# Patient Record
Sex: FEMALE | Race: BLACK OR AFRICAN AMERICAN | Employment: OTHER | URBAN - METROPOLITAN AREA
[De-identification: names, ages, dates, MRNs, and addresses within clinical notes are randomized per-mention and may not be internally consistent; named-entity substitution may affect disease eponyms.]

---

## 2017-02-03 ENCOUNTER — HOSPITAL ENCOUNTER (OUTPATIENT)
Dept: MAMMOGRAPHY | Age: 67
Discharge: HOME | End: 2017-02-03
Attending: INTERNAL MEDICINE
Payer: COMMERCIAL

## 2017-02-03 DIAGNOSIS — R92.8 ABNORMAL MAMMOGRAM: ICD-10-CM

## 2017-02-03 PROCEDURE — 77066 DX MAMMO INCL CAD BI: CPT

## 2017-02-08 RX ORDER — LISINOPRIL 10 MG/1
TABLET ORAL
Qty: 90 TAB | Refills: 1 | Status: SHIPPED | OUTPATIENT
Start: 2017-02-08 | End: 2017-06-29 | Stop reason: SDUPTHER

## 2017-02-09 ENCOUNTER — HOSPITAL ENCOUNTER (OUTPATIENT)
Dept: MRI IMAGING | Age: 67
End: 2017-02-09
Attending: OTOLARYNGOLOGY

## 2017-02-11 ENCOUNTER — APPOINTMENT (OUTPATIENT)
Dept: GENERAL RADIOLOGY | Age: 67
End: 2017-02-11
Attending: EMERGENCY MEDICINE

## 2017-02-11 ENCOUNTER — HOSPITAL ENCOUNTER (EMERGENCY)
Age: 67
Discharge: HOME | End: 2017-02-11
Attending: EMERGENCY MEDICINE

## 2017-02-11 VITALS
SYSTOLIC BLOOD PRESSURE: 187 MMHG | DIASTOLIC BLOOD PRESSURE: 86 MMHG | BODY MASS INDEX: 39.56 KG/M2 | OXYGEN SATURATION: 95 % | TEMPERATURE: 98.4 F | WEIGHT: 215 LBS | HEART RATE: 96 BPM | RESPIRATION RATE: 22 BRPM | HEIGHT: 62 IN

## 2017-02-11 DIAGNOSIS — J10.1 INFLUENZA A: Primary | ICD-10-CM

## 2017-02-11 LAB
INFLUENZA A AG (POC): POSITIVE
INFLUENZA AG (POC) NEGATIVE CTRL.: ABNORMAL
INFLUENZA AG (POC) POSITIVE CTRL.: ABNORMAL
INFLUENZA B AG (POC): NEGATIVE
LOT NUMBER POC: ABNORMAL

## 2017-02-11 RX ORDER — OSELTAMIVIR PHOSPHATE 75 MG/1
75 CAPSULE ORAL 2 TIMES DAILY
Qty: 10 CAP | Refills: 0 | Status: SHIPPED | OUTPATIENT
Start: 2017-02-11 | End: 2017-02-16

## 2017-02-11 RX ORDER — BENZONATATE 100 MG/1
100 CAPSULE ORAL
Qty: 30 CAP | Refills: 0 | Status: SHIPPED | OUTPATIENT
Start: 2017-02-11 | End: 2017-02-18

## 2017-02-11 RX ORDER — AMOXICILLIN 500 MG/1
500 CAPSULE ORAL 3 TIMES DAILY
COMMUNITY
End: 2017-06-11

## 2017-02-11 RX ORDER — ALBUTEROL SULFATE 5 MG/ML
2.5 SOLUTION RESPIRATORY (INHALATION)
Qty: 0.5 ML | Refills: 0 | Status: SHIPPED
Start: 2017-02-11 | End: 2019-12-16 | Stop reason: SDUPTHER

## 2017-02-11 NOTE — LETTER
NOTIFICATION RETURN TO WORK / SCHOOL 
 
2/11/2017 4:19 PM 
 
Ms. Ivan Lucas [address] To Whom It May Concern: 
 
Ivan Lucas is currently under the care of [address] [place] [address]. She will return to work/school on: 2/14/17 If there are questions or concerns please have the patient contact our office. Sincerely, Jared Reyes.  DO

## 2017-02-11 NOTE — DISCHARGE INSTRUCTIONS
Influenza (Flu): Care Instructions  Your Care Instructions  Influenza (flu) is an infection in the lungs and breathing passages. It is caused by the influenza virus. There are different strains, or types, of the flu virus from year to year. Unlike the common cold, the flu comes on suddenly and the symptoms, such as a cough, congestion, fever, chills, fatigue, aches, and pains, are more severe. These symptoms may last up to 10 days. Although the flu can make you feel very sick, it usually doesn't cause serious health problems. Home treatment is usually all you need for flu symptoms. But your doctor may prescribe antiviral medicine to prevent other health problems, such as pneumonia, from developing. Older people and those who have a long-term health condition, such as lung disease, are most at risk for having pneumonia or other health problems. Follow-up care is a key part of your treatment and safety. Be sure to make and go to all appointments, and call your doctor if you are having problems. Its also a good idea to know your test results and keep a list of the medicines you take. How can you care for yourself at home? · Get plenty of rest.  · Drink plenty of fluids, enough so that your urine is light yellow or clear like water. If you have kidney, heart, or liver disease and have to limit fluids, talk with your doctor before you increase the amount of fluids you drink. · Take an over-the-counter pain medicine if needed, such as acetaminophen (Tylenol), ibuprofen (Advil, Motrin), or naproxen (Aleve), to relieve fever, headache, and muscle aches. Read and follow all instructions on the label. No one younger than 20 should take aspirin. It has been linked to Reye syndrome, a serious illness. · Do not smoke. Smoking can make the flu worse. If you need help quitting, talk to your doctor about stop-smoking programs and medicines. These can increase your chances of quitting for good.   · Breathe moist air from a hot shower or from a sink filled with hot water to help clear a stuffy nose. · Before you use cough and cold medicines, check the label. These medicines may not be safe for young children or for people with certain health problems. · If the skin around your nose and lips becomes sore, put some petroleum jelly on the area. · To ease coughing:  ¨ Drink fluids to soothe a scratchy throat. ¨ Suck on cough drops or plain hard candy. ¨ Take an over-the-counter cough medicine that contains dextromethorphan to help you get some sleep. Read and follow all instructions on the label. ¨ Raise your head at night with an extra pillow. This may help you rest if coughing keeps you awake. · Take any prescribed medicine exactly as directed. Call your doctor if you think you are having a problem with your medicine. To avoid spreading the flu  · Wash your hands regularly, and keep your hands away from your face. · Stay home from school, work, and other public places until you are feeling better and your fever has been gone for at least 24 hours. The fever needs to have gone away on its own without the help of medicine. · Ask people living with you to talk to their doctors about preventing the flu. They may get antiviral medicine to keep from getting the flu from you. · To prevent the flu in the future, get a flu vaccine every fall. Encourage people living with you to get the vaccine. · Cover your mouth when you cough or sneeze. When should you call for help? Call 911 anytime you think you may need emergency care. For example, call if:  · You have severe trouble breathing. Call your doctor now or seek immediate medical care if:  · You have new or worse trouble breathing. · You seem to be getting much sicker. · You feel very sleepy or confused. · You have a new or higher fever. · You get a new rash.   Watch closely for changes in your health, and be sure to contact your doctor if:  · You begin to get better and then get worse. · You are not getting better after 1 week. Where can you learn more? Go to http://mynor-mary kay.info/. Enter J916 in the search box to learn more about \"Influenza (Flu): Care Instructions. \"  Current as of: May 23, 2016  Content Version: 11.1  © 7513-7180 [employer]. Care instructions adapted under license by [employer] (which disclaims liability or warranty for this information). If you have questions about a medical condition or this instruction, always ask your healthcare professional. [address] any warranty or liability for your use of this information.

## 2017-02-11 NOTE — UC PROVIDER NOTE
Patient is a 61 y.o. female presenting with cough. The history is provided by the patient. Cough   This is a new problem. The current episode started more than 2 days ago (since Wed night-HA with cough and confestion and wheezing-getting worse). The problem occurs constantly. The problem has been gradually worsening. The cough is non-productive. Patient reports a subjective fever - was not measured. Associated symptoms include chest pain (hurts to cough), rhinorrhea and wheezing. Pertinent negatives include no sore throat, no myalgias and no shortness of breath. She has tried decongestants for the symptoms. She is not a smoker. Her past medical history is significant for asthma. Past Medical History   Diagnosis Date    Asthma     CAD (coronary artery disease)      \"mild\" per Dr Hoang Willoughby note    Diabetes [facility])      Dr Nicole Schrader     Hypertension     Screening for colon cancer 2/16/05     Dr Burk Sees in 10 years    Stroke 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     Thyroid disease     Unspecified sleep apnea      uses CPAP        Past Surgical History   Procedure Laterality Date    Cardiac catheterization  6/6/2012          Hx hysterectomy      Hx hemorrhoidectomy      Hx orthopaedic  8/2011     left shoulder         Family History   Problem Relation Age of Onset    Diabetes Mother     Cancer Mother     Breast Cancer Mother 79    Heart Disease Father     Hypertension Father     Stroke Father     Coronary Artery Disease Brother 54        Social History     Social History    Marital status: SINGLE     Spouse name: N/A    Number of children: N/A    Years of education: N/A     Occupational History    Not on file.      Social History Main Topics    Smoking status: Former Smoker     Quit date: 9/17/2004    Smokeless tobacco: Never Used    Alcohol use No    Drug use: No    Sexual activity: Not on file     Other Topics Concern    Not on file     Social History Narrative                ALLERGIES: Latex; Codeine; Contrast dye [iodine]; and Seafood [shellfish containing products]    Review of Systems   Constitutional: Positive for fatigue and fever. HENT: Positive for congestion and rhinorrhea. Negative for sore throat. Respiratory: Positive for cough and wheezing. Negative for shortness of breath. Cardiovascular: Positive for chest pain (hurts to cough). Musculoskeletal: Negative for myalgias. Vitals:    02/11/17 1510   BP: (!) 207/90   Pulse: 90   Resp: 22   Temp: 98.4 °F (36.9 °C)   SpO2: 95%   Weight: 97.5 kg (215 lb)   Height: 5' 2\" (1.575 m)       Physical Exam   Constitutional: She is oriented to person, place, and time. She appears well-developed and well-nourished. No distress. Uncomfortable but nontoxic   HENT:   Head: Normocephalic. Mouth/Throat: Oropharynx is clear and moist. No oropharyngeal exudate. Nasal congestion without sinus pain and PND is presesnt   Eyes: Conjunctivae and EOM are normal.   Cardiovascular: Normal rate, regular rhythm and normal heart sounds. No murmur heard. Pulmonary/Chest: Effort normal. No respiratory distress. She has wheezes (some with exertion but clears at rest). She has no rales. She exhibits no tenderness. Abdominal: Bowel sounds are normal.   Musculoskeletal: Normal range of motion. She exhibits no edema. Neurological: She is alert and oriented to person, place, and time. Skin: Skin is warm. No erythema. Psychiatric: She has a normal mood and affect. Her behavior is normal.   Nursing note and vitals reviewed. MDM     Differential Diagnosis; Clinical Impression; Plan:     CLINICAL IMPRESSION:  Influenza A  (primary encounter diagnosis)    Plan:  1. tamiflu  2. albuterol  3.  rest  Close fu  Amount and/or Complexity of Data Reviewed:   Clinical lab tests:  Ordered and reviewed  Tests in the radiology section of CPT®:  Ordered  Risk of Significant Complications, Morbidity, and/or Mortality:   Presenting problems: Moderate  Management options:   Moderate  Progress:   Patient progress:  Stable      Procedures

## 2017-02-17 ENCOUNTER — HOSPITAL ENCOUNTER (OUTPATIENT)
Dept: MRI IMAGING | Age: 67
Discharge: HOME | End: 2017-02-17
Attending: OTOLARYNGOLOGY
Payer: COMMERCIAL

## 2017-02-17 DIAGNOSIS — R22.0 HEAD SWELLING: ICD-10-CM

## 2017-02-17 DIAGNOSIS — K11.20 SIALOADENITIS: ICD-10-CM

## 2017-02-17 DIAGNOSIS — Z78.9 NONSMOKER: ICD-10-CM

## 2017-02-17 DIAGNOSIS — R22.1 NECK SWELLING: ICD-10-CM

## 2017-02-17 PROCEDURE — 70543 MRI ORBT/FAC/NCK W/O &W/DYE: CPT

## 2017-02-17 PROCEDURE — 74011250636 HC RX REV CODE- 250/636: Performed by: OTOLARYNGOLOGY

## 2017-02-17 PROCEDURE — A9585 GADOBUTROL INJECTION: HCPCS | Performed by: OTOLARYNGOLOGY

## 2017-02-17 RX ADMIN — GADOBUTROL 10 ML: 604.72 INJECTION INTRAVENOUS at 17:40

## 2017-03-22 ENCOUNTER — TELEPHONE (OUTPATIENT)
Dept: INTERNAL MEDICINE CLINIC | Age: 67
End: 2017-03-22

## 2017-05-19 ENCOUNTER — OFFICE VISIT (OUTPATIENT)
Dept: SLEEP MEDICINE | Age: 67
End: 2017-05-19

## 2017-05-19 VITALS
DIASTOLIC BLOOD PRESSURE: 72 MMHG | BODY MASS INDEX: 40.85 KG/M2 | HEIGHT: 62 IN | OXYGEN SATURATION: 97 % | HEART RATE: 62 BPM | WEIGHT: 222 LBS | SYSTOLIC BLOOD PRESSURE: 116 MMHG

## 2017-05-19 DIAGNOSIS — G47.33 OBSTRUCTIVE SLEEP APNEA (ADULT) (PEDIATRIC): Primary | ICD-10-CM

## 2017-05-19 DIAGNOSIS — I10 ESSENTIAL HYPERTENSION: ICD-10-CM

## 2017-05-19 DIAGNOSIS — Z79.4 TYPE 2 DIABETES MELLITUS WITH COMPLICATION, WITH LONG-TERM CURRENT USE OF INSULIN (HCC): ICD-10-CM

## 2017-05-19 DIAGNOSIS — E11.8 TYPE 2 DIABETES MELLITUS WITH COMPLICATION, WITH LONG-TERM CURRENT USE OF INSULIN (HCC): ICD-10-CM

## 2017-05-19 RX ORDER — INSULIN GLARGINE 100 [IU]/ML
42 INJECTION, SOLUTION SUBCUTANEOUS
COMMUNITY
Start: 2017-03-06 | End: 2017-12-20 | Stop reason: ALTCHOICE

## 2017-05-19 RX ORDER — BLOOD SUGAR DIAGNOSTIC
STRIP MISCELLANEOUS
COMMUNITY
Start: 2017-04-16 | End: 2019-06-01 | Stop reason: ALTCHOICE

## 2017-05-19 RX ORDER — PEN NEEDLE, DIABETIC 31 GX5/16"
NEEDLE, DISPOSABLE MISCELLANEOUS
COMMUNITY
Start: 2017-03-21

## 2017-05-19 NOTE — PROGRESS NOTES
[address] [facility] [place]. [place], [address]  Tel.  [phone]  Fax. [address] [facility] [address]  [place], [address] [facility]  Tel.  [phone]  Fax. [phone] [address]  Tel.  [phone]  Fax. [phone]       Subjective:      Tamiko Vieira is a 61 y.o. female who I am asked to see in consultation for evaluation and management of sleep apnea. She was diagnosed with sleep apnea 10 years ago. She is using her device regularly. She complains of snoring, snorting, periods of not breathing if she doesn't use her CPAP associated with feels sleepy during the day. Symptoms began 2 years ago, gradually worsening since that time. She usually can fall asleep in 5 minutes. Family or house members note snoring, snorting. She denies falling asleep while at work, driving. There are moderate  mask comfort problems. The following problems are noted with PAP:     Drowsiness no Problems exhaling no   Snoring No not if she wears machine Forget to put on no   Mask Comfortable Yes, uses murphy Lt but doesn't like the marks it leaves on her face Can't fall asleep no   Dry Mouth no Mask falls off no   Air Leaking yes Frequent awakenings no     Makayla Garcia does not wake up frequently at night. She is not bothered by waking up too early and left unable to get back to sleep. She actually sleeps about 7 hours at night and wakes up about 1 times during the night. She does work shifts:  First Shift. Anamikabradambrocio indicates she does not get too little sleep at night. Her bedtime is 2300. She awakens at 0530. She does (sometimes) take naps. She takes 2 naps a week lasting 2. She has the following observed behaviors: Loud snoring;  . Other remarks:    She is about to retire at the end of the year. She works for the state in .   Leopold Sleepiness Score: 4      Allergies   Allergen Reactions    Latex Itching    Codeine Itching and Other (comments)     hallucinate    Contrast Dye [Iodine] Rash and Itching     Given pre-cardiac cath    Seafood [Shellfish Containing Products] Swelling         Current Outpatient Prescriptions:     ONETOUCH ULTRA TEST strip, , Disp: , Rfl:     LANTUS SOLOSTAR 100 unit/mL (3 mL) pen, , Disp: , Rfl:     BD INSULIN PEN NEEDLE UF SHORT 31 gauge x 5/16\" ndle, , Disp: , Rfl:     albuterol (PROVENTIL) 5 mg/mL nebulizer solution, 0.5 mL by Nebulization route every four (4) hours as needed for Wheezing., Disp: 0.5 mL, Rfl: 0    lisinopril (PRINIVIL, ZESTRIL) 10 mg tablet, TAKE 1 TABLET DAILY, Disp: 90 Tab, Rfl: 1    empagliflozin (JARDIANCE) 25 mg tablet, Take  by mouth daily. , Disp: , Rfl:     triamterene-hydroCHLOROthiazide (DYAZIDE) 37.5-25 mg per capsule, TAKE 1 CAPSULE TWICE A DAY, Disp: 180 Cap, Rfl: 3    metoprolol tartrate (LOPRESSOR) 25 mg tablet, TAKE 1 TABLET TWICE A DAY, Disp: 180 Tab, Rfl: 2    albuterol (ACCUNEB) 1.25 mg/3 mL nebu, USE 1 VIAL EVERY 4 HOURS AS NEEDED FOR WHEEZING, Disp: 225 mL, Rfl: 3    ADVAIR DISKUS 100-50 mcg/dose diskus inhaler, USE 1 INHALATION TWO TIMES A DAY, Disp: 180 Each, Rfl: 3    PROAIR HFA 90 mcg/actuation inhaler, USE 1 INHALATION EVERY 4 HOURS AS NEEDED WHEEZING OR SHORTNESS OF BREATH, Disp: 25.5 Inhaler, Rfl: 2    amLODIPine (NORVASC) 10 mg tablet, TAKE 1 TABLET DAILY, Disp: 90 Tab, Rfl: 3    SYNTHROID 137 mcg tablet, Take 137 mcg by mouth daily. , Disp: , Rfl:     ergocalciferol (VITAMIN D2) 50,000 unit capsule, Take 50,000 Units by mouth every Monday., Disp: , Rfl:     rosuvastatin (CRESTOR) 20 mg tablet, Take 20 mg by mouth nightly., Disp: , Rfl:     fluticasone (FLONASE) 50 mcg/actuation nasal spray, 2 sprays each morning., Disp: 1 Bottle, Rfl: 1    insulin glargine (LANTUS) 100 unit/mL Soln, 36 Units by SubCUTAneous route nightly., Disp: , Rfl:     insulin lispro (HUMALOG) 100 unit/mL Soln, by SubCUTAneous route.  22 units tid ( Sliding scale), Disp: , Rfl:    aspirin delayed-release 81 mg tablet, Take 81 mg by mouth as needed. , Disp: , Rfl:     amoxicillin (AMOXIL) 500 mg capsule, Take 500 mg by mouth three (3) times daily. , Disp: , Rfl:     buPROPion SR (WELLBUTRIN SR) 150 mg SR tablet, Take  by mouth two (2) times a day., Disp: , Rfl:     ALPRAZolam (XANAX) 0.25 mg tablet, Take 1 Tab by mouth two (2) times daily as needed for Anxiety. Max Daily Amount: 0.5 mg., Disp: 20 Tab, Rfl: 0    scopolamine (TRANSDERM-SCOP) 1.5 mg (1 mg over 3 days) pt3d, 1 Patch by TransDERmal route every seventy-two (72) hours. , Disp: 4 Patch, Rfl: 0    canagliflozin (INVOKANA) 100 mg tablet, Take 300 mg by mouth Daily (before breakfast). , Disp: , Rfl:      She  has a past medical history of Asthma; CAD (coronary artery disease); Diabetes ([facility] [address] Hypertension; Screening for colon cancer (2/16/05); Stroke [facility]) (2004); [facility] ([facility]); Thyroid disease; and Unspecified sleep apnea. She  has a past surgical history that includes cardiac catheterization (6/6/2012); hysterectomy; hemorrhoidectomy; orthopaedic (8/2011); heent; and cardiac surg procedure unlist.    She family history includes Breast Cancer (age of onset: 79) in her mother; Cancer in her mother; Coronary Artery Disease (age of onset: 54) in her brother; Diabetes in her mother; Heart Disease in her father; Hypertension in her father; Stroke in her father. She  reports that she quit smoking about 12 years ago. She has never used smokeless tobacco. She reports that she does not drink alcohol or use illicit drugs. Review of Systems:  Constitutional:  +30 pound weight gain. Eyes:  No blurred vision.   CVS:  No significant chest pain  Pulm:  No significant shortness of breath, occasional wheezing, she is treated for asthma  GI:  No significant nausea or vomiting  :  No significant nocturia  Musculoskeletal:  No significant joint pain at night  Skin:  No significant rashes  Neuro:  No significant dizziness , she has nerve pain in feet from diabetes  Psych:  No active mood issues    Sleep Review of Systems: notable for no difficulty falling asleep; infrequent awakenings at night;  occasional dreaming noted; no nightmares ; no early morning headaches ; no memory problems; no concentration issues; no history of any automobile or occupational accidents due to daytime drowsiness. Objective:     Visit Vitals    /72    Pulse 62    Ht 5' 2\" (1.575 m)    Wt 222 lb (100.7 kg)    SpO2 97%    BMI 40.6 kg/m2         General:   Not in acute distress   Eyes:  Anicteric sclerae, no obvious strabismus   Nose:  No obvious nasal septum deviation    Oropharynx:   Class 3 oropharyngeal outlet, thick tongue base, enlarged and boggy uvula, low-lying soft palate, narrow tonsilo-pharyngeal pilars   Tonsils:   tonsils are absent   Neck:   Neck circ. in \"inches\": 15.5; midline trachea   Chest/Lungs:  Equal lung expansion, clear on auscultation    CVS:  Normal rate, regular rhythm; no JVD   Skin:  Warm to touch; no obvious rashes   Neuro:  No focal deficits ; no obvious tremor    Psych:  Normal affect,  normal countenance;          Assessment:       ICD-10-CM ICD-9-CM    1. Obstructive sleep apnea (adult) (pediatric) G47.33 327.23 SLEEP STUDY UNATTENDED, 4 CHANNEL   2. Essential hypertension I10 401.9    3. Type 2 diabetes mellitus with complication, with long-term current use of insulin ([facility]) E11.8 250.90     Z79.4 V58.67        Plan:         I have ordered a home sleep apnea test.   I will call her with the results. Treatment options for sleep apnea were reviewed. She will need a replacement CPAP. We talked about the features available with the newer devices. We also talked about newer styles of nasal pillows. I have counseled the patient regarding the benefits of weight loss. Counseling was provided regarding the importance of regular PAP use and on proper sleep hygiene and safe driving.   2. Hypertension - she continues on her current regimen. I have reviewed the relationship between hypertension as it relates to sleep-disordered breathing. 3. Type II diabetes - she continues on her current regimen. She is now trying to get her sugar under control. I have reviewed the relationship between sleep disordered breathing as it relates to diabetes. Thank you for allowing us to participate in your patient's medical care.   Darwin Jerez MD  Diplomate in Sleep Medicine  [facility]

## 2017-05-19 NOTE — PATIENT INSTRUCTIONS
[address] [facility] [place]. [place], [address]  Tel.  [phone]  Fax. 100 [facility] 60  [place], [address] [facility]  Tel.  [phone]  Fax. [phone] [address] [facility] 3 Nikita Crenshaw   Tel.  [phone]  Fax. [phone]     Sleep Apnea: After Your Visit  Your Care Instructions  Sleep apnea occurs when you frequently stop breathing for 10 seconds or longer during sleep. It can be mild to severe, based on the number of times per hour that you stop breathing or have slowed breathing. Blocked or narrowed airways in your nose, mouth, or throat can cause sleep apnea. Your airway can become blocked when your throat muscles and tongue relax during sleep. Sleep apnea is common, occurring in 1 out of 20 individuals. Individuals having any of the following characteristics should be evaluated and treated right away due to high risk and detrimental consequences from untreated sleep apnea:  1. Obesity  2. Congestive Heart failure  3. Atrial Fibrillation  4. Uncontrolled Hypertension  5. Type II Diabetes  6. Night-time Arrhythmias  7. Stroke  8. Pulmonary Hypertension  9. High-risk Driving Populations (pilots, truck drivers, etc.)  10. Patients Considering Weight-loss Surgery    How do you know you have sleep apnea? You probably have sleep apnea if you answer 'yes' to 3 or more of the following questions:  S - Have you been told that you Snore? T - Are you often Tired during the day? O - Has anyone Observed you stop breathing while sleeping? P- Do you have (or are being treated for) high blood Pressure? B - Are you obese (Body Mass Index > 35)? A - Is your Age 48years old or older? N - Is your Neck size greater than 16 inches? G - Are you male Gender? A sleep physician can prescribe a breathing device that prevents tissues in the throat from blocking your airway.  Or your doctor may recommend using a dental device (oral breathing device) to help keep your airway open. In some cases, surgery may be needed to remove enlarged tissues in the throat. Follow-up care is a key part of your treatment and safety. Be sure to make and go to all appointments, and call your doctor if you are having problems. It's also a good idea to know your test results and keep a list of the medicines you take. How can you care for yourself at home? · Lose weight, if needed. It may reduce the number of times you stop breathing or have slowed breathing. · Go to bed at the same time every night. · Sleep on your side. It may stop mild apnea. If you tend to roll onto your back, sew a pocket in the back of your pajama top. Put a tennis ball into the pocket, and stitch the pocket shut. This will help keep you from sleeping on your back. · Avoid alcohol and medicines such as sleeping pills and sedatives before bed. · Do not smoke. Smoking can make sleep apnea worse. If you need help quitting, talk to your doctor about stop-smoking programs and medicines. These can increase your chances of quitting for good. · Prop up the head of your bed 4 to 6 inches by putting bricks under the legs of the bed. · Treat breathing problems, such as a stuffy nose, caused by a cold or allergies. · Use a continuous positive airway pressure (CPAP) breathing machine if lifestyle changes do not help your apnea and your doctor recommends it. The machine keeps your airway from closing when you sleep. · If CPAP does not help you, ask your doctor whether you should try other breathing machines. A bilevel positive airway pressure machine has two types of air pressureâone for breathing in and one for breathing out. Another device raises or lowers air pressure as needed while you breathe. · If your nose feels dry or bleeds when using one of these machines, talk with your doctor about increasing moisture in the air. A humidifier may help.   · If your nose is runny or stuffy from using a breathing machine, talk with your doctor about using decongestants or a corticosteroid nasal spray. When should you call for help? Watch closely for changes in your health, and be sure to contact your doctor if:  · You still have sleep apnea even though you have made lifestyle changes. · You are thinking of trying a device such as CPAP. · You are having problems using a CPAP or similar machine. Where can you learn more? Go to [employer]en[employer]. Enter S708 in the search box to learn more about \"Sleep Apnea: After Your Visit. \"   © 8766-9568 Healthwise, [employer] Care instructions adapted under license by Kendall Pandey (which disclaims liability or warranty for this information). This care instruction is for use with your licensed healthcare professional. If you have questions about a medical condition or this instruction, always ask your healthcare professional. [employer] any warranty or liability for your use of this information. PROPER SLEEP HYGIENE    What to avoid  · Do not have drinks with caffeine, such as coffee or black tea, for 8 hours before bed. · Do not smoke or use other types of tobacco near bedtime. Nicotine is a stimulant and can keep you awake. · Avoid drinking alcohol late in the evening, because it can cause you to wake in the middle of the night. · Do not eat a big meal close to bedtime. If you are hungry, eat a light snack. · Do not drink a lot of water close to bedtime, because the need to urinate may wake you up during the night. · Do not read or watch TV in bed. Use the bed only for sleeping and sexual activity. What to try  · Go to bed at the same time every night, and wake up at the same time every morning. Do not take naps during the day. · Keep your bedroom quiet, dark, and cool. · Get regular exercise, but not within 3 to 4 hours of your bedtime. .  · Sleep on a comfortable pillow and mattress.   · If watching the clock makes you anxious, turn it facing away from you so you cannot see the time. · If you worry when you lie down, start a worry book. Well before bedtime, write down your worries, and then set the book and your concerns aside. · Try meditation or other relaxation techniques before you go to bed. · If you cannot fall asleep, get up and go to another room until you feel sleepy. Do something relaxing. Repeat your bedtime routine before you go to bed again. · Make your house quiet and calm about an hour before bedtime. Turn down the lights, turn off the TV, log off the computer, and turn down the volume on music. This can help you relax after a busy day. Drowsy Driving  The [address] [place] [address] Road Traffic Safety Administration cites drowsiness as a causing factor in more than 297,683 police reported crashes annually, resulting in 76,000 injuries and 1,500 deaths. Other surveys suggest 55% of people polled have driven while drowsy in the past year, 23% had fallen asleep but not crashed, 3% crashed, and 2% had and accident due to drowsy driving. Who is at risk? Young Drivers: One study of drowsy driving accidents states that 55% of the drivers were under 25 years. Of those, 75% were male. Shift Workers and Travelers: People who work overnight or travel across time zones frequently are at higher risk of experiencing Circadian Rhythm Disorders. They are trying to work and function when their body is programed to sleep. Sleep Deprived: Lack of sleep has a serious impact on your ability to pay attention or focus on a task. Consistently getting less than the average of 8 hours your body needs creates partial or cumulative sleep deprivation. Untreated Sleep Disorders: Sleep Apnea, Narcolepsy, R.L.S., and other sleep disorders (untreated) prevent a person from getting enough restful sleep. This leads to excessive daytime sleepiness and increases the risk for drowsy driving accidents by up to 7 times.   Medications / Alcohol: Even over the counter medications can cause drowsiness. Medications that impair a drivers attention should have a warning label. Alcohol naturally makes you sleepy and on its own can cause accidents. Combined with excessive drowsiness its effects are amplified. Signs of Drowsy Driving:   * You don't remember driving the last few miles   * You may drift out of your blake   * You are unable to focus and your thoughts wander   * You may yawn more often than normal   * You have difficulty keeping your eyes open / nodding off   * Missing traffic signs, speeding, or tailgating  Prevention-   Good sleep hygiene, lifestyle and behavioral choices have the most impact on drowsy driving. There is no substitute for sleep and the average person requires 8 hours nightly. If you find yourself driving drowsy, stop and sleep. Consider the sleep hygiene tips provided during your visit as well. Medication Refill Policy: Refills for all medications require 1 week advance notice. Please have your pharmacy fax a refill request. We are unable to fax, or call in \"controled substance\" medications and you will need to pick these prescriptions up from our office. [employer] Activation    Thank you for requesting access to [employer]. Please follow the instructions below to securely access and download your online medical record. [employer] allows you to send messages to your doctor, view your test results, renew your prescriptions, schedule appointments, and more. How Do I Sign Up? 1. In your internet browser, go to [email or web address]. 2. Click on the First Time User? Click Here link in the Sign In box. You will see the New Member Sign Up page. 3. Enter your [employer] Access Code exactly as it appears below. You will not need to use this code after youve completed the sign-up process. If you do not sign up before the expiration date, you must request a new code.     [employer] Access Code: [email or web address]  Expires: 8/17/2017 10:01 AM (This is the date your iLike access code will )    4. Enter the last four digits of your Social Security Number (xxxx) and Date of Birth (mm/dd/yyyy) as indicated and click Submit. You will be taken to the next sign-up page. 5. Create a tripJanet ID. This will be your iLike login ID and cannot be changed, so think of one that is secure and easy to remember. 6. Create a iLike password. You can change your password at any time. 7. Enter your Password Reset Question and Answer. This can be used at a later time if you forget your password. 8. Enter your e-mail address. You will receive e-mail notification when new information is available in [address] 9. Click Sign Up. You can now view and download portions of your medical record. 10. Click the Download Summary menu link to download a portable copy of your medical information. Additional Information    If you have questions, please call [phone]. Remember, iLike is NOT to be used for urgent needs. For medical emergencies, dial 911.

## 2017-05-19 NOTE — PROGRESS NOTES
[address] [facility] [place]. [place], [address]  Tel.  [phone]  Fax. 100 [facility] 60  [place], [address] [facility]  Tel.  [phone]  Fax. [phone] [address] [facility] Nikita Crenshaw   Tel.  [phone]  Fax. [phone]       S>Makayla Martinez is a 61 y.o. female seen today to receive a home sleep testing unit (HST). · Patient was educated on proper hookup and operation of the HST. · Instruction forms and documentation were reviewed and signed. · The patient demonstrated good understanding of the HST. O>    Visit Vitals    /72    Pulse 62    Ht 5' 2\" (1.575 m)    Wt 222 lb (100.7 kg)    SpO2 97%    BMI 40.6 kg/m2    Neck circ. in \"inches\": 15.5    A>  1. Obstructive sleep apnea (adult) (pediatric)    2. Essential hypertension    3. Type 2 diabetes mellitus with complication, with long-term current use of insulin ([facility])          P>  · General information regarding operations and maintenance of the device was provided. · She was provided information on sleep apnea including coresponding risk factors and the importance of proper treatment. · Follow-up appointment was made to return the HST. She will be contacted once the results have been reviewed. · She was asked to contact our office for any problems regarding her home sleep test study.

## 2017-05-22 ENCOUNTER — TELEPHONE (OUTPATIENT)
Dept: SLEEP MEDICINE | Age: 67
End: 2017-05-22

## 2017-05-22 NOTE — TELEPHONE ENCOUNTER
Patient returned HSAT today, her machine broke last week and needs order for new device ASAP. She reports she is not able to sleep without device.  We will need sleep study results in order to set her up with new DME

## 2017-05-23 ENCOUNTER — TELEPHONE (OUTPATIENT)
Dept: SLEEP MEDICINE | Age: 67
End: 2017-05-23

## 2017-05-23 DIAGNOSIS — G47.33 OBSTRUCTIVE SLEEP APNEA (ADULT) (PEDIATRIC): Primary | ICD-10-CM

## 2017-05-23 NOTE — TELEPHONE ENCOUNTER
The results of her home sleep study were reviewed. The results were positive for significant sleep disordered breathing. Treatment options were reviewed in detail. she would like to proceed with PAP therapy. I have placed an order for APAP. she will be seen in follow-up in 6 weeks to gauge treatment response and adherence to therapy. All of her questions were addressed. Please call dme and expedite her set up.  She is also interested in a rental for short term since she cannot sleep without the machine and hers broke

## 2017-05-23 NOTE — TELEPHONE ENCOUNTER
HSAT Returned    Date of Study: 5/19/2017    The following information was gathered from the patients study log:    · Lights off: 10:31 PM  · Estimated sleep onset: 10:44 PM    · Awakened a total of 3 times  · The patient felt they slept 8.5 hours  · Patient took nothing before starting the test  · Sleep quality was worse compared to a usual nights sleep. Further information provided: Increase in snoring because of device?

## 2017-05-24 ENCOUNTER — DOCUMENTATION ONLY (OUTPATIENT)
Dept: SLEEP MEDICINE | Age: 67
End: 2017-05-24

## 2017-05-31 ENCOUNTER — TELEPHONE (OUTPATIENT)
Dept: SLEEP MEDICINE | Age: 67
End: 2017-05-31

## 2017-05-31 NOTE — TELEPHONE ENCOUNTER
Scheduled patient for adherence visit. She stated she is having issues with using device due to high pressure settings, she would like a decrease in pressure.

## 2017-06-07 NOTE — TELEPHONE ENCOUNTER
Call placed by Sleep Educator per  request for assessment. Patient is 100% usage over past (12 days). Patient is doing very well with her new device and is feeling better than with her previus device. Patient was asked to contact our office for any problems regarding there PAP therapy.

## 2017-06-11 ENCOUNTER — HOSPITAL ENCOUNTER (EMERGENCY)
Age: 67
Discharge: HOME | End: 2017-06-11
Attending: FAMILY MEDICINE

## 2017-06-11 VITALS
OXYGEN SATURATION: 97 % | HEIGHT: 62 IN | WEIGHT: 224 LBS | SYSTOLIC BLOOD PRESSURE: 189 MMHG | HEART RATE: 89 BPM | TEMPERATURE: 97.2 F | DIASTOLIC BLOOD PRESSURE: 75 MMHG | RESPIRATION RATE: 18 BRPM | BODY MASS INDEX: 41.22 KG/M2

## 2017-06-11 DIAGNOSIS — J31.0 RHINITIS, UNSPECIFIED TYPE: Primary | ICD-10-CM

## 2017-06-11 RX ORDER — MONTELUKAST SODIUM 10 MG/1
10 TABLET ORAL DAILY
Qty: 30 TAB | Refills: 0 | Status: SHIPPED | OUTPATIENT
Start: 2017-06-11 | End: 2019-06-21 | Stop reason: SDUPTHER

## 2017-06-11 RX ORDER — MOMETASONE FUROATE 50 UG/1
2 SPRAY, METERED NASAL DAILY
Qty: 1 CONTAINER | Refills: 0 | Status: SHIPPED | OUTPATIENT
Start: 2017-06-11 | End: 2019-01-22

## 2017-06-11 RX ORDER — BENZONATATE 100 MG/1
100 CAPSULE ORAL
Qty: 30 CAP | Refills: 0 | Status: SHIPPED | OUTPATIENT
Start: 2017-06-11 | End: 2017-06-18

## 2017-06-11 NOTE — DISCHARGE INSTRUCTIONS
Rhinitis: Care Instructions  Your Care Instructions  Rhinitis is swelling and irritation in the nose. Allergies and infections are often the cause. Your nose may run or feel stuffy. Other symptoms are itchy and sore eyes, ears, throat, and mouth. If allergies are the cause, your doctor may do tests to find out what you are allergic to. You may be able to stop symptoms if you avoid the things that cause them. Your doctor may suggest or prescribe medicine to ease your symptoms. Follow-up care is a key part of your treatment and safety. Be sure to make and go to all appointments, and call your doctor if you are having problems. It's also a good idea to know your test results and keep a list of the medicines you take. How can you care for yourself at home? · If your rhinitis is caused by allergies, try to find out what sets off (triggers) your symptoms. Take steps to avoid these triggers. ¨ Avoid yard work. It can stir up both pollen and mold. ¨ Do not smoke or allow others to smoke around you. If you need help quitting, talk to your doctor about stop-smoking programs and medicines. These can increase your chances of quitting for good. ¨ Do not use aerosol sprays, cleaning products, or perfumes. ¨ If pollen is one of your triggers, close your house and car windows during blooming season. ¨ Clean your house often to control dust.  ¨ Keep pets outside. · If your doctor recommends over-the-counter medicines to relieve symptoms, take your medicines exactly as prescribed. Call your doctor if you think you are having a problem with your medicine. · Use saline (saltwater) nasal washes to help keep your nasal passages open and wash out mucus and bacteria. You can buy saline nose drops at a grocery store or drugstore. Or you can make your own at home by adding 1 teaspoon of salt and 1 teaspoon of baking soda to 2 cups of distilled water.  If you make your own, fill a bulb syringe with the solution, insert the tip into your nostril, and squeeze gently. Surinder White your nose. When should you call for help? Call your doctor now or seek immediate medical care if:  · You are having trouble breathing. Watch closely for changes in your health, and be sure to contact your doctor if:  · Mucus from your nose gets thicker (like pus) or has new blood in it. · You have new or worse symptoms. · You do not get better as expected. Where can you learn more? Go to http://mynor-mary kay.info/. Enter M030 in the search box to learn more about \"Rhinitis: Care Instructions. \"  Current as of: July 29, 2016  Content Version: 11.2  © 4834-7165 [employer], [employer]. Care instructions adapted under license by [employer] (which disclaims liability or warranty for this information). If you have questions about a medical condition or this instruction, always ask your healthcare professional. [address] any warranty or liability for your use of this information.

## 2017-06-11 NOTE — UC PROVIDER NOTE
Patient is a 61 y.o. female presenting with sinus pain. The history is provided by the patient. Sinus Pain    This is a new problem. The current episode started 2 days ago. The problem has not changed since onset. There has been no fever. The pain is at a severity of 3/10. Associated symptoms include congestion, sinus pressure and rhinorrhea. Pertinent negatives include no chills, no sweats and no ear pain. She has tried nothing for the symptoms. 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     Thyroid disease     Unspecified sleep apnea     uses CPAP        Past Surgical History:   Procedure Laterality Date    CARDIAC CATHETERIZATION  6/6/2012         CARDIAC SURG PROCEDURE UNLIST      heart surgery    HX HEENT      tonsillectomy    HX HEMORRHOIDECTOMY      HX HYSTERECTOMY      HX ORTHOPAEDIC  8/2011    left shoulder         Family History   Problem Relation Age of Onset    Diabetes Mother     Cancer Mother     Breast Cancer Mother 79    Heart Disease Father     Hypertension Father     Stroke Father     Coronary Artery Disease Brother 54        Social History     Social History    Marital status: SINGLE     Spouse name: N/A    Number of children: N/A    Years of education: N/A     Occupational History    Not on file. Social History Main Topics    Smoking status: Former Smoker     Quit date: 9/17/2004    Smokeless tobacco: Never Used    Alcohol use No    Drug use: No    Sexual activity: Not on file     Other Topics Concern    Not on file     Social History Narrative                ALLERGIES: Latex; Codeine;  Contrast dye [iodine]; and Seafood [shellfish containing products]    Review of Systems   Constitutional: Negative for chills. HENT: Positive for congestion, rhinorrhea and sinus pressure. Negative for ear pain. Vitals:    06/11/17 1126   BP: 189/75   Pulse: 89   Resp: 18   Temp: 97.2 °F (36.2 °C)   SpO2: 97%   Weight: 101.6 kg (224 lb)   Height: 5' 2\" (1.575 m)       Physical Exam   Constitutional: She is oriented to person, place, and time. She appears well-developed and well-nourished. HENT:   Head: Normocephalic and atraumatic. Eyes: Conjunctivae and EOM are normal.   Cardiovascular: Normal rate, regular rhythm and normal heart sounds. Pulmonary/Chest: Effort normal and breath sounds normal. No respiratory distress. She has no wheezes. She has no rales. She exhibits no tenderness. Neurological: She is alert and oriented to person, place, and time. Skin: Skin is warm and dry. Psychiatric: She has a normal mood and affect. Her behavior is normal. Judgment and thought content normal.   Nursing note and vitals reviewed. MDM     Differential Diagnosis; Clinical Impression; Plan:     CLINICAL IMPRESSION:  Rhinitis, unspecified type  (primary encounter diagnosis)    Plan:  1. singulair   2. nasonex   3. Tessalon   Risk of Significant Complications, Morbidity, and/or Mortality:   Presenting problems: Moderate  Diagnostic procedures: Moderate  Management options:   Moderate  Progress:   Patient progress:  Stable      Procedures

## 2017-06-15 ENCOUNTER — HOSPITAL ENCOUNTER (EMERGENCY)
Age: 67
Discharge: HOME | End: 2017-06-15
Attending: FAMILY MEDICINE

## 2017-06-15 VITALS
WEIGHT: 224 LBS | OXYGEN SATURATION: 97 % | DIASTOLIC BLOOD PRESSURE: 65 MMHG | HEART RATE: 92 BPM | HEIGHT: 62 IN | BODY MASS INDEX: 41.22 KG/M2 | SYSTOLIC BLOOD PRESSURE: 145 MMHG | RESPIRATION RATE: 18 BRPM | TEMPERATURE: 97.6 F

## 2017-06-15 DIAGNOSIS — J01.90 ACUTE SINUSITIS, RECURRENCE NOT SPECIFIED, UNSPECIFIED LOCATION: ICD-10-CM

## 2017-06-15 DIAGNOSIS — H10.31 ACUTE BACTERIAL CONJUNCTIVITIS OF RIGHT EYE: Primary | ICD-10-CM

## 2017-06-15 RX ORDER — GENTAMICIN SULFATE 3 MG/ML
1 SOLUTION/ DROPS OPHTHALMIC EVERY 4 HOURS
Qty: 5 ML | Refills: 0 | Status: SHIPPED | OUTPATIENT
Start: 2017-06-15 | End: 2017-12-20 | Stop reason: ALTCHOICE

## 2017-06-15 RX ORDER — FLUTICASONE PROPIONATE 50 MCG
SPRAY, SUSPENSION (ML) NASAL
Qty: 1 BOTTLE | Refills: 0 | Status: SHIPPED | OUTPATIENT
Start: 2017-06-15 | End: 2019-01-22

## 2017-06-15 RX ORDER — AMOXICILLIN 875 MG/1
875 TABLET, FILM COATED ORAL 2 TIMES DAILY
Qty: 20 TAB | Refills: 0 | Status: SHIPPED | OUTPATIENT
Start: 2017-06-15 | End: 2017-06-25

## 2017-06-15 NOTE — DISCHARGE INSTRUCTIONS
Pinkeye: Care Instructions  Your Care Instructions    Pinkeye is redness and swelling of the eye surface and the conjunctiva (the lining of the eyelid and the covering of the white part of the eye). Pinkeye is also called conjunctivitis. Pinkeye is often caused by infection with bacteria or a virus. Dry air, allergies, smoke, and chemicals are other common causes. Pinkeye often clears on its own in 7 to 10 days. Antibiotics only help if the pinkeye is caused by bacteria. Pinkeye caused by infection spreads easily. If an allergy or chemical is causing pinkeye, it will not go away unless you can avoid whatever is causing it. Follow-up care is a key part of your treatment and safety. Be sure to make and go to all appointments, and call your doctor if you are having problems. Its also a good idea to know your test results and keep a list of the medicines you take. How can you care for yourself at home? · Wash your hands often. Always wash them before and after you treat pinkeye or touch your eyes or face. · Use moist cotton or a clean, wet cloth to remove crust. Wipe from the inside corner of the eye to the outside. Use a clean part of the cloth for each wipe. · Put cold or warm wet cloths on your eye a few times a day if the eye hurts. · Do not wear contact lenses or eye makeup until the pinkeye is gone. Throw away any eye makeup you were using when you got pinkeye. Clean your contacts and storage case. If you wear disposable contacts, use a new pair when your eye has cleared and it is safe to wear contacts again. · If the doctor gave you antibiotic ointment or eyedrops, use them as directed. Use the medicine for as long as instructed, even if your eye starts looking better soon. Keep the bottle tip clean, and do not let it touch the eye area. · To put in eyedrops or ointment:  ¨ Tilt your head back, and pull your lower eyelid down with one finger.   ¨ Drop or squirt the medicine inside the lower lid.  ¨ Close your eye for 30 to 60 seconds to let the drops or ointment move around. ¨ Do not touch the ointment or dropper tip to your eyelashes or any other surface. · Do not share towels, pillows, or washcloths while you have pinkeye. When should you call for help? Call your doctor now or seek immediate medical care if:  · You have pain in your eye, not just irritation on the surface. · You have a change in vision or loss of vision. · You have an increase in discharge from the eye. · Your eye has not started to improve or begins to get worse within 48 hours after you start using antibiotics. · Pinkeye lasts longer than 7 days. Watch closely for changes in your health, and be sure to contact your doctor if you have any problems. Where can you learn more? Go to http://mynor-mary kay.info/. Enter Y392 in the search box to learn more about \"Pinkeye: Care Instructions. \"  Current as of: May 27, 2016  Content Version: 11.2  © 1557-7973 [employer]. Care instructions adapted under license by [employer] (which disclaims liability or warranty for this information). If you have questions about a medical condition or this instruction, always ask your healthcare professional. [address] any warranty or liability for your use of this information. Sinusitis: Care Instructions  Your Care Instructions    Sinusitis is an infection of the lining of the sinus cavities in your head. Sinusitis often follows a cold. It causes pain and pressure in your head and face. In most cases, sinusitis gets better on its own in 1 to 2 weeks. But some mild symptoms may last for several weeks. Sometimes antibiotics are needed. Follow-up care is a key part of your treatment and safety. Be sure to make and go to all appointments, and call your doctor if you are having problems.  It's also a good idea to know your test results and keep a list of the medicines you take.  How can you care for yourself at home? · Take an over-the-counter pain medicine, such as acetaminophen (Tylenol), ibuprofen (Advil, Motrin), or naproxen (Aleve). Read and follow all instructions on the label. · If the doctor prescribed antibiotics, take them as directed. Do not stop taking them just because you feel better. You need to take the full course of antibiotics. · Be careful when taking over-the-counter cold or flu medicines and Tylenol at the same time. Many of these medicines have acetaminophen, which is Tylenol. Read the labels to make sure that you are not taking more than the recommended dose. Too much acetaminophen (Tylenol) can be harmful. · Breathe warm, moist air from a steamy shower, a hot bath, or a sink filled with hot water. Avoid cold, dry air. Using a humidifier in your home may help. Follow the directions for cleaning the machine. · Use saline (saltwater) nasal washes to help keep your nasal passages open and wash out mucus and bacteria. You can buy saline nose drops at a grocery store or drugstore. Or you can make your own at home by adding 1 teaspoon of salt and 1 teaspoon of baking soda to 2 cups of distilled water. If you make your own, fill a bulb syringe with the solution, insert the tip into your nostril, and squeeze gently. [place] your nose. · Put a hot, wet towel or a warm gel pack on your face 3 or 4 times a day for 5 to 10 minutes each time. · Try a decongestant nasal spray like oxymetazoline (Afrin). Do not use it for more than 3 days in a row. Using it for more than 3 days can make your congestion worse. When should you call for help? Call your doctor now or seek immediate medical care if:  · You have new or worse swelling or redness in your face or around your eyes. · You have a new or higher fever. Watch closely for changes in your health, and be sure to contact your doctor if:  · You have new or worse facial pain.   · The mucus from your nose becomes thicker (like pus) or has new blood in it. · You are not getting better as expected. Where can you learn more? Go to http://mynor-mary kay.info/. Enter N196 in the search box to learn more about \"Sinusitis: Care Instructions. \"  Current as of: July 29, 2016  Content Version: 11.2  © 2042-5774 [employer]. Care instructions adapted under license by [employer] (which disclaims liability or warranty for this information). If you have questions about a medical condition or this instruction, always ask your healthcare professional. [place] [address] any warranty or liability for your use of this information.

## 2017-06-18 NOTE — UC PROVIDER NOTE
Patient is a 61 y.o. female presenting with eye pain and sinus problems. The history is provided by the patient. Eye Pain    This is a new problem. The current episode started 2 days ago. The problem occurs constantly. The problem has not changed since onset. Both eyes are affected. The injury mechanism was none. There is no history of trauma to the eye. There is no known exposure to pink eye. She does not wear contacts. Associated symptoms include discharge, eye redness and pain. Pertinent negatives include no foreign body sensation. Sinus Infection    This is a new problem. The current episode started more than 1 week ago. There has been no fever. Associated symptoms include congestion and sinus pressure. She has tried acetaminophen for the symptoms. 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     Thyroid disease     Unspecified sleep apnea     uses CPAP        Past Surgical History:   Procedure Laterality Date    CARDIAC CATHETERIZATION  6/6/2012         CARDIAC SURG PROCEDURE UNLIST      heart surgery    HX HEENT      tonsillectomy    HX HEMORRHOIDECTOMY      HX HYSTERECTOMY      HX ORTHOPAEDIC  8/2011    left shoulder         Family History   Problem Relation Age of Onset    Diabetes Mother     Cancer Mother     Breast Cancer Mother 79    Heart Disease Father     Hypertension Father     Stroke Father     Coronary Artery Disease Brother 54        Social History     Social History    Marital status: SINGLE     Spouse name: N/A    Number of children: N/A    Years of education: N/A     Occupational History    Not on file.      Social History Main Topics    Smoking status: Former Smoker     Quit date: 9/17/2004    Smokeless tobacco: Never Used    Alcohol use No    Drug use: No    Sexual activity: Not on file     Other Topics Concern    Not on file     Social History Narrative                ALLERGIES: Latex; Codeine; Contrast dye [iodine]; and Seafood [shellfish containing products]    Review of Systems   HENT: Positive for congestion and sinus pressure. Eyes: Positive for pain, discharge and redness. All other systems reviewed and are negative. Vitals:    06/15/17 1730   BP: 145/65   Pulse: 92   Resp: 18   Temp: 97.6 °F (36.4 °C)   SpO2: 97%   Weight: 101.6 kg (224 lb)   Height: 5' 2\" (1.575 m)       Physical Exam   Constitutional: No distress. HENT:   Right Ear: Tympanic membrane and ear canal normal.   Left Ear: Tympanic membrane and ear canal normal.   Nose: Right sinus exhibits maxillary sinus tenderness. Left sinus exhibits maxillary sinus tenderness. Mouth/Throat: No oropharyngeal exudate, posterior oropharyngeal edema or posterior oropharyngeal erythema. Eyes: Right eye exhibits no discharge. Left eye exhibits no discharge. Right conjunctiva is injected. Left conjunctiva is injected. Neck: Neck supple. Pulmonary/Chest: Effort normal and breath sounds normal. No respiratory distress. She has no wheezes. She has no rales. Lymphadenopathy:     She has no cervical adenopathy. Skin: No rash noted. Nursing note and vitals reviewed.       MDM     Differential Diagnosis; Clinical Impression; Plan:     CLINICAL IMPRESSION:  Acute bacterial conjunctivitis of right eye  (primary encounter diagnosis)  Acute sinusitis, recurrence not specified, unspecified location      DDX    Plan:    Eye drops- gentamycin  Amoxicillin and Flonase  otc Claritin and decongestants  Amount and/or Complexity of Data Reviewed:    Review and summarize past medical records:  Yes  Risk of Significant Complications, Morbidity, and/or Mortality:   Presenting problems: Moderate  Management options:   Moderate  Progress:   Patient progress:  Stable      Procedures

## 2017-06-29 ENCOUNTER — OFFICE VISIT (OUTPATIENT)
Dept: INTERNAL MEDICINE CLINIC | Age: 67
End: 2017-06-29

## 2017-06-29 VITALS
SYSTOLIC BLOOD PRESSURE: 146 MMHG | WEIGHT: 227.6 LBS | HEIGHT: 62 IN | TEMPERATURE: 97.7 F | OXYGEN SATURATION: 95 % | BODY MASS INDEX: 41.88 KG/M2 | HEART RATE: 80 BPM | DIASTOLIC BLOOD PRESSURE: 70 MMHG

## 2017-06-29 DIAGNOSIS — I10 ESSENTIAL HYPERTENSION: Primary | ICD-10-CM

## 2017-06-29 RX ORDER — LISINOPRIL 10 MG/1
TABLET ORAL
Qty: 90 TAB | Refills: 3 | Status: SHIPPED | OUTPATIENT
Start: 2017-06-29 | End: 2018-06-21 | Stop reason: SINTOL

## 2017-06-29 RX ORDER — METOPROLOL TARTRATE 25 MG/1
TABLET, FILM COATED ORAL
Qty: 180 TAB | Refills: 3 | Status: SHIPPED | OUTPATIENT
Start: 2017-06-29 | End: 2018-12-14 | Stop reason: SDUPTHER

## 2017-06-29 RX ORDER — AMOXICILLIN 875 MG/1
875 TABLET, FILM COATED ORAL 2 TIMES DAILY
COMMUNITY
End: 2017-12-20 | Stop reason: ALTCHOICE

## 2017-06-29 RX ORDER — AMLODIPINE BESYLATE 10 MG/1
TABLET ORAL
Qty: 90 TAB | Refills: 3 | Status: SHIPPED | OUTPATIENT
Start: 2017-06-29 | End: 2018-10-27 | Stop reason: SDUPTHER

## 2017-06-29 RX ORDER — BENZONATATE 100 MG/1
100 CAPSULE ORAL
COMMUNITY
End: 2019-01-22

## 2017-06-29 NOTE — PROGRESS NOTES
HISTORY OF PRESENT ILLNESS  Makayla Milner is a 61 y.o. female. HPI   F/u htn hld asthma  F/u right Warthins Tumor--saw ENT Dr Germania Schrader who did not advise surgery given age, dm-2, hx cva  Got new cpap device working well-Dr Milton Blount for DM-2 but wants to change endo MD though-last a1c around 10 per pt  Asthma has been quiet  C/o swelling of right >left foot x months but improved with leg elevation    Patient Active Problem List    Diagnosis Date Noted    Warthin's tumor 07/01/2016    HTN (hypertension) 09/13/2013    Axillary hidradenitis suppurativa 08/07/2013    Esophageal reflux 01/15/2013    Renal failure, acute ([facility] [address] 01/13/2013    Asthma 12/14/2012    Myocardial ischemia 06/06/2012    Shortness of breath 06/06/2012    Coronary artery disease 06/06/2012    PVC's (premature ventricular contractions) 06/01/2012    [facility], [facility] 04/23/2012    Grave's disease 10/15/2010    Obesity 10/20/2009    DJD (degenerative joint disease) of hip 10/20/2009    DJD (degenerative joint disease) of knee 10/20/2009    CTS (Carpal Tunnel Syndrome)-b/l 10/20/2009    CVA (cerebral infarction) 10/20/2009    Diverticulosis 10/20/2009    Osteopenia 10/20/2009     Current Outpatient Prescriptions   Medication Sig Dispense Refill    benzonatate (TESSALON) 100 mg capsule Take 100 mg by mouth three (3) times daily as needed for Cough.  amoxicillin (AMOXIL) 875 mg tablet Take 875 mg by mouth two (2) times a day.  lisinopril (PRINIVIL, ZESTRIL) 10 mg tablet TAKE 1 TABLET DAILY 90 Tab 3    amLODIPine (NORVASC) 10 mg tablet TAKE 1 TABLET DAILY 90 Tab 3    metoprolol tartrate (LOPRESSOR) 25 mg tablet TAKE 1 TABLET TWICE A  Tab 3    gentamicin (GARAMYCIN) 0.3 % ophthalmic solution Administer 1 Drop to both eyes every four (4) hours. 5 mL 0    fluticasone (FLONASE) 50 mcg/actuation nasal spray 2 sprays each morning.  1 Bottle 0    ONETOUCH ULTRA TEST strip  LANTUS SOLOSTAR 100 unit/mL (3 mL) pen 42 Units.  BD INSULIN PEN NEEDLE UF SHORT 31 gauge x 5/16\" ndle       albuterol (PROVENTIL) 5 mg/mL nebulizer solution 0.5 mL by Nebulization route every four (4) hours as needed for Wheezing. 0.5 mL 0    triamterene-hydroCHLOROthiazide (DYAZIDE) 37.5-25 mg per capsule TAKE 1 CAPSULE TWICE A  Cap 3    albuterol (ACCUNEB) 1.25 mg/3 mL nebu USE 1 VIAL EVERY 4 HOURS AS NEEDED FOR WHEEZING 225 mL 3    ADVAIR DISKUS 100-50 mcg/dose diskus inhaler USE 1 INHALATION TWO TIMES A  Each 3    PROAIR HFA 90 mcg/actuation inhaler USE 1 INHALATION EVERY 4 HOURS AS NEEDED WHEEZING OR SHORTNESS OF BREATH 25.5 Inhaler 2    SYNTHROID 137 mcg tablet Take 137 mcg by mouth daily.  ergocalciferol (VITAMIN D2) 50,000 unit capsule Take 50,000 Units by mouth every Monday.  rosuvastatin (CRESTOR) 20 mg tablet Take 20 mg by mouth nightly.  insulin lispro (HUMALOG) 100 unit/mL Soln by SubCUTAneous route. 22 units tid ( Sliding scale)      aspirin delayed-release 81 mg tablet Take 81 mg by mouth as needed.  montelukast (SINGULAIR) 10 mg tablet Take 1 Tab by mouth daily. 30 Tab 0    mometasone (NASONEX) 50 mcg/actuation nasal spray 2 Sprays by Both Nostrils route daily. 1 Container 0    ALPRAZolam (XANAX) 0.25 mg tablet Take 1 Tab by mouth two (2) times daily as needed for Anxiety. Max Daily Amount: 0.5 mg. 20 Tab 0    scopolamine (TRANSDERM-SCOP) 1.5 mg (1 mg over 3 days) pt3d 1 Patch by TransDERmal route every seventy-two (72) hours.  4 Patch 0     Allergies   Allergen Reactions    Latex Itching    Codeine Itching and Other (comments)     hallucinate    Contrast Dye [Iodine] Rash and Itching     Given pre-cardiac cath    Seafood formerly [facility] Containing Products] Swelling      Lab Results  Component Value Date/Time   Hemoglobin A1c, External 9.4 05/20/2016   Hemoglobin A1c, External 8.9 08/17/2015 02:37 PM   Hemoglobin A1c, External 8.1 05/16/2015 05:36 AM   Glucose 150 02/21/2013 01:15 PM   Glucose (POC) 129 09/22/2015 07:11 AM   LDL, calculated 84 06/04/2012 12:00 AM   Creatinine (POC) 0.9 04/10/2015 12:41 PM   Creatinine 0.91 02/21/2013 01:15 PM      Lab Results  Component Value Date/Time   Cholesterol, total 157 06/04/2012 12:00 AM   HDL Cholesterol 55 06/04/2012 12:00 AM   LDL, calculated 84 06/04/2012 12:00 AM   LDL-C, External 72 05/20/2016   Triglyceride 90 06/04/2012 12:00 AM     Lab Results  Component Value Date/Time   GFR est non-AA 69 02/21/2013 01:15 PM   GFRNA, POC >60 04/10/2015 12:41 PM   GFR est AA 31 01/15/2013 03:25 AM   GFRAA, POC >60 04/10/2015 12:41 PM   Creatinine 0.91 02/21/2013 01:15 PM   Creatinine (POC) 0.9 04/10/2015 12:41 PM   BUN 14 02/21/2013 01:15 PM   BUN (POC) 7 04/10/2015 12:41 PM   Sodium 139 02/21/2013 01:15 PM   Sodium (POC) 143 04/10/2015 12:41 PM   Potassium 3.4 02/21/2013 01:15 PM   Potassium (POC) 2.9 04/10/2015 12:41 PM   Chloride 97 02/21/2013 01:15 PM   Chloride (POC) 103 04/10/2015 12:41 PM   CO2 29 02/21/2013 01:15 PM   Magnesium 1.9 01/14/2013 04:30 AM          ROS    Physical Exam   Constitutional: She appears well-developed and well-nourished. Appears stated age   Cardiovascular: Normal rate, regular rhythm and normal heart sounds. Exam reveals no gallop and no friction rub. No murmur heard. Pulmonary/Chest: Effort normal and breath sounds normal. No respiratory distress. She has no wheezes. Abdominal: Soft. Bowel sounds are normal.   Musculoskeletal: She exhibits edema. Mild b/l ankle edema   Neurological: She is alert. Psychiatric: She has a normal mood and affect. Nursing note and vitals reviewed. ASSESSMENT and PLAN  Makayla was seen today for physical and foot swelling.     Diagnoses and all orders for this visit:    Essential hypertension   Reasonable control, refilled medicines  Uncontrolled type 2 diabetes mellitus with complication, with long-term current use of insulin ([facility] [address]  - REFERRAL TO ENDOCRINOLOGY    Leg edema   D/t CVI , hx right leg dvt. Elevate, stockings prn    Warthins tumor   Surgery not recommended by ENT, not symptomatic    Asthma   Controlled on advair  Other orders  -     lisinopril (PRINIVIL, ZESTRIL) 10 mg tablet; TAKE 1 TABLET DAILY  -     amLODIPine (NORVASC) 10 mg tablet; TAKE 1 TABLET DAILY  -     metoprolol tartrate (LOPRESSOR) 25 mg tablet; TAKE 1 TABLET TWICE A DAY    Needs dexa and prevnar 13 next visit age 67-discussed  Follow-up Disposition:  Return in about 6 months (around 12/29/2017) for htn hld asthma.

## 2017-06-29 NOTE — MR AVS SNAPSHOT
Visit Information Date & Time Provider Department Dept. Phone Encounter #  
 6/29/2017  8:30 AM Miguel Hinson, [place] [facility] [address] [phone] [id number] Follow-up Instructions Return in about 6 months (around 12/29/2017) for htn hld asthma. Your Appointments 6/30/2017  9:00 AM  
Any with Mya Leal MD  
[id number] [facility] ([facility]) Appt Note: 1st adherence visit; pt r/s  
 [address] [facility] [phone] [address] [facility] [address] Upcoming Health Maintenance Date Due Hepatitis C Screening 1950 DTaP/Tdap/Td series (1 - Tdap) 12/12/1973 Pneumococcal 19-64 Highest Risk (2 of 3 - PCV13) 4/10/2015 FOOT EXAM Q1 8/4/2015 EYE EXAM RETINAL OR DILATED Q1 3/17/2016 HEMOGLOBIN A1C Q6M 5/23/2017 INFLUENZA AGE 9 TO ADULT 8/1/2017 MICROALBUMIN Q1 11/22/2017 LIPID PANEL Q1 11/22/2017 PAP AKA CERVICAL CYTOLOGY 4/17/2018 BREAST CANCER SCRN MAMMOGRAM 2/3/2019 COLONOSCOPY 9/23/2026 Allergies as of 6/29/2017  Review Complete On: 6/29/2017 By: Earline Alvarez LPN Severity Noted Reaction Type Reactions Latex Medium 06/24/2013    Itching Codeine  03/13/2012    Itching, Other (comments)  
 hallucinate Contrast Dye [Iodine]  06/12/2012    Rash, Itching Given pre-cardiac cath Seafood [Shellfish Containing Products]  06/06/2012    Swelling Current Immunizations  Reviewed on 10/16/2015 Name Date Influenza Vaccine 10/1/2015 Influenza Vaccine PF 1/14/2013  2:37 PM  
 Influenza Vaccine Split 11/1/2011, 10/15/2010 Pneumococcal Polysaccharide (PPSV-23) 4/10/2014 Not reviewed this visit You Were Diagnosed With   
  
 Codes Comments Essential hypertension    -  Primary ICD-10-CM: I10 
ICD-9-CM: 401.9  Uncontrolled type 2 diabetes mellitus with complication, with long-term current use of insulin (HCC)     ICD-10-CM: E11.8, E11.65, Z79.4 ICD-9-CM: 250.82, V58.67 Vitals BP Pulse Temp Height(growth percentile) Weight(growth percentile) SpO2  
 146/70 80 97.7 °F (36.5 °C) (Oral) 5' 2\" (1.575 m) 227 lb 9.6 oz (103.2 kg) 95% BMI OB Status Smoking Status 41.63 kg/m2 Hysterectomy Former Smoker Vitals History BMI and BSA Data Body Mass Index Body Surface Area  
 41.63 kg/m 2 2.12 m 2 Preferred Pharmacy Pharmacy Name Phone [address], [facility] [phone] Your Updated Medication List  
  
   
This list is accurate as of: 6/29/17  9:06 AM.  Always use your most recent med list.  
  
  
  
  
 ADVAIR DISKUS 100-50 mcg/dose diskus inhaler Generic drug:  fluticasone-salmeterol USE 1 INHALATION TWO TIMES A DAY ALPRAZolam 0.25 mg tablet Commonly known as:  Praful Cellar Take 1 Tab by mouth two (2) times daily as needed for Anxiety. Max Daily Amount: 0.5 mg.  
  
 amLODIPine 10 mg tablet Commonly known as:  Racos Duane TAKE 1 TABLET DAILY  
  
 amoxicillin 875 mg tablet Commonly known as:  AMOXIL Take 875 mg by mouth two (2) times a day. aspirin delayed-release 81 mg tablet Take 81 mg by mouth as needed. BD INSULIN PEN NEEDLE UF SHORT 31 gauge x 5/16\" Ndle Generic drug:  Insulin Needles (Disposable)  
  
 benzonatate 100 mg capsule Commonly known as:  TESSALON Take 100 mg by mouth three (3) times daily as needed for Cough. CRESTOR 20 mg tablet Generic drug:  rosuvastatin Take 20 mg by mouth nightly. fluticasone 50 mcg/actuation nasal spray Commonly known as:  FLONASE  
2 sprays each morning. gentamicin 0.3 % ophthalmic solution Commonly known as:  GARAMYCIN Administer 1 Drop to both eyes every four (4) hours. HumaLOG 100 unit/mL injection Generic drug:  insulin lispro by SubCUTAneous route. 22 units tid ( Sliding scale) LANTUS SOLOSTAR 100 unit/mL (3 mL) Inpn Generic drug:  insulin glargine 42 Units. lisinopril 10 mg tablet Commonly known as:  PRINIVIL, ZESTRIL  
TAKE 1 TABLET DAILY  
  
 metoprolol tartrate 25 mg tablet Commonly known as:  LOPRESSOR  
TAKE 1 TABLET TWICE A DAY  
  
 mometasone 50 mcg/actuation nasal spray Commonly known as:  NASONEX  
2 Sprays by Both Nostrils route daily. montelukast 10 mg tablet Commonly known as:  SINGULAIR Take 1 Tab by mouth daily. ONETOUCH ULTRA TEST strip Generic drug:  glucose blood VI test strips * PROAIR HFA 90 mcg/actuation inhaler Generic drug:  albuterol USE 1 INHALATION EVERY 4 HOURS AS NEEDED WHEEZING OR SHORTNESS OF BREATH  
  
 * albuterol 1.25 mg/3 mL Nebu Commonly known as:  ACCUNEB  
USE 1 VIAL EVERY 4 HOURS AS NEEDED FOR WHEEZING  
  
 * albuterol 5 mg/mL nebulizer solution Commonly known as:  PROVENTIL  
0.5 mL by Nebulization route every four (4) hours as needed for Wheezing.  
  
 scopolamine 1.5 mg (1 mg over 3 days) Pt3d Commonly known as:  TRANSDERM-SCOP  
1 Patch by TransDERmal route every seventy-two (72) hours. SYNTHROID 137 mcg tablet Generic drug:  levothyroxine Take 137 mcg by mouth daily. triamterene-hydroCHLOROthiazide 37.5-25 mg per capsule Commonly known as:  Romayne Caitlin TAKE 1 CAPSULE TWICE A DAY  
  
 VITAMIN D2 50,000 unit capsule Generic drug:  ergocalciferol Take 50,000 Units by mouth every Monday. * Notice: This list has 3 medication(s) that are the same as other medications prescribed for you. Read the directions carefully, and ask your doctor or other care provider to review them with you. Prescriptions Sent to Pharmacy Refills  
 lisinopril (PRINIVIL, ZESTRIL) 10 mg tablet 3 Sig: TAKE 1 TABLET DAILY  Class: Normal  
 Pharmacy: [address], [address] [place] [address] [address] [facility] Ph #: [phone]  
 amLODIPine (NORVASC) 10 mg tablet 3 Sig: TAKE 1 TABLET DAILY Class: Normal  
 Pharmacy: [address], [address] Ph #: [phone]  
 metoprolol tartrate (LOPRESSOR) 25 mg tablet 3 Sig: TAKE 1 TABLET TWICE A DAY Class: Normal  
 Pharmacy: [address], [address] [place] [address] Ph #: [phone] We Performed the Following REFERRAL TO ENDOCRINOLOGY [NAM82 Custom] Comments:  
 Please evaluate patient for dm-2 Follow-up Instructions Return in about 6 months (around 12/29/2017) for htn hld asthma. Referral Information Referral ID Referred By Referred To  
  
 [id number] Symone ADAMSON. Steve Dowling 39 Tery Lesch, MD   
   [address] [place] [address] Phone: [phone] Fax: [phone] Visits Status Start Date End Date 1 New Request 6/29/17 6/29/18 If your referral has a status of pending review or denied, additional information will be sent to support the outcome of this decision. Introducing [facility]! [address] introduces BlueVox patient portal. Now you can access parts of your medical record, email your doctor's office, and request medication refills online. 1. In your internet browser, go to [email or web address] 2. Click on the First Time User? Click Here link in the Sign In box. You will see the New Member Sign Up page. 3. Enter your BlueVox Access Code exactly as it appears below. You will not need to use this code after youve completed the sign-up process. If you do not sign up before the expiration date, you must request a new code. · BlueVox Access Code: [email or web address] Expires: 8/17/2017 10:01 AM 
 
4. Enter the last four digits of your Social Security Number (xxxx) and Date of Birth (mm/dd/yyyy) as indicated and click Submit.  You will be taken to the next sign-up page. 5. Create a CamSemi ID. This will be your CamSemi login ID and cannot be changed, so think of one that is secure and easy to remember. 6. Create a CamSemi password. You can change your password at any time. 7. Enter your Password Reset Question and Answer. This can be used at a later time if you forget your password. 8. Enter your e-mail address. You will receive e-mail notification when new information is available in [address] 9. Click Sign Up. You can now view and download portions of your medical record. 10. Click the Download Summary menu link to download a portable copy of your medical information. If you have questions, please visit the Frequently Asked Questions section of the CamSemi website. Remember, CamSemi is NOT to be used for urgent needs. For medical emergencies, dial 911. Now available from your iPhone and Android! Please provide this summary of care documentation to your next provider. Your primary care clinician is listed as Kelley ADAMSON. If you have any questions after today's visit, please call [phone].

## 2017-06-30 ENCOUNTER — OFFICE VISIT (OUTPATIENT)
Dept: SLEEP MEDICINE | Age: 67
End: 2017-06-30

## 2017-06-30 VITALS
BODY MASS INDEX: 41.59 KG/M2 | SYSTOLIC BLOOD PRESSURE: 133 MMHG | HEIGHT: 62 IN | DIASTOLIC BLOOD PRESSURE: 74 MMHG | OXYGEN SATURATION: 95 % | WEIGHT: 226 LBS | HEART RATE: 86 BPM

## 2017-06-30 DIAGNOSIS — I10 ESSENTIAL HYPERTENSION: ICD-10-CM

## 2017-06-30 DIAGNOSIS — G47.33 OBSTRUCTIVE SLEEP APNEA (ADULT) (PEDIATRIC): Primary | ICD-10-CM

## 2017-06-30 NOTE — PROGRESS NOTES
[address] [facility] [place]. [place], [address]  Tel.  [phone]  Fax. 100 [facility] 60  [place], [address] [facility]  Tel.  [phone]  Fax. [phone] [address]   Tel.  [phone]  Fax. [phone]     S>Makayla Ariza is a 61 y.o. female seen for a positive airway pressure follow-up. She reports no problems using the device. The following problems are identified:    Drowsiness no Problems exhaling Yes right at the beginning. On download, it is noted that there is no ramp   Snoring no Forget to put on no   Mask Comfortable yes Can't fall asleep no   Dry Mouth no Mask falls off no   Air Leaking no Frequent awakenings no       Download reviewed. She admits that her sleep has significantly improved. Therapy Apnea Index averaged over PAP use: 1 /hr which reflects improved sleep breathing condition. Allergies   Allergen Reactions    Latex Itching    Codeine Itching and Other (comments)     hallucinate    Contrast Dye [Iodine] Rash and Itching     Given pre-cardiac cath    Seafood [Shellfish Containing Products] Swelling       She has a current medication list which includes the following prescription(s): benzonatate, amoxicillin, lisinopril, amlodipine, metoprolol tartrate, gentamicin, fluticasone, montelukast, mometasone, onetouch ultra test, lantus solostar, bd insulin pen needle uf short, albuterol, triamterene-hydrochlorothiazide, albuterol, advair diskus, proair hfa, scopolamine, synthroid, ergocalciferol, rosuvastatin, humalog, aspirin delayed-release, and alprazolam..      She  has a past medical history of Asthma; CAD (coronary artery disease); [facility] Hypertension; Screening for colon cancer (2/16/05); Stroke [facility]) (2004); Thromboembolus ([facility].) (1976); Thyroid disease; and Unspecified sleep apnea.     [place] Sleepiness Score: 6   and Modified F.O.S.Q. Score Total / 2: 19.5   which reflect improved sleep quality over therapy time. O>    Visit Vitals    /74    Pulse 86    Ht 5' 2\" (1.575 m)    Wt 226 lb (102.5 kg)    SpO2 95%    BMI 41.34 kg/m2           General:   Alert, oriented, not in distress   Neck:   No JVD    Chest/Lungs:  symetrical lung expansion , no accessory muscle use    Extremities:  no obvious rashes , negative edema    Neuro:  No focal deficits ; No obvious tremor    Psych:  Normal affect ,  Normal countenance ;           A>    ICD-10-CM ICD-9-CM    1. Obstructive sleep apnea (adult) (pediatric) G47.33 327.23    2. Essential hypertension I10 401.9      AHI = 5.3 (5-17). On CPAP :  9-12 cmH2O. Compliant:      yes    Therapeutic Response:  Positive    P>      * Follow-up Disposition:  Return in about 7 months (around 1/30/2018). Set ramp starting at 5 cm for 15 minutes  she will continue on her current pressure settings. * She was asked to contact our office for any problems regarding PAP therapy. * Counseling was provided regarding the importance of regular PAP use and on proper sleep hygiene and safe driving. * Re-enforced proper and regular cleaning for the device. I have counseled the patient regarding the benefits of weight loss. 2. Hypertension - she continues on her current regimen. I have reviewed the relationship between hypertension as it relates to sleep-disordered breathing.      Cecy Clark MD  Diplomate in Sleep Medicine  [facility]

## 2017-06-30 NOTE — PATIENT INSTRUCTIONS
217 [facility] [place]. [place], [address]  Tel.  [phone]  Fax. 100 [facility] 60  [place], [address] [facility]  Tel.  [phone]  Fax. [phone] [address]  Tel.  [phone]  Fax. [phone]     PROPER SLEEP HYGIENE    What to avoid  · Do not have drinks with caffeine, such as coffee or black tea, for 8 hours before bed. · Do not smoke or use other types of tobacco near bedtime. Nicotine is a stimulant and can keep you awake. · Avoid drinking alcohol late in the evening, because it can cause you to wake in the middle of the night. · Do not eat a big meal close to bedtime. If you are hungry, eat a light snack. · Do not drink a lot of water close to bedtime, because the need to urinate may wake you up during the night. · Do not read or watch TV in bed. Use the bed only for sleeping and sexual activity. What to try  · Go to bed at the same time every night, and wake up at the same time every morning. Do not take naps during the day. · Keep your bedroom quiet, dark, and cool. · Get regular exercise, but not within 3 to 4 hours of your bedtime. .  · Sleep on a comfortable pillow and mattress. · If watching the clock makes you anxious, turn it facing away from you so you cannot see the time. · If you worry when you lie down, start a worry book. Well before bedtime, write down your worries, and then set the book and your concerns aside. · Try meditation or other relaxation techniques before you go to bed. · If you cannot fall asleep, get up and go to another room until you feel sleepy. Do something relaxing. Repeat your bedtime routine before you go to bed again. · Make your house quiet and calm about an hour before bedtime. Turn down the lights, turn off the TV, log off the computer, and turn down the volume on music. This can help you relax after a busy day.     Drowsy Driving  The [address] [place] [address] Road Traffic Safety Administration cites drowsiness as a causing factor in more than 242,465 police reported crashes annually, resulting in 76,000 injuries and 1,500 deaths. Other surveys suggest 55% of people polled have driven while drowsy in the past year, 23% had fallen asleep but not crashed, 3% crashed, and 2% had and accident due to drowsy driving. Who is at risk? Young Drivers: One study of drowsy driving accidents states that 55% of the drivers were under 25 years. Of those, 75% were male. Shift Workers and Travelers: People who work overnight or travel across time zones frequently are at higher risk of experiencing Circadian Rhythm Disorders. They are trying to work and function when their body is programed to sleep. Sleep Deprived: Lack of sleep has a serious impact on your ability to pay attention or focus on a task. Consistently getting less than the average of 8 hours your body needs creates partial or cumulative sleep deprivation. Untreated Sleep Disorders: Sleep Apnea, Narcolepsy, R.L.S., and other sleep disorders (untreated) prevent a person from getting enough restful sleep. This leads to excessive daytime sleepiness and increases the risk for drowsy driving accidents by up to 7 times. Medications / Alcohol: Even over the counter medications can cause drowsiness. Medications that impair a drivers attention should have a warning label. Alcohol naturally makes you sleepy and on its own can cause accidents. Combined with excessive drowsiness its effects are amplified. Signs of Drowsy Driving:   * You don't remember driving the last few miles   * You may drift out of your blake   * You are unable to focus and your thoughts wander   * You may yawn more often than normal   * You have difficulty keeping your eyes open / nodding off   * Missing traffic signs, speeding, or tailgating  Prevention-   Good sleep hygiene, lifestyle and behavioral choices have the most impact on drowsy driving.  There is no substitute for sleep and the average person requires 8 hours nightly. If you find yourself driving drowsy, stop and sleep. Consider the sleep hygiene tips provided during your visit as well. Medication Refill Policy: Refills for all medications require 1 week advance notice. Please have your pharmacy fax a refill request. We are unable to fax, or call in \"controled substance\" medications and you will need to pick these prescriptions up from our office. Octonius Activation    Thank you for requesting access to Oct[employer]ius. Please follow the instructions below to securely access and download your online medical record. Octonius allows you to send messages to your doctor, view your test results, renew your prescriptions, schedule appointments, and more. How Do I Sign Up? 1. In your internet browser, go to [email or web address] 2. Click on the First Time User? Click Here link in the Sign In box. You will see the New Member Sign Up page. 3. Enter your Octonius Access Code exactly as it appears below. You will not need to use this code after youve completed the sign-up process. If you do not sign up before the expiration date, you must request a new code. Octonius Access Code: [email or web address]  Expires: 2017 10:01 AM (This is the date your Octonius access code will )    4. Enter the last four digits of your Social Security Number (xxxx) and Date of Birth (mm/dd/yyyy) as indicated and click Submit. You will be taken to the next sign-up page. 5. Create a Octonius ID. This will be your Octonius login ID and cannot be changed, so think of one that is secure and easy to remember. 6. Create a Octonius password. You can change your password at any time. 7. Enter your Password Reset Question and Answer. This can be used at a later time if you forget your password. 8. Enter your e-mail address. You will receive e-mail notification when new information is available in [address] 9. Click Sign Up.  You can now view and download portions of your medical record. 10. Click the Download Summary menu link to download a portable copy of your medical information. Additional Information    If you have questions, please call [phone]. Remember, [employer] is NOT to be used for urgent needs. For medical emergencies, dial 911.

## 2017-07-10 ENCOUNTER — TELEPHONE (OUTPATIENT)
Dept: INTERNAL MEDICINE CLINIC | Age: 67
End: 2017-07-10

## 2017-07-10 RX ORDER — CYCLOBENZAPRINE HCL 10 MG
10 TABLET ORAL
Qty: 30 TAB | Refills: 0 | Status: SHIPPED | OUTPATIENT
Start: 2017-07-10 | End: 2019-01-22 | Stop reason: ALTCHOICE

## 2017-07-10 NOTE — TELEPHONE ENCOUNTER
Call completed to patient, two identifiers verified. Patient reports experiencing back spasms in the past. She is currently having a muscle spasm and requesting a Rx be sent to [facility]. Patient advised of 83-54 hours refill policy. Patient verbalized an understanding.

## 2017-07-11 NOTE — TELEPHONE ENCOUNTER
Call completed to patient, two identifiers verified. Patient advised Rx was e-scribed. Patient reports picking up the Rx on yesterday. Patient reports relief from the medication. Patient advised to contact our office if needed.

## 2017-08-07 ENCOUNTER — OFFICE VISIT (OUTPATIENT)
Dept: CARDIOLOGY CLINIC | Age: 67
End: 2017-08-07

## 2017-08-07 VITALS
RESPIRATION RATE: 16 BRPM | DIASTOLIC BLOOD PRESSURE: 70 MMHG | HEIGHT: 62 IN | BODY MASS INDEX: 41.51 KG/M2 | WEIGHT: 225.6 LBS | HEART RATE: 70 BPM | SYSTOLIC BLOOD PRESSURE: 140 MMHG | OXYGEN SATURATION: 97 %

## 2017-08-07 DIAGNOSIS — R60.0 LOWER LEG EDEMA: Primary | ICD-10-CM

## 2017-08-07 DIAGNOSIS — I25.10 CORONARY ARTERY DISEASE INVOLVING NATIVE CORONARY ARTERY OF NATIVE HEART WITHOUT ANGINA PECTORIS: ICD-10-CM

## 2017-08-07 DIAGNOSIS — R06.02 SHORTNESS OF BREATH: ICD-10-CM

## 2017-08-07 DIAGNOSIS — E11.8 TYPE 2 DIABETES MELLITUS WITH COMPLICATION, WITH LONG-TERM CURRENT USE OF INSULIN (HCC): ICD-10-CM

## 2017-08-07 DIAGNOSIS — I10 ESSENTIAL HYPERTENSION: ICD-10-CM

## 2017-08-07 DIAGNOSIS — Z79.4 TYPE 2 DIABETES MELLITUS WITH COMPLICATION, WITH LONG-TERM CURRENT USE OF INSULIN (HCC): ICD-10-CM

## 2017-08-07 RX ORDER — METFORMIN HYDROCHLORIDE 500 MG/1
500 TABLET, EXTENDED RELEASE ORAL
COMMUNITY
Start: 2017-07-19 | End: 2019-01-22

## 2017-08-07 NOTE — MR AVS SNAPSHOT
Visit Information Date & Time Provider Department Dept. Phone Encounter #  
 8/7/2017 10:00 AM Freddy Almazan NP [facility] [phone] [id number] Your Appointments 12/20/2017  8:30 AM  
ROUTINE CARE with Enricotalia Marceloshirley, [address] [place] [address],[address]) Appt Note: 6 month follow up  
 [facility] [address] [facility]  
[phone]  
  
   
 [facility] [address] [facility] [address] [facility]  
  
    
 1/25/2018  9:00 AM  
Any with Renetta Álvarez MD  
[address] [facility] ([address]) Appt Note: pap f/up- bring machine [address] [facility] [address] [phone] 07 [facility], [address] [phone] Upcoming Health Maintenance Date Due Hepatitis C Screening 1950 DTaP/Tdap/Td series (1 - Tdap) 12/12/1973 Pneumococcal 19-64 Highest Risk (2 of 3 - PCV13) 4/10/2015 FOOT EXAM Q1 8/4/2015 EYE EXAM RETINAL OR DILATED Q1 3/17/2016 HEMOGLOBIN A1C Q6M 5/23/2017 INFLUENZA AGE 9 TO ADULT 8/1/2017 MICROALBUMIN Q1 11/22/2017 LIPID PANEL Q1 11/22/2017 PAP AKA CERVICAL CYTOLOGY 4/17/2018 BREAST CANCER SCRN MAMMOGRAM 2/3/2019 COLONOSCOPY 9/23/2026 Allergies as of 8/7/2017  Review Complete On: 8/7/2017 By: Jacquelyn Brewer LPN Severity Noted Reaction Type Reactions Latex Medium 06/24/2013    Itching Codeine  03/13/2012    Itching, Other (comments)  
 hallucinate Contrast Dye [Iodine]  06/12/2012    Rash, Itching Given pre-cardiac cath Seafood [Shellfish Containing Products]  06/06/2012    Swelling Current Immunizations  Reviewed on 10/16/2015 Name Date Influenza Vaccine 10/1/2015 Influenza Vaccine PF 1/14/2013  2:37 PM  
 Influenza Vaccine Split 11/1/2011, 10/15/2010 Pneumococcal Polysaccharide (PPSV-23) 4/10/2014 Not reviewed this visit You Were Diagnosed With   
  
 Codes Comments Essential hypertension    -  Primary ICD-10-CM: I10 
ICD-9-CM: 401.9 Coronary artery disease involving native coronary artery of native heart without angina pectoris     ICD-10-CM: I25.10 ICD-9-CM: 414.01 Shortness of breath     ICD-10-CM: R06.02 
ICD-9-CM: 786.05 Type 2 diabetes mellitus with complication, with long-term current use of insulin (HCC)     ICD-10-CM: E11.8, Z79.4 ICD-9-CM: 250.90, V58.67 Vitals BP Pulse Resp Height(growth percentile) Weight(growth percentile) SpO2  
 140/70 (BP Patient Position: Sitting) 70 16 5' 2\" (1.575 m) 225 lb 9.6 oz (102.3 kg) 97% BMI OB Status Smoking Status 41.26 kg/m2 Hysterectomy Former Smoker BMI and BSA Data Body Mass Index Body Surface Area  
 41.26 kg/m 2 2.12 m 2 Preferred Pharmacy Pharmacy Name Phone [address] [facility] [phone] Your Updated Medication List  
  
   
This list is accurate as of: 8/7/17 10:52 AM.  Always use your most recent med list.  
  
  
  
  
 ADVAIR DISKUS 100-50 mcg/dose diskus inhaler Generic drug:  fluticasone-salmeterol USE 1 INHALATION TWO TIMES A DAY ALPRAZolam 0.25 mg tablet Commonly known as:  Fairy Ny Take 1 Tab by mouth two (2) times daily as needed for Anxiety. Max Daily Amount: 0.5 mg.  
  
 amLODIPine 10 mg tablet Commonly known as:  Robert Lute TAKE 1 TABLET DAILY  
  
 amoxicillin 875 mg tablet Commonly known as:  AMOXIL Take 875 mg by mouth two (2) times a day. aspirin delayed-release 81 mg tablet Take 81 mg by mouth as needed. BD INSULIN PEN NEEDLE UF SHORT 31 gauge x 5/16\" Ndle Generic drug:  Insulin Needles (Disposable)  
  
 benzonatate 100 mg capsule Commonly known as:  TESSALON Take 100 mg by mouth three (3) times daily as needed for Cough. CRESTOR 20 mg tablet Generic drug:  rosuvastatin Take 20 mg by mouth nightly. cyclobenzaprine 10 mg tablet Commonly known as:  FLEXERIL Take 1 Tab by mouth three (3) times daily as needed for Muscle Spasm(s). fluticasone 50 mcg/actuation nasal spray Commonly known as:  FLONASE  
2 sprays each morning. gentamicin 0.3 % ophthalmic solution Commonly known as:  GARAMYCIN Administer 1 Drop to both eyes every four (4) hours. HumaLOG 100 unit/mL injection Generic drug:  insulin lispro  
by SubCUTAneous route. 22 units tid ( Sliding scale) LANTUS SOLOSTAR 100 unit/mL (3 mL) Inpn Generic drug:  insulin glargine 42 Units. lisinopril 10 mg tablet Commonly known as:  PRINIVIL, ZESTRIL  
TAKE 1 TABLET DAILY  
  
 metFORMIN  mg tablet Commonly known as:  GLUCOPHAGE XR Take 500 mg by mouth daily (with dinner). metoprolol tartrate 25 mg tablet Commonly known as:  LOPRESSOR  
TAKE 1 TABLET TWICE A DAY  
  
 mometasone 50 mcg/actuation nasal spray Commonly known as:  NASONEX  
2 Sprays by Both Nostrils route daily. montelukast 10 mg tablet Commonly known as:  SINGULAIR Take 1 Tab by mouth daily. ONETOUCH ULTRA TEST strip Generic drug:  glucose blood VI test strips * PROAIR HFA 90 mcg/actuation inhaler Generic drug:  albuterol USE 1 INHALATION EVERY 4 HOURS AS NEEDED WHEEZING OR SHORTNESS OF BREATH  
  
 * albuterol 1.25 mg/3 mL Nebu Commonly known as:  ACCUNEB  
USE 1 VIAL EVERY 4 HOURS AS NEEDED FOR WHEEZING  
  
 * albuterol 5 mg/mL nebulizer solution Commonly known as:  PROVENTIL  
0.5 mL by Nebulization route every four (4) hours as needed for Wheezing.  
  
 scopolamine 1.5 mg (1 mg over 3 days) Pt3d Commonly known as:  TRANSDERM-SCOP  
1 Patch by TransDERmal route every seventy-two (72) hours. SYNTHROID 137 mcg tablet Generic drug:  levothyroxine Take 137 mcg by mouth daily. triamterene-hydroCHLOROthiazide 37.5-25 mg per capsule Commonly known as:  Pia Loyola TAKE 1 CAPSULE TWICE A DAY  
  
 VITAMIN D2 50,000 unit capsule Generic drug:  ergocalciferol Take 50,000 Units by mouth every Monday. * Notice: This list has 3 medication(s) that are the same as other medications prescribed for you. Read the directions carefully, and ask your doctor or other care provider to review them with you. We Performed the Following AMB POC EKG ROUTINE W/ 12 LEADS, INTER & REP [32538 CPT(R)] To-Do List   
 08/08/2017 ECHO:  2D ECHO COMPLETE ADULT (TTE) W OR WO CONTR   
  
 08/08/2017 ECG:  STRESS TEST LEXISCAN/CARDIOLITE Introducing [facility]! [facility] introduces [employer] patient portal. Now you can access parts of your medical record, email your doctor's office, and request medication refills online. 1. In your internet browser, go to [email or web address] 2. Click on the First Time User? Click Here link in the Sign In box. You will see the New Member Sign Up page. 3. Enter your [employer] Access Code exactly as it appears below. You will not need to use this code after youve completed the sign-up process. If you do not sign up before the expiration date, you must request a new code. · [employer] Access Code: [email or web address] Expires: 8/17/2017 10:01 AM 
 
4. Enter the last four digits of your Social Security Number (xxxx) and Date of Birth (mm/dd/yyyy) as indicated and click Submit. You will be taken to the next sign-up page. 5. Create a [employer] ID. This will be your [employer] login ID and cannot be changed, so think of one that is secure and easy to remember. 6. Create a [employer] password. You can change your password at any time. 7. Enter your Password Reset Question and Answer. This can be used at a later time if you forget your password. 8. Enter your e-mail address.  You will receive e-mail notification when new information is available in [employer]. 9. Click Sign Up. You can now view and download portions of your medical record. 10. Click the Download Summary menu link to download a portable copy of your medical information. If you have questions, please visit the Frequently Asked Questions section of the [employer] website. Remember, [employer] is NOT to be used for urgent needs. For medical emergencies, dial 911. Now available from your iPhone and Android! Please provide this summary of care documentation to your next provider. Your primary care clinician is listed as Shay ADAMSON. If you have any questions after today's visit, please call [phone].

## 2017-08-07 NOTE — PROGRESS NOTES
Dinora Gallagher DNP, ANP-BC  Subjective/HPI:     Hailee Beltran is a 61 y.o. female is here for symptom based appointment reporting increasing bilateral dependent edema, worse towards the end of the day. Additionally, she is reporting episodes of dyspnea on exertion, denies exertional chest pain. Cardiac risk factors include hypertension, hyperlipidemia and type 2 diabetes. Regarding edema, she has been on amlodipine for several years, in medication review she is on Dyazide twice a day however she is only been taking it once a day feeling that it dehydrates her. She denies any excessive sodium use however she is unsure exactly how much salt is in her food since she does not do the cooking, denies frequently eating out.       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     Thyroid disease     Unspecified sleep apnea     uses CPAP      Past Surgical History:   Procedure Laterality Date    CARDIAC CATHETERIZATION  6/6/2012         CARDIAC SURG PROCEDURE UNLIST      heart surgery    HX HEENT      tonsillectomy    HX HEMORRHOIDECTOMY      HX HYSTERECTOMY      HX ORTHOPAEDIC  8/2011    left shoulder     Allergies   Allergen Reactions    Latex Itching    Codeine Itching and Other (comments)     hallucinate    Contrast Dye [Iodine] Rash and Itching     Given pre-cardiac cath    Seafood [Shellfish Containing Products] Swelling      Family History   Problem Relation Age of Onset    Diabetes Mother     Cancer Mother     Breast Cancer Mother 79    Heart Disease Father     Hypertension Father     Stroke Father     Coronary Artery Disease Brother 54      Current Outpatient Prescriptions   Medication Sig    metFORMIN ER (GLUCOPHAGE XR) 500 mg tablet Take 500 mg by mouth daily (with dinner).  cyclobenzaprine (FLEXERIL) 10 mg tablet Take 1 Tab by mouth three (3) times daily as needed for Muscle Spasm(s).  lisinopril (PRINIVIL, ZESTRIL) 10 mg tablet TAKE 1 TABLET DAILY    amLODIPine (NORVASC) 10 mg tablet TAKE 1 TABLET DAILY    metoprolol tartrate (LOPRESSOR) 25 mg tablet TAKE 1 TABLET TWICE A DAY    gentamicin (GARAMYCIN) 0.3 % ophthalmic solution Administer 1 Drop to both eyes every four (4) hours.  fluticasone (FLONASE) 50 mcg/actuation nasal spray 2 sprays each morning.  montelukast (SINGULAIR) 10 mg tablet Take 1 Tab by mouth daily.  mometasone (NASONEX) 50 mcg/actuation nasal spray 2 Sprays by Both Nostrils route daily.  ONETOUCH ULTRA TEST strip     LANTUS SOLOSTAR 100 unit/mL (3 mL) pen 42 Units.  BD INSULIN PEN NEEDLE UF SHORT 31 gauge x 5/16\" ndle     albuterol (PROVENTIL) 5 mg/mL nebulizer solution 0.5 mL by Nebulization route every four (4) hours as needed for Wheezing.  triamterene-hydroCHLOROthiazide (DYAZIDE) 37.5-25 mg per capsule TAKE 1 CAPSULE TWICE A DAY    albuterol (ACCUNEB) 1.25 mg/3 mL nebu USE 1 VIAL EVERY 4 HOURS AS NEEDED FOR WHEEZING    ADVAIR DISKUS 100-50 mcg/dose diskus inhaler USE 1 INHALATION TWO TIMES A DAY    SYNTHROID 137 mcg tablet Take 137 mcg by mouth daily.  ergocalciferol (VITAMIN D2) 50,000 unit capsule Take 50,000 Units by mouth every Monday.  rosuvastatin (CRESTOR) 20 mg tablet Take 20 mg by mouth nightly.  insulin lispro (HUMALOG) 100 unit/mL Soln by SubCUTAneous route. 22 units tid ( Sliding scale)    aspirin delayed-release 81 mg tablet Take 81 mg by mouth as needed.  benzonatate (TESSALON) 100 mg capsule Take 100 mg by mouth three (3) times daily as needed for Cough.  amoxicillin (AMOXIL) 875 mg tablet Take 875 mg by mouth two (2) times a day.  PROAIR HFA 90 mcg/actuation inhaler USE 1 INHALATION EVERY 4 HOURS AS NEEDED WHEEZING OR SHORTNESS OF BREATH    ALPRAZolam (XANAX) 0.25 mg tablet Take 1 Tab by mouth two (2) times daily as needed for Anxiety. Max Daily Amount: 0.5 mg.    scopolamine (TRANSDERM-SCOP) 1.5 mg (1 mg over 3 days) pt3d 1 Patch by TransDERmal route every seventy-two (72) hours. No current facility-administered medications for this visit. Vitals:    08/07/17 1020   BP: 140/70   Pulse: 70   Resp: 16   SpO2: 97%   Weight: 225 lb 9.6 oz (102.3 kg)   Height: 5' 2\" (1.575 m)     Social History     Social History    Marital status: SINGLE     Spouse name: N/A    Number of children: N/A    Years of education: N/A     Occupational History    Not on file. Social History Main Topics    Smoking status: Former Smoker     Quit date: 9/17/2004    Smokeless tobacco: Never Used    Alcohol use No    Drug use: Yes     Special: Prescription, OTC    Sexual activity: No     Other Topics Concern    Not on file     Social History Narrative       I have reviewed the nurses notes, vitals, problem list, allergy list, medical history, family, social history and medications. Review of Symptoms:    General: Pt denies excessive weight gain or loss. Pt is able to conduct ADL's  HEENT: Denies blurred vision, headaches, epistaxis and difficulty swallowing. Respiratory: Denies shortness of breath, + THOMPSON, w denies heezing or stridor. Cardiovascular: Denies precordial pain, palpitations, + edema denies PND  Gastrointestinal: Denies poor appetite, indigestion, abdominal pain or blood in stool  Musculoskeletal: Denies pain or swelling from muscles or joints  Neurologic: Denies tremor, paresthesias, or sensory motor disturbance  Skin: Denies rash, itching or texture change. Physical Exam:      General: Well developed, in no acute distress, cooperative and alert  HEENT: No carotid bruits, no JVD, trach is midline.  Neck Supple, PEERL, EOM intact. Heart:  Normal S1/S2 negative S3 or S4. Regular, no murmur, gallop or rub.   Respiratory: Clear bilaterally x 4, no wheezing or rales  Abdomen:   Soft, non-tender, no masses, bowel sounds are active.   Extremities: +1 bilateral ankle edema, normal cap refill, no cyanosis, atraumatic. Neuro: A&Ox3, speech clear, gait stable. Skin: Skin color is normal. No rashes or lesions. Non diaphoretic  Vascular: 2+ pulses symmetric in all extremities    Cardiographics    ECG: Normal sinus rhythm  Results for orders placed or performed during the hospital encounter of 01/13/13   EKG, 12 LEAD, INITIAL   Result Value Ref Range    Ventricular Rate 74 BPM    Atrial Rate 74 BPM    P-R Interval 200 ms    QRS Duration 86 ms    Q-T Interval 410 ms    QTC Calculation (Bezet) 455 ms    Calculated P Axis 64 degrees    Calculated R Axis 9 degrees    Calculated T Axis 51 degrees    Diagnosis       Normal sinus rhythm  When compared with ECG of 05-AUG-2011 13:27,  No significant change was found  Confirmed by Ani Solis ([id number]) on 1/14/2013 7:28:27 AM         Cardiology Labs:  Lab Results   Component Value Date/Time    Cholesterol, total 157 06/04/2012 12:00 AM    HDL Cholesterol 55 06/04/2012 12:00 AM    LDL, calculated 84 06/04/2012 12:00 AM    Triglyceride 90 06/04/2012 12:00 AM       Lab Results   Component Value Date/Time    Sodium 139 02/21/2013 01:15 PM    Potassium 3.4 02/21/2013 01:15 PM    Chloride 97 02/21/2013 01:15 PM    CO2 29 02/21/2013 01:15 PM    Anion gap 11 01/15/2013 03:25 AM    Glucose 150 02/21/2013 01:15 PM    BUN 14 02/21/2013 01:15 PM    Creatinine 0.91 02/21/2013 01:15 PM    BUN/Creatinine ratio 15 02/21/2013 01:15 PM    GFR est AA 31 01/15/2013 03:25 AM    GFR est non-AA 69 02/21/2013 01:15 PM    Calcium 9.7 02/21/2013 01:15 PM    Bilirubin, total 0.4 01/13/2013 12:45 PM    AST (SGOT) 12 01/13/2013 12:45 PM    Alk.  phosphatase 139 01/13/2013 12:45 PM    Protein, total 8.0 01/13/2013 12:45 PM Albumin 3.2 01/13/2013 12:45 PM    Globulin 4.8 01/13/2013 12:45 PM    A-G Ratio 0.7 01/13/2013 12:45 PM    ALT (SGPT) 18 01/13/2013 12:45 PM           Assessment:     Assessment:     Diagnoses and all orders for this visit:    1. Lower leg edema    2. Essential hypertension  -     AMB POC EKG ROUTINE W/ 12 LEADS, INTER & REP  -     2D ECHO COMPLETE ADULT (TTE) W OR WO CONTR; Future  -     LEXISCAN/CARDIOLITE, Clinic Performed; Future    3. Coronary artery disease involving native coronary artery of native heart without angina pectoris  -     2D ECHO COMPLETE ADULT (TTE) W OR WO CONTR; Future  -     LEXISCAN/CARDIOLITE, Clinic Performed; Future    4. Shortness of breath  -     2D ECHO COMPLETE ADULT (TTE) W OR WO CONTR; Future  -     LEXISCAN/CARDIOLITE, Clinic Performed; Future    5. Type 2 diabetes mellitus with complication, with long-term current use of insulin (HCC)  -     2D ECHO COMPLETE ADULT (TTE) W OR WO CONTR; Future  -     LEXISCAN/CARDIOLITE, Clinic Performed; Future        ICD-10-CM ICD-9-CM    1. Lower leg edema R60.0 782.3    2. Essential hypertension I10 401.9 metFORMIN ER (GLUCOPHAGE XR) 500 mg tablet      AMB POC EKG ROUTINE W/ 12 LEADS, INTER & REP      2D ECHO COMPLETE ADULT (TTE) W OR WO CONTR      STRESS TEST LEXISCAN/CARDIOLITE   3. Coronary artery disease involving native coronary artery of native heart without angina pectoris I25.10 414.01 2D ECHO COMPLETE ADULT (TTE) W OR WO CONTR      STRESS TEST LEXISCAN/CARDIOLITE   4. Shortness of breath R06.02 786.05 2D ECHO COMPLETE ADULT (TTE) W OR WO CONTR      STRESS TEST LEXISCAN/CARDIOLITE   5.  Type 2 diabetes mellitus with complication, with long-term current use of insulin (HCC) E11.8 250.90 2D ECHO COMPLETE ADULT (TTE) W OR WO CONTR    Z79.4 V58.67 STRESS TEST LEXISCAN/CARDIOLITE     Orders Placed This Encounter    AMB POC EKG ROUTINE W/ 12 LEADS, INTER & REP     Order Specific Question:   Reason for Exam:     Answer:   routine    LEXISCAN/CARDIOLITE, Clinic Performed     Standing Status:   Future     Standing Expiration Date:   2/7/2018     Order Specific Question:   Reason for Exam:     Answer:   THOMPSON    2D ECHO COMPLETE ADULT (TTE) W OR WO CONTR     Standing Status:   Future     Standing Expiration Date:   2/7/2018     Order Specific Question:   Reason for Exam:     Answer:   THOMPSON     Order Specific Question:   Contrast Enhancement (Bubble Study, Definity, Optison) may be used if criteria listed in established evidence-based protocol has been identified. Answer: Yes    metFORMIN ER (GLUCOPHAGE XR) 500 mg tablet     Sig: Take 500 mg by mouth daily (with dinner). Plan:     1. Waxing and waning dependent edema: Recommend low-sodium diet, may take the second dose of Dyazide as needed for edema, prescribed thigh-high compression stockings. If no improvement will then warrant reflux study. 2.  Dyspnea on exertion: As anginal equivalent will evaluate with Lexiscan nuclear stress test.  Currently on dual antianginal therapy. Cardiac risk factors include diabetes, hypertension hyperlipidemia and obesity. Maintain aspirin therapy  3. Hyperlipidemia: Maintain statin therapy  4. Hypertension: Mildly elevated today, will work first with reduced sodium diet if no improvement will then warrant further medication adjustments    Follow-up with Dr. Uriel Estrella when test complete.     Joseph Mercedes NP

## 2017-08-24 ENCOUNTER — CLINICAL SUPPORT (OUTPATIENT)
Dept: CARDIOLOGY CLINIC | Age: 67
End: 2017-08-24

## 2017-08-24 DIAGNOSIS — E11.8 TYPE 2 DIABETES MELLITUS WITH COMPLICATION, WITH LONG-TERM CURRENT USE OF INSULIN (HCC): ICD-10-CM

## 2017-08-24 DIAGNOSIS — R06.02 SHORTNESS OF BREATH: ICD-10-CM

## 2017-08-24 DIAGNOSIS — I25.10 CORONARY ARTERY DISEASE INVOLVING NATIVE CORONARY ARTERY OF NATIVE HEART WITHOUT ANGINA PECTORIS: ICD-10-CM

## 2017-08-24 DIAGNOSIS — Z79.4 TYPE 2 DIABETES MELLITUS WITH COMPLICATION, WITH LONG-TERM CURRENT USE OF INSULIN (HCC): ICD-10-CM

## 2017-08-24 DIAGNOSIS — I10 ESSENTIAL HYPERTENSION: ICD-10-CM

## 2017-08-25 NOTE — PROGRESS NOTES
Spoke with patient  Verified patient with 2 patient identifier  Informed per Yunier Hutson NP stress test normal,still awaiting her ECHO results.    Patient verbalized understanding.

## 2017-08-30 NOTE — PROGRESS NOTES
Spoke with patient  Verified patient with 2 patient identifier  Informed per James Keane NP ECHO OK   Patient verbalized understanding

## 2017-09-17 RX ORDER — CEPHALEXIN 250 MG/1
CAPSULE ORAL
Qty: 180 EACH | Refills: 3 | Status: SHIPPED | OUTPATIENT
Start: 2017-09-17

## 2017-11-12 RX ORDER — TRIAMTERENE AND HYDROCHLOROTHIAZIDE 37.5; 25 MG/1; MG/1
CAPSULE ORAL
Qty: 180 CAP | Refills: 3 | Status: SHIPPED | OUTPATIENT
Start: 2017-11-12 | End: 2019-03-06 | Stop reason: SDUPTHER

## 2017-12-11 RX ORDER — ALBUTEROL SULFATE 1.25 MG/3ML
SOLUTION RESPIRATORY (INHALATION)
Qty: 225 ML | Refills: 3 | Status: SHIPPED | OUTPATIENT
Start: 2017-12-11 | End: 2019-04-18 | Stop reason: SDUPTHER

## 2017-12-12 RX ORDER — ALBUTEROL SULFATE 90 UG/1
AEROSOL, METERED RESPIRATORY (INHALATION)
Qty: 25.5 INHALER | Refills: 11 | Status: SHIPPED | OUTPATIENT
Start: 2017-12-12 | End: 2019-06-21 | Stop reason: SDUPTHER

## 2017-12-20 ENCOUNTER — OFFICE VISIT (OUTPATIENT)
Dept: INTERNAL MEDICINE CLINIC | Age: 67
End: 2017-12-20

## 2017-12-20 VITALS
BODY MASS INDEX: 42.14 KG/M2 | OXYGEN SATURATION: 94 % | DIASTOLIC BLOOD PRESSURE: 76 MMHG | SYSTOLIC BLOOD PRESSURE: 146 MMHG | HEIGHT: 62 IN | WEIGHT: 229 LBS | HEART RATE: 78 BPM | TEMPERATURE: 97.9 F

## 2017-12-20 DIAGNOSIS — I10 ESSENTIAL HYPERTENSION: Primary | ICD-10-CM

## 2017-12-20 DIAGNOSIS — L03.818 CELLULITIS OF OTHER SPECIFIED SITE: ICD-10-CM

## 2017-12-20 DIAGNOSIS — R92.8 ABNORMAL MAMMOGRAM: ICD-10-CM

## 2017-12-20 DIAGNOSIS — E78.00 PURE HYPERCHOLESTEROLEMIA: ICD-10-CM

## 2017-12-20 DIAGNOSIS — J45.909 MILD ASTHMA WITHOUT COMPLICATION, UNSPECIFIED WHETHER PERSISTENT: ICD-10-CM

## 2017-12-20 RX ORDER — ROSUVASTATIN CALCIUM 20 MG/1
20 TABLET, COATED ORAL
Qty: 90 TAB | Refills: 3 | Status: SHIPPED | OUTPATIENT
Start: 2017-12-20 | End: 2019-03-06 | Stop reason: SDUPTHER

## 2017-12-20 RX ORDER — CEPHALEXIN 500 MG/1
500 CAPSULE ORAL 3 TIMES DAILY
Qty: 21 CAP | Refills: 0 | Status: SHIPPED | OUTPATIENT
Start: 2017-12-20 | End: 2018-06-21 | Stop reason: ALTCHOICE

## 2017-12-20 NOTE — MR AVS SNAPSHOT
Visit Information Date & Time Provider Department Dept. Phone Encounter #  
 12/20/2017  8:30 AM Miguelina Moon, [address] [phone] [id number] Follow-up Instructions Return in about 6 months (around 6/20/2018) for htn hld asthma. Your Appointments 1/25/2018  9:00 AM  
Any with Tricia Fregoso MD  
[address] [place] ([facility]) Appt Note: pap f/up- bring machine [facility] [address] [phone] [address] [place] [address]., [address] [address] Upcoming Health Maintenance Date Due Hepatitis C Screening 1950 DTaP/Tdap/Td series (1 - Tdap) 12/12/1973 FOOT EXAM Q1 8/4/2015 EYE EXAM RETINAL OR DILATED Q1 3/17/2016 HEMOGLOBIN A1C Q6M 5/23/2017 MICROALBUMIN Q1 11/22/2017 LIPID PANEL Q1 11/22/2017 GLAUCOMA SCREENING Q2Y 12/12/2017 OSTEOPOROSIS SCREENING (DEXA) 12/12/2017 Pneumococcal 65+ High/Highest Risk (1 of 2 - PCV13) 12/12/2017 MEDICARE YEARLY EXAM 12/12/2017 PAP AKA CERVICAL CYTOLOGY 4/17/2018 COLONOSCOPY 9/23/2026 Allergies as of 12/20/2017  Review Complete On: 12/20/2017 By: Tomer Nicole LPN Severity Noted Reaction Type Reactions Latex Medium 06/24/2013    Itching Codeine  03/13/2012    Itching, Other (comments)  
 hallucinate Contrast Dye [Iodine]  06/12/2012    Rash, Itching Given pre-cardiac cath Seafood [Shellfish Containing Products]  06/06/2012    Swelling Current Immunizations  Reviewed on 10/16/2015 Name Date Influenza Vaccine 10/1/2015 Influenza Vaccine PF 1/14/2013  2:37 PM  
 Influenza Vaccine Split 11/1/2011, 10/15/2010 Pneumococcal Polysaccharide (PPSV-23) 4/10/2014 Not reviewed this visit You Were Diagnosed With   
  
 Codes Comments Essential hypertension    -  Primary ICD-10-CM: I10 
ICD-9-CM: 401.9 Pure hypercholesterolemia     ICD-10-CM: E78.00 ICD-9-CM: 272.0 Mild asthma without complication, unspecified whether persistent     ICD-10-CM: J45.909 ICD-9-CM: 493.90 Cellulitis of other specified site     ICD-10-CM: L03.818 ICD-9-CM: 165. 8 Abnormal mammogram     ICD-10-CM: R92.8 ICD-9-CM: 793.80 Uncontrolled type 2 diabetes mellitus with complication, with long-term current use of insulin (HCC)     ICD-10-CM: E11.8, E11.65, Z79.4 ICD-9-CM: 250.82, V58.67 Vitals BP Pulse Temp Height(growth percentile) Weight(growth percentile) SpO2  
 146/76 78 97.9 °F (36.6 °C) (Oral) 5' 2\" (1.575 m) 229 lb (103.9 kg) 94% BMI OB Status Smoking Status 41.88 kg/m2 Hysterectomy Former Smoker Vitals History BMI and BSA Data Body Mass Index Body Surface Area  
 41.88 kg/m 2 2.13 m 2 Preferred Pharmacy Pharmacy Name Phone [facility], [address] [facility] [phone] Your Updated Medication List  
  
   
This list is accurate as of: 12/20/17  8:56 AM.  Always use your most recent med list.  
  
  
  
  
 ADVAIR DISKUS 100-50 mcg/dose diskus inhaler Generic drug:  fluticasone-salmeterol USE 1 INHALATION TWICE A DAY  
  
 * albuterol 5 mg/mL nebulizer solution Commonly known as:  PROVENTIL  
0.5 mL by Nebulization route every four (4) hours as needed for Wheezing. * albuterol 1.25 mg/3 mL Nebu Commonly known as:  ACCUNEB  
USE 1 VIAL EVERY 4 HOURS AS NEEDED FOR WHEEZING  
  
 * albuterol 90 mcg/actuation inhaler Commonly known as:  PROAIR HFA  
USE 1 INHALATION EVERY 4 HOURS AS NEEDED WHEEZING OR SHORTNESS OF BREATH  
  
 ALPRAZolam 0.25 mg tablet Commonly known as:  Rena Quinones Take 1 Tab by mouth two (2) times daily as needed for Anxiety. Max Daily Amount: 0.5 mg.  
  
 amLODIPine 10 mg tablet Commonly known as:  Tom Brooke TAKE 1 TABLET DAILY aspirin delayed-release 81 mg tablet Take 81 mg by mouth as needed. BD INSULIN PEN NEEDLE UF SHORT 31 gauge x 5/16\" Ndle Generic drug:  Insulin Needles (Disposable)  
  
 benzonatate 100 mg capsule Commonly known as:  TESSALON Take 100 mg by mouth three (3) times daily as needed for Cough. cephALEXin 500 mg capsule Commonly known as:  Colin Balboa Take 1 Cap by mouth three (3) times daily. cyclobenzaprine 10 mg tablet Commonly known as:  FLEXERIL Take 1 Tab by mouth three (3) times daily as needed for Muscle Spasm(s). fluticasone 50 mcg/actuation nasal spray Commonly known as:  FLONASE  
2 sprays each morning. HumaLOG 100 unit/mL injection Generic drug:  insulin lispro  
by SubCUTAneous route. 22 units tid ( Sliding scale)  
  
 lisinopril 10 mg tablet Commonly known as:  PRINIVIL, ZESTRIL  
TAKE 1 TABLET DAILY  
  
 metFORMIN  mg tablet Commonly known as:  GLUCOPHAGE XR Take 500 mg by mouth daily (with dinner). metoprolol tartrate 25 mg tablet Commonly known as:  LOPRESSOR  
TAKE 1 TABLET TWICE A DAY  
  
 mometasone 50 mcg/actuation nasal spray Commonly known as:  NASONEX  
2 Sprays by Both Nostrils route daily. montelukast 10 mg tablet Commonly known as:  SINGULAIR Take 1 Tab by mouth daily. ONETOUCH ULTRA TEST strip Generic drug:  glucose blood VI test strips  
  
 rosuvastatin 20 mg tablet Commonly known as:  CRESTOR Take 1 Tab by mouth nightly.  
  
 scopolamine 1 mg over 3 days Pt3d Commonly known as:  TRANSDERM-SCOP  
1 Patch by TransDERmal route every seventy-two (72) hours. SYNTHROID 137 mcg tablet Generic drug:  levothyroxine Take 137 mcg by mouth daily. TOUJEO SOLOSTAR 300 unit/mL (1.5 mL) Inpn Generic drug:  insulin glargine 50 Units by SubCUTAneous route nightly. triamterene-hydroCHLOROthiazide 37.5-25 mg per capsule Commonly known as:  Elmarie Lucero TAKE 1 CAPSULE TWICE A DAY  
  
 not need to use this code after youve completed the sign-up process. If you do not sign up before the expiration date, you must request a new code. · [employer] Access Code: [email or web address] Expires: 3/20/2018  8:56 AM 
 
4. Enter the last four digits of your Social Security Number (xxxx) and Date of Birth (mm/dd/yyyy) as indicated and click Submit. You will be taken to the next sign-up page. 5. Create a [employer] ID. This will be your [employer] login ID and cannot be changed, so think of one that is secure and easy to remember. 6. Create a [employer] password. You can change your password at any time. 7. Enter your Password Reset Question and Answer. This can be used at a later time if you forget your password. 8. Enter your e-mail address. You will receive e-mail notification when new information is available in [address]. 9. Click Sign Up. You can now view and download portions of your medical record. 10. Click the Download Summary menu link to download a portable copy of your medical information. If you have questions, please visit the Frequently Asked Questions section of the [employer] website. Remember, [employer] is NOT to be used for urgent needs. For medical emergencies, dial 911. Now available from your iPhone and Android! Please provide this summary of care documentation to your next provider. Your primary care clinician is listed as Lisa ADAMSON. If you have any questions after today's visit, please call [phone].

## 2017-12-20 NOTE — PROGRESS NOTES
HISTORY OF PRESENT ILLNESS  Makayla Montano is a 74 y.o. female.   HPI   F/u htn hld asthma  C/o sorethroat and 3d  C/o left arm pit boil intermittently  Has not taken crestor in months--did not get refilled  Saw Dr Chris Carrasco 3 mos ago- a1c was up -Lantus was changed to Toujeo  Had neg echo and nst for THOMPSON and edema recently at [facility]  F/u right Warthins Tumor--saw ENT Dr Deena Baldwin who did not advise surgery given age, dm-2, hx cva  Got new cpap device working well-Dr Miya Carrasco for DM-2 but wants to change endo MD though-last a1c around 10 per pt  Asthma has been quiet  C/o swelling of right >left foot x months but improved with leg elevation    Patient Active Problem List    Diagnosis Date Noted    Warthin's tumor 07/01/2016    HTN (hypertension) 09/13/2013    Axillary hidradenitis suppurativa 08/07/2013    Esophageal reflux 01/15/2013    Renal failure, acute ([facility] [address] 01/13/2013    Asthma 12/14/2012    Myocardial ischemia 06/06/2012    Shortness of breath 06/06/2012    Coronary artery disease 06/06/2012    PVC's (premature ventricular contractions) 06/01/2012    DM type 2 (diabetes mellitus, type 2) ([facility] 04/23/2012    Grave's disease 10/15/2010    Obesity 10/20/2009    DJD (degenerative joint disease) of hip 10/20/2009    DJD (degenerative joint disease) of knee 10/20/2009    CTS (Carpal Tunnel Syndrome)-b/l 10/20/2009    CVA (cerebral infarction) 10/20/2009    Diverticulosis 10/20/2009    Osteopenia 10/20/2009     Current Outpatient Prescriptions   Medication Sig Dispense Refill    albuterol (PROAIR HFA) 90 mcg/actuation inhaler USE 1 INHALATION EVERY 4 HOURS AS NEEDED WHEEZING OR SHORTNESS OF BREATH 25.5 Inhaler 11    albuterol (ACCUNEB) 1.25 mg/3 mL nebu USE 1 VIAL EVERY 4 HOURS AS NEEDED FOR WHEEZING 225 mL 3    triamterene-hydroCHLOROthiazide (DYAZIDE) 37.5-25 mg per capsule TAKE 1 CAPSULE TWICE A  Cap 3    ADVAIR DISKUS 100-50 mcg/dose diskus inhaler USE 1 INHALATION TWICE A  Each 3    metFORMIN ER (GLUCOPHAGE XR) 500 mg tablet Take 500 mg by mouth daily (with dinner).  cyclobenzaprine (FLEXERIL) 10 mg tablet Take 1 Tab by mouth three (3) times daily as needed for Muscle Spasm(s). 30 Tab 0    benzonatate (TESSALON) 100 mg capsule Take 100 mg by mouth three (3) times daily as needed for Cough.  amoxicillin (AMOXIL) 875 mg tablet Take 875 mg by mouth two (2) times a day.  lisinopril (PRINIVIL, ZESTRIL) 10 mg tablet TAKE 1 TABLET DAILY 90 Tab 3    amLODIPine (NORVASC) 10 mg tablet TAKE 1 TABLET DAILY 90 Tab 3    metoprolol tartrate (LOPRESSOR) 25 mg tablet TAKE 1 TABLET TWICE A  Tab 3    gentamicin (GARAMYCIN) 0.3 % ophthalmic solution Administer 1 Drop to both eyes every four (4) hours. 5 mL 0    fluticasone (FLONASE) 50 mcg/actuation nasal spray 2 sprays each morning. 1 Bottle 0    montelukast (SINGULAIR) 10 mg tablet Take 1 Tab by mouth daily. 30 Tab 0    mometasone (NASONEX) 50 mcg/actuation nasal spray 2 Sprays by Both Nostrils route daily. 1 Container 0    ONETOUCH ULTRA TEST strip       LANTUS SOLOSTAR 100 unit/mL (3 mL) pen 42 Units.  BD INSULIN PEN NEEDLE UF SHORT 31 gauge x 5/16\" ndle       albuterol (PROVENTIL) 5 mg/mL nebulizer solution 0.5 mL by Nebulization route every four (4) hours as needed for Wheezing. 0.5 mL 0    ALPRAZolam (XANAX) 0.25 mg tablet Take 1 Tab by mouth two (2) times daily as needed for Anxiety. Max Daily Amount: 0.5 mg. 20 Tab 0    scopolamine (TRANSDERM-SCOP) 1.5 mg (1 mg over 3 days) pt3d 1 Patch by TransDERmal route every seventy-two (72) hours. 4 Patch 0    SYNTHROID 137 mcg tablet Take 137 mcg by mouth daily.  ergocalciferol (VITAMIN D2) 50,000 unit capsule Take 50,000 Units by mouth every Monday.  rosuvastatin (CRESTOR) 20 mg tablet Take 20 mg by mouth nightly.  insulin lispro (HUMALOG) 100 unit/mL Soln by SubCUTAneous route.  22 units tid ( Sliding scale)      aspirin delayed-release 81 mg tablet Take 81 mg by mouth as needed. Allergies   Allergen Reactions    Latex Itching    Codeine Itching and Other (comments)     hallucinate    Contrast Dye [Iodine] Rash and Itching     Given pre-cardiac cath    Seafood [facility] Containing Products] Swelling      Lab Results  Component Value Date/Time   Hemoglobin A1c, External 9.4 05/20/2016   Hemoglobin A1c, External 8.9 08/17/2015 02:37 PM   Hemoglobin A1c, External 8.1 05/16/2015 05:36 AM   Glucose 150 02/21/2013 01:15 PM   Glucose (POC) 129 09/22/2015 07:11 AM   LDL, calculated 84 06/04/2012 12:00 AM   Creatinine (POC) 0.9 04/10/2015 12:41 PM   Creatinine 0.91 02/21/2013 01:15 PM      Lab Results  Component Value Date/Time   Cholesterol, total 157 06/04/2012 12:00 AM   HDL Cholesterol 55 06/04/2012 12:00 AM   LDL, calculated 84 06/04/2012 12:00 AM   LDL-C, External 72 05/20/2016   Triglyceride 90 06/04/2012 12:00 AM     Lab Results  Component Value Date/Time   GFR est non-AA 69 02/21/2013 01:15 PM   GFRNA, POC >60 04/10/2015 12:41 PM   GFR est AA 31 01/15/2013 03:25 AM   GFRAA, POC >60 04/10/2015 12:41 PM   Creatinine 0.91 02/21/2013 01:15 PM   Creatinine (POC) 0.9 04/10/2015 12:41 PM   BUN 14 02/21/2013 01:15 PM   BUN (POC) 7 04/10/2015 12:41 PM   Sodium 139 02/21/2013 01:15 PM   Sodium (POC) 143 04/10/2015 12:41 PM   Potassium 3.4 02/21/2013 01:15 PM   Potassium (POC) 2.9 04/10/2015 12:41 PM   Chloride 97 02/21/2013 01:15 PM   Chloride (POC) 103 04/10/2015 12:41 PM   CO2 29 02/21/2013 01:15 PM   Magnesium 1.9 01/14/2013 04:30 AM        ROS    Physical Exam   Constitutional: She appears well-developed and well-nourished. Appears stated age   HENT:   Mouth/Throat: Oropharynx is clear and moist.   Cardiovascular: Normal rate, regular rhythm and normal heart sounds. Exam reveals no gallop and no friction rub. No murmur heard. Pulmonary/Chest: Effort normal and breath sounds normal. No respiratory distress. She has no wheezes. Abdominal: Soft. Bowel sounds are normal.   Musculoskeletal: She exhibits no edema. Neurological: She is alert. Skin:   2 red dime sized red areas below left breast-no induration   Psychiatric: She has a normal mood and affect. Nursing note and vitals reviewed. ASSESSMENT and PLAN  Diagnoses and all orders for this visit:    1. Essential hypertension   Reasonable control  2. Pure hypercholesterolemia   Needs to restart crestor-refilled. Labs with endo MD next month  3. Mild asthma without complication, unspecified whether persistent   controlled  4. Cellulitis of other specified site   Keflex x 7d  5. Abnormal mammogram   Pt will get diagnostic mammogram next month  6. Uncontrolled type 2 diabetes mellitus with complication, with long-term current use of insulin (HCC)  -     REFERRAL TO ENDOCRINOLOGY  7. Advised prevnar 13 immunization    8. URI   cepacol ekta  Other orders  -     rosuvastatin (CRESTOR) 20 mg tablet; Take 1 Tab by mouth nightly. -     cephALEXin (KEFLEX) 500 mg capsule; Take 1 Cap by mouth three (3) times daily. Follow-up Disposition:  Return in about 6 months (around 6/20/2018) for htn hld asthma.

## 2018-01-25 ENCOUNTER — OFFICE VISIT (OUTPATIENT)
Dept: SLEEP MEDICINE | Age: 68
End: 2018-01-25

## 2018-01-25 VITALS
DIASTOLIC BLOOD PRESSURE: 74 MMHG | SYSTOLIC BLOOD PRESSURE: 147 MMHG | BODY MASS INDEX: 42.69 KG/M2 | WEIGHT: 232 LBS | HEIGHT: 62 IN | HEART RATE: 76 BPM | TEMPERATURE: 98.9 F | OXYGEN SATURATION: 98 %

## 2018-01-25 DIAGNOSIS — Z79.4 TYPE 2 DIABETES MELLITUS WITH COMPLICATION, WITH LONG-TERM CURRENT USE OF INSULIN (HCC): ICD-10-CM

## 2018-01-25 DIAGNOSIS — I10 ESSENTIAL HYPERTENSION: ICD-10-CM

## 2018-01-25 DIAGNOSIS — E11.8 TYPE 2 DIABETES MELLITUS WITH COMPLICATION, WITH LONG-TERM CURRENT USE OF INSULIN (HCC): ICD-10-CM

## 2018-01-25 DIAGNOSIS — G47.33 OBSTRUCTIVE SLEEP APNEA SYNDROME: Primary | ICD-10-CM

## 2018-01-25 PROBLEM — E66.01 OBESITY, MORBID (HCC): Status: ACTIVE | Noted: 2018-01-25

## 2018-01-25 NOTE — PROGRESS NOTES
[address] [facility]. [place], [address]  Tel.  [phone]  Fax. 100 [facility] [address]  [place], [address] [facility]  Tel.  [phone]  Fax. [phone] [address]   Tel.  [phone]  Fax. [phone]     S>Makayla Card is a 74 y.o. female seen for a positive airway pressure follow-up. She reports no problems using the device. The following problems are identified:    Drowsiness no Problems exhaling no   Snoring no Forget to put on no   Mask Comfortable yes Can't fall asleep no   Dry Mouth no Mask falls off no   Air Leaking no Frequent awakenings no     Download reviewed. She admits that her sleep has improved. Therapy Apnea Index averaged over PAP use: 1 /hr which reflects improved sleep breathing condition. Allergies   Allergen Reactions    Latex Itching    Codeine Itching and Other (comments)     hallucinate    Contrast Dye [Iodine] Rash and Itching     Given pre-cardiac cath    Seafood [Shellfish Containing Products] Swelling       She has a current medication list which includes the following prescription(s): insulin glargine, rosuvastatin, albuterol, albuterol, triamterene-hydrochlorothiazide, advair diskus, metformin er, cyclobenzaprine, benzonatate, lisinopril, amlodipine, metoprolol tartrate, fluticasone, montelukast, mometasone, onetouch ultra test, bd insulin pen needle uf short, albuterol, alprazolam, scopolamine, synthroid, ergocalciferol, humalog, aspirin delayed-release, and cephalexin. Ford Negrete She  has a past medical history of Asthma; CAD (coronary artery disease); Diabetes ([facility] [address] Hypertension; Screening for colon cancer (2/16/05); Stroke [facility]) (2004); Throm[facility]embolus [facility] ([facility]); Thyroid disease; and Unspecified sleep apnea.            O>    Visit Vitals    /74    Pulse 76    Temp 98.9 °F (37.2 °C) (Oral)    Ht 5' 2\" (1.575 m)    Wt 232 lb (105.2 kg)    SpO2 98%    BMI 42.43 kg/m2 General:   Alert, oriented, not in distress   Neck:   No JVD    Chest/Lungs:  symetrical lung expansion , no accessory muscle use    Extremities:  no obvious rashes , negative edema    Neuro:  No focal deficits ; No obvious tremor    Psych:  Normal affect ,  Normal countenance ;           A>    ICD-10-CM ICD-9-CM    1. Obstructive sleep apnea syndrome G47.33 327.23    2. Essential hypertension I10 401.9    3. Type 2 diabetes mellitus with complication, with long-term current use of insulin (HCC) E11.8 250.90     Z79.4 V58.67      AHI = 5(5-17). On CPAP :  9-12 cmH2O. Compliant:      yes    Therapeutic Response:  Positive    P>      * Follow-up Disposition:  Return in about 1 year (around 1/25/2019). PAP continues to be necessary for treatment and patient benefiting from it  she will continue on her current pressure settings. * She was asked to contact our office for any problems regarding PAP therapy. * Counseling was provided regarding the importance of regular PAP use and on proper sleep hygiene and safe driving. * Re-enforced proper and regular cleaning for the device. 2. Hypertension - she continues on her current regimen. I have reviewed the relationship between hypertension as it relates to sleep-disordered breathing. 3. Type II diabetes - she continues on her current regimen. I have reviewed the relationship between sleep disordered breathing as it relates to diabetes.     Lauren Mack MD  Diplomate in Sleep Medicine  [facility]

## 2018-01-25 NOTE — PATIENT INSTRUCTIONS
[address] [facility] [place]. [place], [address]  Tel.  [phone]  Fax. 100 [facility] 60  [place], [address] [facility] [address]  Tel.  [phone]  Fax. [phone] [address]  Tel.  [phone]  Fax. [phone]     PROPER SLEEP HYGIENE    What to avoid  · Do not have drinks with caffeine, such as coffee or black tea, for 8 hours before bed. · Do not smoke or use other types of tobacco near bedtime. Nicotine is a stimulant and can keep you awake. · Avoid drinking alcohol late in the evening, because it can cause you to wake in the middle of the night. · Do not eat a big meal close to bedtime. If you are hungry, eat a light snack. · Do not drink a lot of water close to bedtime, because the need to urinate may wake you up during the night. · Do not read or watch TV in bed. Use the bed only for sleeping and sexual activity. What to try  · Go to bed at the same time every night, and wake up at the same time every morning. Do not take naps during the day. · Keep your bedroom quiet, dark, and cool. · Get regular exercise, but not within 3 to 4 hours of your bedtime. .  · Sleep on a comfortable pillow and mattress. · If watching the clock makes you anxious, turn it facing away from you so you cannot see the time. · If you worry when you lie down, start a worry book. Well before bedtime, write down your worries, and then set the book and your concerns aside. · Try meditation or other relaxation techniques before you go to bed. · If you cannot fall asleep, get up and go to another room until you feel sleepy. Do something relaxing. Repeat your bedtime routine before you go to bed again. · Make your house quiet and calm about an hour before bedtime. Turn down the lights, turn off the TV, log off the computer, and turn down the volume on music. This can help you relax after a busy day.     Drowsy Driving  The [address] [place] [address] Road Traffic Safety Administration cites drowsiness as a causing factor in more than 560,806 police reported crashes annually, resulting in 76,000 injuries and 1,500 deaths. Other surveys suggest 55% of people polled have driven while drowsy in the past year, 23% had fallen asleep but not crashed, 3% crashed, and 2% had and accident due to drowsy driving. Who is at risk? Young Drivers: One study of drowsy driving accidents states that 55% of the drivers were under 25 years. Of those, 75% were male. Shift Workers and Travelers: People who work overnight or travel across time zones frequently are at higher risk of experiencing Circadian Rhythm Disorders. They are trying to work and function when their body is programed to sleep. Sleep Deprived: Lack of sleep has a serious impact on your ability to pay attention or focus on a task. Consistently getting less than the average of 8 hours your body needs creates partial or cumulative sleep deprivation. Untreated Sleep Disorders: Sleep Apnea, Narcolepsy, R.L.S., and other sleep disorders (untreated) prevent a person from getting enough restful sleep. This leads to excessive daytime sleepiness and increases the risk for drowsy driving accidents by up to 7 times. Medications / Alcohol: Even over the counter medications can cause drowsiness. Medications that impair a drivers attention should have a warning label. Alcohol naturally makes you sleepy and on its own can cause accidents. Combined with excessive drowsiness its effects are amplified. Signs of Drowsy Driving:   * You don't remember driving the last few miles   * You may drift out of your blake   * You are unable to focus and your thoughts wander   * You may yawn more often than normal   * You have difficulty keeping your eyes open / nodding off   * Missing traffic signs, speeding, or tailgating  Prevention-   Good sleep hygiene, lifestyle and behavioral choices have the most impact on drowsy driving.  There is no substitute for sleep and the average person requires 8 hours nightly. If you find yourself driving drowsy, stop and sleep. Consider the sleep hygiene tips provided during your visit as well. Medication Refill Policy: Refills for all medications require 1 week advance notice. Please have your pharmacy fax a refill request. We are unable to fax, or call in \"controled substance\" medications and you will need to pick these prescriptions up from our office. [employer] Activation    Thank you for requesting access to [employer]. Please follow the instructions below to securely access and download your online medical record. [employer] allows you to send messages to your doctor, view your test results, renew your prescriptions, schedule appointments, and more. How Do I Sign Up? 1. In your internet browser, go to [email or web address]. 2. Click on the First Time User? Click Here link in the Sign In box. You will see the New Member Sign Up page. 3. Enter your [employer] Access Code exactly as it appears below. You will not need to use this code after youve completed the sign-up process. If you do not sign up before the expiration date, you must request a new code. [employer] Access Code: [email or web address]  Expires: 3/20/2018  8:56 AM (This is the date your [employer]ide[employer] access code will )    4. Enter the last four digits of your Social Security Number (xxxx) and Date of Birth (mm/dd/yyyy) as indicated and click Submit. You will be taken to the next sign-up page. 5. Create a [employer] ID. This will be your [employer] login ID and cannot be changed, so think of one that is secure and easy to remember. 6. Create a [employer] password. You can change your password at any time. 7. Enter your Password Reset Question and Answer. This can be used at a later time if you forget your password. 8. Enter your e-mail address. You will receive e-mail notification when new information is available in [address] 9. Click Sign Up.  You can now view and download portions of your medical record. 10. Click the Download Summary menu link to download a portable copy of your medical information. Additional Information    If you have questions, please call [phone]. Remember, [employer] is NOT to be used for urgent needs. For medical emergencies, dial 911.

## 2018-01-26 ENCOUNTER — DOCUMENTATION ONLY (OUTPATIENT)
Dept: SLEEP MEDICINE | Age: 68
End: 2018-01-26

## 2018-01-26 NOTE — PROGRESS NOTES
PAP supply order and clinical note were faxed to [facility] on 1/26/18

## 2018-02-09 ENCOUNTER — HOSPITAL ENCOUNTER (OUTPATIENT)
Dept: MAMMOGRAPHY | Age: 68
Discharge: HOME | End: 2018-02-09
Attending: INTERNAL MEDICINE
Payer: MEDICARE

## 2018-02-09 DIAGNOSIS — Z12.39 SCREENING BREAST EXAMINATION: ICD-10-CM

## 2018-02-09 PROCEDURE — 77067 SCR MAMMO BI INCL CAD: CPT

## 2018-06-21 ENCOUNTER — OFFICE VISIT (OUTPATIENT)
Dept: INTERNAL MEDICINE CLINIC | Age: 68
End: 2018-06-21

## 2018-06-21 VITALS
OXYGEN SATURATION: 97 % | WEIGHT: 227 LBS | TEMPERATURE: 98.6 F | SYSTOLIC BLOOD PRESSURE: 162 MMHG | BODY MASS INDEX: 41.77 KG/M2 | HEIGHT: 62 IN | DIASTOLIC BLOOD PRESSURE: 75 MMHG | HEART RATE: 74 BPM

## 2018-06-21 DIAGNOSIS — E78.00 PURE HYPERCHOLESTEROLEMIA: ICD-10-CM

## 2018-06-21 DIAGNOSIS — E11.8 TYPE 2 DIABETES MELLITUS WITH COMPLICATION, WITH LONG-TERM CURRENT USE OF INSULIN (HCC): ICD-10-CM

## 2018-06-21 DIAGNOSIS — Z00.00 MEDICARE ANNUAL WELLNESS VISIT, SUBSEQUENT: ICD-10-CM

## 2018-06-21 DIAGNOSIS — Z11.59 ENCOUNTER FOR HEPATITIS C SCREENING TEST FOR LOW RISK PATIENT: ICD-10-CM

## 2018-06-21 DIAGNOSIS — Z78.0 MENOPAUSE: ICD-10-CM

## 2018-06-21 DIAGNOSIS — Z23 ENCOUNTER FOR IMMUNIZATION: ICD-10-CM

## 2018-06-21 DIAGNOSIS — J45.20 MILD INTERMITTENT ASTHMA WITHOUT COMPLICATION: ICD-10-CM

## 2018-06-21 DIAGNOSIS — I10 ESSENTIAL HYPERTENSION: Primary | ICD-10-CM

## 2018-06-21 DIAGNOSIS — Z79.4 TYPE 2 DIABETES MELLITUS WITH COMPLICATION, WITH LONG-TERM CURRENT USE OF INSULIN (HCC): ICD-10-CM

## 2018-06-21 RX ORDER — LOSARTAN POTASSIUM 50 MG/1
TABLET ORAL DAILY
COMMUNITY
End: 2018-12-14 | Stop reason: SDUPTHER

## 2018-06-21 NOTE — PROGRESS NOTES
HISTORY OF PRESENT ILLNESS  Makayla Burden [place] is a 74 y.o. female. HPI   F/u htn hld asthma and medicare wellness-----------  crestor was restarted last visit   Sees Dr. Suzi Blunt, f/u DM-2-last a1c ---- around 10 this year.  Had appt this week and will get labs    Due for dexa and , prevnar and hep c screen lab    Was given rx for losartan 50mg 1/2 tab every day per Dr Suzi Blunt but did not fill it yet  Last OV:    C/o sorethroat and 3d  C/o left arm pit boil intermittently  Has not taken crestor in months--did not get refilled  Saw Dr Suzi Blunt 3 mos ago- a1c was up -Lantus was changed to Toujeo  Had neg echo and nst for THOMPSON and edema recently at [facility]  F/u right Warthins Tumor--saw ENT Dr Darya Bolaños who did not advise surgery given age, dm-2, hx cva  Got new cpap device working well-Dr Abbi Blunt for DM-2 but wants to change endo MD though-last a1c around 10 per pt  Asthma has been quiet  C/o swelling of right >left foot x months but improved with leg elevation    Patient Active Problem List    Diagnosis Date Noted    Obesity, morbid ([facility] [address] 01/25/2018    Warthin's tumor 07/01/2016    HTN (hypertension) 09/13/2013    Axillary hidradenitis suppurativa 08/07/2013    Esophageal reflux 01/15/2013    Renal failure, acute ([address] 01/13/2013    Asthma 12/14/2012    Myocardial ischemia 06/06/2012    Shortness of breath 06/06/2012    Coronary artery disease 06/06/2012    PVC's (premature ventricular contractions) 06/01/2012    DM type 2 (diabetes mellitus, type 2) ([facility] [address] 04/23/2012    Grave's disease 10/15/2010    Obesity 10/20/2009    DJD (degenerative joint disease) of hip 10/20/2009    DJD (degenerative joint disease) of knee 10/20/2009    CTS (Carpal Tunnel Syndrome)-b/l 10/20/2009    CVA (cerebral infarction) 10/20/2009    Diverticulosis 10/20/2009    Osteopenia 10/20/2009     Current Outpatient Prescriptions   Medication Sig Dispense Refill    insulin glargine (TOUJEO SOLOSTAR) 300 unit/mL (1.5 mL) inpn 50 Units by SubCUTAneous route nightly.  rosuvastatin (CRESTOR) 20 mg tablet Take 1 Tab by mouth nightly. 90 Tab 3    cephALEXin (KEFLEX) 500 mg capsule Take 1 Cap by mouth three (3) times daily. 21 Cap 0    albuterol (PROAIR HFA) 90 mcg/actuation inhaler USE 1 INHALATION EVERY 4 HOURS AS NEEDED WHEEZING OR SHORTNESS OF BREATH 25.5 Inhaler 11    albuterol (ACCUNEB) 1.25 mg/3 mL nebu USE 1 VIAL EVERY 4 HOURS AS NEEDED FOR WHEEZING 225 mL 3    triamterene-hydroCHLOROthiazide (DYAZIDE) 37.5-25 mg per capsule TAKE 1 CAPSULE TWICE A  Cap 3    ADVAIR DISKUS 100-50 mcg/dose diskus inhaler USE 1 INHALATION TWICE A  Each 3    metFORMIN ER (GLUCOPHAGE XR) 500 mg tablet Take 500 mg by mouth daily (with dinner).  cyclobenzaprine (FLEXERIL) 10 mg tablet Take 1 Tab by mouth three (3) times daily as needed for Muscle Spasm(s). 30 Tab 0    benzonatate (TESSALON) 100 mg capsule Take 100 mg by mouth three (3) times daily as needed for Cough.  lisinopril (PRINIVIL, ZESTRIL) 10 mg tablet TAKE 1 TABLET DAILY 90 Tab 3    amLODIPine (NORVASC) 10 mg tablet TAKE 1 TABLET DAILY 90 Tab 3    metoprolol tartrate (LOPRESSOR) 25 mg tablet TAKE 1 TABLET TWICE A  Tab 3    fluticasone (FLONASE) 50 mcg/actuation nasal spray 2 sprays each morning. 1 Bottle 0    montelukast (SINGULAIR) 10 mg tablet Take 1 Tab by mouth daily. 30 Tab 0    mometasone (NASONEX) 50 mcg/actuation nasal spray 2 Sprays by Both Nostrils route daily. 1 Container 0    ONETOUCH ULTRA TEST strip       BD INSULIN PEN NEEDLE UF SHORT 31 gauge x 5/16\" ndle       albuterol (PROVENTIL) 5 mg/mL nebulizer solution 0.5 mL by Nebulization route every four (4) hours as needed for Wheezing. 0.5 mL 0    ALPRAZolam (XANAX) 0.25 mg tablet Take 1 Tab by mouth two (2) times daily as needed for Anxiety.  Max Daily Amount: 0.5 mg. 20 Tab 0    scopolamine (TRANSDERM-SCOP) 1.5 mg (1 mg over 3 days) pt3d 1 Patch by TransDERmal route every seventy-two (72) hours. 4 Patch 0    SYNTHROID 137 mcg tablet Take 137 mcg by mouth daily.  ergocalciferol (VITAMIN D2) 50,000 unit capsule Take 50,000 Units by mouth every Monday.  insulin lispro (HUMALOG) 100 unit/mL Soln by SubCUTAneous route. 22 units tid ( Sliding scale)      aspirin delayed-release 81 mg tablet Take 81 mg by mouth as needed.        Allergies   Allergen Reactions    Latex Itching    Codeine Itching and Other (comments)     hallucinate    Contrast Dye [Iodine] Rash and Itching     Given pre-cardiac cath    Seafood [Shellfish Containing Products] Swelling     Social History   Substance Use Topics    Smoking status: Former Smoker     Quit date: 9/17/2004    Smokeless tobacco: Never Used    Alcohol use No      Lab Results  Component Value Date/Time   Hemoglobin A1c, External 9.4 05/20/2016   Hemoglobin A1c, External 8.9 08/17/2015 02:37 PM   Hemoglobin A1c, External 8.1 05/16/2015 05:36 AM   Glucose 150 (H) 02/21/2013 01:15 PM   Glucose (POC) 129 (H) 09/22/2015 07:11 AM   LDL, calculated 84 06/04/2012 12:00 AM   Creatinine (POC) 0.9 04/10/2015 12:41 PM   Creatinine 0.91 02/21/2013 01:15 PM      Lab Results  Component Value Date/Time   Cholesterol, total 157 06/04/2012 12:00 AM   HDL Cholesterol 55 06/04/2012 12:00 AM   LDL, calculated 84 06/04/2012 12:00 AM   LDL-C, External 72 05/20/2016   Triglyceride 90 06/04/2012 12:00 AM     Lab Results  Component Value Date/Time   GFR est non-AA 69 02/21/2013 01:15 PM   GFRNA, POC >60 04/10/2015 12:41 PM   GFR est AA 31 (L) 01/15/2013 03:25 AM   GFRAA, POC >60 04/10/2015 12:41 PM   Creatinine 0.91 02/21/2013 01:15 PM   Creatinine (POC) 0.9 04/10/2015 12:41 PM   BUN 14 02/21/2013 01:15 PM   BUN (POC) 7 (L) 04/10/2015 12:41 PM   Sodium 139 02/21/2013 01:15 PM   Sodium (POC) 143 04/10/2015 12:41 PM   Potassium 3.4 (L) 02/21/2013 01:15 PM   Potassium (POC) 2.9 (L) 04/10/2015 12:41 PM   Chloride 97 02/21/2013 01:15 PM   Chloride (POC) 103 04/10/2015 12:41 PM   CO2 29 02/21/2013 01:15 PM   Magnesium 1.9 01/14/2013 04:30 AM        ROS    Physical Exam   Constitutional: She appears well-developed and well-nourished. Appears stated age   Cardiovascular: Normal rate, regular rhythm and normal heart sounds. Exam reveals no gallop and no friction rub. No murmur heard. Pulmonary/Chest: Effort normal and breath sounds normal. No respiratory distress. She has no wheezes. Abdominal: Soft. Bowel sounds are normal.   Musculoskeletal: She exhibits no edema. Neurological: She is alert. Psychiatric: She has a normal mood and affect. Nursing note and vitals reviewed. ASSESSMENT and PLAN  Diagnoses and all orders for this visit:    1. Essential hypertension    2. Pure hypercholesterolemia    3. Mild intermittent asthma without complication    4. Type 2 diabetes mellitus with complication, with long-term current use of insulin ([address]    5. Encounter for hepatitis C screening test for low risk patient    6. Encounter for immunization  -     Pneumococcal conjugate 13 valent, IM (PREVNAR-13)    7. Menopause  -     DEXA BONE DENSITY STUDY AXIAL; Future      Follow-up Disposition:  Return in about 6 months (around 12/21/2018) for htn hld asthma. This is the Subsequent Medicare Annual Wellness Exam, performed 12 months or more after the Initial AWV or the last Subsequent AWV    I have reviewed the patient's medical history in detail and updated the computerized patient record.      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 1/2 tab daily      insulin glargine (TOUJEO SOLOSTAR) 300 unit/mL (1.5 mL) inpn 50 Units by SubCUTAneous route nightly.  rosuvastatin (CRESTOR) 20 mg tablet Take 1 Tab by mouth nightly. 90 Tab 3    albuterol (PROAIR HFA) 90 mcg/actuation inhaler USE 1 INHALATION EVERY 4 HOURS AS NEEDED WHEEZING OR SHORTNESS OF BREATH 25.5 Inhaler 11    albuterol (ACCUNEB) 1.25 mg/3 mL nebu USE 1 VIAL EVERY 4 HOURS AS NEEDED FOR WHEEZING 225 mL 3    triamterene-hydroCHLOROthiazide (DYAZIDE) 37.5-25 mg per capsule TAKE 1 CAPSULE TWICE A  Cap 3    ADVAIR DISKUS 100-50 mcg/dose diskus inhaler USE 1 INHALATION TWICE A  Each 3    amLODIPine (NORVASC) 10 mg tablet TAKE 1 TABLET DAILY 90 Tab 3    metoprolol tartrate (LOPRESSOR) 25 mg tablet TAKE 1 TABLET TWICE A  Tab 3    ONETOUCH ULTRA TEST strip       BD INSULIN PEN NEEDLE UF SHORT 31 gauge x 5/16\" ndle       albuterol (PROVENTIL) 5 mg/mL nebulizer solution 0.5 mL by Nebulization route every four (4) hours as needed for Wheezing. 0.5 mL 0    SYNTHROID 137 mcg tablet Take 137 mcg by mouth daily.  insulin lispro (HUMALOG) 100 unit/mL Soln by SubCUTAneous route. 15 units tid ( Sliding scale)      aspirin delayed-release 81 mg tablet Take 81 mg by mouth as needed.  losartan (COZAAR) 50 mg tablet Take  by mouth daily. 1/2 tab daily      metFORMIN ER (GLUCOPHAGE XR) 500 mg tablet Take 500 mg by mouth daily (with dinner).  cyclobenzaprine (FLEXERIL) 10 mg tablet Take 1 Tab by mouth three (3) times daily as needed for Muscle Spasm(s).  30 Tab 0    benzonatate (TESSALON) 100 mg capsule Take 100 mg by mouth three (3) times daily as needed for Cough.  fluticasone (FLONASE) 50 mcg/actuation nasal spray 2 sprays each morning. 1 Bottle 0    montelukast (SINGULAIR) 10 mg tablet Take 1 Tab by mouth daily. 30 Tab 0    mometasone (NASONEX) 50 mcg/actuation nasal spray 2 Sprays by Both Nostrils route daily. 1 Container 0    ALPRAZolam (XANAX) 0.25 mg tablet Take 1 Tab by mouth two (2) times daily as needed for Anxiety. Max Daily Amount: 0.5 mg. 20 Tab 0    scopolamine (TRANSDERM-SCOP) 1.5 mg (1 mg over 3 days) pt3d 1 Patch by TransDERmal route every seventy-two (72) hours. 4 Patch 0    ergocalciferol (VITAMIN D2) 50,000 unit capsule Take 50,000 Units by mouth every Monday.        Allergies   Allergen Reactions    Latex Itching    Codeine Itching and Other (comments)     hallucinate    Contrast Dye [Iodine] Rash and Itching     Given pre-cardiac cath    Seafood [Shellfish Containing Products] Swelling     Family History   Problem Relation Age of Onset    Diabetes Mother     Cancer Mother     Breast Cancer Mother 79    Heart Disease Father     Hypertension Father     Stroke Father     Coronary Artery Disease Brother 54     Social History   Substance Use Topics    Smoking status: Former Smoker     Quit date: 9/17/2004    Smokeless tobacco: Never Used    Alcohol use No     Patient Active Problem List   Diagnosis Code    Obesity E66.9    DJD (degenerative joint disease) of hip M16.9    DJD (degenerative joint disease) of knee M17.10    CTS (Carpal Tunnel Syndrome)-b/l G56.00    CVA (cerebral infarction) I63.9    Diverticulosis K57.90    Osteopenia M85.80    Grave's disease     DM type 2 (diabetes mellitus, type 2) ([facility] [address] E11.9    PVC's (premature ventricular contractions) I49.3    Myocardial ischemia I25.9    Shortness of breath R06.02    Coronary artery disease I25.10    Asthma J45.909    Renal failure, acute (HCC) N17.9    Esophageal reflux K21.9    Axillary hidradenitis suppurativa L73.2    HTN (hypertension) I10    Warthin's tumor D11.9    Obesity, morbid (HCC) E66.01       Depression Risk Factor Screening:     PHQ over the last two weeks 7/1/2016   Little interest or pleasure in doing things Several days   Feeling down, depressed or hopeless Several days   Total Score PHQ 2 2     Alcohol Risk Factor Screening: You do not drink alcohol or very rarely. Functional Ability and Level of Safety:   Hearing Loss  The patient needs further evaluation. Activities of Daily Living  The home contains: no safety equipment. Patient needs help with:  housework    Fall Risk  No flowsheet data found. Abuse Screen  Patient is not abused    Cognitive Screening   Evaluation of Cognitive Function:  Has your family/caregiver stated any concerns about your memory: no  Normal    Patient Care Team   Patient Care Team:  Amanda Iyer MD as PCP - Pippa Martin RN as Ambulatory Care Navigator  Rosie Carrizales MD as Physician (Cardiology)  Harris Daley MD as Surgeon (General Surgery)  Stephany Flores MD as Physician (Sleep Medicine)  Frederick Amanda NP as Nurse Practitioner (Nurse Practitioner)    Assessment/Plan   Education and counseling provided:  Are appropriate based on today's review and evaluation  End-of-Life planning (with patient's consent)-see ACP note  Pneumococcal Vaccine-prevnar 13 today  Bone mass measurement (DEXA)-ordered    Diagnoses and all orders for this visit:    1. Essential hypertension   Pt will start losartan and get labs next week per Dr Domenic Saba  2. Pure hypercholesterolemia   Continue statin   3. Mild intermittent asthma without complication   controlled  4. Type 2 diabetes mellitus with complication, with long-term current use of insulin (HCC)   F/u Dr Domenic Saba  5. Encounter for hepatitis C screening test for low risk patient   Pt declines lab today  6.  Encounter for immunization  -     Pneumococcal conjugate 13 MONICA clark (PREVNAR-13)    7. Menopause  -     DEXA BONE DENSITY STUDY AXIAL;  Future        Health Maintenance Due   Topic Date Due    Hepatitis C Screening  1950    DTaP/Tdap/Td series (1 - Tdap) 12/12/1973    FOOT EXAM Q1  08/04/2015    HEMOGLOBIN A1C Q6M  05/23/2017    MICROALBUMIN Q1  11/22/2017    LIPID PANEL Q1  11/22/2017    Bone Densitometry (Dexa) Screening  12/12/2017    Pneumococcal 65+ High/Highest Risk (1 of 2 - PCV13) 12/12/2017

## 2018-06-21 NOTE — MR AVS SNAPSHOT
[address] [place] [address] 
[phone] Patient: Ranjit Quevedo MRN:  :[id number] Visit Information Date & Time Provider Department Dept. Phone Encounter #  
 2018  8:30 AM Jarett Robles, [address] [place] [address],[address] [phone] [id number] Follow-up Instructions Return in about 6 months (around 2018) for htn hld asthma. Your Appointments 2019  9:20 AM  
Any with Snehal Ferrell MD  
[address] [place] [address] ([facility]) Appt Note: 1 year f/up- bring machine [facility] [address] [phone] [address] [place] [address]., [address] [phone] Upcoming Health Maintenance Date Due Hepatitis C Screening 1950 DTaP/Tdap/Td series (1 - Tdap) 1973 HEMOGLOBIN A1C Q6M 2017 MICROALBUMIN Q1 2017 LIPID PANEL Q1 2017 Bone Densitometry (Dexa) Screening 2017 Pneumococcal 65+ High/Highest Risk (1 of 2 - PCV13) 2017 Influenza Age 5 to Adult 2018 EYE EXAM RETINAL OR DILATED Q1 2018 FOOT EXAM Q1 2019 MEDICARE YEARLY EXAM 2019 GLAUCOMA SCREENING Q2Y 2019 BREAST CANCER SCRN MAMMOGRAM 2020 COLONOSCOPY 2026 Allergies as of 2018  Review Complete On: 2018 By: Chano Laurent LPN Severity Noted Reaction Type Reactions Latex Medium 2013    Itching Codeine  2012    Itching, Other (comments)  
 hallucinate Contrast Dye [Iodine]  2012    Rash, Itching Given pre-cardiac cath Seafood [Shellfish Containing Products]  2012    Swelling Current Immunizations  Reviewed on 10/16/2015 Name Date Influenza Vaccine 10/1/2015 Influenza Vaccine PF 2013  2:37 PM  
 Influenza Vaccine Split 2011, 10/15/2010 Pneumococcal Conjugate (PCV-13)  Incomplete Pneumococcal Polysaccharide (PPSV-23) 4/10/2014 Not reviewed this visit You Were Diagnosed With   
  
 Codes Comments Essential hypertension    -  Primary ICD-10-CM: I10 
ICD-9-CM: 401.9 Pure hypercholesterolemia     ICD-10-CM: E78.00 ICD-9-CM: 272.0 Mild intermittent asthma without complication     CJJ-55-AI: J45.20 ICD-9-CM: 493.90 Type 2 diabetes mellitus with complication, with long-term current use of insulin (HCC)     ICD-10-CM: E11.8, Z79.4 ICD-9-CM: 250.90, V58.67 Encounter for hepatitis C screening test for low risk patient     ICD-10-CM: Z11.59 
ICD-9-CM: V73.89 Encounter for immunization     ICD-10-CM: G79 ICD-9-CM: V03.89 Menopause     ICD-10-CM: Z78.0 ICD-9-CM: 627.2 Vitals BP Pulse Temp Height(growth percentile) Weight(growth percentile) SpO2  
 162/75 (BP 1 Location: Left arm, BP Patient Position: Sitting) 74 98.6 °F (37 °C) (Oral) 5' 2\" (1.575 m) 227 lb (103 kg) 97% BMI OB Status Smoking Status 41.52 kg/m2 Hysterectomy Former Smoker BMI and BSA Data Body Mass Index Body Surface Area 41.52 kg/m 2 2.12 m 2 Preferred Pharmacy Pharmacy Name [place] [address] [address] [facility]3] [phone] [facility] [phone] Your Updated Medication List  
  
   
This list is accurate as of 6/21/18  9:06 AM.  Always use your most recent med list.  
  
  
  
  
 ADVAIR DISKUS 100-50 mcg/dose diskus inhaler Generic drug:  fluticasone-salmeterol USE 1 INHALATION TWICE A DAY  
  
 * albuterol 5 mg/mL nebulizer solution Commonly known as:  PROVENTIL  
0.5 mL by Nebulization route every four (4) hours as needed for Wheezing. * albuterol 1.25 mg/3 mL Nebu Commonly known as:  ACCUNEB  
USE 1 VIAL EVERY 4 HOURS AS NEEDED FOR WHEEZING  
  
 * albuterol 90 mcg/actuation inhaler Commonly known as:  PROAIR HFA  
 USE 1 INHALATION EVERY 4 HOURS AS NEEDED WHEEZING OR SHORTNESS OF BREATH  
  
 ALPRAZolam 0.25 mg tablet Commonly known as:  Betsy  Take 1 Tab by mouth two (2) times daily as needed for Anxiety. Max Daily Amount: 0.5 mg.  
  
 amLODIPine 10 mg tablet Commonly known as:  Ashtyn Conklindle TAKE 1 TABLET DAILY  
  
 aspirin delayed-release 81 mg tablet Take 81 mg by mouth as needed. BD ULTRA-FINE SHORT PEN NEEDLE 31 gauge x 5/16\" Ndle Generic drug:  Insulin Needles (Disposable)  
  
 benzonatate 100 mg capsule Commonly known as:  TESSALON Take 100 mg by mouth three (3) times daily as needed for Cough. cyclobenzaprine 10 mg tablet Commonly known as:  FLEXERIL Take 1 Tab by mouth three (3) times daily as needed for Muscle Spasm(s). fluticasone 50 mcg/actuation nasal spray Commonly known as:  FLONASE  
2 sprays each morning. HumaLOG U-100 Insulin 100 unit/mL injection Generic drug:  insulin lispro  
by SubCUTAneous route. 15 units tid ( Sliding scale) JARDIANCE 25 mg tablet Generic drug:  empagliflozin Take  by mouth daily. 1/2 tab daily  
  
 losartan 50 mg tablet Commonly known as:  COZAAR Take  by mouth daily. 1/2 tab daily  
  
 metFORMIN  mg tablet Commonly known as:  GLUCOPHAGE XR Take 500 mg by mouth daily (with dinner). metoprolol tartrate 25 mg tablet Commonly known as:  LOPRESSOR  
TAKE 1 TABLET TWICE A DAY  
  
 mometasone 50 mcg/actuation nasal spray Commonly known as:  NASONEX  
2 Sprays by Both Nostrils route daily. montelukast 10 mg tablet Commonly known as:  SINGULAIR Take 1 Tab by mouth daily. ONETOUCH ULTRA TEST strip Generic drug:  glucose blood VI test strips  
  
 rosuvastatin 20 mg tablet Commonly known as:  CRESTOR Take 1 Tab by mouth nightly.  
  
 scopolamine 1 mg over 3 days Pt3d Commonly known as:  TRANSDERM-SCOP  
1 Patch by TransDERmal route every seventy-two (72) hours. SYNTHROID 137 mcg tablet Generic drug:  levothyroxine Take 137 mcg by mouth daily. TOUJEO SOLOSTAR U-300 INSULIN 300 unit/mL (1.5 mL) Inpn Generic drug:  insulin glargine 50 Units by SubCUTAneous route nightly. triamterene-hydroCHLOROthiazide 37.5-25 mg per capsule Commonly known as:  Volodymyr Vega TAKE 1 CAPSULE TWICE A DAY  
  
 VITAMIN D2 50,000 unit capsule Generic drug:  ergocalciferol Take 50,000 Units by mouth every Monday. * Notice: This list has 3 medication(s) that are the same as other medications prescribed for you. Read the directions carefully, and ask your doctor or other care provider to review them with you. We Performed the Following PNEUMOCOCCAL CONJ VACCINE 13 VALENT IM [id number] CPT(R)] Follow-up Instructions Return in about 6 months (around 12/21/2018) for htn hld asthma. To-Do List   
 06/25/2018 Imaging:  DEXA BONE DENSITY STUDY AXIAL Patient Instructions Medicare Wellness Visit, Female The best way to live healthy is to have a lifestyle where you eat a well-balanced diet, exercise regularly, limit alcohol use, and quit all forms of tobacco/nicotine, if applicable. Regular preventive services are another way to keep healthy. Preventive services (vaccines, screening tests, monitoring & exams) can help personalize your care plan, which helps you manage your own care. Screening tests can find health problems at the earliest stages, when they are easiest to treat. [address] [facility] follows the current, evidence-based guidelines published by the [facility] States Mirza Marte ([facility]) when recommending preventive services for our patients. Because we follow these guidelines, sometimes recommendations change over time as research supports it. (For example, mammograms used to be recommended annually.  Even though Medicare will still pay for an annual mammogram, the newer guidelines recommend a mammogram every two years for women of average risk.) Of course, you and your provider may decide to screen more often for some diseases, based on your risk and co-morbidities (chronic disease you are already diagnosed with). Preventive services for you include: - Medicare offers their members a free annual wellness visit, which is time for you and your primary care provider to discuss and plan for your preventive service needs. Take advantage of this benefit every year! 
 
-All people over age 72 should receive the recommended pneumonia vaccines. Current USPSTF guidelines recommend a series of two vaccines for the best pneumonia protection.  
 
-All adults should have a yearly flu vaccine and a tetanus vaccine every 10 years. All adults age 61 years should receive a shingles vaccine once in their lifetime.   
 
-A bone mass density test is recommended when a woman turns 65 to screen for osteoporosis. This test is only recommended once as a screening. Some providers will use this same test as a disease monitoring tool if you already have osteoporosis. -All adults age 38-68 years who are overweight should have a diabetes screening test once every three years.  
 
-Other screening tests & preventive services for persons with diabetes include: an eye exam to screen for diabetic retinopathy, a kidney function test, a foot exam, and stricter control over your cholesterol.  
 
-Cardiovascular screening for adults with routine risk involves an electrocardiogram (ECG) at intervals determined by the provider.  
 
-Colorectal cancer screenings should be done for adults age 54-65 years with normal risk. There are a number of acceptable methods of screening for this type of cancer. Each test has its own benefits and drawbacks. Discuss with your provider what is most appropriate for you during your annual wellness visit.  The different tests include: colonoscopy (considered the best screening method), a fecal occult blood test, a fecal DNA test, and sigmoidoscopy. -Breast cancer screenings are recommended every other year for women of normal risk age 54-69 years.  
 
-Cervical cancer screenings for women over age 72 are only recommended with certain risk factors.  
 
-All adults born between [facility] should be screened once for Hepatitis C. Here is a list of your current Health Maintenance items (your personalized list of preventive services) with a due date: 
Health Maintenance Due Topic Date Due  
 Hepatitis C Test  1950  DTaP/Tdap/Td  (1 - Tdap) 12/12/1973 [facility] Diabetic Foot Care  08/04/2015  Hemoglobin A1C    05/23/2017  Albumin Urine Test  11/22/2017  Cholesterol Test   11/22/2017  Bone Mineral Density   12/12/2017  Pneumococcal Vaccine (1 of 2 - PCV13) 12/12/2017 Introducing [facility]! Rosalva Lundberg introduces [employer] patient portal. Now you can access parts of your medical record, email your doctor's office, and request medication refills online. 1. In your internet browser, go to [email or web address] 2. Click on the First Time User? Click Here link in the Sign In box. You will see the New Member Sign Up page. 3. Enter your [employer] Access Code exactly as it appears below. You will not need to use this code after youve completed the sign-up process. If you do not sign up before the expiration date, you must request a new code. · [employer] Access Code: [email or web address] Expires: 9/18/2018  9:18 PM 
 
4. Enter the last four digits of your Social Security Number (xxxx) and Date of Birth (mm/dd/yyyy) as indicated and click Submit. You will be taken to the next sign-up page. 5. Create a [employer] ID. This will be your [employer] login ID and cannot be changed, so think of one that is secure and easy to remember. 6. Create a [employer] password. You can change your password at any time. 7. Enter your Password Reset Question and Answer. This can be used at a later time if you forget your password. 8. Enter your e-mail address. You will receive e-mail notification when new information is available in [address] 9. Click Sign Up. You can now view and download portions of your medical record. 10. Click the Download Summary menu link to download a portable copy of your medical information. If you have questions, please visit the Frequently Asked Questions section of the [employer] website. Remember, [employer] is NOT to be used for urgent needs. For medical emergencies, dial 911. Now available from your iPhone and Android! Please provide this summary of care documentation to your next provider. Your primary care clinician is listed as Jose Luis ADAMSON. If you have any questions after today's visit, please call [phone].

## 2018-06-21 NOTE — PROGRESS NOTES
Chief Complaint   Patient presents with    Hypertension     6 month follow up    Cholesterol Problem     6 month follow up    Asthma     6 month follow up    Medication Evaluation     New med.  Losartan , stop Lisinopril    Labs     Non-fasting

## 2018-07-05 ENCOUNTER — HOSPITAL ENCOUNTER (OUTPATIENT)
Dept: BONE DENSITY | Age: 68
Discharge: HOME | End: 2018-07-05
Attending: INTERNAL MEDICINE
Payer: MEDICARE

## 2018-07-05 DIAGNOSIS — Z78.0 MENOPAUSE: ICD-10-CM

## 2018-07-05 PROCEDURE — 77080 DXA BONE DENSITY AXIAL: CPT

## 2018-07-13 ENCOUNTER — OFFICE VISIT (OUTPATIENT)
Dept: INTERNAL MEDICINE CLINIC | Age: 68
End: 2018-07-13

## 2018-07-13 VITALS
SYSTOLIC BLOOD PRESSURE: 163 MMHG | BODY MASS INDEX: 41.96 KG/M2 | OXYGEN SATURATION: 98 % | HEIGHT: 62 IN | RESPIRATION RATE: 18 BRPM | DIASTOLIC BLOOD PRESSURE: 73 MMHG | WEIGHT: 228 LBS | HEART RATE: 66 BPM | TEMPERATURE: 98.1 F

## 2018-07-13 DIAGNOSIS — I10 ESSENTIAL HYPERTENSION: ICD-10-CM

## 2018-07-13 DIAGNOSIS — E11.8 TYPE 2 DIABETES MELLITUS WITH COMPLICATION, WITH LONG-TERM CURRENT USE OF INSULIN (HCC): ICD-10-CM

## 2018-07-13 DIAGNOSIS — I25.10 CORONARY ARTERY DISEASE INVOLVING NATIVE CORONARY ARTERY OF NATIVE HEART WITHOUT ANGINA PECTORIS: ICD-10-CM

## 2018-07-13 DIAGNOSIS — E66.01 OBESITY, MORBID (HCC): ICD-10-CM

## 2018-07-13 DIAGNOSIS — Z79.4 TYPE 2 DIABETES MELLITUS WITH COMPLICATION, WITH LONG-TERM CURRENT USE OF INSULIN (HCC): ICD-10-CM

## 2018-07-13 DIAGNOSIS — Z86.73 HISTORY OF CVA (CEREBROVASCULAR ACCIDENT): Chronic | ICD-10-CM

## 2018-07-13 DIAGNOSIS — M54.31 SCIATICA OF RIGHT SIDE: Primary | ICD-10-CM

## 2018-07-13 RX ORDER — TRAMADOL HYDROCHLORIDE 50 MG/1
TABLET ORAL
Refills: 0 | COMMUNITY
Start: 2018-07-03 | End: 2019-01-22

## 2018-07-13 RX ORDER — NAPROXEN 500 MG/1
TABLET ORAL
Refills: 0 | COMMUNITY
Start: 2018-07-03 | End: 2019-01-22

## 2018-07-13 NOTE — MR AVS SNAPSHOT
[address] [facility] 
[phone] Patient: Arely Samayoa MRN:  :[id number] Visit Information Date & Time Provider Department Dept. Phone Encounter #  
 2018 10:15 AM Rubina Jones, [address] [id number] Follow-up Instructions Return if symptoms worsen or fail to improve. Your Appointments 2019  9:20 AM  
Any with Larry Schafer MD  
[address] [place] [address] ([facility]) Appt Note: 1 year f/up- bring machine [address] [facility] [address] [phone] [address] [facility] [address] [phone] Upcoming Health Maintenance Date Due Hepatitis C Screening 1950 DTaP/Tdap/Td series (1 - Tdap) 1973 HEMOGLOBIN A1C Q6M 2017 MICROALBUMIN Q1 2017 LIPID PANEL Q1 2017 Influenza Age 5 to Adult 2018 EYE EXAM RETINAL OR DILATED Q1 2018 FOOT EXAM Q1 2019 Pneumococcal 65+ High/Highest Risk (2 of 2 - PPSV23) 4/10/2019 MEDICARE YEARLY EXAM 2019 GLAUCOMA SCREENING Q2Y 2019 BREAST CANCER SCRN MAMMOGRAM 2020 COLONOSCOPY 2026 Allergies as of 2018  Review Complete On: 2018 By: Joshua Loaiza III, DO Severity Noted Reaction Type Reactions Latex Medium 2013    Itching Codeine  2012    Itching, Other (comments)  
 hallucinate Contrast Dye [Iodine]  2012    Rash, Itching Given pre-cardiac cath Seafood [Shellfish Containing Products]  2012    Swelling Current Immunizations  Reviewed on 10/16/2015 Name Date Influenza Vaccine 10/1/2015 Influenza Vaccine PF 2013  2:37 PM  
 Influenza Vaccine Split 2011, 10/15/2010 Pneumococcal Conjugate (PCV-13) 2018 Pneumococcal Polysaccharide (PPSV-23) 4/10/2014 Not reviewed this visit You Were Diagnosed With   
  
 Codes Comments Sciatica of right side    -  Primary ICD-10-CM: M54.31 
ICD-9-CM: 724.3 Obesity, morbid ([address]     ICD-10-CM: E66.01 
ICD-9-CM: 278.01 Type 2 diabetes mellitus with complication, with long-term current use of insulin (HCC)     ICD-10-CM: E11.8, Z79.4 ICD-9-CM: 250.90, V58.67 History of CVA (cerebrovascular accident)     ICD-10-CM: Z86.73 
ICD-9-CM: V12.54 Coronary artery disease involving native coronary artery of native heart without angina pectoris     ICD-10-CM: I25.10 ICD-9-CM: 414.01 Essential hypertension     ICD-10-CM: I10 
ICD-9-CM: 401.9 Vitals BP Pulse Temp Resp Height(growth percentile) Weight(growth percentile) 163/73 (BP 1 Location: Right arm, BP Patient Position: Sitting) 66 98.1 °F (36.7 °C) (Oral) 18 5' 2\" (1.575 m) 228 lb (103.4 kg) SpO2 BMI OB Status Smoking Status 98% 41.7 kg/m2 Hysterectomy Former Smoker Vitals History BMI and BSA Data Body Mass Index Body Surface Area 41.7 kg/m 2 2.13 m 2 Preferred Pharmacy Pharmacy Name Phone [id number] [facility], [address] [facility] [phone] Your Updated Medication List  
  
   
This list is accurate as of 7/13/18 10:35 AM.  Always use your most recent med list.  
  
  
  
  
 ADVAIR DISKUS 100-50 mcg/dose diskus inhaler Generic drug:  fluticasone-salmeterol USE 1 INHALATION TWICE A DAY  
  
 * albuterol 5 mg/mL nebulizer solution Commonly known as:  PROVENTIL  
0.5 mL by Nebulization route every four (4) hours as needed for Wheezing. * albuterol 1.25 mg/3 mL Nebu Commonly known as:  ACCUNEB  
USE 1 VIAL EVERY 4 HOURS AS NEEDED FOR WHEEZING  
  
 * albuterol 90 mcg/actuation inhaler Commonly known as:  PROAIR HFA  
 USE 1 INHALATION EVERY 4 HOURS AS NEEDED WHEEZING OR SHORTNESS OF BREATH  
  
 ALPRAZolam 0.25 mg tablet Commonly known as:  [place] Curio Take 1 Tab by mouth two (2) times daily as needed for Anxiety. Max Daily Amount: 0.5 mg.  
  
 amLODIPine 10 mg tablet Commonly known as:  Billie Darting TAKE 1 TABLET DAILY  
  
 aspirin delayed-release 81 mg tablet Take 81 mg by mouth as needed. BD ULTRA-FINE SHORT PEN NEEDLE 31 gauge x 5/16\" Ndle Generic drug:  Insulin Needles (Disposable)  
  
 benzonatate 100 mg capsule Commonly known as:  TESSALON Take 100 mg by mouth three (3) times daily as needed for Cough. cyclobenzaprine 10 mg tablet Commonly known as:  FLEXERIL Take 1 Tab by mouth three (3) times daily as needed for Muscle Spasm(s). fluticasone 50 mcg/actuation nasal spray Commonly known as:  FLONASE  
2 sprays each morning. HumaLOG U-100 Insulin 100 unit/mL injection Generic drug:  insulin lispro  
by SubCUTAneous route. 15 units tid ( Sliding scale) JARDIANCE 25 mg tablet Generic drug:  empagliflozin Take  by mouth daily. 1/2 tab daily  
  
 losartan 50 mg tablet Commonly known as:  COZAAR Take  by mouth daily. 1/2 tab daily  
  
 metFORMIN  mg tablet Commonly known as:  GLUCOPHAGE XR Take 500 mg by mouth daily (with dinner). metoprolol tartrate 25 mg tablet Commonly known as:  LOPRESSOR  
TAKE 1 TABLET TWICE A DAY  
  
 mometasone 50 mcg/actuation nasal spray Commonly known as:  NASONEX  
2 Sprays by Both Nostrils route daily. montelukast 10 mg tablet Commonly known as:  SINGULAIR Take 1 Tab by mouth daily. naproxen 500 mg tablet Commonly known as:  NAPROSYN  
TK 1 T PO BID PRF PAIN WITH FOOD  
  
 ONETOUCH ULTRA TEST strip Generic drug:  glucose blood VI test strips  
  
 rosuvastatin 20 mg tablet Commonly known as:  CRESTOR Take 1 Tab by mouth nightly.  
  
 scopolamine 1 mg over 3 days Pt3d Commonly known as:  TRANSDERM-SCOP  
1 Patch by TransDERmal route every seventy-two (72) hours. SYNTHROID 137 mcg tablet Generic drug:  levothyroxine Take 137 mcg by mouth daily. TOUJEO SOLOSTAR U-300 INSULIN 300 unit/mL (1.5 mL) Inpn Generic drug:  insulin glargine 50 Units by SubCUTAneous route nightly. traMADol 50 mg tablet Commonly known as:  ULTRAM  
TK 1 T PO BID PRF PAIN  
  
 triamterene-hydroCHLOROthiazide 37.5-25 mg per capsule Commonly known as:  Steffany Root TAKE 1 CAPSULE TWICE A DAY  
  
 VITAMIN D2 50,000 unit capsule Generic drug:  ergocalciferol Take 50,000 Units by mouth every Monday. * Notice: This list has 3 medication(s) that are the same as other medications prescribed for you. Read the directions carefully, and ask your doctor or other care provider to review them with you. Follow-up Instructions Return if symptoms worsen or fail to improve. Patient Instructions DASH Diet: Care Instructions Your Care Instructions The DASH diet is an eating plan that can help lower your blood pressure. DASH stands for Dietary Approaches to Stop Hypertension. Hypertension is high blood pressure. The DASH diet focuses on eating foods that are high in calcium, potassium, and magnesium. These nutrients can lower blood pressure. The foods that are highest in these nutrients are fruits, vegetables, low-fat dairy products, nuts, seeds, and legumes. But taking calcium, potassium, and magnesium supplements instead of eating foods that are high in those nutrients does not have the same effect. The DASH diet also includes whole grains, fish, and poultry. The DASH diet is one of several lifestyle changes your doctor may recommend to lower your high blood pressure. Your doctor may also want you to decrease the amount of sodium in your diet.  Lowering sodium while following the DASH diet can lower blood pressure even further than just the DASH diet alone. Follow-up care is a key part of your treatment and safety. Be sure to make and go to all appointments, and call your doctor if you are having problems. It's also a good idea to know your test results and keep a list of the medicines you take. How can you care for yourself at home? Following the DASH diet · Eat 4 to 5 servings of fruit each day. A serving is 1 medium-sized piece of fruit, ½ cup chopped or canned fruit, 1/4 cup dried fruit, or 4 ounces (½ cup) of fruit juice. Choose fruit more often than fruit juice. · Eat 4 to 5 servings of vegetables each day. A serving is 1 cup of lettuce or raw leafy vegetables, ½ cup of chopped or cooked vegetables, or 4 ounces (½ cup) of vegetable juice. Choose vegetables more often than vegetable juice. · Get 2 to 3 servings of low-fat and fat-free dairy each day. A serving is 8 ounces of milk, 1 cup of yogurt, or 1 ½ ounces of cheese. · Eat 6 to 8 servings of grains each day. A serving is 1 slice of bread, 1 ounce of dry cereal, or ½ cup of cooked rice, pasta, or cooked cereal. Try to choose whole-grain products as much as possible. · Limit lean meat, poultry, and fish to 2 servings each day. A serving is 3 ounces, about the size of a deck of cards. · Eat 4 to 5 servings of nuts, seeds, and legumes (cooked dried beans, lentils, and split peas) each week. A serving is 1/3 cup of nuts, 2 tablespoons of seeds, or ½ cup of cooked beans or peas. · Limit fats and oils to 2 to 3 servings each day. A serving is 1 teaspoon of vegetable oil or 2 tablespoons of salad dressing. · Limit sweets and added sugars to 5 servings or less a week. A serving is 1 tablespoon jelly or jam, ½ cup sorbet, or 1 cup of lemonade. · Eat less than 2,300 milligrams (mg) of sodium a day. If you limit your sodium to 1,500 mg a day, you can lower your blood pressure even more. Tips for success · Start small.  Do not try to make dramatic changes to your diet all at once. You might feel that you are missing out on your favorite foods and then be more likely to not follow the plan. Make small changes, and stick with them. Once those changes become habit, add a few more changes. · Try some of the following: ¨ Make it a goal to eat a fruit or vegetable at every meal and at snacks. This will make it easy to get the recommended amount of fruits and vegetables each day. ¨ Try yogurt topped with fruit and nuts for a snack or healthy dessert. ¨ Add lettuce, tomato, cucumber, and onion to sandwiches. ¨ Combine a ready-made pizza crust with low-fat mozzarella cheese and lots of vegetable toppings. Try using tomatoes, squash, spinach, broccoli, carrots, cauliflower, and onions. ¨ Have a variety of cut-up vegetables with a low-fat dip as an appetizer instead of chips and dip. ¨ Sprinkle sunflower seeds or chopped almonds over salads. Or try adding chopped walnuts or almonds to cooked vegetables. ¨ Try some vegetarian meals using beans and peas. Add garbanzo or kidney beans to salads. Make burritos and tacos with mashed bullard beans or black beans. Where can you learn more? Go to http://mynor-mary kay.info/. Enter J989 in the search box to learn more about \"DASH Diet: Care Instructions. \" Current as of: December 6, 2017 Content Version: 11.7 © 4643-2598 [employer]. Care instructions adapted under license by [employer] (which disclaims liability or warranty for this information). If you have questions about a medical condition or this instruction, always ask your healthcare professional. [place] [address] any warranty or liability for your use of this information. Learning About High Blood Pressure What is high blood pressure? Blood pressure is a measure of how hard the blood pushes against the walls of your arteries.  It's normal for blood pressure to go up and down throughout the day, but if it stays up, you have high blood pressure. Another name for high blood pressure is hypertension. Two numbers tell you your blood pressure. The first number is the systolic pressure. It shows how hard the blood pushes when your heart is pumping. The second number is the diastolic pressure. It shows how hard the blood pushes between heartbeats, when your heart is relaxed and filling with blood. A blood pressure of less than 120/80 (say \"120 over 80\") is ideal for an adult. High blood pressure is 130/80 or higher. You have high blood pressure if your top number is 130 or higher or your bottom number is 80 or higher, or both. What happens when you have high blood pressure? · Blood flows through your arteries with too much force. Over time, this damages the walls of your arteries. But you can't feel it. High blood pressure usually doesn't cause symptoms. · Fat and calcium start to build up in your arteries. This buildup is called plaque. Plaque makes your arteries narrower and stiffer. Blood can't flow through them as easily. · This lack of good blood flow starts to damage some of the organs in your body. This can lead to problems such as coronary artery disease and heart attack, heart failure, stroke, kidney failure, and eye damage. How can you prevent high blood pressure? · Stay at a healthy weight. · Try to limit how much sodium you eat to less than 2,300 milligrams (mg) a day. If you limit your sodium to 1,500 mg a day, you can lower your blood pressure even more. ¨ Buy foods that are labeled \"unsalted,\" \"sodium-free,\" or \"low-sodium. \" Foods labeled \"reduced-sodium\" and \"light sodium\" may still have too much sodium. ¨ Flavor your food with garlic, lemon juice, onion, vinegar, herbs, and spices instead of salt. Do not use soy sauce, steak sauce, onion salt, garlic salt, mustard, or ketchup on your food. ¨ Use less salt (or none) when recipes call for it.  You can often use half the salt a recipe calls for without losing flavor. · Be physically active. Get at least 30 minutes of exercise on most days of the week. Walking is a good choice. You also may want to do other activities, such as running, swimming, cycling, or playing tennis or team sports. · Limit alcohol to 2 drinks a day for men and 1 drink a day for women. · Eat plenty of fruits, vegetables, and low-fat dairy products. Eat less saturated and total fats. How is high blood pressure treated? · Your doctor will suggest making lifestyle changes. For example, your doctor may ask you to eat healthy foods, quit smoking, lose extra weight, and be more active. · If lifestyle changes don't help enough or your blood pressure is very high, you will have to take medicine every day. Follow-up care is a key part of your treatment and safety. Be sure to make and go to all appointments, and call your doctor if you are having problems. It's also a good idea to know your test results and keep a list of the medicines you take. Where can you learn more? Go to http://mynor-mary kay.info/. Enter P501 in the search box to learn more about \"Learning About High Blood Pressure. \" Current as of: May 10, 2017 Content Version: 11.7 © 1295-2841 [employer]. Care instructions adapted under license by [employer] (which disclaims liability or warranty for this information). If you have questions about a medical condition or this instruction, always ask your healthcare professional. [place] [address] any warranty or liability for your use of this information. Naprosyn is an anti-inflammatory medicine that helps with pain. Always take with food. Putting ice pack on a sore area for 15 minutes a few times each day will also help. Would also resume your water aerobics. Introducing [facility]!    
 [facility] introduces "[employer]" patient portal. Now you can access parts of your medical record, email your doctor's office, and request medication refills online. 1. In your internet browser, go to [email or web address] 2. Click on the First Time User? Click Here link in the Sign In box. You will see the New Member Sign Up page. 3. Enter your [email or web address] Access Code exactly as it appears below. You will not need to use this code after youve completed the sign-up process. If you do not sign up before the expiration date, you must request a new code. · [email or web address] Access Code: [email or web address] Expires: 9/18/2018  9:18 PM 
 
4. Enter the last four digits of your Social Security Number (xxxx) and Date of Birth (mm/dd/yyyy) as indicated and click Submit. You will be taken to the next sign-up page. 5. Create a Bolt[employer]io ID. This will be your [employer]olt[employer] login ID and cannot be changed, so think of one that is secure and easy to remember. 6. Create a [employer] password. You can change your password at any time. 7. Enter your Password Reset Question and Answer. This can be used at a later time if you forget your password. 8. Enter your e-mail address. You will receive e-mail notification when new information is available in [address] 9. Click Sign Up. You can now view and download portions of your medical record. 10. Click the Download Summary menu link to download a portable copy of your medical information. If you have questions, please visit the Frequently Asked Questions section of the [email or web address] website. Remember, [employer] is NOT to be used for urgent needs. For medical emergencies, dial 911. Now available from your iPhone and Android! Please provide this summary of care documentation to your next provider. Your primary care clinician is listed as Kanika ADAMSON. If you have any questions after today's visit, please call [phone].

## 2018-07-13 NOTE — PROGRESS NOTES
Tanmay Bonner is a 74 y.o. female who presents for evaluation of uc follow up. Typically follows with dr Royal Vitale, last saw him June 21, 2018. Went to [facility] on July 3 as her right leg and right groin area was hurting her and she was having a tough time walking. She was afraid that she might be having another cva, and she wanted to be checked out. rx given for medrol dose adriana, naprosyn,and ultram for sciatica. No longer has any pain, getting around much better with her walker. Does inquire about getting ramp for her house, and some other things to help her around the house.       ROS:  Constitutional: negative for fevers, chills, anorexia and weight loss  Eyes:   negative for visual disturbance and irritation  ENT:   negative for tinnitus,sore throat,nasal congestion,ear pain,hoarseness  Respiratory:  negative for cough, hemoptysis, dyspnea,wheezing  CV:   negative for chest pain, palpitations, lower extremity edema  GI:   negative for nausea, vomiting, diarrhea, abdominal pain,melena  Genitourinary: negative for frequency, dysuria and hematuria  Musculoskel: negative for myalgias, arthralgias, back pain, muscle weakness, joint pain  Neurological:  negative for headaches, dizziness, focal weakness, numbness  Psychiatric:     Negative for depression or anxiety      Past Medical History:   Diagnosis Date    Asthma     CAD (coronary artery disease)     \"mild\" per Dr Kenyatta Garcia note    Diabetes [facility])     Dr Maria E De Souza     Hypertension     Screening for colon cancer 2/16/05    Dr Allan Sun in 10 years    Stroke [facility]) 2004    Rt side weaker than left, uses a cane: no longer followed by neuro    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     Thyroid disease     Unspecified sleep apnea     uses CPAP       Past Surgical History:   Procedure Laterality Date    CARDIAC CATHETERIZATION  6/6/2012         CARDIAC SURG PROCEDURE UNLIST      heart surgery    HX HEENT      tonsillectomy    HX HEMORRHOIDECTOMY      HX HYSTERECTOMY      HX ORTHOPAEDIC  8/2011    left shoulder       Family History   Problem Relation Age of Onset    Diabetes Mother     Cancer Mother     Breast Cancer Mother 79    Heart Disease Father     Hypertension Father     Stroke Father     Coronary Artery Disease Brother 54       Social History     Social History    Marital status: SINGLE     Spouse name: N/A    Number of children: N/A    Years of education: N/A     Occupational History    Not on file. Social History Main Topics    Smoking status: Former Smoker     Quit date: 9/17/2004    Smokeless tobacco: Never Used    Alcohol use No    Drug use: Yes     Special: Prescription, OTC    Sexual activity: No     Other Topics Concern    Not on file     Social History Narrative            Visit Vitals    /73 (BP 1 Location: Right arm, BP Patient Position: Sitting)    Pulse 66    Temp 98.1 °F (36.7 °C) (Oral)    Resp 18    Ht 5' 2\" (1.575 m)    Wt 228 lb (103.4 kg)    SpO2 98%    BMI 41.7 kg/m2       Physical Examination:   General - Well appearing female  HEENT - PERRL, TM no erythema/opacification, normal nasal turbinates, no oropharyngeal erythema or exudate, MMM  Neck - supple, no bruits, no thyroidomegaly, no lymphadenopathy  Pulm - clear to auscultation bilaterally  Cardio - RRR, normal S1 S2, no murmur  Abd - soft, nontender, no masses, no HSM  Extrem - no edema, +2 distal pulses  Neuro-  No focal deficits, CN intact     Assessment/Plan:    1. Right sided sciatica--has resolved, ok to use naprosyn with food prn  2.  htn--follow at home, continue with norvasc, metoprolol, cozaar, dyazide  3. Hx cva--on asa  4.   Dm, type 2--toujeo, glucophage, humalog  5.  crystal--continue cpap    rtc prn, follows with dr Eric Moon

## 2018-07-13 NOTE — PATIENT INSTRUCTIONS
DASH Diet: Care Instructions  Your Care Instructions    The DASH diet is an eating plan that can help lower your blood pressure. DASH stands for Dietary Approaches to Stop Hypertension. Hypertension is high blood pressure. The DASH diet focuses on eating foods that are high in calcium, potassium, and magnesium. These nutrients can lower blood pressure. The foods that are highest in these nutrients are fruits, vegetables, low-fat dairy products, nuts, seeds, and legumes. But taking calcium, potassium, and magnesium supplements instead of eating foods that are high in those nutrients does not have the same effect. The DASH diet also includes whole grains, fish, and poultry. The DASH diet is one of several lifestyle changes your doctor may recommend to lower your high blood pressure. Your doctor may also want you to decrease the amount of sodium in your diet. Lowering sodium while following the DASH diet can lower blood pressure even further than just the DASH diet alone. Follow-up care is a key part of your treatment and safety. Be sure to make and go to all appointments, and call your doctor if you are having problems. It's also a good idea to know your test results and keep a list of the medicines you take. How can you care for yourself at home? Following the DASH diet  · Eat 4 to 5 servings of fruit each day. A serving is 1 medium-sized piece of fruit, ½ cup chopped or canned fruit, 1/4 cup dried fruit, or 4 ounces (½ cup) of fruit juice. Choose fruit more often than fruit juice. · Eat 4 to 5 servings of vegetables each day. A serving is 1 cup of lettuce or raw leafy vegetables, ½ cup of chopped or cooked vegetables, or 4 ounces (½ cup) of vegetable juice. Choose vegetables more often than vegetable juice. · Get 2 to 3 servings of low-fat and fat-free dairy each day. A serving is 8 ounces of milk, 1 cup of yogurt, or 1 ½ ounces of cheese. · Eat 6 to 8 servings of grains each day.  A serving is 1 slice of bread, 1 ounce of dry cereal, or ½ cup of cooked rice, pasta, or cooked cereal. Try to choose whole-grain products as much as possible. · Limit lean meat, poultry, and fish to 2 servings each day. A serving is 3 ounces, about the size of a deck of cards. · Eat 4 to 5 servings of nuts, seeds, and legumes (cooked dried beans, lentils, and split peas) each week. A serving is 1/3 cup of nuts, 2 tablespoons of seeds, or ½ cup of cooked beans or peas. · Limit fats and oils to 2 to 3 servings each day. A serving is 1 teaspoon of vegetable oil or 2 tablespoons of salad dressing. · Limit sweets and added sugars to 5 servings or less a week. A serving is 1 tablespoon jelly or jam, ½ cup sorbet, or 1 cup of lemonade. · Eat less than 2,300 milligrams (mg) of sodium a day. If you limit your sodium to 1,500 mg a day, you can lower your blood pressure even more. Tips for success  · Start small. Do not try to make dramatic changes to your diet all at once. You might feel that you are missing out on your favorite foods and then be more likely to not follow the plan. Make small changes, and stick with them. Once those changes become habit, add a few more changes. · Try some of the following:  ¨ Make it a goal to eat a fruit or vegetable at every meal and at snacks. This will make it easy to get the recommended amount of fruits and vegetables each day. ¨ Try yogurt topped with fruit and nuts for a snack or healthy dessert. ¨ Add lettuce, tomato, cucumber, and onion to sandwiches. ¨ Combine a ready-made pizza crust with low-fat mozzarella cheese and lots of vegetable toppings. Try using tomatoes, squash, spinach, broccoli, carrots, cauliflower, and onions. ¨ Have a variety of cut-up vegetables with a low-fat dip as an appetizer instead of chips and dip. ¨ Sprinkle sunflower seeds or chopped almonds over salads. Or try adding chopped walnuts or almonds to cooked vegetables.   ¨ Try some vegetarian meals using beans and peas. Add garbanzo or kidney beans to salads. Make burritos and tacos with mashed bullard beans or black beans. Where can you learn more? Go to http://mynor-mary kay.info/. Enter W122 in the search box to learn more about \"DASH Diet: Care Instructions. \"  Current as of: December 6, 2017  Content Version: 11.7  © 2402-2957 [employer]. Care instructions adapted under license by [employer] (which disclaims liability or warranty for this information). If you have questions about a medical condition or this instruction, always ask your healthcare professional. [address] any warranty or liability for your use of this information. Learning About High Blood Pressure  What is high blood pressure? Blood pressure is a measure of how hard the blood pushes against the walls of your arteries. It's normal for blood pressure to go up and down throughout the day, but if it stays up, you have high blood pressure. Another name for high blood pressure is hypertension. Two numbers tell you your blood pressure. The first number is the systolic pressure. It shows how hard the blood pushes when your heart is pumping. The second number is the diastolic pressure. It shows how hard the blood pushes between heartbeats, when your heart is relaxed and filling with blood. A blood pressure of less than 120/80 (say \"120 over 80\") is ideal for an adult. High blood pressure is 130/80 or higher. You have high blood pressure if your top number is 130 or higher or your bottom number is 80 or higher, or both. What happens when you have high blood pressure? · Blood flows through your arteries with too much force. Over time, this damages the walls of your arteries. But you can't feel it. High blood pressure usually doesn't cause symptoms. · Fat and calcium start to build up in your arteries. This buildup is called plaque. Plaque makes your arteries narrower and stiffer.  Blood can't flow through them as easily. · This lack of good blood flow starts to damage some of the organs in your body. This can lead to problems such as coronary artery disease and heart attack, heart failure, stroke, kidney failure, and eye damage. How can you prevent high blood pressure? · Stay at a healthy weight. · Try to limit how much sodium you eat to less than 2,300 milligrams (mg) a day. If you limit your sodium to 1,500 mg a day, you can lower your blood pressure even more. ¨ Buy foods that are labeled \"unsalted,\" \"sodium-free,\" or \"low-sodium. \" Foods labeled \"reduced-sodium\" and \"light sodium\" may still have too much sodium. ¨ Flavor your food with garlic, lemon juice, onion, vinegar, herbs, and spices instead of salt. Do not use soy sauce, steak sauce, onion salt, garlic salt, mustard, or ketchup on your food. ¨ Use less salt (or none) when recipes call for it. You can often use half the salt a recipe calls for without losing flavor. · Be physically active. Get at least 30 minutes of exercise on most days of the week. Walking is a good choice. You also may want to do other activities, such as running, swimming, cycling, or playing tennis or team sports. · Limit alcohol to 2 drinks a day for men and 1 drink a day for women. · Eat plenty of fruits, vegetables, and low-fat dairy products. Eat less saturated and total fats. How is high blood pressure treated? · Your doctor will suggest making lifestyle changes. For example, your doctor may ask you to eat healthy foods, quit smoking, lose extra weight, and be more active. · If lifestyle changes don't help enough or your blood pressure is very high, you will have to take medicine every day. Follow-up care is a key part of your treatment and safety. Be sure to make and go to all appointments, and call your doctor if you are having problems. It's also a good idea to know your test results and keep a list of the medicines you take.   Where can you learn more?  Go to http://mynor-mary kay.info/. Enter P501 in the search box to learn more about \"Learning About High Blood Pressure. \"  Current as of: May 10, 2017  Content Version: 11.7  © 8411-4715 [employer]. Care instructions adapted under license by [employer] (which disclaims liability or warranty for this information). If you have questions about a medical condition or this instruction, always ask your healthcare professional. [facility] [address] any warranty or liability for your use of this information. Naprosyn is an anti-inflammatory medicine that helps with pain. Always take with food. Putting ice pack on a sore area for 15 minutes a few times each day will also help. Would also resume your water aerobics.

## 2018-07-13 NOTE — PROGRESS NOTES
Reviewed record in preparation for visit and have obtained necessary documentation. Identified pt with two pt identifiers(name and ). Chief Complaint   Patient presents with    Back Pain     was seen at Urgent care facility on 18       Health Maintenance Due   Topic Date Due    Hepatitis C Screening  1950    DTaP/Tdap/Td series (1 - Tdap) 1973    HEMOGLOBIN A1C Q6M  2017    MICROALBUMIN Q1  2017    LIPID PANEL Q1  2017       Ms. Kenny Toledo has a reminder for a \"due or due soon\" health maintenance. I have asked that she discuss health maintenance topic(s) due with Her  primary care provider. Coordination of Care Questionnaire:  :     1) Have you been to an emergency room, urgent care clinic since your last visit? yes   Hospitalized since your last visit? no             2) Have you seen or consulted any other health care providers outside of [address] [place] [address] since your last visit? no  (Include any pap smears or colon screenings in this section.)    3) Do you have an Advance Directive on file? no    4) Are you interested in receiving information on Advance Directives? NO    Patient is accompanied by self I have received verbal consent from Michael Son to discuss any/all medical information while they are present in the room.

## 2018-07-16 ENCOUNTER — TELEPHONE (OUTPATIENT)
Dept: INTERNAL MEDICINE CLINIC | Age: 68
End: 2018-07-16

## 2018-07-16 NOTE — TELEPHONE ENCOUNTER
10:00 am, Outbound call to [facility] intake dept. Identified self, role and nature of the call. Spoke to [place], then transferred to a therapist.  Inquired if [facility] does the evaluation for the lift. Per PT Tanika Luong, [facility] doesn't do the evaluation for chairlifts for pt to be able to go up/down in their home. This lift is commercial, and Medicare won't cover this expense. Acknowledged understanding, will relay this information to patient. RO Butts      09:24 am, Outbound call to patient, identifiers ( & address) verified per HIPAA policy. Identified self, role and nature of the call. Inquired about the need for ramp to be built on the outside of her home. Pt voiced \"no, I don't need a ramp on the outside. I need a Lift to help me get up/down the stairs\". Acknowledged understanding, and voiced this message will be relayed to PCP to see if [facility] does the evaluation for the lift. RO Butts      Writer recv'd a message from staff pt requesting a ramp be built for her home. 09:18 am, Outbound call [employer]. Spoke to [employer]", identified self, role and nature of the call re: getting ramp built for elderly patient. Jefry Meredith informed writer pt will need to call in, and do the assessment. Acknowledged understanding.       RO Butts

## 2018-07-16 NOTE — TELEPHONE ENCOUNTER
16: 42 pm, Outbound call to patient, identifiers ( & address) verified per HIPAA policy. Identified self, role and nature of the call. Provided patient telephone # to [address]; and informed her Medicare will not cover this expense. Inquired if patient has grab bars in bathroom \"no, I don't\". Suggested pt purchase the grab bars to assist w/getting in/out of the tub/shower. Pt acknowledged understanding. RO Sanderson    16:32 pm, Outbound call to [employer]. Identified self, role and nature of the call, spoke to .      Inquired if Medicare covered the costs of chairlift. \"Medicare doesn't cover this cost, it's all out of pocket on the individual.  Costs can range btwn $2500-$3500\". Will provide information to the patient.       RO Sanderson

## 2018-10-27 RX ORDER — AMLODIPINE BESYLATE 10 MG/1
TABLET ORAL
Qty: 90 TAB | Refills: 3 | Status: SHIPPED | OUTPATIENT
Start: 2018-10-27 | End: 2018-10-29 | Stop reason: SDUPTHER

## 2018-10-29 RX ORDER — AMLODIPINE BESYLATE 10 MG/1
10 TABLET ORAL DAILY
Qty: 90 TAB | Refills: 3 | Status: SHIPPED | OUTPATIENT
Start: 2018-10-29 | End: 2018-12-14 | Stop reason: SDUPTHER

## 2018-10-29 NOTE — TELEPHONE ENCOUNTER
----- Message from Hanane Locke sent at 10/29/2018  1:55 PM EDT -----  Regarding: Dr. Angelica Nieto  Pt is requesting a refill of  Rx \"Amlodipine\" to be sent to [facility] on [address]. Pharmacy number is [phone]. Best contact number is [phone].         Message copied/pasted from [facility]

## 2018-11-18 ENCOUNTER — HOSPITAL ENCOUNTER (EMERGENCY)
Age: 68
Discharge: HOME | End: 2018-11-18
Attending: EMERGENCY MEDICINE | Admitting: EMERGENCY MEDICINE
Payer: MEDICARE

## 2018-11-18 ENCOUNTER — APPOINTMENT (OUTPATIENT)
Dept: CT IMAGING | Age: 68
End: 2018-11-18
Attending: STUDENT IN AN ORGANIZED HEALTH CARE EDUCATION/TRAINING PROGRAM
Payer: MEDICARE

## 2018-11-18 VITALS
WEIGHT: 210 LBS | SYSTOLIC BLOOD PRESSURE: 149 MMHG | RESPIRATION RATE: 18 BRPM | HEART RATE: 67 BPM | BODY MASS INDEX: 38.41 KG/M2 | DIASTOLIC BLOOD PRESSURE: 61 MMHG | OXYGEN SATURATION: 94 % | TEMPERATURE: 98 F

## 2018-11-18 DIAGNOSIS — H81.399 PERIPHERAL VERTIGO, UNSPECIFIED LATERALITY: Primary | ICD-10-CM

## 2018-11-18 LAB
ALBUMIN SERPL-MCNC: 3.5 G/DL (ref 3.5–5)
ALBUMIN/GLOB SERPL: 0.7 {RATIO} (ref 1.1–2.2)
ALP SERPL-CCNC: 153 U/L (ref 45–117)
ALT SERPL-CCNC: 23 U/L (ref 12–78)
ANION GAP SERPL CALC-SCNC: 5 MMOL/L (ref 5–15)
APPEARANCE UR: CLEAR
AST SERPL-CCNC: 23 U/L (ref 15–37)
BACTERIA URNS QL MICRO: ABNORMAL /HPF
BASOPHILS # BLD: 0 K/UL (ref 0–0.1)
BASOPHILS NFR BLD: 0 % (ref 0–1)
BILIRUB SERPL-MCNC: 0.6 MG/DL (ref 0.2–1)
BILIRUB UR QL: NEGATIVE
BUN SERPL-MCNC: 12 MG/DL (ref 6–20)
BUN/CREAT SERPL: 12 (ref 12–20)
CALCIUM SERPL-MCNC: 9.4 MG/DL (ref 8.5–10.1)
CHLORIDE SERPL-SCNC: 101 MMOL/L (ref 97–108)
CK SERPL-CCNC: 104 U/L (ref 26–192)
CO2 SERPL-SCNC: 35 MMOL/L (ref 21–32)
COLOR UR: ABNORMAL
COMMENT, HOLDF: NORMAL
CREAT SERPL-MCNC: 1.01 MG/DL (ref 0.55–1.02)
DIFFERENTIAL METHOD BLD: ABNORMAL
EOSINOPHIL # BLD: 0.2 K/UL (ref 0–0.4)
EOSINOPHIL NFR BLD: 2 % (ref 0–7)
EPITH CASTS URNS QL MICRO: ABNORMAL /LPF
ERYTHROCYTE [DISTWIDTH] IN BLOOD BY AUTOMATED COUNT: 17.1 % (ref 11.5–14.5)
GLOBULIN SER CALC-MCNC: 4.7 G/DL (ref 2–4)
GLUCOSE SERPL-MCNC: 247 MG/DL (ref 65–100)
GLUCOSE UR STRIP.AUTO-MCNC: 500 MG/DL
HCT VFR BLD AUTO: 41.8 % (ref 35–47)
HGB BLD-MCNC: 12.8 G/DL (ref 11.5–16)
HGB UR QL STRIP: NEGATIVE
IMM GRANULOCYTES # BLD: 0 K/UL (ref 0–0.04)
IMM GRANULOCYTES NFR BLD AUTO: 0 % (ref 0–0.5)
KETONES UR QL STRIP.AUTO: NEGATIVE MG/DL
LEUKOCYTE ESTERASE UR QL STRIP.AUTO: NEGATIVE
LYMPHOCYTES # BLD: 1.6 K/UL (ref 0.8–3.5)
LYMPHOCYTES NFR BLD: 23 % (ref 12–49)
MCH RBC QN AUTO: 24 PG (ref 26–34)
MCHC RBC AUTO-ENTMCNC: 30.6 G/DL (ref 30–36.5)
MCV RBC AUTO: 78.4 FL (ref 80–99)
MONOCYTES # BLD: 0.4 K/UL (ref 0–1)
MONOCYTES NFR BLD: 5 % (ref 5–13)
NEUTS SEG # BLD: 4.7 K/UL (ref 1.8–8)
NEUTS SEG NFR BLD: 68 % (ref 32–75)
NITRITE UR QL STRIP.AUTO: NEGATIVE
NRBC # BLD: 0 K/UL (ref 0–0.01)
NRBC BLD-RTO: 0 PER 100 WBC
PH UR STRIP: 6.5 [PH] (ref 5–8)
PLATELET # BLD AUTO: 204 K/UL (ref 150–400)
PMV BLD AUTO: 11.5 FL (ref 8.9–12.9)
POTASSIUM SERPL-SCNC: 3.5 MMOL/L (ref 3.5–5.1)
PROT SERPL-MCNC: 8.2 G/DL (ref 6.4–8.2)
PROT UR STRIP-MCNC: ABNORMAL MG/DL
RBC # BLD AUTO: 5.33 M/UL (ref 3.8–5.2)
RBC #/AREA URNS HPF: ABNORMAL /HPF (ref 0–5)
SAMPLES BEING HELD,HOLD: NORMAL
SODIUM SERPL-SCNC: 141 MMOL/L (ref 136–145)
SP GR UR REFRACTOMETRY: 1.02 (ref 1–1.03)
TROPONIN I SERPL-MCNC: <0.05 NG/ML
UA: UC IF INDICATED,UAUC: ABNORMAL
UROBILINOGEN UR QL STRIP.AUTO: 1 EU/DL (ref 0.2–1)
WBC # BLD AUTO: 6.9 K/UL (ref 3.6–11)
WBC URNS QL MICRO: ABNORMAL /HPF (ref 0–4)
YEAST BUDDING URNS QL: PRESENT

## 2018-11-18 PROCEDURE — 74011250636 HC RX REV CODE- 250/636: Performed by: STUDENT IN AN ORGANIZED HEALTH CARE EDUCATION/TRAINING PROGRAM

## 2018-11-18 PROCEDURE — 81001 URINALYSIS AUTO W/SCOPE: CPT

## 2018-11-18 PROCEDURE — 70450 CT HEAD/BRAIN W/O DYE: CPT

## 2018-11-18 PROCEDURE — 93005 ELECTROCARDIOGRAM TRACING: CPT

## 2018-11-18 PROCEDURE — 36415 COLL VENOUS BLD VENIPUNCTURE: CPT

## 2018-11-18 PROCEDURE — 96374 THER/PROPH/DIAG INJ IV PUSH: CPT

## 2018-11-18 PROCEDURE — 85025 COMPLETE CBC W/AUTO DIFF WBC: CPT

## 2018-11-18 PROCEDURE — 99285 EMERGENCY DEPT VISIT HI MDM: CPT

## 2018-11-18 PROCEDURE — 84484 ASSAY OF TROPONIN QUANT: CPT

## 2018-11-18 PROCEDURE — 80053 COMPREHEN METABOLIC PANEL: CPT

## 2018-11-18 PROCEDURE — 82550 ASSAY OF CK (CPK): CPT

## 2018-11-18 PROCEDURE — 87086 URINE CULTURE/COLONY COUNT: CPT

## 2018-11-18 RX ORDER — MECLIZINE HCL 12.5 MG 12.5 MG/1
25 TABLET ORAL
Status: COMPLETED | OUTPATIENT
Start: 2018-11-18 | End: 2018-11-18

## 2018-11-18 RX ORDER — ONDANSETRON 2 MG/ML
4 INJECTION INTRAMUSCULAR; INTRAVENOUS
Status: COMPLETED | OUTPATIENT
Start: 2018-11-18 | End: 2018-11-18

## 2018-11-18 RX ORDER — ONDANSETRON 4 MG/1
4 TABLET, ORALLY DISINTEGRATING ORAL
Qty: 30 TAB | Refills: 0 | Status: SHIPPED | OUTPATIENT
Start: 2018-11-18 | End: 2018-11-28

## 2018-11-18 RX ORDER — MECLIZINE HYDROCHLORIDE 25 MG/1
25 TABLET ORAL
Qty: 30 TAB | Refills: 0 | Status: SHIPPED | OUTPATIENT
Start: 2018-11-18 | End: 2018-11-28

## 2018-11-18 RX ADMIN — ONDANSETRON 4 MG: 2 INJECTION INTRAMUSCULAR; INTRAVENOUS at 09:55

## 2018-11-18 RX ADMIN — MECLIZINE 25 MG: 12.5 TABLET ORAL at 09:55

## 2018-11-18 NOTE — ED NOTES
Assumed care of patient. Patient placed in position of comfort. Call bell in reach. Skin warm and dry. Respirations even and unlabored. In no apparent distress at this time. Pt presents to the ED, accompanied by family, with c/o feeling dizzy and \"off balance\" and also reports a tingling sensation in her Rt hand and both feet-pt reports similar symptoms intermittently x several years d/t peripheral neuropathy, but states, \"it's usually in both of my hands. \" Pt is unsure when the symptoms started, but states they were not present when she went to bed last night at 11 pm. Denies weakness and speech difficulties. A&O x 4. Family at bedside. Placed on cardiac monitor x 3. ED resident at bedside and does not want to call a \"code s\" at this time.

## 2018-11-18 NOTE — DISCHARGE INSTRUCTIONS
Vertigo: Care Instructions  Your Care Instructions    Vertigo is the feeling that you or your surroundings are moving when there is no actual movement. It is often described as a feeling of spinning, whirling, falling, or tilting. Vertigo may make you vomit or feel nauseated. You may have trouble standing or walking and may lose your balance. Vertigo is often related to an inner ear problem, but it can have other more serious causes. If vertigo continues, you may need more tests to find its cause. Follow-up care is a key part of your treatment and safety. Be sure to make and go to all appointments, and call your doctor if you are having problems. It's also a good idea to know your test results and keep a list of the medicines you take. How can you care for yourself at home? · Do not lie flat on your back. Prop yourself up slightly. This may reduce the spinning feeling. Keep your eyes open. · Move slowly so that you do not fall. · If your doctor recommends medicine, take it exactly as directed. · Do not drive while you are having vertigo. Certain exercises, called Myers-Daroff exercises, can help decrease vertigo. To do Myers-Daroff exercises:  · Sit on the edge of a bed or sofa and quickly lie down on the side that causes the worst vertigo. Lie on your side with your ear down. · Stay in this position for at least 30 seconds or until the vertigo goes away. · Sit up. If this causes vertigo, wait for it to stop. · Repeat the procedure on the other side. · Repeat this 10 times. Do these exercises 2 times a day until the vertigo is gone. When should you call for help? Call 911 anytime you think you may need emergency care. For example, call if:    · You passed out (lost consciousness).     · You have symptoms of a stroke. These may include:  ? Sudden numbness, tingling, weakness, or loss of movement in your face, arm, or leg, especially on only one side of your body.   ? Sudden vision changes. ? Sudden trouble speaking. ? Sudden confusion or trouble understanding simple statements. ? Sudden problems with walking or balance. ? A sudden, severe headache that is different from past headaches.    Call your doctor now or seek immediate medical care if:    · Vertigo occurs with a fever, a headache, or ringing in your ears.     · You have new or increased nausea and vomiting.    Watch closely for changes in your health, and be sure to contact your doctor if:    · Vertigo gets worse or happens more often.     · Vertigo has not gotten better after 2 weeks. Where can you learn more? Go to http://mynor-mary kay.info/. Enter N445 in the search box to learn more about \"Vertigo: Care Instructions. \"  Current as of: March 28, 2018  Content Version: 11.8  © 8387-1659 [employer]. Care instructions adapted under license by [employer] (which disclaims liability or warranty for this information). If you have questions about a medical condition or this instruction, always ask your healthcare professional. [place] [address] any warranty or liability for your use of this information.

## 2018-11-18 NOTE — ED NOTES
Dr. Jelani Arnold at bedside to provide discharge paperwork. Vital signs stable. Pt in no apparent distress at this time. Mental status at baseline. To waiting room via wheelchair, discharge paperwork in hand. Accompanied by family.

## 2018-11-18 NOTE — ED PROVIDER NOTES
EMERGENCY DEPARTMENT HISTORY AND PHYSICAL EXAM 
     
 
Date: 11/18/2018 Patient Name: Elidia Childers History of Presenting Illness Chief Complaint Patient presents with  Dizziness  
  pt stated that she woke up dizzy,  pt stated that her right hand feels numb History Provided By: Patient and Patient's Daughter HPI: Elidia Childers is a 74 y.o. female with hx of CAD, CVA, DM, DVT/PE, asthma, HTN, HLD, hypothyroidism, and RODRÍGUEZ presenting to ED with complaint of dizziness. Patient states that symptoms began this morning at 06:45 when she woke up. States she felt normal when she went to sleep yesterday evening at 23:30. Dizziness described as sensation of room spinning. It occurs both at rest and is worsened with movement. States it has been constant since onset of symptoms this AM. Reports some tingling sensations in R hand and bilateral feet which she states is consistent with her diabetic neuropathy. Otherwise denies any other neurological symptoms such as weakness, vision changes, slurred speech, or syncope. Patient states that she was told after her CVA 14 years ago that she could have issues with intermittent vertigo as a result of stroke. Has had similar dizziness symptoms in the past but they have not been as intense or as long lasting as current symptoms. Does report nausea but has not vomited. Denies any chest pain, dyspnea, palpitations, abdominal pain, diarrhea/constipation, or dysuria. Denies any recent trauma, travel, sick contacts, or medication changes. PCP: Kathy Preciado MD 
 
Social Hx: 
Tobacco: denies EtOH: denies Drugs: denies There are no other complaints, changes, or physical findings at this time. Current Outpatient Medications Medication Sig Dispense Refill  meclizine (ANTIVERT) 25 mg tablet Take 1 Tab by mouth three (3) times daily as needed for up to 10 days.  30 Tab 0  
  ondansetron (ZOFRAN ODT) 4 mg disintegrating tablet Take 1 Tab by mouth every eight (8) hours as needed for Nausea for up to 10 days. 30 Tab 0  
 amLODIPine (NORVASC) 10 mg tablet Take 1 Tab by mouth daily. 90 Tab 3  
 naproxen (NAPROSYN) 500 mg tablet TK 1 T PO BID PRF PAIN WITH FOOD  0  
 traMADol (ULTRAM) 50 mg tablet TK 1 T PO BID PRF PAIN  0  
 empagliflozin (JARDIANCE) 25 mg tablet Take  by mouth daily. 1/2 tab daily  losartan (COZAAR) 50 mg tablet Take  by mouth daily. 1/2 tab daily  insulin glargine (TOUJEO SOLOSTAR) 300 unit/mL (1.5 mL) inpn 50 Units by SubCUTAneous route nightly.  rosuvastatin (CRESTOR) 20 mg tablet Take 1 Tab by mouth nightly. 90 Tab 3  
 albuterol (PROAIR HFA) 90 mcg/actuation inhaler USE 1 INHALATION EVERY 4 HOURS AS NEEDED WHEEZING OR SHORTNESS OF BREATH 25.5 Inhaler 11  
 albuterol (ACCUNEB) 1.25 mg/3 mL nebu USE 1 VIAL EVERY 4 HOURS AS NEEDED FOR WHEEZING 225 mL 3  
 triamterene-hydroCHLOROthiazide (DYAZIDE) 37.5-25 mg per capsule TAKE 1 CAPSULE TWICE A  Cap 3  ADVAIR DISKUS 100-50 mcg/dose diskus inhaler USE 1 INHALATION TWICE A  Each 3  
 metFORMIN ER (GLUCOPHAGE XR) 500 mg tablet Take 500 mg by mouth daily (with dinner).  cyclobenzaprine (FLEXERIL) 10 mg tablet Take 1 Tab by mouth three (3) times daily as needed for Muscle Spasm(s). 30 Tab 0  
 benzonatate (TESSALON) 100 mg capsule Take 100 mg by mouth three (3) times daily as needed for Cough.  metoprolol tartrate (LOPRESSOR) 25 mg tablet TAKE 1 TABLET TWICE A  Tab 3  
 fluticasone (FLONASE) 50 mcg/actuation nasal spray 2 sprays each morning. 1 Bottle 0  
 montelukast (SINGULAIR) 10 mg tablet Take 1 Tab by mouth daily. 30 Tab 0  
 mometasone (NASONEX) 50 mcg/actuation nasal spray 2 Sprays by Both Nostrils route daily. 1 Container 0  
 ONETOUCH ULTRA TEST strip  BD INSULIN PEN NEEDLE UF SHORT 31 gauge x 5/16\" ndle  albuterol (PROVENTIL) 5 mg/mL nebulizer solution 0.5 mL by Nebulization route every four (4) hours as needed for Wheezing. 0.5 mL 0  
 ALPRAZolam (XANAX) 0.25 mg tablet Take 1 Tab by mouth two (2) times daily as needed for Anxiety. Max Daily Amount: 0.5 mg. 20 Tab 0  
 SYNTHROID 137 mcg tablet Take 137 mcg by mouth daily.  ergocalciferol (VITAMIN D2) 50,000 unit capsule Take 50,000 Units by mouth every Monday.  insulin lispro (HUMALOG) 100 unit/mL Soln by SubCUTAneous route. 15 units tid ( Sliding scale)  aspirin delayed-release 81 mg tablet Take 81 mg by mouth as needed. Past History Past Medical History: 
Past Medical History:  
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   
 uses CPAP Past Surgical History: 
Past Surgical History:  
Procedure Laterality Date  CARDIAC CATHETERIZATION  2012  CARDIAC SURG PROCEDURE UNLIST    
 heart surgery  HX HEENT    
 tonsillectomy  HX HEMORRHOIDECTOMY  HX HYSTERECTOMY  HX ORTHOPAEDIC  2011  
 left shoulder Family History: 
Family History Problem Relation Age of Onset  Diabetes Mother  Cancer Mother  Breast Cancer Mother 79  
 Heart Disease Father  Hypertension Father  Stroke Father  Coronary Artery Disease Brother 54 Social History: 
Social History Tobacco Use  Smoking status: Former Smoker Last attempt to quit: 2004 Years since quittin.1  Smokeless tobacco: Never Used Substance Use Topics  Alcohol use: No  
 Drug use: Yes Types: Prescription, OTC Allergies: Allergies Allergen Reactions  Latex Itching  Codeine Itching and Other (comments)  
  hallucinate  Contrast Dye [Iodine] Rash and Itching Given pre-cardiac cath  Seafood [Shellfish Containing Products] Swelling Review of Systems Review of Systems Constitutional: Negative for chills and fever. HENT: Negative for sore throat. Respiratory: Negative for shortness of breath. Cardiovascular: Negative for chest pain and palpitations. Gastrointestinal: Positive for nausea. Negative for abdominal pain and vomiting. Genitourinary: Negative for dysuria. Skin: Negative for rash. Neurological: Positive for dizziness. Negative for facial asymmetry, speech difficulty, weakness and headaches. Parasthesia Psychiatric/Behavioral: Negative for agitation. All other systems reviewed and are negative. Physical Exam  
Physical Exam  
Constitutional: She is oriented to person, place, and time. She appears well-developed. HENT:  
Head: Normocephalic and atraumatic. Mouth/Throat: Oropharynx is clear and moist.  
Eyes: EOM are normal. Pupils are equal, round, and reactive to light. No scleral icterus. Cardiovascular: Normal rate and regular rhythm. Exam reveals no gallop and no friction rub. No murmur heard. Pulmonary/Chest: Effort normal and breath sounds normal. She has no wheezes. She has no rales. She exhibits no tenderness. Abdominal: Soft. Bowel sounds are normal. She exhibits no distension. There is no tenderness. Neurological: She is alert and oriented to person, place, and time. No cranial nerve deficit. Normal strength sensation in all extremities, intact finger-to-nose, negative Romberg, negative HINTS, worsened vertigo symptoms with positional changes Skin: Skin is warm and dry. Psychiatric: Her behavior is normal.  
 
 
 
Diagnostic Study Results Labs - Recent Results (from the past 12 hour(s)) SAMPLES BEING HELD  
 Collection Time: 11/18/18  9:21 AM  
Result Value Ref Range SAMPLES BEING HELD BLUE,RED,GREEN,LAV   
 COMMENT Add-on orders for these samples will be processed based on acceptable specimen integrity and analyte stability, which may vary by analyte. CBC WITH AUTOMATED DIFF Collection Time: 11/18/18  9:21 AM  
Result Value Ref Range WBC 6.9 3.6 - 11.0 K/uL  
 RBC 5.33 (H) 3.80 - 5.20 M/uL  
 HGB 12.8 11.5 - 16.0 g/dL HCT 41.8 35.0 - 47.0 % MCV 78.4 (L) 80.0 - 99.0 FL  
 MCH 24.0 (L) 26.0 - 34.0 PG  
 MCHC 30.6 30.0 - 36.5 g/dL  
 RDW 17.1 (H) 11.5 - 14.5 % PLATELET 776 503 - 550 K/uL MPV 11.5 8.9 - 12.9 FL  
 NRBC 0.0 0  WBC ABSOLUTE NRBC 0.00 0.00 - 0.01 K/uL NEUTROPHILS 68 32 - 75 % LYMPHOCYTES 23 12 - 49 % MONOCYTES 5 5 - 13 % EOSINOPHILS 2 0 - 7 % BASOPHILS 0 0 - 1 % IMMATURE GRANULOCYTES 0 0.0 - 0.5 % ABS. NEUTROPHILS 4.7 1.8 - 8.0 K/UL  
 ABS. LYMPHOCYTES 1.6 0.8 - 3.5 K/UL  
 ABS. MONOCYTES 0.4 0.0 - 1.0 K/UL  
 ABS. EOSINOPHILS 0.2 0.0 - 0.4 K/UL  
 ABS. BASOPHILS 0.0 0.0 - 0.1 K/UL  
 ABS. IMM. GRANS. 0.0 0.00 - 0.04 K/UL  
 DF AUTOMATED METABOLIC PANEL, COMPREHENSIVE Collection Time: 11/18/18  9:21 AM  
Result Value Ref Range Sodium 141 136 - 145 mmol/L Potassium 3.5 3.5 - 5.1 mmol/L Chloride 101 97 - 108 mmol/L  
 CO2 35 (H) 21 - 32 mmol/L Anion gap 5 5 - 15 mmol/L Glucose 247 (H) 65 - 100 mg/dL BUN 12 6 - 20 MG/DL Creatinine 1.01 0.55 - 1.02 MG/DL  
 BUN/Creatinine ratio 12 12 - 20 GFR est AA >60 >60 ml/min/1.73m2 GFR est non-AA 55 (L) >60 ml/min/1.73m2 Calcium 9.4 8.5 - 10.1 MG/DL Bilirubin, total 0.6 0.2 - 1.0 MG/DL  
 ALT (SGPT) 23 12 - 78 U/L  
 AST (SGOT) 23 15 - 37 U/L Alk. phosphatase 153 (H) 45 - 117 U/L Protein, total 8.2 6.4 - 8.2 g/dL Albumin 3.5 3.5 - 5.0 g/dL Globulin 4.7 (H) 2.0 - 4.0 g/dL A-G Ratio 0.7 (L) 1.1 - 2.2    
TROPONIN I  Collection Time: 11/18/18  9:21 AM  
 Result Value Ref Range Troponin-I, Qt. <0.05 <0.05 ng/mL CK W/ REFLX CKMB Collection Time: 11/18/18  9:21 AM  
Result Value Ref Range  26 - 192 U/L  
EKG, 12 LEAD, INITIAL Collection Time: 11/18/18  9:29 AM  
Result Value Ref Range Ventricular Rate 63 BPM  
 Atrial Rate 63 BPM  
 P-R Interval 182 ms QRS Duration 90 ms Q-T Interval 412 ms QTC Calculation (Bezet) 421 ms Calculated P Axis 29 degrees Calculated R Axis -11 degrees Calculated T Axis 21 degrees Diagnosis Normal sinus rhythm Cannot rule out Anterior infarct (cited on or before 18-NOV-2018) When compared with ECG of 13-JAN-2013 12:43, No significant change was found URINALYSIS W/ REFLEX CULTURE Collection Time: 11/18/18  9:51 AM  
Result Value Ref Range Color YELLOW/STRAW Appearance CLEAR CLEAR Specific gravity 1.019 1.003 - 1.030    
 pH (UA) 6.5 5.0 - 8.0 Protein TRACE (A) NEG mg/dL Glucose 500 (A) NEG mg/dL Ketone NEGATIVE  NEG mg/dL Bilirubin NEGATIVE  NEG Blood NEGATIVE  NEG Urobilinogen 1.0 0.2 - 1.0 EU/dL Nitrites NEGATIVE  NEG Leukocyte Esterase NEGATIVE  NEG    
 WBC 0-4 0 - 4 /hpf  
 RBC 0-5 0 - 5 /hpf Epithelial cells MODERATE (A) FEW /lpf Bacteria 2+ (A) NEG /hpf  
 UA:UC IF INDICATED URINE CULTURE ORDERED (A) CNI Budding yeast PRESENT (A) NEG Radiologic Studies -  
CT HEAD WO CONT Final Result CT Results  (Last 48 hours)  
          
 11/18/18 1004  CT HEAD WO CONT Final result Impression:  IMPRESSION: Chronic encephalomalacia in the bilateral cerebellar lobes, left  
greater than right. No evidence of acute process. Narrative:  EXAM:  CT HEAD WO CONT INDICATION:   evaluate for intracranial abnormality; dizziness, hx of CVA COMPARISON: MRI 2/17/2017. CONTRAST:  None. TECHNIQUE: Unenhanced CT of the head was performed using 5 mm images.  Brain and  
 bone windows were generated. CT dose reduction was achieved through use of a  
standardized protocol tailored for this examination and automatic exposure  
control for dose modulation. FINDINGS:  
Unchanged chronic encephalomalacia is seen in the medial aspect of the  
cerebellar lobes, left greater than right. There is no significant white matter  
disease. There is no intracranial hemorrhage, extra-axial collection, mass, mass  
effect or midline shift. The basilar cisterns are open. No acute infarct is  
identified. The bone windows demonstrate no abnormalities. The visualized  
portions of the paranasal sinuses and mastoid air cells are clear. CXR Results  (Last 48 hours) None Medical Decision Making I am the first provider for this patient. I reviewed the vital signs, available nursing notes, past medical history, past surgical history, family history and social history. Vital Signs-Reviewed the patient's vital signs. Patient Vitals for the past 12 hrs: 
 Temp Pulse Resp BP SpO2  
11/18/18 1025  77 18 165/75 96 % 11/18/18 1021  70 18 159/78 98 % 11/18/18 1019  61 18 141/57 96 % 11/18/18 0918  63 17 186/72 98 % 11/18/18 0903 98 °F (36.7 °C) 65 18 (!) 143/118 98 % Records Reviewed: Nursing Notes and Old Medical Records Provider Notes (Medical Decision Making):  
 
Patient hypertensive but otherwise hemodynamically stable with no evidence of respiratory distress. Physical examination identifies dizziness worsened with positional changes. Patient with tingling sensation in R hand and bilateral feet consistent with baseline diabetic neuropathy. Otherwise no focal neurological deficit noted. Negative HINTS exam. Unclear etiology for patient's vertigo. Possibly related to either central or peripheral etiology.  Some concern for central given intensity and apparent constant nature of symptoms along with history of possible prior cerebellar vascular disease. Strong positional component may also suggest peripheral cause. Patient's blood pressure is mild/moderately elevated as well and may be contributing factor. Patient is outside of tPA window so does not meet criteria for Stroke Alert. Will obtain CBC, BMP, cardiac enzymes, and UA. Will obtain CT head w/o contrast and EKG. Will administer meclizine and antihypertensives along with IVF and analgesic/antiemetic medications as needed and reassess. ED Course:  
Initial assessment performed. The patients presenting problems have been discussed, and they are in agreement with the care plan formulated and outlined with them. I have encouraged them to ask questions as they arise throughout their visit. EKG interpretation: (Preliminary) 9:29 Rhythm: normal sinus rhythm. Rate (approx.): 63bpmbpm; Axis: left axis deviation; Normal ND, QRS, QTc intervals; ST/T wave: normal, no ST changes; I. 
Written by Snow Romero ED Scribe, as dictated by Maldonado Emerson DO. PROGRESS NOTE: 
11:17 AM 
Laboratory studies with mild hyperglycemia and moderate elevation in bicarbonate but otherwise no acute abnormalities. CT head demonstrates chronic cerebellar disease but no acute abnormalities. EKG without acute dysrhythmia or ischemia. Patient reports symptoms fully resolved after administration of medication. Blood pressure normalized. Given response to therapy, suspect peripheral etiology of symptoms. Will discharge patient home with instructions to follow-up with PCP to ensure resolution of symptoms. Will provide patient with prescriptions for meclizine and ondansetron. Advised patient to follow up with PCP to ensure resolution of symptoms and for further evaluation as needed. Advised patient to return should symptoms recur or worsen. Answered all questions to patient's satisfaction. Patient both understood and agreed with plan as above. Diagnosis Clinical Impression: 1. Peripheral vertigo, unspecified laterality PLAN: 
1. Current Discharge Medication List  
  
START taking these medications Details  
meclizine (ANTIVERT) 25 mg tablet Take 1 Tab by mouth three (3) times daily as needed for up to 10 days. Qty: 30 Tab, Refills: 0  
  
ondansetron (ZOFRAN ODT) 4 mg disintegrating tablet Take 1 Tab by mouth every eight (8) hours as needed for Nausea for up to 10 days. Qty: 30 Tab, Refills: 0 CONTINUE these medications which have NOT CHANGED Details  
naproxen (NAPROSYN) 500 mg tablet TK 1 T PO BID PRF PAIN WITH FOOD Refills: 0  
  
traMADol (ULTRAM) 50 mg tablet TK 1 T PO BID PRF PAIN Refills: 0  
  
empagliflozin (JARDIANCE) 25 mg tablet Take  by mouth daily. 1/2 tab daily  
  
losartan (COZAAR) 50 mg tablet Take  by mouth daily. 1/2 tab daily  
  
insulin glargine (TOUJEO SOLOSTAR) 300 unit/mL (1.5 mL) inpn 50 Units by SubCUTAneous route nightly. rosuvastatin (CRESTOR) 20 mg tablet Take 1 Tab by mouth nightly. Qty: 90 Tab, Refills: 3  
  
albuterol (PROAIR HFA) 90 mcg/actuation inhaler USE 1 INHALATION EVERY 4 HOURS AS NEEDED WHEEZING OR SHORTNESS OF BREATH Qty: 25.5 Inhaler, Refills: 11  
  
albuterol (ACCUNEB) 1.25 mg/3 mL nebu USE 1 VIAL EVERY 4 HOURS AS NEEDED FOR WHEEZING Qty: 225 mL, Refills: 3  
  
triamterene-hydroCHLOROthiazide (DYAZIDE) 37.5-25 mg per capsule TAKE 1 CAPSULE TWICE A DAY Qty: 180 Cap, Refills: 3 ADVAIR DISKUS 100-50 mcg/dose diskus inhaler USE 1 INHALATION TWICE A DAY Qty: 180 Each, Refills: 3  
  
metFORMIN ER (GLUCOPHAGE XR) 500 mg tablet Take 500 mg by mouth daily (with dinner). Associated Diagnoses: Essential hypertension  
  
cyclobenzaprine (FLEXERIL) 10 mg tablet Take 1 Tab by mouth three (3) times daily as needed for Muscle Spasm(s). Qty: 30 Tab, Refills: 0  
  
benzonatate (TESSALON) 100 mg capsule Take 100 mg by mouth three (3) times daily as needed for Cough. metoprolol tartrate (LOPRESSOR) 25 mg tablet TAKE 1 TABLET TWICE A DAY Qty: 180 Tab, Refills: 3 Comments: Follow-up with Dr. Mario Shi for more refills  
  
fluticasone (FLONASE) 50 mcg/actuation nasal spray 2 sprays each morning. Qty: 1 Bottle, Refills: 0  
  
montelukast (SINGULAIR) 10 mg tablet Take 1 Tab by mouth daily. Qty: 30 Tab, Refills: 0  
  
mometasone (NASONEX) 50 mcg/actuation nasal spray 2 Sprays by Both Nostrils route daily. Qty: 1 Container, Refills: 0  
  
ONETOUCH ULTRA TEST strip BD INSULIN PEN NEEDLE UF SHORT 31 gauge x 5/16\" ndle   
  
albuterol (PROVENTIL) 5 mg/mL nebulizer solution 0.5 mL by Nebulization route every four (4) hours as needed for Wheezing. Qty: 0.5 mL, Refills: 0 ALPRAZolam (XANAX) 0.25 mg tablet Take 1 Tab by mouth two (2) times daily as needed for Anxiety. Max Daily Amount: 0.5 mg. 
Qty: 20 Tab, Refills: 0  
  
SYNTHROID 137 mcg tablet Take 137 mcg by mouth daily. ergocalciferol (VITAMIN D2) 50,000 unit capsule Take 50,000 Units by mouth every Monday. insulin lispro (HUMALOG) 100 unit/mL Soln by SubCUTAneous route. 15 units tid ( Sliding scale) Associated Diagnoses: Type I (juvenile type) diabetes mellitus without mention of complication, not stated as uncontrolled  
  
aspirin delayed-release 81 mg tablet Take 81 mg by mouth as needed. Associated Diagnoses: Unspecified essential hypertension; Type I (juvenile type) diabetes mellitus without mention of complication, not stated as uncontrolled 2. Follow-up Information Follow up With Specialties Details Why Contact Info Kathy Preciado MD Internal Medicine In 1 week  [address] [facility] [facility] [address] [facility] 
[phone] Return to ED if worse Disposition: 
 
DISCHARGE NOTE: 
11:23 AM 
The patient's results have been reviewed with family and/or caregiver.  They verbally convey their understanding and agreement of the patient's signs, symptoms, diagnosis, treatment, and prognosis. They additionally agree to follow up as recommended in the discharge instructions or to return to the Emergency Room should the patient's condition change prior to their follow-up appointment. The family and/or caregiver verbally agrees with the care-plan and all of their questions have been answered. The discharge instructions have also been provided to the them along with educational information regarding the patient's diagnosis and a list of reasons why the patient would want to return to the ER prior to their follow-up appointment should their condition change. This note will not be viewable in [address] Attending Attestation: 
 
I personally performed a history and physical examination of the patient and discussed the management with the resident. I have found the following on physical exam: 
 
General: NAD HEENT: EOMI, non-icteric sclera Chest: RRR, no m/r/g Lungs: CTAB Abd: nt, nd, soft, +bs Ext: no peripheral edema, no cyanosis, +2 peripheral pulses Skin: no rashes or lesions Neuro: no slurred speech, no facial droop. 5/5 muscle strength bilateral UE and LE. Sensation intact bilaterally. Pt is ambulatory. I have reviewed the resident's note and agree with the resident's findings, including all diagnostic interpretations, treatment and plan of care, except as documented below. I was present during the key portions of separately billed procedures.    
Laura Ortiz DO

## 2018-11-19 LAB
ATRIAL RATE: 63 BPM
CALCULATED P AXIS, ECG09: 29 DEGREES
CALCULATED R AXIS, ECG10: -11 DEGREES
CALCULATED T AXIS, ECG11: 21 DEGREES
DIAGNOSIS, 93000: NORMAL
HBA1C MFR BLD HPLC: 10.6 %
P-R INTERVAL, ECG05: 182 MS
Q-T INTERVAL, ECG07: 412 MS
QRS DURATION, ECG06: 90 MS
QTC CALCULATION (BEZET), ECG08: 421 MS
VENTRICULAR RATE, ECG03: 63 BPM

## 2018-11-20 LAB
BACTERIA SPEC CULT: NORMAL
CC UR VC: NORMAL
SERVICE CMNT-IMP: NORMAL

## 2018-12-14 ENCOUNTER — OFFICE VISIT (OUTPATIENT)
Dept: INTERNAL MEDICINE CLINIC | Age: 68
End: 2018-12-14

## 2018-12-14 VITALS
DIASTOLIC BLOOD PRESSURE: 80 MMHG | BODY MASS INDEX: 42.51 KG/M2 | HEART RATE: 83 BPM | TEMPERATURE: 98.6 F | OXYGEN SATURATION: 96 % | WEIGHT: 231 LBS | SYSTOLIC BLOOD PRESSURE: 161 MMHG | HEIGHT: 62 IN

## 2018-12-14 DIAGNOSIS — E11.65 UNCONTROLLED TYPE 2 DIABETES MELLITUS WITH HYPERGLYCEMIA (HCC): ICD-10-CM

## 2018-12-14 DIAGNOSIS — H91.93 BILATERAL HEARING LOSS, UNSPECIFIED HEARING LOSS TYPE: Primary | ICD-10-CM

## 2018-12-14 DIAGNOSIS — I25.10 CORONARY ARTERY DISEASE INVOLVING NATIVE CORONARY ARTERY OF NATIVE HEART WITHOUT ANGINA PECTORIS: ICD-10-CM

## 2018-12-14 DIAGNOSIS — R42 DIZZINESS: ICD-10-CM

## 2018-12-14 PROBLEM — E11.21 TYPE 2 DIABETES WITH NEPHROPATHY (HCC): Status: ACTIVE | Noted: 2018-12-14

## 2018-12-14 RX ORDER — LOSARTAN POTASSIUM 50 MG/1
50 TABLET ORAL DAILY
Qty: 90 TAB | Refills: 3 | Status: SHIPPED | OUTPATIENT
Start: 2018-12-14 | End: 2019-04-22 | Stop reason: SDUPTHER

## 2018-12-14 RX ORDER — AMLODIPINE BESYLATE 10 MG/1
10 TABLET ORAL DAILY
Qty: 90 TAB | Refills: 3 | Status: SHIPPED | OUTPATIENT
Start: 2018-12-14 | End: 2020-02-05 | Stop reason: SDUPTHER

## 2018-12-14 RX ORDER — METOPROLOL TARTRATE 25 MG/1
TABLET, FILM COATED ORAL
Qty: 180 TAB | Refills: 3 | Status: SHIPPED | OUTPATIENT
Start: 2018-12-14 | End: 2019-04-22 | Stop reason: SDUPTHER

## 2018-12-14 NOTE — PROGRESS NOTES
Chief Complaint   Patient presents with    Hypertension     6 month follow up    Cholesterol Problem     6 month follow up    Asthma     6 month follow up    Labs     6 month follow up    Medication Refill     losartan,amlodipine,metoprolol (90 day supply) [facility]; Need prescription for a Rollator    Dizziness     recent fall due to dizziness

## 2018-12-14 NOTE — PROGRESS NOTES
HISTORY OF PRESENT ILLNESS  Makayla Locke is a 79 y.o. female. HPI     F/u HTN hld asthma  In ED last month for dizziness, right hand tingling. Head CT no acute changes--dx peripheral vertigo--meclizine rx  Decreased hearing b/l ears  a1c around 9-10 range , wants to see another endocrine MD  Had labs last month--labs ok excpet eleated a1c from Dr Margot Leahy  Requests rx for rollator due to unsteady gait and hip pain--uses cane currently since CVA      Had dexa since last OV--osteopenia with low frax  Last OV  F/u htn hld asthma . crestor was restarted last visit   Sees Dr. Margot Leahy, f/u DM-2-last a1c ---- around 10 this year.  Had appt this week and will get labs     Due for dexa and , prevnar and hep c screen lab     Was given rx for losartan 50mg 1/2 tab every day per Dr Margot Leahy but did not fill it yet  Last OV:     C/o sorethroat and 3d  C/o left arm pit boil intermittently  Has not taken crestor in months--did not get refilled  Saw Dr Margot Leahy 3 mos ago- a1c was up -Lantus was changed to Toujeo  Had neg echo and nst for THOMPSON and edema recently at [facility]  F/u right Warthins Tumor--saw ENT Dr Kylah Alexander who did not advise surgery given age, dm-2, hx cva  Got new cpap device working well-Dr Mcconnell Dy Dr Margot Leahy for DM-2 but wants to change endo MD though-last a1c around 10 per pt  Asthma has been quiet  C/o swelling of right >left foot x months but improved with leg elevation           Patient Active Problem List    Diagnosis Date Noted    History of CVA (cerebrovascular accident) 07/13/2018    Obesity, morbid ([address] 01/25/2018    Warthin's tumor 07/01/2016    HTN (hypertension) 09/13/2013    Axillary hidradenitis suppurativa 08/07/2013    Esophageal reflux 01/15/2013    Renal failure, acute ([facility] 01/13/2013    Asthma 12/14/2012    Myocardial ischemia 06/06/2012    Shortness of breath 06/06/2012    Coronary artery disease 06/06/2012    PVC's (premature ventricular contractions) 06/01/2012    DM type 2 (diabetes mellitus, type 2) [facility] 04/23/2012    Grave's disease 10/15/2010    DJD (degenerative joint disease) of hip 10/20/2009    DJD (degenerative joint disease) of knee 10/20/2009    CTS (Carpal Tunnel Syndrome)-b/l 10/20/2009    CVA (cerebral infarction) 10/20/2009    Diverticulosis 10/20/2009    Osteopenia 10/20/2009     Current Outpatient Medications   Medication Sig Dispense Refill    amLODIPine (NORVASC) 10 mg tablet Take 1 Tab by mouth daily. 90 Tab 3    naproxen (NAPROSYN) 500 mg tablet TK 1 T PO BID PRF PAIN WITH FOOD  0    traMADol (ULTRAM) 50 mg tablet TK 1 T PO BID PRF PAIN  0    empagliflozin (JARDIANCE) 25 mg tablet Take  by mouth daily. 1/2 tab daily      losartan (COZAAR) 50 mg tablet Take  by mouth daily. 1/2 tab daily      insulin glargine (TOUJEO SOLOSTAR) 300 unit/mL (1.5 mL) inpn 50 Units by SubCUTAneous route nightly.  rosuvastatin (CRESTOR) 20 mg tablet Take 1 Tab by mouth nightly. 90 Tab 3    albuterol (PROAIR HFA) 90 mcg/actuation inhaler USE 1 INHALATION EVERY 4 HOURS AS NEEDED WHEEZING OR SHORTNESS OF BREATH 25.5 Inhaler 11    albuterol (ACCUNEB) 1.25 mg/3 mL nebu USE 1 VIAL EVERY 4 HOURS AS NEEDED FOR WHEEZING 225 mL 3    triamterene-hydroCHLOROthiazide (DYAZIDE) 37.5-25 mg per capsule TAKE 1 CAPSULE TWICE A  Cap 3    ADVAIR DISKUS 100-50 mcg/dose diskus inhaler USE 1 INHALATION TWICE A  Each 3    metFORMIN ER (GLUCOPHAGE XR) 500 mg tablet Take 500 mg by mouth daily (with dinner).  cyclobenzaprine (FLEXERIL) 10 mg tablet Take 1 Tab by mouth three (3) times daily as needed for Muscle Spasm(s). 30 Tab 0    benzonatate (TESSALON) 100 mg capsule Take 100 mg by mouth three (3) times daily as needed for Cough.  metoprolol tartrate (LOPRESSOR) 25 mg tablet TAKE 1 TABLET TWICE A  Tab 3    fluticasone (FLONASE) 50 mcg/actuation nasal spray 2 sprays each morning. 1 Bottle 0    montelukast (SINGULAIR) 10 mg tablet Take 1 Tab by mouth daily. 30 Tab 0    mometasone (NASONEX) 50 mcg/actuation nasal spray 2 Sprays by Both Nostrils route daily. 1 Container 0    ONETOUCH ULTRA TEST strip       BD INSULIN PEN NEEDLE UF SHORT 31 gauge x 5/16\" ndle       albuterol (PROVENTIL) 5 mg/mL nebulizer solution 0.5 mL by Nebulization route every four (4) hours as needed for Wheezing. 0.5 mL 0    ALPRAZolam (XANAX) 0.25 mg tablet Take 1 Tab by mouth two (2) times daily as needed for Anxiety. Max Daily Amount: 0.5 mg. 20 Tab 0    SYNTHROID 137 mcg tablet Take 137 mcg by mouth daily.  ergocalciferol (VITAMIN D2) 50,000 unit capsule Take 50,000 Units by mouth every Monday.  insulin lispro (HUMALOG) 100 unit/mL Soln by SubCUTAneous route. 15 units tid ( Sliding scale)      aspirin delayed-release 81 mg tablet Take 81 mg by mouth as needed.        Allergies   Allergen Reactions    Latex Itching    Codeine Itching and Other (comments)     hallucinate    Contrast Dye [Iodine] Rash and Itching     Given pre-cardiac cath    Seafood [facility] Containing Products] Swelling      Lab Results   Component Value Date/Time    WBC 6.9 11/18/2018 09:21 AM    HGB 12.8 11/18/2018 09:21 AM    Hemoglobin (POC) 14.6 04/10/2015 12:41 PM    HCT 41.8 11/18/2018 09:21 AM    Hematocrit (POC) 43 04/10/2015 12:41 PM    PLATELET 201 88/69/7006 09:21 AM    MCV 78.4 (L) 11/18/2018 09:21 AM     Lab Results   Component Value Date/Time    Hemoglobin A1c, External 9.4 05/20/2016    Hemoglobin A1c, External 8.9 08/17/2015 02:37 PM    Hemoglobin A1c, External 8.1 05/16/2015 05:36 AM    Glucose 247 (H) 11/18/2018 09:21 AM    Glucose (POC) 129 (H) 09/22/2015 07:11 AM    LDL, calculated 84 06/04/2012 12:00 AM    Creatinine (POC) 0.9 04/10/2015 12:41 PM    Creatinine 1.01 11/18/2018 09:21 AM      Lab Results   Component Value Date/Time    Cholesterol, total 157 06/04/2012 12:00 AM    HDL Cholesterol 55 06/04/2012 12:00 AM    LDL, calculated 84 06/04/2012 12:00 AM    LDL-C, External 72 05/20/2016    Triglyceride 90 06/04/2012 12:00 AM     Lab Results   Component Value Date/Time    GFR est non-AA 55 (L) 11/18/2018 09:21 AM    GFRNA, POC >60 04/10/2015 12:41 PM    GFR est AA >60 11/18/2018 09:21 AM    GFRAA, POC >60 04/10/2015 12:41 PM    Creatinine 1.01 11/18/2018 09:21 AM    Creatinine (POC) 0.9 04/10/2015 12:41 PM    BUN 12 11/18/2018 09:21 AM    BUN (POC) 7 (L) 04/10/2015 12:41 PM    Sodium 141 11/18/2018 09:21 AM    Sodium (POC) 143 04/10/2015 12:41 PM    Potassium 3.5 11/18/2018 09:21 AM    Potassium (POC) 2.9 (L) 04/10/2015 12:41 PM    Chloride 101 11/18/2018 09:21 AM    Chloride (POC) 103 04/10/2015 12:41 PM    CO2 35 (H) 11/18/2018 09:21 AM    Magnesium 1.9 01/14/2013 04:30 AM        ROS    Physical Exam   Constitutional: She appears well-developed and well-nourished. Appears stated age   Cardiovascular: Normal rate, regular rhythm and normal heart sounds. Exam reveals no gallop and no friction rub. No murmur heard. Pulmonary/Chest: Effort normal and breath sounds normal. No respiratory distress. She has no wheezes. Abdominal: Soft. Bowel sounds are normal.   Musculoskeletal: She exhibits no edema. Neurological: She is alert. Psychiatric: She has a normal mood and affect. Nursing note and vitals reviewed. ASSESSMENT and PLAN  Diagnoses and all orders for this visit:    1. Bilateral hearing loss, unspecified hearing loss type  -     REFERRAL TO ENT-OTOLARYNGOLOGY    2. Dizziness  -     REFERRAL TO ENT-OTOLARYNGOLOGY   rx for rollator was given to pt  3. Uncontrolled type 2 diabetes mellitus with hyperglycemia (HCC)  -     REFERRAL TO ENDOCRINOLOGY    4. Coronary artery disease involving native coronary artery of native heart without angina pectoris  -     REFERRAL TO CARDIOLOGY -f/u and BP recheck   Mild CAD -no cp /angina    5. HTN   Mild sbp elevation, low sodium diet   Consider increasing arb or ccb if still elevated       6.  Asthma   controlled  Other orders  - amLODIPine (NORVASC) 10 mg tablet; Take 1 Tab by mouth daily. -     metoprolol tartrate (LOPRESSOR) 25 mg tablet; TAKE 1 TABLET TWICE A DAY  -     losartan (COZAAR) 50 mg tablet; Take 1 Tab by mouth daily. One tab daily      Follow-up Disposition:  Return in about 4 months (around 4/14/2019) for htn hld cad asthma hx cvqa.

## 2019-01-22 ENCOUNTER — OFFICE VISIT (OUTPATIENT)
Dept: CARDIOLOGY CLINIC | Age: 69
End: 2019-01-22

## 2019-01-22 VITALS
HEART RATE: 76 BPM | OXYGEN SATURATION: 97 % | BODY MASS INDEX: 42.4 KG/M2 | RESPIRATION RATE: 20 BRPM | WEIGHT: 230.4 LBS | HEIGHT: 62 IN | DIASTOLIC BLOOD PRESSURE: 80 MMHG | SYSTOLIC BLOOD PRESSURE: 146 MMHG

## 2019-01-22 DIAGNOSIS — E11.8 TYPE 2 DIABETES MELLITUS WITH COMPLICATION, WITH LONG-TERM CURRENT USE OF INSULIN (HCC): ICD-10-CM

## 2019-01-22 DIAGNOSIS — Z79.4 TYPE 2 DIABETES MELLITUS WITH COMPLICATION, WITH LONG-TERM CURRENT USE OF INSULIN (HCC): ICD-10-CM

## 2019-01-22 DIAGNOSIS — E78.2 MIXED HYPERLIPIDEMIA: ICD-10-CM

## 2019-01-22 DIAGNOSIS — I10 ESSENTIAL HYPERTENSION: ICD-10-CM

## 2019-01-22 DIAGNOSIS — I25.10 CORONARY ARTERY DISEASE INVOLVING NATIVE CORONARY ARTERY OF NATIVE HEART WITHOUT ANGINA PECTORIS: Primary | ICD-10-CM

## 2019-01-22 NOTE — PROGRESS NOTES
1. Have you been to the ER, urgent care clinic since your last visit? [facility] since your last visit? Yes [id number]  [facility], stroke work up    2. Have you seen or consulted any other health care providers outside of the [address] [place] [address] since your last visit? Include any pap smears or colon screening. Yes When: Dr. Moore Knows 11/2018 check up     Chief Complaint   Patient presents with    Follow-up     HTN, Leg edema     Pt states leg swelling has improved ,no recent dizziness.

## 2019-01-22 NOTE — PROGRESS NOTES
[address] [place] [address]  [phone]     Subjective:      Roopa Napier is a 79 y.o. female is here for routine f/u. Reports occasional unchanged soto. Denies chest pain, orthopnea, PND, palpitations, syncope, or presyncope.        Patient Active Problem List    Diagnosis Date Noted    Type 2 diabetes with nephropathy ([facility] 12/14/2018    History of CVA (cerebrovascular accident) 07/13/2018    Obesity, morbid ([address] 01/25/2018    Warthin's tumor 07/01/2016    HTN (hypertension) 09/13/2013    Axillary hidradenitis suppurativa 08/07/2013    Esophageal reflux 01/15/2013    Renal failure, acute ([address] 01/13/2013    Asthma 12/14/2012    Myocardial ischemia 06/06/2012    Shortness of breath 06/06/2012    Coronary artery disease 06/06/2012    PVC's (premature ventricular contractions) 06/01/2012    DM type 2 (diabetes mellitus, type 2) ([address] 04/23/2012    Grave's disease 10/15/2010    DJD (degenerative joint disease) of hip 10/20/2009    DJD (degenerative joint disease) of knee 10/20/2009    CTS (Carpal Tunnel Syndrome)-b/l 10/20/2009    CVA (cerebral infarction) 10/20/2009    Diverticulosis 10/20/2009    Osteopenia 10/20/2009      Harriett Lay MD  Past Medical History:   Diagnosis Date    Asthma     CAD (coronary artery disease)     \"mild\" per Dr Katie Jerez note    Diabetes [facility])     Dr Ann Sensing     Hypertension     Screening for colon cancer 2/16/05    Dr Mariluz Ames in 10 years    Stroke [facility]) 2004    Rt side weaker than left, uses a cane: no longer followed by neuro    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     Thyroid disease     Unspecified sleep apnea     uses CPAP      Past Surgical History:   Procedure Laterality Date    CARDIAC CATHETERIZATION  6/6/2012         CARDIAC SURG PROCEDURE UNLIST      heart surgery    HX HEENT      tonsillectomy    HX HEMORRHOIDECTOMY      HX HYSTERECTOMY      HX ORTHOPAEDIC  8/2011 left shoulder     Allergies   Allergen Reactions    Latex Itching    Codeine Itching and Other (comments)     hallucinate    Contrast Dye [Iodine] Rash and Itching     Given pre-cardiac cath    Seafood [Shellfish Containing Products] Swelling      Family History   Problem Relation Age of Onset    Diabetes Mother     Cancer Mother     Breast Cancer Mother 79    Heart Disease Father     Hypertension Father     Stroke Father     Coronary Artery Disease Brother 54      Social History     Socioeconomic History    Marital status: SINGLE     Spouse name: Not on file    Number of children: Not on file    Years of education: Not on file    Highest education level: Not on file   Social Needs    Financial resource strain: Not on file    Food insecurity - worry: Not on file    Food insecurity - inability: Not on file   [employer] needs - medical: Not on file   [employer] needs - non-medical: Not on file   Occupational History    Not on file   Tobacco Use    Smoking status: Former Smoker     Last attempt to quit: 2004     Years since quittin.3    Smokeless tobacco: Never Used   Substance and Sexual Activity    Alcohol use: No    Drug use: Yes     Types: Prescription, OTC    Sexual activity: No   Other Topics Concern    Not on file   Social History Narrative    Not on file      Current Outpatient Medications   Medication Sig    amLODIPine (NORVASC) 10 mg tablet Take 1 Tab by mouth daily.  metoprolol tartrate (LOPRESSOR) 25 mg tablet TAKE 1 TABLET TWICE A DAY    losartan (COZAAR) 50 mg tablet Take 1 Tab by mouth daily. One tab daily    insulin glargine (TOUJEO SOLOSTAR) 300 unit/mL (1.5 mL) inpn 50 Units by SubCUTAneous route nightly.  rosuvastatin (CRESTOR) 20 mg tablet Take 1 Tab by mouth nightly.     albuterol (PROAIR HFA) 90 mcg/actuation inhaler USE 1 INHALATION EVERY 4 HOURS AS NEEDED WHEEZING OR SHORTNESS OF BREATH    albuterol (ACCUNEB) 1.25 mg/3 mL nebu USE 1 VIAL EVERY 4 HOURS AS NEEDED FOR WHEEZING    triamterene-hydroCHLOROthiazide (DYAZIDE) 37.5-25 mg per capsule TAKE 1 CAPSULE TWICE A DAY (Patient taking differently: TAKE 1 CAPSULE once A DAY)    ADVAIR DISKUS 100-50 mcg/dose diskus inhaler USE 1 INHALATION TWICE A DAY (Patient taking differently: USE 1 INHALATION TWICE A DAY prn)    montelukast (SINGULAIR) 10 mg tablet Take 1 Tab by mouth daily.  ONETOUCH ULTRA TEST strip     BD INSULIN PEN NEEDLE UF SHORT 31 gauge x 5/16\" ndle     albuterol (PROVENTIL) 5 mg/mL nebulizer solution 0.5 mL by Nebulization route every four (4) hours as needed for Wheezing.  SYNTHROID 137 mcg tablet Take 137 mcg by mouth daily.  insulin lispro (HUMALOG) 100 unit/mL Soln 22 Units by SubCUTAneous route. 15 units tid ( Sliding scale)    aspirin delayed-release 81 mg tablet Take 81 mg by mouth as needed. No current facility-administered medications for this visit. Review of Symptoms:  11 systems reviewed, negative other than as stated in the HPI    Physical ExamPhysical Exam:    Vitals:    01/22/19 1540   BP: 146/80   Pulse: 76   Resp: 20   SpO2: 97%   Weight: 230 lb 6.4 oz (104.5 kg)   Height: 5' 2\" (1.575 m)     Body mass index is 42.14 kg/m². General PE   Gen:  NAD  Mental Status - Alert. General Appearance - Not in acute distress. Chest and Lung Exam   Inspection: Accessory muscles - No use of accessory muscles in breathing. Auscultation:   Breath sounds: - Normal.   Cardiovascular   Inspection: Jugular vein - Bilateral - Inspection Normal.   Palpation/Percussion:   Apical Impulse: - Normal.   Auscultation: Rhythm - Regular. Heart Sounds - S1 WNL and S2 WNL. No S3 or S4. Murmurs & Other Heart Sounds: Auscultation of the heart reveals - No Murmurs. Peripheral Vascular   Upper Extremity: Inspection - Bilateral - No Cyanotic nailbeds or Digital clubbing. Lower Extremity:   Palpation: Edema - Bilateral - No edema.   Abdomen:   Soft, non-tender, bowel sounds are active. Neuro: A&O times 3, CN and motor grossly WNL    Labs:   Lab Results   Component Value Date/Time    Cholesterol, total 157 06/04/2012 12:00 AM    HDL Cholesterol 55 06/04/2012 12:00 AM    LDL, calculated 84 06/04/2012 12:00 AM    Triglyceride 90 06/04/2012 12:00 AM     No results found for: CPK, CPKX, CPX  Lab Results   Component Value Date/Time    Sodium 141 11/18/2018 09:21 AM    Potassium 3.5 11/18/2018 09:21 AM    Chloride 101 11/18/2018 09:21 AM    CO2 35 (H) 11/18/2018 09:21 AM    Anion gap 5 11/18/2018 09:21 AM    Glucose 247 (H) 11/18/2018 09:21 AM    BUN 12 11/18/2018 09:21 AM    Creatinine 1.01 11/18/2018 09:21 AM    BUN/Creatinine ratio 12 11/18/2018 09:21 AM    GFR est AA >60 11/18/2018 09:21 AM    GFR est non-AA 55 (L) 11/18/2018 09:21 AM    Calcium 9.4 11/18/2018 09:21 AM    Bilirubin, total 0.6 11/18/2018 09:21 AM    AST (SGOT) 23 11/18/2018 09:21 AM    Alk. phosphatase 153 (H) 11/18/2018 09:21 AM    Protein, total 8.2 11/18/2018 09:21 AM    Albumin 3.5 11/18/2018 09:21 AM    Globulin 4.7 (H) 11/18/2018 09:21 AM    A-G Ratio 0.7 (L) 11/18/2018 09:21 AM    ALT (SGPT) 23 11/18/2018 09:21 AM       EKG:  NSR      Assessment:        1. Coronary artery disease involving native coronary artery of native heart without angina pectoris    2. Essential hypertension    3. Mixed hyperlipidemia    4. Type 2 diabetes mellitus with complication, with long-term current use of insulin ([address]        Orders Placed This Encounter    AMB POC EKG ROUTINE W/ 12 LEADS, INTER & REP     Order Specific Question:   Reason for Exam:     Answer:   routine        Plan:     Patient presents for long f/u. Last seen by us in August 2017. Occasional unchanged soto  Negative NST, Normal EF with no significant valvular pathology per echo in August 2017  Mild, nonobstructive CAD per cath in 2012  Continue ASA, BB, statin    HTN  Repeat remains mildly elevated. States home BP's are controlled.   I recommend calibration with MD cuff. HLD  On statin. Labs/lipids followed by Dr Ty Dakins and PCP    DM  On Insulin therapy    Hx ankle swelling, improved    Counseled on diet and exercise- eventual goal of 30-60 minutes 5-7 times a week as per AHA guidelines. Continue current care and f/u in 1 year.     Jessica Salazar MD

## 2019-01-24 ENCOUNTER — OFFICE VISIT (OUTPATIENT)
Dept: ENDOCRINOLOGY | Age: 69
End: 2019-01-24

## 2019-01-24 VITALS
BODY MASS INDEX: 42.29 KG/M2 | DIASTOLIC BLOOD PRESSURE: 73 MMHG | WEIGHT: 229.8 LBS | HEIGHT: 62 IN | SYSTOLIC BLOOD PRESSURE: 182 MMHG

## 2019-01-24 DIAGNOSIS — Z79.4 TYPE 2 DIABETES MELLITUS WITH COMPLICATION, WITH LONG-TERM CURRENT USE OF INSULIN (HCC): Primary | ICD-10-CM

## 2019-01-24 DIAGNOSIS — E11.8 TYPE 2 DIABETES MELLITUS WITH COMPLICATION, WITH LONG-TERM CURRENT USE OF INSULIN (HCC): Primary | ICD-10-CM

## 2019-01-24 DIAGNOSIS — E11.8 TYPE 2 DIABETES MELLITUS WITH COMPLICATION, WITH LONG-TERM CURRENT USE OF INSULIN (HCC): ICD-10-CM

## 2019-01-24 DIAGNOSIS — Z79.4 TYPE 2 DIABETES MELLITUS WITH COMPLICATION, WITH LONG-TERM CURRENT USE OF INSULIN (HCC): ICD-10-CM

## 2019-01-24 RX ORDER — METFORMIN HYDROCHLORIDE 500 MG/1
TABLET, EXTENDED RELEASE ORAL
Qty: 360 TAB | Refills: 10 | Status: SHIPPED | OUTPATIENT
Start: 2019-01-24 | End: 2020-07-30

## 2019-01-24 RX ORDER — METFORMIN HYDROCHLORIDE 500 MG/1
1000 TABLET, EXTENDED RELEASE ORAL
Qty: 120 TAB | Refills: 10 | Status: SHIPPED | OUTPATIENT
Start: 2019-01-24 | End: 2019-01-24 | Stop reason: SDUPTHER

## 2019-01-24 RX ORDER — INSULIN LISPRO 100 [IU]/ML
22 INJECTION, SOLUTION INTRAVENOUS; SUBCUTANEOUS
COMMUNITY
End: 2019-03-06 | Stop reason: SDUPTHER

## 2019-01-24 NOTE — PROGRESS NOTES
Chief Complaint   Patient presents with    New Patient     Ref. by Dr. Héctor Chaves    Diabetes     Last A1c 10.6% (11/19/18)    Other     Checks blood sugars once daily before breakfast    Other     PCP and Pharmacy verified   [facility] Other     eye exam consent form signed       HPI  This is a 80-year-old -American female with a history of type 2 diabetes mellitus times at least 13 years, history of a left CVA with right hemiparesis in 2007 history of coronary disease, now on toujeo insulin 56 units at bedtime and Humalog 22 units with her meals. She says that she was doing reasonably well in terms of her diabetes until a year prior to the stroke. She was not taking her medicines and says that is what caused her stroke. In any case she says she has been adherent to her medications ever since. She was seeing Antonio Graham and had been on insulin plus Trulicity but did not like taking all the pills so she has not been taking the Trulicity. Her most recent A1c was 10.6%. She checks her blood sugars mostly in the morning and they are generally in the 200-250 range. She has a few blood sugars lower than that but very few. If she checks her blood sugar at night they are in the 300s. The only time she checks is because she feels bad. She eats a fairly high carb fast food diet. Breakfast is juan and eggs and biscuits or pancakes or waffles or occasionally oatmeal.  Lunch is leftovers which can be fried chicken and potatoes or could be a salad. Dinner last night was fried chicken green beans and a biscuit. Other meals include fast food barbecue with Western Reva fries or she will have pasta and bread. She does say that she grew up eating lots of bread and has continued to do so. ROS  She denies chest pain, shortness of breath, constipation or diarrhea. She does walk with a cane because of the residual effects of the left CVA.   Family History   Problem Relation Age of Onset    Diabetes Mother     Cancer Mother  Breast Cancer Mother 79    Heart Disease Father     Hypertension Father     Stroke Father     Coronary Artery Disease Brother 54        Social History     Socioeconomic History    Marital status: SINGLE     Spouse name: Not on file    Number of children: Not on file    Years of education: Not on file    Highest education level: Not on file   Tobacco Use    Smoking status: Former Smoker     Last attempt to quit: 2004     Years since quittin.3    Smokeless tobacco: Never Used   Substance and Sexual Activity    Alcohol use: No    Drug use: Yes     Types: Prescription, OTC    Sexual activity: No        Vitals:    19 0917   BP: 182/73   Weight: 229 lb 12.8 oz (104.2 kg)   Height: 5' 2\" (1.575 m)   PainSc:   0 - No pain        Physical Examination: General appearance - alert, well appearing, and in no distress  Mental status - alert, oriented to person, place, and time  Neck - supple, no significant adenopathy, thyroid exam: thyroid is normal in size without nodules or tenderness  Chest - clear to auscultation, no wheezes, rales or rhonchi, symmetric air entry  Heart - normal rate, regular rhythm, normal S1, S2, no murmurs, rubs, clicks or gallops  Abdomen - soft, nontender, nondistended, no masses or organomegaly  Extremities - peripheral pulses normal, no pedal edema, no clubbing or cyanosis    Diabetic foot exam:     Left: Reflexes 2+     Vibratory sensation diminished    Filament test reduced sensation with micro filament   Pulse DP: 2+ (normal)   Pulse PT: 2+ (normal)   Deformities: None  Right: Reflexes 2+   Vibratory sensation diminished   Filament test reduced sensation with micro filament   Pulse DP: 2+ (normal)   Pulse PT: 2+ (normal)   Deformities: None      ASSESSMENT & PLAN  This woman has type 2 diabetes mellitus with poor blood sugar control which has been poorly controlled for at least 3 years.   She does tell us that she had one child 44 years ago who weighed 9 pounds 12 collette so she likely had gestational diabetes as well. In any case, we have elected to do the followin. Her renal function is normal so we have restarted metformin. She was given a titration schedule and hopefully we will get to 1000 mg twice daily  2. We have continue the toujeo at 56 units at bedtime  3. Regarding her mealtime insulin, we advised have advised her that she does not have to have 3 meals if she does not want them. She would like to eliminate the lunchtime meal and we are in agreement with this. So she will take 22 units of Humalog insulin for her 2 meals of the day rather than 3.  4.  Please note that she tried to get the freestyle Kelin system but Medicare denied it so she will have to continue with fingerstick blood sugars for now. 5.  We will see her back in 1 month.

## 2019-01-24 NOTE — PATIENT INSTRUCTIONS
Home instructions:    A. Slowly advance metformin to full dosin. Take one (500mg) tablet at the evening meal for 4-7 days  2. If there is no nausea or diarrhea, add a second pill by taking ONE tablet at breakfast and ONE at evening meal  3. If there is no nausea or diarrhea in 4-7 days, take ONE tablet at breakfast and TWO at evening meal  4.  If there is no nausea or diarrhea in 4-7 days, take TWO tablets at breakfast and TWO tablets at evening meal

## 2019-01-28 ENCOUNTER — OFFICE VISIT (OUTPATIENT)
Dept: SLEEP MEDICINE | Age: 69
End: 2019-01-28

## 2019-01-28 ENCOUNTER — DOCUMENTATION ONLY (OUTPATIENT)
Dept: SLEEP MEDICINE | Age: 69
End: 2019-01-28

## 2019-01-28 VITALS
BODY MASS INDEX: 42.51 KG/M2 | SYSTOLIC BLOOD PRESSURE: 164 MMHG | HEIGHT: 62 IN | DIASTOLIC BLOOD PRESSURE: 75 MMHG | HEART RATE: 77 BPM | WEIGHT: 231 LBS | OXYGEN SATURATION: 97 %

## 2019-01-28 DIAGNOSIS — I10 ESSENTIAL HYPERTENSION: ICD-10-CM

## 2019-01-28 DIAGNOSIS — G47.33 OBSTRUCTIVE SLEEP APNEA SYNDROME: Primary | ICD-10-CM

## 2019-01-28 DIAGNOSIS — Z79.4 TYPE 2 DIABETES MELLITUS WITH COMPLICATION, WITH LONG-TERM CURRENT USE OF INSULIN (HCC): ICD-10-CM

## 2019-01-28 DIAGNOSIS — E11.8 TYPE 2 DIABETES MELLITUS WITH COMPLICATION, WITH LONG-TERM CURRENT USE OF INSULIN (HCC): ICD-10-CM

## 2019-01-28 NOTE — PATIENT INSTRUCTIONS
[address] [facility] [place]. [place], [address]  Tel.  [phone]  Fax. 100 [facility] 60  [place], [address] [facility]  Tel.  [phone]  Fax. [phone] [address]  Tel.  [phone]  Fax. [phone]     PROPER SLEEP HYGIENE    What to avoid  · Do not have drinks with caffeine, such as coffee or black tea, for 8 hours before bed. · Do not smoke or use other types of tobacco near bedtime. Nicotine is a stimulant and can keep you awake. · Avoid drinking alcohol late in the evening, because it can cause you to wake in the middle of the night. · Do not eat a big meal close to bedtime. If you are hungry, eat a light snack. · Do not drink a lot of water close to bedtime, because the need to urinate may wake you up during the night. · Do not read or watch TV in bed. Use the bed only for sleeping and sexual activity. What to try  · Go to bed at the same time every night, and wake up at the same time every morning. Do not take naps during the day. · Keep your bedroom quiet, dark, and cool. · Get regular exercise, but not within 3 to 4 hours of your bedtime. .  · Sleep on a comfortable pillow and mattress. · If watching the clock makes you anxious, turn it facing away from you so you cannot see the time. · If you worry when you lie down, start a worry book. Well before bedtime, write down your worries, and then set the book and your concerns aside. · Try meditation or other relaxation techniques before you go to bed. · If you cannot fall asleep, get up and go to another room until you feel sleepy. Do something relaxing. Repeat your bedtime routine before you go to bed again. · Make your house quiet and calm about an hour before bedtime. Turn down the lights, turn off the TV, log off the computer, and turn down the volume on music. This can help you relax after a busy day.     Drowsy Driving  The [address] [place] [address] Road Traffic Safety Administration cites drowsiness as a causing factor in more than 620,486 police reported crashes annually, resulting in 76,000 injuries and 1,500 deaths. Other surveys suggest 55% of people polled have driven while drowsy in the past year, 23% had fallen asleep but not crashed, 3% crashed, and 2% had and accident due to drowsy driving. Who is at risk? Young Drivers: One study of drowsy driving accidents states that 55% of the drivers were under 25 years. Of those, 75% were male. Shift Workers and Travelers: People who work overnight or travel across time zones frequently are at higher risk of experiencing Circadian Rhythm Disorders. They are trying to work and function when their body is programed to sleep. Sleep Deprived: Lack of sleep has a serious impact on your ability to pay attention or focus on a task. Consistently getting less than the average of 8 hours your body needs creates partial or cumulative sleep deprivation. Untreated Sleep Disorders: Sleep Apnea, Narcolepsy, R.L.S., and other sleep disorders (untreated) prevent a person from getting enough restful sleep. This leads to excessive daytime sleepiness and increases the risk for drowsy driving accidents by up to 7 times. Medications / Alcohol: Even over the counter medications can cause drowsiness. Medications that impair a drivers attention should have a warning label. Alcohol naturally makes you sleepy and on its own can cause accidents. Combined with excessive drowsiness its effects are amplified. Signs of Drowsy Driving:   * You don't remember driving the last few miles   * You may drift out of your blake   * You are unable to focus and your thoughts wander   * You may yawn more often than normal   * You have difficulty keeping your eyes open / nodding off   * Missing traffic signs, speeding, or tailgating  Prevention-   Good sleep hygiene, lifestyle and behavioral choices have the most impact on drowsy driving.  There is no substitute for sleep and the average person requires 8 hours nightly. If you find yourself driving drowsy, stop and sleep. Consider the sleep hygiene tips provided during your visit as well. Medication Refill Policy: Refills for all medications require 1 week advance notice. Please have your pharmacy fax a refill request. We are unable to fax, or call in \"controled substance\" medications and you will need to pick these prescriptions up from our office. [employer] Activation    Thank you for requesting access to [employer]. Please follow the instructions below to securely access and download your online medical record. [employer] allows you to send messages to your doctor, view your test results, renew your prescriptions, schedule appointments, and more. How Do I Sign Up? 1. In your internet browser, go to [email or web address]. 2. Click on the First Time User? Click Here link in the Sign In box. You will see the New Member Sign Up page. 3. Enter your [employer] Access Code exactly as it appears below. You will not need to use this code after youve completed the sign-up process. If you do not sign up before the expiration date, you must request a new code. [employer] Access Code: [email or web address]  Expires: 3/10/2019  9:46 AM (This is the date your [employer] access code will )    4. Enter the last four digits of your Social Security Number (xxxx) and Date of Birth (mm/dd/yyyy) as indicated and click Submit. You will be taken to the next sign-up page. 5. Create a [employer] ID. This will be your [employer] login ID and cannot be changed, so think of one that is secure and easy to remember. 6. Create a [employer] password. You can change your password at any time. 7. Enter your Password Reset Question and Answer. This can be used at a later time if you forget your password. 8. Enter your e-mail address. You will receive e-mail notification when new information is available in [address] 9. Click Sign Up.  You can now view and download portions of your medical record. 10. Click the Download Summary menu link to download a portable copy of your medical information. Additional Information    If you have questions, please call [phone]. Remember, D1G is NOT to be used for urgent needs. For medical emergencies, dial 911.

## 2019-01-28 NOTE — PROGRESS NOTES
217 [facility] [place]. [place], [address]  Tel.  [phone]  Fax. 100 [facility] 60  [address] [facility], [address]  Tel.  [phone]  Fax. [phone] [address]  Tel.  [phone]  Fax. [phone]     S>Margretta Daphine Sicard [place] is a 79 y.o. female seen for a positive airway pressure follow-up. She reports no problems using the device. The following problems are identified:    Drowsiness no Problems exhaling no   Snoring no Forget to put on no   Mask Comfortable yes Can't fall asleep no   Dry Mouth no Mask falls off no   Air Leaking no Frequent awakenings no     Download reviewed. She admits that her sleep has improved. Therapy Apnea Index averaged over PAP use: 1 /hr which reflects improved sleep breathing condition. Allergies   Allergen Reactions    Latex Itching    Codeine Itching and Other (comments)     hallucinate    Contrast Dye [Iodine] Rash and Itching     Given pre-cardiac cath    Seafood [Shellfish Containing Products] Swelling       She has a current medication list which includes the following prescription(s): insulin lispro, metformin er, amlodipine, metoprolol tartrate, losartan, insulin glargine u-300 conc, rosuvastatin, albuterol, albuterol, triamterene-hydrochlorothiazide, advair diskus, montelukast, onetouch ultra test, bd ultra-fine short pen needle, albuterol, synthroid, humalog u-100 insulin, and aspirin delayed-release. .      She  has a past medical history of Asthma, CAD (coronary artery disease), Diabetes ([address] Hypertension, Screening for colon cancer (2/16/05), Stroke ([facility] (2004), Thromboembolus ([facility] (1976), Thyroid disease, and Unspecified sleep apnea. [place] Sleepiness Score: 2   and Modified F.O.S.Q. Score Total / 2: 20   which reflect improved sleep quality over therapy time.     O>    Visit Vitals  /75 (BP 1 Location: Right arm, BP Patient Position: Sitting)   Pulse 77   Ht 5' 2\" (1.575 m)   Wt 231 lb (104.8 kg)   SpO2 97%   BMI 42.25 kg/m²           General:   Alert, oriented, not in distress   Neck:   No JVD    Chest/Lungs:  symetrical lung expansion , no accessory muscle use    Extremities:  no obvious rashes , negative edema    Neuro:  No focal deficits ; No obvious tremor    Psych:  Normal affect ,  Normal countenance ;         A>    ICD-10-CM ICD-9-CM    1. Obstructive sleep apnea syndrome G47.33 327.23 AMB SUPPLY ORDER   2. Essential hypertension I10 401.9    3. Type 2 diabetes mellitus with complication, with long-term current use of insulin (HCC) E11.8 250.90     Z79.4 V58.67      AHI = 5(5-17). On CPAP :  9-12 cmH2O. Compliant:      yes    Therapeutic Response:  Positive    P>      * Follow-up Disposition:  Return in about 1 year (around 1/28/2020). she will continue on her current pressure settings. I have ordered replacement supplies  I have reviewed medicare requirements regarding PAP usage    * She was asked to contact our office for any problems regarding PAP therapy. * Counseling was provided regarding the importance of regular PAP use and on proper sleep hygiene and safe driving. * Re-enforced proper and regular cleaning for the device. I have counseled the patient regarding the benefits of weight loss. 2. Hypertension - she continues on her current regimen. She will have her cuff calibrated at her doctor's officeand will monitor her BP  I have reviewed the relationship between hypertension as it relates to sleep-disordered breathing. 3. Type II diabetes - she continues on her current regimen. I have reviewed the relationship between sleep disordered breathing as it relates to diabetes.     Electronically signed by    Ramon Pinto MD  Diplomate in Sleep Medicine  [facility]

## 2019-02-04 ENCOUNTER — TELEPHONE (OUTPATIENT)
Dept: SLEEP MEDICINE | Age: 69
End: 2019-02-04

## 2019-02-04 NOTE — TELEPHONE ENCOUNTER
Patient to increase humidity to 4. Download reviewed with patient. Patient will follow up if needed.

## 2019-02-04 NOTE — TELEPHONE ENCOUNTER
Patient called into the office stating she is waking up gasping for air and feels like her nose and mouth are very dry. She thinks her Cpap needs some adjusting, please call her back at [phone].

## 2019-02-11 ENCOUNTER — HOSPITAL ENCOUNTER (OUTPATIENT)
Dept: MAMMOGRAPHY | Age: 69
Discharge: HOME | End: 2019-02-11
Attending: INTERNAL MEDICINE
Payer: MEDICARE

## 2019-02-11 DIAGNOSIS — Z12.31 VISIT FOR SCREENING MAMMOGRAM: ICD-10-CM

## 2019-02-11 PROCEDURE — 77067 SCR MAMMO BI INCL CAD: CPT

## 2019-02-21 ENCOUNTER — OFFICE VISIT (OUTPATIENT)
Dept: ENDOCRINOLOGY | Age: 69
End: 2019-02-21

## 2019-02-21 VITALS
HEIGHT: 62 IN | HEART RATE: 68 BPM | SYSTOLIC BLOOD PRESSURE: 149 MMHG | BODY MASS INDEX: 41.88 KG/M2 | WEIGHT: 227.6 LBS | DIASTOLIC BLOOD PRESSURE: 75 MMHG

## 2019-02-21 DIAGNOSIS — E11.8 TYPE 2 DIABETES MELLITUS WITH COMPLICATION, WITH LONG-TERM CURRENT USE OF INSULIN (HCC): Primary | ICD-10-CM

## 2019-02-21 DIAGNOSIS — Z79.4 TYPE 2 DIABETES MELLITUS WITH COMPLICATION, WITH LONG-TERM CURRENT USE OF INSULIN (HCC): Primary | ICD-10-CM

## 2019-02-21 LAB — HBA1C MFR BLD HPLC: 10.3 %

## 2019-02-21 NOTE — PATIENT INSTRUCTIONS
Continue Toujeo 56 units at 11 PM    Continue Hunmalog 22 units for breakfast and lunch (if you eat lunch)    Increase Humalog to 30 units with dinner (unless you lower the carbs for dinner)    Continue metformin 2 in am and 1 in pm    Return in 1 month    Please note: It is the policy of [facility] to process medication refills directly from local or mail-order pharmacies. So, when you require a medication refill, please contact the pharmacy you wish to provide your medication, and request they contact the practice directly through [employer] (fax). Please have your pharmacy fax the request to [phone].

## 2019-02-21 NOTE — PROGRESS NOTES
Ms. Aristides Miguel returns for follow-up of her type 2 diabetes mellitus with poor blood sugar control and cerebrovascular disease. She is currently on toujeo insulin 56 units at bedtime and Humalog 22 with meals. Because her renal function was normal, I added back metformin and she is currently taking 2 in the morning and 1 in the evening. She has slight looseness of her stools but not diarrhea or nausea. The combination seems to be working better. Her fasting blood sugars now range between 101 60. Her post dinner blood sugars 200-350. Her diet is a little lower in carb but not much. [place] is by far the higher carb meal although she claims that breakfast is her biggest meal.    Examination  Blood pressure 149/75  Pulse 68  Weight 227    Impression type 2 diabetes mellitus with an A1c of 10.3%  Plan: We have asked her to continue the above regimen but to increase the dose of Humalog to 30 units for dinner. We may also need to increase the dose for the breakfast meal but we would like to see the bedtime blood sugar come down first.  We did not advance the Metformin beyond the 3 tablets she is currently taking since this appears to be close to maximum tolerated dose for now. We will see her back in 1 month. We spent more than 25 mintutes face to face, more than 50% was in counseling regarding diet, exercise and carbohydrate intake.

## 2019-03-06 ENCOUNTER — TELEPHONE (OUTPATIENT)
Dept: ENDOCRINOLOGY | Age: 69
End: 2019-03-06

## 2019-03-06 DIAGNOSIS — E11.8 TYPE 2 DIABETES MELLITUS WITH COMPLICATION, WITH LONG-TERM CURRENT USE OF INSULIN (HCC): Primary | ICD-10-CM

## 2019-03-06 DIAGNOSIS — Z79.4 TYPE 2 DIABETES MELLITUS WITH COMPLICATION, WITH LONG-TERM CURRENT USE OF INSULIN (HCC): Primary | ICD-10-CM

## 2019-03-06 RX ORDER — ROSUVASTATIN CALCIUM 20 MG/1
TABLET, COATED ORAL
Qty: 90 TAB | Refills: 3 | Status: SHIPPED | OUTPATIENT
Start: 2019-03-06 | End: 2019-06-21 | Stop reason: SDUPTHER

## 2019-03-06 RX ORDER — TRIAMTERENE AND HYDROCHLOROTHIAZIDE 37.5; 25 MG/1; MG/1
CAPSULE ORAL
Qty: 180 CAP | Refills: 3 | Status: SHIPPED | OUTPATIENT
Start: 2019-03-06 | End: 2020-05-08

## 2019-03-06 RX ORDER — INSULIN LISPRO 100 [IU]/ML
INJECTION, SOLUTION INTRAVENOUS; SUBCUTANEOUS
Qty: 23 ADJUSTABLE DOSE PRE-FILLED PEN SYRINGE | Refills: 3 | Status: SHIPPED | OUTPATIENT
Start: 2019-03-06 | End: 2019-11-25 | Stop reason: SDUPTHER

## 2019-03-06 NOTE — TELEPHONE ENCOUNTER
Spoke with Mrs. Daily Memos and she stated that [facility] prescription with the updated instructions for her Humalog insulin. She stated that she tried to have them send her the medication, but they stated that it was too soon for a refill which she stated was due to them having an old prescription on file.

## 2019-03-21 ENCOUNTER — OFFICE VISIT (OUTPATIENT)
Dept: ENDOCRINOLOGY | Age: 69
End: 2019-03-21

## 2019-03-21 VITALS
HEIGHT: 62 IN | DIASTOLIC BLOOD PRESSURE: 80 MMHG | HEART RATE: 73 BPM | BODY MASS INDEX: 42.4 KG/M2 | SYSTOLIC BLOOD PRESSURE: 161 MMHG | WEIGHT: 230.4 LBS

## 2019-03-21 DIAGNOSIS — Z79.4 TYPE 2 DIABETES MELLITUS WITH COMPLICATION, WITH LONG-TERM CURRENT USE OF INSULIN (HCC): Primary | ICD-10-CM

## 2019-03-21 DIAGNOSIS — E11.8 TYPE 2 DIABETES MELLITUS WITH COMPLICATION, WITH LONG-TERM CURRENT USE OF INSULIN (HCC): Primary | ICD-10-CM

## 2019-03-21 RX ORDER — GABAPENTIN 100 MG/1
100 CAPSULE ORAL 3 TIMES DAILY
Qty: 90 CAP | Refills: 3 | Status: SHIPPED | OUTPATIENT
Start: 2019-03-21 | End: 2020-02-03 | Stop reason: SDUPTHER

## 2019-03-21 NOTE — PROGRESS NOTES
Ms. Sheila Guthrie returns for follow-up of her type 2 diabetes mellitus and a history of COPD. She is currently on toujeo insulin 56 units at night, Humalog insulin 30 at breakfast and dinner and 22 at lunchtime if she eats lunch and metformin 1500 mg daily which is her maximum tolerated dose. Her blood sugars have improved a little bit. Her morning blood sugars are generally in the 120-160 range. Her bedtime blood sugars 200-250 and occasionally 300. Her diet is unchanged. Breakfast is eggs juan and 2 pieces of toast and a diet soda. Lunch yesterday was a half a steak with no carbohydrate. Last night for dinner she had 4 inches of a [employer] sandwich and [employer] fries. She drank water. She has a number of complaints. She has a feeling of abdominal tightness. She has leg pain and finger pain. She was told in the ER that the pain that she is experiencing is muscle spasm. She was getting in the pool doing water aerobics but she has not been able to do that recently. Examination  Blood pressure 161/80  Pulse 73  Weight 230 pounds    Impression type 2 diabetes mellitus with blood sugars that are a little bit better than the A1c of 10.3 which we had a month ago. Plan: I decrease the morning dose of Humalog from 30 units down to 20 units but increase the dinnertime dose from 30 units to 35 units. She will continue taking 20 units at lunch if she eats lunch. She has requested and I sent a prescription for low-dose Neurontin for some neuropathic symptoms. We will see her back in 1 month. We spent more than 25 mintutes face to face, more than 50% was in counseling regarding diet, exercise and carbohydrate intake.

## 2019-04-18 ENCOUNTER — TELEPHONE (OUTPATIENT)
Dept: INTERNAL MEDICINE CLINIC | Age: 69
End: 2019-04-18

## 2019-04-18 DIAGNOSIS — R06.02 SHORTNESS OF BREATH: ICD-10-CM

## 2019-04-18 DIAGNOSIS — J45.909 MILD ASTHMA WITHOUT COMPLICATION, UNSPECIFIED WHETHER PERSISTENT: Primary | ICD-10-CM

## 2019-04-18 RX ORDER — ALBUTEROL SULFATE 1.25 MG/3ML
1.25 SOLUTION RESPIRATORY (INHALATION)
Qty: 225 ML | Refills: 3 | Status: SHIPPED | OUTPATIENT
Start: 2019-04-18 | End: 2022-05-10 | Stop reason: SDUPTHER

## 2019-04-18 NOTE — TELEPHONE ENCOUNTER
Called, spoke to pt. Two identifiers confirmed. Pt stated her nebulizer machine is no longer working and is also out of the solution. Notified pt I would send new order to [facility]. Pt verbalized understanding of information discussed w/ no further questions at this time. RX pended to Dr. Renetta Armstrong.

## 2019-04-18 NOTE — TELEPHONE ENCOUNTER
Pt is requesting a call back in reference to getting a nebulizer for asthma machine. Best contact [phone].        Copy/paste Rupesh stratton

## 2019-04-22 ENCOUNTER — HOSPITAL ENCOUNTER (OUTPATIENT)
Dept: LAB | Age: 69
Discharge: HOME | End: 2019-04-22
Payer: MEDICARE

## 2019-04-22 ENCOUNTER — PATIENT OUTREACH (OUTPATIENT)
Dept: INTERNAL MEDICINE CLINIC | Age: 69
End: 2019-04-22

## 2019-04-22 ENCOUNTER — OFFICE VISIT (OUTPATIENT)
Dept: INTERNAL MEDICINE CLINIC | Age: 69
End: 2019-04-22

## 2019-04-22 VITALS
DIASTOLIC BLOOD PRESSURE: 84 MMHG | WEIGHT: 232 LBS | HEART RATE: 71 BPM | RESPIRATION RATE: 18 BRPM | SYSTOLIC BLOOD PRESSURE: 162 MMHG | BODY MASS INDEX: 42.69 KG/M2 | HEIGHT: 62 IN | OXYGEN SATURATION: 96 % | TEMPERATURE: 98 F

## 2019-04-22 DIAGNOSIS — E78.00 PURE HYPERCHOLESTEROLEMIA: ICD-10-CM

## 2019-04-22 DIAGNOSIS — I25.10 CORONARY ARTERY DISEASE INVOLVING NATIVE CORONARY ARTERY OF NATIVE HEART WITHOUT ANGINA PECTORIS: ICD-10-CM

## 2019-04-22 DIAGNOSIS — I10 ESSENTIAL HYPERTENSION: ICD-10-CM

## 2019-04-22 DIAGNOSIS — E11.65 UNCONTROLLED TYPE 2 DIABETES MELLITUS WITH HYPERGLYCEMIA (HCC): Primary | ICD-10-CM

## 2019-04-22 PROCEDURE — 36415 COLL VENOUS BLD VENIPUNCTURE: CPT

## 2019-04-22 PROCEDURE — 83036 HEMOGLOBIN GLYCOSYLATED A1C: CPT

## 2019-04-22 PROCEDURE — 80053 COMPREHEN METABOLIC PANEL: CPT

## 2019-04-22 PROCEDURE — 80061 LIPID PANEL: CPT

## 2019-04-22 RX ORDER — METOPROLOL TARTRATE 50 MG/1
TABLET ORAL
Qty: 180 TAB | Refills: 3 | Status: SHIPPED | OUTPATIENT
Start: 2019-04-22 | End: 2020-05-15

## 2019-04-22 RX ORDER — LOSARTAN POTASSIUM 100 MG/1
100 TABLET ORAL DAILY
Qty: 90 TAB | Refills: 3 | Status: SHIPPED | OUTPATIENT
Start: 2019-04-22 | End: 2020-04-10

## 2019-04-22 NOTE — PROGRESS NOTES
HISTORY OF PRESENT ILLNESS  Makayla Hoffmann is a 79 y.o. female. HPI     F/u HTN hld asthma hx CVA-DM-2  Has established with Dr Marely Irwin for DM-2 management--metformin restarted 1500,g every day and humalog dose has been adjusted by Dr Antonina Melendez  bs still up around 200 but down to 109 this morning  Metformin causes nausea and diarrhea      Last a1c 10.3  Last OV  In ED last month for dizziness, right hand tingling. Head CT no acute changes--dx peripheral vertigo--meclizine rx  Decreased hearing b/l ears  a1c around 9-10 range , wants to see another endocrine MD  Had labs last month--labs ok excpet eleated a1c from Dr Maria E De Souza  Requests rx for rollator due to unsteady gait and hip pain--uses cane currently since CVA        Had dexa since last OV--osteopenia with low frax        Patient Active Problem List    Diagnosis Date Noted    Type 2 diabetes with nephropathy ([facility] 12/14/2018    History of CVA (cerebrovascular accident) 07/13/2018    Obesity, morbid ([address] 01/25/2018    Warthin's tumor 07/01/2016    HTN (hypertension) 09/13/2013    Axillary hidradenitis suppurativa 08/07/2013    Esophageal reflux 01/15/2013    Renal failure, acute ([address] 01/13/2013    Asthma 12/14/2012    Myocardial ischemia 06/06/2012    Shortness of breath 06/06/2012    Coronary artery disease 06/06/2012    PVC's (premature ventricular contractions) 06/01/2012    DM type 2 (diabetes mellitus, type 2) ([facility] 04/23/2012    Grave's disease 10/15/2010    DJD (degenerative joint disease) of hip 10/20/2009    DJD (degenerative joint disease) of knee 10/20/2009    CTS (Carpal Tunnel Syndrome)-b/l 10/20/2009    CVA (cerebral infarction) 10/20/2009    Diverticulosis 10/20/2009    Osteopenia 10/20/2009     Current Outpatient Medications   Medication Sig Dispense Refill    albuterol (ACCUNEB) 1.25 mg/3 mL nebu 3 mL by Nebulization route every four (4) hours as needed (wheezing).  225 mL 3    gabapentin (NEURONTIN) 100 mg capsule Take 1 Cap by mouth three (3) times daily. 90 Cap 3    rosuvastatin (CRESTOR) 20 mg tablet TAKE 1 TABLET NIGHTLY 90 Tab 3    triamterene-hydroCHLOROthiazide (DYAZIDE) 37.5-25 mg per capsule TAKE 1 CAPSULE TWICE A  Cap 3    insulin lispro (HUMALOG KWIKPEN INSULIN) 100 unit/mL kwikpen 22 units for breakfast and lunch and 30 units for dinner 23 Adjustable Dose Pre-filled Pen Syringe 3    metFORMIN ER (GLUCOPHAGE XR) 500 mg tablet TAKE 2 TABLETS BY MOUTH TWICE DAILY AFTER MEALS (Patient taking differently: TAKE 2 TABLETS IN THE MORNING AND 1 TABLET IN THE EVENING WITH MEALS) 360 Tab 10    amLODIPine (NORVASC) 10 mg tablet Take 1 Tab by mouth daily. 90 Tab 3    metoprolol tartrate (LOPRESSOR) 25 mg tablet TAKE 1 TABLET TWICE A  Tab 3    losartan (COZAAR) 50 mg tablet Take 1 Tab by mouth daily. One tab daily 90 Tab 3    insulin glargine (TOUJEO SOLOSTAR) 300 unit/mL (1.5 mL) inpn 56 Units by SubCUTAneous route nightly.  albuterol (PROAIR HFA) 90 mcg/actuation inhaler USE 1 INHALATION EVERY 4 HOURS AS NEEDED WHEEZING OR SHORTNESS OF BREATH 25.5 Inhaler 11    ADVAIR DISKUS 100-50 mcg/dose diskus inhaler USE 1 INHALATION TWICE A DAY (Patient taking differently: USE 1 INHALATION TWICE A DAY prn) 180 Each 3    montelukast (SINGULAIR) 10 mg tablet Take 1 Tab by mouth daily. 30 Tab 0    ONETOUCH ULTRA TEST strip       BD INSULIN PEN NEEDLE UF SHORT 31 gauge x 5/16\" ndle       albuterol (PROVENTIL) 5 mg/mL nebulizer solution 0.5 mL by Nebulization route every four (4) hours as needed for Wheezing. 0.5 mL 0    SYNTHROID 137 mcg tablet Take 137 mcg by mouth daily.  insulin lispro (HUMALOG) 100 unit/mL Soln 22 Units by SubCUTAneous route. 15 units tid ( Sliding scale)      aspirin delayed-release 81 mg tablet Take 81 mg by mouth daily.        Allergies   Allergen Reactions    Latex Itching    Codeine Itching and Other (comments)     hallucinate    Contrast Dye [Iodine] Rash and Itching     Given pre-cardiac cath    Seafood [facility] Containing Products] Swelling     Social History     Tobacco Use    Smoking status: Former Smoker     Last attempt to quit: 2004     Years since quittin.6    Smokeless tobacco: Never Used   Substance Use Topics    Alcohol use: No      Lab Results   Component Value Date/Time    WBC 6.9 2018 09:21 AM    HGB 12.8 2018 09:21 AM    Hemoglobin (POC) 14.6 04/10/2015 12:41 PM    HCT 41.8 2018 09:21 AM    Hematocrit (POC) 43 04/10/2015 12:41 PM    PLATELET 685  09:21 AM    MCV 78.4 (L) 2018 09:21 AM     Lab Results   Component Value Date/Time    Hemoglobin A1c, External 10.6 2018    Hemoglobin A1c, External 9.4 2016    Hemoglobin A1c, External 8.9 2015 02:37 PM    Glucose 247 (H) 2018 09:21 AM    Glucose (POC) 129 (H) 2015 07:11 AM    LDL, calculated 84 2012 12:00 AM    Creatinine (POC) 0.9 04/10/2015 12:41 PM    Creatinine 1.01 2018 09:21 AM      Lab Results   Component Value Date/Time    Cholesterol, total 157 2012 12:00 AM    HDL Cholesterol 55 2012 12:00 AM    LDL, calculated 84 2012 12:00 AM    LDL-C, External 72 2016    Triglyceride 90 2012 12:00 AM     Lab Results   Component Value Date/Time    GFR est non-AA 55 (L) 2018 09:21 AM    GFRNA, POC >60 04/10/2015 12:41 PM    GFR est AA >60 2018 09:21 AM    GFRAA, POC >60 04/10/2015 12:41 PM    Creatinine 1.01 2018 09:21 AM    Creatinine (POC) 0.9 04/10/2015 12:41 PM    BUN 12 2018 09:21 AM    BUN (POC) 7 (L) 04/10/2015 12:41 PM    Sodium 141 2018 09:21 AM    Sodium (POC) 143 04/10/2015 12:41 PM    Potassium 3.5 2018 09:21 AM    Potassium (POC) 2.9 (L) 04/10/2015 12:41 PM    Chloride 101 2018 09:21 AM    Chloride (POC) 103 04/10/2015 12:41 PM    CO2 35 (H) 2018 09:21 AM    Magnesium 1.9 2013 04:30 AM     No results found for: TSH, TSH2, TSH3, TSHP, TSHELE, TSHEXT, TT3, T3U, T3UP, FRT3, FT3, FT4, FT4P, T4, T4P, FT4T, TT7, TSHEXT      ROS    Physical Exam   Constitutional: She appears well-developed and well-nourished. Appears stated age   Cardiovascular: Normal rate, regular rhythm and normal heart sounds. Exam reveals no gallop and no friction rub. No murmur heard. Pulmonary/Chest: Effort normal and breath sounds normal. No respiratory distress. She has no wheezes. Abdominal: Soft. Bowel sounds are normal. She exhibits no distension and no mass. There is no tenderness. There is no rebound and no guarding. Musculoskeletal: She exhibits no edema. Neurological: She is alert. Skin: Skin is dry. Psychiatric: She has a normal mood and affect. Nursing note and vitals reviewed. ASSESSMENT and PLAN  Diagnoses and all orders for this visit:    1. Uncontrolled type 2 diabetes mellitus with hyperglycemia (HCC)  -     METABOLIC PANEL, COMPREHENSIVE  -     HEMOGLOBIN A1C WITH EAG  -     MICROALBUMIN, UR, RAND W/ MICROALB/CREAT RATIO   Refer to CDE in office for uncontrolled diabetes. Elevated home fsbs readings despite med  changes, boyfriend prepares her meals   F/u Dr Augustine Sears next week  2. Essential hypertension  -     METABOLIC PANEL, COMPREHENSIVE   Elevated SBP   Increase norvasc 10 mgqd and metoprolol 50 mg bid  3. Pure hypercholesterolemia  -     METABOLIC PANEL, COMPREHENSIVE  -     LIPID PANEL    4. Coronary artery disease involving native coronary artery of native heart without angina pectoris   Stable,nonobstructive dz, no cp /angina  Other orders  -     losartan (COZAAR) 100 mg tablet; Take 1 Tab by mouth daily. One tab daily  -     metoprolol tartrate (LOPRESSOR) 50 mg tablet; TAKE 1 TABLET TWICE A DAY      Follow-up and Dispositions    · Return in about 1 month (around 5/22/2019) for htn.

## 2019-04-22 NOTE — PROGRESS NOTES
Was asked by Dr. Dane Lynch to see pt for diabetes self management education for A1c of 10.3% on 2/21/19, done in Dr. Staci Rao office. Offered pt appt tomorrow, but due to other appt conflicts, pt scheduled appt for 5/14/19 at 9:00.

## 2019-04-22 NOTE — PROGRESS NOTES
Chief Complaint   Patient presents with    Hypertension     4 month f/u    Diabetes         1. Have you been to the ER, urgent care clinic since your last visit? Hospitalized since your last visit? no  2. Have you seen or consulted any other health care providers outside of the [address] [place] [address] since your last visit? Include any pap smears or colon screening.   no

## 2019-04-23 LAB
ALBUMIN SERPL-MCNC: 4 G/DL (ref 3.6–4.8)
ALBUMIN/GLOB SERPL: 1.4 {RATIO} (ref 1.2–2.2)
ALP SERPL-CCNC: 131 IU/L (ref 39–117)
ALT SERPL-CCNC: 14 IU/L (ref 0–32)
AST SERPL-CCNC: 11 IU/L (ref 0–40)
BILIRUB SERPL-MCNC: 0.5 MG/DL (ref 0–1.2)
BUN SERPL-MCNC: 11 MG/DL (ref 8–27)
BUN/CREAT SERPL: 10 (ref 12–28)
CALCIUM SERPL-MCNC: 9.5 MG/DL (ref 8.7–10.3)
CHLORIDE SERPL-SCNC: 101 MMOL/L (ref 96–106)
CHOLEST SERPL-MCNC: 157 MG/DL (ref 100–199)
CO2 SERPL-SCNC: 32 MMOL/L (ref 20–29)
CREAT SERPL-MCNC: 1.1 MG/DL (ref 0.57–1)
EST. AVERAGE GLUCOSE BLD GHB EST-MCNC: 243 MG/DL
GLOBULIN SER CALC-MCNC: 2.8 G/DL (ref 1.5–4.5)
GLUCOSE SERPL-MCNC: 172 MG/DL (ref 65–99)
HBA1C MFR BLD: 10.1 % (ref 4.8–5.6)
HDLC SERPL-MCNC: 55 MG/DL
LDLC SERPL CALC-MCNC: 69 MG/DL (ref 0–99)
POTASSIUM SERPL-SCNC: 3.5 MMOL/L (ref 3.5–5.2)
PROT SERPL-MCNC: 6.8 G/DL (ref 6–8.5)
SODIUM SERPL-SCNC: 145 MMOL/L (ref 134–144)
TRIGL SERPL-MCNC: 166 MG/DL (ref 0–149)
VLDLC SERPL CALC-MCNC: 33 MG/DL (ref 5–40)

## 2019-04-24 ENCOUNTER — TELEPHONE (OUTPATIENT)
Dept: INTERNAL MEDICINE CLINIC | Age: 69
End: 2019-04-24

## 2019-04-24 NOTE — TELEPHONE ENCOUNTER
[facility]els[facility] states patient's medication, albuterol (ACCUNEB) 1.25 mg/3 mL nebulizer needs a Prior Auth.  Please call to give verbal. Thank you

## 2019-04-26 ENCOUNTER — HOSPITAL ENCOUNTER (OUTPATIENT)
Dept: LAB | Age: 69
Discharge: HOME | End: 2019-04-26
Payer: MEDICARE

## 2019-04-26 ENCOUNTER — TELEPHONE (OUTPATIENT)
Dept: INTERNAL MEDICINE CLINIC | Age: 69
End: 2019-04-26

## 2019-04-26 PROCEDURE — 82043 UR ALBUMIN QUANTITATIVE: CPT

## 2019-04-26 NOTE — TELEPHONE ENCOUNTER
----- Message from Maddy Odonnell sent at 4/26/2019 10:40 AM EDT -----  Regarding: Gladystine Lie from [address] has a question about one of the patient's medication. Her number is [phone].      Copy/paste [place]

## 2019-04-26 NOTE — TELEPHONE ENCOUNTER
Called, spoke to pt. Two identifiers confirmed. Notified pt PA and appeal for albuterol were denied with Medicare part D. Per representative for [facility], pt's Medicare part A or B may cover. Notified pt RX will be sent again to local pharmacy to try. Pt verbalized understanding of information discussed w/ no further questions at this time.

## 2019-04-26 NOTE — TELEPHONE ENCOUNTER
Called and spoke with Jim. RX clarified. Jim verbalized understanding of information discussed w/ no further questions at this time.

## 2019-04-27 LAB
ALBUMIN/CREAT UR: 38.7 MG/G CREAT (ref 0–30)
CREAT UR-MCNC: 113.1 MG/DL
MICROALBUMIN UR-MCNC: 43.8 UG/ML

## 2019-04-29 ENCOUNTER — OFFICE VISIT (OUTPATIENT)
Dept: ENDOCRINOLOGY | Age: 69
End: 2019-04-29

## 2019-04-29 VITALS
BODY MASS INDEX: 42.73 KG/M2 | WEIGHT: 232.2 LBS | HEIGHT: 62 IN | HEART RATE: 72 BPM | SYSTOLIC BLOOD PRESSURE: 154 MMHG | DIASTOLIC BLOOD PRESSURE: 70 MMHG

## 2019-04-29 DIAGNOSIS — E11.8 TYPE 2 DIABETES MELLITUS WITH COMPLICATION, WITH LONG-TERM CURRENT USE OF INSULIN (HCC): Primary | ICD-10-CM

## 2019-04-29 DIAGNOSIS — Z79.4 TYPE 2 DIABETES MELLITUS WITH COMPLICATION, WITH LONG-TERM CURRENT USE OF INSULIN (HCC): Primary | ICD-10-CM

## 2019-04-29 PROBLEM — E11.40 TYPE 2 DIABETES MELLITUS WITH DIABETIC NEUROPATHY (HCC): Status: ACTIVE | Noted: 2019-04-29

## 2019-04-29 NOTE — PROGRESS NOTES
Ms. Renzo Leonardo returns for follow-up of her type 2 diabetes mellitus with poor blood sugar control. She is currently on Toujeo insulin 56 units at bedtime, Humalog insulin 24 breakfast 20 for lunch and 35 for dinner and Metformin somewhere between 2 and 3 tablets daily. Her renal function is normal and she had been making some improvements in her diet. She presents today with blood sugars that can be in a good range some mornings but other days she wakes up with blood sugars of 1 80-2 50. This is almost entirely related to her dinner meal.  She admits that she may be forgetting her evening dose of insulin and when she does so and checks her blood sugar can be 300. On the other hand, she can have blood sugars of 90-1 30 fairly frequently. Breakfast is usually to juan egg biscuits. Lunch is turkey and some vegetables without bread. Last night for dinner she had chicken at [employer] and broccoli. Her blood sugar this morning was 178. She drinks diet sodas. She is not very physically active. Examination  Blood pressure 154/70  Pulse 72  Weight 232    Impression type 2 diabetes mellitus with persistently elevated blood sugar on combination therapy  Plan: We have been emphasized her diet in particular her dinner meal which is where most of the carbohydrate is. We also suggested that perhaps to juan egg biscuits is more than she needs for breakfast.  She plans to see Sherryle Fire in May and we will see her back in 3 months. We spent more than 25 mintutes face to face, more than 50% was in counseling regarding diet, exercise and carbohydrate intake.

## 2019-05-14 ENCOUNTER — PATIENT OUTREACH (OUTPATIENT)
Dept: INTERNAL MEDICINE CLINIC | Age: 69
End: 2019-05-14

## 2019-05-14 ENCOUNTER — OFFICE VISIT (OUTPATIENT)
Dept: INTERNAL MEDICINE CLINIC | Age: 69
End: 2019-05-14

## 2019-05-14 DIAGNOSIS — E11.21 TYPE 2 DIABETES WITH NEPHROPATHY (HCC): Primary | ICD-10-CM

## 2019-05-14 NOTE — PROGRESS NOTES
Was asked by Dr. Deedee Milian to see patient for diabetes self management education. Last A1c was 10.1% on 4/22/19. Previous A1Cs were 10.3 on 2/21/19, 9.6 on 11/23/16 and 9.4 on 5/19/16. Previous diabetes education - in Dr. Yvonne Chun' office. Pt's endocrinologist is Dr. Ceci Villegas. Presentation/Accompanied by - using Simon and accompanied by Bella cantu. \"    Social History - retired and lives with Corrie Donnelly who is also a controlled diabetic    Diabetes History/Diabetes Family History - has been diabetes for about 15 years; family history is mom and brother; her son has prediabetes    Symptoms of high blood sugar- fatigue, neuropathy in fingers and toes    Motivation - wants to get numbers down    Self Care Behaviors-    1) Healthy Eating/Food Recall- pt stated she always feels hungry, even after eating; she likes potatoes and corn on the cob; David cooks/plans all the meals; pt does not typically drink carbs  BK - 8:30-Roa's juan, egg, or sausage biscuit, water or diet coke  LN - 12:30-4 buffalo chicken wings, broccoli, water; or T-bone steak, broccoli; sometimes eats chips with her lunch  DN - 5-6:00- 2 fried chicken wings, khadar greens, diet coke  SN - does not snack  RAY - water, diet coke, once in a while drinks sweet tea    2) Being Active- currently not active, but used to go two days a week to free [address] [place] [address] Facilities    3) Self Monitoring Blood Glucose (SMBG)- only checks fasting due to neuropathy in her fingers; pt would like to try the [facility] and will check with Dr. Tano Dia at her next visit in July; this morning her blood sugar was 114, yesterday 142, Sunday 192-went to a graduation at [address] on Saturday and ate more     How to treat a low blood sugar -  Has a rare low blood sugar; her last was a 90; explained to pt that a low is <70.  Discussed how to treat a low in depth; pt is aware that if she starts eating a consistent amount of carbs her insulin needs may decrease; she will call Dr. Mukesh Barclay or this CDE if she has a low blood sugar; pt was given a sample of glucose tabs    4) Taking Medication- is supposed to take maximum dose of metformin er, but usually takes 1000 mg with breakfast and 500 mg with dinner; she misses about 3 doses per week because she doesn't want to have diarrhea/nausea when she goes out during the day; she did take her metformin this morning and has nausea; pt does not miss any doses of insulin; she takes Toujeo 56 units at bedtime and Humalog 24 units with breakfast, 20 with lunch and 35 with dinner  Key Antihyperglycemic Medications             insulin lispro (HUMALOG KWIKPEN INSULIN) 100 unit/mL kwikpen 22 units for breakfast and lunch and 30 units for dinner    metFORMIN ER (GLUCOPHAGE XR) 500 mg tablet TAKE 2 TABLETS BY MOUTH TWICE DAILY AFTER MEALS    insulin glargine (TOUJEO SOLOSTAR) 300 unit/mL (1.5 mL) inpn 56 Units by SubCUTAneous route nightly. insulin lispro (HUMALOG) 100 unit/mL Soln 22 Units by SubCUTAneous route. 15 units tid ( Sliding scale)        5) Problem Solving- pt is ready and willing to manage her blood sugars by making adjustments to her treatment plan; she plans to go back to water aerobics and eat healthier    6) Reducing Risk-   Tobacco - former smoker  HTN - takes losartan, metoprolol, amlodipine, Dyazide  HLD - takes Crestor  Aspirin - takes 81 mg    Discussed possible complications of uncontrolled diabetes ie fatigue, dehydration, damage to vital organs. 7) Healthy Coping-   Support system - pt's MANUELITO Abreu is supportive; pt will take advantage of the support and education provided today and of the support of her health care team.    Barriers identified - side effects from metformin er affecting med adherence; note sent to providers    Health Maintenance Due:  Health Maintenance Due   Topic Date Due    Hepatitis C Screening  1950    DTaP/Tdap/Td series (1 - Tdap) 12/12/1973    Shingrix Vaccine Age 50> (1 of 2) 12/12/2002     Resources provided:    -Diabetes Made Simple Video    -Living With Type 2 Diabetes    -Label Reading Basics for Diabetes    -Carb Counting and Meal Planning    -Low and No-Carb Snack Ideas for Diabetics    -Symptoms and Treatment of Low Blood Sugar    -Glucose tablets    DSMT- not addressed this visit    Plan / Pt Goals -   -by next appt with Dr. Monique Louis on June 3rd, start going to water aerobics at least twice a week  -continue to check fasting blood sugar every morning  -continue to take metformin and insulin as prescribed; Natividad Scales will let Dr. Gualberto Mason know that some doses of metformin are missed due to diarrhea and nausea  -strive to start following the healthy plate meal plan being mindful of what is a carb (fruit, dairy, grains, starchy vegetables) and portion size; guideline is up to 45 grams of carb per meal (3 meals per day) or 3 servings per meal; snack guideline - 1 oz. protein serving and may add 1 carb serving   -follow up with Dr. Gualberto Mason in July as scheduled, and Dr. Monique Louis in June  -call Natividad Scaels at [phone] with any questions    Future Appointments   Date Time Provider Ovidio Allan   6/3/2019  1:45 PM Luna Cartagena MD [facility] SCHED   7/15/2019  9:10 AM Humphrey Herzog MD RDE  [facility] [address]   2/3/2020 10:00 Baltazar Vu  [facility] [address]      Last Appointment My Department:  4/26/2019     Chart was routed to Dr. Monique Louis and Dr. Gualberto Mason.     Wicho Blake, RN, CDE, CCM  (Phone) [phone]

## 2019-05-14 NOTE — PATIENT INSTRUCTIONS
Goals/plan:  -by next appt with Dr. Nuvia Tolliver on June 3rd, start going to water aerobics at least twice a week  -continue to check fasting blood sugar every morning  -continue to take metformin and insulin as prescribed; Amelia Abel will let Dr. Shannan Pablo know that you are missing some doses of metformin due to diarrhea and nausea  -strive to start following the healthy plate meal plan being mindful of what is a carb (fruit, dairy, grains, starchy vegetables) and portion size; guideline is up to 45 grams of carb per meal (3 meals per day) or 3 servings per meal; snack guideline - 1 oz. protein serving and may add 1 carb serving   -follow up with Dr. Shannan Pablo in July as scheduled  -call Amelia Abel at [phone] with any questions

## 2019-05-14 NOTE — Clinical Note
Pt seen today, tanks for referral. Fasting blood sugar this morning was 114 and yesterday 142. 192 on Sunday after going to a graduation at [address] on Saturday. She is going to start water aerobics again, at least twice a week before she sees you for f/u next month. According to what she told me, her eating is not unreasonable. She is missing some doses of metformin due to diarrhea and nausea. I will let Dr. Travis Rodriguez know. She sees him again in July.

## 2019-05-14 NOTE — Clinical Note
ENZO-I saw pt today for diabetes self management education. Her blood sugar this morning was 114, yesterday 142, Sunday 192 after going to a graduation at [address] on Saturday. She only checks fasting due to neuropathy in fingers. I spent quite a bit of time on healthy eating and from what she told me, her eating is not unreasonable. She is going to get back into water aerobics. She will call if she has a low blood sugar, which at this point is rare. She thought a 90 was low.

## 2019-05-14 NOTE — PROGRESS NOTES
Was asked by Dr. Beth Donato to see patient for diabetes self management education. Last A1c was 10.1% on 4/22/19. Previous A1Cs were 10.3 on 2/21/19, 9.6 on 11/23/16 and 9.4 on 5/19/16. Previous diabetes education - in Dr. Sasha Atkins' office. Pt's endocrinologist is Dr. Kb Gonzalez. Presentation/Accompanied by - using Simon and accompanied by Josselin cantu. \"    Social History - retired and lives with Felisa Levy who is also a controlled diabetic    Diabetes History/Diabetes Family History - has been diabetes for about 15 years; family history is mom and brother; her son has prediabetes    Symptoms of high blood sugar- fatigue, neuropathy in fingers and toes    Motivation - wants to get numbers down    Self Care Behaviors-    1) Healthy Eating/Food Recall- pt stated she always feels hungry, even after eating; she likes potatoes and corn on the cob; David cooks/plans all the meals; pt does not typically drink carbs  BK - 8:30-Roa's juan, egg, or sausage biscuit, water or diet coke  LN - 12:30-4 buffalo chicken wings, broccoli, water; or T-bone steak, broccoli; sometimes eats chips with her lunch  DN - 5-6:00- 2 fried chicken wings, khadar greens, diet coke  SN - does not snack  RAY - water, diet coke, once in a while drinks sweet tea    2) Being Active- currently not active, but used to go two days a week to free [address] [place] [address] Facilities    3) Self Monitoring Blood Glucose (SMBG)- only checks fasting due to neuropathy in her fingers; pt would like to try the [facility] and will check with Dr. Tom Burden at her next visit in July; this morning her blood sugar was 114, yesterday 142, Sunday 192-went to a graduation at [address] on Saturday and ate more     How to treat a low blood sugar -  Has a rare low blood sugar; her last was a 90; explained to pt that a low is <70.  Discussed how to treat a low in depth; pt is aware that if she starts eating a consistent amount of carbs her insulin needs may decrease; she will call Dr. Tano Dia or this CDE if she has a low blood sugar; pt was given a sample of glucose tabs    4) Taking Medication- is supposed to take maximum dose of metformin er, but usually takes 1000 mg with breakfast and 500 mg with dinner; she misses about 3 doses per week because she doesn't want to have diarrhea/nausea when she goes out during the day; she did take her metformin this morning and has nausea; pt does not miss any doses of insulin; she takes Toujeo 56 units at bedtime and Humalog 24 units with breakfast, 20 with lunch and 35 with dinner  Key Antihyperglycemic Medications             insulin lispro (HUMALOG KWIKPEN INSULIN) 100 unit/mL kwikpen 22 units for breakfast and lunch and 30 units for dinner    metFORMIN ER (GLUCOPHAGE XR) 500 mg tablet TAKE 2 TABLETS BY MOUTH TWICE DAILY AFTER MEALS    insulin glargine (TOUJEO SOLOSTAR) 300 unit/mL (1.5 mL) inpn 56 Units by SubCUTAneous route nightly. insulin lispro (HUMALOG) 100 unit/mL Soln 22 Units by SubCUTAneous route. 15 units tid ( Sliding scale)        5) Problem Solving- pt is ready and willing to manage her blood sugars by making adjustments to her treatment plan; she plans to go back to water aerobics and eat healthier    6) Reducing Risk-   Tobacco - former smoker  HTN - takes losartan, metoprolol, amlodipine, Dyazide  HLD - takes Crestor  Aspirin - takes 81 mg    Discussed possible complications of uncontrolled diabetes ie fatigue, dehydration, damage to vital organs. 7) Healthy Coping-   Support system - pt's MANUELITO Donnelly is supportive; pt will take advantage of the support and education provided today and of the support of her health care team.    Barriers identified - side effects from metformin er affecting med adherence; note sent to providers    Health Maintenance Due:  Health Maintenance Due   Topic Date Due    Hepatitis C Screening  1950    DTaP/Tdap/Td series (1 - Tdap) 12/12/1973    Shingrix Vaccine Age 50> (1 of 2) 12/12/2002     Resources provided:    -Diabetes Made Simple Video    -Living With Type 2 Diabetes    -Label Reading Basics for Diabetes    -Carb Counting and Meal Planning    -Low and No-Carb Snack Ideas for Diabetics    -Symptoms and Treatment of Low Blood Sugar    -Glucose tablets    DSMT- not addressed this visit    Plan / Pt Goals -   -by next appt with Dr. Meliton Coleman on June 3rd, start going to water aerobics at least twice a week  -continue to check fasting blood sugar every morning  -continue to take metformin and insulin as prescribed; Nanci Jones will let Dr. Gail Duke know that some doses of metformin are missed due to diarrhea and nausea  -strive to start following the healthy plate meal plan being mindful of what is a carb (fruit, dairy, grains, starchy vegetables) and portion size; guideline is up to 45 grams of carb per meal (3 meals per day) or 3 servings per meal; snack guideline - 1 oz. protein serving and may add 1 carb serving   -follow up with Dr. Gail Duke in July as scheduled, and Dr. Meliton Coleman in June  -call Nanci Jones at [phone] with any questions    Future Appointments   Date Time Provider Ovidio Allan   6/3/2019  1:45 PM Miguel James MD [facility]   7/15/2019  9:10 AM Sera Lund MD RDE  [facility]   2/3/2020 10:00 Toy Deras  [address]      Last Appointment My Department:  4/26/2019     Chart was routed to Dr. Meliton Coleman and Dr. Gail Duke.     Donna Landaverde, RN, CDE, CCM  (Phone) [phone]

## 2019-06-01 DIAGNOSIS — E11.8 TYPE 2 DIABETES MELLITUS WITH COMPLICATION, WITH LONG-TERM CURRENT USE OF INSULIN (HCC): Primary | ICD-10-CM

## 2019-06-01 DIAGNOSIS — Z79.4 TYPE 2 DIABETES MELLITUS WITH COMPLICATION, WITH LONG-TERM CURRENT USE OF INSULIN (HCC): Primary | ICD-10-CM

## 2019-06-12 ENCOUNTER — TELEPHONE (OUTPATIENT)
Dept: INTERNAL MEDICINE CLINIC | Age: 69
End: 2019-06-12

## 2019-06-12 NOTE — TELEPHONE ENCOUNTER
Called, spoke to pt. Two identifiers confirmed. Pt stated she meant to call Dr. Giorgi Connro office. Pt verbalized understanding of information discussed w/ no further questions at this time.

## 2019-06-17 ENCOUNTER — PATIENT OUTREACH (OUTPATIENT)
Dept: INTERNAL MEDICINE CLINIC | Age: 69
End: 2019-06-17

## 2019-06-19 ENCOUNTER — TELEPHONE (OUTPATIENT)
Dept: ENDOCRINOLOGY | Age: 69
End: 2019-06-19

## 2019-06-19 RX ORDER — LEVOTHYROXINE SODIUM 137 UG/1
137 TABLET ORAL DAILY
Qty: 90 TAB | Refills: 3 | Status: SHIPPED | OUTPATIENT
Start: 2019-06-19 | End: 2020-06-15

## 2019-06-19 NOTE — TELEPHONE ENCOUNTER
Patient called to ask if you could contact her [address] for a refill on her Synthroid 137 mcg? Her former Endocrinologist was filling this for her. She can be reached at:  [phone].     [facility]  [phone]  Synthroid 137 mcg      90-day supply

## 2019-06-19 NOTE — TELEPHONE ENCOUNTER
Requested Prescriptions     Pending Prescriptions Disp Refills    levothyroxine (SYNTHROID) 137 mcg tablet 90 Tab 3     Sig: Take 137 mcg by mouth daily.

## 2019-06-20 ENCOUNTER — TELEPHONE (OUTPATIENT)
Dept: ENDOCRINOLOGY | Age: 69
End: 2019-06-20

## 2019-06-20 NOTE — TELEPHONE ENCOUNTER
----- Message from [facility] sent at 6/20/2019  2:44 PM EDT -----  Regarding: Dr. Quinten Jiang  Pt requested a refill on Rx(\"Toujeo\" 300 unit ml) called to [facility]. Pt stated she only 2 syringes left. Best contact number [phone].

## 2019-06-21 ENCOUNTER — OFFICE VISIT (OUTPATIENT)
Dept: INTERNAL MEDICINE CLINIC | Age: 69
End: 2019-06-21

## 2019-06-21 VITALS
OXYGEN SATURATION: 96 % | WEIGHT: 227 LBS | RESPIRATION RATE: 14 BRPM | HEART RATE: 68 BPM | DIASTOLIC BLOOD PRESSURE: 73 MMHG | BODY MASS INDEX: 41.77 KG/M2 | SYSTOLIC BLOOD PRESSURE: 156 MMHG | HEIGHT: 62 IN | TEMPERATURE: 98 F

## 2019-06-21 DIAGNOSIS — E66.01 OBESITY, MORBID (HCC): ICD-10-CM

## 2019-06-21 DIAGNOSIS — J45.20 MILD INTERMITTENT ASTHMA WITHOUT COMPLICATION: ICD-10-CM

## 2019-06-21 DIAGNOSIS — Z86.73 HISTORY OF CVA (CEREBROVASCULAR ACCIDENT): Chronic | ICD-10-CM

## 2019-06-21 DIAGNOSIS — I10 ESSENTIAL HYPERTENSION: ICD-10-CM

## 2019-06-21 DIAGNOSIS — R41.3 MEMORY LOSS: ICD-10-CM

## 2019-06-21 DIAGNOSIS — Z00.00 MEDICARE ANNUAL WELLNESS VISIT, SUBSEQUENT: Primary | ICD-10-CM

## 2019-06-21 RX ORDER — HYDRALAZINE HYDROCHLORIDE 25 MG/1
25 TABLET, FILM COATED ORAL 3 TIMES DAILY
Qty: 180 TAB | Refills: 3 | Status: SHIPPED | OUTPATIENT
Start: 2019-06-21 | End: 2020-07-07 | Stop reason: SDUPTHER

## 2019-06-21 RX ORDER — MONTELUKAST SODIUM 10 MG/1
10 TABLET ORAL DAILY
Qty: 90 TAB | Refills: 3 | Status: SHIPPED | OUTPATIENT
Start: 2019-06-21 | End: 2019-11-18

## 2019-06-21 RX ORDER — ROSUVASTATIN CALCIUM 20 MG/1
TABLET, COATED ORAL
Qty: 90 TAB | Refills: 3 | Status: SHIPPED | OUTPATIENT
Start: 2019-06-21 | End: 2020-07-15

## 2019-06-21 RX ORDER — ALBUTEROL SULFATE 90 UG/1
AEROSOL, METERED RESPIRATORY (INHALATION)
Qty: 25.5 INHALER | Refills: 11 | Status: SHIPPED | OUTPATIENT
Start: 2019-06-21 | End: 2020-06-22 | Stop reason: SDUPTHER

## 2019-06-21 RX ORDER — LISINOPRIL 10 MG/1
TABLET ORAL DAILY
COMMUNITY
End: 2019-10-16 | Stop reason: ALTCHOICE

## 2019-06-21 RX ORDER — MONTELUKAST SODIUM 10 MG/1
10 TABLET ORAL DAILY
Qty: 30 TAB | Refills: 5 | Status: SHIPPED | OUTPATIENT
Start: 2019-06-21 | End: 2019-06-21 | Stop reason: SDUPTHER

## 2019-06-21 NOTE — PROGRESS NOTES
SUBJECTIVE:   Ms. Adams Perales is a 79 y.o. female who is here for follow up of routine medical issues. Chief Complaint   Patient presents with    Diabetes     pt here today for a routine f. u    Skin Problem     pt c.o itching all over, but do not see a rash anywhere but does itch; x 2 months ; pt notice that when she wears dark colors - she has skin flacking       She has sciatica making it hard to walk. Ambulates with walker. Seeing Dr. Josiah Hicks, for Diabetes; not Dr. Ksenia Raza. Next appt is in July. Asthma: \"With the humidity sometimes my breathing get whacky, but mostly it's okay. I stay inside. \"   At this time, she is otherwise doing well and has brought no other complaints to my attention today. For a list of the medical issues addressed today, see the assessment and plan below. PMH:   Past Medical History:   Diagnosis Date    Asthma     CAD (coronary artery disease)     \"mild\" per Dr Jackelyn Rowe note    Diabetes [facility])     Dr Ksenia Raza     Hypertension     Screening for colon cancer 2/16/05    Dr Elba London in 10 years    Stroke [facility]) 2004    Rt side weaker than left, uses a cane: no longer followed by neuro    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     Thyroid disease     Unspecified sleep apnea     uses CPAP       Past Surgical History:   Procedure Laterality Date    CARDIAC CATHETERIZATION  6/6/2012         CARDIAC SURG PROCEDURE UNLIST      heart surgery    HX HEENT      tonsillectomy    HX HEMORRHOIDECTOMY      HX HYSTERECTOMY      HX ORTHOPAEDIC  8/2011    left shoulder       All: is allergic to latex; codeine; contrast dye [iodine]; and seafood [shellfish containing products]. Current Outpatient Medications   Medication Sig    insulin glargine U-300 conc (TOUJEO SOLOSTAR U-300 INSULIN) 300 unit/mL (1.5 mL) inpn pen 56 Units by SubCUTAneous route nightly.  lisinopril (PRINIVIL, ZESTRIL) 10 mg tablet Take  by mouth daily.     glucosam/chond/hyalu/CF borate (MOVE FREE JOINT HEALTH PO) Take  by mouth.  albuterol (PROAIR HFA) 90 mcg/actuation inhaler USE 1 INHALATION EVERY 4 HOURS AS NEEDED WHEEZING OR SHORTNESS OF BREATH    rosuvastatin (CRESTOR) 20 mg tablet TAKE 1 TABLET NIGHTLY    levothyroxine (SYNTHROID) 137 mcg tablet Take 137 mcg by mouth daily.  glucose blood VI test strips (ONETOUCH ULTRA BLUE TEST STRIP) strip Monitor blood sugar 3 times daily    losartan (COZAAR) 100 mg tablet Take 1 Tab by mouth daily. One tab daily    metoprolol tartrate (LOPRESSOR) 50 mg tablet TAKE 1 TABLET TWICE A DAY    albuterol (ACCUNEB) 1.25 mg/3 mL nebu 3 mL by Nebulization route every four (4) hours as needed (wheezing).  triamterene-hydroCHLOROthiazide (DYAZIDE) 37.5-25 mg per capsule TAKE 1 CAPSULE TWICE A DAY    insulin lispro (HUMALOG KWIKPEN INSULIN) 100 unit/mL kwikpen 22 units for breakfast and lunch and 30 units for dinner    metFORMIN ER (GLUCOPHAGE XR) 500 mg tablet TAKE 2 TABLETS BY MOUTH TWICE DAILY AFTER MEALS (Patient taking differently: TAKE 2 TABLETS IN THE MORNING AND 1 TABLET IN THE EVENING WITH MEALS)    amLODIPine (NORVASC) 10 mg tablet Take 1 Tab by mouth daily.  ADVAIR DISKUS 100-50 mcg/dose diskus inhaler USE 1 INHALATION TWICE A DAY (Patient taking differently: USE 1 INHALATION TWICE A DAY prn)    BD INSULIN PEN NEEDLE UF SHORT 31 gauge x 5/16\" ndle     albuterol (PROVENTIL) 5 mg/mL nebulizer solution 0.5 mL by Nebulization route every four (4) hours as needed for Wheezing.  insulin lispro (HUMALOG) 100 unit/mL Soln 22 Units by SubCUTAneous route. 15 units tid ( Sliding scale)    aspirin delayed-release 81 mg tablet Take 81 mg by mouth daily.  gabapentin (NEURONTIN) 100 mg capsule Take 1 Cap by mouth three (3) times daily.  montelukast (SINGULAIR) 10 mg tablet Take 1 Tab by mouth daily. No current facility-administered medications for this visit.         FH: family history includes Breast Cancer (age of onset: 79) in her mother; Cancer in her mother; Coronary Artery Disease (age of onset: 54) in her brother; Diabetes in her mother; Heart Disease in her father; Hypertension in her father; Stroke in her father. SH:  reports that she quit smoking about 14 years ago. She has never used smokeless tobacco. She reports that she has current or past drug history. Drugs: Prescription and OTC. She reports that she does not drink alcohol. ROS: See above; Complete ROS otherwise negative. OBJECTIVE:   Vitals:   Visit Vitals  /73 (BP 1 Location: Left arm, BP Patient Position: Sitting)   Pulse 68   Temp 98 °F (36.7 °C) (Oral)   Resp 14   Ht 5' 2\" (1.575 m)   Wt 227 lb (103 kg)   SpO2 96%   BMI 41.52 kg/m²      Gen: Pleasant 79 y.o.  female in NAD. HEENT: PERRLA. EOMI. OP moist and pink. Neck: Supple. No LAD. HEART: RRR, No M/G/R.    LUNGS: CTAB No W/R. ABDOMEN: S, NT, ND, BS+. EXTREMITIES: Warm. No C/C/E.  MUSCULOSKELETAL: Normal ROM, muscle strength 5/5 all groups. NEURO: Alert and oriented x 3. Cranial nerves grossly intact. No focal sensory or motor deficits noted. SKIN: Warm. Dry. No rashes or other lesions noted. Lab Results   Component Value Date/Time    Sodium 145 (H) 04/22/2019 03:33 PM    Potassium 3.5 04/22/2019 03:33 PM    Chloride 101 04/22/2019 03:33 PM    CO2 32 (H) 04/22/2019 03:33 PM    Anion gap 5 11/18/2018 09:21 AM    Glucose 172 (H) 04/22/2019 03:33 PM    BUN 11 04/22/2019 03:33 PM    Creatinine 1.10 (H) 04/22/2019 03:33 PM    BUN/Creatinine ratio 10 (L) 04/22/2019 03:33 PM    GFR est AA 60 04/22/2019 03:33 PM    GFR est non-AA 52 (L) 04/22/2019 03:33 PM    Calcium 9.5 04/22/2019 03:33 PM    Bilirubin, total 0.5 04/22/2019 03:33 PM    ALT (SGPT) 14 04/22/2019 03:33 PM    AST (SGOT) 11 04/22/2019 03:33 PM    Alk.  phosphatase 131 (H) 04/22/2019 03:33 PM    Protein, total 6.8 04/22/2019 03:33 PM    Albumin 4.0 04/22/2019 03:33 PM    Globulin 4.7 (H) 11/18/2018 09:21 AM    A-G Ratio 1.4 04/22/2019 03:33 PM       Lab Results   Component Value Date/Time    Cholesterol, total 157 04/22/2019 03:33 PM    HDL Cholesterol 55 04/22/2019 03:33 PM    LDL, calculated 69 04/22/2019 03:33 PM    Triglyceride 166 (H) 04/22/2019 03:33 PM        Lab Results   Component Value Date/Time    Hemoglobin A1c 10.1 (H) 04/22/2019 03:33 PM    Hemoglobin A1c, External 10.6 11/19/2018       Lab Results   Component Value Date/Time    WBC 6.9 11/18/2018 09:21 AM    Hemoglobin (POC) 14.6 04/10/2015 12:41 PM    HGB 12.8 11/18/2018 09:21 AM    Hematocrit (POC) 43 04/10/2015 12:41 PM    HCT 41.8 11/18/2018 09:21 AM    PLATELET 484 29/15/1006 09:21 AM    MCV 78.4 (L) 11/18/2018 09:21 AM         ASSESSMENT/ PLAN: Diagnoses and all orders for this visit:    1. Rash: Comes and goes. For the dry skin, recommend moisturizer. 2. Obesity, morbid ([address]): Diet and exercise. Handout. 3. Memory loss: New complaint.   -     REFERRAL TO PSYCHOLOGY--Dr. Tre Duckworth    4. Essential hypertension: Not controlled. We'll try adding hydralazine. 5. History of CVA (cerebrovascular accident): Stable deficits. 6. Mild intermittent asthma without complication  -     albuterol (PROAIR HFA) 90 mcg/actuation inhaler; USE 1 INHALATION EVERY 4 HOURS AS NEEDED WHEEZING OR SHORTNESS OF BREATH  -     montelukast (SINGULAIR) 10 mg tablet; Take 1 Tab by mouth daily. 7. Hyperlipidemia:   -     rosuvastatin (CRESTOR) 20 mg tablet; TAKE 1 TABLET NIGHTLY     DM, thyroid followed by endocrinologist.  Defer labs. Follow-up and Dispositions    · Return in about 6 months (around 12/21/2019) for lipids, etc; Dr. Osmin Martinez. I have reviewed the patient's medications and risks/side effects/benefits were discussed. Diagnosis(-es) explained to patient and questions answered. Literature provided where appropriate. Discussed the patient's BMI with her.   The BMI follow up plan is as follows:     dietary management education, guidance, and counseling  encourage exercise  monitor weight  prescribed dietary intake    An After Visit Summary was printed and given to the patient. This is the Subsequent Medicare Annual Wellness Exam, performed 12 months or more after the Initial AWV or the last Subsequent AWV    I have reviewed the patient's medical history in detail and updated the computerized patient record. History     Past Medical History:   Diagnosis Date    Asthma     CAD (coronary artery disease)     \"mild\" per Dr Karol Gaston note    Diabetes [facility])     Dr Minerva Holm     Hypertension     Screening for colon cancer 2/16/05    Dr Forrest Ayala in 10 years    Stroke [facility]) 2004    Rt side weaker than left, uses a cane: no longer followed by neuro    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     Thyroid disease     Unspecified sleep apnea     uses CPAP      Past Surgical History:   Procedure Laterality Date    CARDIAC CATHETERIZATION  6/6/2012         CARDIAC SURG PROCEDURE UNLIST      heart surgery    HX HEENT      tonsillectomy    HX HEMORRHOIDECTOMY      HX HYSTERECTOMY      HX ORTHOPAEDIC  8/2011    left shoulder     Current Outpatient Medications   Medication Sig Dispense Refill    insulin glargine U-300 conc (TOUJEO SOLOSTAR U-300 INSULIN) 300 unit/mL (1.5 mL) inpn pen 56 Units by SubCUTAneous route nightly. 18 Adjustable Dose Pre-filled Pen Syringe 3    lisinopril (PRINIVIL, ZESTRIL) 10 mg tablet Take  by mouth daily.  glucosam/chond/hyalu/CF borate (MOVE FREE JOINT HEALTH PO) Take  by mouth.  albuterol (PROAIR HFA) 90 mcg/actuation inhaler USE 1 INHALATION EVERY 4 HOURS AS NEEDED WHEEZING OR SHORTNESS OF BREATH 25.5 Inhaler 11    rosuvastatin (CRESTOR) 20 mg tablet TAKE 1 TABLET NIGHTLY 90 Tab 3    montelukast (SINGULAIR) 10 mg tablet Take 1 Tab by mouth daily. 90 Tab 3    levothyroxine (SYNTHROID) 137 mcg tablet Take 137 mcg by mouth daily.  90 Tab 3    glucose blood VI test strips (ONETOUCH ULTRA BLUE TEST STRIP) strip Monitor blood sugar 3 times daily 300 Strip 3    losartan (COZAAR) 100 mg tablet Take 1 Tab by mouth daily. One tab daily 90 Tab 3    metoprolol tartrate (LOPRESSOR) 50 mg tablet TAKE 1 TABLET TWICE A  Tab 3    albuterol (ACCUNEB) 1.25 mg/3 mL nebu 3 mL by Nebulization route every four (4) hours as needed (wheezing). 225 mL 3    triamterene-hydroCHLOROthiazide (DYAZIDE) 37.5-25 mg per capsule TAKE 1 CAPSULE TWICE A  Cap 3    insulin lispro (HUMALOG KWIKPEN INSULIN) 100 unit/mL kwikpen 22 units for breakfast and lunch and 30 units for dinner 23 Adjustable Dose Pre-filled Pen Syringe 3    metFORMIN ER (GLUCOPHAGE XR) 500 mg tablet TAKE 2 TABLETS BY MOUTH TWICE DAILY AFTER MEALS (Patient taking differently: TAKE 2 TABLETS IN THE MORNING AND 1 TABLET IN THE EVENING WITH MEALS) 360 Tab 10    amLODIPine (NORVASC) 10 mg tablet Take 1 Tab by mouth daily. 90 Tab 3    ADVAIR DISKUS 100-50 mcg/dose diskus inhaler USE 1 INHALATION TWICE A DAY (Patient taking differently: USE 1 INHALATION TWICE A DAY prn) 180 Each 3    BD INSULIN PEN NEEDLE UF SHORT 31 gauge x 5/16\" ndle       albuterol (PROVENTIL) 5 mg/mL nebulizer solution 0.5 mL by Nebulization route every four (4) hours as needed for Wheezing. 0.5 mL 0    insulin lispro (HUMALOG) 100 unit/mL Soln 22 Units by SubCUTAneous route. 15 units tid ( Sliding scale)      aspirin delayed-release 81 mg tablet Take 81 mg by mouth daily.  gabapentin (NEURONTIN) 100 mg capsule Take 1 Cap by mouth three (3) times daily.  90 Cap 3     Allergies   Allergen Reactions    Latex Itching    Codeine Itching and Other (comments)     hallucinate    Contrast Dye [Iodine] Rash and Itching     Given pre-cardiac cath    Seafood [Shellfish Containing Products] Swelling     Family History   Problem Relation Age of Onset    Diabetes Mother     Cancer Mother     Breast Cancer Mother 79    Heart Disease Father     Hypertension Father     Stroke Father     Coronary Artery Disease Brother 54     Social History     Tobacco Use    Smoking status: Former Smoker     Last attempt to quit: 2004     Years since quittin.7    Smokeless tobacco: Never Used   Substance Use Topics    Alcohol use: No     Patient Active Problem List   Diagnosis Code    DJD (degenerative joint disease) of hip M16.9    DJD (degenerative joint disease) of knee M17.10    CTS (Carpal Tunnel Syndrome)-b/l G56.00    CVA (cerebral infarction) I63.9    Diverticulosis K57.90    Osteopenia M85.80    Grave's disease     DM type 2 (diabetes mellitus, type 2) ([facility] E11.9    PVC's (premature ventricular contractions) I49.3    Myocardial ischemia I25.9    Shortness of breath R06.02    Coronary artery disease I25.10    Asthma J45.909    Renal failure, acute (HCC) N17.9    Esophageal reflux K21.9    Axillary hidradenitis suppurativa L73.2    HTN (hypertension) I10    Warthin's tumor D11.9    Obesity, morbid ([facility] E66.01    History of CVA (cerebrovascular accident) Z80.78    Type 2 diabetes with nephropathy ([facility] E11.21    Type 2 diabetes mellitus with diabetic neuropathy ([facility] E11.40       Depression Risk Factor Screening:     3 most recent PHQ Screens 2019   Little interest or pleasure in doing things Several days   Feeling down, depressed, irritable, or hopeless Several days   Total Score PHQ 2 2     Alcohol Risk Factor Screening: You do not drink alcohol or very rarely. Functional Ability and Level of Safety:   Hearing Loss  The patient needs further evaluation. She has had testing this year. Activities of Daily Living  The home contains: no safety equipment. SHe does have difficulty getting into and out of bath. She is concerned about her stairs, and is considering a handicap lift. Patient does total self care    Fall Risk  Fall Risk Assessment, last 12 mths 2019   Able to walk? Yes   Fall in past 12 months?  No   Fall with injury? -   Number of falls in past 12 months -   Fall Risk Score -       Abuse Screen  Patient is not abused    Cognitive Screening   Evaluation of Cognitive Function:  Has your family/caregiver stated any concerns about your memory: yes  Normal    Patient Care Team   Patient Care Team:  Itzel Watson MD as PCP - General  Chilo Gay MD as Physician (Cardiology)  Zahida Brito MD as Surgeon (General Surgery)  Everton Paniagua MD as Physician (Sleep Medicine)  Laura Banegas NP as Nurse Practitioner (Nurse Practitioner)  Pierre Mohamud RN as Ambulatory Care Navigator    Assessment/Plan   Education and counseling provided:  Are appropriate based on today's review and evaluation    Diagnoses and all orders for this visit:    1. Obesity, morbid ([address]    Other orders  -     albuterol (PROAIR HFA) 90 mcg/actuation inhaler; USE 1 INHALATION EVERY 4 HOURS AS NEEDED WHEEZING OR SHORTNESS OF BREATH  -     rosuvastatin (CRESTOR) 20 mg tablet; TAKE 1 TABLET NIGHTLY  -     montelukast (SINGULAIR) 10 mg tablet; Take 1 Tab by mouth daily.         Health Maintenance Due   Topic Date Due    Hepatitis C Screening  1950    DTaP/Tdap/Td series (1 - Tdap) 12/12/1973    Shingrix Vaccine Age 50> (1 of 2) 12/12/2002    Pneumococcal 65+ years (2 of 2 - PPSV23) 06/21/2019    MEDICARE YEARLY EXAM  06/22/2019

## 2019-06-21 NOTE — PATIENT INSTRUCTIONS
Body Mass Index: Care Instructions  Your Care Instructions    Body mass index (BMI) can help you see if your weight is raising your risk for health problems. It uses a formula to compare how much you weigh with how tall you are. · A BMI lower than 18.5 is considered underweight. · A BMI between 18.5 and 24.9 is considered healthy. · A BMI between 25 and 29.9 is considered overweight. A BMI of 30 or higher is considered obese. If your BMI is in the normal range, it means that you have a lower risk for weight-related health problems. If your BMI is in the overweight or obese range, you may be at increased risk for weight-related health problems, such as high blood pressure, heart disease, stroke, arthritis or joint pain, and diabetes. If your BMI is in the underweight range, you may be at increased risk for health problems such as fatigue, lower protection (immunity) against illness, muscle loss, bone loss, hair loss, and hormone problems. BMI is just one measure of your risk for weight-related health problems. You may be at higher risk for health problems if you are not active, you eat an unhealthy diet, or you drink too much alcohol or use tobacco products. Follow-up care is a key part of your treatment and safety. Be sure to make and go to all appointments, and call your doctor if you are having problems. It's also a good idea to know your test results and keep a list of the medicines you take. How can you care for yourself at home? · Practice healthy eating habits. This includes eating plenty of fruits, vegetables, whole grains, lean protein, and low-fat dairy. · If your doctor recommends it, get more exercise. Walking is a good choice. Bit by bit, increase the amount you walk every day. Try for at least 30 minutes on most days of the week. · Do not smoke. Smoking can increase your risk for health problems. If you need help quitting, talk to your doctor about stop-smoking programs and medicines. These can increase your chances of quitting for good. · Limit alcohol to 2 drinks a day for men and 1 drink a day for women. Too much alcohol can cause health problems. If you have a BMI higher than 25  · Your doctor may do other tests to check your risk for weight-related health problems. This may include measuring the distance around your waist. A waist measurement of more than 40 inches in men or 35 inches in women can increase the risk of weight-related health problems. · Talk with your doctor about steps you can take to stay healthy or improve your health. You may need to make lifestyle changes to lose weight and stay healthy, such as changing your diet and getting regular exercise. If you have a BMI lower than 18.5  · Your doctor may do other tests to check your risk for health problems. · Talk with your doctor about steps you can take to stay healthy or improve your health. You may need to make lifestyle changes to gain or maintain weight and stay healthy, such as getting more healthy foods in your diet and doing exercises to build muscle. Where can you learn more? Go to http://mynor-mary kay.info/. Enter S176 in the search box to learn more about \"Body Mass Index: Care Instructions. \"  Current as of: October 13, 2016  Content Version: 11.4  © 8977-9061 [employer]wise[employer]. Care instructions adapted under license by [employer] (which disclaims liability or warranty for this information). If you have questions about a medical condition or this instruction, always ask your healthcare professional. [place] [address] any warranty or liability for your use of this information. Medicare Wellness Visit, Female     The best way to live healthy is to have a lifestyle where you eat a well-balanced diet, exercise regularly, limit alcohol use, and quit all forms of tobacco/nicotine, if applicable. Regular preventive services are another way to keep healthy. Preventive services (vaccines, screening tests, monitoring & exams) can help personalize your care plan, which helps you manage your own care. Screening tests can find health problems at the earliest stages, when they are easiest to treat. [facility] follows the current, evidence-based guidelines published by the [facility] Mirza Marte ([facility]) when recommending preventive services for our patients. Because we follow these guidelines, sometimes recommendations change over time as research supports it. (For example, mammograms used to be recommended annually. Even though Medicare will still pay for an annual mammogram, the newer guidelines recommend a mammogram every two years for women of average risk.)  Of course, you and your doctor may decide to screen more often for some diseases, based on your risk and your health status. Preventive services for you include:  - Medicare offers their members a free annual wellness visit, which is time for you and your primary care provider to discuss and plan for your preventive service needs. Take advantage of this benefit every year!  -All adults over the age of 72 should receive the recommended pneumonia vaccines. Current USPSTF guidelines recommend a series of two vaccines for the best pneumonia protection.   -All adults should have a flu vaccine yearly and a tetanus vaccine every 10 years. All adults age 61 and older should receive a shingles vaccine once in their lifetime.    -A bone mass density test is recommended when a woman turns 65 to screen for osteoporosis. This test is only recommended one time, as a screening. Some providers will use this same test as a disease monitoring tool if you already have osteoporosis.   -All adults age 38-68 who are overweight should have a diabetes screening test once every three years.   -Other screening tests and preventive services for persons with diabetes include: an eye exam to screen for diabetic retinopathy, a kidney function test, a foot exam, and stricter control over your cholesterol.   -Cardiovascular screening for adults with routine risk involves an electrocardiogram (ECG) at intervals determined by your doctor.   -Colorectal cancer screenings should be done for adults age 54-65 with no increased risk factors for colorectal cancer. There are a number of acceptable methods of screening for this type of cancer. Each test has its own benefits and drawbacks. Discuss with your doctor what is most appropriate for you during your annual wellness visit. The different tests include: colonoscopy (considered the best screening method), a fecal occult blood test, a fecal DNA test, and sigmoidoscopy. -Breast cancer screenings are recommended every other year for women of normal risk, age 54-69.  -Cervical cancer screenings for women over age 72 are only recommended with certain risk factors.   -All adults born between [facility] should be screened once for Hepatitis C.      Here is a list of your current Health Maintenance items (your personalized list of preventive services) with a due date:  Health Maintenance Due   Topic Date Due    Hepatitis C Test  1950    DTaP/Tdap/Td  (1 - Tdap) 12/12/1973    Shingles Vaccine (1 of 2) 12/12/2002    Pneumococcal Vaccine (2 of 2 - PPSV23) 06/21/2019    Annual Well Visit  06/22/2019

## 2019-06-27 ENCOUNTER — TELEPHONE (OUTPATIENT)
Dept: ENDOCRINOLOGY | Age: 69
End: 2019-06-27

## 2019-06-27 NOTE — TELEPHONE ENCOUNTER
Chava Hernandez called and asked if Dr. Malcom Manley could send over 1 pen of her Toujeo medication to her local pharmacy until she receives her Toujeo medication from [facility]

## 2019-06-27 NOTE — TELEPHONE ENCOUNTER
----- Message from Bernardo Hernandez sent at 6/27/2019  9:44 AM EDT -----  Regarding: /Refill  Pt called requesting a call back in regards to requesting the medication ashly send to the [facility] on [address] . Pt is almost out . Pt best contact number is [phone] .

## 2019-06-27 NOTE — TELEPHONE ENCOUNTER
Mrs. Zaira Barnett called and stated that she has not received her Toujeo medication from [facility] yet and that she is about to run out of her medication. She stated that a representative from [address] told her that they needed more information from Dr. Roya Gudino in regards to her medication. I informed Mrs. Zaira Barnett that we have not received any phone calls or faxes from [address] in regards to her medication. Mrs. Zaira Barnett asked if Dr. Roya Gudino could send 1 pen over to her local pharmacy until [address] her the medication.      Refill pended to Dr. Roya Gudino for approval.

## 2019-06-27 NOTE — TELEPHONE ENCOUNTER
Spoke with Mrs. Zaira Barnett and informed her that I spoke with a representative from [facility] and she informed me that Mrs. Nieto's Toujeo insulin will be shipped out on July 1st. I also informed Mrs. Zaira Barnett that Dr. Roya Gudino granted her request on sending 1 Toujeo pen to her local pharmacy until her medication from [facility] arrives. Patient understood with no further questions.

## 2019-07-01 NOTE — TELEPHONE ENCOUNTER
[facility] states they need a call back in reference to No prescription coverage on Albuterol (PROAIR HFA) 90 mcg/actuation inhaler. [facility] advises there is coverage on Ventolin Inhaler if that can be prescribed. Please call to discuss. options.  Thank you

## 2019-07-03 ENCOUNTER — TELEPHONE (OUTPATIENT)
Dept: INTERNAL MEDICINE CLINIC | Age: 69
End: 2019-07-03

## 2019-07-03 NOTE — TELEPHONE ENCOUNTER
Marj Yeboah MD  You 33 minutes ago (2:16 PM)     Yes , please order, thx    Routing comment      Called and spoke with [facility]. Verbal OK given to switch Proair to Ventolin.

## 2019-07-03 NOTE — TELEPHONE ENCOUNTER
[facility] states they need a call back in reference to No prescription coverage on Albuterol (PROAIR HFA) 90 mcg/actuation inhaler. [facility] advises there is coverage on Ventolin Inhaler if that can be prescribed. Please call to discuss. options.  Thank you    Use ref #[id number]

## 2019-07-15 ENCOUNTER — OFFICE VISIT (OUTPATIENT)
Dept: ENDOCRINOLOGY | Age: 69
End: 2019-07-15

## 2019-07-15 VITALS
HEIGHT: 62 IN | WEIGHT: 229 LBS | BODY MASS INDEX: 42.14 KG/M2 | HEART RATE: 62 BPM | SYSTOLIC BLOOD PRESSURE: 139 MMHG | DIASTOLIC BLOOD PRESSURE: 62 MMHG

## 2019-07-15 DIAGNOSIS — Z79.4 TYPE 2 DIABETES MELLITUS WITH COMPLICATION, WITH LONG-TERM CURRENT USE OF INSULIN (HCC): Primary | ICD-10-CM

## 2019-07-15 DIAGNOSIS — E11.8 TYPE 2 DIABETES MELLITUS WITH COMPLICATION, WITH LONG-TERM CURRENT USE OF INSULIN (HCC): Primary | ICD-10-CM

## 2019-07-15 NOTE — PROGRESS NOTES
Ms. Cameron returns for follow-up of her type 2 diabetes mellitus with poor blood sugar control. When we saw her last  she was on Toujeo insulin 56 units at bedtime, Humalog insulin 24 breakfast 20 for lunch and 35 for dinner and Metformin somewhere between 2 and 3 tablets daily. Her renal function is normal and she had been making some improvements in her diet. Unfortunately she is back to taking only one metformin a day because of GI distress. She is continued to take the insulin as noted above. She has a recent A1c of 10.1%. She is been notably decreased in physical activity and her diet is not as good as it had been. Breakfast is juan eggs hashbrowns and toast.  Last night for dinner she had pork chops cabbage rice and biscuit. I downloaded her meter and there are few blood sugars in the meter. Her blood sugar this morning after the dinner noted was 222. Examination  Blood pressure 139/62  Pulse 62  Weight 229    Impression type 2 diabetes mellitus with poor blood sugar control on basal bolus insulin but with minimal metformin since I saw her last.  Plan: We spent considerable time today talking about physical activity. She was previously getting some aerobic activity and she has a facility where she can go. It is not safe in her neighborhood and there are no sidewalks so she can go for walks where she lives. I strongly encouraged her to go back to the gym so that she could get some physical activity. I emphasized to her that the less activity she gets, the less activity she will end up getting and this will build on itself. She also tells me that her mother is very depressed and this depression feels on her which in turn leads to less activity and less interest in her own health. After considerable negotiation, she agreed that she will try to increase her physical activity to tolerance. We will see her back in 1 month to see how well she is doing.     We spent more than 25 mintutes face to face, more than 50% was in counseling regarding diet, exercise and carbohydrate intake.

## 2019-08-14 ENCOUNTER — OFFICE VISIT (OUTPATIENT)
Dept: ENDOCRINOLOGY | Age: 69
End: 2019-08-14

## 2019-08-14 VITALS
HEIGHT: 62 IN | HEART RATE: 59 BPM | SYSTOLIC BLOOD PRESSURE: 163 MMHG | DIASTOLIC BLOOD PRESSURE: 67 MMHG | BODY MASS INDEX: 42.69 KG/M2 | WEIGHT: 232 LBS

## 2019-08-14 DIAGNOSIS — E11.8 TYPE 2 DIABETES MELLITUS WITH COMPLICATION, WITH LONG-TERM CURRENT USE OF INSULIN (HCC): Primary | ICD-10-CM

## 2019-08-14 DIAGNOSIS — Z79.4 TYPE 2 DIABETES MELLITUS WITH COMPLICATION, WITH LONG-TERM CURRENT USE OF INSULIN (HCC): Primary | ICD-10-CM

## 2019-08-14 LAB — HBA1C MFR BLD HPLC: 10.9 %

## 2019-08-14 NOTE — PROGRESS NOTES
Nursing Diagnosis 39434 Ineffective Health Management   Stage of Change  Action   Nursing Intervention Domain 8512 Decision-makingSupport   Nursing Interventions 1. Examined usual diabetes self-management practices related to meals, physical activity, BG monitoring and medication dosing  2. Explored strategies patient is willing to incorporate into their self-care plan  3. Informed of value of regular physical activity on BG readings as evidenced by her meter download       Evaluation: Patient is ready & willing to incorporate regular physical activity into home routine.     Kamran Resendiz DNP, RN, ACNS-BC, BC-ADM, CDE  Clinical Nurse Specialist-Diabetes & Endocrine disorders  [facility] (Ambulatory \"Specialty\" practice)  (N) [phone]

## 2019-08-22 ENCOUNTER — OFFICE VISIT (OUTPATIENT)
Dept: NEUROLOGY | Age: 69
End: 2019-08-22

## 2019-08-22 DIAGNOSIS — G31.84 MILD NEUROCOGNITIVE DISORDER: Primary | ICD-10-CM

## 2019-08-22 DIAGNOSIS — F32.1 CURRENT MODERATE EPISODE OF MAJOR DEPRESSIVE DISORDER, UNSPECIFIED WHETHER RECURRENT (HCC): Primary | ICD-10-CM

## 2019-08-22 NOTE — PROGRESS NOTES
This note will not be viewable in [address] [facility] at [address]    Office:  [phone]  Fax: [phone]                                             Neuropsychological Evaluation Report    Patient Name: Natty Noel  Age: 79 y.o. Gender: female   Occupation:  Retired from   Handedness: right handed   Presenting Concern: My memory is declining  Primary Care Physician: Cristina Farias MD  Referring Provider: No ref. provider found    PATIENT HISTORY (OBTAINED DURING INITIAL CLINICAL EVALUATION):    REASON FOR REFERRAL:  This comprehensive and medically necessary neuropsychological assessment was requested to assist a differential diagnosis of memory difficulties. The use and purpose of this examination, as well as the extent and limitations of confidentiality, were explained prior to obtaining permission to participate. Instructions were provided regarding the necessity to put forth optimal effort and answer questions truthfully in order to obtain reliable and accurate test results.     PERTINENT HISTORY:  Ms. Duane Campoverde presented for a neuropsychological assessment at the recommendation of her treating physician secondary to complaints of memory loss, decreased concentration, mild anxiety and depression. Ms. Duane Campoverde began noticing symptoms starting about a year ago. She reports a stroke in 2004 that effected the right-side of her body. From a brief review of her medical and personal history there has not been any other significant neurological injury or illness noted or reported. She did not report experiencing depression or anxiety in the past.       Ms. Duane Campoverde does not  report any problems at birth or difficulties meeting developmental milestones. She reports that she had an adequate level of family support and was not subject to trauma or abuse as a child.   Ms. Duane Campoverde does not  report being retain in school or receiving special assistance in any of she classes or subjects. Ms. Delia Merino completed 12+ years of education.     Ms. Delia Merino does not  exercise on a regular basis and does not  maintain a balanced diet. She does not  report problems with sleep and does complain of pain. She does  participate in mentally stimulating activities. Ms. Delai Merino does  have concerns regarding family members, but nothing severe. Ms. Delia Merino indicated that she is independent in her instrumental activities of daily living, including shopping, meal preparation, housekeeping, doing laundry, driving a car, managing medications, and finances.       METHODS OF ASSESSMENT (Current Evaluation):  Clinician Administered:  Clinical Interview  Review of Medical Records  Clock Drawing Task  Modified Mini-Mental Status Exam (3MS)  Test of Premorbid [facility] and Depression Scale  Revised Memory and Behavior Checklist    Technician Administered:  NAB Judgment  Personality Assessment Inventory  RBANS-B  Reliable Digit Span  Test of Practical Judgment (9-Item)  [facility] 81 Card Sorting Test  Word Choice Effort Test (ACS)    TEST OBSERVATIONS:  Ms. Delia Merino arrived promptly for the testing session. Dress and grooming were appropriate; physical presentation was unchanged from that observed during the clinical interview. Speech was fluent, intelligible, and goal-directed. Affect was congruent with the euthymic mood conveyed. Ms. Delia Merino was adequately cooperative and appeared to put forth good effort throughout this examination. Report with the examiner was adequately established and maintained. Minimal prompting was required. Comprehension of test instructions was not problematic. Performance motivation was objectively measured by three instruments (Reliable Digit Span, RBANS ES, WC Effort), and Ms. Delia Merino produced normal scores on these measures. Accordingly, test findings below do not appear to be the product of disingenuous effort. Given the above observations, plus comments contained in the Mental Status section, the results of this examination are regarded as reasonably reliable and valid. TEST RESULTS:  Quantitative test results are derived from comparisons to age and education corrected cohort normative data, where applicable. Percentiles are included in these instances. Qualitative test results are determined using clinical observations. General Orientation and Awareness:       Orientation to person, year, month, day of month, day of week, state, town, and circumstance.    Awareness of deficit: WNL                   Cognitive performance validity testing: Valid        Attention/Concentration:      Classification:    Coding (22 percentile)            Low Average   Simple visuomotor tracking (18 percentile)                Low Average  Digits forward (31 percentile)                  Average  Digits backward (34 percentile)                  Average    Visuospatial and Constructional Praxis:     Figure copy (0.1 percentile)                                       Severely Impaired  Line orientation (< 0.1 percentile)                                                  Severely Impaired     Language:         Picture naming (8 percentile)                               Mildly Impaired  Semantic fluency (< 0.1 percentile)                Severely Impaired    Memory and Learning:       Immediate word list learning (2 percentile)               Moderately Impaired  Delayed word list recall (9 percentile)                           Low Average  Delayed word list recognition (69 percentile)               Average  Immediate story memory (56 percentile)                           Average  Delayed story recall (14 percentile)                Low Average  Delayed figure recall (8 percentile)                Mildly Impaired    Cognitive Tests of Executive Functioning:     Ability to think flexibly, Trail Making B (24 percentile)              Average  Simple judgment in daily decision making (46 percentile)              Average  Complex judgment in daily decision making (1 percentile)              Severely Impaired  WCST   Total Errors (30 percentile)      Average   Perseverative Responses (63 percentile)    Average   Categories Completed (16 percentile)     Low Average     Adaptive Behavioral Measure of Daily Functioning:   Time:          9 %ile   Money/calculations:      50 %ile  Communication:    > 75 %ile   Memory:        50 %ile    Total:     T= 44 (27%ile)      Intellectual and Basic Cognitive Functioning (WAIS-IV):  ACS Test of pre-morbid functioning reading recognition lower limit estimated WAIS-IV FSIQ score:       Estimated full scale IQ:         8 percentile     Mildly Impaired     Emotional Functioning:  Ms. Cameron was administered the AC to assess her current level of emotional functioning. There was some indication that Ms. Cameron may not have been completely forthright in some of her responses. Her profile was somewhat unusual in that she indicated a defensiveness about particular shortcomings as well as exaggeration of certain problems. There was no evidence to suggest that Ms. Cameron was motivated to present herself as being relatively free of common shortcomings. With respect to negative impression management there are subtle suggestions that she attempted to portray herself in a more negative or pathological manner in some areas. As such some level of caution should be an under consideration when hearing her complaints of symptoms. Clinical profile revealed no marked elevations that should be considered to indicate the presence of clinical psychopathology. Ms. Denis Vidal self-description suggest she is easily insulted lighted and tends to respond by holding grudges towards others.   This may result in her attributing her own misfortunes elective others credit the successes of others as a result of being result of luck for favoritism. IMPRESSIONS:  Ms. Mason Lyman was seen for a neuropsychological evaluation and administered a battery of measures that assessed her attention, memory, language, spatial, and executive functioning. Overall level of cognitive functioning was determined to be in the low average range her scores ranging from severely impaired (I.e. Visuospatial) to average range (I.e. Attention and Executive functioning). Her performance suggested impairments in the areas of constructional praxis, visual perception, semantic fluency, and complex problem-solving. On a adaptive behavioral functioning measure of everyday skills, Ms. Mason Lyman is functioning in the low average range. In summary, Ms. Mason Lyman is experiencing some decline in her functioning, but is not yet experiencing a level of impairment that is consistent with a dementia. DIAGNOSTIC IMPRESSIONS:    ICD-10-CM ICD-9-CM    1. Mild neurocognitive disorder G31.84 331.83 NE NEUROPSYCHOLOGICAL TST EVAL PHYS/QHP 1ST HOUR      NE PSYL/NRPSYCL TST PHYS/QHP 2+ TST 1ST 30 MIN      NE PSYCL/NRPSYCL TST PHYS/QHP 2+ TST EA ADDL 30 MIN      NE PSYCL/NRPSYCL TST TECH 2+ TST 1ST 30 MIN      NE PSYCL/NRPSYCL TST TECH 2+ TST EA ADDL 30 MIN      NE PSYCL/NRPSYCL TST TECH 2+ TST EA ADDL 30 MIN      NE PSYCL/NRPSYCL TST TECH 2+ TST EA ADDL 30 MIN      NE PSYCL/NRPSYCL TST TECH 2+ TST EA ADDL 30 MIN      NE PSYCL/NRPSYCL TST TECH 2+ TST EA ADDL 30 MIN         RECOMMENDATIONS:   1. Findings should be reviewed with Ms. Mason Lyman to insure her understanding and discuss the potential implications. 2. Emphasis should be placed on Ms. Mason Lyman obtaining good sleep hygiene and maintaining adequate physical exercise to promote good brain health.   3. Ms. Mason Lyman may benefit from a referral to psychotherapy to address anxiety and work on adequate stress management skills. 4. Ms. Abebe Rodriguez is encouraged to seek out various forms of mental stimulation that would help to \"exercise\" her brain. This might include learning a new skill, hobby, travel, attending lectures, or evening reading or listening to podcasts or videos on topics of her interest.      Thank you for allowing me the opportunity to assist you in Ms. Nieto's care. Please do not hesitate to contact me should you have additional questions that I may not have addressed. 05885 x 1  96138 x 1  96139 x 6  96132 x 1  96133 x 2          Shirley Ac, Ph.D., ABPP  Licensed Clinical Psychologist  Neuropsychologist  Board Certified Rehabilitation Psychologist      Time Documentation:     77264 x 1: Neurobehavioral Status Exam/Clinical Interview: (1 hour, (already billed on first date of service)     46191*3 Neuropsych testing/data gathering by Neuropsychologist (35 additional minutes, see above)      96138 x 1  96139 x 6 Test Administration/Data Gathering By Technician: (3.5 hours). 62959 x 1 (first 30 minutes), 38043 x 7 (each additional 30 minutes)     96132 x 1  96133 x 1 Testing Evaluation Services by Neuropsychologist (1 hour, 50 minutes) 96132 x 1 (first hour), 96133 x 1 (50 minutes)     Definitions:       00995/27254:  Neurobehavioral Status Exam, Clinical interview. Clinical assessment of thinking, reasoning and judgment, by neuropsychologist, both face to face time with patient and time interpreting those test results and reporting, first and subsequent hours)     51188/66453: Neuropsychological Test Administration by Technician/Psychometrist, first 30 minutes and each additional 30 minutes. The above includes: Record review. Review of history provided by patient. Review of collaborative information. Testing by Clinician. Review of raw data. Scoring. Report writing of individual tests administered by Clinician.   Integration of individual tests administered by psychometrist with NSE/testing by clinician, review of records/history/collaborative information, case Conceptualization, treatment planning, clinical decision making, report writing, coordination Of Care. Psychometry test codes as time spent by psychometrist administering and scoring neurocognitive/psychological tests under supervision of neuropsychologist.  Integral services including scoring of raw data, data interpretation, case conceptualization, report writing etcetera were initiated after the patient finished testing/raw data collected and was completed on the date the report was signed. This note will not be viewable in [address]

## 2019-08-22 NOTE — PROGRESS NOTES
This note will not be viewable in [address] [facility] at [address]    Office:  [phone]  Fax: [phone]                 Initial Office Exam    Patient Name: Benny Johnson  Age: 79 y.o. Gender: female   Occupation:  Retired from   Handedness: right handed   Presenting Concern: My memory is declining  Primary Care Physician: Lane Romero MD  Referring Provider: No ref. provider found      REASON FOR REFERRAL:  This comprehensive and medically necessary neuropsychological assessment was requested to assist a differential diagnosis of memory difficulties. The use and purpose of this examination, as well as the extent and limitations of confidentiality, were explained prior to obtaining permission to participate. Instructions were provided regarding the necessity to put forth optimal effort and answer questions truthfully in order to obtain reliable and accurate test results. PERTINENT HISTORY:  Ms. Annette Johnson presented for a neuropsychological assessment at the recommendation of she treating physician secondary to complaints of memory loss, decreased concentration, mild anxiety and depression. Ms. Annette Johnson began noticing symptoms starting about a year ago. She reports a stroke in 2004 that effected the right-side of her body. From a brief review of her medical and personal history there has not been any other significant neurological injury or illness noted or reported. She did not report experiencing depression or anxiety in the past.      Ms. Annette Johnson does not  report any problems at birth or difficulties meeting developmental milestones. She reports that she had an adequate level of family support and was not subject to trauma or abuse as a child.   Ms. Annette Johnson does not  report being retain in school or receiving special assistance in any of she classes or subjects. Ms. Shanna Buchanan completed 12+ years of education. Ms. Shanna Buchanan does not  exercise on a regular basis and does not  maintain a balanced diet. She does not  report problems with sleep and does complain of pain. She does  participate in mentally stimulating activities. Ms. Shanna Buchanan does  have concerns regarding family members, but nothing severe. Ms. Shanna Buchanan indicated that she is independent in her instrumental activities of daily living, including shopping, meal preparation, housekeeping, doing laundry, driving a car, managing medications, and finances. Current Outpatient Medications   Medication Sig    insulin glargine U-300 conc (TOUJEO SOLOSTAR U-300 INSULIN) 300 unit/mL (1.5 mL) inpn pen 56 Units by SubCUTAneous route nightly.  lisinopril (PRINIVIL, ZESTRIL) 10 mg tablet Take  by mouth daily.  glucosam/chond/hyalu/CF borate (MOVE FREE JOINT [facility] PO) Take  by mouth.  albuterol (PROAIR HFA) 90 mcg/actuation inhaler USE 1 INHALATION EVERY 4 HOURS AS NEEDED WHEEZING OR SHORTNESS OF BREATH    rosuvastatin (CRESTOR) 20 mg tablet TAKE 1 TABLET NIGHTLY    montelukast (SINGULAIR) 10 mg tablet Take 1 Tab by mouth daily.  hydrALAZINE (APRESOLINE) 25 mg tablet Take 1 Tab by mouth three (3) times daily.  levothyroxine (SYNTHROID) 137 mcg tablet Take 137 mcg by mouth daily.  glucose blood VI test strips (ONETOUCH ULTRA BLUE TEST STRIP) strip Monitor blood sugar 3 times daily    losartan (COZAAR) 100 mg tablet Take 1 Tab by mouth daily. One tab daily    metoprolol tartrate (LOPRESSOR) 50 mg tablet TAKE 1 TABLET TWICE A DAY    albuterol (ACCUNEB) 1.25 mg/3 mL nebu 3 mL by Nebulization route every four (4) hours as needed (wheezing).  gabapentin (NEURONTIN) 100 mg capsule Take 1 Cap by mouth three (3) times daily.     triamterene-hydroCHLOROthiazide (DYAZIDE) 37.5-25 mg per capsule TAKE 1 CAPSULE TWICE A DAY    insulin lispro (HUMALOG KWIKPEN INSULIN) 100 unit/mL kwikpen 22 units for breakfast and lunch and 30 units for dinner    metFORMIN ER (GLUCOPHAGE XR) 500 mg tablet TAKE 2 TABLETS BY MOUTH TWICE DAILY AFTER MEALS (Patient taking differently: TAKE 2 TABLETS IN THE MORNING AND 1 TABLET IN THE EVENING WITH MEALS)    amLODIPine (NORVASC) 10 mg tablet Take 1 Tab by mouth daily.  ADVAIR DISKUS 100-50 mcg/dose diskus inhaler USE 1 INHALATION TWICE A DAY (Patient taking differently: USE 1 INHALATION TWICE A DAY prn)    BD INSULIN PEN NEEDLE UF SHORT 31 gauge x 5/16\" ndle     albuterol (PROVENTIL) 5 mg/mL nebulizer solution 0.5 mL by Nebulization route every four (4) hours as needed for Wheezing.  insulin lispro (HUMALOG) 100 unit/mL Soln 22 Units by SubCUTAneous route. 15 units tid ( Sliding scale)    aspirin delayed-release 81 mg tablet Take 81 mg by mouth daily. No current facility-administered medications for this visit. 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     No data recorded    Past Surgical History:   Procedure Laterality Date    CARDIAC CATHETERIZATION  6/6/2012         CARDIAC SURG PROCEDURE UNLIST      heart surgery    HX HEENT      tonsillectomy    HX HEMORRHOIDECTOMY      HX HYSTERECTOMY      HX ORTHOPAEDIC  8/2011    left shoulder       Social History     Socioeconomic History    Marital status: SINGLE     Spouse name: Not on file    Number of children: Not on file    Years of education: Not on file    Highest education level: Not on file   Tobacco Use    Smoking status: Former Smoker     Last attempt to quit: 2004     Years since quittin.9    Smokeless tobacco: Never Used   Substance and Sexual Activity    Alcohol use: No    Drug use: Yes     Types: Prescription, OTC    Sexual activity: Never       Family History   Problem Relation Age of Onset    Diabetes Mother     Cancer Mother     Breast Cancer Mother 79    Heart Disease Father     Hypertension Father     Stroke Father     Coronary Artery Disease Brother 54       CT Results (most recent):  Results from Hospital Encounter encounter on 18   CT HEAD WO CONT    Narrative EXAM:  CT HEAD WO CONT    INDICATION:   evaluate for intracranial abnormality; dizziness, hx of CVA    COMPARISON: MRI 2017. CONTRAST:  None. TECHNIQUE: Unenhanced CT of the head was performed using 5 mm images. Brain and  bone windows were generated. CT dose reduction was achieved through use of a  standardized protocol tailored for this examination and automatic exposure  control for dose modulation. FINDINGS:  Unchanged chronic encephalomalacia is seen in the medial aspect of the  cerebellar lobes, left greater than right. There is no significant white matter  disease. There is no intracranial hemorrhage, extra-axial collection, mass, mass  effect or midline shift. The basilar cisterns are open. No acute infarct is  identified. The bone windows demonstrate no abnormalities. The visualized  portions of the paranasal sinuses and mastoid air cells are clear. Impression IMPRESSION: Chronic encephalomalacia in the bilateral cerebellar lobes, left  greater than right. No evidence of acute process.          MRI Results (most recent):  Results from [place] encounter on 17   MRI ORB FACE NECK W WO CONT    Narrative EXAM:  MRI ORB FACE NECK W WO CONT    INDICATION: Nonsmoker, Sialoadenitis, Head swelling, Neck swelling, previous  diagnosis of bilateral parotid Warthin tumors    COMPARISON:  CT of 10/6/2010    TECHNIQUE:    MR imaging of the neck soft tissues was performed with coronal and axial T1  precontrast, coronal and axial STIR; axial T1 with fat saturation, post contrast  ; axial T2 ;      FINDINGS:    There are 3 major solid enhancing masses in the superficial lobe of the right  parotid gland which demonstrate enlargement since the previous study of within 6  years ago. There is a 2.4 x 2.2 cm anterior inferior mass (5-10) which was  previously 1.7 x 1.2 cm. There is also a 2.5 x 2.6 cm posterior inferior mass  (5-10) which was previously 1.6 x 0.9 cm. There is also a 1.6 x 1.1 cm right  anterior superior mass (5-7) which was previously 0.9 x 0.7 cm. There may be  smaller adjacent subcentimeter right parotid masses. There is a 2.6 x 1.6 cm left superficial lobe parotid mass (5-13) which was  previously 1.7 x 1.2 cm. All of the parotid masses bilaterally are well-circumscribed and enhance  homogeneously. There is no lymph node enlargement in the neck. The submandibular  glands appear normal.    The patient is noted to have a large area of chronic infarction in the left  inferior cerebellar hemisphere and a smaller area of chronic infarction in the  cerebellar vermis and right inferior cerebellar hemisphere. There are no significant abnormalities in the visualized portion of the  nasopharynx, or in the oropharynx, hypopharynx, or larynx. .  There is limitation  in evaluating the oral cavity, although grossly the oral tongue, buccal spaces  and floor of mouth are normal.   The thyroid gland appears small but  unremarkable. .   There are scattered nonenlarged lymph nodes in the cervical  soft tissues. Impression IMPRESSION:   1. 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 Interval enlargement since October, 2010.               MENTAL STATUS:    Orientation: Fully oriented   Eye Contact: appropriate   Motor Behavior:  Ambulates with a wheeled walker   Speech:  Fluent and intelligible   Thought Process: No reported confusion or blockage   Thought Content: No evidence of Hallucinations or delusions   Suicidal ideations: Denies   Mood:  Dysphoria   Affect:   WNL   Concentration:  WNL   Abstraction:  WNL   Insight:  WNL     On the Modified Mini-Mental Status Exam: 91/100 (WNL)      DIAGNOSTIC IMPRESSIONS:    ICD-10-CM ICD-9-CM    1. Current moderate episode of major depressive disorder, unspecified whether recurrent ([facility] F32.1 296.22              PLAN:  1. Complete a comprehensive neuropsychological assessment to provide a differential diagnosis of presenting concerns as well as to assist with disposition and treatment planning as appropriate. 2. Consider compensatory and remedial cognitive training. 3. Consider nonpharmacological interventions for depression    90791 x 1 Review of records. Face to face interview w/ patient. Determine test protocol: 60 minutes. Total 1 unit        Vida Ivan, PhD, ABPP, LCP  Licensed Clinical Psychologist/ Neuropsychologist        This note will not be viewable in [address]

## 2019-08-27 ENCOUNTER — PATIENT OUTREACH (OUTPATIENT)
Dept: INTERNAL MEDICINE CLINIC | Age: 69
End: 2019-08-27

## 2019-08-27 NOTE — PROGRESS NOTES
Patient has graduated from the Disease Specific Care Management  program on 8/27/19 for diabetes. Pt is being followed by Dr. Liseth Brown for diabetes. Patient's symptoms are stable at this time. Patient/family has the ability to self-manage. Care management goals have been completed at this time. No further nurse navigator follow up scheduled. Goals Addressed                 This Visit's Progress     COMPLETED: Patient verbalizes understanding of self -management goals of living with Diabetes.         8/27/19-dkw  -checked up on pt today who declined further need for care management for diabetes  -reviewed all of pt's upcoming appts  -pt will call if she has any questions or needs help with her diabetes self management in the future  -DSCM episode resolved    6/17/19-dkw  -for the most part, taking only one metformin at dinnertime due to diarrhea and nausea  -taking insulin  -only checking fasting blood sugars due to sore fingertips  -not exercising due to back and leg pain; pt inquired about a stairway lift chair and will call [facility] to get an idea of cost and insurance coverage  -pt will see Dr. Huynh Seen on Friday for leg and back pain  -pt will see Dr. Liseth Brown on July 15th and discuss metformin  -will follow    5/14/19-dkw  Patient stated--by next appt with Dr. Jovanny Guzman on June 3rd, start going to water aerobics at least twice a week  -continue to check fasting blood sugar every morning  -continue to take metformin and insulin as prescribed; [facility] that some doses of metformin are missed due to diarrhea and nausea  -strive to start following the healthy plate meal plan being mindful of what is a carb (fruit, dairy, grains, starchy vegetables) and portion size; guideline is up to 45 grams of carb per meal (3 meals per day) or 3 servings per meal; snack guideline - 1 oz. protein serving and may add 1 carb serving   -follow up with Dr. Liseth Brown in July as scheduled, and Dr. Jovanny Guzman in June  -call Norman España at [phone] with any questions  -will follow          Pt has nurse navigator's contact information for any further questions, concerns, or needs.     Patients upcoming visits:      Future Appointments   Date Time Provider Ovidio Allan   9/5/2019 10:00 AM Vickey Franco, PHD [facility]   11/25/2019  8:50 AM Drew Sanders MD RDE  [facility]   12/16/2019  8:15 AM Karyle Chiquito, MD [facility]   2/3/2020 10:00 AM Darrel White  [facility]

## 2019-09-04 ENCOUNTER — TELEPHONE (OUTPATIENT)
Dept: NEUROLOGY | Age: 69
End: 2019-09-04

## 2019-09-04 NOTE — TELEPHONE ENCOUNTER
Neuropscyh results are ready for review. Faxed notification to PCP and referring DrValeriy In same office. Conf to chart.

## 2019-09-05 ENCOUNTER — OFFICE VISIT (OUTPATIENT)
Dept: NEUROLOGY | Age: 69
End: 2019-09-05

## 2019-09-05 DIAGNOSIS — G31.84 MILD NEUROCOGNITIVE DISORDER: Primary | ICD-10-CM

## 2019-09-05 NOTE — PROGRESS NOTES
Bryan Wagner is a 79 y.o. female who presents today for feedback following recent neuropsychological testing. The results were reviewed of the recent neuropsychological evaluation, including discussing individual tests as well as the patient's areas of neurocognitive strength versus their weaknesses. Education was provided regarding my diagnostic impressions, and we discussed next steps for further evaluation down the road. I all also answered numerous questions related to the clinical findings, including discussing various methods to improve cognitive cognition and mood when relevant. The patient is encouraged to follow-up with the referring provider. DIAGNOSTIC IMPRESSIONS:    ICD-10-CM ICD-9-CM    1. Mild neurocognitive disorder G31.84 331.83        89036 30 minutes x 1    Ritchie Angelo, PHD   This note will not be viewable in [address]

## 2019-10-16 ENCOUNTER — PATIENT OUTREACH (OUTPATIENT)
Dept: INTERNAL MEDICINE CLINIC | Age: 69
End: 2019-10-16

## 2019-10-16 NOTE — PROGRESS NOTES
Ambulatory Care Management Note      Date/Time:  10/16/2019 11:21 AM    This patient was received as a referral from [id number] . Top Challenges reviewed with the provider   - not taking Gabapentin due to concerns re: potential side effects; referral to Ambulatory PharmD by this NN    - referral to Ambulatory PharmD for detailed review of current medications, patient w/ multiple questions re: current medications    - no ACP on file; patient is interested in completing an Advance Medical directive. 'Your right to decide' pamphlet and blank AMD mailed to patient today. - most recent Hgb A1c 10.9 % (April)       Ambulatory  contacted patient for discussion and case managements of see below. Summary of patients top problems:   1. DM Type 2 with nephropathy, diabetic neuropathy: followed by Dr. Nikolas Abad. Completed/graduated Disease Specific Care Management  program with Aditi Alaniz RN on 8/27/19 for diabetes . Most recent OV with Dr. Nikolas Abad 8/14/19; f/u 3 months. Component      Latest Ref Rng & Units 4/22/2019 2/21/2019           3:33 PM  8:45 AM   Hemoglobin A1c, (calculated)      4.8 - 5.6 % 10.1 (H)    Estimated average glucose      mg/dL 243    Hemoglobin A1c (POC)      %  10.3     2. Asthma  3. Morbid Obesity: BMI 42.43  Patient's challenges to self management identified:   medication management      Medication Management:  good understanding, poor adherence      Med Rec during this Call  Current Outpatient Medications   Medication Sig    insulin glargine U-300 conc (TOUJEO SOLOSTAR U-300 INSULIN) 300 unit/mL (1.5 mL) inpn pen 56 Units by SubCUTAneous route nightly.  albuterol (PROAIR HFA) 90 mcg/actuation inhaler USE 1 INHALATION EVERY 4 HOURS AS NEEDED WHEEZING OR SHORTNESS OF BREATH    rosuvastatin (CRESTOR) 20 mg tablet TAKE 1 TABLET NIGHTLY    hydrALAZINE (APRESOLINE) 25 mg tablet Take 1 Tab by mouth three (3) times daily.     levothyroxine (SYNTHROID) 137 mcg tablet Take 137 mcg by mouth daily.  losartan (COZAAR) 100 mg tablet Take 1 Tab by mouth daily. One tab daily    metoprolol tartrate (LOPRESSOR) 50 mg tablet TAKE 1 TABLET TWICE A DAY    albuterol (ACCUNEB) 1.25 mg/3 mL nebu 3 mL by Nebulization route every four (4) hours as needed (wheezing).  triamterene-hydroCHLOROthiazide (DYAZIDE) 37.5-25 mg per capsule TAKE 1 CAPSULE TWICE A DAY (Patient taking differently: 1 Cap.)    insulin lispro (HUMALOG KWIKPEN INSULIN) 100 unit/mL kwikpen 22 units for breakfast and lunch and 30 units for dinner    metFORMIN ER (GLUCOPHAGE XR) 500 mg tablet TAKE 2 TABLETS BY MOUTH TWICE DAILY AFTER MEALS (Patient taking differently: Take 500 mg by mouth daily.)    amLODIPine (NORVASC) 10 mg tablet Take 1 Tab by mouth daily.  ADVAIR DISKUS 100-50 mcg/dose diskus inhaler USE 1 INHALATION TWICE A DAY (Patient taking differently: USE 1 INHALATION TWICE A DAY prn)    albuterol (PROVENTIL) 5 mg/mL nebulizer solution 0.5 mL by Nebulization route every four (4) hours as needed for Wheezing.  aspirin delayed-release 81 mg tablet Take 81 mg by mouth daily.  montelukast (SINGULAIR) 10 mg tablet Take 1 Tab by mouth daily. (Patient not taking: Reported on 10/16/2019)    glucose blood VI test strips (ONETOUCH ULTRA BLUE TEST STRIP) strip Monitor blood sugar 3 times daily    gabapentin (NEURONTIN) 100 mg capsule Take 1 Cap by mouth three (3) times daily. (Patient not taking: Reported on 10/16/2019)    BD INSULIN PEN NEEDLE UF SHORT 31 gauge x 5/16\" ndle      No current facility-administered medications for this visit.       Medications Discontinued During This Encounter   Medication Reason    lisinopril (PRINIVIL, ZESTRIL) 10 mg tablet Discontinued by Another Clinician    insulin lispro (HUMALOG) 100 unit/mL Soln Duplicate Order    glucosam/chond/hyalu/CF borate (MOVE FREE [facility] PO) Not A Current Medication       Lisinopril discontinued because, per chart review, this was discontinued 6/21/18 by PCP for Reason for Discontinue: Side Effects; patient confirms this information and reports not a current medication. Med Rec updated. Humalog discontinued because it is noted to be a duplicate order; med rec updated. [address] discontinued because patient reports not a current medication; med rec updated. Advance Care Planning:   Does patient have an Advance Directive:  not on file; education provided    [employer], Referrals, and Durable Medical Equipment: Ambulatory PharmD    PCP/Specialist follow up:   Future Appointments   Date Time Provider Ovidio Allan   11/18/2019 10:45 AM Abbe Lee MD [address] [facility]   11/25/2019  8:50 AM Beatriz Ortiz MD E  [facility]   12/16/2019  8:15 AM Bo Rockwell MD [address]   2/3/2020 10:00 AM Uriel Connell MD [address] [place] [address]                 This Visit's Progress       Chronic Disease     Knowledge and adherence of prescribed medication (ie. action, side effects, missed dose, etc.).        10/16/2019  - not taking Gabapentin and reports that she never began taking Gabapentin when it was prescribed (ordered by Dr. Dat Lopes 3/21/19 for neuropathic symptoms); states, \"I looked it up and got scared of it. \" Patient reports continued neuropathic symptoms in bilateral feet. Will refer to Ambulatory PharmD to further address patient questions/concerns. - taking hydralazine 25 mg daily (ordered by Dr. Clement Downs 6/21/19 for Hydralazine 25 mg tablet TID\"; reports only taking once daily \"because I take another fluid pill\"   - has been taking Humalog 36 units with dinner (most recent OV note by Dr. Dat Lopes reviewed and ordered unit dose with dinner is 35 units); NN informed patient today and patient verbalizes understanding   - Reports adherence with losartan, however has questions; \"is that the one I've been seeing on [address].\"  - will refer patient to Ambulatory PharmD for medication management, address multiple patient questions/concerns re: current medications; pt is agreeable to this plan              Patient verbalized understanding of all information discussed. Patient has this Nurse Navigators contact information for any further questions, concerns, or needs.

## 2019-10-16 NOTE — Clinical Note
Patient referral; see my goal section for details regarding reason for referral/patient questions, etc.

## 2019-10-29 ENCOUNTER — PATIENT OUTREACH (OUTPATIENT)
Dept: INTERNAL MEDICINE CLINIC | Age: 69
End: 2019-10-29

## 2019-10-29 NOTE — PROGRESS NOTES
Pt reports \"soreness\" in right flank area that began after she sustained a GLF a few months ago; denies injury to right side as a result of fall and reports that she landed on her buttocks. Pt reports that \"soreness\" in right flank area is intermittent and she is unable to identify any aggravating and/or alleviating factors to pain; also notes that \"soreness\" has not worsened since initial onset, however it has not improved or resolved. Offered to schedule patient for acute OV with PCP; pt agreeable. NN will route notification to LPN  for assistance with scheduling of Acute OV due to appointment availability. Goals Addressed                 This Visit's Progress       Chronic Disease     Advance Care Planning   On track     10/29/19  - blank AMD and 'right to decide' literature was mailed to patient by this NN on 10/16/19 ; pt confirms that she received this in the mail, however she has not yet reviewed it  - Plan: patient will review 'right to decide' literature and blank AMD document this week; NN will f/u with patient next week to address any questions/concerns. NN requested that, if completed, patient provide a copy of AMD to PCP office.  Knowledge and adherence of prescribed medication (ie. action, side effects, missed dose, etc.). On track     10/29/19  - patient awaiting outreach from Ambulatory PharmD  - started taking Gabapentin 10/16; reports numbness/tingling in feet is improved since starting Gabapentin. Denies side effects denies worsened lower extremity edema from baseline, fatigue, dizziness, nausea, vomiting    10/16/19  - not taking Gabapentin and reports that she never began taking Gabapentin when it was prescribed (ordered by Dr. Lissette Reynolds 3/21/19 for neuropathic symptoms); states, \"I looked it up and got scared of it. \" Patient reports continued neuropathic symptoms in bilateral feet. Will refer to Ambulatory PharmD to further address patient questions/concerns.   - taking hydralazine 25 mg daily (ordered by Dr. Kim August 6/21/19 for Hydralazine 25 mg tablet TID\"; reports only taking once daily \"because I take another fluid pill\"   - has been taking Humalog 36 units with dinner (most recent OV note by Dr. Migdalia Sinclair reviewed and ordered unit dose with dinner is 35 units); NN informed patient today and patient verbalizes understanding   - Reports adherence with losartan, however has questions; \"is that the one I've been seeing on [facility].\"  - will refer patient to Ambulatory PharmD for medication management, address multiple patient questions/concerns re: current medications; pt is agreeable to this plan         Diabetes     Patient verbalizes understanding of self -management goals of living with Diabetes. 10/29/19  8/14/19-10.9%  4/22/19 - Hgb A1c 10.1%  - Current level of understanding: checks blood sugar once daily before breakfast. Completed Disease Specific CM Program for Diabetes Self-Management (avani/ Amari Tony, RN NN CDE) on 8/27/19. Does not keep written blood glucose log; reports that glucometer stores historical blood glucose results. Pt reported fasting blood glucose on 10/28 of 169 mg/dL and on 10/29 of 93 mg/dL. Pt notes difficulty adhering to diabetic diet - enjoys pasta, potatoes, chips - states, \"my problem is food. I'm not that strong on sweets. \"; pt reported barrier to diabetic diet adherence is financial. Does not drink regular soda; drinks diet coke (no more than 2/day) and water. Currently exercising two times/week for 60-90 minutes- water aerobics. - Desired Outcome: Improve diet and exercise, decrease carbohydrate intake. Lower Hgb A1c.  - Plan: Further assess financial barrier(s). Will continue provide and reinforce pt education re: diabetic diet, assist with meal planning. Pt could benefit from keeping a food diary; will discuss further during subsequent outreach encounter as pt is unable to continue call at this time due to getting ready to leave her house. Future Appointments:  Future Appointments   Date Time Provider Ovidio Sanjana   11/18/2019 10:45 AM Amanda Park MD [facility]   11/25/2019  8:50 AM Kalpesh Melgar MD [facility]   12/16/2019  8:15 AM Farhana Alvarez MD [facility]   2/3/2020 10:00 AM Josephina Ahumada,  [address]        Last Appointment With Me:  Visit date not found     Last Appointment My Department:  6/21/2019

## 2019-10-31 ENCOUNTER — OFFICE VISIT (OUTPATIENT)
Dept: INTERNAL MEDICINE CLINIC | Age: 69
End: 2019-10-31

## 2019-10-31 VITALS
SYSTOLIC BLOOD PRESSURE: 151 MMHG | HEART RATE: 72 BPM | RESPIRATION RATE: 16 BRPM | HEIGHT: 62 IN | WEIGHT: 230 LBS | OXYGEN SATURATION: 96 % | BODY MASS INDEX: 42.33 KG/M2 | TEMPERATURE: 98.3 F | DIASTOLIC BLOOD PRESSURE: 71 MMHG

## 2019-10-31 DIAGNOSIS — R10.9 STOMACH ACHE: Primary | ICD-10-CM

## 2019-10-31 DIAGNOSIS — R07.81 RIB PAIN ON RIGHT SIDE: ICD-10-CM

## 2019-10-31 DIAGNOSIS — Z23 ENCOUNTER FOR IMMUNIZATION: Primary | ICD-10-CM

## 2019-10-31 DIAGNOSIS — I10 ESSENTIAL HYPERTENSION: ICD-10-CM

## 2019-10-31 DIAGNOSIS — R10.9 FLANK PAIN: ICD-10-CM

## 2019-10-31 LAB
BILIRUB UR QL STRIP: NEGATIVE
GLUCOSE UR-MCNC: NORMAL MG/DL
KETONES P FAST UR STRIP-MCNC: NEGATIVE MG/DL
PH UR STRIP: 5.5 [PH] (ref 4.6–8)
PROT UR QL STRIP: NEGATIVE
SP GR UR STRIP: 1.02 (ref 1–1.03)
UA UROBILINOGEN AMB POC: NORMAL (ref 0.2–1)
URINALYSIS CLARITY POC: CLEAR
URINALYSIS COLOR POC: YELLOW
URINE BLOOD POC: NEGATIVE
URINE LEUKOCYTES POC: NEGATIVE
URINE NITRITES POC: NEGATIVE

## 2019-10-31 NOTE — PROGRESS NOTES
HISTORY OF PRESENT ILLNESS  Makayla Hansen is a 79 y.o. female. HPI   Here for acute care OV c/o right rib pain x 2 months  Fells backwards 3 months ago on buttocks, about 3 weeks later noted right rib pain with cough  No bruise,f/c cough ,n/v, dysuria      Patient Active Problem List    Diagnosis Date Noted    Type 2 diabetes mellitus with diabetic neuropathy ([facility] 04/29/2019    Type 2 diabetes with nephropathy ([facility] 12/14/2018    History of CVA (cerebrovascular accident) 07/13/2018    Obesity, morbid ([facility] 01/25/2018    Warthin's tumor 07/01/2016    HTN (hypertension) 09/13/2013    Axillary hidradenitis suppurativa 08/07/2013    Esophageal reflux 01/15/2013    Renal failure, acute ([facility] 01/13/2013    Asthma 12/14/2012    Myocardial ischemia 06/06/2012    Shortness of breath 06/06/2012    Coronary artery disease 06/06/2012    PVC's (premature ventricular contractions) 06/01/2012    DM type 2 (diabetes mellitus, type 2) ([facility] 04/23/2012    Grave's disease 10/15/2010    DJD (degenerative joint disease) of hip 10/20/2009    DJD (degenerative joint disease) of knee 10/20/2009    CTS (Carpal Tunnel Syndrome)-b/l 10/20/2009    CVA (cerebral infarction) 10/20/2009    Diverticulosis 10/20/2009    Osteopenia 10/20/2009     Current Outpatient Medications   Medication Sig Dispense Refill    insulin glargine U-300 conc (TOUJEO SOLOSTAR U-300 INSULIN) 300 unit/mL (1.5 mL) inpn pen 56 Units by SubCUTAneous route nightly. 1 Adjustable Dose Pre-filled Pen Syringe 0    albuterol (PROAIR HFA) 90 mcg/actuation inhaler USE 1 INHALATION EVERY 4 HOURS AS NEEDED WHEEZING OR SHORTNESS OF BREATH 25.5 Inhaler 11    rosuvastatin (CRESTOR) 20 mg tablet TAKE 1 TABLET NIGHTLY 90 Tab 3    montelukast (SINGULAIR) 10 mg tablet Take 1 Tab by mouth daily. (Patient not taking: Reported on 10/16/2019) 90 Tab 3    hydrALAZINE (APRESOLINE) 25 mg tablet Take 1 Tab by mouth three (3) times daily.  180 Tab 3    levothyroxine (SYNTHROID) 137 mcg tablet Take 137 mcg by mouth daily. 90 Tab 3    glucose blood VI test strips (ONETOUCH ULTRA BLUE TEST STRIP) strip Monitor blood sugar 3 times daily 300 Strip 3    losartan (COZAAR) 100 mg tablet Take 1 Tab by mouth daily. One tab daily 90 Tab 3    metoprolol tartrate (LOPRESSOR) 50 mg tablet TAKE 1 TABLET TWICE A  Tab 3    albuterol (ACCUNEB) 1.25 mg/3 mL nebu 3 mL by Nebulization route every four (4) hours as needed (wheezing). 225 mL 3    gabapentin (NEURONTIN) 100 mg capsule Take 1 Cap by mouth three (3) times daily. (Patient not taking: Reported on 10/16/2019) 90 Cap 3    triamterene-hydroCHLOROthiazide (DYAZIDE) 37.5-25 mg per capsule TAKE 1 CAPSULE TWICE A DAY (Patient taking differently: 1 Cap.) 180 Cap 3    insulin lispro (HUMALOG KWIKPEN INSULIN) 100 unit/mL kwikpen 22 units for breakfast and lunch and 30 units for dinner 23 Adjustable Dose Pre-filled Pen Syringe 3    metFORMIN ER (GLUCOPHAGE XR) 500 mg tablet TAKE 2 TABLETS BY MOUTH TWICE DAILY AFTER MEALS (Patient taking differently: Take 500 mg by mouth daily.) 360 Tab 10    amLODIPine (NORVASC) 10 mg tablet Take 1 Tab by mouth daily. 90 Tab 3    ADVAIR DISKUS 100-50 mcg/dose diskus inhaler USE 1 INHALATION TWICE A DAY (Patient taking differently: USE 1 INHALATION TWICE A DAY prn) 180 Each 3    BD INSULIN PEN NEEDLE UF SHORT 31 gauge x 5/16\" ndle       albuterol (PROVENTIL) 5 mg/mL nebulizer solution 0.5 mL by Nebulization route every four (4) hours as needed for Wheezing. 0.5 mL 0    aspirin delayed-release 81 mg tablet Take 81 mg by mouth daily.        Allergies   Allergen Reactions    Latex Itching    Codeine Itching and Other (comments)     hallucinate    Contrast Dye [Iodine] Rash and Itching     Given pre-cardiac cath    Seafood [Shellfish Containing Products] Swelling     Social History     Tobacco Use    Smoking status: Former Smoker     Last attempt to quit: 9/17/2004     Years since quitting: 15.1    Smokeless tobacco: Never Used   Substance Use Topics    Alcohol use: No      Lab Results   Component Value Date/Time    WBC 6.9 11/18/2018 09:21 AM    HGB 12.8 11/18/2018 09:21 AM    Hemoglobin (POC) 14.6 04/10/2015 12:41 PM    HCT 41.8 11/18/2018 09:21 AM    Hematocrit (POC) 43 04/10/2015 12:41 PM    PLATELET 410 03/25/6427 09:21 AM    MCV 78.4 (L) 11/18/2018 09:21 AM     Lab Results   Component Value Date/Time    Hemoglobin A1c 10.1 (H) 04/22/2019 03:33 PM    Hemoglobin A1c, External 10.6 11/19/2018    Hemoglobin A1c, External 9.4 05/20/2016    Hemoglobin A1c, External 8.9 08/17/2015 02:37 PM    Glucose 172 (H) 04/22/2019 03:33 PM    Glucose (POC) 129 (H) 09/22/2015 07:11 AM    Microalb/Creat ratio (ug/mg creat.) 38.7 (H) 04/26/2019 10:30 AM    LDL, calculated 69 04/22/2019 03:33 PM    Creatinine (POC) 0.9 04/10/2015 12:41 PM    Creatinine 1.10 (H) 04/22/2019 03:33 PM      Lab Results   Component Value Date/Time    Cholesterol, total 157 04/22/2019 03:33 PM    HDL Cholesterol 55 04/22/2019 03:33 PM    LDL, calculated 69 04/22/2019 03:33 PM    LDL-C, External 72 05/20/2016    Triglyceride 166 (H) 04/22/2019 03:33 PM     Lab Results   Component Value Date/Time    GFR est non-AA 52 (L) 04/22/2019 03:33 PM    GFRNA, POC >60 04/10/2015 12:41 PM    GFR est AA 60 04/22/2019 03:33 PM    GFRAA, POC >60 04/10/2015 12:41 PM    Creatinine 1.10 (H) 04/22/2019 03:33 PM    Creatinine (POC) 0.9 04/10/2015 12:41 PM    BUN 11 04/22/2019 03:33 PM    BUN (POC) 7 (L) 04/10/2015 12:41 PM    Sodium 145 (H) 04/22/2019 03:33 PM    Sodium (POC) 143 04/10/2015 12:41 PM    Potassium 3.5 04/22/2019 03:33 PM    Potassium (POC) 2.9 (L) 04/10/2015 12:41 PM    Chloride 101 04/22/2019 03:33 PM    Chloride (POC) 103 04/10/2015 12:41 PM    CO2 32 (H) 04/22/2019 03:33 PM    Magnesium 1.9 01/14/2013 04:30 AM        ROS    Physical Exam   Constitutional: She appears well-developed and well-nourished.    Appears stated age Cardiovascular: Normal rate, regular rhythm and normal heart sounds. Exam reveals no gallop and no friction rub. No murmur heard. Pulmonary/Chest: Effort normal and breath sounds normal. No respiratory distress. She has no wheezes. Abdominal: Soft. Bowel sounds are normal.   Musculoskeletal: She exhibits no edema. Mild TTP right lower rib area   Neurological: She is alert. Psychiatric: She has a normal mood and affect. Nursing note and vitals reviewed. ASSESSMENT and PLAN  Diagnoses and all orders for this visit:    1. Encounter for immunization  -     INFLUENZA VACCINE INACTIVATED (IIV), SUBUNIT, ADJUVANTED, IM  -     PNEUMOCOCCAL POLYSACCHARIDE VACCINE, 23-VALENT, ADULT OR IMMUNOSUPPRESSED PT DOSE,    2. Flank pain  -     AMB POC URINALYSIS DIP STICK AUTO W/O MICRO--negative  -     XR RIBS RT W PA CXR MIN 3 V; Future    3. Rib pain on right side  -     XR RIBS RT W PA CXR MIN 3 V; Future   Conservative treatment discussed   4. Essential hypertension   sbp 150mmhg   Increase hydralazine to 2 tabs tid    Recheck with cardiologist next month  Follow-up and Dispositions    · Return if symptoms worsen or fail to improve.

## 2019-10-31 NOTE — PATIENT INSTRUCTIONS
Office Policies    Phone calls/patient messages:            Please allow up to 24 hours for someone in the office to contact you about your call or message. Be mindful your provider may be out of the office or your message may require further review. We encourage you to use [employer] for your messages as this is a faster, more efficient way to communicate with our office                         Medication Refills:            Prescription medications require 48-72 business hours to process. We encourage you to use [employer] for your refills. For controlled medications: Please allow 72 business hours to process. Certain medications may require you to  a written prescription at our office. NO narcotic/controlled medications will be prescribed after 4pm Monday through Friday or on weekends              Form/Paperwork Completion:            Please note a $25 fee may incur for all paperwork for completed by our providers. We ask that you allow 7-10 business days. Pre-payment is due prior to picking up/faxing the completed form. You may also download your forms to [employer] to have your doctor print off.

## 2019-10-31 NOTE — PROGRESS NOTES
Chief Complaint   Patient presents with    Flank Pain     Right side, ? related to a fall x 3 months ago

## 2019-11-01 ENCOUNTER — TELEPHONE (OUTPATIENT)
Dept: ENDOCRINOLOGY | Age: 69
End: 2019-11-01

## 2019-11-01 NOTE — PROGRESS NOTES
Spoke with patient using two identifiers. Patient was informed of labs and x-rays. Patient was instructed to follow 's   Recommendations. Patient verbalized understanding.

## 2019-11-01 NOTE — TELEPHONE ENCOUNTER
Left message. Informed pt that glucose in urine is due to the elevated blood sugar readings which we are trying to address with insulin and other medications.

## 2019-11-05 ENCOUNTER — PATIENT OUTREACH (OUTPATIENT)
Dept: INTERNAL MEDICINE CLINIC | Age: 69
End: 2019-11-05

## 2019-11-05 NOTE — PROGRESS NOTES
NN attempted patient outreach today in order to perform CCM follow-up; lvm requesting a return phone call to this NN.

## 2019-11-06 ENCOUNTER — TELEPHONE (OUTPATIENT)
Dept: INTERNAL MEDICINE CLINIC | Age: 69
End: 2019-11-06

## 2019-11-06 NOTE — TELEPHONE ENCOUNTER
Pharmacy Progress Note - Telephone Encounter    S/O: Ms. Mehul El 79 y.o. female, referred by Mary Mulligan, RN, Nurse Navigator, was contacted via an outbound telephone call to discuss her medication questions today. Verified patients identifiers (name & ) per HIPAA policy. - Wants to know what gabapentin was for. Has not been taking this  - Reports her BP has been high. Does not monitor BP at home. Reports compliance to therapy. 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  insulin glargine U-300 conc (TOUJEO SOLOSTAR U-300 INSULIN) 300 unit/mL (1.5 mL) inpn pen 56 Units by SubCUTAneous route nightly.  albuterol (PROAIR HFA) 90 mcg/actuation inhaler USE 1 INHALATION EVERY 4 HOURS AS NEEDED WHEEZING OR SHORTNESS OF BREATH    rosuvastatin (CRESTOR) 20 mg tablet TAKE 1 TABLET NIGHTLY    montelukast (SINGULAIR) 10 mg tablet Take 1 Tab by mouth daily.  hydrALAZINE (APRESOLINE) 25 mg tablet Take 1 Tab by mouth three (3) times daily.  levothyroxine (SYNTHROID) 137 mcg tablet Take 137 mcg by mouth daily.  glucose blood VI test strips (ONETOUCH ULTRA BLUE TEST STRIP) strip Monitor blood sugar 3 times daily    losartan (COZAAR) 100 mg tablet Take 1 Tab by mouth daily.  One tab daily    metoprolol tartrate (LOPRESSOR) 50 mg tablet TAKE 1 TABLET TWICE A DAY    albuterol (ACCUNEB) 1.25 mg/3 mL nebu 3 mL by Nebulization route every four (4) hours as needed (wheezing).  gabapentin (NEURONTIN) 100 mg capsule Take 1 Cap by mouth three (3) times daily.  triamterene-hydroCHLOROthiazide (DYAZIDE) 37.5-25 mg per capsule TAKE 1 CAPSULE TWICE A DAY (Patient taking differently: 1 Cap.)    insulin lispro (HUMALOG KWIKPEN INSULIN) 100 unit/mL kwikpen 22 units for breakfast and lunch and 30 units for dinner (Patient taking differently: 22 units for breakfast and lunch and 36 units for dinner)    metFORMIN ER (GLUCOPHAGE XR) 500 mg tablet TAKE 2 TABLETS BY MOUTH TWICE DAILY AFTER MEALS (Patient taking differently: Take 500 mg by mouth daily.)    amLODIPine (NORVASC) 10 mg tablet Take 1 Tab by mouth daily.  ADVAIR DISKUS 100-50 mcg/dose diskus inhaler USE 1 INHALATION TWICE A DAY (Patient taking differently: USE 1 INHALATION TWICE A DAY prn)    BD INSULIN PEN NEEDLE UF SHORT 31 gauge x 5/16\" ndle     albuterol (PROVENTIL) 5 mg/mL nebulizer solution 0.5 mL by Nebulization route every four (4) hours as needed for Wheezing.  aspirin delayed-release 81 mg tablet Take 81 mg by mouth daily. No current facility-administered medications for this visit. BP Readings from Last 4 Encounters:   10/31/19 151/71   08/14/19 163/67   07/15/19 139/62   06/21/19 156/73     Pulse Readings from Last 3 Encounters:   10/31/19 72   08/14/19 (!) 59   07/15/19 62         A/P:  - Recommend patient start gabapentin in the evening first to evaluate drowsiness potential.    - Will see patient for a comprehensive medication review on 12/16/19 following her routine PCP follow up visit on 12/16/19  - Bring all medications - prescription & OTC to visit. - Patient endorses understanding to the provided information. All questions were answered at this time.      Thank you,  Cierra Morfin, PharmD, CDE

## 2019-11-18 ENCOUNTER — OFFICE VISIT (OUTPATIENT)
Dept: CARDIOLOGY CLINIC | Age: 69
End: 2019-11-18

## 2019-11-18 VITALS
OXYGEN SATURATION: 96 % | HEIGHT: 62 IN | WEIGHT: 231.7 LBS | BODY MASS INDEX: 42.64 KG/M2 | SYSTOLIC BLOOD PRESSURE: 138 MMHG | RESPIRATION RATE: 18 BRPM | DIASTOLIC BLOOD PRESSURE: 76 MMHG | HEART RATE: 70 BPM

## 2019-11-18 DIAGNOSIS — E78.2 MIXED HYPERLIPIDEMIA: ICD-10-CM

## 2019-11-18 DIAGNOSIS — Z79.4 TYPE 2 DIABETES MELLITUS WITH COMPLICATION, WITH LONG-TERM CURRENT USE OF INSULIN (HCC): ICD-10-CM

## 2019-11-18 DIAGNOSIS — I10 ESSENTIAL HYPERTENSION: ICD-10-CM

## 2019-11-18 DIAGNOSIS — G47.33 OSA (OBSTRUCTIVE SLEEP APNEA): ICD-10-CM

## 2019-11-18 DIAGNOSIS — R01.1 CARDIAC MURMUR: ICD-10-CM

## 2019-11-18 DIAGNOSIS — I25.10 CORONARY ARTERY DISEASE INVOLVING NATIVE CORONARY ARTERY OF NATIVE HEART WITHOUT ANGINA PECTORIS: Primary | ICD-10-CM

## 2019-11-18 DIAGNOSIS — R06.09 DOE (DYSPNEA ON EXERTION): ICD-10-CM

## 2019-11-18 DIAGNOSIS — E66.01 OBESITY, MORBID (HCC): ICD-10-CM

## 2019-11-18 DIAGNOSIS — E11.8 TYPE 2 DIABETES MELLITUS WITH COMPLICATION, WITH LONG-TERM CURRENT USE OF INSULIN (HCC): ICD-10-CM

## 2019-11-18 NOTE — PROGRESS NOTES
Verified patient with 2 identifiers   Reviewed record in preparation for visit and obtained necessary documentation. Chief Complaint   Patient presents with    Shortness of Breath     upon walking     Ankle swelling     \"all the time\"    Dizziness     \"vertigo\"     1. Have you been to the ER, urgent care clinic since your last visit? Hospitalized since your last visit? No     2. Have you seen or consulted any other health care providers outside of the [address] [place] [address] since your last visit? Include any pap smears or colon screening.  No

## 2019-11-18 NOTE — PROGRESS NOTES
[address] [facility], [address] [facility]  [phone]     Subjective:      Nelida Morris is a 79 y.o. female is here for routine f/u. Last visit was January 2019. Since then, she reports getting more shortwinded with activity, not not  Everyday. She is able to participate in water aerobics 1 hr at least twice a week. Has occasional leg swelling, not taking her thiazide diuretic as prescribed. In review of meds, she also reports taking Advair as needed or once a day only, not bid as prescribed. Has occasional positional dizziness with know equilibrium problem per her report as well. To note, he saw Dr Dao Friend end of October for rib pain with cough. CXR was -ve for fracture and was recommended to take tylenol heat and ice. Today she said its feeling better. Noted that during that visit, Dr Dao Friend increased Hydralazine to 50 mg TID, she is not doing this either        The patient denies chest pain, orthopnea, PND,  palpitations, syncope, or presyncope.        Patient Active Problem List    Diagnosis Date Noted    Type 2 diabetes mellitus with diabetic neuropathy ([facility] 04/29/2019    Type 2 diabetes with nephropathy ([facility] [address] 12/14/2018    History of CVA (cerebrovascular accident) 07/13/2018    Obesity, morbid ([address] 01/25/2018    Warthin's tumor 07/01/2016    HTN (hypertension) 09/13/2013    Axillary hidradenitis suppurativa 08/07/2013    Esophageal reflux 01/15/2013    Renal failure, acute ([address] 01/13/2013    Asthma 12/14/2012    Myocardial ischemia 06/06/2012    Shortness of breath 06/06/2012    Coronary artery disease 06/06/2012    PVC's (premature ventricular contractions) 06/01/2012    DM type 2 (diabetes mellitus, type 2) ([address] 04/23/2012    Grave's disease 10/15/2010    DJD (degenerative joint disease) of hip 10/20/2009    DJD (degenerative joint disease) of knee 10/20/2009    CTS (Carpal Tunnel Syndrome)-b/l 10/20/2009    CVA (cerebral infarction) 10/20/2009    Diverticulosis 10/20/2009    Osteopenia 10/20/2009      Kip Manning MD  Past Medical History:   Diagnosis Date    Asthma     CAD (coronary artery disease)     \"mild\" per Dr Mayen Amada note    Diabetes [facility])     Dr Jamal Garcia     Hypertension     Screening for colon cancer 2/16/05    Dr Bhumi Trimble in 10 years    Stroke [facility]) 2004    Rt side weaker than left, uses a cane: no longer followed by neuro    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     Thyroid disease     Unspecified sleep apnea     uses CPAP      Past Surgical History:   Procedure Laterality Date    CARDIAC CATHETERIZATION  6/6/2012         CARDIAC SURG PROCEDURE UNLIST      heart surgery    HX HEENT      tonsillectomy    HX HEMORRHOIDECTOMY      HX HYSTERECTOMY      HX ORTHOPAEDIC  8/2011    left shoulder     Allergies   Allergen Reactions    Latex Itching    Codeine Itching and Other (comments)     hallucinate    Contrast Dye [Iodine] Rash and Itching     Given pre-cardiac cath    Seafood [Shellfish Containing Products] Swelling      Family History   Problem Relation Age of Onset    Diabetes Mother     Cancer Mother     Breast Cancer Mother 79    Heart Disease Father     Hypertension Father     Stroke Father     Coronary Artery Disease Brother 54      Social History     Socioeconomic History    Marital status: SINGLE     Spouse name: Not on file    Number of children: Not on file    Years of education: Not on file    Highest education level: Not on file   Occupational History    Not on file   Social Needs    Financial resource strain: Not on file    Food insecurity:     Worry: Not on file     Inability: Not on file    Transportation needs:     Medical: Not on file     Non-medical: Not on file   Tobacco Use    Smoking status: Former Smoker     Last attempt to quit: 9/17/2004     Years since quitting: 15.1    Smokeless tobacco: Never Used   Substance and Sexual Activity    Alcohol use:  No Comment: CBD cream    Drug use: Yes     Types: Prescription, OTC    Sexual activity: Never   Lifestyle    Physical activity:     Days per week: Not on file     Minutes per session: Not on file    Stress: Not on file   Relationships    Social connections:     Talks on phone: Not on file     Gets together: Not on file     Attends [employer] service: Not on file     Active member of club or organization: Not on file     Attends meetings of clubs or organizations: Not on file     Relationship status: Not on file    Intimate partner violence:     Fear of current or ex partner: Not on file     Emotionally abused: Not on file     Physically abused: Not on file     Forced sexual activity: Not on file   Other Topics Concern    Not on file   Social History Narrative    Not on file      Current Outpatient Medications   Medication Sig    OTHER CBD cream apply to area once a day.  insulin glargine U-300 conc (TOUJEO SOLOSTAR U-300 INSULIN) 300 unit/mL (1.5 mL) inpn pen 56 Units by SubCUTAneous route nightly.  albuterol (PROAIR HFA) 90 mcg/actuation inhaler USE 1 INHALATION EVERY 4 HOURS AS NEEDED WHEEZING OR SHORTNESS OF BREATH    rosuvastatin (CRESTOR) 20 mg tablet TAKE 1 TABLET NIGHTLY    hydrALAZINE (APRESOLINE) 25 mg tablet Take 1 Tab by mouth three (3) times daily.  levothyroxine (SYNTHROID) 137 mcg tablet Take 137 mcg by mouth daily.  glucose blood VI test strips (ONETOUCH ULTRA BLUE TEST STRIP) strip Monitor blood sugar 3 times daily    losartan (COZAAR) 100 mg tablet Take 1 Tab by mouth daily. One tab daily    metoprolol tartrate (LOPRESSOR) 50 mg tablet TAKE 1 TABLET TWICE A DAY    albuterol (ACCUNEB) 1.25 mg/3 mL nebu 3 mL by Nebulization route every four (4) hours as needed (wheezing).  gabapentin (NEURONTIN) 100 mg capsule Take 1 Cap by mouth three (3) times daily.     triamterene-hydroCHLOROthiazide (DYAZIDE) 37.5-25 mg per capsule TAKE 1 CAPSULE TWICE A DAY (Patient taking differently: 1 Cap.)    insulin lispro (HUMALOG KWIKPEN INSULIN) 100 unit/mL kwikpen 22 units for breakfast and lunch and 30 units for dinner (Patient taking differently: 22 units for breakfast and lunch and 36 units for dinner)    metFORMIN ER (GLUCOPHAGE XR) 500 mg tablet TAKE 2 TABLETS BY MOUTH TWICE DAILY AFTER MEALS (Patient taking differently: Take 500 mg by mouth daily.)    amLODIPine (NORVASC) 10 mg tablet Take 1 Tab by mouth daily.  ADVAIR DISKUS 100-50 mcg/dose diskus inhaler USE 1 INHALATION TWICE A DAY (Patient taking differently: USE 1 INHALATION TWICE A DAY prn)    BD INSULIN PEN NEEDLE UF SHORT 31 gauge x 5/16\" ndle     albuterol (PROVENTIL) 5 mg/mL nebulizer solution 0.5 mL by Nebulization route every four (4) hours as needed for Wheezing.  aspirin delayed-release 81 mg tablet Take 81 mg by mouth daily. No current facility-administered medications for this visit. Review of Symptoms:  11 systems reviewed, negative other than as stated in the HPI    Physical ExamPhysical Exam:    Vitals:    11/18/19 1050 11/18/19 1100 11/18/19 1102   BP: 144/84 142/76 138/76   Pulse: 63 68 70   Resp: 18     SpO2: 96%     Weight: 231 lb 11.2 oz (105.1 kg)     Height: 5' 2\" (1.575 m)       Body mass index is 42.38 kg/m². General PE  Gen:  NAD  Mental Status - Alert. General Appearance - Not in acute distress. HEENT:  PERRL, no carotid bruits or JVD  Chest and Lung Exam   Inspection: Accessory muscles - No use of accessory muscles in breathing. Auscultation:   Breath sounds: - Normal.   Cardiovascular   Inspection: Jugular vein - Bilateral - Inspection Normal.   Palpation/Percussion:   Apical Impulse: - Normal.   Auscultation: Rhythm - Regular. Heart Sounds - S1 WNL and S2 WNL. No S3 or S4. Murmurs & Other Heart Sounds: Auscultation of the heart reveals - 2/6 CRISTAL  Peripheral Vascular   Upper Extremity: Inspection - Bilateral - No Cyanotic nailbeds or Digital clubbing.    Lower Extremity: Palpation: Edema - Bilateral - No edema. Abdomen:   Soft, non-tender, bowel sounds are active. Neuro: A&O times 3, CN and motor grossly WNL    Labs:   Lab Results   Component Value Date/Time    Cholesterol, total 157 04/22/2019 03:33 PM    Cholesterol, total 157 06/04/2012 12:00 AM    HDL Cholesterol 55 04/22/2019 03:33 PM    HDL Cholesterol 55 06/04/2012 12:00 AM    LDL, calculated 69 04/22/2019 03:33 PM    LDL, calculated 84 06/04/2012 12:00 AM    Triglyceride 166 (H) 04/22/2019 03:33 PM    Triglyceride 90 06/04/2012 12:00 AM     No results found for: CPK, CPKX, CPX  Lab Results   Component Value Date/Time    Sodium 145 (H) 04/22/2019 03:33 PM    Potassium 3.5 04/22/2019 03:33 PM    Chloride 101 04/22/2019 03:33 PM    CO2 32 (H) 04/22/2019 03:33 PM    Anion gap 5 11/18/2018 09:21 AM    Glucose 172 (H) 04/22/2019 03:33 PM    BUN 11 04/22/2019 03:33 PM    Creatinine 1.10 (H) 04/22/2019 03:33 PM    BUN/Creatinine ratio 10 (L) 04/22/2019 03:33 PM    GFR est AA 60 04/22/2019 03:33 PM    GFR est non-AA 52 (L) 04/22/2019 03:33 PM    Calcium 9.5 04/22/2019 03:33 PM    Bilirubin, total 0.5 04/22/2019 03:33 PM    AST (SGOT) 11 04/22/2019 03:33 PM    Alk. phosphatase 131 (H) 04/22/2019 03:33 PM    Protein, total 6.8 04/22/2019 03:33 PM    Albumin 4.0 04/22/2019 03:33 PM    Globulin 4.7 (H) 11/18/2018 09:21 AM    A-G Ratio 1.4 04/22/2019 03:33 PM    ALT (SGPT) 14 04/22/2019 03:33 PM       EKG:  SR     Assessment:     Assessment:      1. Coronary artery disease involving native coronary artery of native heart without angina pectoris    2. Mixed hyperlipidemia    3. Obesity, morbid ([address]    4. Type 2 diabetes mellitus with complication, with long-term current use of insulin ([address]    5. Essential hypertension    6. RODRÍGUEZ (obstructive sleep apnea)    7. Cardiac murmur    8.  THOMPSON (dyspnea on exertion)        Orders Placed This Encounter    AMB POC EKG ROUTINE W/ 12 LEADS, INTER & REP     Order Specific Question:   Reason for Exam:     Answer:   ROUTINE        Plan:     Patient presents for follow up. Last visit was January 2019. Since then, she reports getting more shortwinded with activity, not not  Everyday. She is able to participate in water aerobics 1 hr at least twice a week. Has occasional leg swelling, not taking her thiazide diuretic as prescribed. In review of meds, she also reports taking Advair as needed or once a day only, not bid as prescribed. Has occasional positional dizziness with know equilibrium problem per her report as well. To note, he saw Dr Osmel Weinstein end of October for rib pain with cough. CXR was -ve for fracture and was recommended to take tylenol heat and ice. Today she said its feeling better. Noted that during that visit, Dr Osmel Weinstein increased Hydralazine to 50 mg TID, she is not doing this either       Nonobstructive CAD per cath in 2012  Negative NST in 8/17   Continue ASA, BB, statin  In light of progressive soto, will repeat lexiscan     Cardiac murmur   Normal EF with no significant valvular pathology per echo in August 2017  Will repeat echo     HTN  C/o dizziness---non orthostatic in clinic   Controlled with current therapy  Cr 1.10, stable per labs in 4/19  She will start taking her dyazide as prescribed, keep hydralazine 25 mg TID and has f/u with Dr Osmel Weinstein next month     HLD  4/19 LDL at 69. On high intensity statin.  Labs and lipids per PCP       DM  On Insulin therapy     RODRÍGUEZ  On cpap therapy    Asthma  On inhalers  Reiterated importance of adhering to medication regimen---advair BID versus PRN or daily dosing       Continue current care and f/u 6 mos or sooner if testing abnormal         Meghan Bright NP

## 2019-11-21 ENCOUNTER — PATIENT OUTREACH (OUTPATIENT)
Dept: INTERNAL MEDICINE CLINIC | Age: 69
End: 2019-11-21

## 2019-11-21 ENCOUNTER — OFFICE VISIT (OUTPATIENT)
Dept: URGENT CARE | Age: 69
End: 2019-11-21

## 2019-11-21 VITALS
TEMPERATURE: 98.1 F | BODY MASS INDEX: 42.51 KG/M2 | HEIGHT: 62 IN | SYSTOLIC BLOOD PRESSURE: 139 MMHG | OXYGEN SATURATION: 96 % | HEART RATE: 66 BPM | WEIGHT: 231 LBS | DIASTOLIC BLOOD PRESSURE: 63 MMHG | RESPIRATION RATE: 20 BRPM

## 2019-11-21 DIAGNOSIS — M54.41 ACUTE RIGHT-SIDED LOW BACK PAIN WITH RIGHT-SIDED SCIATICA: ICD-10-CM

## 2019-11-21 DIAGNOSIS — K11.20 SIALADENITIS: Primary | ICD-10-CM

## 2019-11-21 RX ORDER — CLINDAMYCIN HYDROCHLORIDE 300 MG/1
300 CAPSULE ORAL 3 TIMES DAILY
Qty: 30 CAP | Refills: 0 | Status: SHIPPED | OUTPATIENT
Start: 2019-11-21 | End: 2019-12-01

## 2019-11-21 RX ORDER — METHOCARBAMOL 500 MG/1
500 TABLET, FILM COATED ORAL
Qty: 20 TAB | Refills: 0 | Status: SHIPPED | OUTPATIENT
Start: 2019-11-21 | End: 2020-07-30

## 2019-11-21 NOTE — PROGRESS NOTES
Back Pain    The history is provided by the patient. This is a recurrent problem. Episode onset: 1 week ago. The problem has not changed since onset. The problem occurs constantly. The pain is associated with no known injury. The pain is present in the lower back and right side. The quality of the pain is described as aching and shooting. The pain radiates to the right thigh. The pain is mild. The symptoms are aggravated by twisting and bending. Associated symptoms include numbness and leg pain. Pertinent negatives include no chest pain, no fever, no headaches, no paresthesias, no paresis and no tingling. She has tried nothing for the symptoms. Cold Symptoms   This is a recurrent problem. Episode onset: 1 week ago. The problem occurs constantly. The problem has been gradually worsening. There has been no fever. Associated symptoms include sore throat. Pertinent negatives include no chest pain, no chills, no sweats, no ear congestion, no ear pain, no headaches, no rhinorrhea, no myalgias, no shortness of breath, no wheezing, no nausea and no vomiting. Associated symptoms comments: Bilateral salivary gland swelling R>L, recurrent known Warthin's Tumor infection. Smoker: Former.         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     Thyroid disease     Unspecified sleep apnea     uses CPAP        Past Surgical History:   Procedure Laterality Date    CARDIAC CATHETERIZATION  6/6/2012         CARDIAC SURG PROCEDURE UNLIST      heart surgery    HX HEENT      tonsillectomy    HX HEMORRHOIDECTOMY      HX HYSTERECTOMY      HX ORTHOPAEDIC  8/2011    left shoulder Family History   Problem Relation Age of Onset    Diabetes Mother     Cancer Mother     Breast Cancer Mother 79    Heart Disease Father     Hypertension Father     Stroke Father     Coronary Artery Disease Brother 54        Social History     Socioeconomic History    Marital status: SINGLE     Spouse name: Not on file    Number of children: Not on file    Years of education: Not on file    Highest education level: Not on file   Occupational History    Not on file   Social Needs    Financial resource strain: Not on file    Food insecurity:     Worry: Not on file     Inability: Not on file    Transportation needs:     Medical: Not on file     Non-medical: Not on file   Tobacco Use    Smoking status: Former Smoker     Last attempt to quit: 9/17/2004     Years since quitting: 15.1    Smokeless tobacco: Never Used   Substance and Sexual Activity    Alcohol use: No     Comment: CBD cream    Drug use: Yes     Types: Prescription, OTC    Sexual activity: Never   Lifestyle    Physical activity:     Days per week: Not on file     Minutes per session: Not on file    Stress: Not on file   Relationships    Social connections:     Talks on phone: Not on file     Gets together: Not on file     Attends Rastafari service: Not on file     Active member of club or organization: Not on file     Attends meetings of clubs or organizations: Not on file     Relationship status: Not on file    Intimate partner violence:     Fear of current or ex partner: Not on file     Emotionally abused: Not on file     Physically abused: Not on file     Forced sexual activity: Not on file   Other Topics Concern    Not on file   Social History Narrative    Not on file                ALLERGIES: Latex; Codeine; Contrast dye [iodine]; and Seafood [shellfish containing products]    Review of Systems   Constitutional: Negative for activity change, appetite change, chills and fever.    HENT: Positive for facial swelling and sore throat. Negative for congestion, ear pain, rhinorrhea, sinus pressure, sinus pain and trouble swallowing. Respiratory: Negative for cough, shortness of breath and wheezing. Cardiovascular: Negative for chest pain and palpitations. Gastrointestinal: Negative for nausea and vomiting. Musculoskeletal: Positive for back pain. Negative for myalgias. Neurological: Positive for numbness. Negative for dizziness, tingling, headaches and paresthesias. Hematological: Positive for adenopathy. Vitals:    11/21/19 1318   BP: 139/63   Pulse: 66   Resp: 20   Temp: 98.1 °F (36.7 °C)   SpO2: 96%   Weight: 231 lb (104.8 kg)   Height: 5' 2\" (1.575 m)       Physical Exam  Vitals signs and nursing note reviewed. Constitutional:       General: She is not in acute distress. Appearance: She is well-developed. She is not diaphoretic. HENT:      Head:      Salivary Glands: Right salivary gland is diffusely enlarged and tender. Left salivary gland is diffusely enlarged and tender. Right Ear: Tympanic membrane, ear canal and external ear normal.      Left Ear: Tympanic membrane, ear canal and external ear normal.      Nose: Nose normal.      Right Sinus: No maxillary sinus tenderness or frontal sinus tenderness. Left Sinus: No maxillary sinus tenderness or frontal sinus tenderness. Mouth/Throat:      Pharynx: No oropharyngeal exudate or posterior oropharyngeal erythema. Tonsils: No tonsillar abscesses. Cardiovascular:      Rate and Rhythm: Normal rate and regular rhythm. Heart sounds: Normal heart sounds. Pulmonary:      Effort: Pulmonary effort is normal. No respiratory distress. Breath sounds: Normal breath sounds. No wheezing or rales. Musculoskeletal:      Lumbar back: She exhibits decreased range of motion, tenderness, pain and spasm. She exhibits no bony tenderness, no swelling and no edema. Back:    Lymphadenopathy:      Cervical: Cervical adenopathy present. Neurological:      Mental Status: She is alert. Psychiatric:         Behavior: Behavior normal.         Thought Content: Thought content normal.         Judgment: Judgment normal.         MDM    ICD-10-CM ICD-9-CM   1. Sialadenitis K11.20 527.2   2. Acute right-sided low back pain with right-sided sciatica M54.41 724.2     724.3       Orders Placed This Encounter    methocarbamol (ROBAXIN) 500 mg tablet     Sig: Take 1 Tab by mouth two (2) times daily as needed (muscle spasm). Dispense:  20 Tab     Refill:  0    clindamycin (CLEOCIN) 300 mg capsule     Sig: Take 1 Cap by mouth three (3) times daily for 10 days. Dispense:  30 Cap     Refill:  0      Probiotics  Tylenol prn  The patient is to follow up with PCP. If signs and symptoms become worse the pt is to go to the ER.          Procedures

## 2019-11-21 NOTE — PROGRESS NOTES
[facility]gent [facility] visit is noted on today for c/o back pain, cold symptoms; prescribed clindamycin for Sialadenitis, robaxin for acute right sided low back pain with right sided sciatica. NN outreach to patient today to complete CCM follow-up; NN will perform patient outreach next week to complete CCM Assessments and medication reconciliation due to patient not feeling well today secondary to acute illness.

## 2019-11-21 NOTE — PATIENT INSTRUCTIONS
Salivary Gland Infection: Care Instructions  Your Care Instructions    Salivary glands make saliva, or spit. An infection in these glands can make the glands swell and hurt. An infection can happen when bacteria gets into the gland. This is more common in people who have diabetes, poor tooth care, or stones in these glands. Bacteria can build up and cause an infection if you don't get enough fluids. It can also happen if the flow of saliva gets blocked by a small stone in the gland. A virus can also cause an infection. Your care depends on the cause. If the problem is caused by bacteria, your doctor may prescribe antibiotics. Home treatment may help. You can drink more fluids or suck on sugar-free lemon drops to increase the flow of saliva. Follow-up care is a key part of your treatment and safety. Be sure to make and go to all appointments, and call your doctor if you are having problems. It's also a good idea to know your test results and keep a list of the medicines you take. How can you care for yourself at home? · If your doctor prescribed antibiotics, take them as directed. Do not stop taking them just because you feel better. You need to take the full course of antibiotics. · Take an over-the-counter pain medicine if needed, such as acetaminophen (Tylenol), ibuprofen (Advil, Motrin), or naproxen (Aleve). Be safe with medicines. Read and follow all instructions on the label. · Do not take two or more pain medicines at the same time unless the doctor told you to. Many pain medicines have acetaminophen, which is Tylenol. Too much acetaminophen (Tylenol) can be harmful. · Drink plenty of fluids, enough so that your urine is light yellow or clear like water. If you have kidney, heart, or liver disease and have to limit fluids, talk with your doctor before you increase the amount of fluids you drink. · Put an ice or heat pack (whichever feels better) on the swollen jaw for 10 to 20 minutes at a time. Put a thin cloth between the ice or heat pack and your skin. · Suck on ice chips or ice treats such as sugar-free flavored ice pops. Eat soft foods that do not have to be chewed much. · Use sugar-free gum or candies such as lemon drops. They increase saliva. · Avoid over-the-counter medicines that can give you a dry mouth. These medicines include antihistamines, such as diphenhydramine (Benadryl) or chlorpheniramine (Chlor-Trimeton). · Gently massage the infected gland. When should you call for help? Call your doctor now or seek immediate medical care if:    · You have symptoms of infection, such as:  ? Increased pain, swelling, warmth, or redness. ? Red streaks leading from the area. ? Pus draining from the area. ? A fever.     · You have new pain, or your pain is worse.    Watch closely for changes in your health, and be sure to contact your doctor if:    · You are not getting better as expected. Where can you learn more? Go to http://mynor-mary kay.info/. Enter F122 in the search box to learn more about \"Salivary Gland Infection: Care Instructions. \"  Current as of: October 21, 2018  Content Version: 12.2  © 0554-0319 [employer]. Care instructions adapted under license by [employer] (which disclaims liability or warranty for this information). If you have questions about a medical condition or this instruction, always ask your healthcare professional. [place] [address] any warranty or liability for your use of this information. Back Pain: Care Instructions  Your Care Instructions    Back pain has many possible causes. It is often related to problems with muscles and ligaments of the back. It may also be related to problems with the nerves, discs, or bones of the back. Moving, lifting, standing, sitting, or sleeping in an awkward way can strain the back. Sometimes you don't notice the injury until later.  Arthritis is another common cause of back pain. Although it may hurt a lot, back pain usually improves on its own within several weeks. Most people recover in 12 weeks or less. Using good home treatment and being careful not to stress your back can help you feel better sooner. Follow-up care is a key part of your treatment and safety. Be sure to make and go to all appointments, and call your doctor if you are having problems. It's also a good idea to know your test results and keep a list of the medicines you take. How can you care for yourself at home? · Sit or lie in positions that are most comfortable and reduce your pain. Try one of these positions when you lie down:  ? Lie on your back with your knees bent and supported by large pillows. ? Lie on the floor with your legs on the seat of a sofa or chair. ? Lie on your side with your knees and hips bent and a pillow between your legs. ? Lie on your stomach if it does not make pain worse. · Do not sit up in bed, and avoid soft couches and twisted positions. Bed rest can help relieve pain at first, but it delays healing. Avoid bed rest after the first day of back pain. · Change positions every 30 minutes. If you must sit for long periods of time, take breaks from sitting. Get up and walk around, or lie in a comfortable position. · Try using a heating pad on a low or medium setting for 15 to 20 minutes every 2 or 3 hours. Try a warm shower in place of one session with the heating pad. · You can also try an ice pack for 10 to 15 minutes every 2 to 3 hours. Put a thin cloth between the ice pack and your skin. · Take pain medicines exactly as directed. ? If the doctor gave you a prescription medicine for pain, take it as prescribed. ? If you are not taking a prescription pain medicine, ask your doctor if you can take an over-the-counter medicine. · Take short walks several times a day. You can start with 5 to 10 minutes, 3 or 4 times a day, and work up to longer walks.  Walk on level surfaces and avoid hills and stairs until your back is better. · Return to work and other activities as soon as you can. Continued rest without activity is usually not good for your back. · To prevent future back pain, do exercises to stretch and strengthen your back and stomach. Learn how to use good posture, safe lifting techniques, and proper body mechanics. When should you call for help? Call your doctor now or seek immediate medical care if:    · You have new or worsening numbness in your legs.     · You have new or worsening weakness in your legs. (This could make it hard to stand up.)     · You lose control of your bladder or bowels.    Watch closely for changes in your health, and be sure to contact your doctor if:    · You have a fever, lose weight, or don't feel well.     · You do not get better as expected. Where can you learn more? Go to http://mynor-mary kay.info/. Enter T573 in the search box to learn more about \"Back Pain: Care Instructions. \"  Current as of: June 26, 2019  Content Version: 12.2  © 9587-8756 [employer]. Care instructions adapted under license by [employer] (which disclaims liability or warranty for this information). If you have questions about a medical condition or this instruction, always ask your healthcare professional. [address] any warranty or liability for your use of this information.

## 2019-11-25 ENCOUNTER — OFFICE VISIT (OUTPATIENT)
Dept: ENDOCRINOLOGY | Age: 69
End: 2019-11-25

## 2019-11-25 VITALS
HEART RATE: 60 BPM | BODY MASS INDEX: 42.76 KG/M2 | SYSTOLIC BLOOD PRESSURE: 137 MMHG | WEIGHT: 232.4 LBS | DIASTOLIC BLOOD PRESSURE: 55 MMHG | HEIGHT: 62 IN

## 2019-11-25 DIAGNOSIS — Z79.4 TYPE 2 DIABETES MELLITUS WITH COMPLICATION, WITH LONG-TERM CURRENT USE OF INSULIN (HCC): Primary | ICD-10-CM

## 2019-11-25 DIAGNOSIS — E11.8 TYPE 2 DIABETES MELLITUS WITH COMPLICATION, WITH LONG-TERM CURRENT USE OF INSULIN (HCC): Primary | ICD-10-CM

## 2019-11-25 LAB — HBA1C MFR BLD HPLC: 10.1 %

## 2019-11-25 RX ORDER — INSULIN LISPRO 100 [IU]/ML
INJECTION, SOLUTION INTRAVENOUS; SUBCUTANEOUS
Qty: 25 ADJUSTABLE DOSE PRE-FILLED PEN SYRINGE | Refills: 3 | Status: SHIPPED | OUTPATIENT
Start: 2019-11-25 | End: 2019-12-04 | Stop reason: SDUPTHER

## 2019-11-25 NOTE — PROGRESS NOTES
Ms. Gilbert Dupont returns for follow-up of her type 2 diabetes mellitus with poor blood sugar control.    When we saw her last  she was on Toujeo insulin 56 units at bedtime, Humalog insulin 24 breakfast 20 for lunch and 35 for dinner and Metformin somewhere between 2 and 3 tablets daily. Leilani Adas renal function is normal and she had been making some improvements in her diet.  Unfortunately she is back to taking only one metformin a day because of GI distress. Milta Skiff is continued to take the insulin as noted above.  She has a recent A1c of 10.1%.  She is been notably decreased in physical activity and her diet is not as good as it had been. After considerable negotiation, she agreed that she will try to increase her physical activity to tolerance. She presents today with an A1c of 10.1%. Her blood sugars in the morning are outstanding ranging between 100-130. Unfortunately her blood sugars in the evening which we have presumed were before dinner are actually 2 to 3 hours after dinner. She apparently eats a very early dinner but waits until about 8:00 at night or occasionally 11:00 at night and checks her blood sugar and then takes her evening dose of insulin.  Breakfast is usually a boiled egg and toast.  Lunch is a sandwich with chips and fruit. Dinner can be a meat a starch and a non-starchy vegetable. Last night she had pork chops broccoli and rice. The night before that she had breaded chicken wings and [employer] fries. Again the blood sugars 2 to 3 hours after dinner are in the 350s explaining the A1c of 10.1%. Examination  Blood pressure 137/55  Pulse 60  Weight 232    Impression type 2 diabetes mellitus with persistently poor blood sugar control. Plan: I strongly encouraged her to take her mealtime insulin before or with the meal not 2 to 3 hours after the meal.  I have asked her to call me in 1 week with blood sugars on this new strategy and we will see her back in 3 months or sooner as needed.     We spent more than 25 mintutes face to face, more than 50% was in counseling regarding diet, exercise and carbohydrate intake.

## 2019-12-04 DIAGNOSIS — Z79.4 TYPE 2 DIABETES MELLITUS WITH COMPLICATION, WITH LONG-TERM CURRENT USE OF INSULIN (HCC): ICD-10-CM

## 2019-12-04 DIAGNOSIS — E11.8 TYPE 2 DIABETES MELLITUS WITH COMPLICATION, WITH LONG-TERM CURRENT USE OF INSULIN (HCC): ICD-10-CM

## 2019-12-04 RX ORDER — INSULIN LISPRO 100 [IU]/ML
INJECTION, SOLUTION INTRAVENOUS; SUBCUTANEOUS
Qty: 25 ADJUSTABLE DOSE PRE-FILLED PEN SYRINGE | Refills: 3 | Status: SHIPPED | OUTPATIENT
Start: 2019-12-04 | End: 2021-02-15 | Stop reason: SDUPTHER

## 2019-12-14 NOTE — PROGRESS NOTES
HISTORY OF PRESENT ILLNESShumalo Margretta Debora Collet is a 81 y.o. female.   HPI      F/u HTN hld asthma hx CVA-DM-2  Last a1c 10.1 last month   Sees Dr Felicia Atkins who recommended taking humalog prior to meals rather than 2 hrs later  fsbs around 200 ac meals--some and 120-160  norvasc and metoprolol were increased last OV for SBP elevation--  On neurontin tid for diabetic neuropathy  C/o callous lesion on left 4th finger that keeps recurring after it gets flat-no bleeding  Has chronic low back down that can radiate to either leg    Will get stress test next month due [place]  Enrolled in water aerobics classes  Last OV  Has established with Dr Felicia Atkins for DM-2 management--metformin restarted 1500,g every day and humalog dose has been adjusted by Dr Jan Ramirez  bs still up around 200 but down to 109 this morning  Metformin causes nausea and diarrhea        Last a1c 10.3    Patient Active Problem List    Diagnosis Date Noted    Type 2 diabetes mellitus with diabetic neuropathy ([facility] [address] 04/29/2019    Type 2 diabetes with nephropathy ([address] 12/14/2018    History of CVA (cerebrovascular accident) 07/13/2018    Obesity, morbid ([address] 01/25/2018    Warthin's tumor 07/01/2016    HTN (hypertension) 09/13/2013    Axillary hidradenitis suppurativa 08/07/2013    Esophageal reflux 01/15/2013    Renal failure, acute ([address] 01/13/2013    Asthma 12/14/2012    Myocardial ischemia 06/06/2012    Shortness of breath 06/06/2012    Coronary artery disease 06/06/2012    PVC's (premature ventricular contractions) 06/01/2012    DM type 2 (diabetes mellitus, type 2) ([address] 04/23/2012    Grave's disease 10/15/2010    DJD (degenerative joint disease) of hip 10/20/2009    DJD (degenerative joint disease) of knee 10/20/2009    CTS (Carpal Tunnel Syndrome)-b/l 10/20/2009    CVA (cerebral infarction) 10/20/2009    Diverticulosis 10/20/2009    Osteopenia 10/20/2009     Current Outpatient Medications   Medication Sig Dispense Refill    insulin lispro (HUMALOG KWIKPEN INSULIN) 100 unit/mL kwikpen 22 units for breakfast and lunch and 36 units for dinner 25 Adjustable Dose Pre-filled Pen Syringe 3    methocarbamol (ROBAXIN) 500 mg tablet Take 1 Tab by mouth two (2) times daily as needed (muscle spasm). 20 Tab 0    OTHER CBD cream apply to area once a day.  insulin glargine U-300 conc (TOUJEO SOLOSTAR U-300 INSULIN) 300 unit/mL (1.5 mL) inpn pen 56 Units by SubCUTAneous route nightly. 1 Adjustable Dose Pre-filled Pen Syringe 0    albuterol (PROAIR HFA) 90 mcg/actuation inhaler USE 1 INHALATION EVERY 4 HOURS AS NEEDED WHEEZING OR SHORTNESS OF BREATH 25.5 Inhaler 11    rosuvastatin (CRESTOR) 20 mg tablet TAKE 1 TABLET NIGHTLY 90 Tab 3    hydrALAZINE (APRESOLINE) 25 mg tablet Take 1 Tab by mouth three (3) times daily. 180 Tab 3    levothyroxine (SYNTHROID) 137 mcg tablet Take 137 mcg by mouth daily. 90 Tab 3    glucose blood VI test strips (ONETOUCH ULTRA BLUE TEST STRIP) strip Monitor blood sugar 3 times daily 300 Strip 3    losartan (COZAAR) 100 mg tablet Take 1 Tab by mouth daily. One tab daily 90 Tab 3    metoprolol tartrate (LOPRESSOR) 50 mg tablet TAKE 1 TABLET TWICE A  Tab 3    albuterol (ACCUNEB) 1.25 mg/3 mL nebu 3 mL by Nebulization route every four (4) hours as needed (wheezing). 225 mL 3    gabapentin (NEURONTIN) 100 mg capsule Take 1 Cap by mouth three (3) times daily. 90 Cap 3    triamterene-hydroCHLOROthiazide (DYAZIDE) 37.5-25 mg per capsule TAKE 1 CAPSULE TWICE A DAY (Patient taking differently: 1 Cap.) 180 Cap 3    metFORMIN ER (GLUCOPHAGE XR) 500 mg tablet TAKE 2 TABLETS BY MOUTH TWICE DAILY AFTER MEALS (Patient taking differently: Take 500 mg by mouth daily.) 360 Tab 10    amLODIPine (NORVASC) 10 mg tablet Take 1 Tab by mouth daily.  90 Tab 3    ADVAIR DISKUS 100-50 mcg/dose diskus inhaler USE 1 INHALATION TWICE A DAY (Patient taking differently: USE 1 INHALATION TWICE A DAY prn) 180 Each 3    BD INSULIN PEN NEEDLE UF SHORT 31 gauge x 5/16\" ndle       albuterol (PROVENTIL) 5 mg/mL nebulizer solution 0.5 mL by Nebulization route every four (4) hours as needed for Wheezing. 0.5 mL 0    aspirin delayed-release 81 mg tablet Take 81 mg by mouth daily.        Allergies   Allergen Reactions    Latex Itching    Codeine Itching and Other (comments)     hallucinate    Contrast Dye [Iodine] Rash and Itching     Given pre-cardiac cath    Seafood [Shellfish Containing Products] Swelling     Social History     Tobacco Use    Smoking status: Former Smoker     Last attempt to quit: 9/17/2004     Years since quitting: 15.2    Smokeless tobacco: Never Used   Substance Use Topics    Alcohol use: No     Comment: CBD cream      Lab Results   Component Value Date/Time    WBC 6.9 11/18/2018 09:21 AM    HGB 12.8 11/18/2018 09:21 AM    Hemoglobin (POC) 14.6 04/10/2015 12:41 PM    HCT 41.8 11/18/2018 09:21 AM    Hematocrit (POC) 43 04/10/2015 12:41 PM    PLATELET 472 95/16/1408 09:21 AM    MCV 78.4 (L) 11/18/2018 09:21 AM     Lab Results   Component Value Date/Time    Hemoglobin A1c 10.1 (H) 04/22/2019 03:33 PM    Hemoglobin A1c, External 10.6 11/19/2018    Hemoglobin A1c, External 9.4 05/20/2016    Hemoglobin A1c, External 8.9 08/17/2015 02:37 PM    Glucose 172 (H) 04/22/2019 03:33 PM    Glucose (POC) 129 (H) 09/22/2015 07:11 AM    Microalb/Creat ratio (ug/mg creat.) 38.7 (H) 04/26/2019 10:30 AM    LDL, calculated 69 04/22/2019 03:33 PM    Creatinine (POC) 0.9 04/10/2015 12:41 PM    Creatinine 1.10 (H) 04/22/2019 03:33 PM      Lab Results   Component Value Date/Time    Cholesterol, total 157 04/22/2019 03:33 PM    HDL Cholesterol 55 04/22/2019 03:33 PM    LDL, calculated 69 04/22/2019 03:33 PM    LDL-C, External 72 05/20/2016    Triglyceride 166 (H) 04/22/2019 03:33 PM     Lab Results   Component Value Date/Time    GFR est non-AA 52 (L) 04/22/2019 03:33 PM    GFRNA, POC >60 04/10/2015 12:41 PM    GFR est AA 60 04/22/2019 03:33 PM    GFRAA, POC >60 04/10/2015 12:41 PM    Creatinine 1.10 (H) 04/22/2019 03:33 PM    Creatinine (POC) 0.9 04/10/2015 12:41 PM    BUN 11 04/22/2019 03:33 PM    BUN (POC) 7 (L) 04/10/2015 12:41 PM    Sodium 145 (H) 04/22/2019 03:33 PM    Sodium (POC) 143 04/10/2015 12:41 PM    Potassium 3.5 04/22/2019 03:33 PM    Potassium (POC) 2.9 (L) 04/10/2015 12:41 PM    Chloride 101 04/22/2019 03:33 PM    Chloride (POC) 103 04/10/2015 12:41 PM    CO2 32 (H) 04/22/2019 03:33 PM    Magnesium 1.9 01/14/2013 04:30 AM     No results found for: TSH, TSH2, TSH3, TSHP, TSHELE, TSHEXT, TT3, T3U, T3UP, FRT3, FT3, FT4, FT4P, T4, T4P, FT4T, TT7, TSHEXT   Lab Results   Component Value Date/Time    Glucose 172 (H) 04/22/2019 03:33 PM    Glucose (POC) 129 (H) 09/22/2015 07:11 AM         ROS    Physical Exam  Vitals signs and nursing note reviewed. Constitutional:       Appearance: She is well-developed. Comments: Appears stated age   Cardiovascular:      Rate and Rhythm: Normal rate and regular rhythm. Heart sounds: Normal heart sounds. No murmur. No friction rub. No gallop. Pulmonary:      Effort: Pulmonary effort is normal. No respiratory distress. Breath sounds: Normal breath sounds. No wheezing. Abdominal:      General: Bowel sounds are normal.      Palpations: Abdomen is soft. Musculoskeletal:      Comments: No TTP of lower back, SLR neg b/l   Skin:     Comments: Hard raised callous on left fourth finger   Neurological:      Mental Status: She is alert. ASSESSMENT and PLAN  Diagnoses and all orders for this visit:    1. Controlled type 2 diabetes mellitus with complication, with long-term current use of insulin (HCC)  -     METABOLIC PANEL, COMPREHENSIVE   uncontorlled   Increase toujeo to 60 units every day   Work on diet   F/u Dr Mark Arredondo   \ Pharmacist to meet with pt today-[facility]  2.  Essential hypertension  -     CBC W/O DIFF  -     METABOLIC PANEL, COMPREHENSIVE   Reasonable control, labile but most at goal, no changes inmedicines today  3. Pure hypercholesterolemia  -     METABOLIC PANEL, COMPREHENSIVE  -     TSH 3RD GENERATION    4. Skin lesion of left upper extremity  -     REFERRAL TO DERMATOLOGY  -     REFERRAL TO DERMATOLOGY   ? bx left 4th finger callous lesion  5. Encounter for hepatitis C screening test for low risk patient  -     HEPATITIS C AB      Follow-up and Dispositions    · Return in about 4 months (around 4/16/2020) for dm-2 htn hld hx cva.

## 2019-12-16 ENCOUNTER — OFFICE VISIT (OUTPATIENT)
Dept: INTERNAL MEDICINE CLINIC | Age: 69
End: 2019-12-16

## 2019-12-16 ENCOUNTER — HOSPITAL ENCOUNTER (OUTPATIENT)
Dept: LAB | Age: 69
Discharge: HOME | End: 2019-12-16
Payer: MEDICARE

## 2019-12-16 VITALS
SYSTOLIC BLOOD PRESSURE: 159 MMHG | DIASTOLIC BLOOD PRESSURE: 66 MMHG | WEIGHT: 235 LBS | HEART RATE: 69 BPM | TEMPERATURE: 97.7 F | RESPIRATION RATE: 16 BRPM | OXYGEN SATURATION: 95 % | HEIGHT: 62 IN | BODY MASS INDEX: 43.24 KG/M2

## 2019-12-16 DIAGNOSIS — E78.00 PURE HYPERCHOLESTEROLEMIA: ICD-10-CM

## 2019-12-16 DIAGNOSIS — I10 ESSENTIAL HYPERTENSION: ICD-10-CM

## 2019-12-16 DIAGNOSIS — E11.8 CONTROLLED TYPE 2 DIABETES MELLITUS WITH COMPLICATION, WITH LONG-TERM CURRENT USE OF INSULIN (HCC): Primary | ICD-10-CM

## 2019-12-16 DIAGNOSIS — Z11.59 ENCOUNTER FOR HEPATITIS C SCREENING TEST FOR LOW RISK PATIENT: ICD-10-CM

## 2019-12-16 DIAGNOSIS — L98.9 SKIN LESION OF LEFT UPPER EXTREMITY: ICD-10-CM

## 2019-12-16 DIAGNOSIS — Z79.4 CONTROLLED TYPE 2 DIABETES MELLITUS WITH COMPLICATION, WITH LONG-TERM CURRENT USE OF INSULIN (HCC): Primary | ICD-10-CM

## 2019-12-16 PROCEDURE — 85027 COMPLETE CBC AUTOMATED: CPT

## 2019-12-16 PROCEDURE — 86803 HEPATITIS C AB TEST: CPT

## 2019-12-16 PROCEDURE — 84443 ASSAY THYROID STIM HORMONE: CPT

## 2019-12-16 PROCEDURE — 36415 COLL VENOUS BLD VENIPUNCTURE: CPT

## 2019-12-16 PROCEDURE — 80053 COMPREHEN METABOLIC PANEL: CPT

## 2019-12-16 NOTE — PATIENT INSTRUCTIONS
Office Policies    Phone calls/patient messages:            Please allow up to 24 hours for someone in the office to contact you about your call or message. Be mindful your provider may be out of the office or your message may require further review. We encourage you to use [employer] for your messages as this is a faster, more efficient way to communicate with our office                         Medication Refills:            Prescription medications require 48-72 business hours to process. We encourage you to use [employer] for your refills. For controlled medications: Please allow 72 business hours to process. Certain medications may require you to  a written prescription at our office. NO narcotic/controlled medications will be prescribed after 4pm Monday through Friday or on weekends              Form/Paperwork Completion:            Please note a $25 fee may incur for all paperwork for completed by our providers. We ask that you allow 7-10 business days. Pre-payment is due prior to picking up/faxing the completed form. You may also download your forms to [employer] to have your doctor print off.

## 2019-12-16 NOTE — PROGRESS NOTES
Pharmacy Progress Note - Medication Review    S/O: Ms. Knutson Reason 81 y.o. female, with a PMH of CAD, PVC, Hx of MI and CVA, T2DM with nephropathy and neuropathy, Grave's disease, Diverticulosis, GERD, Asthma, DJD, referred by Dr. Gerry Aguilar MD for medication review. She was seen following her PCP appointment today. Patient ambulates with a rollator. Pt will have updated labs collected following this visit today. - Reports she is now taking gabapentin 100 mg TID. Helping with her peripheral neuropathy. Denies any issues w/ drowsiness/falls. - Inquires about the difference between hydralazine 25 mg TID and HCTZ (combined with triamterene). Thinks these are the same medications. Endorses hx of not taking hydralazine. Restarted this medication about 2-3 weeks ago. - Endorses compliance to lopressor 50 mg BID, losartan 50 mg daily.     - Has robaxin 500 mg PRN at home. Denies needing to take this medication.      - Last A1c was 10.1% (Nov 2019). Currently taking Humalog 24 units with breakfast, 20 units for lunch, and 35 units for dinner and Toujeo 56 units at HS. Metformin 500 mg ER - 1-2 tabs daily depending on her GI.   - Dr. Sangita Marrufo increased Toujeo to 60 units daily today. - Note she also uses CBD oil. Currently out at this time.      Wt Readings from Last 3 Encounters:   12/16/19 235 lb (106.6 kg)   11/25/19 232 lb 6.4 oz (105.4 kg)   11/21/19 231 lb (104.8 kg)     BP Readings from Last 3 Encounters:   12/16/19 159/66   11/25/19 137/55   11/21/19 139/63     Pulse Readings from Last 3 Encounters:   12/16/19 69   11/25/19 60   11/21/19 66       Past Medical History:   Diagnosis Date    Asthma     CAD (coronary artery disease)     \"mild\" per Dr Minh Chavez note    Diabetes [facility])     Dr Ruthie Bang     Hypertension     Screening for colon cancer 2/16/05    Dr Radha Bob in 10 years    Stroke [facility]) 2004    Rt side weaker than left, uses a cane: no longer followed by neuro    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     Thyroid disease     Unspecified sleep apnea     uses CPAP     Allergies   Allergen Reactions    Latex Itching    Codeine Itching and Other (comments)     hallucinate    Contrast Dye [Iodine] Rash and Itching     Given pre-cardiac cath    Seafood [Shellfish Containing Products] Swelling     Current Outpatient Medications   Medication Sig    insulin lispro (HUMALOG KWIKPEN INSULIN) 100 unit/mL kwikpen 22 units for breakfast and lunch and 36 units for dinner    insulin glargine U-300 conc (TOUJEO SOLOSTAR U-300 INSULIN) 300 unit/mL (1.5 mL) inpn pen 56 Units by SubCUTAneous route nightly.  albuterol (PROAIR HFA) 90 mcg/actuation inhaler USE 1 INHALATION EVERY 4 HOURS AS NEEDED WHEEZING OR SHORTNESS OF BREATH    rosuvastatin (CRESTOR) 20 mg tablet TAKE 1 TABLET NIGHTLY    hydrALAZINE (APRESOLINE) 25 mg tablet Take 1 Tab by mouth three (3) times daily.  levothyroxine (SYNTHROID) 137 mcg tablet Take 137 mcg by mouth daily.  glucose blood VI test strips (ONETOUCH ULTRA BLUE TEST STRIP) strip Monitor blood sugar 3 times daily    metoprolol tartrate (LOPRESSOR) 50 mg tablet TAKE 1 TABLET TWICE A DAY    albuterol (ACCUNEB) 1.25 mg/3 mL nebu 3 mL by Nebulization route every four (4) hours as needed (wheezing).  gabapentin (NEURONTIN) 100 mg capsule Take 1 Cap by mouth three (3) times daily.  triamterene-hydroCHLOROthiazide (DYAZIDE) 37.5-25 mg per capsule TAKE 1 CAPSULE TWICE A DAY (Patient taking differently: 1 Cap.)    metFORMIN ER (GLUCOPHAGE XR) 500 mg tablet TAKE 2 TABLETS BY MOUTH TWICE DAILY AFTER MEALS (Patient taking differently: Take 500 mg by mouth daily.)    amLODIPine (NORVASC) 10 mg tablet Take 1 Tab by mouth daily.     ADVAIR DISKUS 100-50 mcg/dose diskus inhaler USE 1 INHALATION TWICE A DAY (Patient taking differently: USE 1 INHALATION TWICE A DAY prn)    BD INSULIN PEN NEEDLE UF SHORT 31 gauge x 5/16\" ndle     aspirin delayed-release 81 mg tablet Take 81 mg by mouth daily.  methocarbamol (ROBAXIN) 500 mg tablet Take 1 Tab by mouth two (2) times daily as needed (muscle spasm).  OTHER CBD cream apply to area once a day.  losartan (COZAAR) 100 mg tablet Take 1 Tab by mouth daily. One tab daily     No current facility-administered medications for this visit. Lab Results   Component Value Date/Time    Sodium 145 (H) 04/22/2019 03:33 PM    Potassium 3.5 04/22/2019 03:33 PM    Chloride 101 04/22/2019 03:33 PM    CO2 32 (H) 04/22/2019 03:33 PM    Anion gap 5 11/18/2018 09:21 AM    Glucose 172 (H) 04/22/2019 03:33 PM    BUN 11 04/22/2019 03:33 PM    Creatinine 1.10 (H) 04/22/2019 03:33 PM    BUN/Creatinine ratio 10 (L) 04/22/2019 03:33 PM    GFR est AA 60 04/22/2019 03:33 PM    GFR est non-AA 52 (L) 04/22/2019 03:33 PM    Calcium 9.5 04/22/2019 03:33 PM     Lab Results   Component Value Date/Time    Protein, total 6.8 04/22/2019 03:33 PM    Albumin 4.0 04/22/2019 03:33 PM       Lab Results   Component Value Date/Time    Hemoglobin A1c 10.1 (H) 04/22/2019 03:33 PM    Hemoglobin A1c, External 10.6 11/19/2018    Hemoglobin A1c, External 9.4 05/20/2016    Hemoglobin A1c, External 8.9 08/17/2015 02:37 PM     A/P:  - BP elevated for goal of <130/80 today. - Discussed difference between hydralazine and HCTZ. Recommend patient take these medications as directed. - Emphasized new dosage adjustment to Toujeo today. - Encourage patient to reach out with any future medication related questions. - Pt endorsed understanding of the information provided. All questions were answered. Thank you for the consult,  Cierra Moya, PharmD, BCACP, CDE     Medication reconciliation was completed today.   Medications Discontinued During This Encounter   Medication Reason    albuterol (PROVENTIL) 5 mg/mL nebulizer solution Duplicate Order

## 2019-12-17 LAB
ALBUMIN SERPL-MCNC: 3.8 G/DL (ref 3.6–4.8)
ALBUMIN/GLOB SERPL: 1.1 {RATIO} (ref 1.2–2.2)
ALP SERPL-CCNC: 136 IU/L (ref 39–117)
ALT SERPL-CCNC: 14 IU/L (ref 0–32)
AST SERPL-CCNC: 10 IU/L (ref 0–40)
BILIRUB SERPL-MCNC: 0.4 MG/DL (ref 0–1.2)
BUN SERPL-MCNC: 11 MG/DL (ref 8–27)
BUN/CREAT SERPL: 11 (ref 12–28)
CALCIUM SERPL-MCNC: 9.7 MG/DL (ref 8.7–10.3)
CHLORIDE SERPL-SCNC: 100 MMOL/L (ref 96–106)
CO2 SERPL-SCNC: 24 MMOL/L (ref 20–29)
CREAT SERPL-MCNC: 0.99 MG/DL (ref 0.57–1)
ERYTHROCYTE [DISTWIDTH] IN BLOOD BY AUTOMATED COUNT: 15.5 % (ref 12.3–15.4)
GLOBULIN SER CALC-MCNC: 3.4 G/DL (ref 1.5–4.5)
GLUCOSE SERPL-MCNC: 173 MG/DL (ref 65–99)
HCT VFR BLD AUTO: 39 % (ref 34–46.6)
HCV AB S/CO SERPL IA: <0.1 S/CO RATIO (ref 0–0.9)
HGB BLD-MCNC: 11.8 G/DL (ref 11.1–15.9)
MCH RBC QN AUTO: 23.3 PG (ref 26.6–33)
MCHC RBC AUTO-ENTMCNC: 30.3 G/DL (ref 31.5–35.7)
MCV RBC AUTO: 77 FL (ref 79–97)
PLATELET # BLD AUTO: 221 X10E3/UL (ref 150–450)
POTASSIUM SERPL-SCNC: 3.6 MMOL/L (ref 3.5–5.2)
PROT SERPL-MCNC: 7.2 G/DL (ref 6–8.5)
RBC # BLD AUTO: 5.06 X10E6/UL (ref 3.77–5.28)
SODIUM SERPL-SCNC: 144 MMOL/L (ref 134–144)
TSH SERPL DL<=0.005 MIU/L-ACNC: 2.67 UIU/ML (ref 0.45–4.5)
WBC # BLD AUTO: 6.2 X10E3/UL (ref 3.4–10.8)

## 2019-12-19 ENCOUNTER — PATIENT OUTREACH (OUTPATIENT)
Dept: INTERNAL MEDICINE CLINIC | Age: 69
End: 2019-12-19

## 2019-12-19 NOTE — PROGRESS NOTES
- OV with PCP 12/16/19 is noted, patient also consulted with Ambulatory PharmD during [address] for medication review; advised to increase toujeo to 60 units every day, labs ordered, referral to dermatology ordered for skin lesion of left upper extremity, f/u with PCP 4 months. NN outreach to patient today to complete CCM follow-up; patient currently has company and requests NN outreach on a later date. Patient agreeable to NN outreach week of 12/30.

## 2020-01-10 ENCOUNTER — PATIENT OUTREACH (OUTPATIENT)
Dept: INTERNAL MEDICINE CLINIC | Age: 70
End: 2020-01-10

## 2020-01-10 NOTE — PROGRESS NOTES
Ambulatory Care Management Note    Date/Time:  1/10/2020 3:57 PM    This Ambulatory Care Manager (ACM) reviewed and updated the following screenings during this call; history, fall risk assessment, depression screening, general assessment, disease specific assessment, ACP assessment and note. Patient's challenges to self management identified:   level of motivation, medication management    Advance Care Planning:   Does patient have an Advance Directive:  not on file; education provided; will resend 'Right to Decide' and blank advance medical directive, per patient request.        Health Maintenance Due   Topic Date Due    DTaP/Tdap/Td series (1 - Tdap) 12/12/1961    Shingrix Vaccine Age 50> (1 of 2) 12/12/2002    FOOT EXAM Q1  01/24/2020     Health Maintenance reviewed - not reviewed during this outreach encounter; outreach encounter was brief per patient request due to josue arrived at her home during this call. Patient was asked to consider health care goals that they would like to focus on with this ACM. ACM will follow up with patient to further discuss goals and care plan in the next 7-14 days. Goals Addressed                 This Visit's Progress       Chronic Disease     Advance Care Planning   On track     1/10/20  - pt requests that ACP information be re-mailed to her; information mailed to patient today, per pt request    10/29/19  - blank AMD and 'right to decide' literature was mailed to patient by this NN on 10/16/19 ; pt confirms that she received this in the mail, however she has not yet reviewed it  - Plan: patient will review 'right to decide' literature and blank AMD document this week; NN will f/u with patient next week to address any questions/concerns. NN requested that, if completed, patient provide a copy of AMD to PCP office.  Knowledge and adherence of prescribed medication (ie. action, side effects, missed dose, etc.).    On track     1/10/20  - reports taking humalog 24 units before breakfast (if blood glucose is <150 mg/dL she takes 22 units), 24 units before lunch, 36 units before dinner and Toujeo 60 units nightly. Most recent endocrinology OV note reviewed; most recent Humalog order (11/25/19) - 22 units for breakfast and lunch and 36 units for dinner and Toujeo 60 units nightly (12/16/19), pt was noted to be taking mealtime insulin 2-3 hours after meal . Advised patient outreach to Dr. Gail Duke to confirm/clarify current insulin unit dose orders. 10/29/19  - patient awaiting outreach from Ambulatory PharmD  - started taking Gabapentin 10/16; reports numbness/tingling in feet is improved since starting Gabapentin. Denies side effects denies worsened lower extremity edema from baseline, fatigue, dizziness, nausea, vomiting    10/16/19  - not taking Gabapentin and reports that she never began taking Gabapentin when it was prescribed (ordered by Dr. Gail Duke 3/21/19 for neuropathic symptoms); states, \"I looked it up and got scared of it. \" Patient reports continued neuropathic symptoms in bilateral feet. Will refer to Ambulatory PharmD to further address patient questions/concerns. - taking hydralazine 25 mg daily (ordered by Dr. Byron Ku 6/21/19 for Hydralazine 25 mg tablet TID\"; reports only taking once daily \"because I take another fluid pill\"   - has been taking Humalog 36 units with dinner (most recent OV note by Dr. Gail Duke reviewed and ordered unit dose with dinner is 35 units); NN informed patient today and patient verbalizes understanding   - Reports adherence with losartan, however has questions; \"is that the one I've been seeing on [facility] [place].\"  - will refer patient to Ambulatory PharmD for medication management, address multiple patient questions/concerns re: current medications; pt is agreeable to this plan         Diabetes     Patient verbalizes understanding of self -management goals of living with Diabetes.    On track     1/10/20  - Hgb A1c 11/25/19 - 10.1%  - pt notes history of Type 2 DM for 15 years  - checking blood glucose 1-2x/day  - ordered insulin regimen/frequency is Humalog TID (before breakfast, lunch and dinner) and Toujeo nightly  - reports taking humalog 24 units before breakfast (if blood glucose is <150 mg/dL she takes 22 units), 24 units before lunch, 36 units before dinner and Toujeo 60 units nightly. Most recent endocrinology OV note reviewed; most recent Humalog order (11/25/19) - 22 units for breakfast and lunch and 36 units for dinner and Toujeo 60 units nightly (12/16/19), pt was noted to be taking mealtime insulin 2-3 hours after meal . Current ordered insulin dose requires further clarification; patient is advised to contact Dr. Jennie Lundberg to confirm/clarify current insulin unit dose orders. - per last endocrinology OV note, pt was encouraged to improve diet and physical activity to tolerance  - encouraged to take mealtime insulin prior to eating meal  - encouraged to check blood glucose TID as ordered and keep blood glucose log      10/29/19  8/14/19-10.9%  4/22/19 - Hgb A1c 10.1%  - Current level of understanding: checks blood sugar once daily before breakfast. Completed Disease Specific CM Program for Diabetes Self-Management (avani/ Jan Christine, RN NN CDE) on 8/27/19. Does not keep written blood glucose log; reports that glucometer stores historical blood glucose results. Pt reported fasting blood glucose on 10/28 of 169 mg/dL and on 10/29 of 93 mg/dL. Pt notes difficulty adhering to diabetic diet - enjoys pasta, potatoes, chips - states, \"my problem is food. I'm not that strong on sweets. \"; pt reported barrier to diabetic diet adherence is financial. Does not drink regular soda; drinks diet coke (no more than 2/day) and water. Currently exercising two times/week for 60-90 minutes- water aerobics. - Desired Outcome: Improve diet and exercise, decrease carbohydrate intake. Lower Hgb A1c.  - Plan: Further assess financial barrier(s).  Will continue provide and reinforce pt education re: diabetic diet, assist with meal planning. Pt could benefit from keeping a food diary; will discuss further during subsequent outreach encounter as pt is unable to continue call at this time due to getting ready to leave her house.               PCP/Specialist follow up:   Future Appointments   Date Time Provider Ovidio Allan   1/24/2020  9:00 AM ECHO, [address]   1/24/2020 10:00 AM NUCLEAR, [facility] SCHED   1/27/2020 10:00 AM NUCLEAR, [facility] SCH[facility]   2/3/2020 10:00 AM Triny Bates MD [facility] SCH[facility]   2/28/2020  1:50 PM Modesta Dixon MD RDE  [facility]   5/27/2020  3:15 PM Asa Parr MD [address] [facility]

## 2020-01-22 ENCOUNTER — TELEPHONE (OUTPATIENT)
Dept: CARDIOLOGY CLINIC | Age: 70
End: 2020-01-22

## 2020-01-22 NOTE — TELEPHONE ENCOUNTER
Called patient to confirm Nuclear stress test for 01/24. Reviewed with patient the need to hold all caffeine containing food, beverages and medicines for 24 hours. Pt verbalized understanding.

## 2020-01-23 ENCOUNTER — TELEPHONE (OUTPATIENT)
Dept: ENDOCRINOLOGY | Age: 70
End: 2020-01-23

## 2020-01-23 NOTE — TELEPHONE ENCOUNTER
----- Message from Raquel Hart sent at 1/23/2020 12:32 PM EST -----  Regarding: Dr. Rudy Ni  Patient return call    Caller's first and last name and relationship (if not the patient):  Patsy Leader, [address] [place] [address] contact number(s):  [phone]    Whose call is being returned: The office    Details to clarify the request:  Returning a miss call from the office inquiring if it was in regards to this pt.      Raquel Hart

## 2020-01-23 NOTE — TELEPHONE ENCOUNTER
----- Message from Rica Torieverette sent at 1/23/2020 11:16 AM EST -----  Regarding: /telephone  General Message/Vendor Calls    Caller's first and last name:      Reason for call: Mazin Smith from [place] stated he needs a progress note for  the pt for 12/15/18 to 12/23/19. Callback required yes/no and why: yes.       Best contact number(s):[phone] fax - [phone]      Details to clarify the request:

## 2020-01-24 ENCOUNTER — TELEPHONE (OUTPATIENT)
Dept: CARDIOLOGY CLINIC | Age: 70
End: 2020-01-24

## 2020-01-24 NOTE — TELEPHONE ENCOUNTER
Received a fax from [facility] requesting office notes. Notes faxed on 1/23/2020. No further actions required.

## 2020-01-28 ENCOUNTER — TELEPHONE (OUTPATIENT)
Dept: CARDIOLOGY CLINIC | Age: 70
End: 2020-01-28

## 2020-01-28 NOTE — TELEPHONE ENCOUNTER
----- Message from Dwain Morales NP sent at 1/27/2020  2:25 PM EST -----  please advise the patient there is no evidence of active blood flow problems to the patient's heart

## 2020-02-03 ENCOUNTER — OFFICE VISIT (OUTPATIENT)
Dept: SLEEP MEDICINE | Age: 70
End: 2020-02-03

## 2020-02-03 ENCOUNTER — DOCUMENTATION ONLY (OUTPATIENT)
Dept: SLEEP MEDICINE | Age: 70
End: 2020-02-03

## 2020-02-03 VITALS
DIASTOLIC BLOOD PRESSURE: 66 MMHG | HEART RATE: 70 BPM | HEIGHT: 62 IN | SYSTOLIC BLOOD PRESSURE: 128 MMHG | BODY MASS INDEX: 43.43 KG/M2 | OXYGEN SATURATION: 96 % | WEIGHT: 236 LBS

## 2020-02-03 DIAGNOSIS — E11.8 TYPE 2 DIABETES MELLITUS WITH COMPLICATION, WITH LONG-TERM CURRENT USE OF INSULIN (HCC): ICD-10-CM

## 2020-02-03 DIAGNOSIS — Z79.4 TYPE 2 DIABETES MELLITUS WITH COMPLICATION, WITH LONG-TERM CURRENT USE OF INSULIN (HCC): ICD-10-CM

## 2020-02-03 DIAGNOSIS — I10 ESSENTIAL HYPERTENSION: ICD-10-CM

## 2020-02-03 DIAGNOSIS — G47.33 OBSTRUCTIVE SLEEP APNEA (ADULT) (PEDIATRIC): Primary | ICD-10-CM

## 2020-02-03 RX ORDER — PREDNISONE 5 MG/1
TABLET ORAL
COMMUNITY
Start: 2020-01-11 | End: 2020-07-30

## 2020-02-03 RX ORDER — GABAPENTIN 100 MG/1
100 CAPSULE ORAL 3 TIMES DAILY
Qty: 90 CAP | Refills: 3 | Status: SHIPPED | OUTPATIENT
Start: 2020-02-03 | End: 2020-07-30

## 2020-02-03 NOTE — TELEPHONE ENCOUNTER
Requested Prescriptions     Pending Prescriptions Disp Refills    gabapentin (NEURONTIN) 100 mg capsule 90 Cap 3     Sig: Take 1 Cap by mouth three (3) times daily.

## 2020-02-03 NOTE — PATIENT INSTRUCTIONS
[facility] [place]. [place], [address]  Tel.  [phone]  Fax. [address] [facility] [address]  [place], [address]  Tel.  [phone]  Fax. [phone] [address]  Tel.  [phone]  Fax. [phone]     PROPER SLEEP HYGIENE    What to avoid  · Do not have drinks with caffeine, such as coffee or black tea, for 8 hours before bed. · Do not smoke or use other types of tobacco near bedtime. Nicotine is a stimulant and can keep you awake. · Avoid drinking alcohol late in the evening, because it can cause you to wake in the middle of the night. · Do not eat a big meal close to bedtime. If you are hungry, eat a light snack. · Do not drink a lot of water close to bedtime, because the need to urinate may wake you up during the night. · Do not read or watch TV in bed. Use the bed only for sleeping and sexual activity. What to try  · Go to bed at the same time every night, and wake up at the same time every morning. Do not take naps during the day. · Keep your bedroom quiet, dark, and cool. · Get regular exercise, but not within 3 to 4 hours of your bedtime. .  · Sleep on a comfortable pillow and mattress. · If watching the clock makes you anxious, turn it facing away from you so you cannot see the time. · If you worry when you lie down, start a worry book. Well before bedtime, write down your worries, and then set the book and your concerns aside. · Try meditation or other relaxation techniques before you go to bed. · If you cannot fall asleep, get up and go to another room until you feel sleepy. Do something relaxing. Repeat your bedtime routine before you go to bed again. · Make your house quiet and calm about an hour before bedtime. Turn down the lights, turn off the TV, log off the computer, and turn down the volume on music. This can help you relax after a busy day.     Drowsy Driving  The [address] [place] [address] Road Traffic Safety Administration cites drowsiness as a causing factor in more than 155,908 police reported crashes annually, resulting in 76,000 injuries and 1,500 deaths. Other surveys suggest 55% of people polled have driven while drowsy in the past year, 23% had fallen asleep but not crashed, 3% crashed, and 2% had and accident due to drowsy driving. Who is at risk? Young Drivers: One study of drowsy driving accidents states that 55% of the drivers were under 25 years. Of those, 75% were male. Shift Workers and Travelers: People who work overnight or travel across time zones frequently are at higher risk of experiencing Circadian Rhythm Disorders. They are trying to work and function when their body is programed to sleep. Sleep Deprived: Lack of sleep has a serious impact on your ability to pay attention or focus on a task. Consistently getting less than the average of 8 hours your body needs creates partial or cumulative sleep deprivation. Untreated Sleep Disorders: Sleep Apnea, Narcolepsy, R.L.S., and other sleep disorders (untreated) prevent a person from getting enough restful sleep. This leads to excessive daytime sleepiness and increases the risk for drowsy driving accidents by up to 7 times. Medications / Alcohol: Even over the counter medications can cause drowsiness. Medications that impair a drivers attention should have a warning label. Alcohol naturally makes you sleepy and on its own can cause accidents. Combined with excessive drowsiness its effects are amplified. Signs of Drowsy Driving:   * You don't remember driving the last few miles   * You may drift out of your blake   * You are unable to focus and your thoughts wander   * You may yawn more often than normal   * You have difficulty keeping your eyes open / nodding off   * Missing traffic signs, speeding, or tailgating  Prevention-   Good sleep hygiene, lifestyle and behavioral choices have the most impact on drowsy driving.  There is no substitute for sleep and the average person requires 8 hours nightly. If you find yourself driving drowsy, stop and sleep. Consider the sleep hygiene tips provided during your visit as well. Medication Refill Policy: Refills for all medications require 1 week advance notice. Please have your pharmacy fax a refill request. We are unable to fax, or call in \"controled substance\" medications and you will need to pick these prescriptions up from our office. [employer] Activation    Thank you for requesting access to [employer]. Please follow the instructions below to securely access and download your online medical record. [employer] allows you to send messages to your doctor, view your test results, renew your prescriptions, schedule appointments, and more. How Do I Sign Up? 1. In your internet browser, go to [email or web address]. 2. Click on the First Time User? Click Here link in the Sign In box. You will see the New Member Sign Up page. 3. Enter your [employer] Access Code exactly as it appears below. You will not need to use this code after youve completed the sign-up process. If you do not sign up before the expiration date, you must request a new code. [employer] Access Code: [email or web address]  Expires: 3/19/2020 10:11 AM (This is the date your [employer] access code will )    4. Enter the last four digits of your Social Security Number (xxxx) and Date of Birth (mm/dd/yyyy) as indicated and click Submit. You will be taken to the next sign-up page. 5. Create a [employer] ID. This will be your [employer] login ID and cannot be changed, so think of one that is secure and easy to remember. 6. Create a [employer] password. You can change your password at any time. 7. Enter your Password Reset Question and Answer. This can be used at a later time if you forget your password. 8. Enter your e-mail address. You will receive e-mail notification when new information is available in [address] 9. Click Sign Up.  You can now view and download portions of your medical record. 10. Click the Download Summary menu link to download a portable copy of your medical information. Additional Information    If you have questions, please call [phone]. Remember, [employer] is NOT to be used for urgent needs. For medical emergencies, dial 911.

## 2020-02-03 NOTE — PROGRESS NOTES
[address] [facility] [place]. [place], [address]  Tel.  [phone]  Fax. 100 [facility] 60  [place], [address] [facility]  Tel.  [phone]  Fax. [phone] [address] [facility] Nikita Crenshaw   Tel.  [phone]  Fax. [phone]     S>Makayla Amparo Aishwarya Mccullough is a 81 y.o. female seen for a positive airway pressure follow-up. She reports no problems using the device. The following problems are identified:    Drowsiness no Problems exhaling no   Snoring no Forget to put on no   Mask Comfortable yes Can't fall asleep no   Dry Mouth no Mask falls off no   Air Leaking no Frequent awakenings no     Download reviewed. She admits that her sleep has improved. Therapy Apnea Index averaged over PAP use: 2 /hr which reflects significantly improved sleep breathing condition. Allergies   Allergen Reactions    Latex Itching    Codeine Itching and Other (comments)     hallucinate    Contrast Dye [Iodine] Rash and Itching     Given pre-cardiac cath    Seafood [Shellfish Containing Products] Swelling       She has a current medication list which includes the following prescription(s): prednisone, insulin glargine u-300 conc, insulin lispro, methocarbamol, OTHER, albuterol, rosuvastatin, hydralazine, levothyroxine, glucose blood vi test strips, losartan, metoprolol tartrate, albuterol, gabapentin, triamterene-hydrochlorothiazide, metformin er, amlodipine, advair diskus, bd ultra-fine short pen needle, and aspirin delayed-release. .      She  has a past medical history of Asthma, CAD (coronary artery disease), Diabetes ([address] Hypertension, Screening for colon cancer (2/16/05), Stroke ([facility] (2004), Thromboembolus ([facility] (1976), Thyroid disease, and Unspecified sleep apnea. Essie Sleepiness Score: 4   and Modified F.O.S.Q. Score Total / 2: 19   which reflect improved sleep quality over therapy time.     O>    Visit Vitals  /66 (BP 1 Location: Left arm, BP Patient Position: Sitting)   Pulse 70   Ht 5' 2\" (1.575 m)   Wt 236 lb (107 kg)   SpO2 96%   BMI 43.16 kg/m²           General:   Alert, oriented, not in distress   Neck:   No JVD    Chest/Lungs:  symetrical lung expansion , no accessory muscle use    Extremities:  no obvious rashes , negative edema    Neuro:  No focal deficits ; No obvious tremor    Psych:  Normal affect ,  Normal countenance ;         A>    ICD-10-CM ICD-9-CM    1. Obstructive sleep apnea (adult) (pediatric) G47.33 327.23 AMB SUPPLY ORDER   2. Type 2 diabetes mellitus with complication, with long-term current use of insulin (formerly [facility]) E11.8 250.90     Z79.4 V58.67    3. Essential hypertension I10 401.9      AHI = 5(5-17). On CPAP :  12 cmH2O. Compliant:      yes    Therapeutic Response:  Positive    P>      *   Follow-up and Dispositions    · Return in about 1 year (around 2/3/2021). she is compliant with PAP therapy and PAP continues to benefit patient and remains necessary for control of her sleep apnea. she will continue on her current pressure settings. I have ordered replacement supplies  I have counseled the patient regarding the benefits of weight loss. * She was asked to contact our office for any problems regarding PAP therapy. * Counseling was provided regarding the importance of regular PAP use and on proper sleep hygiene and safe driving. * Re-enforced proper and regular cleaning for the device. 2. Type II diabetes - she continues on her current regimen. I have reviewed the relationship between sleep disordered breathing as it relates to diabetes. 3. Hypertension - she continues on her current regimen. I have reviewed the relationship between hypertension as it relates to sleep-disordered breathing.      Electronically signed by    Nnamdi Mathur MD  Diplomate in Sleep Medicine  [facility]

## 2020-02-05 RX ORDER — AMLODIPINE BESYLATE 10 MG/1
10 TABLET ORAL DAILY
Qty: 90 TAB | Refills: 3 | Status: SHIPPED | OUTPATIENT
Start: 2020-02-05 | End: 2021-04-13

## 2020-02-05 NOTE — TELEPHONE ENCOUNTER
90 day refill to mail order, [facility]. Pt states that [facility] sent request last pt states?

## 2020-02-06 ENCOUNTER — TELEPHONE (OUTPATIENT)
Dept: ENDOCRINOLOGY | Age: 70
End: 2020-02-06

## 2020-02-06 NOTE — TELEPHONE ENCOUNTER
Informed Valeriy Loco Doty that Dr. Amelia Preciado sent her Gabapentin prescription to [facility] on 2/3/2020. Mrs. Loco Doty stated that she will contact [facility] to see if they received the prescription.

## 2020-02-12 ENCOUNTER — PATIENT OUTREACH (OUTPATIENT)
Dept: INTERNAL MEDICINE CLINIC | Age: 70
End: 2020-02-12

## 2020-02-12 ENCOUNTER — HOSPITAL ENCOUNTER (OUTPATIENT)
Dept: MAMMOGRAPHY | Age: 70
Discharge: HOME | End: 2020-02-12
Attending: INTERNAL MEDICINE
Payer: MEDICARE

## 2020-02-12 DIAGNOSIS — Z12.31 VISIT FOR SCREENING MAMMOGRAM: ICD-10-CM

## 2020-02-12 PROCEDURE — 77067 SCR MAMMO BI INCL CAD: CPT

## 2020-02-12 NOTE — PROGRESS NOTES
Patient appears minimally receptive to [place] follow-up/outreach today, therefore encounter is brief. Goals Addressed                 This Visit's Progress       Chronic Disease     Advance Care Planning        2/12/2020  - received ACP information that ACM mailed to her; does not have any questions/concerns at this time  - patient plans to complete Advance Medical Directive and bring with her to next scheduled PCP appointment so that a copy can be made for her chart    1/10/20  - pt requests that ACP information be re-mailed to her; information mailed to patient today, per pt request    10/29/19  - blank AMD and 'right to decide' literature was mailed to patient by this NN on 10/16/19 ; pt confirms that she received this in the mail, however she has not yet reviewed it  - Plan: patient will review 'right to decide' literature and blank AMD document this week; NN will f/u with patient next week to address any questions/concerns. NN requested that, if completed, patient provide a copy of AMD to PCP office.  Knowledge and adherence of prescribed medication (ie. action, side effects, missed dose, etc.).        2/12/2020  - pt did not contact Dr. Fransisco Marc office to confirm current insulin orders; she reports that she reviewed her most recent OV AVS which advised that current orders are humalog 24 units before breakfast (if blood glucose is <150 mg/dL she takes 22 units), 24 units before lunch, 36 units before dinner and Toujeo 60 units nightly  - attempted to complete medication reconciliation with patient today, however patient is unreceptive to this; states, \"all of my medications are the same. \"    1/10/20  - reports taking humalog 24 units before breakfast (if blood glucose is <150 mg/dL she takes 22 units), 24 units before lunch, 36 units before dinner and Toujeo 60 units nightly.  Most recent endocrinology OV note reviewed; most recent Humalog order (11/25/19) - 22 units for breakfast and lunch and 36 units for dinner and Toujeo 60 units nightly (12/16/19), pt was noted to be taking mealtime insulin 2-3 hours after meal . Advised patient outreach to Dr. Tayler Kincaid to confirm/clarify current insulin unit dose orders. 10/29/19  - patient awaiting outreach from Ambulatory PharmD  - started taking Gabapentin 10/16; reports numbness/tingling in feet is improved since starting Gabapentin. Denies side effects denies worsened lower extremity edema from baseline, fatigue, dizziness, nausea, vomiting    10/16/19  - not taking Gabapentin and reports that she never began taking Gabapentin when it was prescribed (ordered by Dr. Tayler Kincaid 3/21/19 for neuropathic symptoms); states, \"I looked it up and got scared of it. \" Patient reports continued neuropathic symptoms in bilateral feet. Will refer to Ambulatory PharmD to further address patient questions/concerns. - taking hydralazine 25 mg daily (ordered by Dr. Kecia Naranjo 6/21/19 for Hydralazine 25 mg tablet TID\"; reports only taking once daily \"because I take another fluid pill\"   - has been taking Humalog 36 units with dinner (most recent OV note by Dr. Tayler Kincaid reviewed and ordered unit dose with dinner is 35 units); NN informed patient today and patient verbalizes understanding   - Reports adherence with losartan, however has questions; \"is that the one I've been seeing on T. V.\"  - will refer patient to Ambulatory PharmD for medication management, address multiple patient questions/concerns re: current medications; pt is agreeable to this plan         Diabetes     Patient verbalizes understanding of self -management goals of living with Diabetes. 2/12/2020  - reports fasting blood glucose this morning of 120 mg/dL, after lunch 190 mg/dL. Unable to review additional recent blood glucose readings as results are stored on glucometer and patient is unsure how to recall historical results; pt will take her glucometer to scheduled OV with Dr. Tayler Kincaid .   - reports taking mealtime insulin prior to meals; states, \"Sometimes I forget and I take it right after I eat, but I'm trying to get use to it and take it before I eat. \"  - still taking humalog 24 units before breakfast (if blood glucose is <150 mg/dL she takes 22 units), 24 units before lunch, 36 units before dinner and Toujeo 60 units nightly; pt did not contact Dr. Palma Kennedy office after last ACM outreach to confirm insulin orders, however reports that she reviewed her most recent office visit AVS and confirmed that the unit doses she is currently taking are correct. - continued lack of adherence with TID blood glucose monitoring; inquired about patient barriers to adherence - states, \"I am not sure I am going to be able to do that because when I stick it it hurts too bad and I've been doing this for 15 years and my fingers have had it. \" Informed patient about 14-day blood glucose monitoring system, Freestyle Jack, and encouraged patient to discuss this option with Dr. Kristie Lorenzo at her upcoming office visit to increase patient adherence with blood glucose monitoring.  -  Inquired as to whether patient is following a diabetic diet; pt states, \"Kind of, yeah; most of the time. \" Diet is described as \"about the same; nothing more, nothing less. \" Patient is not interested in keeping a food diary to review with this NN. Patient is not interested in additional education/review of diabetic diet; notes that she previously met with CDE NN, Larissa Valdez.  - no longer participating in water aerobics; currently not engaging in moderate exercise. She reports that she walks around her house for exercise; states, \"I just get up and start walking from the side door around to the front door and do it a couple of times. \" Patient reported barrier to exercise/increasing physical activity is back spasms which prevent her from \"doing a lot of moving or standing unless I am in the water. \" Reports that barrier to attending water aerobics at present is transportation and winter season. - will attend scheduled OV with Dr. Gabriela Guthrie 2/28/2020  - pt requests next ACM follow-up in three weeks after she attends scheduled OV with Dr. Gabriela Guthrie    1/10/20  - Hgb A1c 11/25/19 - 10.1%  - pt notes history of Type 2 DM for 15 years  - checking blood glucose 1-2x/day  - ordered insulin regimen/frequency is Humalog TID (before breakfast, lunch and dinner) and Toujeo nightly  - reports taking humalog 24 units before breakfast (if blood glucose is <150 mg/dL she takes 22 units), 24 units before lunch, 36 units before dinner and Toujeo 60 units nightly. Most recent endocrinology OV note reviewed; per OV note, most recent Humalog order (11/25/19) - 22 units for breakfast and lunch and 36 units for dinner and Toujeo 60 units nightly (12/16/19), pt was noted to be taking mealtime insulin 2-3 hours after meal .Further clarification is needed regarding what current insulin unit dose orders are; patient is advised to contact Dr. Gabriela Guthrie to confirm/clarify current insulin unit dose orders. - per last endocrinology OV note, pt was encouraged to improve diet and physical activity to tolerance  - encouraged to take mealtime insulin prior to eating meal  - encouraged to check blood glucose TID as ordered and keep blood glucose log      10/29/19  8/14/19-10.9%  4/22/19 - Hgb A1c 10.1%  - Current level of understanding: checks blood sugar once daily before breakfast. Completed Disease Specific CM Program for Diabetes Self-Management (avani/ Ramsey Soriano, RN NN CDE) on 8/27/19. Does not keep written blood glucose log; reports that glucometer stores historical blood glucose results. Pt reported fasting blood glucose on 10/28 of 169 mg/dL and on 10/29 of 93 mg/dL. Pt notes difficulty adhering to diabetic diet - enjoys pasta, potatoes, chips - states, \"my problem is food. I'm not that strong on sweets. \"; pt reported barrier to diabetic diet adherence is financial. Does not drink regular soda; drinks diet coke (no more than 2/day) and water. Currently exercising two times/week for 60-90 minutes- water aerobics. - Desired Outcome: Improve diet and exercise, decrease carbohydrate intake. Lower Hgb A1c.  - Plan: Further assess financial barrier(s). Will continue provide and reinforce pt education re: diabetic diet, assist with meal planning. Pt could benefit from keeping a food diary; will discuss further during subsequent outreach encounter as pt is unable to continue call at this time due to getting ready to leave her house.             Future Appointments:  Future Appointments   Date Time Provider [facility]   2/28/2020  1:50 PM Camila Martell MD RDE  [facility]   5/27/2020  3:15 PM John Mcconnell MD [address] [facility]   2/1/2021  9:40 AM Casandra Louis  [address]        Last Appointment With Me:  Visit date not found     Last Appointment My Department:  12/16/2019

## 2020-02-12 NOTE — PROGRESS NOTES
NN outreach to patient today in order to perform ACM follow-up; lvm requesting a return phone call to this NN.

## 2020-02-28 ENCOUNTER — OFFICE VISIT (OUTPATIENT)
Dept: ENDOCRINOLOGY | Age: 70
End: 2020-02-28

## 2020-02-28 VITALS
HEART RATE: 67 BPM | HEIGHT: 62 IN | SYSTOLIC BLOOD PRESSURE: 154 MMHG | WEIGHT: 229 LBS | BODY MASS INDEX: 42.14 KG/M2 | DIASTOLIC BLOOD PRESSURE: 72 MMHG

## 2020-02-28 DIAGNOSIS — Z79.4 TYPE 2 DIABETES MELLITUS WITH DIABETIC NEUROPATHY, WITH LONG-TERM CURRENT USE OF INSULIN (HCC): ICD-10-CM

## 2020-02-28 DIAGNOSIS — E11.40 TYPE 2 DIABETES MELLITUS WITH DIABETIC NEUROPATHY, WITH LONG-TERM CURRENT USE OF INSULIN (HCC): ICD-10-CM

## 2020-02-28 DIAGNOSIS — Z79.4 TYPE 2 DIABETES MELLITUS WITH COMPLICATION, WITH LONG-TERM CURRENT USE OF INSULIN (HCC): Primary | ICD-10-CM

## 2020-02-28 DIAGNOSIS — E11.8 TYPE 2 DIABETES MELLITUS WITH COMPLICATION, WITH LONG-TERM CURRENT USE OF INSULIN (HCC): Primary | ICD-10-CM

## 2020-02-28 LAB — HBA1C MFR BLD HPLC: 9.9 %

## 2020-02-28 NOTE — PROGRESS NOTES
Ms. Renzo Leonardo returns for follow-up of her type 2 diabetes mellitus with poor blood sugar control.    When we saw her last  she was on Toujeo insulin 56 units at bedtime, Humalog insulin 24 breakfast 20 for lunch and 35 for dinner and Metformin somewhere between 2 and 3 tablets daily. Aleshia Keenan renal function is normal and she had been making some improvements in her diet.  Unfortunately she is back to taking only one metformin a day because of GI distress. Isela Murillo is continued to take the insulin as noted above.  She has a recent A1c of 10.1%.  She is been notably decreased in physical activity and her diet is not as good as it had been. After considerable negotiation, she agreed that she will try to increase her physical activity to tolerance. Breakfast is usually a boiled egg and toast.  Lunch is a sandwich with chips and fruit. Dinner can be a meat a starch and a non-starchy vegetable. Last night she had pork chops broccoli and rice. The night before that she had breaded chicken wings and [employer] fries. When I saw her last her A1c was 10.1. Her A1c today is 9.9%. She is been checking her blood sugars in the morning and actually the morning blood sugars are better than they were previously ranging between 130 and 180. Unfortunately the A1c is just about as high as it was before. Her diet is really unchanged as noted above. She is not very physically active. She claims adherence to the medications. Importantly, she is taking the Humalog before her meals not after the meals. Examination  blood pressure 154/72  Pulse 67  Weight 229    Impression type 2 diabetes mellitus and morbid obesity with poor blood sugar control on basal bolus insulin. The we have tried to continue the Metformin she has not been able to do it and so has not taken any since I saw her last.  Plan: I have asked her to check blood sugars at 11:00 at night for 1 week and to call me.   I suspect these be elevated and the 60 units of Toujeo bringing her blood sugar down overnight. If so, we will focus on the dinnertime Humalog dosing. We spent more than 25 mintutes face to face, more than 50% was in counseling regarding diet, exercise and carbohydrate intake.

## 2020-03-02 ENCOUNTER — TELEPHONE (OUTPATIENT)
Dept: ENDOCRINOLOGY | Age: 70
End: 2020-03-02

## 2020-03-02 NOTE — TELEPHONE ENCOUNTER
Called and informed the patient that we received a fax from [place] stating that her insurance does not cover the [employer]. Patient understood with no further questions.

## 2020-03-05 ENCOUNTER — PATIENT OUTREACH (OUTPATIENT)
Dept: INTERNAL MEDICINE CLINIC | Age: 70
End: 2020-03-05

## 2020-03-05 ENCOUNTER — TELEPHONE (OUTPATIENT)
Dept: ENDOCRINOLOGY | Age: 70
End: 2020-03-05

## 2020-03-05 RX ORDER — AZITHROMYCIN 250 MG/1
TABLET, FILM COATED ORAL
Qty: 6 TAB | Refills: 0 | Status: SHIPPED | OUTPATIENT
Start: 2020-03-05 | End: 2020-03-10

## 2020-03-05 NOTE — PROGRESS NOTES
Message   Received: Today   Message Contents   MD Quyen Lehman RN   Caller: Unspecified (Today, 10:52 AM)             Call in a zpak please    Previous Messages      ----- Message -----   From: Quyen Betancur RN   Sent: 3/5/2020  11:04 AM EST   To: Ting Montes MD     ----- Message from Jeniffer Montalvo RN sent at 3/5/2020 11:04 AM EST -----   patient reports nasal congestion, sinus pressure/headache, \"scratchy\" throat, productive cough (\"green, yellowish\" mucus) x two weeks. Patient denies fever, chills, sore throat. Do you have any availability to see her for an acute appointment either this week or next week?  Hussain advised me to follow-up with you directly regarding appt request.

## 2020-03-05 NOTE — PROGRESS NOTES
ACM outreach to patient today in order to perform CCM follow-up; pt states, \"I'm not feeling well, so I'm not feeling like up to asking a lot of questions. \" ACM inquired about symptoms; patient reports nasal congestion, sinus pressure/headache, \"scratchy\" throat, productive cough (\"green, yellowish\" mucus) x two weeks. Patient denies fever, chills, sore throat. Patient is agreeable to scheduling acute appointment with PCP for further evaluation. ACM outreach to LPN  today to request acute appt availability. ACM will follow-up with PCP directly regarding appointment availability.

## 2020-03-05 NOTE — PROGRESS NOTES
Rajni Davis MD Just now (11:52 AM)      Rx for azithromycin 250 mg tablet - Take 2 Tabs by mouth daily for 1 day, THEN 1 Tab daily for 4 days. , Normal, Disp-6 Tab, R-0 has been sent to patient's preferred local pharmacy; pt notified. Routing comment        Abraham Pineda MD  You 8 minutes ago (11:43 AM)      Call in a zpak please    Routing comment        You  Abraham Pineda MD 48 minutes ago (11:04 AM)      patient reports nasal congestion, sinus pressure/headache, \"scratchy\" throat, productive cough (\"green, yellowish\" mucus) x two weeks. Patient denies fever, chills, sore throat. Do you have any availability to see her for an acute appointment either this week or next week?  Hussain advised me to follow-up with you directly regarding appt request.    Routing comment

## 2020-03-05 NOTE — TELEPHONE ENCOUNTER
2020: 130 (morning); 138 (evenin units of Toujeo)     2020: 93 (morning); 142 (evenin units of Toujeo)     3/1/2020: 102 (morning); 135 (evenin units of Toujeo)     3/2/2020: 111 (morning); 272 (evenin units of Toujeo)     3/3/2020: 104 (morning); 232 (evenin units of Toujeo)     3/4/2020: 104 (morning); 229 (evenin units of Toujeo)     3/5/2020: 108 (morning)    Called pt advised her to decrease toujeo to 50 and increase humalog to 45.

## 2020-03-05 NOTE — TELEPHONE ENCOUNTER
Patient called to give Dr. Kristie Lorenzo her glucose readings.  Readings are as followed:    2020: 130 (morning); 138 (evenin units of Toujeo)    2020: 93 (morning); 142 (evenin units of Toujeo)    3/1/2020: 102 (morning); 135 (evenin units of Toujeo)    3/2/2020: 111 (morning); 272 (evenin units of Toujeo)    3/3/2020: 104 (morning); 232 (evenin units of Toujeo)    3/4/2020: 104 (morning); 229 (evenin units of Toujeo)    3/5/2020: 108 (morning)

## 2020-03-16 RX ORDER — INSULIN GLARGINE 300 U/ML
60 INJECTION, SOLUTION SUBCUTANEOUS
Qty: 4 PEN | Refills: 3 | Status: SHIPPED | OUTPATIENT
Start: 2020-03-16 | End: 2020-11-11 | Stop reason: SDUPTHER

## 2020-03-16 NOTE — TELEPHONE ENCOUNTER
----- Message from Dahiana Navarro sent at 3/16/2020  2:37 PM EDT -----  Regarding: Dr Christiansen Both  General Message/Vendor Calls    Caller's first and last name:      Reason for call: Pt is requesting a call from the nurse to report sugar levels as requested       Callback required yes/no and why:yes      Best contact number(s):[phone]      Details to clarify the request: Also, pt is requesting a new Rx for \"Toujeo\" to be called in to [facility], Rx is out of refills      Dahiana Navarro

## 2020-03-16 NOTE — TELEPHONE ENCOUNTER
Pt request to have her Tou[facility] rx [facility] and she also stated she will mail in her  bs readings for the past 2 weeks.

## 2020-03-20 ENCOUNTER — TELEPHONE (OUTPATIENT)
Dept: ENDOCRINOLOGY | Age: 70
End: 2020-03-20

## 2020-03-20 NOTE — TELEPHONE ENCOUNTER
Patient need a RX sent to [facility] for [facility]. She's having problems all week trying to get it filled. Thanks.

## 2020-03-20 NOTE — TELEPHONE ENCOUNTER
Spoke to the patient and she stated that she got everything taken care of with [facility]. Patient also stated that she was informed on Monday to mail Dr. Roberto Mireles her readings. Informed the patient that it was okay to call in her readings each week so that I can forwards them to Dr. Roberto Mireles.

## 2020-04-10 RX ORDER — LOSARTAN POTASSIUM 100 MG/1
TABLET ORAL
Qty: 90 TAB | Refills: 3 | Status: SHIPPED | OUTPATIENT
Start: 2020-04-10 | End: 2021-03-04 | Stop reason: SDUPTHER

## 2020-04-15 ENCOUNTER — PATIENT OUTREACH (OUTPATIENT)
Dept: INTERNAL MEDICINE CLINIC | Age: 70
End: 2020-04-15

## 2020-04-15 NOTE — PROGRESS NOTES
Patient contacted regarding infection prevention and COVID-19 risk   Care Transition Nurse/ Ambulatory Care Manager contacted the patient by telephone to provide education. Verified name and  with patient as identifiers. Patient has following risk factors of: Type 2 DM, asthma. Advised obtaining a 90-day supply of all daily and as-needed medications. Education provided regarding infection prevention, and signs and symptoms of COVID-19 and when to seek medical attention with patient who verbalized understanding. Discussed exposure protocols and quarantine from [address] you at higher risk for severe illness  and given an opportunity for questions and concerns. The patient agrees to contact the COVID-19 hotline [phone] or PCP office for questions related to their healthcare. CTN/ACM provided contact information for future reference. From CDC: Are you at higher risk for severe illness?  Wash your hands often.  Avoid close contact (6 feet, which is about two arm lengths) with people who are sick.  Put distance between yourself and other people if COVID-19 is spreading in your community.  Clean and disinfect frequently touched surfaces.  Avoid all cruise travel and non-essential air travel.  Call your healthcare professional if you have concerns about COVID-19 and your underlying condition or if you are sick. For more information on steps you can take to protect yourself, see CDC's How to [address] services to be resumed at a future date; pt verbalizes understanding and is agreeable to this plan.

## 2020-05-04 ENCOUNTER — TELEPHONE (OUTPATIENT)
Dept: ENDOCRINOLOGY | Age: 70
End: 2020-05-04

## 2020-05-04 NOTE — TELEPHONE ENCOUNTER
Ms. Ramona Jordan send me blood sugar readings in the morning and in the evening. Her fasting blood sugars are outstanding ranging between 100-120. Unfortunately evening blood sugars can be as high as 250-300. She is recently been working on the pasta and cutting way back on that. As a result the evening blood sugars are more in the 1  range which is significantly improved. I have encouraged her to continue the current strategy and she is followed up May 29.

## 2020-05-08 RX ORDER — TRIAMTERENE AND HYDROCHLOROTHIAZIDE 37.5; 25 MG/1; MG/1
CAPSULE ORAL
Qty: 180 CAP | Refills: 3 | Status: SHIPPED | OUTPATIENT
Start: 2020-05-08 | End: 2020-07-30

## 2020-05-15 RX ORDER — METOPROLOL TARTRATE 50 MG/1
TABLET ORAL
Qty: 180 TAB | Refills: 3 | Status: SHIPPED | OUTPATIENT
Start: 2020-05-15 | End: 2020-07-30 | Stop reason: SDUPTHER

## 2020-05-26 DIAGNOSIS — Z79.4 TYPE 2 DIABETES MELLITUS WITH COMPLICATION, WITH LONG-TERM CURRENT USE OF INSULIN (HCC): Primary | ICD-10-CM

## 2020-05-26 DIAGNOSIS — E11.8 TYPE 2 DIABETES MELLITUS WITH COMPLICATION, WITH LONG-TERM CURRENT USE OF INSULIN (HCC): Primary | ICD-10-CM

## 2020-05-29 ENCOUNTER — VIRTUAL VISIT (OUTPATIENT)
Dept: ENDOCRINOLOGY | Age: 70
End: 2020-05-29

## 2020-05-29 DIAGNOSIS — E11.8 TYPE 2 DIABETES MELLITUS WITH COMPLICATION, WITH LONG-TERM CURRENT USE OF INSULIN (HCC): Primary | ICD-10-CM

## 2020-05-29 DIAGNOSIS — Z79.4 TYPE 2 DIABETES MELLITUS WITH COMPLICATION, WITH LONG-TERM CURRENT USE OF INSULIN (HCC): Primary | ICD-10-CM

## 2020-05-29 NOTE — PROGRESS NOTES
This is an established visit conducted via telemedicine using [employer]. me video. The patient has been instructed that this meets HIPAA criteria ,that they may receive a bill for these services and acknowledges and agrees to this method of visitation. [facility] follow-up of her type 2 diabetes mellitus with poor blood sugar control.    When we saw her last  she was on Toujeo insulin 56 units at bedtime, Humalog insulin 24 breakfast 20 for lunch and 35 for dinner and Metformin somewhere between 2 and 3 tablets daily. [place] renal function is normal and she had been making some improvements in her diet.  Unfortunately she is back to taking only one metformin a day because of GI distress. Lis Steel is continued to take the insulin as noted above.  She has a recent A1c of 10.1%.  She is been notably decreased in physical activity and her diet is not as good as it had been. After considerable negotiation, she agreed that she will try to increase her physical activity to tolerance. Breakfast is usually a boiled egg and toast.  Lunch is a sandwich with chips and fruit.  Dinner can be a meat a starch and a non-starchy vegetable.  Last night she had pork chops broccoli and rice.  The night before that she had breaded chicken wings and Western Reva fries. [facility] send me at my request blood sugars in the morning and after dinner. Her fasting blood sugars have consistently been between 90 and 125 after taking 60 units of Toujeo at bedtime. Unfortunately the blood sugars after supper (about 2 hours) are usually in the 250-2 50 range. She eats 2 meals a day. Breakfast is juan eggs and a biscuit. Last night for dinner she had chicken and Western Reva fries. The night before that she had pork chops cabbage and sweet potatoes. She occasionally has a piece of cake after dinner. She does not eat lunch but she does have some peanut butter crackers. Most recent A1c was 9.9%.     Examination  GENERAL: NCAT, Appears well nourished   EYES: EOMI, non-icteric, no proptosis   Ear/Nose/Throat: NCAT, no visible inflammation or masses   CARDIOVASCULAR: no cyanosis, no visible JVD   RESPIRATORY: comfortable respirations observed, no cyanosis   MUSCULOSKELETAL: Normal ROM of upper extremities observed   SKIN: No edema, rash, or other significant changes observed   NEUROLOGIC:  AAOx3   PSYCHIATRIC: Normal affect, Normal insight and judgement       Impression type 2 diabetes mellitus with continued poor blood sugar control primarily related to a high carb dinner. Plan: I have asked her to keep send me another reading of blood sugars before breakfast and after dinner. I also think she will likely need to increase the dinnertime dose of short acting insulin. If that successful, then her dose of Toujeo can likely be decreased so I anticipate 20 units of Humalog at breakfast, 40 units of Humalog at supper, and 50 units of Toujeo at bedtime. Based on the blood sugars that she sends me we will make those changes. I will see her back in 3 to 4 months. 3

## 2020-06-11 ENCOUNTER — TELEPHONE (OUTPATIENT)
Dept: ENDOCRINOLOGY | Age: 70
End: 2020-06-11

## 2020-06-11 DIAGNOSIS — Z79.4 TYPE 2 DIABETES MELLITUS WITH COMPLICATION, WITH LONG-TERM CURRENT USE OF INSULIN (HCC): Primary | ICD-10-CM

## 2020-06-11 DIAGNOSIS — E11.8 TYPE 2 DIABETES MELLITUS WITH COMPLICATION, WITH LONG-TERM CURRENT USE OF INSULIN (HCC): Primary | ICD-10-CM

## 2020-06-11 LAB
ALBUMIN/CREAT UR: 18 MG/G CREAT (ref 0–29)
CHOLEST SERPL-MCNC: 236 MG/DL (ref 100–199)
CREAT UR-MCNC: 123.8 MG/DL
EST. AVERAGE GLUCOSE BLD GHB EST-MCNC: 232 MG/DL
HBA1C MFR BLD: 9.7 % (ref 4.8–5.6)
HDLC SERPL-MCNC: 57 MG/DL
INTERPRETATION, 910389: NORMAL
LDLC SERPL CALC-MCNC: 146 MG/DL (ref 0–99)
Lab: NORMAL
MICROALBUMIN UR-MCNC: 22.4 UG/ML
TRIGL SERPL-MCNC: 165 MG/DL (ref 0–149)
VLDLC SERPL CALC-MCNC: 33 MG/DL (ref 5–40)

## 2020-06-11 RX ORDER — DULAGLUTIDE 0.75 MG/.5ML
0.75 INJECTION, SOLUTION SUBCUTANEOUS
Qty: 12 SYRINGE | Refills: 3 | Status: SHIPPED | OUTPATIENT
Start: 2020-06-11 | End: 2021-03-18

## 2020-06-11 NOTE — TELEPHONE ENCOUNTER
Spoke to pt. A1c still 9.7%  AM blood sugars OK but need more humalog at supper. She tells me that she tried trulicity in past but wasn't sure that it worked. Plan for now will be:  1. Increase Humalog to 40 units at supper  2. Continue Toujeo 60 units HS  3.  Try Trulicity 5.90 mg (will show her how to use device with VV if approved by insurance)

## 2020-06-15 RX ORDER — LEVOTHYROXINE SODIUM 137 UG/1
TABLET ORAL
Qty: 90 TAB | Refills: 3 | Status: SHIPPED | OUTPATIENT
Start: 2020-06-15 | End: 2021-06-10

## 2020-06-22 RX ORDER — ALBUTEROL SULFATE 90 UG/1
AEROSOL, METERED RESPIRATORY (INHALATION)
Qty: 3 INHALER | Refills: 3 | Status: SHIPPED | OUTPATIENT
Start: 2020-06-22 | End: 2020-06-29 | Stop reason: SDUPTHER

## 2020-06-22 NOTE — TELEPHONE ENCOUNTER
#[phone] pt states she needs to get a script for Pro Air to be sent to [facility] for 90 day refill. Pt states insurance will no longer cover the ventolin and she needs the Eponym now. Thanks.

## 2020-06-29 DIAGNOSIS — J45.909 MILD ASTHMA WITHOUT COMPLICATION, UNSPECIFIED WHETHER PERSISTENT: ICD-10-CM

## 2020-06-29 RX ORDER — ALBUTEROL SULFATE 90 UG/1
AEROSOL, METERED RESPIRATORY (INHALATION)
Qty: 3 INHALER | Refills: 3 | Status: SHIPPED | OUTPATIENT
Start: 2020-06-29 | End: 2021-06-04 | Stop reason: SDUPTHER

## 2020-07-07 RX ORDER — HYDRALAZINE HYDROCHLORIDE 25 MG/1
25 TABLET, FILM COATED ORAL 3 TIMES DAILY
Qty: 180 TAB | Refills: 3 | Status: SHIPPED | OUTPATIENT
Start: 2020-07-07 | End: 2021-09-17 | Stop reason: SDUPTHER

## 2020-07-15 RX ORDER — MONTELUKAST SODIUM 10 MG/1
TABLET ORAL
Qty: 90 TAB | Refills: 3 | Status: SHIPPED | OUTPATIENT
Start: 2020-07-15 | End: 2021-03-18

## 2020-07-15 RX ORDER — ROSUVASTATIN CALCIUM 20 MG/1
TABLET, COATED ORAL
Qty: 90 TAB | Refills: 3 | Status: SHIPPED | OUTPATIENT
Start: 2020-07-15 | End: 2020-07-30

## 2020-07-30 ENCOUNTER — OFFICE VISIT (OUTPATIENT)
Dept: CARDIOLOGY CLINIC | Age: 70
End: 2020-07-30

## 2020-07-30 VITALS
BODY MASS INDEX: 42.12 KG/M2 | RESPIRATION RATE: 16 BRPM | HEIGHT: 62 IN | SYSTOLIC BLOOD PRESSURE: 190 MMHG | DIASTOLIC BLOOD PRESSURE: 90 MMHG | OXYGEN SATURATION: 97 % | HEART RATE: 74 BPM | WEIGHT: 228.9 LBS

## 2020-07-30 DIAGNOSIS — I25.10 CORONARY ARTERY DISEASE INVOLVING NATIVE CORONARY ARTERY OF NATIVE HEART WITHOUT ANGINA PECTORIS: Primary | ICD-10-CM

## 2020-07-30 DIAGNOSIS — I10 ESSENTIAL HYPERTENSION: ICD-10-CM

## 2020-07-30 DIAGNOSIS — E78.2 MIXED HYPERLIPIDEMIA: ICD-10-CM

## 2020-07-30 RX ORDER — TRIAMTERENE AND HYDROCHLOROTHIAZIDE 37.5; 25 MG/1; MG/1
1 CAPSULE ORAL DAILY
Qty: 180 CAP | Refills: 3
Start: 2020-07-30 | End: 2021-09-17 | Stop reason: SDUPTHER

## 2020-07-30 RX ORDER — METOPROLOL TARTRATE 50 MG/1
50 TABLET ORAL 2 TIMES DAILY
Qty: 180 TAB | Refills: 3 | Status: SHIPPED | OUTPATIENT
Start: 2020-07-30 | End: 2022-02-15 | Stop reason: ALTCHOICE

## 2020-07-30 RX ORDER — ROSUVASTATIN CALCIUM 20 MG/1
20 TABLET, COATED ORAL DAILY
Qty: 90 TAB | Refills: 3
Start: 2020-07-30 | End: 2021-08-02 | Stop reason: SDUPTHER

## 2020-07-30 NOTE — PROGRESS NOTES
Chief Complaint   Patient presents with    Follow-up    Coronary Artery Disease       1. Have you been to the ER, urgent care clinic since your last visit? Hospitalized since your last visit? No    2. Have you seen or consulted any other health care providers outside of the [address] [place] [address] since your last visit? Include any pap smears or colon screening.  Yes Dentist

## 2020-07-30 NOTE — PROGRESS NOTES
Loni Chapman, [facility]    Subjective/HPI:     Ms. Purnima Meneses is a 81 y.o. female is here for routine follow-up. She has a PMHx of CAD, DM2, HTN, HLD, obesity, and asthma/COPD. She is doing well. She is very agitated today because she is concerned about exposure to COVID-19 while being outside her home. She did not take dyazide this morning due to upcoming appointment. She does not check her blood pressure at home. She tells me she takes all her medications, but is unsure whether she is taking a statin medication. She denies changes to her diet. She denies lower extremity edema. She denies shortness of breath symptoms past baseline, chest pain symptoms, orthopnea, PND or dizziness.          PCP Provider  Nakia Cao MD    Patient Active Problem List   Diagnosis Code    DJD (degenerative joint disease) of hip M16.9    DJD (degenerative joint disease) of knee M17.10    CTS (Carpal Tunnel Syndrome)-b/l G56.00    CVA (cerebral infarction) I63.9    Diverticulosis K57.90    Osteopenia M85.80    Grave's disease     DM type 2 (diabetes mellitus, type 2) ([address] E11.9    PVC's (premature ventricular contractions) I49.3    Myocardial ischemia I25.9    Shortness of breath R06.02    Coronary artery disease I25.10    Asthma J45.909    Renal failure, acute ([address] N17.9    Esophageal reflux K21.9    Axillary hidradenitis suppurativa L73.2    HTN (hypertension) I10    Warthin's tumor D11.9    Obesity, morbid ([address] E66.01    History of CVA (cerebrovascular accident) Z80.78    Type 2 diabetes with nephropathy ([address] E11.21    Type 2 diabetes mellitus with diabetic neuropathy ([address] E11.40       Social History     Tobacco Use    Smoking status: Former Smoker     Last attempt to quit: 9/17/2004     Years since quitting: 15.8    Smokeless tobacco: Never Used   Substance Use Topics    Alcohol use: No       Current Outpatient Medications   Medication Sig    rosuvastatin (CRESTOR) 20 mg tablet Take 1 Tab by mouth daily.  triamterene-hydroCHLOROthiazide (DYAZIDE) 37.5-25 mg per capsule Take 1 Cap by mouth daily.  metoprolol tartrate (LOPRESSOR) 50 mg tablet Take 1 Tab by mouth two (2) times a day.  montelukast (SINGULAIR) 10 mg tablet TAKE 1 TABLET DAILY    hydrALAZINE (APRESOLINE) 25 mg tablet Take 1 Tab by mouth three (3) times daily.  albuterol (ProAir HFA) 90 mcg/actuation inhaler USE 1 INHALATION EVERY 4 HOURS AS NEEDED WHEEZING OR SHORTNESS OF BREATH    levothyroxine (SYNTHROID) 137 mcg tablet TAKE 1 TABLET DAILY    dulaglutide (Trulicity) 4.48 EE/0.1 mL sub-q pen 0.5 mL by SubCUTAneous route every seven (7) days.  losartan (COZAAR) 100 mg tablet TAKE 1 TABLET DAILY    insulin glargine U-300 conc (Toujeo SoloStar U-300 Insulin) 300 unit/mL (1.5 mL) inpn pen 60 Units by SubCUTAneous route nightly. Indications: type 2 diabetes mellitus    flash glucose scanning reader (FREESTYLE LEXI 14 DAY READER) misc 1 Each by Does Not Apply route daily.  flash glucose sensor (FREESTYLE LEXI 14 DAY SENSOR) kit 1 Each by Does Not Apply route every fourteen (14) days.  amLODIPine (NORVASC) 10 mg tablet Take 1 Tab by mouth daily.  insulin lispro (HUMALOG KWIKPEN INSULIN) 100 unit/mL kwikpen 22 units for breakfast and lunch and 36 units for dinner (Patient taking differently: 20 units for breakfast and lunch and 40 units for dinner)    glucose blood VI test strips (ONETOUCH ULTRA BLUE TEST STRIP) strip Monitor blood sugar 3 times daily    albuterol (ACCUNEB) 1.25 mg/3 mL nebu 3 mL by Nebulization route every four (4) hours as needed (wheezing).  ADVAIR DISKUS 100-50 mcg/dose diskus inhaler USE 1 INHALATION TWICE A DAY (Patient taking differently: USE 1 INHALATION TWICE A DAY prn)    BD INSULIN PEN NEEDLE UF SHORT 31 gauge x 5/16\" ndle     aspirin delayed-release 81 mg tablet Take 81 mg by mouth daily. No current facility-administered medications for this visit.          Allergies Allergen Reactions    Latex Itching    Codeine Itching and Other (comments)     hallucinate    Contrast Dye [Iodine] Rash and Itching     Given pre-cardiac cath    Seafood [Shellfish Containing Products] Swelling        I have reviewed the problem list, allergy list, medical history, family, social history and medications. Review of Symptoms:    Review of Systems   Constitutional: Negative for chills, fever and weight loss. HENT: Negative for nosebleeds. Eyes: Negative for blurred vision and double vision. Respiratory: Negative for cough, shortness of breath and wheezing. Cardiovascular: Negative for chest pain, palpitations, orthopnea, leg swelling and PND. Gastrointestinal: Negative for abdominal pain, blood in stool, diarrhea, nausea and vomiting. Musculoskeletal: Negative for joint pain. Skin: Negative for rash. Neurological: Negative for dizziness, tingling and loss of consciousness. Endo/Heme/Allergies: Does not bruise/bleed easily. Physical Exam:      General: Well developed, in no acute distress, cooperative and alert. Obese. HEENT: No carotid bruits, no JVD, trach is midline. Neck Supple, PEERL, EOM intact. Heart:  reg rate and rhythm; normal S1/S2; no murmurs, no gallops or rubs. Respiratory: Clear bilaterally x 4, no wheezing or rales  Abdomen:   Soft, non-tender, no distention, no masses. + BS. Extremities:  Normal cap refill, no cyanosis, atraumatic. No edema. Neuro: A&Ox3, speech clear, gait stable. Skin: Skin color is normal. No rashes or lesions.  Non diaphoretic  Vascular: 2+ pulses symmetric in all extremities    Vitals:    07/30/20 1052   BP: 190/90   Pulse: 74   Resp: 16   SpO2: 97%   Weight: 228 lb 14.4 oz (103.8 kg)   Height: 5' 2\" (1.575 m)       ECG: sinus rhythm    Cardiology Labs:    Lab Results   Component Value Date/Time    Cholesterol, total 236 (H) 06/10/2020 08:08 AM    HDL Cholesterol 57 06/10/2020 08:08 AM    LDL, calculated 146 (H) 06/10/2020 08:08 AM    LDL, calculated 69 04/22/2019 03:33 PM    LDL, calculated 84 06/04/2012 12:00 AM    VLDL, calculated 33 06/10/2020 08:08 AM       Lab Results   Component Value Date/Time    Hemoglobin A1c 9.7 (H) 06/10/2020 08:08 AM    Hemoglobin A1c (POC) 9.9 02/28/2020 02:09 PM    Hemoglobin A1c, External 10.6 11/19/2018       Lab Results   Component Value Date/Time    Sodium 144 12/16/2019 02:02 PM    Potassium 3.6 12/16/2019 02:02 PM    Chloride 100 12/16/2019 02:02 PM    CO2 24 12/16/2019 02:02 PM    Glucose 173 (H) 12/16/2019 02:02 PM    BUN 11 12/16/2019 02:02 PM    Creatinine 0.99 12/16/2019 02:02 PM    BUN/Creatinine ratio 11 (L) 12/16/2019 02:02 PM    GFR est AA 68 12/16/2019 02:02 PM    GFR est non-AA 59 (L) 12/16/2019 02:02 PM    Calcium 9.7 12/16/2019 02:02 PM    Anion gap 5 11/18/2018 09:21 AM    Bilirubin, total 0.4 12/16/2019 02:02 PM    ALT (SGPT) 14 12/16/2019 02:02 PM    Alk. phosphatase 136 (H) 12/16/2019 02:02 PM    Protein, total 7.2 12/16/2019 02:02 PM    Albumin 3.8 12/16/2019 02:02 PM    Globulin 4.7 (H) 11/18/2018 09:21 AM    A-G Ratio 1.1 (L) 12/16/2019 02:02 PM       Orders Placed This Encounter    AMB POC EKG ROUTINE W/ 12 LEADS, INTER & REP     Order Specific Question:   Reason for Exam:     Answer:   routine    rosuvastatin (CRESTOR) 20 mg tablet     Sig: Take 1 Tab by mouth daily. Dispense:  90 Tab     Refill:  3    triamterene-hydroCHLOROthiazide (DYAZIDE) 37.5-25 mg per capsule     Sig: Take 1 Cap by mouth daily. Dispense:  180 Cap     Refill:  3    metoprolol tartrate (LOPRESSOR) 50 mg tablet     Sig: Take 1 Tab by mouth two (2) times a day. Dispense:  180 Tab     Refill:  3        Assessment:     Assessment:       ICD-10-CM ICD-9-CM    1. Coronary artery disease involving native coronary artery of native heart without angina pectoris  I25.10 414.01 AMB POC EKG ROUTINE W/ 12 LEADS, INTER & REP      metoprolol tartrate (LOPRESSOR) 50 mg tablet   2.  Essential hypertension  I10 401.9 AMB POC EKG ROUTINE W/ 12 LEADS, INTER & REP   3. Mixed hyperlipidemia  E78.2 272.2         Plan:     1. Coronary artery disease involving native coronary artery of native heart without angina pectoris  Hx of non-obstructive CAD per cath in 2012  Lexiscan stress test done 1/2020 without evidence of ischemia  Continue medical management and risk factor modification    2. Essential hypertension  BP uncontrolled. Has not taken dyazide today  Provided list of current medications today. She will compare this list with her medication bottles and call us with discrepancies. Recommended she get a blood pressure monitor and check BP daily, 1 hour after AM meds. Call back in 1 week with BP readings. Plan to increase hydralazine for BP > 140/85. Discussed importance of daily medication compliance and low sodium diet    3. Mixed hyperlipidemia   in 6/2020  Reports she is on \"all meds\", but cannot accurately recall if she is on statin  Discussed med rec today -- she will call if she does not have rosuvastatin bottles at home for rx fill. Discussed goal LDL < 70  Lipids by endo    4. Counseled on diet and exercise- eventual goal of 30-60 minutes 5-7 times a week as per AHA guidelines. F/u in 1 year.     Adalberto Bennett MD

## 2020-07-31 ENCOUNTER — TELEPHONE (OUTPATIENT)
Dept: INTERNAL MEDICINE CLINIC | Age: 70
End: 2020-07-31

## 2020-07-31 NOTE — TELEPHONE ENCOUNTER
#[phone]  Pt states her b/p is running high. She was at another appt on 7-330-20 and her b/p was 190/90    Pt would like a call about this.

## 2020-07-31 NOTE — TELEPHONE ENCOUNTER
Called, spoke to pt. Two identifiers confirmed. Pt stated her BP was elevated at her cardiologist office yesterday. Pt was advised by cardio to follow up in one week for BP check. Notified pt to call the office if BP continues to be elevated. Pt verbalized understanding of information discussed w/ no further questions at this time.

## 2020-08-07 ENCOUNTER — TELEPHONE (OUTPATIENT)
Dept: ENDOCRINOLOGY | Age: 70
End: 2020-08-07

## 2020-08-07 NOTE — TELEPHONE ENCOUNTER
----- Message from Rossy Bradford LPN sent at 1/4/2318  1:17 PM EDT -----  Regarding: FW: Non-Urgent Medical Question  Contact: [phone]    ----- Message -----  From: Xiang Zamudio  Sent: 8/7/2020   1:01 PM EDT  To: [facility] Nurse Pool  Subject: Non-Urgent Medical Question                      YOSEPH Ramirez I'm having problem with  my blood sugar. It is running very hi. Please give me call [phone]. Thanks  Xiang Zamudio

## 2020-08-07 NOTE — TELEPHONE ENCOUNTER
Has not started the trulicity but has the pens now (delayed by her pharmacy for several weeks). Will walk her through the process over the camera on her phone. She continues to take her Toujeo and Humalog. She tells me that she sent a record of her blood sugars in July but I have not received them. Plan: She will able to get the first dose of Trulicity over the phone with good technique. She will contact me using my chart to send blood sugars and we will adjust her medications as needed.

## 2020-09-15 DIAGNOSIS — Z79.4 TYPE 2 DIABETES MELLITUS WITH COMPLICATION, WITH LONG-TERM CURRENT USE OF INSULIN (HCC): Primary | ICD-10-CM

## 2020-09-15 DIAGNOSIS — E11.8 TYPE 2 DIABETES MELLITUS WITH COMPLICATION, WITH LONG-TERM CURRENT USE OF INSULIN (HCC): Primary | ICD-10-CM

## 2020-09-21 ENCOUNTER — PATIENT MESSAGE (OUTPATIENT)
Dept: ENDOCRINOLOGY | Age: 70
End: 2020-09-21

## 2020-09-21 DIAGNOSIS — E11.8 TYPE 2 DIABETES MELLITUS WITH COMPLICATION, WITH LONG-TERM CURRENT USE OF INSULIN (HCC): Primary | ICD-10-CM

## 2020-09-21 DIAGNOSIS — Z79.4 TYPE 2 DIABETES MELLITUS WITH COMPLICATION, WITH LONG-TERM CURRENT USE OF INSULIN (HCC): Primary | ICD-10-CM

## 2020-09-21 RX ORDER — CALCIUM CITRATE/VITAMIN D3 200MG-6.25
TABLET ORAL
Qty: 300 STRIP | Refills: 3 | Status: SHIPPED | OUTPATIENT
Start: 2020-09-21 | End: 2021-05-13 | Stop reason: SDUPTHER

## 2020-09-21 NOTE — TELEPHONE ENCOUNTER
From: Iqra Torres  To: Jaja Young MD  Sent: 9/21/2020 11:09 AM EDT  Subject: Prescription Question    could you send in a new rx for true metrix strips.  Thanks [address]

## 2020-09-25 LAB
EST. AVERAGE GLUCOSE BLD GHB EST-MCNC: 249 MG/DL
HBA1C MFR BLD: 10.3 % (ref 4.8–5.6)

## 2020-09-28 ENCOUNTER — VIRTUAL VISIT (OUTPATIENT)
Dept: ENDOCRINOLOGY | Age: 70
End: 2020-09-28
Payer: MEDICARE

## 2020-09-28 DIAGNOSIS — E66.01 OBESITY, MORBID (HCC): ICD-10-CM

## 2020-09-28 DIAGNOSIS — E11.8 TYPE 2 DIABETES MELLITUS WITH COMPLICATION, WITH LONG-TERM CURRENT USE OF INSULIN (HCC): Primary | ICD-10-CM

## 2020-09-28 DIAGNOSIS — Z79.4 TYPE 2 DIABETES MELLITUS WITH COMPLICATION, WITH LONG-TERM CURRENT USE OF INSULIN (HCC): Primary | ICD-10-CM

## 2020-09-28 PROCEDURE — 99214 OFFICE O/P EST MOD 30 MIN: CPT | Performed by: INTERNAL MEDICINE

## 2020-09-28 NOTE — PROGRESS NOTES
This is an established visit conducted via telemedicine using [employer] video. The patient has been instructed that this meets HIPAA criteria ,that they may receive a bill for these services and acknowledges and agrees to this method of visitation.     Ms. Kellee Carrasco returns for follow-up of her type 2 diabetes mellitus with poor blood sugar control.    Her A1c is 10.3%    Current medications  Toujeo insulin 60 units at bedtime  Humalog insulin 20 breakfast (20 for lunch if she eats lunch) and 42 for dinner  Metformin she is not taking this anymore because of GI distress. Trulicity 0.5 mg weekly (started June)     She is very frustrated by the increasing cost of her medications and frankly is not sure that the Trulicity has done anything in terms of her overall blood sugar control. Her fasting blood sugars have consistently been between 125 and 170 after taking 60 units of Toujeo at bedtime. This is actually significantly increased from our last visit. Unfortunately the blood sugars after supper (about 2 hours) are usually in the 250-300 range. She eats 2 meals a day. Breakfast is juan eggs and a biscuit. Last night for dinner she had chicken and Western Reva fries. The night before that she had pork chops cabbage and sweet potatoes. She occasionally has a piece of cake after dinner. She does not eat lunch but she does have some peanut butter crackers. She also tells me that she had a \"growth\" removed from her right ankle. The growth was not benign but her podiatrist did not tell her what it is. She does have an appointment to see her podiatrist today because it is \"getting dark around the edges\".     Examination  GENERAL: NCAT, Appears well nourished   EYES: EOMI, non-icteric, no proptosis   Ear/Nose/Throat: NCAT, no visible inflammation or masses   CARDIOVASCULAR: no cyanosis, no visible JVD   RESPIRATORY: comfortable respirations observed, no cyanosis   MUSCULOSKELETAL: Normal ROM of upper extremities observed   SKIN: No edema, rash, or other significant changes observed   NEUROLOGIC:  AAOx3   PSYCHIATRIC: Normal affect, Normal insight and judgement       Impression type 2 diabetes mellitus with deteriorating glucose control  Plan: I have asked her to check blood sugars before breakfast, before her lunchtime even though she does not eat lunch, before dinner and at bedtime and to send me the report in 1 week. I suspect we will need to increase the dose of insulin at breakfast and perhaps at dinner. Given the cost of medications, the Trulicity may not be affordable and it is not clear that it is doing anything for her. I will see her back in 1 month.

## 2020-10-12 DIAGNOSIS — E11.8 TYPE 2 DIABETES MELLITUS WITH COMPLICATION, WITH LONG-TERM CURRENT USE OF INSULIN (HCC): Primary | ICD-10-CM

## 2020-10-12 DIAGNOSIS — Z79.4 TYPE 2 DIABETES MELLITUS WITH COMPLICATION, WITH LONG-TERM CURRENT USE OF INSULIN (HCC): Primary | ICD-10-CM

## 2020-10-12 RX ORDER — GABAPENTIN 100 MG/1
CAPSULE ORAL
Qty: 90 CAP | Refills: 3 | Status: SHIPPED | OUTPATIENT
Start: 2020-10-12 | End: 2021-02-15 | Stop reason: SDUPTHER

## 2020-10-13 ENCOUNTER — PATIENT MESSAGE (OUTPATIENT)
Dept: ENDOCRINOLOGY | Age: 70
End: 2020-10-13

## 2020-10-13 NOTE — TELEPHONE ENCOUNTER
From: Kelsey Lawrence  To: Amanda William MD  Sent: 10/13/2020 1:27 PM EDT  Subject: Visit Follow-Up Question    No notifications came through. Please again, or call me at [phone].

## 2020-10-21 ENCOUNTER — TELEPHONE (OUTPATIENT)
Dept: INTERNAL MEDICINE CLINIC | Age: 70
End: 2020-10-21

## 2020-10-21 RX ORDER — CYCLOBENZAPRINE HCL 5 MG
5 TABLET ORAL
Qty: 30 TAB | Refills: 0 | Status: SHIPPED | OUTPATIENT
Start: 2020-10-21 | End: 2020-10-30

## 2020-10-21 RX ORDER — METHYLPREDNISOLONE 4 MG/1
TABLET ORAL
Qty: 1 DOSE PACK | Refills: 0 | Status: SHIPPED | OUTPATIENT
Start: 2020-10-21 | End: 2021-03-18 | Stop reason: ALTCHOICE

## 2020-10-21 NOTE — TELEPHONE ENCOUNTER
Mel Macedo MD  You 1 hour ago (1:21 PM)      I escribed medrol and flexeril, please inform pt    Message text

## 2020-10-21 NOTE — TELEPHONE ENCOUNTER
Called, spoke to pt. Two identifiers confirmed. Pt c/o muscle spasms. Pt stated she is unable to walk around without pain. Notified pt a message will be sent to Dr. Karlos Ríos. Pt verbalized understanding of information discussed w/ no further questions at this time.

## 2020-10-21 NOTE — TELEPHONE ENCOUNTER
[facility]. [facility]               Caller's first and last name:pt   Reason for call:back pain   Callback required yes/no and why:yes to discuss symptoms   Best contact number(s):[phone]   Details to clarify the request:n/a

## 2020-10-28 ENCOUNTER — VIRTUAL VISIT (OUTPATIENT)
Dept: ENDOCRINOLOGY | Age: 70
End: 2020-10-28
Payer: MEDICARE

## 2020-10-28 DIAGNOSIS — Z79.4 TYPE 2 DIABETES MELLITUS WITH COMPLICATION, WITH LONG-TERM CURRENT USE OF INSULIN (HCC): Primary | ICD-10-CM

## 2020-10-28 DIAGNOSIS — E11.8 TYPE 2 DIABETES MELLITUS WITH COMPLICATION, WITH LONG-TERM CURRENT USE OF INSULIN (HCC): Primary | ICD-10-CM

## 2020-10-28 DIAGNOSIS — E66.01 OBESITY, MORBID (HCC): ICD-10-CM

## 2020-10-28 PROCEDURE — 99214 OFFICE O/P EST MOD 30 MIN: CPT | Performed by: INTERNAL MEDICINE

## 2020-10-28 NOTE — PROGRESS NOTES
This is an established visit conducted via telemedicine using [employer] video.  The patient has been instructed that this meets HIPAA criteria ,that they may receive a bill for these services and acknowledges and agrees to this method of visitation.     Ms. Luis Fernando Contreras returns for follow-up of her type 2 diabetes mellitus x 14 years with poor blood sugar control.    Her A1c was 10.3% in September     Current medications  Toujeo insulin 60 units at bedtime  Humalog insulin 20 breakfast (20 for lunch if she eats lunch) and 42 for dinner  Metformin she is not taking this anymore because of GI distress. Trulicity 0.5 mg weekly (started June)     Ms. Luis Fernando Contreras returns with blood sugars that are still not well controlled. She had some back spasms and received prednsione and said her blood sugars went up with the prednisone   \"Just to let you know I have been on Methylprednisolone. 10/18 244 mor 246 night 10/19 137 mor 10/20 212 mor 364 night 10/21 238 mor 10/24  mor  10/25 183 mor 344 night 10/26 186 mor  10/27 89\"    The last 2 mornings she has had blood sugars of 75 and 89. But most of the time her fasting blood sugars are between 180 and 240. Her bedtime blood sugars are between 203 150. She says she has a juan and egg sandwich with a Diet Coke for breakfast.  A peanut butter sandwich with peaches for lunch. And a barbecue sandwich last night. After the barbecue and coleslaw. Her blood sugar this morning was 75. She says her back pain is a little bit better than it was previously. But for the most part her blood sugars have been elevated and consistent with an A1c of 10.3.     Examination  GENERAL: NCAT, Appears well nourished   EYES: EOMI, non-icteric, no proptosis   Ear/Nose/Throat: NCAT, no visible inflammation or masses   CARDIOVASCULAR: no cyanosis, no visible JVD   RESPIRATORY: comfortable respirations observed, no cyanosis   MUSCULOSKELETAL: Normal ROM of upper extremities observed   SKIN: No edema, rash, or other significant changes observed   NEUROLOGIC:  AAOx3   PSYCHIATRIC: Normal affect, Normal insight and judgement       Impression type 2 diabetes mellitus and morbid obesity with poor blood sugar control on combination therapy  Plan: I have asked her to do a daytime fast after taking 60 units of Toujeo at night. My suspicion is that her blood sugars going to fall. If that is the case, then the problem is the mealtime insulin. We will discuss this over the phone and make adjustments as needed.   I did order laboratory tests for 2 months from now prior to a visit in Dec.

## 2020-10-30 ENCOUNTER — TELEPHONE (OUTPATIENT)
Dept: INTERNAL MEDICINE CLINIC | Age: 70
End: 2020-10-30

## 2020-10-30 DIAGNOSIS — M25.559 HIP PAIN: Primary | ICD-10-CM

## 2020-10-30 RX ORDER — TRAMADOL HYDROCHLORIDE 50 MG/1
50 TABLET ORAL
Qty: 30 TAB | Refills: 0 | Status: SHIPPED | OUTPATIENT
Start: 2020-10-30 | End: 2020-11-11

## 2020-10-30 NOTE — TELEPHONE ENCOUNTER
----- Message from Watson Taylor sent at 10/30/2020  9:06 AM EDT -----  Regarding: Dr. Miryam Mir telephone  General Message/Vendor Calls    Caller's first and last name: pt       Reason for call: pt states that she is still experiencing pain in her left hip. Callback required yes/no and why: yes       Best contact number(s): [phone]      Details to clarify the request: pt states the medication that was called in for her is not providing her with any relief.        Message from [employer]

## 2020-10-30 NOTE — TELEPHONE ENCOUNTER
Requested Prescriptions     Pending Prescriptions Disp Refills    insulin glargine U-300 conc (TOUJEO SOLOSTAR U-300 INSULIN) 300 unit/mL (1.5 mL) inpn pen       Si Units by SubCUTAneous route nightly.    \ 80y old woman with PMH uterine cancer (s/p SHAAN in 2016 and adjuvant therapy, recurrence with malignant ascites currently undergoing therapeutic paracentesis/chemotherapy on ), CAD (s/p CABG), history of PE (2020; was on Xarelto OP), HTN, depression who presents for hematuria.  Oral AC was d/c, pt was started on SQ Lovenox on 10/28, shortly after developed hematuria again. She was consulted by  , cystoscopy was scheduled on Monday at [facility]. IVC filter was placed by IR on 10/29. Today pt'll get chemotherapy.    Pt denies abdominal pain, c/o hematuria, very upset, crying during conversation, agreeable for chemotherapy and inpt cystoscope.     OBJECTIVE  PAST MEDICAL & SURGICAL HISTORY  HLD (hyperlipidemia)    HTN (hypertension)    CAD (coronary artery disease)    Coronary artery disease  s/p CABG X2    Glaucoma    Cataract of right eye    High cholesterol    Hypertension    Uterine cancer  s/p SHAAN and chemo    History of coronary artery bypass surgery    Athscl CABG, unsp, w angina pectoris w documented spasm    H/O total hysterectomy      ALLERGIES:  chloroquine (Hives)  quinine (Urticaria)      VITAL SIGNS: Last 24 Hours  T(C): 37.4 (30 Oct 2020 13:59), Max: 37.4 (30 Oct 2020 13:59)  T(F): 99.3 (30 Oct 2020 13:59), Max: 99.3 (30 Oct 2020 13:59)  HR: 82 (30 Oct 2020 13:59) (71 - 87)  BP: 126/64 (30 Oct 2020 13:59) (113/58 - 126/64)  BP(mean): --  RR: 18 (30 Oct 2020 13:59) (18 - 18)      PHYSICAL EXAM:  GENERAL: NAD,  AAOx3 with temporal muscle waisting   HEENT:  Atraumatic, Normocephalic. EOMI, PERRLA, conjunctiva and sclera clear, No JVD  PULMONARY: Clear to auscultation bilaterally; No wheeze  CARDIOVASCULAR: Regular rate and rhythm; No murmurs, rubs, or gallops  GASTROINTESTINAL: Soft, Nontender, Nondistended; Bowel sounds present  MUSCULOSKELETAL:  2+ Peripheral Pulses, No clubbing, cyanosis, or edema, PICC line RUE  NEUROLOGY: non-focal  SKIN: No rashes or lesions    LABS:                                   8.6    3.49  )-----------( 203      ( 30 Oct 2020 05:30 )             27.2   10-30    137  |  101  |  11  ----------------------------<  99  4.1   |  26  |  0.9    Ca    8.4<L>      30 Oct 2020 05:30  Mg     1.8     10-30    TPro  5.7<L>  /  Alb  3.5  /  TBili  0.9  /  DBili  x   /  AST  14  /  ALT  6   /  AlkPhos  65  10-30         PTT - ( 27 Oct 2020 13:31 )  PTT:36.6 sec  Urinalysis Basic - ( 29 Oct 2020 10:00 )    Color: Dark Brown / Appearance: Turbid / S.012 / pH: x  Gluc: x / Ketone: Negative  / Bili: Negative / Urobili: <2 mg/dL   Blood: x / Protein: 300 mg/dL / Nitrite: Negative   Leuk Esterase: Moderate / RBC: >720 /HPF /  /HPF   Sq Epi: x / Non Sq Epi: 0 /HPF / Bacteria: 0.0    Culture - Urine (collected 27 Oct 2020 13:31)  Source: .Urine Clean Catch (Midstream)  Final Report (28 Oct 2020 18:19):    No growth    RADIOLOGY:    < from: US Kidney and Bladder (10.28.20 @ 07:35) >  IMPRESSION:    No stones seen by ultrasound.    Again seen mild left hydronephrosis.    MEDICATIONS  (STANDING):  amLODIPine   Tablet 5 milliGRAM(s) Oral daily  aspirin enteric coated 81 milliGRAM(s) Oral daily  atorvastatin 40 milliGRAM(s) Oral at bedtime  brimonidine 0.2% Ophthalmic Solution 1 Drop(s) Both EYES two times a day  dorzolamide 2% Ophthalmic Solution 1 Drop(s) Both EYES three times a day  heparin   Injectable 5000 Unit(s) SubCutaneous every 12 hours  metoprolol succinate ER 25 milliGRAM(s) Oral daily  polyethylene glycol 3350 17 Gram(s) Oral at bedtime  senna 2 Tablet(s) Oral at bedtime  sertraline 25 milliGRAM(s) Oral daily    MEDICATIONS  (PRN):  clonazePAM  Tablet 0.5 milliGRAM(s) Oral daily PRN anxiety  oxycodone    5 mG/acetaminophen 325 mG 1 Tablet(s) Oral every 6 hours PRN Moderate Pain (4 - 6)

## 2020-10-30 NOTE — TELEPHONE ENCOUNTER
Minda Sesay MD  You 17 minutes ago (1:50 PM)      Advise to see Dr Sherita Avila or Pauline Lopez or [facility] for hip pain--needs office eval    Message text      Notified pt via [address]

## 2020-11-11 ENCOUNTER — HOSPITAL ENCOUNTER (EMERGENCY)
Age: 70
Discharge: HOME | End: 2020-11-11
Attending: EMERGENCY MEDICINE
Payer: MEDICARE

## 2020-11-11 ENCOUNTER — APPOINTMENT (OUTPATIENT)
Dept: GENERAL RADIOLOGY | Age: 70
End: 2020-11-11
Attending: PHYSICIAN ASSISTANT
Payer: MEDICARE

## 2020-11-11 VITALS
BODY MASS INDEX: 43.24 KG/M2 | OXYGEN SATURATION: 98 % | HEART RATE: 74 BPM | WEIGHT: 235 LBS | SYSTOLIC BLOOD PRESSURE: 170 MMHG | HEIGHT: 62 IN | DIASTOLIC BLOOD PRESSURE: 68 MMHG | TEMPERATURE: 98 F | RESPIRATION RATE: 16 BRPM

## 2020-11-11 DIAGNOSIS — E11.8 TYPE 2 DIABETES MELLITUS WITH COMPLICATION, WITH LONG-TERM CURRENT USE OF INSULIN (HCC): Primary | ICD-10-CM

## 2020-11-11 DIAGNOSIS — Z79.4 TYPE 2 DIABETES MELLITUS WITH COMPLICATION, WITH LONG-TERM CURRENT USE OF INSULIN (HCC): Primary | ICD-10-CM

## 2020-11-11 DIAGNOSIS — M54.50 ACUTE LEFT-SIDED LOW BACK PAIN WITHOUT SCIATICA: Primary | ICD-10-CM

## 2020-11-11 DIAGNOSIS — M25.559 HIP PAIN: ICD-10-CM

## 2020-11-11 PROCEDURE — A9270 NON-COVERED ITEM OR SERVICE: HCPCS | Performed by: PHYSICIAN ASSISTANT

## 2020-11-11 PROCEDURE — 96372 THER/PROPH/DIAG INJ SC/IM: CPT

## 2020-11-11 PROCEDURE — 99284 EMERGENCY DEPT VISIT MOD MDM: CPT

## 2020-11-11 PROCEDURE — 74011250636 HC RX REV CODE- 250/636: Performed by: PHYSICIAN ASSISTANT

## 2020-11-11 PROCEDURE — 72100 X-RAY EXAM L-S SPINE 2/3 VWS: CPT

## 2020-11-11 PROCEDURE — 74011250637 HC RX REV CODE- 250/637: Performed by: PHYSICIAN ASSISTANT

## 2020-11-11 PROCEDURE — 74011636637 HC RX REV CODE- 636/637: Performed by: PHYSICIAN ASSISTANT

## 2020-11-11 RX ORDER — METHOCARBAMOL 750 MG/1
750 TABLET, FILM COATED ORAL 4 TIMES DAILY
Qty: 20 TAB | Refills: 0 | Status: SHIPPED | OUTPATIENT
Start: 2020-11-11 | End: 2021-03-18 | Stop reason: ALTCHOICE

## 2020-11-11 RX ORDER — PREDNISONE 20 MG/1
60 TABLET ORAL
Status: COMPLETED | OUTPATIENT
Start: 2020-11-11 | End: 2020-11-11

## 2020-11-11 RX ORDER — TRAMADOL HYDROCHLORIDE 50 MG/1
50 TABLET ORAL
Status: COMPLETED | OUTPATIENT
Start: 2020-11-11 | End: 2020-11-11

## 2020-11-11 RX ORDER — TRAMADOL HYDROCHLORIDE 50 MG/1
50 TABLET ORAL
Qty: 20 TAB | Refills: 0 | Status: SHIPPED | OUTPATIENT
Start: 2020-11-11 | End: 2020-11-16

## 2020-11-11 RX ORDER — INSULIN GLARGINE 300 U/ML
60 INJECTION, SOLUTION SUBCUTANEOUS
Qty: 4 PEN | Refills: 3 | Status: SHIPPED | OUTPATIENT
Start: 2020-11-11 | End: 2021-02-15 | Stop reason: SDUPTHER

## 2020-11-11 RX ORDER — PREDNISONE 10 MG/1
TABLET ORAL
Qty: 21 TAB | Refills: 0 | Status: SHIPPED | OUTPATIENT
Start: 2020-11-11 | End: 2020-11-17

## 2020-11-11 RX ORDER — KETOROLAC TROMETHAMINE 30 MG/ML
30 INJECTION, SOLUTION INTRAMUSCULAR; INTRAVENOUS
Status: COMPLETED | OUTPATIENT
Start: 2020-11-11 | End: 2020-11-11

## 2020-11-11 RX ORDER — METHOCARBAMOL 750 MG/1
750 TABLET, FILM COATED ORAL
Status: COMPLETED | OUTPATIENT
Start: 2020-11-11 | End: 2020-11-11

## 2020-11-11 RX ADMIN — METHOCARBAMOL TABLETS 750 MG: 750 TABLET, COATED ORAL at 17:30

## 2020-11-11 RX ADMIN — TRAMADOL HYDROCHLORIDE 50 MG: 50 TABLET ORAL at 17:30

## 2020-11-11 RX ADMIN — PREDNISONE 60 MG: 20 TABLET ORAL at 17:30

## 2020-11-11 RX ADMIN — KETOROLAC TROMETHAMINE 30 MG: 30 INJECTION, SOLUTION INTRAMUSCULAR at 17:30

## 2020-11-11 NOTE — DISCHARGE INSTRUCTIONS
Patient Education        Learning About Relief for Back Pain  What is back tension and strain? Back strain happens when you overstretch, or pull, a muscle in your back. You may hurt your back in an accident or when you exercise or lift something. Most back pain will get better with rest and time. You can take care of yourself at home to help your back heal.  What can you do first to relieve back pain? When you first feel back pain, try these steps:  · Walk. Take a short walk (10 to 20 minutes) on a level surface (no slopes, hills, or stairs) every 2 to 3 hours. Walk only distances you can manage without pain, especially leg pain. · Relax. Find a comfortable position for rest. Some people are comfortable on the floor or a medium-firm bed with a small pillow under their head and another under their knees. Some people prefer to lie on their side with a pillow between their knees. Don't stay in one position for too long. · Try heat or ice. Try using a heating pad on a low or medium setting, or take a warm shower, for 15 to 20 minutes every 2 to 3 hours. Or you can buy single-use heat wraps that last up to 8 hours. You can also try an ice pack for 10 to 15 minutes every 2 to 3 hours. You can use an ice pack or a bag of frozen vegetables wrapped in a thin towel. There is not strong evidence that either heat or ice will help, but you can try them to see if they help. You may also want to try switching between heat and cold. · Take pain medicine exactly as directed. ¨ If the doctor gave you a prescription medicine for pain, take it as prescribed. ¨ If you are not taking a prescription pain medicine, ask your doctor if you can take an over-the-counter medicine. What else can you do? · Stretch and exercise. Exercises that increase flexibility may relieve your pain and make it easier for your muscles to keep your spine in a good, neutral position. And don't forget to keep walking. · Do self-massage.  You can use self-massage to unwind after work or school or to energize yourself in the morning. You can easily massage your feet, hands, or neck. Self-massage works best if you are in comfortable clothes and are sitting or lying in a comfortable position. Use oil or lotion to massage bare skin. · Reduce stress. Back pain can lead to a vicious Ruby: Distress about the pain tenses the muscles in your back, which in turn causes more pain. Learn how to relax your mind and your muscles to lower your stress. Where can you learn more? Go to http://www.gray.com/. Enter S301 in the search box to learn more about \"Learning About Relief for Back Pain. \"  Current as of: March 21, 2017  Content Version: 11.5  © 2224-2112 [employer]wise, [employer]. Care instructions adapted under license by [employer] (which disclaims liability or warranty for this information). If you have questions about a medical condition or this instruction, always ask your healthcare professional. [address] any warranty or liability for your use of this information.

## 2020-11-11 NOTE — ED PROVIDER NOTES
EMERGENCY DEPARTMENT HISTORY AND PHYSICAL EXAM      Date: 11/11/2020  Patient Name: Carolin Navarro    History of Presenting Illness     Chief Complaint   Patient presents with    Back Pain     x3 weeks, fall on saturday where her walker came out from under her    Foot Swelling       History Provided By: Patient    HPI: Carolin Navarro, 81 y.o. female with significant PMHx for HTN and DM, presents by POV to the ED with cc of lower back pain. The patient notes Hx of sciatica with similar complaints in the past.  Her pain started about 3 weeks ago. She has been evaluated up with her primary care doctor as well as orthopedics for this. She was scheduled to start therapy today, but did not attend because of her pain. She reports that she is taken a dose of steroids, some unknown pain medication and a muscle relaxer without relief of her complaint. She has not taken any medications either prescription or over-the-counter since this past Friday for her back pain. She does report falling on Saturday when her walker came out from underneath of her. Her only other complaint is bilateral foot swelling that she attributes to not taking her fluid pill over the last 3 weeks. She states that she is electively decided to not take this because it hurts too bad to move and she does not want to have to get to the restroom quickly. She denies chest pain, palpitations, shortness of breath, lightheadedness, dizziness, urinary complaint. Her pain is a constant pain that is nonradiating and worsens with movement. There are no other complaints, changes, or physical findings at this time. Social Hx: Tobacco (denies), EtOH (denies), Illicit drug use (denies)     PCP: Leonel Maldonado MD    No current facility-administered medications on file prior to encounter.       Current Outpatient Medications on File Prior to Encounter   Medication Sig Dispense Refill    insulin glargine U-300 conc (Toujeo SoloStar U-300 Insulin) 300 unit/mL (1.5 mL) inpn pen 60 Units by SubCUTAneous route nightly. Indications: type 2 diabetes mellitus 4 Pen 3    traMADoL (ULTRAM) 50 mg tablet Take 1 Tab by mouth every eight (8) hours as needed for Pain for up to 30 days. Max Daily Amount: 150 mg. 30 Tab 0    methylPREDNISolone (Medrol, Bret,) 4 mg tablet As directed 1 Dose Pack 0    gabapentin (NEURONTIN) 100 mg capsule TAKE 1 CAPSULE THREE TIMES A DAY 90 Cap 3    glucose blood VI test strips (True Metrix Glucose Test Strip) strip Monitor blood sugar 3 times daily 300 Strip 3    rosuvastatin (CRESTOR) 20 mg tablet Take 1 Tab by mouth daily. 90 Tab 3    triamterene-hydroCHLOROthiazide (DYAZIDE) 37.5-25 mg per capsule Take 1 Cap by mouth daily. 180 Cap 3    metoprolol tartrate (LOPRESSOR) 50 mg tablet Take 1 Tab by mouth two (2) times a day. 180 Tab 3    montelukast (SINGULAIR) 10 mg tablet TAKE 1 TABLET DAILY 90 Tab 3    hydrALAZINE (APRESOLINE) 25 mg tablet Take 1 Tab by mouth three (3) times daily. 180 Tab 3    albuterol (ProAir HFA) 90 mcg/actuation inhaler USE 1 INHALATION EVERY 4 HOURS AS NEEDED WHEEZING OR SHORTNESS OF BREATH 3 Inhaler 3    levothyroxine (SYNTHROID) 137 mcg tablet TAKE 1 TABLET DAILY 90 Tab 3    dulaglutide (Trulicity) 8.33 XT/2.7 mL sub-q pen 0.5 mL by SubCUTAneous route every seven (7) days. 12 Syringe 3    losartan (COZAAR) 100 mg tablet TAKE 1 TABLET DAILY 90 Tab 3    [DISCONTINUED] insulin glargine U-300 conc (Toujeo SoloStar U-300 Insulin) 300 unit/mL (1.5 mL) inpn pen 60 Units by SubCUTAneous route nightly. Indications: type 2 diabetes mellitus 4 Pen 3    flash glucose scanning reader (FREESTYLE LEXI 14 DAY READER) misc 1 Each by Does Not Apply route daily. 1 Each 0    flash glucose sensor (FREESTYLE LEXI 14 DAY SENSOR) kit 1 Each by Does Not Apply route every fourteen (14) days. 6 Kit 3    amLODIPine (NORVASC) 10 mg tablet Take 1 Tab by mouth daily.  90 Tab 3    insulin lispro (HUMALOG [facility] INSULIN) 100 unit/mL kwikpen 22 units for breakfast and lunch and 36 units for dinner (Patient taking differently: 20 units for breakfast and lunch and 40 units for dinner) 25 Adjustable Dose Pre-filled Pen Syringe 3    glucose blood VI test strips (ONETOUCH ULTRA BLUE TEST STRIP) strip Monitor blood sugar 3 times daily 300 Strip 3    albuterol (ACCUNEB) 1.25 mg/3 mL nebu 3 mL by Nebulization route every four (4) hours as needed (wheezing). 225 mL 3    ADVAIR DISKUS 100-50 mcg/dose diskus inhaler USE 1 INHALATION TWICE A DAY (Patient taking differently: USE 1 INHALATION TWICE A DAY prn) 180 Each 3    BD INSULIN PEN NEEDLE UF SHORT 31 gauge x \" ndle       aspirin delayed-release 81 mg tablet Take 81 mg by mouth daily.          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     Thyroid disease     Unspecified sleep apnea     uses CPAP       Past Surgical History:  Past Surgical History:   Procedure Laterality Date    CARDIAC CATHETERIZATION  2012         CARDIAC SURG PROCEDURE UNLIST      heart surgery    HX HEENT      tonsillectomy    HX HEMORRHOIDECTOMY      HX HYSTERECTOMY      HX ORTHOPAEDIC  2011    left shoulder       Family History:  Family History   Problem Relation Age of Onset    Diabetes Mother     Cancer Mother     Breast Cancer Mother 79    Heart Disease Father     Hypertension Father     Stroke Father     Coronary Artery Disease Brother 54       Social History:  Social History     Tobacco Use    Smoking status: Former Smoker     Last attempt to quit: 2004     Years since quittin.1    Smokeless tobacco: Never Used   Substance Use Topics    Alcohol use: No    Drug use: Never     Comment: CBD oil(cream)       Allergies: Allergies   Allergen Reactions    Latex Itching    Codeine Itching and Other (comments)     hallucinate    Contrast Dye [Iodine] Rash and Itching     Given pre-cardiac cath    Seafood [Shellfish Containing Products] Swelling         Review of Systems   Review of Systems   Constitutional: Negative for chills, diaphoresis and fever. HENT: Negative for congestion, ear pain, rhinorrhea and sore throat. Respiratory: Negative for cough and shortness of breath. Cardiovascular: Positive for leg swelling. Negative for chest pain. Gastrointestinal: Negative for abdominal pain, constipation, diarrhea, nausea and vomiting. Denies incontinence of bowel. Genitourinary: Negative for difficulty urinating, dysuria, frequency and hematuria. Denies urinary incontinence. Musculoskeletal: Positive for back pain and gait problem. Negative for arthralgias and myalgias. Neurological: Negative for dizziness, weakness, numbness and headaches. Denies saddle paresthesias. All other systems reviewed and are negative. Physical Exam   Physical Exam  Vitals signs and nursing note reviewed. Constitutional:       General: She is not in acute distress. Appearance: She is well-developed. She is obese. She is not diaphoretic. Comments: 81 y.o. -American female    HENT:      Head: Normocephalic and atraumatic. Eyes:      General:         Right eye: No discharge. Left eye: No discharge. Conjunctiva/sclera: Conjunctivae normal.   Neck:      Musculoskeletal: Normal range of motion and neck supple. Cardiovascular:      Rate and Rhythm: Normal rate and regular rhythm. Heart sounds: Normal heart sounds. No murmur. Pulmonary:      Effort: Pulmonary effort is normal. No respiratory distress. Breath sounds: Normal breath sounds. Musculoskeletal:      Comments: BACK: Normal spinal curvatures. No step off or deformity. Tender to palpation of the lower left back. Negative seated SLR bilaterally. Strength of the LE 5/5 and equal bilaterally. Ambulatory with pain. Skin:     General: Skin is warm and dry. Neurological:      Mental Status: She is alert and oriented to person, place, and time. Psychiatric:         Behavior: Behavior normal.         Diagnostic Study Results     Labs - none    Radiologic Studies -   XR SPINE LUMB 2 OR 3 V    (Results Pending)     Medical Decision Making   I am the first provider for this patient. I reviewed the vital signs, available nursing notes, past medical history, past surgical history, family history and social history. Vital Signs-Reviewed the patient's vital signs. Patient Vitals for the past 12 hrs:   Temp Pulse Resp BP SpO2   11/11/20 1632 98 °F (36.7 °C) 74 16 (!) 170/68 98 %       Records Reviewed: Nursing Notes and Old Medical Records   Reviewed recent meds and office notes. Provider Notes (Medical Decision Making):   Patient resents the ED with back pain. Differential diagnosis include fracture, sprain, strain, spasm, DDD, DJD, herniated disc, sciatica. X-rays are negative for acute issue. Patient's vital signs are stable. Her pain improved with ED treatment. She is to follow-up as an outpatient with PCP orthopedics. ED Course:   Initial assessment performed. The patients presenting problems have been discussed, and they are in agreement with the care plan formulated and outlined with them. I have encouraged them to ask questions as they arise throughout their visit. 6:52 PM  The patient has been reevaluated. She notes that her pain is slightly improved. We discussed her x-ray results, outpatient treatment for her back pain, and the importance of taking her fluid pill to help with her peripheral edema. She is ready for discharge.        Critical Care Time: None    Disposition:  DISCHARGE NOTE:  6:52 PM  The pt is ready for discharge. The pt's signs, symptoms, diagnosis, and discharge instructions have been discussed and pt has conveyed their understanding. The pt is to follow up as recommended or return to ER should their symptoms worsen. Plan has been discussed and pt is in agreement. PLAN:  1. Current Discharge Medication List        2. Follow-up Information    None       Return to ED if worse     Diagnosis     Clinical Impression: No diagnosis found. Please note that this dictation was completed with AwesomeTouch, the [employer] voice recognition software. Quite often unanticipated grammatical, syntax, homophones, and other interpretive errors are inadvertently transcribed by the computer software. Please disregards these errors. Please excuse any errors that have escaped final proofreading.

## 2020-11-11 NOTE — ED NOTES
VU Ly at bedside. 1746: Pt off the floor to x-ray. 1912: Pt discharged by VU Ly. Pt provided with discharge instructions Rx and instructions on follow up care. Pt out of ED via wheelchair accompanied by HEIDI Dial.

## 2020-11-12 ENCOUNTER — PATIENT OUTREACH (OUTPATIENT)
Dept: CASE MANAGEMENT | Age: 70
End: 2020-11-12

## 2020-11-12 NOTE — PROGRESS NOTES
Patient contacted regarding recent discharge and COVID-19 risk. Discussed COVID-19 related testing which was not done at this time. Test results were not done. Patient informed of results, if available? N/A      Care Transition Nurse/ Ambulatory Care Manager/ LPN Care Coordinator contacted the patient by telephone to perform post discharge assessment; lvm requesting a return phone call to this ACM. Patient has following risk factors of: asthma and diabetes. Rx- tramadol, prednisone, robaxin-750. Pt was advised to f/u with PCP (Dr. Jared Martinez Provider) and Dr. Alyssa Rodriguez (Orthopedic Surgery) post-discharge.

## 2020-11-13 NOTE — PROGRESS NOTES
2020  10:57 AM    Patient contacted regarding recent discharge and COVID-19 risk. Discussed COVID-19 related testing which was not done at this time. Test results were not done. Patient informed of results, if available? N/A      Care Transition Nurse/ Ambulatory Care Manager/ LPN Care Coordinator contacted the patient by telephone to perform post discharge assessment. Verified name and  with patient as identifiers. Patient has following risk factors of: asthma and diabetes. CTN/ACM/LPN reviewed discharge instructions, medical action plan and red flags related to discharge diagnosis. Reviewed and educated them on any new and changed medications related to discharge diagnosis; Rx- tramadol, prednisone, robaxin-750. Advised obtaining a 90-day supply of all daily and as-needed medications. Advance Care Planning:   Does patient have an Advance Directive: currently not on file; patient declined education    Education provided regarding infection prevention, and signs and symptoms of COVID-19 and when to seek medical attention with patient who verbalized understanding. Discussed exposure protocols and quarantine from [address] you at higher risk for severe illness  and given an opportunity for questions and concerns. The patient agrees to contact the COVID-19 hotline [phone] or PCP office for questions related to their healthcare. CTN/ACM/LPN provided contact information for future reference. From CDC: Are you at higher risk for severe illness?  Wash your hands often.  Avoid close contact (6 feet, which is about two arm lengths) with people who are sick.  Put distance between yourself and other people if COVID-19 is spreading in your community.  Clean and disinfect frequently touched surfaces.  Avoid all cruise travel and non-essential air travel.    Call your healthcare professional if you have concerns about COVID-19 and your underlying condition or if you are sick.    For more information on steps you can take to protect yourself, see CDC's How to Protect Yourself      Patient/family/caregiver given information for [employer] Loop and agrees to enroll no  Patient's preferred e-mail:  N/A  Patient's preferred phone number: N/A  Based on Loop alert triggers, patient will be contacted by nurse care manager for worsening symptoms. Pt was advised to f/u with PCP (Dr. Ni Angry Provider) and Dr. Demetria Johnson (Orthopedic Surgery) post-discharge. Patient reports elevated blood glucose levels since starting prednisone; she will contact her endocrinologist regarding this to notify and be further advised. Plan for follow-up call in 7-14 days based on severity of symptoms and risk factors.

## 2020-12-07 NOTE — PATIENT INSTRUCTIONS
Medicare Wellness Visit, Female    The best way to live healthy is to have a lifestyle where you eat a well-balanced diet, exercise regularly, limit alcohol use, and quit all forms of tobacco/nicotine, if applicable. Regular preventive services are another way to keep healthy. Preventive services (vaccines, screening tests, monitoring & exams) can help personalize your care plan, which helps you manage your own care. Screening tests can find health problems at the earliest stages, when they are easiest to treat. [facility] follows the current, evidence-based guidelines published by the [facility] Mirza Estuardo ([facility]) when recommending preventive services for our patients. Because we follow these guidelines, sometimes recommendations change over time as research supports it. (For example, mammograms used to be recommended annually. Even though Medicare will still pay for an annual mammogram, the newer guidelines recommend a mammogram every two years for women of average risk.)    Of course, you and your provider may decide to screen more often for some diseases, based on your risk and co-morbidities (chronic disease you are already diagnosed with). Preventive services for you include:    - Medicare offers their members a free annual wellness visit, which is time for you and your primary care provider to discuss and plan for your preventive service needs. Take advantage of this benefit every year!    -All people over age 72 should receive the recommended pneumonia vaccines. Current USPSTF guidelines recommend a series of two vaccines for the best pneumonia protection.     -All adults should have a yearly flu vaccine and a tetanus vaccine every 10 years. All adults age 61 years should receive a shingles vaccine once in their lifetime.      -A bone mass density test is recommended when a woman turns 65 to screen for osteoporosis.  This test is only recommended once as a screening. Some providers will use this same test as a disease monitoring tool if you already have osteoporosis. -All adults age 38-68 years who are overweight should have a diabetes screening test once every three years.     -Other screening tests & preventive services for persons with diabetes include: an eye exam to screen for diabetic retinopathy, a kidney function test, a foot exam, and stricter control over your cholesterol.     -Cardiovascular screening for adults with routine risk involves an electrocardiogram (ECG) at intervals determined by the provider.     -Colorectal cancer screenings should be done for adults age 54-65 years with normal risk. There are a number of acceptable methods of screening for this type of cancer. Each test has its own benefits and drawbacks. Discuss with your provider what is most appropriate for you during your annual wellness visit. The different tests include: colonoscopy (considered the best screening method), a fecal occult blood test, a fecal DNA test, and sigmoidoscopy. -Breast cancer screenings are recommended every other year for women of normal risk age 54-69 years.     -Cervical cancer screenings for women over age 72 are only recommended with certain risk factors.     -All adults born between [facility] should be screened once for Hepatitis C.      Here is a list of your current Health Maintenance items (your personalized list of preventive services) with a due date:  Health Maintenance Due   Topic Date Due    Hepatitis C Test  1950    DTaP/Tdap/Td  (1 - Tdap) 12/12/1973    Diabetic Foot Care  08/04/2015    Hemoglobin A1C    05/23/2017    Albumin Urine Test  11/22/2017    Cholesterol Test   11/22/2017    Bone Mineral Density   12/12/2017    Pneumococcal Vaccine (1 of 2 - PCV13) 12/12/2017 - - -

## 2020-12-18 DIAGNOSIS — E11.8 TYPE 2 DIABETES MELLITUS WITH COMPLICATION, WITH LONG-TERM CURRENT USE OF INSULIN (HCC): ICD-10-CM

## 2020-12-18 DIAGNOSIS — Z79.4 TYPE 2 DIABETES MELLITUS WITH COMPLICATION, WITH LONG-TERM CURRENT USE OF INSULIN (HCC): ICD-10-CM

## 2020-12-19 LAB
HBA1C MFR BLD: 9.7 % (ref 4.8–5.6)
T4 FREE SERPL-MCNC: 1.61 NG/DL (ref 0.82–1.77)
TSH SERPL DL<=0.005 MIU/L-ACNC: 1.93 UIU/ML (ref 0.45–4.5)

## 2020-12-23 ENCOUNTER — VIRTUAL VISIT (OUTPATIENT)
Dept: ENDOCRINOLOGY | Age: 70
End: 2020-12-23
Payer: MEDICARE

## 2020-12-23 DIAGNOSIS — E11.8 TYPE 2 DIABETES MELLITUS WITH COMPLICATION, WITH LONG-TERM CURRENT USE OF INSULIN (HCC): Primary | ICD-10-CM

## 2020-12-23 DIAGNOSIS — Z79.4 TYPE 2 DIABETES MELLITUS WITH COMPLICATION, WITH LONG-TERM CURRENT USE OF INSULIN (HCC): Primary | ICD-10-CM

## 2020-12-23 DIAGNOSIS — E66.01 OBESITY, MORBID (HCC): ICD-10-CM

## 2020-12-23 PROCEDURE — 99213 OFFICE O/P EST LOW 20 MIN: CPT | Performed by: INTERNAL MEDICINE

## 2020-12-23 NOTE — PROGRESS NOTES
This is an established visit conducted via telemedicine using Vobi video.  The patient has been instructed that this meets HIPAA criteria ,that they may receive a bill for these services and acknowledges and agrees to this method of visitation.     Ms. Pastora Clinton returns for follow-up of her type 2 diabetes mellitus x 14 years with poor blood sugar control.    Her A1c was 9.7% in September. We have honestly never been able to get her A1c under 9%.     Current medications  Toujeo insulin 60 units at bedtime  Humalog insulin 20 breakfast (20 for lunch if she eats lunch) and 42 for dinner  Metformin she is not taking this anymore because of GI distress. Trulicity 0.5 mg weekly (stopped)    She tells me today that her blood sugars in the morning are ranging from 1 15-1 80. Her blood sugars 2 to 3 hours after dinner are ranging from 2 50-3 50. She is taking the insulin as noted above. Last night for dinner she had a hot dog and baked beans. Her blood sugar at bedtime was 255 and her blood sugar this morning was 107. She occasionally has a lunch meal of a sandwich and she will take 20 units for that. She does tell me that she recently had a steroid injection in her back and finished 2 courses of prednisone but that finished about mid December. The blood sugars noted above are in the most recent 2 weeks.     Examination  GENERAL: NCAT, Appears well nourished   EYES: EOMI, non-icteric, no proptosis   Ear/Nose/Throat: NCAT, no visible inflammation or masses   CARDIOVASCULAR: no cyanosis, no visible JVD   RESPIRATORY: comfortable respirations observed, no cyanosis   MUSCULOSKELETAL: Normal ROM of upper extremities observed   SKIN: No edema, rash, or other significant changes observed   NEUROLOGIC:  AAOx3   PSYCHIATRIC: Normal affect, Normal insight and judgement       Impression type 2 diabetes mellitus with poor blood sugar control currently on basal bolus insulin  Plan: I have asked her to check blood sugars before breakfast, before supper, and at bedtime and to send me those via YesPlz!. Based on those readings, I am anticipating an increase in the dose of mealtime insulin for dinner and likely a decrease in Toujeo at bedtime. We will see her back hopefully face-to-face in March.     We spent more than 15 mintutes face to face, more than 50% was in counseling regarding diet, exercise and carbohydrate intake.

## 2021-01-02 ENCOUNTER — HOSPITAL ENCOUNTER (EMERGENCY)
Age: 71
Discharge: HOME | End: 2021-01-02
Attending: EMERGENCY MEDICINE
Payer: MEDICARE

## 2021-01-02 VITALS
HEART RATE: 89 BPM | DIASTOLIC BLOOD PRESSURE: 66 MMHG | OXYGEN SATURATION: 92 % | TEMPERATURE: 98.3 F | SYSTOLIC BLOOD PRESSURE: 152 MMHG | BODY MASS INDEX: 35.44 KG/M2 | HEIGHT: 63 IN | WEIGHT: 200 LBS | RESPIRATION RATE: 20 BRPM

## 2021-01-02 DIAGNOSIS — M54.50 ACUTE LEFT-SIDED LOW BACK PAIN WITHOUT SCIATICA: Primary | ICD-10-CM

## 2021-01-02 PROCEDURE — 99284 EMERGENCY DEPT VISIT MOD MDM: CPT

## 2021-01-02 PROCEDURE — 77030038269 HC DRN EXT URIN PURWCK BARD -A

## 2021-01-02 PROCEDURE — 74011000250 HC RX REV CODE- 250: Performed by: EMERGENCY MEDICINE

## 2021-01-02 PROCEDURE — 96372 THER/PROPH/DIAG INJ SC/IM: CPT

## 2021-01-02 PROCEDURE — 74011250636 HC RX REV CODE- 250/636: Performed by: EMERGENCY MEDICINE

## 2021-01-02 PROCEDURE — 74011250637 HC RX REV CODE- 250/637: Performed by: EMERGENCY MEDICINE

## 2021-01-02 RX ORDER — METHOCARBAMOL 750 MG/1
750 TABLET, FILM COATED ORAL 4 TIMES DAILY
Status: DISCONTINUED | OUTPATIENT
Start: 2021-01-02 | End: 2021-01-02

## 2021-01-02 RX ORDER — OXYCODONE HYDROCHLORIDE 5 MG/1
5 TABLET ORAL
Status: COMPLETED | OUTPATIENT
Start: 2021-01-02 | End: 2021-01-02

## 2021-01-02 RX ORDER — ACETAMINOPHEN 325 MG/1
325 TABLET ORAL
Status: COMPLETED | OUTPATIENT
Start: 2021-01-02 | End: 2021-01-02

## 2021-01-02 RX ORDER — KETOROLAC TROMETHAMINE 30 MG/ML
30 INJECTION, SOLUTION INTRAMUSCULAR; INTRAVENOUS
Status: COMPLETED | OUTPATIENT
Start: 2021-01-02 | End: 2021-01-02

## 2021-01-02 RX ORDER — TRAMADOL HYDROCHLORIDE 50 MG/1
50 TABLET ORAL
Status: COMPLETED | OUTPATIENT
Start: 2021-01-02 | End: 2021-01-02

## 2021-01-02 RX ORDER — LIDOCAINE 4 G/100G
2 PATCH TOPICAL EVERY 24 HOURS
Status: DISCONTINUED | OUTPATIENT
Start: 2021-01-02 | End: 2021-01-02 | Stop reason: HOSPADM

## 2021-01-02 RX ORDER — OXYCODONE AND ACETAMINOPHEN 5; 325 MG/1; MG/1
1 TABLET ORAL
Status: DISCONTINUED | OUTPATIENT
Start: 2021-01-02 | End: 2021-01-02

## 2021-01-02 RX ORDER — METHOCARBAMOL 750 MG/1
750 TABLET, FILM COATED ORAL ONCE
Status: COMPLETED | OUTPATIENT
Start: 2021-01-02 | End: 2021-01-02

## 2021-01-02 RX ORDER — OXYCODONE HYDROCHLORIDE 5 MG/1
5 TABLET ORAL
Qty: 12 TAB | Refills: 0 | Status: SHIPPED | OUTPATIENT
Start: 2021-01-02 | End: 2021-01-05

## 2021-01-02 RX ADMIN — OXYCODONE 5 MG: 5 TABLET ORAL at 16:09

## 2021-01-02 RX ADMIN — ACETAMINOPHEN 325 MG: 325 TABLET ORAL at 15:12

## 2021-01-02 RX ADMIN — METHOCARBAMOL TABLETS 750 MG: 750 TABLET, COATED ORAL at 15:12

## 2021-01-02 RX ADMIN — TRAMADOL HYDROCHLORIDE 50 MG: 50 TABLET, COATED ORAL at 15:12

## 2021-01-02 RX ADMIN — KETOROLAC TROMETHAMINE 30 MG: 30 INJECTION, SOLUTION INTRAMUSCULAR; INTRAVENOUS at 15:10

## 2021-01-02 NOTE — ED NOTES
Patient given printed discharge instructions reviewed by the MD. Patient understands instructions/follow up recommendations. Patient discharged  out of ED via wheelchair to daughters car.

## 2021-01-02 NOTE — ED PROVIDER NOTES
EMERGENCY DEPARTMENT HISTORY AND PHYSICAL EXAM      Date: 1/2/2021  Patient Name: Marguerite Wren    Please note that this dictation was completed with [email or web address], the computer voice recognition software. Quite often unanticipated grammatical, syntax, homophones, and other interpretive errors are inadvertently transcribed by the computer software. Please disregard these errors. Please excuse any errors that have escaped final proofreading. History of Presenting Illness     Chief Complaint   Patient presents with    Back Pain     Assumed care of patient from EMS for c/o back onset Thurs. She has had it forever. History Provided By: Patient     HPI: Marguerite Wren, 78 y.o. female, presenting the emergency department from home with complaints of back pain. Patient has chronic back pain. She states this flare of back pain started 3 days ago. No known injury. Pain is in the lower lumbar region. Initially was on the left side and is now moving to the right. No radicular symptoms, no saddle anesthesia, urinary incontinence or retention. Pain is worse with movement. Rates her pain is severe. She has been taking tramadol at home for pain, but she states this does not resolve her pain relieve it, but just makes her sleep. No other exacerbating relieving factors. Patient denies any fever, chills, cough, nausea vomiting or diarrhea. PCP: Clarisa Dickerson MD    No current facility-administered medications on file prior to encounter. Current Outpatient Medications on File Prior to Encounter   Medication Sig Dispense Refill    insulin glargine U-300 conc (Toujeo SoloStar U-300 Insulin) 300 unit/mL (1.5 mL) inpn pen 60 Units by SubCUTAneous route nightly. Indications: type 2 diabetes mellitus 4 Pen 3    methocarbamoL (Robaxin-750) 750 mg tablet Take 1 Tab by mouth four (4) times daily.  20 Tab 0    methylPREDNISolone (Medrol, Bret,) 4 mg tablet As directed 1 Dose Pack 0    gabapentin (NEURONTIN) 100 mg capsule TAKE 1 CAPSULE THREE TIMES A DAY 90 Cap 3    glucose blood VI test strips (True Metrix Glucose Test Strip) strip Monitor blood sugar 3 times daily 300 Strip 3    rosuvastatin (CRESTOR) 20 mg tablet Take 1 Tab by mouth daily. 90 Tab 3    triamterene-hydroCHLOROthiazide (DYAZIDE) 37.5-25 mg per capsule Take 1 Cap by mouth daily. 180 Cap 3    metoprolol tartrate (LOPRESSOR) 50 mg tablet Take 1 Tab by mouth two (2) times a day. 180 Tab 3    montelukast (SINGULAIR) 10 mg tablet TAKE 1 TABLET DAILY 90 Tab 3    hydrALAZINE (APRESOLINE) 25 mg tablet Take 1 Tab by mouth three (3) times daily. 180 Tab 3    albuterol (ProAir HFA) 90 mcg/actuation inhaler USE 1 INHALATION EVERY 4 HOURS AS NEEDED WHEEZING OR SHORTNESS OF BREATH 3 Inhaler 3    levothyroxine (SYNTHROID) 137 mcg tablet TAKE 1 TABLET DAILY 90 Tab 3    dulaglutide (Trulicity) 4.50 BT/7.8 mL sub-q pen 0.5 mL by SubCUTAneous route every seven (7) days. 12 Syringe 3    losartan (COZAAR) 100 mg tablet TAKE 1 TABLET DAILY 90 Tab 3    flash glucose scanning reader (FREESTYLE LEXI 14 DAY READER) misc 1 Each by Does Not Apply route daily. 1 Each 0    flash glucose sensor (FREESTYLE LEXI 14 DAY SENSOR) kit 1 Each by Does Not Apply route every fourteen (14) days. 6 Kit 3    amLODIPine (NORVASC) 10 mg tablet Take 1 Tab by mouth daily. 90 Tab 3    insulin lispro (HUMALOG KWIKPEN INSULIN) 100 unit/mL kwikpen 22 units for breakfast and lunch and 36 units for dinner (Patient taking differently: 20 units for breakfast and lunch and 40 units for dinner) 25 Adjustable Dose Pre-filled Pen Syringe 3    glucose blood VI test strips (ONETOUCH ULTRA BLUE TEST STRIP) strip Monitor blood sugar 3 times daily 300 Strip 3    albuterol (ACCUNEB) 1.25 mg/3 mL nebu 3 mL by Nebulization route every four (4) hours as needed (wheezing).  225 mL 3    ADVAIR DISKUS 100-50 mcg/dose diskus inhaler USE 1 INHALATION TWICE A DAY (Patient taking differently: USE 1 INHALATION TWICE A DAY prn) 180 Each 3    BD INSULIN PEN NEEDLE UF SHORT 31 gauge x \" ndle       aspirin delayed-release 81 mg tablet Take 81 mg by mouth daily. Past History     Past Medical History:  Past Medical History:   Diagnosis Date    Asthma     CAD (coronary artery disease)     \"mild\" per Dr Shan Chauhan note    Diabetes [facility])     Dr Allison Ramos     Hypertension     Screening for colon cancer 05    Dr Daniel Keenan in 10 years    Stroke 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     Thyroid disease     Unspecified sleep apnea     uses CPAP       Past Surgical History:  Past Surgical History:   Procedure Laterality Date    CARDIAC CATHETERIZATION  2012         HX HEENT      tonsillectomy    HX HEMORRHOIDECTOMY      HX HYSTERECTOMY      HX ORTHOPAEDIC  2011    left shoulder    NC CARDIAC SURG PROCEDURE UNLIST      heart surgery       Family History:  Family History   Problem Relation Age of Onset    Diabetes Mother     Cancer Mother     Breast Cancer Mother 79    Heart Disease Father     Hypertension Father     Stroke Father     Coronary Artery Disease Brother 54       Social History:  Social History     Tobacco Use    Smoking status: Former Smoker     Quit date: 2004     Years since quittin.3    Smokeless tobacco: Never Used   Substance Use Topics    Alcohol use: No    Drug use: Never     Comment: CBD oil(cream)       Allergies: Allergies   Allergen Reactions    Latex Itching    Codeine Itching and Other (comments)     hallucinate    Contrast Dye [Iodine] Rash and Itching     Given pre-cardiac cath    Seafood [Shellfish Containing Products] Swelling         Review of Systems   Review of Systems   Constitutional: Negative for chills and fever. HENT: Negative for congestion and sore throat. Eyes: Negative for visual disturbance.    Respiratory: Negative for cough and shortness of breath. Cardiovascular: Negative for chest pain and leg swelling. Gastrointestinal: Negative for abdominal pain, blood in stool, diarrhea and nausea. Endocrine: Negative for polyuria. Genitourinary: Negative for dysuria, flank pain, vaginal bleeding and vaginal discharge. Musculoskeletal: Positive for back pain. Negative for myalgias. Skin: Negative for rash. Allergic/Immunologic: Negative for immunocompromised state. Neurological: Negative for weakness and headaches. Psychiatric/Behavioral: Negative for confusion. Physical Exam   Physical Exam  Vitals signs and nursing note reviewed. Constitutional:       Appearance: She is well-developed. She is obese. Comments: Uncomfortable obese female, laying in bed, moaning   HENT:      Head: Normocephalic and atraumatic. Eyes:      General:         Right eye: No discharge. Left eye: No discharge. Conjunctiva/sclera: Conjunctivae normal.      Pupils: Pupils are equal, round, and reactive to light. Neck:      Musculoskeletal: Normal range of motion and neck supple. Trachea: No tracheal deviation. Cardiovascular:      Rate and Rhythm: Normal rate and regular rhythm. Heart sounds: Normal heart sounds. No murmur. Pulmonary:      Effort: Pulmonary effort is normal. No respiratory distress. Breath sounds: Normal breath sounds. No wheezing or rales. Abdominal:      General: Bowel sounds are normal.      Palpations: Abdomen is soft. Tenderness: There is no abdominal tenderness. There is no guarding or rebound. Musculoskeletal: Normal range of motion. General: No tenderness or deformity. Skin:     General: Skin is warm and dry. Findings: No erythema or rash. Neurological:      Mental Status: She is alert and oriented to person, place, and time.    Psychiatric:         Behavior: Behavior normal.         Diagnostic Study Results     Labs -   No results found for this or any previous visit (from the past 12 hour(s)). Radiologic Studies -   No orders to display     CT Results  (Last 48 hours)    None        CXR Results  (Last 48 hours)    None            Medical Decision Making   I am the first provider for this patient. I reviewed the vital signs, available nursing notes, past medical history, past surgical history, family history and social history. Vital Signs-Reviewed the patient's vital signs. Patient Vitals for the past 12 hrs:   Temp Pulse Resp BP SpO2   01/02/21 1352 98.3 °F (36.8 °C) 89 20 (!) 152/66 92 %         Records Reviewed:   Nursing notes, Prior visits     Provider Notes (Medical Decision Making):   Patient here with nontraumatic back pain, no red flags at this time. This is more of a chronic pain. Will treat pain. Julio Knight Physical exam relatively limited initially, given patient's pain level and unwillingness to comply with exam based off of her pain. We will try to get of more detailed exam once patient's pain is better controlled. ED Course:   Initial assessment performed. The patients presenting problems have been discussed, and they are in agreement with the care plan formulated and outlined with them. I have encouraged them to ask questions as they arise throughout their visit. ED Course as of Jan 02 2318   Sat Jan 02, 2021   1556 Repeat exam shows some tenderness to palpation at the left SI joint and the quadratus muscles of the lumbar spine. No midline point tenderness or step-off. Negative straight leg raises, equal strength in the bilateral lower extremities. Normal sensation of bilateral lower extremities    [AR]   1556 Still having pretty significant pain with any movement. Will order oxycodone.    [AR]      ED Course User Index  [AR] Kayden Angelique DO       Patient was up in the emergency department and ambulated the bedside commode. She feels better and wants to go home.         Critical Care Time:   none    Disposition:    DISCHARGE NOTE  Patients results have been reviewed with them. Patient and/or family have verbally conveyed their understanding and agreement of the patient's signs, symptoms, diagnosis, treatment and prognosis and additionally agree to follow up as recommended or return to the Emergency Room should their condition change or have any new concerns prior to their follow-up appointment. Patient verbally agrees with the care-plan and verbally conveys that all of their questions have been answered. Discharge instructions have also been provided to the patient with some educational information regarding their diagnosis as well a list of reasons why they would want to return to the ER prior to their follow-up appointment should their condition change. PLAN:  1. Discharge Medication List as of 1/2/2021  5:43 PM      START taking these medications    Details   oxyCODONE IR (Roxicodone) 5 mg immediate release tablet Take 1 Tab by mouth every six (6) hours as needed for Pain for up to 3 days. Max Daily Amount: 20 mg., Normal, Disp-12 Tab, R-0         CONTINUE these medications which have NOT CHANGED    Details   insulin glargine U-300 conc (Toujeo SoloStar U-300 Insulin) 300 unit/mL (1.5 mL) inpn pen 60 Units by SubCUTAneous route nightly. Indications: type 2 diabetes mellitus, Normal, Disp-4 Pen,R-3      methocarbamoL (Robaxin-750) 750 mg tablet Take 1 Tab by mouth four (4) times daily. , Normal, Disp-20 Tab,R-0      methylPREDNISolone (Medrol, Bret,) 4 mg tablet As directed, Normal, Disp-1 Dose Pack,R-0      gabapentin (NEURONTIN) 100 mg capsule TAKE 1 CAPSULE THREE TIMES A DAY, Normal, Disp-90 Cap,R-3      !! glucose blood VI test strips (True Metrix Glucose Test Strip) strip Monitor blood sugar 3 times daily, Normal, Disp-300 Strip,R-3      rosuvastatin (CRESTOR) 20 mg tablet Take 1 Tab by mouth daily. , No Print, Disp-90 Tab,R-3      triamterene-hydroCHLOROthiazide (DYAZIDE) 37.5-25 mg per capsule Take 1 Cap by mouth daily. , No Print, Disp-180 Cap,R-3      metoprolol tartrate (LOPRESSOR) 50 mg tablet Take 1 Tab by mouth two (2) times a day., Normal, Disp-180 Tab,R-3      montelukast (SINGULAIR) 10 mg tablet TAKE 1 TABLET DAILY, Normal, Disp-90 Tab,R-3      hydrALAZINE (APRESOLINE) 25 mg tablet Take 1 Tab by mouth three (3) times daily. , Normal, Disp-180 Tab,R-3      albuterol (ProAir HFA) 90 mcg/actuation inhaler USE 1 INHALATION EVERY 4 HOURS AS NEEDED WHEEZING OR SHORTNESS OF BREATH, Normal, Disp-3 Inhaler,R-3      levothyroxine (SYNTHROID) 137 mcg tablet TAKE 1 TABLET DAILY, Normal, Disp-90 Tab, R-3      dulaglutide (Trulicity) 7.33 SO/8.3 mL sub-q pen 0.5 mL by SubCUTAneous route every seven (7) days. , Normal, Disp-12 Syringe, R-3      losartan (COZAAR) 100 mg tablet TAKE 1 TABLET DAILY, Normal, Disp-90 Tab, R-3      flash glucose scanning reader (FREESTYLE LEXI 14 DAY READER) misc 1 Each by Does Not Apply route daily. , Normal, Disp-1 Each, R-0      flash glucose sensor (FREESTYLE LEXI 14 DAY SENSOR) kit 1 Each by Does Not Apply route every fourteen (14) days. , Normal, Disp-6 Kit, R-3      amLODIPine (NORVASC) 10 mg tablet Take 1 Tab by mouth daily. , Normal, Disp-90 Tab, R-3      insulin lispro (HUMALOG KWIKPEN INSULIN) 100 unit/mL kwikpen 22 units for breakfast and lunch and 36 units for dinner, Normal, Disp-25 Adjustable Dose Pre-filled Pen Syringe, R-3      !! glucose blood VI test strips (ONETOUCH ULTRA BLUE TEST STRIP) strip Monitor blood sugar 3 times daily, Normal, Disp-300 Strip, R-3      albuterol (ACCUNEB) 1.25 mg/3 mL nebu 3 mL by Nebulization route every four (4) hours as needed (wheezing). , Normal, Disp-225 mL, R-3      ADVAIR DISKUS 100-50 mcg/dose diskus inhaler USE 1 INHALATION TWICE A DAY, Normal, Disp-180 Each, R-3      BD INSULIN PEN NEEDLE UF SHORT 31 gauge x 5/16\" ndle Historical Med, CHANDRIKA      aspirin delayed-release 81 mg tablet Take 81 mg by mouth daily. , Historical Med       !! - Potential duplicate medications found. Please discuss with provider. 2.   Follow-up Information     Follow up With Specialties Details Why Contact Info    Sedrick Obrien MD Internal Medicine Schedule an appointment as soon as possible for a visit   [address] [facility] [address] [phone]      [facility] EMERGENCY DEPT Emergency Medicine  If symptoms worsen [address]  [address] [facility]  [phone]          Return to ED if worse     Diagnosis     Clinical Impression:   1. Acute left-sided low back pain without sciatica        Attestations:   This note was completed by Rafael Peoples DO

## 2021-01-02 NOTE — ED NOTES
Patient ambulated to [facility] with walker and 2 assist. Verbalized ready to be discharged home now.

## 2021-01-02 NOTE — ED NOTES
Bedside and Verbal shift change report given to Harpreet William (oncoming nurse) by Paulino Jerry (offgoing nurse). Report included the following information SBAR, ED Summary, Intake/Output and MAR.

## 2021-01-15 ENCOUNTER — PATIENT OUTREACH (OUTPATIENT)
Dept: CASE MANAGEMENT | Age: 71
End: 2021-01-15

## 2021-01-15 NOTE — PROGRESS NOTES
ACM outreach to patient today in order to follow-up and perform second CCM call assessments; two patient identifiers verified. Patient requests ACM outreach next week.

## 2021-02-01 ENCOUNTER — DOCUMENTATION ONLY (OUTPATIENT)
Dept: SLEEP MEDICINE | Age: 71
End: 2021-02-01

## 2021-02-01 ENCOUNTER — VIRTUAL VISIT (OUTPATIENT)
Dept: SLEEP MEDICINE | Age: 71
End: 2021-02-01
Payer: MEDICARE

## 2021-02-01 DIAGNOSIS — Z79.4 TYPE 2 DIABETES MELLITUS WITH COMPLICATION, WITH LONG-TERM CURRENT USE OF INSULIN (HCC): ICD-10-CM

## 2021-02-01 DIAGNOSIS — G47.33 OBSTRUCTIVE SLEEP APNEA (ADULT) (PEDIATRIC): Primary | ICD-10-CM

## 2021-02-01 DIAGNOSIS — E11.8 TYPE 2 DIABETES MELLITUS WITH COMPLICATION, WITH LONG-TERM CURRENT USE OF INSULIN (HCC): ICD-10-CM

## 2021-02-01 DIAGNOSIS — I10 ESSENTIAL HYPERTENSION: ICD-10-CM

## 2021-02-01 PROCEDURE — 3046F HEMOGLOBIN A1C LEVEL >9.0%: CPT | Performed by: INTERNAL MEDICINE

## 2021-02-01 PROCEDURE — G8756 NO BP MEASURE DOC: HCPCS | Performed by: INTERNAL MEDICINE

## 2021-02-01 PROCEDURE — 3017F COLORECTAL CA SCREEN DOC REV: CPT | Performed by: INTERNAL MEDICINE

## 2021-02-01 PROCEDURE — G9899 SCRN MAM PERF RSLTS DOC: HCPCS | Performed by: INTERNAL MEDICINE

## 2021-02-01 PROCEDURE — G8399 PT W/DXA RESULTS DOCUMENT: HCPCS | Performed by: INTERNAL MEDICINE

## 2021-02-01 PROCEDURE — 99213 OFFICE O/P EST LOW 20 MIN: CPT | Performed by: INTERNAL MEDICINE

## 2021-02-01 PROCEDURE — 2022F DILAT RTA XM EVC RTNOPTHY: CPT | Performed by: INTERNAL MEDICINE

## 2021-02-01 PROCEDURE — G8427 DOCREV CUR MEDS BY ELIG CLIN: HCPCS | Performed by: INTERNAL MEDICINE

## 2021-02-01 PROCEDURE — 1101F PT FALLS ASSESS-DOCD LE1/YR: CPT | Performed by: INTERNAL MEDICINE

## 2021-02-01 PROCEDURE — 1090F PRES/ABSN URINE INCON ASSESS: CPT | Performed by: INTERNAL MEDICINE

## 2021-02-01 PROCEDURE — G8432 DEP SCR NOT DOC, RNG: HCPCS | Performed by: INTERNAL MEDICINE

## 2021-02-01 NOTE — PROGRESS NOTES
217 [facility] [place]. [place], [address]  Tel.  [phone]  Fax. 100 [facility] 60  [place], [address] [facility]  Tel.  [phone]  Fax. [phone] 9250 [facility] Nikita Crenshaw   Tel.  [phone]  Fax. [phone]       Telemedicine visit performed with verbal consent of the patient. Patient called and identity confirmed with 2 patient identifers    Patient was seen at home  [address] is a 78 y.o. female who was seen by synchronous (real-time) audio-video technology on 2/1/2021. Consent:  She and/or her healthcare decision maker is aware that this patient-initiated Telehealth encounter is the equivalent to a face to face encounter in the sleep disorder center and has provided verbal consent to proceed: Yes    I was at home while conducting this encounter. S>Makayla Stafford is a 78 y.o. female seen at this telemedicine visit for a positive airway pressure follow-up. She reports some problems using the device. She is 100% compliant over the recent 90 days. The following problems are identified:    Drowsiness no Problems exhaling no   Snoring no Forget to put on never   Mask Comfortable yes  Can't fall asleep no   Dry Mouth yes Mask falls off no   Air Leaking no Frequent awakenings no   She sometimes feels like she is getting too much airflow    Download reviewed. She admits that her sleep has improved on PAP therapy using nasal pillow mask and heated tubing.     Allergies   Allergen Reactions    Latex Itching    Codeine Itching and Other (comments)     hallucinate    Contrast Dye [Iodine] Rash and Itching     Given pre-cardiac cath    Seafood [Shellfish Containing Products] Swelling       She has a current medication list which includes the following prescription(s): toujeo solostar u-300 insulin, methocarbamol, methylprednisolone, gabapentin, true metrix glucose test strip, rosuvastatin, triamterene-hydrochlorothiazide, metoprolol tartrate, montelukast, hydralazine, albuterol, levothyroxine, trulicity, losartan, flash glucose scanning reader, flash glucose sensor, amlodipine, insulin lispro, glucose blood vi test strips, albuterol, advair diskus, bd ultra-fine short pen needle, and aspirin delayed-release. .      She  has a past medical history of Asthma, CAD (coronary artery disease), Diabetes ([facility] Hypertension, Screening for colon cancer (2/16/05), Stroke ([facility] (2004), Thromboembolus ([facility] (1976), Thyroid disease, and Unspecified sleep apnea. [place] Sleepiness Score: 5      O>      Weight 200 lb   Vital Signs: (As obtained by patient/caregiver at home)        Constitutional: [x] Appears well-developed and well-nourished [x] No apparent distress      [] Abnormal -     Mental status: [x] Alert and awake  [x] Oriented to person/place/time [x] Able to follow commands    [] Abnormal -     Eyes:   EOM    [x]  Normal    [] Abnormal -   Sclera  [x]  Normal    [] Abnormal -          Discharge [x]  None visible   [] Abnormal -     HENT: [x] Normocephalic, atraumatic  [] Abnormal -     External Ears [x] Normal  [] Abnormal -    Neck: [x] No visualized mass [] Abnormal -     Pulmonary/Chest: [x] Respiratory effort normal   [x] No visualized signs of difficulty breathing or respiratory distress        [] Abnormal -       Neurological:        [x] No Facial Asymmetry (Cranial nerve 7 motor function) (limited exam due to video visit)          [x] No gaze palsy        [] Abnormal -          Skin:        [x] No significant exanthematous lesions or discoloration noted on facial skin         [] Abnormal -            Psychiatric:       [x] Normal Affect [] Abnormal -        Other pertinent observable physical exam findings:-            A>    ICD-10-CM ICD-9-CM    1. Obstructive sleep apnea (adult) (pediatric)  G47.33 327.23 AMB SUPPLY ORDER   2. Essential hypertension  I10 401.9    3.  Type 2 diabetes mellitus with complication, with long-term current use of insulin (formerly [facility])  E11.8 250.90     Z79.4 V58.67      AHI = 5 (5-17). On CPAP, Respironics :  9-12 cmH2O. Compliant:      yes    Therapeutic Response:  Positive    P>    she is compliant with PAP therapy and PAP continues to benefit patient and remains necessary for control of her sleep apnea. Setting change to 8-10 cmH20  * We have recommended a dedicated weight loss through appropriate diet and an exercise regimen as significant weight reduction has been shown to reduce severity of obstructive sleep apnea. We reviewed humidity setting. She will adjust it up to comfort    * She was asked to contact our office for any problems regarding PAP therapy. * Counseling was provided regarding the importance of regular PAP use and on proper sleep hygiene and safe driving. * Re-enforced proper and regular cleaning for the device. 2. Hypertension - she continues on her current regimen. I have reviewed the relationship between hypertension as it relates to sleep-disordered breathing. 3. Type II diabetes - she continues on her current regimen. I have reviewed the relationship between sleep disordered breathing as it relates to diabetes. All of her questions were addressed. Pursuant to the emergency declaration under the [facility], 1135 waiver authority and the [employer] Act, this Virtual  Visit was conducted, with patient's consent, to reduce the patient's risk of exposure to COVID-19 and provide continuity of care for an established patient. Services were provided through a video synchronous discussion virtually to substitute for in-person clinic visit.     Drew Parra MD    Electronically signed by    Sunil Oliveira MD  Diplomate in Sleep Medicine  [facility]

## 2021-02-01 NOTE — PATIENT INSTRUCTIONS
[address] [place]. [address] [facility], [address] Tel.  [phone] Fax. 100 [facility] 60 [place], [address] Tel.  [phone] Fax. [phone] 9250 [facility] Nikita Crenshaw Tel.  [phone] Fax. [phone] PROPER SLEEP HYGIENE What to avoid · Do not have drinks with caffeine, such as coffee or black tea, for 8 hours before bed. · Do not smoke or use other types of tobacco near bedtime. Nicotine is a stimulant and can keep you awake. · Avoid drinking alcohol late in the evening, because it can cause you to wake in the middle of the night. · Do not eat a big meal close to bedtime. If you are hungry, eat a light snack. · Do not drink a lot of water close to bedtime, because the need to urinate may wake you up during the night. · Do not read or watch TV in bed. Use the bed only for sleeping and sexual activity. What to try · Go to bed at the same time every night, and wake up at the same time every morning. Do not take naps during the day. · Keep your bedroom quiet, dark, and cool. · Get regular exercise, but not within 3 to 4 hours of your bedtime. Corey Webb · Sleep on a comfortable pillow and mattress. · If watching the clock makes you anxious, turn it facing away from you so you cannot see the time. · If you worry when you lie down, start a worry book. Well before bedtime, write down your worries, and then set the book and your concerns aside. · Try meditation or other relaxation techniques before you go to bed. · If you cannot fall asleep, get up and go to another room until you feel sleepy. Do something relaxing. Repeat your bedtime routine before you go to bed again. · Make your house quiet and calm about an hour before bedtime. Turn down the lights, turn off the TV, log off the computer, and turn down the volume on music. This can help you relax after a busy day. Drowsy Driving The [facility] cites drowsiness as a causing factor in more than 584,120 police reported crashes annually, resulting in 76,000 injuries and 1,500 deaths. Other surveys suggest 55% of people polled have driven while drowsy in the past year, 23% had fallen asleep but not crashed, 3% crashed, and 2% had and accident due to drowsy driving. Who is at risk? Young Drivers: One study of drowsy driving accidents states that 55% of the drivers were under 25 years. Of those, 75% were male. Shift Workers and Travelers: People who work overnight or travel across time zones frequently are at higher risk of experiencing Circadian Rhythm Disorders. They are trying to work and function when their body is programed to sleep. Sleep Deprived: Lack of sleep has a serious impact on your ability to pay attention or focus on a task. Consistently getting less than the average of 8 hours your body needs creates partial or cumulative sleep deprivation. Untreated Sleep Disorders: Sleep Apnea, Narcolepsy, R.L.S., and other sleep disorders (untreated) prevent a person from getting enough restful sleep. This leads to excessive daytime sleepiness and increases the risk for drowsy driving accidents by up to 7 times. Medications / Alcohol: Even over the counter medications can cause drowsiness. Medications that impair a drivers attention should have a warning label. Alcohol naturally makes you sleepy and on its own can cause accidents. Combined with excessive drowsiness its effects are amplified. Signs of Drowsy Driving: * You don't remember driving the last few miles * You may drift out of your blake * You are unable to focus and your thoughts wander * You may yawn more often than normal 
 * You have difficulty keeping your eyes open / nodding off * Missing traffic signs, speeding, or tailgating Prevention-  
 Good sleep hygiene, lifestyle and behavioral choices have the most impact on drowsy driving. There is no substitute for sleep and the average person requires 8 hours nightly. If you find yourself driving drowsy, stop and sleep. Consider the sleep hygiene tips provided during your visit as well. Medication Refill Policy: Refills for all medications require 1 week advance notice. Please have your pharmacy fax a refill request. We are unable to fax, or call in \"controled substance\" medications and you will need to pick these prescriptions up from our office. [employer] Activation Thank you for requesting access to [employer]. Please follow the instructions below to securely access and download your online medical record. [employer]istix allows you to send messages to your doctor, view your test results, renew your prescriptions, schedule appointments, and more. How Do I Sign Up? 1. In your internet browser, go to [email or web address]. 2. Click on the First Time User? Click Here link in the Sign In box. You will see the New Member Sign Up page. 3. Enter your [employer]istix Access Code exactly as it appears below. You will not need to use this code after youve completed the sign-up process. If you do not sign up before the expiration date, you must request a new code. [employer]ix Access Code: Activation code not generated Current Silistix Status: Active (This is the date your Silistix access code will ) 4. Enter the last four digits of your Social Security Number (xxxx) and Date of Birth (mm/dd/yyyy) as indicated and click Submit. You will be taken to the next sign-up page. 5. Create a Imaging [employer] ID. This will be your Silistix login ID and cannot be changed, so think of one that is secure and easy to remember. 6. Create a [employer]istix password. You can change your password at any time. 7. Enter your Password Reset Question and Answer. This can be used at a later time if you forget your password. 8. Enter your e-mail address. You will receive e-mail notification when new information is available in Swapferit. 
9. Click Sign Up. You can now view and download portions of your medical record. 
10. Click the Download Summary menu link to download a portable copy of your medical information. 
 
Additional Information 
 
If you have questions, please call [phone]. Remember, Swapferit is NOT to be used for urgent needs. For medical emergencies, dial 911.

## 2021-02-08 ENCOUNTER — TRANSCRIBE ORDER (OUTPATIENT)
Dept: SCHEDULING | Age: 71
End: 2021-02-08

## 2021-02-08 ENCOUNTER — TELEPHONE (OUTPATIENT)
Dept: SLEEP MEDICINE | Age: 71
End: 2021-02-08

## 2021-02-08 DIAGNOSIS — Z12.31 SCREENING MAMMOGRAM FOR HIGH-RISK PATIENT: Primary | ICD-10-CM

## 2021-02-08 NOTE — TELEPHONE ENCOUNTER
Patient called and asked if she is being compliant with her machine? She also want to know if an order for settings change was put in and did settings change take place? She can be reached at [phone].

## 2021-02-10 NOTE — TELEPHONE ENCOUNTER
Patient was contacted in regards to her device it was found to be believed that her modem is not currently active. She was given the phone number for [facility] and she will follow up with them regarding reestablishing modem access.     Patient was asked to follow up with us regarding any other issues

## 2021-02-15 ENCOUNTER — TELEPHONE (OUTPATIENT)
Dept: INTERNAL MEDICINE CLINIC | Age: 71
End: 2021-02-15

## 2021-02-15 ENCOUNTER — PATIENT MESSAGE (OUTPATIENT)
Dept: ENDOCRINOLOGY | Age: 71
End: 2021-02-15

## 2021-02-15 DIAGNOSIS — Z79.4 TYPE 2 DIABETES MELLITUS WITH COMPLICATION, WITH LONG-TERM CURRENT USE OF INSULIN (HCC): ICD-10-CM

## 2021-02-15 DIAGNOSIS — E11.8 TYPE 2 DIABETES MELLITUS WITH COMPLICATION, WITH LONG-TERM CURRENT USE OF INSULIN (HCC): ICD-10-CM

## 2021-02-15 RX ORDER — INSULIN LISPRO 100 [IU]/ML
INJECTION, SOLUTION INTRAVENOUS; SUBCUTANEOUS
Qty: 25 ADJUSTABLE DOSE PRE-FILLED PEN SYRINGE | Refills: 3 | Status: SHIPPED | OUTPATIENT
Start: 2021-02-15 | End: 2021-02-17 | Stop reason: SDUPTHER

## 2021-02-15 NOTE — TELEPHONE ENCOUNTER
Spoke with patient. Two pt identifiers confirmed. Patient states that she would like to know about the COVID vaccine. Patient states that would like to know if she should get the COVID vaccine. Patient states that she is over due for her physical and would like to come in to see Dr. Reji Granger and talk to him about the vaccine. Advised patient that he does not have any available appointments until sometime in the summer. Advised patient that I will send a message to his nurse to see if she can find a place to get her in sooner. Pt verbalized understanding of information discussed w/ no further questions at this time.

## 2021-02-15 NOTE — TELEPHONE ENCOUNTER
Pt states she requires a call back regarding \"the virus\". Pt would not provide any other details.     Please call: [phone]

## 2021-02-15 NOTE — TELEPHONE ENCOUNTER
From: Joann Aragon  To: Lukas Mace MD  Sent: 2/15/2021 11:31 AM EST  Subject: Prescription Question    i need humalog refilled also.

## 2021-02-17 DIAGNOSIS — E11.8 TYPE 2 DIABETES MELLITUS WITH COMPLICATION, WITH LONG-TERM CURRENT USE OF INSULIN (HCC): ICD-10-CM

## 2021-02-17 DIAGNOSIS — Z79.4 TYPE 2 DIABETES MELLITUS WITH COMPLICATION, WITH LONG-TERM CURRENT USE OF INSULIN (HCC): ICD-10-CM

## 2021-02-17 RX ORDER — INSULIN GLARGINE 300 U/ML
60 INJECTION, SOLUTION SUBCUTANEOUS
Qty: 10 PEN | Refills: 4 | Status: SHIPPED | OUTPATIENT
Start: 2021-02-17

## 2021-02-17 RX ORDER — INSULIN LISPRO 100 [IU]/ML
INJECTION, SOLUTION INTRAVENOUS; SUBCUTANEOUS
Qty: 25 ADJUSTABLE DOSE PRE-FILLED PEN SYRINGE | Refills: 3 | Status: SHIPPED | OUTPATIENT
Start: 2021-02-17 | End: 2021-02-23 | Stop reason: SDUPTHER

## 2021-02-17 NOTE — TELEPHONE ENCOUNTER
Mrs. Meagan Ho stated that [facility] did not receive the prescriptions for her Toujeo and Humalog insulins. Patient stated that she would like a 90 day supply.

## 2021-02-17 NOTE — TELEPHONE ENCOUNTER
----- Message from Mega Hong sent at 2/17/2021 10:09 AM EST -----  Regarding: Dr. Silvio Phillips General Message/Vendor Calls    Caller's first and last name:      Reason for call: Requesting call back regarding her Rx.        Call back required yes/no and why: Yes       Best contact number(s): [phone]      Details to clarify the request:      Mega Hong

## 2021-02-18 ENCOUNTER — PATIENT OUTREACH (OUTPATIENT)
Dept: CASE MANAGEMENT | Age: 71
End: 2021-02-18

## 2021-02-18 NOTE — PROGRESS NOTES
Ambulatory Care Management Note    Date/Time:  2/18/2021 4:37 PM    This Ambulatory Care Manager (ACM) reviewed and updated the following screenings during this call; fall risk assessment, depression screening, general assessment, disease specific assessment, ACP assessment and note. Medication Management: will complete medication reconciliation during future outreach    Advance Care Planning:   Does patient have an Advance Directive:  currently not on file, education provided; pt reports that she is currently undecided as to whether she is interested in completing an Advance Medical Directive. ACM will continue to discussion/follow-up with patient during future outreach. [employer], Referrals, and Durable Medical Equipment: Currently open to outpatient physical therapy 2x per week. Health Maintenance Due   Topic Date Due    COVID-19 Vaccine (1 of 2) 12/12/1966    DTaP/Tdap/Td series (1 - Tdap) 12/12/1973    Shingrix Vaccine Age 50> (1 of 2) 12/12/2002    Foot Exam Q1  01/24/2020    Medicare Yearly Exam  06/21/2020    Breast Cancer Screen Mammogram  02/12/2021     Health Maintenance reviewed - will review during future outreach. Patient was asked to consider health care goals that they would like to focus on with this ACM. ACM will follow up with patient to discuss goals and establish care plan in the next 7-14 days.        PCP/Specialist follow up:   Future Appointments   Date Time Provider Ovidio Allan   3/10/2021 12:30 PM [id number] [facility] 1 [facility]   3/24/2021 10:10 AM Shamar Sparks MD RDE  BS AMB   8/16/2021  9:45 AM MD ART [facility] BS AMB   2/1/2022  9:40 AM Balbina Hickey MD [facility] BS AMB

## 2021-02-18 NOTE — ACP (ADVANCE CARE PLANNING)
Does patient have an Advance Directive:  currently not on file, education provided; pt reports that she is currently undecided as to whether she is interested in completing an Advance Medical Directive.

## 2021-02-23 DIAGNOSIS — E11.8 TYPE 2 DIABETES MELLITUS WITH COMPLICATION, WITH LONG-TERM CURRENT USE OF INSULIN (HCC): ICD-10-CM

## 2021-02-23 DIAGNOSIS — Z79.4 TYPE 2 DIABETES MELLITUS WITH COMPLICATION, WITH LONG-TERM CURRENT USE OF INSULIN (HCC): ICD-10-CM

## 2021-02-23 RX ORDER — INSULIN LISPRO 100 [IU]/ML
INJECTION, SOLUTION INTRAVENOUS; SUBCUTANEOUS
Qty: 25 ADJUSTABLE DOSE PRE-FILLED PEN SYRINGE | Refills: 3 | Status: SHIPPED | OUTPATIENT
Start: 2021-02-23 | End: 2022-01-12 | Stop reason: SDUPTHER

## 2021-02-23 NOTE — TELEPHONE ENCOUNTER
----- Message from Karyle Minder sent at 2/23/2021 12:57 PM EST -----  Regarding: /Telephone  Medication Refill    Caller (if not patient):      Relationship of caller (if not patient):      Best contact number(s):[phone]      Name of medication and dosage if known:ishaan log      Is patient out of this medication (yes/no):yes      [facility] listed in chart? (yes/no):yes  Pharmacy phone number:  Wrong medication is being shipped out .   Details to clarify the request:      Karyle Minder

## 2021-02-23 NOTE — TELEPHONE ENCOUNTER
Requested Prescriptions     Pending Prescriptions Disp Refills    insulin lispro (HumaLOG KwikPen Insulin) 100 unit/mL kwikpen 25 Adjustable Dose Pre-filled Pen Syringe 3     Si units for breakfast and lunch and 40 units for dinner

## 2021-03-04 RX ORDER — LOSARTAN POTASSIUM 100 MG/1
100 TABLET ORAL DAILY
Qty: 90 TAB | Refills: 3 | Status: SHIPPED | OUTPATIENT
Start: 2021-03-04 | End: 2021-03-05 | Stop reason: SDUPTHER

## 2021-03-04 RX ORDER — LOSARTAN POTASSIUM 100 MG/1
100 TABLET ORAL DAILY
Qty: 7 TAB | Refills: 0 | Status: SHIPPED | OUTPATIENT
Start: 2021-03-04 | End: 2021-03-05 | Stop reason: SDUPTHER

## 2021-03-04 NOTE — TELEPHONE ENCOUNTER
Future Appointments:  Future Appointments   Date Time Provider Ovidio Orri   3/10/2021 12:30 PM ED [facility] 1 [facility]   3/24/2021 10:10 AM Rosalina Mckeon MD RDE  BS AMB   8/16/2021  9:45 AM Nia Powers MD [facility] BS AMB   2/1/2022  9:40 AM Chance Harrell MD [facility] 39 BS AMB        Last Appointment With Me:  Visit date not found     Requested Prescriptions     Pending Prescriptions Disp Refills    losartan (COZAAR) 100 mg tablet 7 Tab 0     Sig: Take 1 Tab by mouth daily.

## 2021-03-04 NOTE — TELEPHONE ENCOUNTER
Future Appointments:  Future Appointments   Date Time Provider Ovidio Orri   3/10/2021 12:30 PM ED [facility] 1 [facility]   3/24/2021 10:10 AM Zaida Lloyd MD RDE  BS AMB   8/16/2021  9:45 AM Chestine Nageotte, MD [facility] BS AMB   2/1/2022  9:40 AM Marta Jurado MD [facility] 39 BS AMB        Last Appointment With Me:  Visit date not found     Requested Prescriptions     Pending Prescriptions Disp Refills    losartan (COZAAR) 100 mg tablet 90 Tab 3     Sig: Take 1 Tab by mouth daily.

## 2021-03-05 RX ORDER — LOSARTAN POTASSIUM 100 MG/1
100 TABLET ORAL DAILY
Qty: 90 TAB | Refills: 3 | Status: SHIPPED | OUTPATIENT
Start: 2021-03-05 | End: 2022-02-28

## 2021-03-05 RX ORDER — LOSARTAN POTASSIUM 100 MG/1
100 TABLET ORAL DAILY
Qty: 7 TAB | Refills: 0 | Status: SHIPPED | OUTPATIENT
Start: 2021-03-05 | End: 2021-03-18 | Stop reason: SDUPTHER

## 2021-03-05 NOTE — TELEPHONE ENCOUNTER
----- Message from Clemencia Dietrich sent at 3/4/2021 12:49 PM EST -----  Regarding: Prescription Question  Contact: [phone]  [facility] Los[facility]  and a 7 day to [facility] to hole me over?  thanks [phone]

## 2021-03-10 ENCOUNTER — HOSPITAL ENCOUNTER (OUTPATIENT)
Dept: MAMMOGRAPHY | Age: 71
Discharge: HOME | End: 2021-03-10
Attending: INTERNAL MEDICINE

## 2021-03-10 DIAGNOSIS — Z12.31 SCREENING MAMMOGRAM FOR HIGH-RISK PATIENT: ICD-10-CM

## 2021-03-16 ENCOUNTER — TELEPHONE (OUTPATIENT)
Dept: ENDOCRINOLOGY | Age: 71
End: 2021-03-16

## 2021-03-16 DIAGNOSIS — E11.8 TYPE 2 DIABETES MELLITUS WITH COMPLICATION, WITH LONG-TERM CURRENT USE OF INSULIN (HCC): ICD-10-CM

## 2021-03-16 DIAGNOSIS — Z79.4 TYPE 2 DIABETES MELLITUS WITH COMPLICATION, WITH LONG-TERM CURRENT USE OF INSULIN (HCC): ICD-10-CM

## 2021-03-16 DIAGNOSIS — E11.8 TYPE 2 DIABETES MELLITUS WITH COMPLICATION, WITH LONG-TERM CURRENT USE OF INSULIN (HCC): Primary | ICD-10-CM

## 2021-03-16 DIAGNOSIS — Z79.4 TYPE 2 DIABETES MELLITUS WITH COMPLICATION, WITH LONG-TERM CURRENT USE OF INSULIN (HCC): Primary | ICD-10-CM

## 2021-03-16 NOTE — TELEPHONE ENCOUNTER
----- Message from Tejas Todd sent at 3/16/2021  9:12 AM EDT -----  Regarding: Dr. Rosy Melo General Message/Vendor Calls    Caller's first and last name:      Reason for call: Regarding having labs done.        Call back required yes/no and why: Yes       Best contact number(s): [phone]      Details to clarify the request:      Tejas Todd

## 2021-03-18 ENCOUNTER — PATIENT OUTREACH (OUTPATIENT)
Dept: CASE MANAGEMENT | Age: 71
End: 2021-03-18

## 2021-03-18 LAB
ALBUMIN SERPL-MCNC: 3.8 G/DL (ref 3.8–4.8)
ALBUMIN/GLOB SERPL: 1.2 {RATIO} (ref 1.2–2.2)
ALP SERPL-CCNC: 157 IU/L (ref 39–117)
ALT SERPL-CCNC: 9 IU/L (ref 0–32)
AST SERPL-CCNC: 12 IU/L (ref 0–40)
BILIRUB SERPL-MCNC: 0.4 MG/DL (ref 0–1.2)
BUN SERPL-MCNC: 18 MG/DL (ref 8–27)
BUN/CREAT SERPL: 13 (ref 12–28)
CALCIUM SERPL-MCNC: 9.9 MG/DL (ref 8.7–10.3)
CHLORIDE SERPL-SCNC: 102 MMOL/L (ref 96–106)
CHOLEST SERPL-MCNC: 243 MG/DL (ref 100–199)
CO2 SERPL-SCNC: 31 MMOL/L (ref 20–29)
CREAT SERPL-MCNC: 1.41 MG/DL (ref 0.57–1)
EST. AVERAGE GLUCOSE BLD GHB EST-MCNC: 209 MG/DL
GLOBULIN SER CALC-MCNC: 3.1 G/DL (ref 1.5–4.5)
GLUCOSE SERPL-MCNC: 85 MG/DL (ref 65–99)
HBA1C MFR BLD: 8.9 % (ref 4.8–5.6)
HDLC SERPL-MCNC: 53 MG/DL
IMP & REVIEW OF LAB RESULTS: NORMAL
INTERPRETATION: NORMAL
LDLC SERPL CALC-MCNC: 161 MG/DL (ref 0–99)
Lab: NORMAL
PDF IMAGE, 910387: NORMAL
POTASSIUM SERPL-SCNC: 3.5 MMOL/L (ref 3.5–5.2)
PROT SERPL-MCNC: 6.9 G/DL (ref 6–8.5)
SODIUM SERPL-SCNC: 146 MMOL/L (ref 134–144)
TRIGL SERPL-MCNC: 162 MG/DL (ref 0–149)
VLDLC SERPL CALC-MCNC: 29 MG/DL (ref 5–40)

## 2021-03-18 NOTE — PROGRESS NOTES
Ambulatory Care Management Note    Date/Time:  3/18/2021 3:41 PM    This Ambulatory Care Manager (ACM) reviewed and updated the following screenings during this call; health maintenance, medication reconciliation. Patient's challenges to self management identified:   medication management; reports not currently taking Crestor (ordered by Cardiology). Patient is not sure why she is not currently taking this medication. Medication Management:  Patient reports not currently taking Crestor as ordered by Cardiology; patient is unable to recall the last time that she took this medication and is not sure as to why she is not taking this medication at present. Med Rec during this call  Current Outpatient Medications   Medication Sig    losartan (COZAAR) 100 mg tablet Take 1 Tab by mouth daily.  insulin lispro (HumaLOG KwikPen Insulin) 100 unit/mL kwikpen 20 units for breakfast and lunch and 40 units for dinner (Patient taking differently: 20 units for breakfast and lunch and 42 units for dinner)    insulin glargine U-300 conc (Toujeo SoloStar U-300 Insulin) 300 unit/mL (1.5 mL) inpn pen 60 Units by SubCUTAneous route nightly. Indications: type 2 diabetes mellitus (Patient taking differently: 62 Units by SubCUTAneous route nightly. Indications: type 2 diabetes mellitus)    gabapentin (NEURONTIN) 100 mg capsule Take 1 Cap by mouth three (3) times daily. Max Daily Amount: 300 mg.  triamterene-hydroCHLOROthiazide (DYAZIDE) 37.5-25 mg per capsule Take 1 Cap by mouth daily.  metoprolol tartrate (LOPRESSOR) 50 mg tablet Take 1 Tab by mouth two (2) times a day.  hydrALAZINE (APRESOLINE) 25 mg tablet Take 1 Tab by mouth three (3) times daily.  albuterol (ProAir HFA) 90 mcg/actuation inhaler USE 1 INHALATION EVERY 4 HOURS AS NEEDED WHEEZING OR SHORTNESS OF BREATH    levothyroxine (SYNTHROID) 137 mcg tablet TAKE 1 TABLET DAILY    amLODIPine (NORVASC) 10 mg tablet Take 1 Tab by mouth daily.     albuterol (ACCUNEB) 1.25 mg/3 mL nebu 3 mL by Nebulization route every four (4) hours as needed (wheezing).  ADVAIR DISKUS 100-50 mcg/dose diskus inhaler USE 1 INHALATION TWICE A DAY (Patient taking differently: USE 1 INHALATION TWICE A DAY prn)    aspirin delayed-release 81 mg tablet Take 81 mg by mouth daily.  glucose blood VI test strips (True Metrix Glucose Test Strip) strip Monitor blood sugar 3 times daily    rosuvastatin (CRESTOR) 20 mg tablet Take 1 Tab by mouth daily.  flash glucose scanning reader (Next 1 InteractiveSTYLE LEXI 14 DAY READER) misc 1 Each by Does Not Apply route daily.  flash glucose sensor (FREESTYLE LEXI 14 DAY SENSOR) kit 1 Each by Does Not Apply route every fourteen (14) days.  glucose blood VI test strips (ONETOUCH ULTRA BLUE TEST STRIP) strip Monitor blood sugar 3 times daily    BD INSULIN PEN NEEDLE UF SHORT 31 gauge x 5/16\" ndle      No current facility-administered medications for this visit. Medications Discontinued During This Encounter   Medication Reason    losartan (COZAAR) 360 mg tablet DUPLICATE ORDER    methocarbamoL (Robaxin-750) 750 mg tablet Therapy Completed    methylPREDNISolone (Medrol Bret,) 4 mg tablet Therapy Completed    montelukast (SINGULAIR) 10 mg tablet LIST CLEANUP    dulaglutide (Trulicity) 2.86 IN/8.4 mL sub-q pen Cost of Medication     Losartan discontinued because it is noted to be a duplicate order. Med Rec updated. Robaxin-750 discontinued because patient reports that she is not longer taking this medication. Med Rec updated. Medrol, Bret- discontinued because patient reports therapy completed. Med Rec updated  Singulair discontinued because patient reports that she is not currently taking this medication. Med Rec updated. Trulicity discontinued because patient reports that she is no longer taking this medication; reports stopping this medication in either Jan/Feb due to cost. Patient reports that Dr. Delores Amador is aware.       Health Maintenance Due   Topic Date Due   • COVID-19 Vaccine (1) Never done   • DTaP/Tdap/Td series (1 - Tdap) Never done   • Shingrix Vaccine Age 50> (1 of 2) Never done   • Foot Exam Q1  01/24/2020   • Medicare Yearly Exam  06/21/2020   • Breast Cancer Screen Mammogram  02/12/2021     Health Maintenance reviewed - see below.  COVID-19 vaccine - reports receiving first vaccine on 3/2 at [facility]; scheduled to received second vaccine on 3/30.   Shingrix - patient declines.  Mammogram - reports last mammogram was completed 2/12/21 at [facility].      Patient states, \"I get agitated easily, so if you hear me starting to get agitated, it's just the way that I am.\" Informed patient that length of ACM outreach encounters can be kept within a time frame that suits her to prevent agitation; pt is agreeable to this plan. Increasing agitation in patient responses to ACM assessment questions is noted at this time therefore ACM will continue follow-up during next outreach.    Patient was asked to consider health care goals that they would like to focus on with this ACM.   ACM will follow up with patient to discuss goals and establish care plan in the next 7-14 days.       PCP/Specialist follow up:   Future Appointments   Date Time Provider Department Center   3/24/2021 10:10 AM Dannie De León MD RDE  BS AMB   8/16/2021  9:45 AM Ravindra Gore MD [facility] BS AMB   2/1/2022  9:40 AM Patrizia Rahman MD [facility] BS AMB

## 2021-03-24 ENCOUNTER — VIRTUAL VISIT (OUTPATIENT)
Dept: ENDOCRINOLOGY | Age: 71
End: 2021-03-24
Payer: MEDICARE

## 2021-03-24 DIAGNOSIS — E11.8 TYPE 2 DIABETES MELLITUS WITH COMPLICATION, WITH LONG-TERM CURRENT USE OF INSULIN (HCC): Primary | ICD-10-CM

## 2021-03-24 DIAGNOSIS — Z79.4 TYPE 2 DIABETES MELLITUS WITH COMPLICATION, WITH LONG-TERM CURRENT USE OF INSULIN (HCC): Primary | ICD-10-CM

## 2021-03-24 PROCEDURE — 99214 OFFICE O/P EST MOD 30 MIN: CPT | Performed by: INTERNAL MEDICINE

## 2021-03-24 NOTE — PROGRESS NOTES
This is an established visit conducted via telemedicine using Whisk video.  The patient has been instructed that this meets HIPAA criteria ,that they may receive a bill for these services and acknowledges and agrees to this method of visitation.     Ms. Mago Miller returns for follow-up of her type 2 diabetes mellitus x 15 years with poor blood sugar control.    Her A1c was 9.7% in September. Her most recent A1c is 8.9% in March 2021 which is the lowest we have ever had. Recent laboratory tests also remarkable for creatinine of 1.41 with a GFR of 44. LFTs normal.     Current medications  Toujeo insulin 60 units at bedtime  Humalog insulin 20 breakfast (20 for lunch if she eats lunch) and 42 for dinner  Metformin she is not taking this anymore because of GI distress. Trulicity 0.5 mg weekly (stopped)     She tells me today that her blood sugars in the morning are ranging from . Crystal Maya Her blood sugars 2 to 3 hours after dinner are ranging from 1 . Although not at goal, these are much better than they were previously reflecting the improvement in A1c. She is taking the insulin as noted above. Breakfast is juan and eggs but no toast.  She had lunch yesterday with a barbecue sandwich and slaw. It is important to point out that she usually does not have lunch and so she does not take insulin at that time. For dinner she had chitlins potato salad and green beans. Her blood sugar this morning was 97. She is not snacking at bedtime. She occasionally has a piece of fruit after breakfast.  Her physical activity is primarily rehab/PT for back pain.     Examination  GENERAL: NCAT, Appears well nourished   EYES: EOMI, non-icteric, no proptosis   Ear/Nose/Throat: NCAT, no visible inflammation or masses   CARDIOVASCULAR: no cyanosis, no visible JVD   RESPIRATORY: comfortable respirations observed, no cyanosis   MUSCULOSKELETAL: Normal ROM of upper extremities observed   SKIN: No edema, rash, or other significant changes observed   NEUROLOGIC:  AAOx3   PSYCHIATRIC: Normal affect, Normal insight and judgement       Impression  1. Type 2 diabetes mellitus with some improvement in glucose control  2. Chronic kidney disease stage IIIb  3. Obesity    Plan:  1. We will continue the regimen of Toujeo and Humalog as her only antihyperglycemic's. 2.  She believes that she can keep up the diet that she is eating now and I would anticipate the next A1c being close to 8%. 3.  I will see her back in 3 to 4 months.

## 2021-04-13 RX ORDER — AMLODIPINE BESYLATE 10 MG/1
TABLET ORAL
Qty: 90 TAB | Refills: 3 | Status: SHIPPED | OUTPATIENT
Start: 2021-04-13 | End: 2021-10-06 | Stop reason: SINTOL

## 2021-04-20 ENCOUNTER — PATIENT OUTREACH (OUTPATIENT)
Dept: CASE MANAGEMENT | Age: 71
End: 2021-04-20

## 2021-04-20 NOTE — PROGRESS NOTES
Ambulatory Care Management Note      Date/Time:  4/20/2021 4:28 PM    This patient was received as a referral from daily discharge report   Top Challenges reviewed with the provider   - medication management - not currently taking Crestor. Most recent Rx was written 7/30/2020 for #90 with 3 refills by Cardiology. - most recent Hgb A1c - 8.9% (3/17/21)         Ambulatory  contacted patient for discussion and case management of see below. Summary of patients top problems:   1. Medication Management  2. Type 2 DM  3. Advance Care Planning  Patient's challenges to self management identified:   functional physical ability, level of motivation and medication management      PCP/Specialist follow up:   Future Appointments   Date Time Provider Ovidio Allan   5/17/2021 12:40 PM [facility]   6/4/2021 11:00 AM Jazmine Camp MD [facility] BS AMB   7/14/2021 11:10 AM Olga Fiore MD RDE  BS AMB   8/16/2021  9:45 AM Taras Shelby MD [facility] BS AMB   11/4/2021  8:30 AM Jazmine Camp MD [facility] BS AMB   2/1/2022  9:40 AM Elana Cruz MD [facility] BS AMB      Goals Addressed                 This Visit's Progress       Chronic Disease     Advance Care Planning        4/20/2021  Patient has not completed Advance Medical Directive yet. Patient remains interested in completing Advance Medical Directive and reports that she still has the information that was mailed to her last year by this ACM. Patient will locate and review ACP literature that was previously mailed to her by this ACM.     2/12/2020  - received ACP information that ACM mailed to her; does not have any questions/concerns at this time  - patient plans to complete Advance Medical Directive and bring with her to next scheduled PCP appointment so that a copy can be made for her chart    1/10/20  - pt requests that ACP information be re-mailed to her; information mailed to patient today, per pt request    10/29/19  - blank AMD and 'right to decide' literature was mailed to patient by this NN on 10/16/19 ; pt confirms that she received this in the mail, however she has not yet reviewed it  - Plan: patient will review 'right to decide' literature and blank AMD document this week; NN will f/u with patient next week to address any questions/concerns. NN requested that, if completed, patient provide a copy of AMD to PCP office.  Knowledge and adherence of prescribed medication (ie. action, side effects, missed dose, etc.).        4/20/2021  not currently taking Crestor; listed as a current medication. Most recent Rx was written on 7/30/2020 by Cardiology for #90 with 3 refills. Patient is unsure as to why she is not currently taking this medication. Patient will contact Cardiology to confirm that Crestor is still a current medication and request a medication refill, if needed. ACM will route patient reminder via [employer], per patient request.    3/5/2020  - pt informed Dr. Robin Clark at 2/28/2020 OV that she had not taken metformin since her previous OV (11/25/19) due to continued GI distress    2/12/2020  - pt did not contact Dr. Cheryl Agarwal office to confirm current insulin orders; she reports that she reviewed her most recent OV AVS which advised that current orders are humalog 24 units before breakfast (if blood glucose is <150 mg/dL she takes 22 units), 24 units before lunch, 36 units before dinner and Toujeo 60 units nightly  - attempted to complete medication reconciliation with patient today, however patient is unreceptive to this; states, \"all of my medications are the same. \"    1/10/20  - reports taking humalog 24 units before breakfast (if blood glucose is <150 mg/dL she takes 22 units), 24 units before lunch, 36 units before dinner and Toujeo 60 units nightly.  Most recent endocrinology OV note reviewed; most recent Humalog order (11/25/19) - 22 units for breakfast and lunch and 36 units for dinner and Toujeo 60 units nightly (12/16/19), pt was noted to be taking mealtime insulin 2-3 hours after meal . Advised patient outreach to Dr. Mishel Oviedo to confirm/clarify current insulin unit dose orders. 10/29/19  - patient awaiting outreach from Ambulatory PharmD  - started taking Gabapentin 10/16; reports numbness/tingling in feet is improved since starting Gabapentin. Denies side effects denies worsened lower extremity edema from baseline, fatigue, dizziness, nausea, vomiting    10/16/19  - not taking Gabapentin and reports that she never began taking Gabapentin when it was prescribed (ordered by Dr. Mishel Oviedo 3/21/19 for neuropathic symptoms); states, \"I looked it up and got scared of it. \" Patient reports continued neuropathic symptoms in bilateral feet. Will refer to Ambulatory PharmD to further address patient questions/concerns. - taking hydralazine 25 mg daily (ordered by Dr. Torrey Griggs 6/21/19 for Hydralazine 25 mg tablet TID\"; reports only taking once daily \"because I take another fluid pill\"   - has been taking Humalog 36 units with dinner (most recent OV note by Dr. Mishel Oviedo reviewed and ordered unit dose with dinner is 35 units); NN informed patient today and patient verbalizes understanding   - Reports adherence with losartan, however has questions; \"is that the one I've been seeing on [facility].\"  - will refer patient to Ambulatory PharmD for medication management, address multiple patient questions/concerns re: current medications; pt is agreeable to this plan         Diabetes     Patient verbalizes understanding of self -management goals of living with Diabetes. 4/20/2021  most recent Hgb A1c 3/17/21 8.9%. Patient reports that she has made an effort to improve her diet since the beginning of the year and has decreased her intake of starchy foods. Patient reports that current level of physical activity includes walking around inside of her house; barrier to increased physical activity is chronic pain in her right knee and back.  Patient reports plan to increase physical activity with the warmer weather by walking in her pool once she gets it open. 3/5/2020  - attended OV with Dr. Juan Francisco Ware 2/28/2020; Hgb A1c 9.9%, compare to previous result of 10.1%. Pt reported to Dr. Juan Francisco Ware that she had not taken metformin since her previous OV (11/25/19) due to continued GI distress. Pt was advised to increase her physical activity to tolerance and was asked to check blood sugars at 11:00PM for 1 week and then contact Dr. Juan Francisco Ware to report readings; see [facility] note for details regarding OV encounter.   - Patient is not receptive to ACM follow-up today due to acute illness; ACM outreach in one week. 2/12/2020  - reports fasting blood glucose this morning of 120 mg/dL, after lunch 190 mg/dL. Unable to review additional recent blood glucose readings as results are stored on glucometer and patient is unsure how to recall historical results; pt will take her glucometer to scheduled OV with Dr. Juan Francisco Ware . - reports taking mealtime insulin prior to meals; states, \"Sometimes I forget and I take it right after I eat, but I'm trying to get use to it and take it before I eat. \"  - still taking humalog 24 units before breakfast (if blood glucose is <150 mg/dL she takes 22 units), 24 units before lunch, 36 units before dinner and Toujeo 60 units nightly; pt did not contact Dr. Hanane Mendoza office after last ACM outreach to confirm insulin orders, however reports that she reviewed her most recent office visit AVS and confirmed that the unit doses she is currently taking are correct. - continued lack of adherence with TID blood glucose monitoring; inquired about patient barriers to adherence - states, \"I am not sure I am going to be able to do that because when I stick it it hurts too bad and I've been doing this for 15 years and my fingers have had it. \" Informed patient about 14-day blood glucose monitoring system, Freestyle Jack, and encouraged patient to discuss this option with  Alisha Real at her upcoming office visit to increase patient adherence with blood glucose monitoring.  -  Inquired as to whether patient is following a diabetic diet; pt states, \"Kind of, yeah; most of the time. \" Diet is described as \"about the same; nothing more, nothing less. \" Patient is not interested in keeping a food diary to review with this NN. Patient is not interested in additional education/review of diabetic diet; notes that she previously met with CDE NN, Lucila Arias.  - no longer participating in water aerobics; currently not engaging in moderate exercise. She reports that she walks around her house for exercise; states, \"I just get up and start walking from the side door around to the front door and do it a couple of times. \" Patient reported barrier to exercise/increasing physical activity is back spasms which prevent her from \"doing a lot of moving or standing unless I am in the water. \" Reports that barrier to attending water aerobics at present is transportation and winter season. - will attend scheduled OV with Dr. Alisha Real 2/28/2020  - pt requests next ACM follow-up in three weeks after she attends scheduled OV with Dr. Alisha Real    1/10/20  - Hgb A1c 11/25/19 - 10.1%  - pt notes history of Type 2 DM for 15 years  - checking blood glucose 1-2x/day  - ordered insulin regimen/frequency is Humalog TID (before breakfast, lunch and dinner) and Toujeo nightly  - reports taking humalog 24 units before breakfast (if blood glucose is <150 mg/dL she takes 22 units), 24 units before lunch, 36 units before dinner and Toujeo 60 units nightly.  Most recent endocrinology OV note reviewed; per OV note, most recent Humalog order (11/25/19) - 22 units for breakfast and lunch and 36 units for dinner and Toujeo 60 units nightly (12/16/19), pt was noted to be taking mealtime insulin 2-3 hours after meal .Further clarification is needed regarding what current insulin unit dose orders are; patient is advised to contact Dr. Alisha Real to confirm/clarify current insulin unit dose orders. - per last endocrinology OV note, pt was encouraged to improve diet and physical activity to tolerance  - encouraged to take mealtime insulin prior to eating meal  - encouraged to check blood glucose TID as ordered and keep blood glucose log      10/29/19  8/14/19-10.9%  4/22/19 - Hgb A1c 10.1%  - Current level of understanding: checks blood sugar once daily before breakfast. Completed Disease Specific CM Program for Diabetes Self-Management (avani/ Ash Ruiz, RN NN CDE) on 8/27/19. Does not keep written blood glucose log; reports that glucometer stores historical blood glucose results. Pt reported fasting blood glucose on 10/28 of 169 mg/dL and on 10/29 of 93 mg/dL. Pt notes difficulty adhering to diabetic diet - enjoys pasta, potatoes, chips - states, \"my problem is food. I'm not that strong on sweets. \"; pt reported barrier to diabetic diet adherence is financial. Does not drink regular soda; drinks diet coke (no more than 2/day) and water. Currently exercising two times/week for 60-90 minutes- water aerobics. - Desired Outcome: Improve diet and exercise, decrease carbohydrate intake. Lower Hgb A1c.  - Plan: Further assess financial barrier(s). Will continue provide and reinforce pt education re: diabetic diet, assist with meal planning. Pt could benefit from keeping a food diary; will discuss further during subsequent outreach encounter as pt is unable to continue call at this time due to getting ready to leave her house.

## 2021-05-13 DIAGNOSIS — Z79.4 TYPE 2 DIABETES MELLITUS WITH COMPLICATION, WITH LONG-TERM CURRENT USE OF INSULIN (HCC): ICD-10-CM

## 2021-05-13 DIAGNOSIS — E11.8 TYPE 2 DIABETES MELLITUS WITH COMPLICATION, WITH LONG-TERM CURRENT USE OF INSULIN (HCC): ICD-10-CM

## 2021-05-14 RX ORDER — CALCIUM CITRATE/VITAMIN D3 200MG-6.25
TABLET ORAL
Qty: 300 STRIP | Refills: 3 | Status: SHIPPED | OUTPATIENT
Start: 2021-05-14 | End: 2021-12-08 | Stop reason: SDUPTHER

## 2021-05-17 ENCOUNTER — HOSPITAL ENCOUNTER (OUTPATIENT)
Dept: MAMMOGRAPHY | Age: 71
Discharge: HOME | End: 2021-05-17
Attending: INTERNAL MEDICINE
Payer: MEDICARE

## 2021-05-17 PROCEDURE — 77067 SCR MAMMO BI INCL CAD: CPT

## 2021-05-25 ENCOUNTER — TELEPHONE (OUTPATIENT)
Dept: SLEEP MEDICINE | Age: 71
End: 2021-05-25

## 2021-05-25 NOTE — TELEPHONE ENCOUNTER
Patient called and wanted to make sure her machine was giving us data. She can be reached at [phone].

## 2021-05-26 ENCOUNTER — HOSPITAL ENCOUNTER (OUTPATIENT)
Dept: MAMMOGRAPHY | Age: 71
Discharge: HOME | End: 2021-05-26
Attending: INTERNAL MEDICINE
Payer: MEDICARE

## 2021-05-26 ENCOUNTER — PATIENT OUTREACH (OUTPATIENT)
Dept: CASE MANAGEMENT | Age: 71
End: 2021-05-26

## 2021-05-26 DIAGNOSIS — R92.8 ABNORMAL MAMMOGRAM: ICD-10-CM

## 2021-05-26 PROCEDURE — 77066 DX MAMMO INCL CAD BI: CPT

## 2021-05-26 NOTE — PROGRESS NOTES
5/26/2021  4:09 PM    Patient outreach by this ACM today to perform CCM follow-up; two patient identifiers verified. Patient is currently busy and unable to f/u with ACM at this time. Informed patient that this ACM will be out of the office next week, however ACM will perform f/u outreach upon returning to the office; pt verbalizes understanding.

## 2021-06-04 ENCOUNTER — OFFICE VISIT (OUTPATIENT)
Dept: INTERNAL MEDICINE CLINIC | Age: 71
End: 2021-06-04
Payer: MEDICARE

## 2021-06-04 VITALS
SYSTOLIC BLOOD PRESSURE: 150 MMHG | RESPIRATION RATE: 18 BRPM | DIASTOLIC BLOOD PRESSURE: 65 MMHG | TEMPERATURE: 97.9 F | OXYGEN SATURATION: 95 % | HEART RATE: 77 BPM | HEIGHT: 63 IN | WEIGHT: 230 LBS | BODY MASS INDEX: 40.75 KG/M2

## 2021-06-04 DIAGNOSIS — E11.8 TYPE 2 DIABETES MELLITUS WITH COMPLICATION, WITH LONG-TERM CURRENT USE OF INSULIN (HCC): ICD-10-CM

## 2021-06-04 DIAGNOSIS — I25.10 CORONARY ARTERY DISEASE INVOLVING NATIVE CORONARY ARTERY OF NATIVE HEART WITHOUT ANGINA PECTORIS: ICD-10-CM

## 2021-06-04 DIAGNOSIS — E11.42 TYPE 2 DIABETES MELLITUS WITH DIABETIC POLYNEUROPATHY, WITH LONG-TERM CURRENT USE OF INSULIN (HCC): Primary | ICD-10-CM

## 2021-06-04 DIAGNOSIS — Z78.0 MENOPAUSE: ICD-10-CM

## 2021-06-04 DIAGNOSIS — Z79.4 TYPE 2 DIABETES MELLITUS WITH DIABETIC POLYNEUROPATHY, WITH LONG-TERM CURRENT USE OF INSULIN (HCC): Primary | ICD-10-CM

## 2021-06-04 DIAGNOSIS — Z00.00 MEDICARE ANNUAL WELLNESS VISIT, SUBSEQUENT: ICD-10-CM

## 2021-06-04 DIAGNOSIS — J45.20 MILD INTERMITTENT ASTHMA, UNSPECIFIED WHETHER COMPLICATED: ICD-10-CM

## 2021-06-04 DIAGNOSIS — E66.01 OBESITY, MORBID (HCC): ICD-10-CM

## 2021-06-04 DIAGNOSIS — E78.00 PURE HYPERCHOLESTEROLEMIA: ICD-10-CM

## 2021-06-04 DIAGNOSIS — I10 ESSENTIAL HYPERTENSION: ICD-10-CM

## 2021-06-04 DIAGNOSIS — F32.1 CURRENT MODERATE EPISODE OF MAJOR DEPRESSIVE DISORDER, UNSPECIFIED WHETHER RECURRENT (HCC): ICD-10-CM

## 2021-06-04 DIAGNOSIS — Z86.73 HISTORY OF CVA (CEREBROVASCULAR ACCIDENT): ICD-10-CM

## 2021-06-04 DIAGNOSIS — Z79.4 TYPE 2 DIABETES MELLITUS WITH COMPLICATION, WITH LONG-TERM CURRENT USE OF INSULIN (HCC): ICD-10-CM

## 2021-06-04 PROCEDURE — 1101F PT FALLS ASSESS-DOCD LE1/YR: CPT | Performed by: INTERNAL MEDICINE

## 2021-06-04 PROCEDURE — G0463 HOSPITAL OUTPT CLINIC VISIT: HCPCS | Performed by: INTERNAL MEDICINE

## 2021-06-04 PROCEDURE — G8753 SYS BP > OR = 140: HCPCS | Performed by: INTERNAL MEDICINE

## 2021-06-04 PROCEDURE — 1090F PRES/ABSN URINE INCON ASSESS: CPT | Performed by: INTERNAL MEDICINE

## 2021-06-04 PROCEDURE — 3017F COLORECTAL CA SCREEN DOC REV: CPT | Performed by: INTERNAL MEDICINE

## 2021-06-04 PROCEDURE — G0439 PPPS, SUBSEQ VISIT: HCPCS | Performed by: INTERNAL MEDICINE

## 2021-06-04 PROCEDURE — G8399 PT W/DXA RESULTS DOCUMENT: HCPCS | Performed by: INTERNAL MEDICINE

## 2021-06-04 PROCEDURE — 99213 OFFICE O/P EST LOW 20 MIN: CPT | Performed by: INTERNAL MEDICINE

## 2021-06-04 PROCEDURE — G8754 DIAS BP LESS 90: HCPCS | Performed by: INTERNAL MEDICINE

## 2021-06-04 PROCEDURE — G8510 SCR DEP NEG, NO PLAN REQD: HCPCS | Performed by: INTERNAL MEDICINE

## 2021-06-04 PROCEDURE — G8536 NO DOC ELDER MAL SCRN: HCPCS | Performed by: INTERNAL MEDICINE

## 2021-06-04 PROCEDURE — G8417 CALC BMI ABV UP PARAM F/U: HCPCS | Performed by: INTERNAL MEDICINE

## 2021-06-04 PROCEDURE — G8427 DOCREV CUR MEDS BY ELIG CLIN: HCPCS | Performed by: INTERNAL MEDICINE

## 2021-06-04 PROCEDURE — G9899 SCRN MAM PERF RSLTS DOC: HCPCS | Performed by: INTERNAL MEDICINE

## 2021-06-04 PROCEDURE — 2022F DILAT RTA XM EVC RTNOPTHY: CPT | Performed by: INTERNAL MEDICINE

## 2021-06-04 RX ORDER — ALBUTEROL SULFATE 90 UG/1
AEROSOL, METERED RESPIRATORY (INHALATION)
Qty: 3 INHALER | Refills: 3 | Status: SHIPPED | OUTPATIENT
Start: 2021-06-04 | End: 2022-08-22

## 2021-06-04 RX ORDER — GABAPENTIN 100 MG/1
100 CAPSULE ORAL
Qty: 270 CAPSULE | Refills: 1 | Status: SHIPPED | OUTPATIENT
Start: 2021-06-04 | End: 2021-08-17

## 2021-06-04 RX ORDER — ALBUTEROL SULFATE 90 UG/1
AEROSOL, METERED RESPIRATORY (INHALATION)
Qty: 3 INHALER | Refills: 3 | Status: CANCELLED | OUTPATIENT
Start: 2021-06-04

## 2021-06-04 RX ORDER — BUDESONIDE AND FORMOTEROL FUMARATE DIHYDRATE 80; 4.5 UG/1; UG/1
2 AEROSOL RESPIRATORY (INHALATION) 2 TIMES DAILY
Qty: 3 INHALER | Refills: 3 | Status: SHIPPED | OUTPATIENT
Start: 2021-06-04 | End: 2021-12-22

## 2021-06-04 NOTE — TELEPHONE ENCOUNTER
Future Appointments:  Future Appointments   Date Time Provider Ovidio Allan   7/14/2021 11:10 AM Kyle Sesay MD RDE  BS AMB   8/16/2021  9:45 AM Erika Jimenez MD [facility] BS AMB   2/1/2022  9:40 AM Yen Lovett MD [facility] BS AMB        Last Appointment With Me:  6/4/2021     Requested Prescriptions     Pending Prescriptions Disp Refills    albuterol (ProAir HFA) 90 mcg/actuation inhaler 3 Inhaler 3     Sig: USE 1 INHALATION EVERY 4 HOURS AS NEEDED WHEEZING OR SHORTNESS OF BREATH

## 2021-06-04 NOTE — PATIENT INSTRUCTIONS
Office Policies    Phone calls/patient messages:            Please allow up to 24 hours for someone in the office to contact you about your call or message. Be mindful your provider may be out of the office or your message may require further review. We encourage you to use [facility] for your messages as this is a faster, more efficient way to communicate with our office                         Medication Refills:            Prescription medications require 48-72 business hours to process. We encourage you to use [facility] for your refills. For controlled medications: Please allow 72 business hours to process. Certain medications may require you to  a written prescription at our office. NO narcotic/controlled medications will be prescribed after 4pm Monday through Friday or on weekends              Form/Paperwork Completion:            Please note a $25 fee may incur for all paperwork for completed by our providers. We ask that you allow 7-10 business days. Pre-payment is due prior to picking up/faxing the completed form. You may also download your forms to [facility] to have your doctor print off. Medicare Wellness Visit, Female     The best way to live healthy is to have a lifestyle where you eat a well-balanced diet, exercise regularly, limit alcohol use, and quit all forms of tobacco/nicotine, if applicable. Regular preventive services are another way to keep healthy. Preventive services (vaccines, screening tests, monitoring & exams) can help personalize your care plan, which helps you manage your own care. Screening tests can find health problems at the earliest stages, when they are easiest to treat. [facility] follows the current, evidence-based guidelines published by the [facility] (USPSTF) when recommending preventive services for our patients.  Because we follow these guidelines, sometimes recommendations change over time as research supports it. (For example, mammograms used to be recommended annually. Even though Medicare will still pay for an annual mammogram, the newer guidelines recommend a mammogram every two years for women of average risk). Of course, you and your doctor may decide to screen more often for some diseases, based on your risk and your co-morbidities (chronic disease you are already diagnosed with). Preventive services for you include:  - Medicare offers their members a free annual wellness visit, which is time for you and your primary care provider to discuss and plan for your preventive service needs. Take advantage of this benefit every year!  -All adults over the age of 72 should receive the recommended pneumonia vaccines. Current USPSTF guidelines recommend a series of two vaccines for the best pneumonia protection.   -All adults should have a flu vaccine yearly and a tetanus vaccine every 10 years.   -All adults age 48 and older should receive the shingles vaccines (series of two vaccines). -All adults age 38-68 who are overweight should have a diabetes screening test once every three years.   -All adults born between [birth year] and 1963 should be screened once for Hepatitis C.  -Other screening tests and preventive services for persons with diabetes include: an eye exam to screen for diabetic retinopathy, a kidney function test, a foot exam, and stricter control over your cholesterol.   -Cardiovascular screening for adults with routine risk involves an electrocardiogram (ECG) at intervals determined by your doctor.   -Colorectal cancer screenings should be done for adults age 54-65 with no increased risk factors for colorectal cancer. There are a number of acceptable methods of screening for this type of cancer. Each test has its own benefits and drawbacks. Discuss with your doctor what is most appropriate for you during your annual wellness visit.  The different tests include: colonoscopy (considered the best screening method), a fecal occult blood test, a fecal DNA test, and sigmoidoscopy.    -A bone mass density test is recommended when a woman turns 65 to screen for osteoporosis. This test is only recommended one time, as a screening. Some providers will use this same test as a disease monitoring tool if you already have osteoporosis. -Breast cancer screenings are recommended every other year for women of normal risk, age 54-69.  -Cervical cancer screenings for women over age 72 are only recommended with certain risk factors.      Here is a list of your current Health Maintenance items (your personalized list of preventive services) with a due date:  Health Maintenance Due   Topic Date Due    DTaP/Tdap/Td  (1 - Tdap) Never done    Shingles Vaccine (1 of 2) Never done    Annual Well Visit  06/21/2020    Albumin Urine Test  06/10/2021

## 2021-06-04 NOTE — PROGRESS NOTES
HISTORY OF PRESENT ILLNESS  Makayla Estrada is a 78 y.o. female. HPI      F/u HTN hld asthma hx CVA-DM-2 osteopenia morbid obesity and CPE  Last a1c 8.9 LDl 161 but not taking crestor every daily--skips at night often  Sees Dr Bravo for Dm-2 management will get labs again in July next appt  Last dexa 2018  Sees Dr Jeff Jacques yearly for hx mild CAD-no cp or angina  Some tinglong in feet  Uses rollator due to imbalance s/p cva-asking about a motorized scooter. Unable to walk long distances due to back pain    Last OV      Has established with Dr Jung Martin for DM-2 management--metformin restarted 1500,g every day and humalog dose has been adjusted by Dr Leandra Mann  bs still up around 200 but down to 109 this morning  Metformin causes nausea and diarrhea    Patient Active Problem List    Diagnosis Date Noted    Type 2 diabetes mellitus with diabetic neuropathy ([facility] 04/29/2019    Type 2 diabetes with nephropathy ([facility] 12/14/2018    History of CVA (cerebrovascular accident) 07/13/2018    Obesity, morbid ([facility] 01/25/2018    Warthin's tumor 07/01/2016    HTN (hypertension) 09/13/2013    Axillary hidradenitis suppurativa 08/07/2013    Esophageal reflux 01/15/2013    Renal failure, acute ([facility] 01/13/2013    Asthma 12/14/2012    Myocardial ischemia 06/06/2012    Shortness of breath 06/06/2012    Coronary artery disease 06/06/2012    PVC's (premature ventricular contractions) 06/01/2012    DM type 2 (diabetes mellitus, type 2) ([facility] 04/23/2012    Grave's disease 10/15/2010    DJD (degenerative joint disease) of hip 10/20/2009    DJD (degenerative joint disease) of knee 10/20/2009    CTS (Carpal Tunnel Syndrome)-b/l 10/20/2009    CVA (cerebral infarction) 10/20/2009    Diverticulosis 10/20/2009    Osteopenia 10/20/2009     Current Outpatient Medications   Medication Sig Dispense Refill    budesonide-formoteroL (SYMBICORT) 80-4.5 mcg/actuation HFAA Take 2 Puffs by inhalation two (2) times a day.  3 Inhaler 3    albuterol (ProAir HFA) 90 mcg/actuation inhaler USE 1 INHALATION EVERY 4 HOURS AS NEEDED WHEEZING OR SHORTNESS OF BREATH 3 Inhaler 3    glucose blood VI test strips (True Metrix Glucose Test Strip) strip Monitor blood sugar 3 times daily 300 Strip 3    amLODIPine (NORVASC) 10 mg tablet TAKE 1 TABLET DAILY 90 Tab 3    losartan (COZAAR) 100 mg tablet Take 1 Tab by mouth daily. 90 Tab 3    insulin lispro (HumaLOG KwikPen Insulin) 100 unit/mL kwikpen 20 units for breakfast and lunch and 40 units for dinner (Patient taking differently: 22 units for breakfast and lunch and 42 units for dinner) 25 Adjustable Dose Pre-filled Pen Syringe 3    insulin glargine U-300 conc (Toujeo SoloStar U-300 Insulin) 300 unit/mL (1.5 mL) inpn pen 60 Units by SubCUTAneous route nightly. Indications: type 2 diabetes mellitus (Patient taking differently: 62 Units by SubCUTAneous route nightly. Indications: type 2 diabetes mellitus) 10 Pen 4    gabapentin (NEURONTIN) 100 mg capsule Take 1 Cap by mouth three (3) times daily. Max Daily Amount: 300 mg. 90 Cap 3    rosuvastatin (CRESTOR) 20 mg tablet Take 1 Tab by mouth daily. 90 Tab 3    triamterene-hydroCHLOROthiazide (DYAZIDE) 37.5-25 mg per capsule Take 1 Cap by mouth daily. 180 Cap 3    metoprolol tartrate (LOPRESSOR) 50 mg tablet Take 1 Tab by mouth two (2) times a day. 180 Tab 3    hydrALAZINE (APRESOLINE) 25 mg tablet Take 1 Tab by mouth three (3) times daily. 180 Tab 3    levothyroxine (SYNTHROID) 137 mcg tablet TAKE 1 TABLET DAILY 90 Tab 3    flash glucose scanning reader (FREESTYLE LEXI 14 DAY READER) misc 1 Each by Does Not Apply route daily. 1 Each 0    flash glucose sensor (FREESTYLE LEXI 14 DAY SENSOR) kit 1 Each by Does Not Apply route every fourteen (14) days.  6 Kit 3    glucose blood VI test strips (ONETOUCH ULTRA BLUE TEST STRIP) strip Monitor blood sugar 3 times daily 300 Strip 3    albuterol (ACCUNEB) 1.25 mg/3 mL nebu 3 mL by Nebulization route every four (4) hours as needed (wheezing). 225 mL 3    ADVAIR DISKUS 100-50 mcg/dose diskus inhaler USE 1 INHALATION TWICE A DAY (Patient taking differently: USE 1 INHALATION TWICE A DAY prn) 180 Each 3    aspirin delayed-release 81 mg tablet Take 81 mg by mouth daily.       BD INSULIN PEN NEEDLE UF SHORT 31 gauge x 5/16\" ndle        Allergies   Allergen Reactions    Latex Itching    Codeine Itching and Other (comments)     hallucinate    Contrast Dye [Iodine] Rash and Itching     Given pre-cardiac cath    Seafood [facility] Containing Products] Swelling      Lab Results   Component Value Date/Time    WBC 6.2 12/16/2019 02:02 PM    HGB 11.8 12/16/2019 02:02 PM    Hemoglobin (POC) 14.6 04/10/2015 12:41 PM    HCT 39.0 12/16/2019 02:02 PM    Hematocrit (POC) 43 04/10/2015 12:41 PM    PLATELET 317 28/24/2372 02:02 PM    MCV 77 (L) 12/16/2019 02:02 PM     Lab Results   Component Value Date/Time    Hemoglobin A1c 8.9 (H) 03/17/2021 09:08 AM    Hemoglobin A1c 9.7 (H) 12/18/2020 09:01 AM    Hemoglobin A1c 10.3 (H) 09/24/2020 10:23 AM    Hemoglobin A1c, External 10.6 11/19/2018 12:00 AM    Hemoglobin A1c, External 9.4 05/20/2016 12:00 AM    Hemoglobin A1c, External 8.9 08/17/2015 02:37 PM    Glucose 85 03/17/2021 09:08 AM    Glucose (POC) 129 (H) 09/22/2015 07:11 AM    Microalb/Creat ratio (ug/mg creat.) 18 06/10/2020 08:08 AM    LDL, calculated 161 (H) 03/17/2021 09:08 AM    LDL, calculated 146 (H) 06/10/2020 08:08 AM    Creatinine (POC) 0.9 04/10/2015 12:41 PM    Creatinine 1.41 (H) 03/17/2021 09:08 AM      Lab Results   Component Value Date/Time    Cholesterol, total 243 (H) 03/17/2021 09:08 AM    HDL Cholesterol 53 03/17/2021 09:08 AM    LDL, calculated 161 (H) 03/17/2021 09:08 AM    LDL, calculated 146 (H) 06/10/2020 08:08 AM    LDL-C, External 72 05/20/2016 12:00 AM    Triglyceride 162 (H) 03/17/2021 09:08 AM     Lab Results   Component Value Date/Time    GFR est non-AA 38 (L) 03/17/2021 09:08 AM    GFRNA, POC >60 04/10/2015 12:41 PM    GFR est AA 44 (L) 03/17/2021 09:08 AM    GFRAA, POC >60 04/10/2015 12:41 PM    Creatinine 1.41 (H) 03/17/2021 09:08 AM    Creatinine (POC) 0.9 04/10/2015 12:41 PM    BUN 18 03/17/2021 09:08 AM    BUN (POC) 7 (L) 04/10/2015 12:41 PM    Sodium 146 (H) 03/17/2021 09:08 AM    Sodium (POC) 143 04/10/2015 12:41 PM    Potassium 3.5 03/17/2021 09:08 AM    Potassium (POC) 2.9 (L) 04/10/2015 12:41 PM    Chloride 102 03/17/2021 09:08 AM    Chloride (POC) 103 04/10/2015 12:41 PM    CO2 31 (H) 03/17/2021 09:08 AM    Magnesium 1.9 01/14/2013 04:30 AM        ROS    Physical Exam  Vitals and nursing note reviewed. Constitutional:       Appearance: She is well-developed. Comments: Appears stated age   Cardiovascular:      Rate and Rhythm: Normal rate and regular rhythm. Heart sounds: Normal heart sounds. No murmur heard. No friction rub. No gallop. Pulmonary:      Effort: Pulmonary effort is normal. No respiratory distress. Breath sounds: Normal breath sounds. No wheezing. Abdominal:      General: Bowel sounds are normal.      Palpations: Abdomen is soft. Neurological:      Mental Status: She is alert. Comments:      Diabetic foot exam performed by Maureen Green MD       Measurement  Response Nurse Comment Physician Comment  Monofilament  R - normal sensation with micro filament  L - normal sensation with micro filament    Pulse DP R - 2+ (normal)  L - 2+ (normal)    Pulse TP R - 2+ (normal)  L - 2+ (normal)    Structural deformity R - None  L - None    Skin Integrity / Deformity R - None  L - None       Reviewed by:               ASSESSMENT and PLAN  Diagnoses and all orders for this visit:    1. Type 2 diabetes mellitus with diabetic polyneuropathy, with long-term current use of insulin ([facility])  -      DIABETES FOOT EXAM    2. Essential hypertension    3. Pure hypercholesterolemia    4. Mild intermittent asthma, unspecified whether complicated    5.  Current moderate episode of major depressive disorder, unspecified whether recurrent ([address]    6. Obesity, morbid ([address]    7. Type 2 diabetes mellitus with complication, with long-term current use of insulin ([address]    8. Menopause    Other orders  -     budesonide-formoteroL (SYMBICORT) 80-4.5 mcg/actuation HFAA; Take 2 Puffs by inhalation two (2) times a day. -     albuterol (ProAir HFA) 90 mcg/actuation inhaler; USE 1 INHALATION EVERY 4 HOURS AS NEEDED WHEEZING OR SHORTNESS OF BREATH           This is the Subsequent Medicare Annual Wellness Exam, performed 12 months or more after the Initial AWV or the last Subsequent AWV    I have reviewed the patient's medical history in detail and updated the computerized patient record. Assessment/Plan   Education and counseling provided:  Are appropriate based on today's review and evaluation  End-of-Life planning (with patient's consent)-updated health care decision makers  Bone mass measurement (DEXA)    1. Type 2 diabetes mellitus with diabetic polyneuropathy, with long-term current use of insulin ([facility])  -      DIABETES FOOT EXAM   Fu Dr Herminia Castle I July   Refill neurontin   2. Essential hypertension   Mild SBP elevation, continue medicines and low sodium diet  3. Pure hypercholesterolemia   Take crestor qam  4. Mild intermittent asthma, unspecified whether complicated   Controlled on inhalers  5. Current moderate episode of major depressive disorder, unspecified whether recurrent ([address]   controlled  6. Obesity, morbid ([address]  7. Type 2 diabetes mellitus with complication, with long-term current use of insulin ([address]  8. Menopause   dexa  9. Hx CVA   Gait unsteadiness--using walker.  Can look into scooter       Depression Risk Factor Screening     3 most recent PHQ Screens 6/4/2021   Little interest or pleasure in doing things Several days   Feeling down, depressed, irritable, or hopeless Several days   Total Score PHQ 2 2       Alcohol Risk Screen    Do you average more than 1 drink per night or more than 7 drinks a week:  No    On any one occasion in the past three months have you have had more than 3 drinks containing alcohol:  No        Functional Ability and Level of Safety    Hearing: Hearing is good. Activities of Daily Living: The home contains: no safety equipment. Patient needs help with:  transportation      Ambulation: with difficulty, uses a walker and rollator     Fall Risk:  Fall Risk Assessment, last 12 mths 2/18/2021   Able to walk? Yes   Fall in past 12 months? 1   Do you feel unsteady? 0   Are you worried about falling 1   Number of falls in past 12 months 1   Fall with injury?  0      Abuse Screen:  Patient is not abused       Cognitive Screening    Has your family/caregiver stated any concerns about your memory: no     Cognitive Screening: Normal - serial 3    Health Maintenance Due     Health Maintenance Due   Topic Date Due    DTaP/Tdap/Td series (1 - Tdap) Never done    Shingrix Vaccine Age 50> (1 of 2) Never done    Medicare Yearly Exam  06/21/2020    MICROALBUMIN Q1  06/10/2021       Patient Care Team   Patient Care Team:  Pratibha Nuñez MD as PCP - General  Louellen Proud Abel Bourgeois MD as PCP - [facility]  Jorge Bravo MD as Physician (Cardiology)  Tracie Lindsey MD as Surgeon (General Surgery)  Lily Hernandez MD as Physician (Sleep Medicine)  Suzanne Taveras NP as Nurse Practitioner (Nurse Practitioner)  Yusuf Agosto, RN as Ambulatory Care Manager  Lit Ace MD as Consulting Provider (Endocrinology)    History     Patient Active Problem List   Diagnosis Code    DJD (degenerative joint disease) of hip M16.9    DJD (degenerative joint disease) of knee M17.10    CTS (Carpal Tunnel Syndrome)-b/l G56.00    CVA (cerebral infarction) I63.9    Diverticulosis K57.90    Osteopenia M85.80    Grave's disease     DM type 2 (diabetes mellitus, type 2) ([facility] E11.9    PVC's (premature ventricular contractions) I49.3    Myocardial ischemia I25.9    Shortness of breath R06.02    Coronary artery disease I25.10    Asthma J45.909    Renal failure, acute (HCC) N17.9    Esophageal reflux K21.9    Axillary hidradenitis suppurativa L73.2    HTN (hypertension) I10    Warthin's tumor D11.9    Obesity, morbid (HCC) E66.01    History of CVA (cerebrovascular accident) Z80.78    Type 2 diabetes with nephropathy ([facility] E11.21    Type 2 diabetes mellitus with diabetic neuropathy ([facility] E11.40     Past Medical History:   Diagnosis Date    Asthma     CAD (coronary artery disease)     \"mild\" per Dr Christine Burkitt note    Diabetes [facility])     Dr Shandra Kelly     Hypertension     Screening for colon cancer 2/16/05    Dr Grayson Stanley in 10 years    Stroke [facility]) 2004    Rt side weaker than left, uses a cane: no longer followed by neuro    Thromboembolus ([facility] 1976    Rt 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 3 Inhaler 3    albuterol (ProAir HFA) 90 mcg/actuation inhaler USE 1 INHALATION EVERY 4 HOURS AS NEEDED WHEEZING OR SHORTNESS OF BREATH 3 Inhaler 3    glucose blood VI test strips (True Metrix Glucose Test Strip) strip Monitor blood sugar 3 times daily 300 Strip 3    amLODIPine (NORVASC) 10 mg tablet TAKE 1 TABLET DAILY 90 Tab 3    losartan (COZAAR) 100 mg tablet Take 1 Tab by mouth daily.  90 Tab 3    insulin lispro (HumaLOG KwikPen Insulin) 100 unit/mL kwikpen 20 units for breakfast and lunch and 40 units for dinner (Patient taking differently: 22 units for breakfast and lunch and 42 units for dinner) 25 Adjustable Dose Pre-filled Pen Syringe 3    insulin glargine U-300 conc (Toujeo SoloStar U-300 Insulin) 300 unit/mL (1.5 mL) inpn pen 60 Units by SubCUTAneous route nightly. Indications: type 2 diabetes mellitus (Patient taking differently: 62 Units by SubCUTAneous route nightly. Indications: type 2 diabetes mellitus) 10 Pen 4    gabapentin (NEURONTIN) 100 mg capsule Take 1 Cap by mouth three (3) times daily. Max Daily Amount: 300 mg. 90 Cap 3    rosuvastatin (CRESTOR) 20 mg tablet Take 1 Tab by mouth daily. 90 Tab 3    triamterene-hydroCHLOROthiazide (DYAZIDE) 37.5-25 mg per capsule Take 1 Cap by mouth daily. 180 Cap 3    metoprolol tartrate (LOPRESSOR) 50 mg tablet Take 1 Tab by mouth two (2) times a day. 180 Tab 3    hydrALAZINE (APRESOLINE) 25 mg tablet Take 1 Tab by mouth three (3) times daily. 180 Tab 3    levothyroxine (SYNTHROID) 137 mcg tablet TAKE 1 TABLET DAILY 90 Tab 3    flash glucose scanning reader (FREESTYLE LEXI 14 DAY READER) misc 1 Each by Does Not Apply route daily. 1 Each 0    flash glucose sensor (FREESTYLE LEXI 14 DAY SENSOR) kit 1 Each by Does Not Apply route every fourteen (14) days. 6 Kit 3    glucose blood VI test strips (ONETOUCH ULTRA BLUE TEST STRIP) strip Monitor blood sugar 3 times daily 300 Strip 3    albuterol (ACCUNEB) 1.25 mg/3 mL nebu 3 mL by Nebulization route every four (4) hours as needed (wheezing). 225 mL 3    ADVAIR DISKUS 100-50 mcg/dose diskus inhaler USE 1 INHALATION TWICE A DAY (Patient taking differently: USE 1 INHALATION TWICE A DAY prn) 180 Each 3    aspirin delayed-release 81 mg tablet Take 81 mg by mouth daily.       BD INSULIN PEN NEEDLE UF SHORT 31 gauge x 5/16\" ndle        Allergies   Allergen Reactions    Latex Itching    Codeine Itching and Other (comments)     hallucinate    Contrast Dye [Iodine] Rash and Itching     Given pre-cardiac cath    Seafood [Shellfish Containing Products] Swelling       Family History   Problem Relation Age of Onset    Diabetes Mother     Cancer Mother     Breast Cancer Mother 79    Heart Disease Father     Hypertension Father     Stroke Father     Coronary Artery Disease Brother 54     Social History     Tobacco Use    Smoking status: Former Smoker     Quit date: 2004     Years since quittin.7    Smokeless tobacco: Never Used   Substance Use Topics    Alcohol use: No         Josh Rios MD

## 2021-06-10 RX ORDER — LEVOTHYROXINE SODIUM 137 UG/1
TABLET ORAL
Qty: 90 TABLET | Refills: 3 | Status: SHIPPED | OUTPATIENT
Start: 2021-06-10 | End: 2022-04-29 | Stop reason: SDUPTHER

## 2021-07-08 ENCOUNTER — PATIENT OUTREACH (OUTPATIENT)
Dept: CASE MANAGEMENT | Age: 71
End: 2021-07-08

## 2021-07-08 NOTE — PROGRESS NOTES
7/8/2021  2:19 PM      Patient outreach by this [facility] today to perform CCM follow-up; two patient identifiers verified. Goals Addressed                 This Visit's Progress       Chronic Disease     Advance Care Planning        7/8/2021  Patient reports that she discussed this with PCP at most recent office visit on 6/4/21 and was provided with another blank Advance Medical Directive. Patient is encouraged to provided PCP office with a copy, if completed. 4/20/2021  Patient has not completed Advance Medical Directive yet. Patient remains interested in completing Advance Medical Directive and reports that she still has the information that was mailed to her last year by this AC. Patient will locate and review ACP literature that was previously mailed to her by this AC. 2/12/2020  - received ACP information that ACM mailed to her; does not have any questions/concerns at this time  - patient plans to complete Advance Medical Directive and bring with her to next scheduled PCP appointment so that a copy can be made for her chart    1/10/20  - pt requests that ACP information be re-mailed to her; information mailed to patient today, per pt request    10/29/19  - blank AMD and 'right to decide' literature was mailed to patient by this NN on 10/16/19 ; pt confirms that she received this in the mail, however she has not yet reviewed it  - Plan: patient will review 'right to decide' literature and blank AMD document this week; NN will f/u with patient next week to address any questions/concerns. NN requested that, if completed, patient provide a copy of AMD to PCP office.  Knowledge and adherence of prescribed medication (ie. action, side effects, missed dose, etc.).    On track     7/8/2021  Patient reports that she is currently taking the generic for Crestor (rosuvastatin); reports that she never stopped taking this medication-states that during previous med rec with [facility] she was looking for brand name Crestor in her medication bottles instead of generic name rosuvastatin which is why she thought that she was not taking Crestor (rosuvastatin). Patient reports taking all medications as ordered on all days; denies any missed doses since previous ACM follow-up encounter. 4/20/2021  not currently taking Crestor; listed as a current medication. Most recent Rx was written on 7/30/2020 by Cardiology for #90 with 3 refills. Patient is unsure as to why she is not currently taking this medication. Patient will contact Cardiology to confirm that Crestor is still a current medication and request a medication refill, if needed. ACM will route patient reminder via [employer], per patient request.    3/5/2020  - pt informed Dr. Mychal Soto at 2/28/2020 OV that she had not taken metformin since her previous OV (11/25/19) due to continued GI distress    2/12/2020  - pt did not contact Dr. Mann Somers office to confirm current insulin orders; she reports that she reviewed her most recent OV AVS which advised that current orders are humalog 24 units before breakfast (if blood glucose is <150 mg/dL she takes 22 units), 24 units before lunch, 36 units before dinner and Toujeo 60 units nightly  - attempted to complete medication reconciliation with patient today, however patient is unreceptive to this; states, \"all of my medications are the same. \"    1/10/20  - reports taking humalog 24 units before breakfast (if blood glucose is <150 mg/dL she takes 22 units), 24 units before lunch, 36 units before dinner and Toujeo 60 units nightly. Most recent endocrinology OV note reviewed; most recent Humalog order (11/25/19) - 22 units for breakfast and lunch and 36 units for dinner and Toujeo 60 units nightly (12/16/19), pt was noted to be taking mealtime insulin 2-3 hours after meal . Advised patient outreach to Dr. Mychal Soto to confirm/clarify current insulin unit dose orders.     10/29/19  - patient awaiting outreach from Ambulatory PharmD  - started taking Gabapentin 10/16; reports numbness/tingling in feet is improved since starting Gabapentin. Denies side effects denies worsened lower extremity edema from baseline, fatigue, dizziness, nausea, vomiting    10/16/19  - not taking Gabapentin and reports that she never began taking Gabapentin when it was prescribed (ordered by Dr. Montserrat Jaramillo 3/21/19 for neuropathic symptoms); states, \"I looked it up and got scared of it. \" Patient reports continued neuropathic symptoms in bilateral feet. Will refer to Ambulatory PharmD to further address patient questions/concerns. - taking hydralazine 25 mg daily (ordered by Dr. Asim Huynh 6/21/19 for Hydralazine 25 mg tablet TID\"; reports only taking once daily \"because I take another fluid pill\"   - has been taking Humalog 36 units with dinner (most recent OV note by Dr. Montserrat Jaramillo reviewed and ordered unit dose with dinner is 35 units); NN informed patient today and patient verbalizes understanding   - Reports adherence with losartan, however has questions; \"is that the one I've been seeing on [facility] [facility].\"  - will refer patient to Ambulatory PharmD for medication management, address multiple patient questions/concerns re: current medications; pt is agreeable to this plan         Diabetes     Patient verbalizes understanding of self -management goals of living with Diabetes. On track     7/8/2021  Plans to resume water aerobics next week. She contacted the [facility] and was notified that water aerobics class has resumed; plans to go to local [facility] on 7/13 to sign up and pay fee. Patient states, \"I'm definitely looking forward to that. \"  Checking her blood glucose twice daily; states, \"I haven't had anything over 160 (mg/dL). \" She is interested in getting the [facility] 14-day system and will discuss this with Dr. Montserrat Jaramillo during her upcoming visit; pt notes that when this was previously ordered for her in 2020, however she did not get it because it was not covered by her Medicare.   Patient has office visit scheduled with Dr. Lucrecia Mosher on 7/14.    4/20/2021  most recent Hgb A1c 3/17/21 8.9%. Patient reports that she has made an effort to improve her diet since the beginning of the year and has decreased her intake of starchy foods. Patient reports that current level of physical activity includes walking around inside of her house; barrier to increased physical activity is chronic pain in her right knee and back. Patient reports plan to increase physical activity with the warmer weather by walking in her pool once she gets it open. 3/5/2020  - attended OV with Dr. Lucrecia Mosher 2/28/2020; Hgb A1c 9.9%, compare to previous result of 10.1%. Pt reported to Dr. Lucrecia Mosher that she had not taken metformin since her previous OV (11/25/19) due to continued GI distress. Pt was advised to increase her physical activity to tolerance and was asked to check blood sugars at 11:00PM for 1 week and then contact Dr. Lucrecia Mosher to report readings; see [facility] note for details regarding OV encounter.   - Patient is not receptive to AC follow-up today due to acute illness; ACM outreach in one week. 2/12/2020  - reports fasting blood glucose this morning of 120 mg/dL, after lunch 190 mg/dL. Unable to review additional recent blood glucose readings as results are stored on glucometer and patient is unsure how to recall historical results; pt will take her glucometer to scheduled OV with Dr. Lucrecia Mosher . - reports taking mealtime insulin prior to meals; states, \"Sometimes I forget and I take it right after I eat, but I'm trying to get use to it and take it before I eat. \"  - still taking humalog 24 units before breakfast (if blood glucose is <150 mg/dL she takes 22 units), 24 units before lunch, 36 units before dinner and Toujeo 60 units nightly; pt did not contact Dr. Terrie Calvin office after last ACM outreach to confirm insulin orders, however reports that she reviewed her most recent office visit AVS and confirmed that the unit doses she is currently taking are correct. - continued lack of adherence with TID blood glucose monitoring; inquired about patient barriers to adherence - states, \"I am not sure I am going to be able to do that because when I stick it it hurts too bad and I've been doing this for 15 years and my fingers have had it. \" Informed patient about 14-day blood glucose monitoring system, [employer], and encouraged patient to discuss this option with Dr. Ernesto Izaguirre at her upcoming office visit to increase patient adherence with blood glucose monitoring.  -  Inquired as to whether patient is following a diabetic diet; pt states, \"Kind of, yeah; most of the time. \" Diet is described as \"about the same; nothing more, nothing less. \" Patient is not interested in keeping a food diary to review with this NN. Patient is not interested in additional education/review of diabetic diet; notes that she previously met with CDE NN, Ephraim Guaman.  - no longer participating in water aerobics; currently not engaging in moderate exercise. She reports that she walks around her house for exercise; states, \"I just get up and start walking from the side door around to the front door and do it a couple of times. \" Patient reported barrier to exercise/increasing physical activity is back spasms which prevent her from \"doing a lot of moving or standing unless I am in the water. \" Reports that barrier to attending water aerobics at present is transportation and winter season.   - will attend scheduled OV with Dr. Erensto Izaguirre 2/28/2020  - pt requests next ACM follow-up in three weeks after she attends scheduled OV with Dr. Ernesto Izaguirre    1/10/20  - Hgb A1c 11/25/19 - 10.1%  - pt notes history of Type 2 DM for 15 years  - checking blood glucose 1-2x/day  - ordered insulin regimen/frequency is Humalog TID (before breakfast, lunch and dinner) and Toujeo nightly  - reports taking humalog 24 units before breakfast (if blood glucose is <150 mg/dL she takes 22 units), 24 units before lunch, 36 units before dinner and Toujeo 60 units nightly. Most recent endocrinology OV note reviewed; per OV note, most recent Humalog order (11/25/19) - 22 units for breakfast and lunch and 36 units for dinner and Toujeo 60 units nightly (12/16/19), pt was noted to be taking mealtime insulin 2-3 hours after meal .Further clarification is needed regarding what current insulin unit dose orders are; patient is advised to contact Dr. Akash Franklin to confirm/clarify current insulin unit dose orders. - per last endocrinology OV note, pt was encouraged to improve diet and physical activity to tolerance  - encouraged to take mealtime insulin prior to eating meal  - encouraged to check blood glucose TID as ordered and keep blood glucose log      10/29/19  8/14/19-10.9%  4/22/19 - Hgb A1c 10.1%  - Current level of understanding: checks blood sugar once daily before breakfast. Completed Disease Specific CM Program for Diabetes Self-Management (w/ Brenda Eller, RN NN CDE) on 8/27/19. Does not keep written blood glucose log; reports that glucometer stores historical blood glucose results. Pt reported fasting blood glucose on 10/28 of 169 mg/dL and on 10/29 of 93 mg/dL. Pt notes difficulty adhering to diabetic diet - enjoys pasta, potatoes, chips - states, \"my problem is food. I'm not that strong on sweets. \"; pt reported barrier to diabetic diet adherence is financial. Does not drink regular soda; drinks diet coke (no more than 2/day) and water. Currently exercising two times/week for 60-90 minutes- water aerobics. - Desired Outcome: Improve diet and exercise, decrease carbohydrate intake. Lower Hgb A1c.  - Plan: Further assess financial barrier(s). Will continue provide and reinforce pt education re: diabetic diet, assist with meal planning. Pt could benefit from keeping a food diary; will discuss further during subsequent outreach encounter as pt is unable to continue call at this time due to getting ready to leave her house. Future Appointments:  Future Appointments   Date Time Provider Ovidio Orri   7/13/2021  8:30  [address] DEXA 1 [facility] RLDEXA LABURNUM IM   7/14/2021 11:10 AM Lynette Chan MD RDE  BS AMB   8/16/2021  2:00 PM Mamadou Tatum MD [facility] BS AMB   12/10/2021 10:30 AM Lesly Osuna MD [facility] BS AMB   2/1/2022  9:40 AM Aashish Tidwell MD [facility] BS AMB

## 2021-07-13 ENCOUNTER — HOSPITAL ENCOUNTER (OUTPATIENT)
Dept: BONE DENSITY | Age: 71
Discharge: HOME | End: 2021-07-13
Attending: INTERNAL MEDICINE
Payer: MEDICARE

## 2021-07-13 DIAGNOSIS — Z78.0 MENOPAUSE: ICD-10-CM

## 2021-07-13 PROCEDURE — 77080 DXA BONE DENSITY AXIAL: CPT

## 2021-07-13 NOTE — PROGRESS NOTES
Tell pt she still has osteopenia (low bone mass) of hip and spine but low 10 year fracture risk. Take otc calcium 500mg bid and MVI every day and weight bearing exercise. Her bone mass is lower than 2018 so need to repeat DEXA again in 2 years.

## 2021-07-14 ENCOUNTER — OFFICE VISIT (OUTPATIENT)
Dept: ENDOCRINOLOGY | Age: 71
End: 2021-07-14
Payer: MEDICARE

## 2021-07-14 VITALS
SYSTOLIC BLOOD PRESSURE: 151 MMHG | DIASTOLIC BLOOD PRESSURE: 59 MMHG | WEIGHT: 232 LBS | HEIGHT: 63 IN | BODY MASS INDEX: 41.11 KG/M2 | HEART RATE: 61 BPM

## 2021-07-14 DIAGNOSIS — E11.8 TYPE 2 DIABETES MELLITUS WITH COMPLICATION, WITH LONG-TERM CURRENT USE OF INSULIN (HCC): Primary | ICD-10-CM

## 2021-07-14 DIAGNOSIS — Z79.4 TYPE 2 DIABETES MELLITUS WITH COMPLICATION, WITH LONG-TERM CURRENT USE OF INSULIN (HCC): Primary | ICD-10-CM

## 2021-07-14 DIAGNOSIS — E66.01 OBESITY, MORBID (HCC): ICD-10-CM

## 2021-07-14 LAB — HBA1C MFR BLD HPLC: 9.2 %

## 2021-07-14 PROCEDURE — G8427 DOCREV CUR MEDS BY ELIG CLIN: HCPCS | Performed by: INTERNAL MEDICINE

## 2021-07-14 PROCEDURE — G8753 SYS BP > OR = 140: HCPCS | Performed by: INTERNAL MEDICINE

## 2021-07-14 PROCEDURE — 99214 OFFICE O/P EST MOD 30 MIN: CPT | Performed by: INTERNAL MEDICINE

## 2021-07-14 PROCEDURE — 3046F HEMOGLOBIN A1C LEVEL >9.0%: CPT | Performed by: INTERNAL MEDICINE

## 2021-07-14 PROCEDURE — G8417 CALC BMI ABV UP PARAM F/U: HCPCS | Performed by: INTERNAL MEDICINE

## 2021-07-14 PROCEDURE — 3017F COLORECTAL CA SCREEN DOC REV: CPT | Performed by: INTERNAL MEDICINE

## 2021-07-14 PROCEDURE — G8754 DIAS BP LESS 90: HCPCS | Performed by: INTERNAL MEDICINE

## 2021-07-14 PROCEDURE — G8432 DEP SCR NOT DOC, RNG: HCPCS | Performed by: INTERNAL MEDICINE

## 2021-07-14 PROCEDURE — 1090F PRES/ABSN URINE INCON ASSESS: CPT | Performed by: INTERNAL MEDICINE

## 2021-07-14 PROCEDURE — 83036 HEMOGLOBIN GLYCOSYLATED A1C: CPT | Performed by: INTERNAL MEDICINE

## 2021-07-14 PROCEDURE — G8399 PT W/DXA RESULTS DOCUMENT: HCPCS | Performed by: INTERNAL MEDICINE

## 2021-07-14 PROCEDURE — 2022F DILAT RTA XM EVC RTNOPTHY: CPT | Performed by: INTERNAL MEDICINE

## 2021-07-14 PROCEDURE — G8536 NO DOC ELDER MAL SCRN: HCPCS | Performed by: INTERNAL MEDICINE

## 2021-07-14 PROCEDURE — 1101F PT FALLS ASSESS-DOCD LE1/YR: CPT | Performed by: INTERNAL MEDICINE

## 2021-07-14 PROCEDURE — G9899 SCRN MAM PERF RSLTS DOC: HCPCS | Performed by: INTERNAL MEDICINE

## 2021-07-14 NOTE — PROGRESS NOTES
Ms. Ricco Aocsta returns for follow-up of her type 2 diabetes mellitus x 15 years with poor blood sugar control.    Her A1c was 9.7% in September. Her most recent A1c is 8.9% in March 2021 which is the lowest we have ever had.,  Her A1c today is back up to 9.2%. Recent laboratory tests also remarkable for creatinine of 1.41 with a GFR of 44. LFTs normal.     Current medications  Toujeo insulin 60 units at bedtime  Humalog insulin 24 breakfast (20 for lunch if she eats lunch) and 42 for dinner     She tells me today that her blood sugars in the morning are ranging from . . Burke Dancer blood sugars 2 to 3 hours after dinner are ranging from 180-240. Breakfast is juan and eggs but no toast.  She had lunch yesterday with a barbecue sandwich and slaw. It is important to point out that she usually does not have lunch and so she does not take insulin at that time. Other hand, if she does not have lunch, she frequently has peanut butter crackers and a Diet Coke and again takes no insulin for that. For dinner she had chitlins potato salad and green beans.  Other times she has a breakfast meal for dinner. Her blood sugar this morning was 122. She is not snacking at bedtime. She occasionally has a piece of fruit after breakfast.  Her physical activity is primarily rehab/PT for back pain. Examination  Blood pressure 151/59  Pulse 80  Weight 232  BMI 41.1  HEENT unremarkable  Lungs clear  Heart reveals a regular rate and rhythm  Abdomen obese  Extremities remarkable for 2-3+ pitting edema below the knees bilaterally  Diabetic foot exam:     Left Foot:   Visual Exam: normal    Pulse DP: 2+ (normal)   Filament test: reduced sensation    Vibratory sensation: diminished      Right Foot:   Visual Exam: normal    Pulse DP: 1+ (weak)   Filament test: reduced sensation    Vibratory sensation: diminished     Pression  1. Type 2 diabetes mellitus with an A1c of 9.2%  2. Morbid obesity  3.   Chronic kidney disease stage III a    Plan:  1. I have strongly encouraged her to decrease her carbohydrate intake and continue her insulin regimen  2.   I will see her back in 3 to 4 months.

## 2021-07-14 NOTE — PROGRESS NOTES
Pt called (verified name and date of birth) and pt was notified that their    Tell pt she still has osteopenia (low bone mass) of hip and spine but low 10 year fracture risk. Take otc calcium 500mg bid and MVI every day and weight bearing exercise. Her bone mass is lower than 2018 so need to repeat DEXA again in 2 years. Pt asked for it to be sent as a [employer] message as well, and notified that it would be. Pt acknowledged and call was ended.

## 2021-08-02 RX ORDER — ROSUVASTATIN CALCIUM 20 MG/1
20 TABLET, COATED ORAL DAILY
Qty: 90 TABLET | Refills: 3 | Status: SHIPPED | OUTPATIENT
Start: 2021-08-02 | End: 2022-07-22

## 2021-08-16 DIAGNOSIS — E11.8 TYPE 2 DIABETES MELLITUS WITH COMPLICATION, WITH LONG-TERM CURRENT USE OF INSULIN (HCC): ICD-10-CM

## 2021-08-16 DIAGNOSIS — Z79.4 TYPE 2 DIABETES MELLITUS WITH COMPLICATION, WITH LONG-TERM CURRENT USE OF INSULIN (HCC): ICD-10-CM

## 2021-08-17 ENCOUNTER — PATIENT OUTREACH (OUTPATIENT)
Dept: CASE MANAGEMENT | Age: 71
End: 2021-08-17

## 2021-08-17 RX ORDER — GABAPENTIN 100 MG/1
CAPSULE ORAL
Qty: 90 CAPSULE | Refills: 3 | Status: SHIPPED | OUTPATIENT
Start: 2021-08-17 | End: 2022-01-25

## 2021-08-17 NOTE — PROGRESS NOTES
2021  1:50 PM    Patient outreach by this AC today to perform CCM follow-up; two patient identifiers verified. Patient just returned home from a . Lukasz Blas will perform patient outreach later this afternoon.

## 2021-08-18 NOTE — PROGRESS NOTES
8/18/2021  4:20 PM    Goals Addressed                 This Visit's Progress       Chronic Disease     COMPLETED: Advance Care Planning        8/18/2021  Patient reviewed ACP information, however she is not interested in completing an Advance Medical Directive at this time; reports that her children are aware of her wishes. Goal resolved at this time as patient does not want to complete an Advance Medical Directive. 7/8/2021  Patient reports that she discussed this with PCP at most recent office visit on 6/4/21 and was provided with another blank Advance Medical Directive. Patient is encouraged to provided PCP office with a copy, if completed. 4/20/2021  Patient has not completed Advance Medical Directive yet. Patient remains interested in completing Advance Medical Directive and reports that she still has the information that was mailed to her last year by this AC. Patient will locate and review ACP literature that was previously mailed to her by this ACM. 2/12/2020  - received ACP information that ACM mailed to her; does not have any questions/concerns at this time  - patient plans to complete Advance Medical Directive and bring with her to next scheduled PCP appointment so that a copy can be made for her chart    1/10/20  - pt requests that ACP information be re-mailed to her; information mailed to patient today, per pt request    10/29/19  - blank AMD and 'right to decide' literature was mailed to patient by this NN on 10/16/19 ; pt confirms that she received this in the mail, however she has not yet reviewed it  - Plan: patient will review 'right to decide' literature and blank AMD document this week; NN will f/u with patient next week to address any questions/concerns. NN requested that, if completed, patient provide a copy of AMD to PCP office.  COMPLETED: Knowledge and adherence of prescribed medication (ie. action, side effects, missed dose, etc.).    On track     8/18/2021  Patient reports taking all medications as prescribed on all days. Patient is able to state how to correctly refill her medications. Patient denies any questions/concerns at this time regarding her medications. Goal Completed. 7/8/2021  Patient reports that she is currently taking the generic for Crestor (rosuvastatin); reports that she never stopped taking this medication-states that during previous med rec with ACM she was looking for brand name Crestor in her medication bottles instead of generic name rosuvastatin which is why she thought that she was not taking Crestor (rosuvastatin). Patient reports taking all medications as ordered on all days; denies any missed doses since previous ACM follow-up encounter. 4/20/2021  not currently taking Crestor; listed as a current medication. Most recent Rx was written on 7/30/2020 by Cardiology for #90 with 3 refills. Patient is unsure as to why she is not currently taking this medication. Patient will contact Cardiology to confirm that Crestor is still a current medication and request a medication refill, if needed. AC will route patient reminder via [employer], per patient request.    3/5/2020  - pt informed Dr. Constance Franco at 2/28/2020 OV that she had not taken metformin since her previous OV (11/25/19) due to continued GI distress    2/12/2020  - pt did not contact Dr. Praful Forrest office to confirm current insulin orders; she reports that she reviewed her most recent OV AVS which advised that current orders are humalog 24 units before breakfast (if blood glucose is <150 mg/dL she takes 22 units), 24 units before lunch, 36 units before dinner and Toujeo 60 units nightly  - attempted to complete medication reconciliation with patient today, however patient is unreceptive to this; states, \"all of my medications are the same. \"    1/10/20  - reports taking humalog 24 units before breakfast (if blood glucose is <150 mg/dL she takes 22 units), 24 units before lunch, 36 units before dinner and Toujeo 60 units nightly. Most recent endocrinology OV note reviewed; most recent Humalog order (11/25/19) - 22 units for breakfast and lunch and 36 units for dinner and Toujeo 60 units nightly (12/16/19), pt was noted to be taking mealtime insulin 2-3 hours after meal . Advised patient outreach to Dr. Sheryl De Jesus to confirm/clarify current insulin unit dose orders. 10/29/19  - patient awaiting outreach from Ambulatory PharmD  - started taking Gabapentin 10/16; reports numbness/tingling in feet is improved since starting Gabapentin. Denies side effects denies worsened lower extremity edema from baseline, fatigue, dizziness, nausea, vomiting    10/16/19  - not taking Gabapentin and reports that she never began taking Gabapentin when it was prescribed (ordered by Dr. Sheryl De Jesus 3/21/19 for neuropathic symptoms); states, \"I looked it up and got scared of it. \" Patient reports continued neuropathic symptoms in bilateral feet. Will refer to Ambulatory PharmD to further address patient questions/concerns. - taking hydralazine 25 mg daily (ordered by Dr. Alon Christian 6/21/19 for Hydralazine 25 mg tablet TID\"; reports only taking once daily \"because I take another fluid pill\"   - has been taking Humalog 36 units with dinner (most recent OV note by Dr. Sheryl De Jesus reviewed and ordered unit dose with dinner is 35 units); NN informed patient today and patient verbalizes understanding   - Reports adherence with losartan, however has questions; \"is that the one I've been seeing on [facility] [facility].\"  - will refer patient to Ambulatory PharmD for medication management, address multiple patient questions/concerns re: current medications; pt is agreeable to this plan         Diabetes     Patient verbalizes understanding of self -management goals of living with Diabetes. On track     8/18/2021  She has not resumed water aerobics class; she still plans on returning to this class, however her schedule has been busy.   Checking her blood glucose once daily. She is still interested in trying to get the [facility]; notes that it was previously not covered by Medicare, however she has recently heard that it is now covered. Pt reported fasting blood glucose this morning was 140 mg/dL. Attended office visit with Dr. Rohini Myers on 7/14/21; hemoglobin A1c 7/14/21 9.2%  Next scheduled follow-up with Dr. Rohini Myers is 10/15/21. Encouraged patient to increase blood glucose monitoring to twice daily; order per Dr. Rohini Myers is for three times daily. ACM will consult with Ambulatory PharmD regarding Freestyle Jack and Medicare coverage; pt could benefit from the [place] from an adherence standpoint with blood glucose monitoring. 7/8/2021  Plans to resume water aerobics next week. She contacted the [facility] and was notified that water aerobics class has resumed; plans to go to local [facility] on 7/13 to sign up and pay fee. Patient states, \"I'm definitely looking forward to that. \"  Checking her blood glucose twice daily; states, \"I haven't had anything over 160 (mg/dL). \" She is interested in getting the [facility] 14-day system and will discuss this with Dr. Rohini Myers during her upcoming visit; pt notes that when this was previously ordered for her in 2020, however she did not get it because it was not covered by her Medicare. Patient has office visit scheduled with Dr. Rohini Myers on 7/14.    4/20/2021  most recent Hgb A1c 3/17/21 8.9%. Patient reports that she has made an effort to improve her diet since the beginning of the year and has decreased her intake of starchy foods. Patient reports that current level of physical activity includes walking around inside of her house; barrier to increased physical activity is chronic pain in her right knee and back. Patient reports plan to increase physical activity with the warmer weather by walking in her pool once she gets it open. 3/5/2020  - attended OV with Dr. Rohini Myers 2/28/2020; Hgb A1c 9.9%, compare to previous result of 10.1%.  Pt reported to Dr. Mayela Mcgraw that she had not taken metformin since her previous OV (11/25/19) due to continued GI distress. Pt was advised to increase her physical activity to tolerance and was asked to check blood sugars at 11:00PM for 1 week and then contact Dr. Mayela Mcgraw to report readings; see [facility] note for details regarding OV encounter.   - Patient is not receptive to ACM follow-up today due to acute illness; ACM outreach in one week. 2/12/2020  - reports fasting blood glucose this morning of 120 mg/dL, after lunch 190 mg/dL. Unable to review additional recent blood glucose readings as results are stored on glucometer and patient is unsure how to recall historical results; pt will take her glucometer to scheduled OV with Dr. Mayela Mcgraw . - reports taking mealtime insulin prior to meals; states, \"Sometimes I forget and I take it right after I eat, but I'm trying to get use to it and take it before I eat. \"  - still taking humalog 24 units before breakfast (if blood glucose is <150 mg/dL she takes 22 units), 24 units before lunch, 36 units before dinner and Toujeo 60 units nightly; pt did not contact Dr. Massiel Russell office after last ACM outreach to confirm insulin orders, however reports that she reviewed her most recent office visit AVS and confirmed that the unit doses she is currently taking are correct. - continued lack of adherence with TID blood glucose monitoring; inquired about patient barriers to adherence - states, \"I am not sure I am going to be able to do that because when I stick it it hurts too bad and I've been doing this for 15 years and my fingers have had it. \" Informed patient about 14-day blood glucose monitoring system, Freestyle Jack, and encouraged patient to discuss this option with Dr. Mayela Mcgraw at her upcoming office visit to increase patient adherence with blood glucose monitoring.  -  Inquired as to whether patient is following a diabetic diet; pt states, \"Kind of, yeah; most of the time. \" Diet is described as \"about the same; nothing more, nothing less. \" Patient is not interested in keeping a food diary to review with this NN. Patient is not interested in additional education/review of diabetic diet; notes that she previously met with CDE NN, Aneta Parson.  - no longer participating in water aerobics; currently not engaging in moderate exercise. She reports that she walks around her house for exercise; states, \"I just get up and start walking from the side door around to the front door and do it a couple of times. \" Patient reported barrier to exercise/increasing physical activity is back spasms which prevent her from \"doing a lot of moving or standing unless I am in the water. \" Reports that barrier to attending water aerobics at present is transportation and winter season. - will attend scheduled OV with Dr. Mirella Maldonado 2/28/2020  - pt requests next ACM follow-up in three weeks after she attends scheduled OV with Dr. Mirella Maldonado    1/10/20  - Hgb A1c 11/25/19 - 10.1%  - pt notes history of Type 2 DM for 15 years  - checking blood glucose 1-2x/day  - ordered insulin regimen/frequency is Humalog TID (before breakfast, lunch and dinner) and Toujeo nightly  - reports taking humalog 24 units before breakfast (if blood glucose is <150 mg/dL she takes 22 units), 24 units before lunch, 36 units before dinner and Toujeo 60 units nightly. Most recent endocrinology OV note reviewed; per OV note, most recent Humalog order (11/25/19) - 22 units for breakfast and lunch and 36 units for dinner and Toujeo 60 units nightly (12/16/19), pt was noted to be taking mealtime insulin 2-3 hours after meal .Further clarification is needed regarding what current insulin unit dose orders are; patient is advised to contact Dr. Mirella Maldonado to confirm/clarify current insulin unit dose orders.    - per last endocrinology OV note, pt was encouraged to improve diet and physical activity to tolerance  - encouraged to take mealtime insulin prior to eating meal  - encouraged to check blood glucose TID as ordered and keep blood glucose log      10/29/19  8/14/19-10.9%  4/22/19 - Hgb A1c 10.1%  - Current level of understanding: checks blood sugar once daily before breakfast. Completed Disease Specific CM Program for Diabetes Self-Management (avani/ Nicole Decker, RN NN CDE) on 8/27/19. Does not keep written blood glucose log; reports that glucometer stores historical blood glucose results. Pt reported fasting blood glucose on 10/28 of 169 mg/dL and on 10/29 of 93 mg/dL. Pt notes difficulty adhering to diabetic diet - enjoys pasta, potatoes, chips - states, \"my problem is food. I'm not that strong on sweets. \"; pt reported barrier to diabetic diet adherence is financial. Does not drink regular soda; drinks diet coke (no more than 2/day) and water. Currently exercising two times/week for 60-90 minutes- water aerobics. - Desired Outcome: Improve diet and exercise, decrease carbohydrate intake. Lower Hgb A1c.  - Plan: Further assess financial barrier(s). Will continue provide and reinforce pt education re: diabetic diet, assist with meal planning. Pt could benefit from keeping a food diary; will discuss further during subsequent outreach encounter as pt is unable to continue call at this time due to getting ready to leave her house.             Future Appointments:  Future Appointments   Date Time Provider Ovidio Allan   10/6/2021  3:00 PM Destiney Qureshi MD [facility] BS AMB   10/15/2021  2:30 PM Akash Kaur MD RDE  BS AMB   12/10/2021 10:30 AM Jessa Wilhelm MD [facility] BS AMB   2/1/2022  9:40 AM Perri Ragsdale MD [facility] BS AMB

## 2021-09-17 RX ORDER — HYDRALAZINE HYDROCHLORIDE 25 MG/1
25 TABLET, FILM COATED ORAL 3 TIMES DAILY
Qty: 180 TABLET | Refills: 3 | Status: SHIPPED | OUTPATIENT
Start: 2021-09-17 | End: 2021-10-06

## 2021-09-17 RX ORDER — TRIAMTERENE AND HYDROCHLOROTHIAZIDE 37.5; 25 MG/1; MG/1
1 CAPSULE ORAL DAILY
Qty: 180 CAPSULE | Refills: 3 | Status: SHIPPED | OUTPATIENT
Start: 2021-09-17 | End: 2022-10-31

## 2021-09-17 NOTE — TELEPHONE ENCOUNTER
----- Message from Clemencia Tolentino sent at 9/17/2021 10:59 AM EDT -----  Regarding: Prescription Question  Contact: [phone]  [facility]  [facility]  triamterene/ hctz   and hydraiazine

## 2021-10-01 ENCOUNTER — PATIENT OUTREACH (OUTPATIENT)
Dept: CASE MANAGEMENT | Age: 71
End: 2021-10-01

## 2021-10-01 NOTE — PROGRESS NOTES
10/1/2021  2:15 PM    Patient was previously taking metoprolol tartrate 50 mg tablet twice daily, however she reports that she stopped taking this medication approximately three weeks ago; she reports that she discussed presence of lower extremity edema with one of her Providers within the past month and was advised that it could possibly be caused by metoprolol, therefore she stopped taking the medication. Patient is unable to recall at this time which Provider she discussed this with. Patient reports that her lower extremity swelling has resolved since she stopped taking metoprolol. She has not been monitoring her blood pressure at home because she has misplaced her personal BP monitor. Patient is advised to contact Dr. Winifred Rain today to notify that she stopped taking metoprolol and reason for stopping this medication. Pt reported fasting blood glucose levels:     21 -   Fastin mg/dL  After Breakfast: 110 mg/dL  Before dinner: 145 mg/dL    21  Fastin mg/dL  After Dinner: 201 mg/dL    21  Fastin mg/dL  After Dinner: 186 mg/dL    21  Fastin mg/dL  After Dinner: 280 mg/dL    21  Fastin mg/dL  After Dinner: 211 mg/dL    21  Fastin mg/dL  After Dinner: 209 mg/dL    21  Fastin mg/dL  After Dinner: 275 mg/dL    9/15/21  Fastin mg/dL  After Dinner: 199 mg/dL    21  Fastin mg/dL   After Dinner: 255 mg/dL    21  Fastin mg/dL  After Dinner: 303 mg/dL    21  Fastin mg/dL  After Dinner:  219 mg/dL      Goals Addressed                 This Visit's Progress       Diabetes     Patient verbalizes understanding of self -management goals of living with Diabetes.    On track     10/1/2021   She was checking her blood glucose twice daily and maintaining her blood glucose log up until last week; she checked her blood glucose once daily last week because her fingers were sore and she has not written her blood sugars down since last week. Patient states, \"But I know I should (check her blood glucose twice daily) because I really don't feel any different when it's (blood sugar) up or down. \"    \"I've been having some very low sugar in the morning. \" Pt reported fasting blood glucose this morning was 93 mg/dL; reports that her lowest fasting blood glucose level since previous ACM outreach encounter was on 9/8/21 and it was 52 mg/dL. Patient reports associated symptoms of sweating and weakness when her blood glucose is low, \"but not to the point where I felt like I was going to pass out. \" ACM reviewed treatment of hypoglycemia with patient today; pt verbalizes understanding.  Patient was able to review her blood glucose log and visualize the effect that certain foods had on her blood sugar; states, \"When it (blood sugar) went up at night I had either rice, potato or bread. \"   She is still interested in the [facility], if eligible; she could benefit from the [facility] to assist with treatment plan adherence for blood glucose monitoring. Barrier to patient adherence with blood glucose monitoring at present is that her fingertips get sore with checking her blood glucose 2-3 times per day. She has not received outreach from Ambulatory PharmD regarding this; referral was previously sent by this ACM on 8/18/21. Patient will discuss this with Dr. Delores Amador at her scheduled appointment on 10/15/21.  Patient will resume checking her blood glucose twice daily and maintain her blood glucose log.  Patient will attend previously scheduled appointment with Dr. Delores Amador on 10/15/21; she will take her blood glucose log to this appointment.  Encounter routed to Dr. Delores Amador today for notification of patient reported most recent blood glucose results.  Plan for next AC outreach in approximately two weeks.     8/18/2021  She has not resumed water aerobics class; she still plans on returning to this class, however her schedule has been busy.  Checking her blood glucose once daily. She is still interested in trying to get the [facility]; notes that it was previously not covered by Medicare, however she has recently heard that it is now covered. Pt reported fasting blood glucose this morning was 140 mg/dL. Attended office visit with Dr. Mattie Bearden on 7/14/21; hemoglobin A1c 7/14/21 9.2%  Next scheduled follow-up with Dr. Mattie Bearden is 10/15/21. Encouraged patient to increase blood glucose monitoring to twice daily; order per Dr. Mattie Bearden is for three times daily. ACM will consult with Ambulatory PharmD regarding Freestyle Jack and Medicare coverage; pt could benefit from the [place] from an adherence standpoint with blood glucose monitoring. 7/8/2021  Plans to resume water aerobics next week. [facility] and was notified that water aerobics class has resumed; plans to go to local [facility] on 7/13 to sign up and pay fee. Patient states, \"I'm definitely looking forward to that. \"  Checking her blood glucose twice daily; states, \"I haven't had anything over 160 (mg/dL). \" She is interested in getting the [facility] 14-day system and will discuss this with Dr. Mattie Bearden during her upcoming visit; pt notes that when this was previously ordered for her in 2020, however she did not get it because it was not covered by her Medicare. Patient has office visit scheduled with Dr. Mattie Bearden on 7/14.    4/20/2021  most recent Hgb A1c 3/17/21 8.9%. Patient reports that she has made an effort to improve her diet since the beginning of the year and has decreased her intake of starchy foods. Patient reports that current level of physical activity includes walking around inside of her house; barrier to increased physical activity is chronic pain in her right knee and back. Patient reports plan to increase physical activity with the warmer weather by walking in her pool once she gets it open.     3/5/2020  - attended OV with Dr. Mattie Bearden 2/28/2020; Hgb A1c 9.9%, compare to previous result of 10.1%. Pt reported to Dr. Darya Hurst that she had not taken metformin since her previous OV (11/25/19) due to continued GI distress. Pt was advised to increase her physical activity to tolerance and was asked to check blood sugars at 11:00PM for 1 week and then contact Dr. Darya Hurst to report readings; see [address] note for details regarding OV encounter.   - Patient is not receptive to ACM follow-up today due to acute illness; ACM outreach in one week. 2/12/2020  - reports fasting blood glucose this morning of 120 mg/dL, after lunch 190 mg/dL. Unable to review additional recent blood glucose readings as results are stored on glucometer and patient is unsure how to recall historical results; pt will take her glucometer to scheduled OV with Dr. Darya Hurst . - reports taking mealtime insulin prior to meals; states, \"Sometimes I forget and I take it right after I eat, but I'm trying to get use to it and take it before I eat. \"  - still taking humalog 24 units before breakfast (if blood glucose is <150 mg/dL she takes 22 units), 24 units before lunch, 36 units before dinner and Toujeo 60 units nightly; pt did not contact Dr. Duffy Breath office after last ACM outreach to confirm insulin orders, however reports that she reviewed her most recent office visit AVS and confirmed that the unit doses she is currently taking are correct. - continued lack of adherence with TID blood glucose monitoring; inquired about patient barriers to adherence - states, \"I am not sure I am going to be able to do that because when I stick it it hurts too bad and I've been doing this for 15 years and my fingers have had it. \" Informed patient about 14-day blood glucose monitoring system, Freestyle Jack, and encouraged patient to discuss this option with Dr. Darya Hurst at her upcoming office visit to increase patient adherence with blood glucose monitoring.  -  Inquired as to whether patient is following a diabetic diet; pt states, \"Kind of, yeah; most of the time. \" Diet is described as \"about the same; nothing more, nothing less. \" Patient is not interested in keeping a food diary to review with this NN. Patient is not interested in additional education/review of diabetic diet; notes that she previously met with CDE NN, Abby Harrison.  - no longer participating in water aerobics; currently not engaging in moderate exercise. She reports that she walks around her house for exercise; states, \"I just get up and start walking from the side door around to the front door and do it a couple of times. \" Patient reported barrier to exercise/increasing physical activity is back spasms which prevent her from \"doing a lot of moving or standing unless I am in the water. \" Reports that barrier to attending water aerobics at present is transportation and winter season. - will attend scheduled OV with Dr. Abbe Thompson 2/28/2020  - pt requests next ACM follow-up in three weeks after she attends scheduled OV with Dr. Abbe Thompson    1/10/20  - Hgb A1c 11/25/19 - 10.1%  - pt notes history of Type 2 DM for 15 years  - checking blood glucose 1-2x/day  - ordered insulin regimen/frequency is Humalog TID (before breakfast, lunch and dinner) and Toujeo nightly  - reports taking humalog 24 units before breakfast (if blood glucose is <150 mg/dL she takes 22 units), 24 units before lunch, 36 units before dinner and Toujeo 60 units nightly. Most recent endocrinology OV note reviewed; per OV note, most recent Humalog order (11/25/19) - 22 units for breakfast and lunch and 36 units for dinner and Toujeo 60 units nightly (12/16/19), pt was noted to be taking mealtime insulin 2-3 hours after meal .Further clarification is needed regarding what current insulin unit dose orders are; patient is advised to contact Dr. Abbe Thompson to confirm/clarify current insulin unit dose orders.    - per last endocrinology OV note, pt was encouraged to improve diet and physical activity to tolerance  - encouraged to take mealtime insulin prior to eating meal  - encouraged to check blood glucose TID as ordered and keep blood glucose log      10/29/19  8/14/19-10.9%  4/22/19 - Hgb A1c 10.1%  - Current level of understanding: checks blood sugar once daily before breakfast. Completed Disease Specific CM Program for Diabetes Self-Management (w/ Portia Torres, RN NN CDE) on 8/27/19. Does not keep written blood glucose log; reports that glucometer stores historical blood glucose results. Pt reported fasting blood glucose on 10/28 of 169 mg/dL and on 10/29 of 93 mg/dL. Pt notes difficulty adhering to diabetic diet - enjoys pasta, potatoes, chips - states, \"my problem is food. I'm not that strong on sweets. \"; pt reported barrier to diabetic diet adherence is financial. Does not drink regular soda; drinks diet coke (no more than 2/day) and water. Currently exercising two times/week for 60-90 minutes- water aerobics. - Desired Outcome: Improve diet and exercise, decrease carbohydrate intake. Lower Hgb A1c.  - Plan: Further assess financial barrier(s). Will continue provide and reinforce pt education re: diabetic diet, assist with meal planning. Pt could benefit from keeping a food diary; will discuss further during subsequent outreach encounter as pt is unable to continue call at this time due to getting ready to leave her house.                   Future Appointments:  Future Appointments   Date Time Provider Ovidio Allan   10/6/2021 10:00 AM Tomi Quintanilla., NP [facility] BS AMB   10/15/2021  2:30 PM Jaimee James MD RDE  BS AMB   12/10/2021 10:30 AM Juan Manuel Valdes MD [facility] BS AMB   2/1/2022  9:40 AM Pat Manning MD [facility] 39 BS AMB

## 2021-10-04 NOTE — PROGRESS NOTES
[address] [place] [address]  [phone]     Subjective:      Cameron Salcedo is a 78 y.o. female is here for routine f/u. She has a PMHx of CAD, DM2, HTN, HLD, RODRÍGUEZ, obesity, and asthma/COPD. Last OV 7/2020.   She is c/o bilateral ankle swelling. She tries to elevate her legs and wear compression stockings. Received both of her covid vaccine. The patient denies chest pain/ shortness of breath, orthopnea, PND,  palpitations, syncope, or presyncope.        Patient Active Problem List    Diagnosis Date Noted    Type 2 diabetes mellitus with diabetic neuropathy ([facility] 04/29/2019    Type 2 diabetes with nephropathy ([address] 12/14/2018    History of CVA (cerebrovascular accident) 07/13/2018    Obesity, morbid ([address] 01/25/2018    Warthin's tumor 07/01/2016    HTN (hypertension) 09/13/2013    Axillary hidradenitis suppurativa 08/07/2013    Esophageal reflux 01/15/2013    Renal failure, acute ([address] 01/13/2013    Asthma 12/14/2012    Myocardial ischemia 06/06/2012    Shortness of breath 06/06/2012    Coronary artery disease 06/06/2012    PVC's (premature ventricular contractions) 06/01/2012    DM type 2 (diabetes mellitus, type 2) ([facility] 04/23/2012    Grave's disease 10/15/2010    DJD (degenerative joint disease) of hip 10/20/2009    DJD (degenerative joint disease) of knee 10/20/2009    CTS (Carpal Tunnel Syndrome)-b/l 10/20/2009    CVA (cerebral infarction) 10/20/2009    Diverticulosis 10/20/2009    Osteopenia 10/20/2009      Gaye Patiño MD  Past Medical History:   Diagnosis Date    Asthma     CAD (coronary artery disease)     \"mild\" per Dr Robbie Arnett note    Diabetes [facility])     Dr Otis Proctor     Hyperlipidemia     Hypertension     Pulmonary embolism ([facility]     Screening for colon cancer 2/16/05    Dr Jason Esparza in 10 years    Stroke [facility]) 2004    Rt side weaker than left, uses a cane: no longer followed by neuro    [facility] Rt leg moved to lung     Thyroid disease     Unspecified sleep apnea     uses CPAP      Past Surgical History:   Procedure Laterality Date    CARDIAC CATHETERIZATION  2012         HX HEART CATHETERIZATION      HX HEENT      tonsillectomy    HX HEMORRHOIDECTOMY      HX HYSTERECTOMY      HX ORTHOPAEDIC  2011    left shoulder    [place] CARDIAC SURG PROCEDURE UNLIST      heart surgery     Allergies   Allergen Reactions    Latex Itching    Codeine Itching and Other (comments)     hallucinate    Contrast Dye [Iodine] Rash and Itching     Given pre-cardiac cath    Seafood [Shellfish Containing Products] Swelling      Family History   Problem Relation Age of Onset    Diabetes Mother     Cancer Mother     Breast Cancer Mother 79    Heart Disease Father     Hypertension Father     Stroke Father     Coronary Artery Disease Brother 54      Social History     Socioeconomic History    Marital status:      Spouse name: Not on file    Number of children: 2    Years of education: Not on file    Highest education level: Some college, no degree   Occupational History    Not on file   Tobacco Use    Smoking status: Former Smoker     Packs/day: 1.00     Types: Cigarettes     Quit date: 2004     Years since quittin.0    Smokeless tobacco: Never Used   Substance and Sexual Activity    Alcohol use: No    Drug use: Not Currently     Comment: CBD oil(cream)    Sexual activity: Not Currently   Other Topics Concern    Not on file   Social History Narrative    Lives with Son, grandson and her partner. Patient does not drive; she has not driven since she had CVA. Social Determinants of Health     Financial Resource Strain:     Difficulty of Paying Living Expenses:    Food Insecurity:     Worried About Running Out of Food in the Last Year:     [address] in the Last Year:    Transportation Needs:     Lack of Transportation (Medical):      Lack of Transportation (Non-Medical): Physical Activity:     Days of Exercise per Week:     Minutes of Exercise per Session:    Stress:     Feeling of Stress :    Social Connections:     Frequency of Communication with Friends and Family:     Frequency of Social Gatherings with Friends and Family:     Attends [employer] Services:     Active Member of Clubs or Organizations:     Attends Club or Organization Meetings:     Marital Status:    Intimate Partner Violence:     Fear of Current or Ex-Partner:     Emotionally Abused:     Physically Abused:     Sexually Abused:       Current Outpatient Medications   Medication Sig    hydrALAZINE (APRESOLINE) 50 mg tablet Take 1 Tablet by mouth every eight (8) hours.  triamterene-hydroCHLOROthiazide (DYAZIDE) 37.5-25 mg per capsule Take 1 Capsule by mouth daily.  gabapentin (NEURONTIN) 100 mg capsule TAKE 1 CAPSULE THREE TIMES A DAY    rosuvastatin (CRESTOR) 20 mg tablet Take 1 Tablet by mouth daily.  levothyroxine (SYNTHROID) 137 mcg tablet TAKE 1 TABLET DAILY    albuterol (ProAir HFA) 90 mcg/actuation inhaler USE 1 INHALATION EVERY 4 HOURS AS NEEDED WHEEZING OR SHORTNESS OF BREATH    glucose blood VI test strips (True Metrix Glucose Test Strip) strip Monitor blood sugar 3 times daily    losartan (COZAAR) 100 mg tablet Take 1 Tab by mouth daily.  insulin lispro (HumaLOG KwikPen Insulin) 100 unit/mL kwikpen 20 units for breakfast and lunch and 40 units for dinner (Patient taking differently: 22 units for breakfast and lunch and 42 units for dinner)    insulin glargine U-300 conc (Toujeo SoloStar U-300 Insulin) 300 unit/mL (1.5 mL) inpn pen 60 Units by SubCUTAneous route nightly. Indications: type 2 diabetes mellitus (Patient taking differently: 62 Units by SubCUTAneous route nightly. Indications: type 2 diabetes mellitus)    metoprolol tartrate (LOPRESSOR) 50 mg tablet Take 1 Tab by mouth two (2) times a day.     flash glucose scanning reader (FREESTYLE LEXI 14 DAY READER) [facility] 1 Each by Does Not Apply route daily.  flash glucose sensor (FREESTYLE LEXI 14 DAY SENSOR) kit 1 Each by Does Not Apply route every fourteen (14) days.  glucose blood VI test strips (ONETOUCH ULTRA BLUE TEST STRIP) strip Monitor blood sugar 3 times daily    albuterol (ACCUNEB) 1.25 mg/3 mL nebu 3 mL by Nebulization route every four (4) hours as needed (wheezing).  ADVAIR DISKUS 100-50 mcg/dose diskus inhaler USE 1 INHALATION TWICE A DAY (Patient taking differently: USE 1 INHALATION TWICE A DAY prn)    BD INSULIN PEN NEEDLE UF SHORT 31 gauge x 5/16\" ndle     aspirin delayed-release 81 mg tablet Take 81 mg by mouth daily.  budesonide-formoteroL (SYMBICORT) 80-4.5 mcg/actuation HFAA Take 2 Puffs by inhalation two (2) times a day. No current facility-administered medications for this visit. Review of Symptoms:  11 systems reviewed, negative other than as stated in the HPI    Physical ExamPhysical Exam:    Vitals:    10/06/21 1004 10/06/21 1034   BP: 136/60 (!) 140/70   Pulse: 78    Resp: 18    SpO2: 96%    Weight: 228 lb 12.8 oz (103.8 kg)    Height: 5' 3\" (1.6 m)      Body mass index is 40.53 kg/m². General PE  Gen:  NAD  Mental Status - Alert. General Appearance - Not in acute distress. HEENT:  PERRL, no carotid bruits or JVD  Chest and Lung Exam   Inspection: Accessory muscles - No use of accessory muscles in breathing. Auscultation:   Breath sounds: - Normal.   Cardiovascular   Inspection: Jugular vein - Bilateral - Inspection Normal.   Palpation/Percussion:   Apical Impulse: - Normal.   Auscultation: Rhythm - Regular. Heart Sounds - S1 WNL and S2 WNL. No S3 or S4. Murmurs & Other Heart Sounds: Auscultation of the heart reveals - No Murmurs. Peripheral Vascular   Upper Extremity: Inspection - Bilateral - No Cyanotic nailbeds or Digital clubbing. Lower Extremity:   Palpation: Edema - Bilateral -ankle swelling  Abdomen:   Soft, non-tender, bowel sounds are active.   Neuro: A&O times 3, right sided weakness    Labs:   Lab Results   Component Value Date/Time    Cholesterol, total 243 (H) 03/17/2021 09:08 AM    Cholesterol, total 236 (H) 06/10/2020 08:08 AM    Cholesterol, total 157 04/22/2019 03:33 PM    Cholesterol, total 157 06/04/2012 12:00 AM    HDL Cholesterol 53 03/17/2021 09:08 AM    HDL Cholesterol 57 06/10/2020 08:08 AM    HDL Cholesterol 55 04/22/2019 03:33 PM    HDL Cholesterol 55 06/04/2012 12:00 AM    LDL, calculated 161 (H) 03/17/2021 09:08 AM    LDL, calculated 146 (H) 06/10/2020 08:08 AM    LDL, calculated 69 04/22/2019 03:33 PM    LDL, calculated 84 06/04/2012 12:00 AM    Triglyceride 162 (H) 03/17/2021 09:08 AM    Triglyceride 165 (H) 06/10/2020 08:08 AM    Triglyceride 166 (H) 04/22/2019 03:33 PM    Triglyceride 90 06/04/2012 12:00 AM     No results found for: CPK, CPKX, CPX  Lab Results   Component Value Date/Time    Sodium 146 (H) 03/17/2021 09:08 AM    Potassium 3.5 03/17/2021 09:08 AM    Chloride 102 03/17/2021 09:08 AM    CO2 31 (H) 03/17/2021 09:08 AM    Anion gap 5 11/18/2018 09:21 AM    Glucose 85 03/17/2021 09:08 AM    BUN 18 03/17/2021 09:08 AM    Creatinine 1.41 (H) 03/17/2021 09:08 AM    BUN/Creatinine ratio 13 03/17/2021 09:08 AM    GFR est AA 44 (L) 03/17/2021 09:08 AM    GFR est non-AA 38 (L) 03/17/2021 09:08 AM    Calcium 9.9 03/17/2021 09:08 AM    Bilirubin, total 0.4 03/17/2021 09:08 AM    Alk. phosphatase 157 (H) 03/17/2021 09:08 AM    Protein, total 6.9 03/17/2021 09:08 AM    Albumin 3.8 03/17/2021 09:08 AM    Globulin 4.7 (H) 11/18/2018 09:21 AM    A-G Ratio 1.2 03/17/2021 09:08 AM    ALT (SGPT) 9 03/17/2021 09:08 AM       EKG:         Assessment:     Assessment:        ICD-10-CM ICD-9-CM    1. Coronary artery disease involving native coronary artery of native heart without angina pectoris  I25.10 414.01    2. Essential hypertension  I10 401.9 AMB POC EKG ROUTINE W/ 12 LEADS, INTER & REP   3. Mixed hyperlipidemia  E78.2 272.2    4.  RODRÍGUEZ (obstructive sleep apnea)  G47.33 327.23    5. History of CVA (cerebrovascular accident)  Z86.73 V12.54    6. Stage 3 chronic kidney disease, unspecified whether stage 3a or 3b CKD (HCC)  N18.30 585.3        Orders Placed This Encounter    AMB POC EKG ROUTINE W/ 12 LEADS, INTER & REP     Order Specific Question:   Reason for Exam:     Answer:   routine    hydrALAZINE (APRESOLINE) 50 mg tablet     Sig: Take 1 Tablet by mouth every eight (8) hours.      Dispense:  180 Tablet     Refill:  1        Plan:     Coronary artery disease involving native coronary artery of native heart without angina pectoris  Hx of non-obstructive CAD per cath in 2012  Lexiscan stress test done 1/2020 without evidence of ischemia  Continue medical management and risk factor modification     Essential hypertension  Will stop amlodipine d/t leg swelling, increase Hydralazine to 50 mg every 8 hrs  Continue other therapy       Mixed hyperlipidemia  3/2021  On rosuva 20 mg daily Labs and lipids per Dr Eliane Pallas  Next follow up with endo 10/15/2021    Leg swelling  Normal EF no significant valvular disease per echo in 1/2020  Continue leg elevation and mild exercise  Continue compression stockings  Stopping amlodipine per above       DM  On Insulin  Followed by Dr Eliane Pallas, last OV 7/14/2021    CKD III  Stable kidney fxn Serum Cr 1.41 in 3/2021    RODRÍGUEZ  On pap therapy    Hx CVA in 2004  On ASA, statin    Counseled on diet and exercise- eventual goal of 30-60 minutes 5-7 times a week as per AHA guidelines.      F/u in 1 mos          Tommy Barron NP

## 2021-10-06 ENCOUNTER — OFFICE VISIT (OUTPATIENT)
Dept: CARDIOLOGY CLINIC | Age: 71
End: 2021-10-06
Payer: MEDICARE

## 2021-10-06 VITALS
RESPIRATION RATE: 18 BRPM | DIASTOLIC BLOOD PRESSURE: 70 MMHG | WEIGHT: 228.8 LBS | HEIGHT: 63 IN | BODY MASS INDEX: 40.54 KG/M2 | OXYGEN SATURATION: 96 % | SYSTOLIC BLOOD PRESSURE: 140 MMHG | HEART RATE: 78 BPM

## 2021-10-06 DIAGNOSIS — G47.33 OSA (OBSTRUCTIVE SLEEP APNEA): ICD-10-CM

## 2021-10-06 DIAGNOSIS — I10 ESSENTIAL HYPERTENSION: ICD-10-CM

## 2021-10-06 DIAGNOSIS — E78.2 MIXED HYPERLIPIDEMIA: ICD-10-CM

## 2021-10-06 DIAGNOSIS — I25.10 CORONARY ARTERY DISEASE INVOLVING NATIVE CORONARY ARTERY OF NATIVE HEART WITHOUT ANGINA PECTORIS: Primary | ICD-10-CM

## 2021-10-06 DIAGNOSIS — N18.30 STAGE 3 CHRONIC KIDNEY DISEASE, UNSPECIFIED WHETHER STAGE 3A OR 3B CKD (HCC): ICD-10-CM

## 2021-10-06 DIAGNOSIS — Z86.73 HISTORY OF CVA (CEREBROVASCULAR ACCIDENT): ICD-10-CM

## 2021-10-06 PROCEDURE — G8536 NO DOC ELDER MAL SCRN: HCPCS | Performed by: NURSE PRACTITIONER

## 2021-10-06 PROCEDURE — 3017F COLORECTAL CA SCREEN DOC REV: CPT | Performed by: NURSE PRACTITIONER

## 2021-10-06 PROCEDURE — 99214 OFFICE O/P EST MOD 30 MIN: CPT | Performed by: NURSE PRACTITIONER

## 2021-10-06 PROCEDURE — 93005 ELECTROCARDIOGRAM TRACING: CPT | Performed by: NURSE PRACTITIONER

## 2021-10-06 PROCEDURE — G8752 SYS BP LESS 140: HCPCS | Performed by: NURSE PRACTITIONER

## 2021-10-06 PROCEDURE — 93010 ELECTROCARDIOGRAM REPORT: CPT | Performed by: NURSE PRACTITIONER

## 2021-10-06 PROCEDURE — G8417 CALC BMI ABV UP PARAM F/U: HCPCS | Performed by: NURSE PRACTITIONER

## 2021-10-06 PROCEDURE — 1101F PT FALLS ASSESS-DOCD LE1/YR: CPT | Performed by: NURSE PRACTITIONER

## 2021-10-06 PROCEDURE — 1090F PRES/ABSN URINE INCON ASSESS: CPT | Performed by: NURSE PRACTITIONER

## 2021-10-06 PROCEDURE — G9899 SCRN MAM PERF RSLTS DOC: HCPCS | Performed by: NURSE PRACTITIONER

## 2021-10-06 PROCEDURE — G8754 DIAS BP LESS 90: HCPCS | Performed by: NURSE PRACTITIONER

## 2021-10-06 PROCEDURE — G8399 PT W/DXA RESULTS DOCUMENT: HCPCS | Performed by: NURSE PRACTITIONER

## 2021-10-06 PROCEDURE — G8427 DOCREV CUR MEDS BY ELIG CLIN: HCPCS | Performed by: NURSE PRACTITIONER

## 2021-10-06 PROCEDURE — G8432 DEP SCR NOT DOC, RNG: HCPCS | Performed by: NURSE PRACTITIONER

## 2021-10-06 PROCEDURE — G0463 HOSPITAL OUTPT CLINIC VISIT: HCPCS | Performed by: NURSE PRACTITIONER

## 2021-10-06 RX ORDER — HYDRALAZINE HYDROCHLORIDE 50 MG/1
50 TABLET, FILM COATED ORAL EVERY 8 HOURS
Qty: 180 TABLET | Refills: 1 | Status: SHIPPED | OUTPATIENT
Start: 2021-10-06 | End: 2022-02-25 | Stop reason: SDUPTHER

## 2021-10-06 NOTE — PROGRESS NOTES
1. Have you been to the ER, urgent care clinic since your last visit? Hospitalized since your last visit-  Jan 2021 ED [facility] ER, back pain    2. Have you seen or consulted any other health care providers outside of the [address] [place] [address] since your last visit? Include any pap smears or colon screening. No    Chief Complaint   Patient presents with    Coronary Artery Disease     Yearly appt. No cardiac concerns.

## 2021-10-15 ENCOUNTER — OFFICE VISIT (OUTPATIENT)
Dept: ENDOCRINOLOGY | Age: 71
End: 2021-10-15
Payer: MEDICARE

## 2021-10-15 VITALS
HEART RATE: 78 BPM | DIASTOLIC BLOOD PRESSURE: 70 MMHG | WEIGHT: 229.2 LBS | HEIGHT: 63 IN | SYSTOLIC BLOOD PRESSURE: 145 MMHG | BODY MASS INDEX: 40.61 KG/M2

## 2021-10-15 DIAGNOSIS — E66.01 OBESITY, MORBID (HCC): ICD-10-CM

## 2021-10-15 DIAGNOSIS — Z79.4 TYPE 2 DIABETES MELLITUS WITH COMPLICATION, WITH LONG-TERM CURRENT USE OF INSULIN (HCC): Primary | ICD-10-CM

## 2021-10-15 DIAGNOSIS — E11.8 TYPE 2 DIABETES MELLITUS WITH COMPLICATION, WITH LONG-TERM CURRENT USE OF INSULIN (HCC): Primary | ICD-10-CM

## 2021-10-15 LAB — HBA1C MFR BLD HPLC: 9.3 %

## 2021-10-15 PROCEDURE — 99214 OFFICE O/P EST MOD 30 MIN: CPT | Performed by: INTERNAL MEDICINE

## 2021-10-15 PROCEDURE — G8399 PT W/DXA RESULTS DOCUMENT: HCPCS | Performed by: INTERNAL MEDICINE

## 2021-10-15 PROCEDURE — G9899 SCRN MAM PERF RSLTS DOC: HCPCS | Performed by: INTERNAL MEDICINE

## 2021-10-15 PROCEDURE — G8417 CALC BMI ABV UP PARAM F/U: HCPCS | Performed by: INTERNAL MEDICINE

## 2021-10-15 PROCEDURE — G8536 NO DOC ELDER MAL SCRN: HCPCS | Performed by: INTERNAL MEDICINE

## 2021-10-15 PROCEDURE — 1101F PT FALLS ASSESS-DOCD LE1/YR: CPT | Performed by: INTERNAL MEDICINE

## 2021-10-15 PROCEDURE — G8427 DOCREV CUR MEDS BY ELIG CLIN: HCPCS | Performed by: INTERNAL MEDICINE

## 2021-10-15 PROCEDURE — 3017F COLORECTAL CA SCREEN DOC REV: CPT | Performed by: INTERNAL MEDICINE

## 2021-10-15 PROCEDURE — G8432 DEP SCR NOT DOC, RNG: HCPCS | Performed by: INTERNAL MEDICINE

## 2021-10-15 PROCEDURE — G8753 SYS BP > OR = 140: HCPCS | Performed by: INTERNAL MEDICINE

## 2021-10-15 PROCEDURE — 2022F DILAT RTA XM EVC RTNOPTHY: CPT | Performed by: INTERNAL MEDICINE

## 2021-10-15 PROCEDURE — 1090F PRES/ABSN URINE INCON ASSESS: CPT | Performed by: INTERNAL MEDICINE

## 2021-10-15 PROCEDURE — 83036 HEMOGLOBIN GLYCOSYLATED A1C: CPT | Performed by: INTERNAL MEDICINE

## 2021-10-15 PROCEDURE — G8754 DIAS BP LESS 90: HCPCS | Performed by: INTERNAL MEDICINE

## 2021-10-15 PROCEDURE — 3046F HEMOGLOBIN A1C LEVEL >9.0%: CPT | Performed by: INTERNAL MEDICINE

## 2021-10-15 RX ORDER — FLASH GLUCOSE SENSOR
1 KIT MISCELLANEOUS
Qty: 6 KIT | Refills: 3 | Status: SHIPPED | OUTPATIENT
Start: 2021-10-15 | End: 2021-11-02 | Stop reason: SDUPTHER

## 2021-10-15 RX ORDER — FLASH GLUCOSE SCANNING READER
1 EACH MISCELLANEOUS DAILY
Qty: 1 EACH | Refills: 0 | Status: SHIPPED | OUTPATIENT
Start: 2021-10-15 | End: 2021-11-02 | Stop reason: SDUPTHER

## 2021-10-15 NOTE — PROGRESS NOTES
Ms. Aleena Parham returns for follow-up of her type 2 diabetes mellitus x 15 years with poor blood sugar control.    Her A1c was 9.7% in September. Her most recent A1c is 8.9% in March 2021 which is the lowest we have ever had.,   Her A1c today is 9.3%. Recent laboratory tests also remarkable for creatinine of 1.41 with a GFR of 44.  LFTs normal.     Current medications  Toujeo insulin 60 units at bedtime  Humalog insulin 24 breakfast (20 for lunch if she eats lunch) and 42 for dinner       This woman is seen every 3-4 months, monitors her blood sugars 3-4 times daily, administers insulin 3-4 times daily and adjusts her insulin dosage based on her readings. We reviewed her glucose meter. She tells me today that her blood sugars in the morning are ranging from . .. [place] blood sugars 2 to 3 hours after dinner are ranging from 180-240. Breakfast is juan and eggs but no toast.  She had lunch yesterday with a barbecue sandwich and slaw.  It is important to point out that she usually does not have lunch and so she does not take insulin at that time. Other hand, if she does not have lunch, she frequently has peanut butter crackers and a Diet Coke and again takes no insulin for that. For dinner she had chitlins potato salad and green beans.  Other times she has a breakfast meal for dinner.  Her blood sugar this morning was 122.  She is not snacking at bedtime.  She occasionally has a piece of fruit after breakfast.  Her physical activity is primarily rehab/PT for back pain. She tells me today that she is trying to cut down on the breads and rice. Last night she went to [facility] and got chicken with BMs and rolls and her blood sugar last night was 224. Her blood sugar this morning was 119. Examination  Blood pressure 145/70  Pulse 88  Weight 229  BMI 40.6    Impression  #1. Type 2 diabetes mellitus with poor blood sugar control and a recent A1c of 9.3%  #2. Obesity  #3.   Chronic kidney disease    Plan:  #1.  I have asked the patient to check blood sugars in the morning and in the evening. #2. At her request I have sent a prescription yet again for freestyle eleni. It did not go through the last time but she is hoping that it will go through now. #3.  I will see her back in 3 months. If she gets the freestyle eleni I advised her to stop by in about a month and I can download the device.

## 2021-11-01 DIAGNOSIS — E11.8 TYPE 2 DIABETES MELLITUS WITH COMPLICATION, WITH LONG-TERM CURRENT USE OF INSULIN (HCC): ICD-10-CM

## 2021-11-01 DIAGNOSIS — Z79.4 TYPE 2 DIABETES MELLITUS WITH COMPLICATION, WITH LONG-TERM CURRENT USE OF INSULIN (HCC): ICD-10-CM

## 2021-11-01 NOTE — TELEPHONE ENCOUNTER
----- Message from Nancy Leonardo sent at 11/1/2021  4:00 PM EDT -----  Regarding: /telephone  Contact: [phone]  General Message/Vendor Calls    Caller's first and last name: n/a      Reason for call: Has a question about \"diabetic arm thing\"       Callback required yes/no and why: Yes to answer questions      Best contact number(s): [phone]      Details to clarify the request: n/a      Nnacy Leonardo

## 2021-11-02 RX ORDER — FLASH GLUCOSE SENSOR
1 KIT MISCELLANEOUS
Qty: 6 KIT | Refills: 3 | Status: SHIPPED | OUTPATIENT
Start: 2021-11-02 | End: 2021-11-11 | Stop reason: ALTCHOICE

## 2021-11-02 RX ORDER — FLASH GLUCOSE SCANNING READER
1 EACH MISCELLANEOUS DAILY
Qty: 1 EACH | Refills: 0 | Status: SHIPPED | OUTPATIENT
Start: 2021-11-02 | End: 2021-11-11 | Stop reason: ALTCHOICE

## 2021-11-02 NOTE — TELEPHONE ENCOUNTER
Spoke to the patient and she stated that her freestyle Jack [place] and Sensor prescriptions would need to  Be sent to [facility] in [place], [place] per her insurance.

## 2021-11-03 NOTE — PROGRESS NOTES
[address], [address] [facility]  [phone]     Subjective:      Soraya Murguia is a 78 y.o. female is here for one mos follow up. She has a PMHx of CAD, DM2, HTN, HLD, RODRÍGUEZ, obesity, and asthma/COPD. Last OV 10/6/2021  She is c/o bilateral ankle swelling. She tries to elevate her legs and wear compression stockings. We dc amlodipine d/t above and increased her Hydralazine to 50 mg every 8 hrs. Today, reports resolution of ankle swelling, home bp controlled 130s-140s/70s. The patient denies chest pain/ shortness of breath, orthopnea, PND,  palpitations, syncope, or presyncope.        Patient Active Problem List    Diagnosis Date Noted    Type 2 diabetes mellitus with diabetic neuropathy ([address] 04/29/2019    Type 2 diabetes with nephropathy ([address] 12/14/2018    History of CVA (cerebrovascular accident) 07/13/2018    Obesity, morbid ([address] 01/25/2018    Warthin's tumor 07/01/2016    HTN (hypertension) 09/13/2013    Axillary hidradenitis suppurativa 08/07/2013    Esophageal reflux 01/15/2013    Renal failure, acute ([address] 01/13/2013    Asthma 12/14/2012    Myocardial ischemia 06/06/2012    Shortness of breath 06/06/2012    Coronary artery disease 06/06/2012    PVC's (premature ventricular contractions) 06/01/2012    DM type 2 (diabetes mellitus, type 2) ([address] 04/23/2012    Grave's disease 10/15/2010    DJD (degenerative joint disease) of hip 10/20/2009    DJD (degenerative joint disease) of knee 10/20/2009    CTS (Carpal Tunnel Syndrome)-b/l 10/20/2009    CVA (cerebral infarction) 10/20/2009    Diverticulosis 10/20/2009    Osteopenia 10/20/2009      Sedrick Obrien MD  Past Medical History:   Diagnosis Date    Asthma     CAD (coronary artery disease)     \"mild\" per Dr Hook Oven note    Diabetes [facility])     Dr Chelsi Yang Hyperlipidemia     Hypertension     Long term current use of anticoagulant therapy     Pulmonary embolism ([address]     Screening for colon cancer 05    Dr My Buchanan in 10 years    Stroke [facility])     Rt side weaker than left, uses a cane: no longer followed by neuro    Thromboembolus ([address]     Rt leg moved to lung     Thyroid disease     Unspecified sleep apnea     uses CPAP      Past Surgical History:   Procedure Laterality Date    CARDIAC CATHETERIZATION  2012         HX HEART CATHETERIZATION      HX HEENT      tonsillectomy    HX HEMORRHOIDECTOMY      HX HYSTERECTOMY      HX ORTHOPAEDIC  2011    left shoulder    MA CARDIAC SURG PROCEDURE UNLIST      heart surgery     Allergies   Allergen Reactions    Latex Itching    Codeine Itching and Other (comments)     hallucinate    Contrast Dye [Iodine] Rash and Itching     Given pre-cardiac cath    Seafood [Shellfish Containing Products] Swelling      Family History   Problem Relation Age of Onset    Diabetes Mother     Cancer Mother     Breast Cancer Mother 79    Heart Disease Father     Hypertension Father     Stroke Father     Coronary Art Dis Brother 54      Social History     Socioeconomic History    Marital status:      Spouse name: Not on file    Number of children: 2    Years of education: Not on file    Highest education level: Some college, no degree   Occupational History    Not on file   Tobacco Use    Smoking status: Former Smoker     Packs/day: 1.00     Types: Cigarettes     Quit date: 2004     Years since quittin.1    Smokeless tobacco: Never Used   Substance and Sexual Activity    Alcohol use: No    Drug use: Not Currently     Comment: CBD oil(cream)    Sexual activity: Not Currently   Other Topics Concern    Not on file   Social History Narrative    Lives with Son, grandson and her partner. Patient does not drive; she has not driven since she had CVA.      Social Determinants of Health     Financial Resource Strain:     Difficulty of Paying Living Expenses: Not on file   Food Insecurity:     Worried About [facility] in the Last Year: Not on file    [place] of Food in the Last Year: Not on file   Transportation Needs:     Lack of Transportation (Medical): Not on file    Lack of Transportation (Non-Medical): Not on file   Physical Activity:     Days of Exercise per Week: Not on file    Minutes of Exercise per Session: Not on file   Stress:     Feeling of Stress : Not on file   Social Connections:     Frequency of Communication with Friends and Family: Not on file    Frequency of Social Gatherings with Friends and Family: Not on file    Attends Cheondoism Services: Not on file    Active Member of [address] [place] [address] or Organizations: Not on file    Attends Club or Organization Meetings: Not on file    Marital Status: Not on file   Intimate Partner Violence:     Fear of Current or Ex-Partner: Not on file    Emotionally Abused: Not on file    Physically Abused: Not on file    Sexually Abused: Not on file   Housing Stability:     Unable to Pay for Housing in the Last Year: Not on file    Number of [place] in the Last Year: Not on file    Unstable Housing in the Last Year: Not on file      Current Outpatient Medications   Medication Sig    flash glucose scanning reader (FreeStyle Jack 14 Day Lyndon) misc 1 Each by Does Not Apply route daily.  flash glucose sensor (FreeStyle Jack 14 Day Sensor) kit 1 Each by Does Not Apply route every fourteen (14) days.  hydrALAZINE (APRESOLINE) 50 mg tablet Take 1 Tablet by mouth every eight (8) hours. (Patient taking differently: Take 50 mg by mouth every eight (8) hours. Taking one AM  And one PM.)    triamterene-hydroCHLOROthiazide (DYAZIDE) 37.5-25 mg per capsule Take 1 Capsule by mouth daily.  gabapentin (NEURONTIN) 100 mg capsule TAKE 1 CAPSULE THREE TIMES A DAY    rosuvastatin (CRESTOR) 20 mg tablet Take 1 Tablet by mouth daily.     levothyroxine (SYNTHROID) 137 mcg tablet TAKE 1 TABLET DAILY    budesonide-formoteroL (SYMBICORT) 80-4.5 mcg/actuation HFAA Take 2 Puffs by inhalation two (2) times a day.  albuterol (ProAir HFA) 90 mcg/actuation inhaler USE 1 INHALATION EVERY 4 HOURS AS NEEDED WHEEZING OR SHORTNESS OF BREATH    glucose blood VI test strips (True Metrix Glucose Test Strip) strip Monitor blood sugar 3 times daily    losartan (COZAAR) 100 mg tablet Take 1 Tab by mouth daily.  insulin lispro (HumaLOG KwikPen Insulin) 100 unit/mL kwikpen 20 units for breakfast and lunch and 40 units for dinner (Patient taking differently: 22 units for breakfast, 24 units for lunch and 42 units for dinner)    insulin glargine U-300 conc (Toujeo SoloStar U-300 Insulin) 300 unit/mL (1.5 mL) inpn pen 60 Units by SubCUTAneous route nightly. Indications: type 2 diabetes mellitus (Patient taking differently: 62 Units by SubCUTAneous route nightly. Indications: type 2 diabetes mellitus)    metoprolol tartrate (LOPRESSOR) 50 mg tablet Take 1 Tab by mouth two (2) times a day.  glucose blood VI test strips (ONETOUCH ULTRA BLUE TEST STRIP) strip Monitor blood sugar 3 times daily    albuterol (ACCUNEB) 1.25 mg/3 mL nebu 3 mL by Nebulization route every four (4) hours as needed (wheezing).  ADVAIR DISKUS 100-50 mcg/dose diskus inhaler USE 1 INHALATION TWICE A DAY (Patient taking differently: USE 1 INHALATION TWICE A DAY prn)    BD INSULIN PEN NEEDLE UF SHORT 31 gauge x 5/16\" ndle     aspirin delayed-release 81 mg tablet Take 81 mg by mouth daily. No current facility-administered medications for this visit. Review of Symptoms:  11 systems reviewed, negative other than as stated in the HPI    Physical ExamPhysical Exam:    Vitals:    11/04/21 1547 11/04/21 1553   BP: (!) 146/82 (!) 140/70   Pulse: 69    Resp: 18    SpO2: 95%    Weight: 235 lb 4.8 oz (106.7 kg)    Height: 5' 3\" (1.6 m)      Body mass index is 41.68 kg/m². General PE  Gen:  NAD  Mental Status - Alert. General Appearance - Not in acute distress.    HEENT:  PERRL, no carotid bruits or JVD  Chest and Lung Exam   Inspection: Accessory muscles - No use of accessory muscles in breathing. Auscultation:   Breath sounds: - Normal.   Cardiovascular   Inspection: Jugular vein - Bilateral - Inspection Normal.   Palpation/Percussion:   Apical Impulse: - Normal.   Auscultation: Rhythm - Regular. Heart Sounds - S1 WNL and S2 WNL. No S3 or S4. Murmurs & Other Heart Sounds: Auscultation of the heart reveals - No Murmurs. Peripheral Vascular   Upper Extremity: Inspection - Bilateral - No Cyanotic nailbeds or Digital clubbing. Lower Extremity:   Palpation: Edema - Bilateral -ankle swelling  Abdomen:   Soft, non-tender, bowel sounds are active.   Neuro: A&O times 3, right sided weakness    Labs:   Lab Results   Component Value Date/Time    Cholesterol, total 243 (H) 03/17/2021 09:08 AM    Cholesterol, total 236 (H) 06/10/2020 08:08 AM    Cholesterol, total 157 04/22/2019 03:33 PM    Cholesterol, total 157 06/04/2012 12:00 AM    HDL Cholesterol 53 03/17/2021 09:08 AM    HDL Cholesterol 57 06/10/2020 08:08 AM    HDL Cholesterol 55 04/22/2019 03:33 PM    HDL Cholesterol 55 06/04/2012 12:00 AM    LDL, calculated 161 (H) 03/17/2021 09:08 AM    LDL, calculated 146 (H) 06/10/2020 08:08 AM    LDL, calculated 69 04/22/2019 03:33 PM    LDL, calculated 84 06/04/2012 12:00 AM    Triglyceride 162 (H) 03/17/2021 09:08 AM    Triglyceride 165 (H) 06/10/2020 08:08 AM    Triglyceride 166 (H) 04/22/2019 03:33 PM    Triglyceride 90 06/04/2012 12:00 AM     No results found for: CPK, CPKX, CPX  Lab Results   Component Value Date/Time    Sodium 146 (H) 03/17/2021 09:08 AM    Potassium 3.5 03/17/2021 09:08 AM    Chloride 102 03/17/2021 09:08 AM    CO2 31 (H) 03/17/2021 09:08 AM    Anion gap 5 11/18/2018 09:21 AM    Glucose 85 03/17/2021 09:08 AM    BUN 18 03/17/2021 09:08 AM    Creatinine 1.41 (H) 03/17/2021 09:08 AM    BUN/Creatinine ratio 13 03/17/2021 09:08 AM    GFR est AA 44 (L) 03/17/2021 09:08 AM    GFR est non-AA 38 (L) 03/17/2021 09:08 AM    Calcium 9.9 03/17/2021 09:08 AM    Bilirubin, total 0.4 03/17/2021 09:08 AM    Alk. phosphatase 157 (H) 03/17/2021 09:08 AM    Protein, total 6.9 03/17/2021 09:08 AM    Albumin 3.8 03/17/2021 09:08 AM    Globulin 4.7 (H) 11/18/2018 09:21 AM    A-G Ratio 1.2 03/17/2021 09:08 AM    ALT (SGPT) 9 03/17/2021 09:08 AM       EKG:         Assessment:     Assessment:        ICD-10-CM ICD-9-CM    1. Coronary artery disease involving native coronary artery of native heart without angina pectoris  I25.10 414.01    2. Essential hypertension  I10 401.9    3. Mixed hyperlipidemia  E78.2 272.2    4. RODRÍGUEZ (obstructive sleep apnea)  G47.33 327.23    5. History of CVA (cerebrovascular accident)  Z86.73 V12.54        No orders of the defined types were placed in this encounter.        Plan:     Coronary artery disease involving native coronary artery of native heart without angina pectoris  Hx of non-obstructive CAD per cath in 2012  Lexiscan stress test done 1/2020 without evidence of ischemia  Continue medical management and risk factor modification     Essential hypertension  Controlled with current therapy       Mixed hyperlipidemia  3/2021  On rosuva 20 mg daily Labs and lipids per Dr Ita Cardona  Next follow up with endo 10/15/2021    Leg swelling, resolved upon stopping amlodipine  Normal EF no significant valvular disease per echo in 1/2020  Continue leg elevation and mild exercise  Continue compression stockings      DM  On Insulin  Followed by Dr Ita Cardona, last OV 10/15/2021    CKD III  Stable kidney fxn Serum Cr 1.41 in 3/2021    RODRÍGUEZ  On pap therapy    Hx CVA in 2004  On ASA, statin    Counseled on diet and exercise- eventual goal of 30-60 minutes 5-7 times a week as per AHA guidelines.        Continue current care and f/u in 6 mos       Clearnce Number, NP

## 2021-11-04 ENCOUNTER — OFFICE VISIT (OUTPATIENT)
Dept: CARDIOLOGY CLINIC | Age: 71
End: 2021-11-04
Payer: MEDICARE

## 2021-11-04 VITALS
RESPIRATION RATE: 18 BRPM | HEART RATE: 69 BPM | SYSTOLIC BLOOD PRESSURE: 140 MMHG | OXYGEN SATURATION: 95 % | WEIGHT: 235.3 LBS | HEIGHT: 63 IN | DIASTOLIC BLOOD PRESSURE: 70 MMHG | BODY MASS INDEX: 41.69 KG/M2

## 2021-11-04 DIAGNOSIS — I10 ESSENTIAL HYPERTENSION: ICD-10-CM

## 2021-11-04 DIAGNOSIS — I25.10 CORONARY ARTERY DISEASE INVOLVING NATIVE CORONARY ARTERY OF NATIVE HEART WITHOUT ANGINA PECTORIS: Primary | ICD-10-CM

## 2021-11-04 DIAGNOSIS — E78.2 MIXED HYPERLIPIDEMIA: ICD-10-CM

## 2021-11-04 DIAGNOSIS — Z86.73 HISTORY OF CVA (CEREBROVASCULAR ACCIDENT): ICD-10-CM

## 2021-11-04 DIAGNOSIS — G47.33 OSA (OBSTRUCTIVE SLEEP APNEA): ICD-10-CM

## 2021-11-04 PROCEDURE — G8417 CALC BMI ABV UP PARAM F/U: HCPCS | Performed by: NURSE PRACTITIONER

## 2021-11-04 PROCEDURE — G8536 NO DOC ELDER MAL SCRN: HCPCS | Performed by: NURSE PRACTITIONER

## 2021-11-04 PROCEDURE — G8399 PT W/DXA RESULTS DOCUMENT: HCPCS | Performed by: NURSE PRACTITIONER

## 2021-11-04 PROCEDURE — G9899 SCRN MAM PERF RSLTS DOC: HCPCS | Performed by: NURSE PRACTITIONER

## 2021-11-04 PROCEDURE — G0463 HOSPITAL OUTPT CLINIC VISIT: HCPCS | Performed by: NURSE PRACTITIONER

## 2021-11-04 PROCEDURE — 1101F PT FALLS ASSESS-DOCD LE1/YR: CPT | Performed by: NURSE PRACTITIONER

## 2021-11-04 PROCEDURE — 1090F PRES/ABSN URINE INCON ASSESS: CPT | Performed by: NURSE PRACTITIONER

## 2021-11-04 PROCEDURE — G8754 DIAS BP LESS 90: HCPCS | Performed by: NURSE PRACTITIONER

## 2021-11-04 PROCEDURE — G8753 SYS BP > OR = 140: HCPCS | Performed by: NURSE PRACTITIONER

## 2021-11-04 PROCEDURE — 99214 OFFICE O/P EST MOD 30 MIN: CPT | Performed by: NURSE PRACTITIONER

## 2021-11-04 PROCEDURE — G8427 DOCREV CUR MEDS BY ELIG CLIN: HCPCS | Performed by: NURSE PRACTITIONER

## 2021-11-04 PROCEDURE — G8432 DEP SCR NOT DOC, RNG: HCPCS | Performed by: NURSE PRACTITIONER

## 2021-11-04 PROCEDURE — 3017F COLORECTAL CA SCREEN DOC REV: CPT | Performed by: NURSE PRACTITIONER

## 2021-11-04 NOTE — PROGRESS NOTES
Chief Complaint   Patient presents with    Hypertension     stop amlodopine-increased Hydrazaline at last OV. Denies cardiac symptoms     1. Have you been to the ER, urgent care clinic since your last visit? Hospitalized since your last visit? No    2. Have you seen or consulted any other health care providers outside of the [address] [place] [address] since your last visit? Include any pap smears or colon screening.  No

## 2021-11-05 ENCOUNTER — PATIENT OUTREACH (OUTPATIENT)
Dept: CASE MANAGEMENT | Age: 71
End: 2021-11-05

## 2021-11-05 NOTE — PROGRESS NOTES
2021   12:54 PM    Ambulatory Care Management Note      Top Challenges reviewed with the provider   - attended office visit with Cardiology on 21; office visit note reviewed by this ACM. Per review of visit note, amlodipine was stopped due to leg swelling and hydralazine was increased to 50 mg every 8 hours; pt reports that she resumed metoprolol as ordered by Cardiology. She denies presence of lower extremity edema today. 10/29/21  Fastin mg/dL  After Dinner: 195 mg/dL    10/30/21  Fasting: Did not record result  After Dinner: Did not record result    10/31/21  Fastin mg/dL  After Dinner: 225 mg/dL    21  Fastin mg/dL  After Dinner: Did not record result    21  Breakfast: 91 mg/dL  After Dinner: Did not record result    11/3/21  Fastin mg/dL  After Dinner: 260 mg/dL    21  Fastin mg/dL  After Dinner: 207 mg/dL    21  Fasting 160 mg/dL    Goals Addressed                 This Visit's Progress       Diabetes     Patient verbalizes understanding of self -management goals of living with Diabetes. 2021   Attended office visit with Dr. Mattie Bearden on 10/15/21.  [facility] by Dr. Mattie Bearden on 10/15/21. She tried to fill this order at [facility] and [facility]. Pt reports that [facility] informed her that out-of-pocket cost for the reader device was >$200; pt unable to afford this cost and had orders transferred to Tatiana Cristobal. Patient was informed by [address] [place] [address] Flipaste reader was $79; this is affordable to her, however she has not purchased the reader yet because she was unable to get the sensors. Patient is not sure of her out-of-pocket cost for the [facility]; BAUNAT was unable to advise her of the 14-day sensors copay because [facility] needs to void out where they already filled the order.  Pt was informed by Tatiana Cristobal that order for [facility] should be sent to a DME Provider.  She is checking her blood glucose twice daily in the morning and in the evening. One episode of hypoglycemia is reported by patient within the past week with fasting blood glucose level of 59 mg/dL, however she reports that she was asymptomatic. Reports that episodes of fasting low blood sugar have decreased since previous ACM outreach encounter.  She forgot to take her Toujeou last night and her fasting blood glucose this morning was 160 mg/dL   Patient will continue to check her blood glucose twice daily in the morning and in the evening; she will maintain blood glucose log to review with Dr. Pal MORENO.  Medicare Coverage Criteria for [address] reviewed by this ACM today ([employer] resource); patient should meet the criteria for the [address] [place] [address] Drive to be covered by her Medicare Part B. ACM outreach to [facility] DME Provider to inquire if they are able to assist with order; lvm requesting a return phone call to this ACM.  ACM provided patient with update; will attempt to have order for [address], reader and sensors, supplied by DME Provider under her Medicare Part B.    10/1/2021   She was checking her blood glucose twice daily and maintaining her blood glucose log up until last week; she checked her blood glucose once daily last week because her fingers were sore and she has not written her blood sugars down since last week. Patient states, \"But I know I should (check her blood glucose twice daily) because I really don't feel any different when it's (blood sugar) up or down. \"    \"I've been having some very low sugar in the morning. \" Pt reported fasting blood glucose this morning was 93 mg/dL; reports that her lowest fasting blood glucose level since previous ACM outreach encounter was on 9/8/21 and it was 52 mg/dL.   Patient reports associated symptoms of sweating and weakness when her blood glucose is low, \"but not to the point where I felt like I was going to pass out. \" ACM reviewed treatment of hypoglycemia with patient today; pt verbalizes understanding.  Patient was able to review her blood glucose log and visualize the effect that certain foods had on her blood sugar; states, \"When it (blood sugar) went up at night I had either rice, potato or bread. \"   She is still interested in the [facility], if eligible; she could benefit from the [facility] to assist with treatment plan adherence for blood glucose monitoring. Barrier to patient adherence with blood glucose monitoring at present is that her fingertips get sore with checking her blood glucose 2-3 times per day. She has not received outreach from Ambulatory PharmD regarding this; referral was previously sent by this ACM on 8/18/21. Patient will discuss this with Dr. Jasmin Plunkett at her scheduled appointment on 10/15/21.  Patient will resume checking her blood glucose twice daily and maintain her blood glucose log.  Patient will attend previously scheduled appointment with Dr. Jasmin Plunkett on 10/15/21; she will take her blood glucose log to this appointment.  Encounter routed to Dr. Jasmin Plunkett today for notification of patient reported most recent blood glucose results.  Plan for next AC outreach in approximately two weeks. 8/18/2021  She has not resumed water aerobics class; she still plans on returning to this class, however her schedule has been busy. Checking her blood glucose once daily. She is still interested in trying to get the [facility]; notes that it was previously not covered by Medicare, however she has recently heard that it is now covered. Pt reported fasting blood glucose this morning was 140 mg/dL. Attended office visit with Dr. Jasmin Plunkett on 7/14/21; hemoglobin A1c 7/14/21 9.2%  Next scheduled follow-up with Dr. Jasmin Plunkett is 10/15/21. Encouraged patient to increase blood glucose monitoring to twice daily; order per Dr. Jasmin Plunkett is for three times daily.   ACM will consult with Ambulatory PharmD regarding Freestyle Jack and Medicare coverage; pt could benefit from the [place] from an adherence standpoint with blood glucose monitoring. 7/8/2021  Plans to resume water aerobics next week. She contacted the [facility] and was notified that water aerobics class has resumed; plans to go to local [facility] on 7/13 to sign up and pay fee. Patient states, \"I'm definitely looking forward to that. \"  Checking her blood glucose twice daily; states, \"I haven't had anything over 160 (mg/dL). \" She is interested in getting the [facility] 14-day system and will discuss this with Dr. Phil Macias during her upcoming visit; pt notes that when this was previously ordered for her in 2020, however she did not get it because it was not covered by her Medicare. Patient has office visit scheduled with Dr. Phil Macias on 7/14.    4/20/2021  most recent Hgb A1c 3/17/21 8.9%. Patient reports that she has made an effort to improve her diet since the beginning of the year and has decreased her intake of starchy foods. Patient reports that current level of physical activity includes walking around inside of her house; barrier to increased physical activity is chronic pain in her right knee and back. Patient reports plan to increase physical activity with the warmer weather by walking in her pool once she gets it open. 3/5/2020  - attended OV with Dr. Phil Macias 2/28/2020; Hgb A1c 9.9%, compare to previous result of 10.1%. Pt reported to Dr. Phil Macias that she had not taken metformin since her previous OV (11/25/19) due to continued GI distress. Pt was advised to increase her physical activity to tolerance and was asked to check blood sugars at 11:00PM for 1 week and then contact Dr. Phil Macias to report readings; see [facility] note for details regarding OV encounter.   - Patient is not receptive to ACM follow-up today due to acute illness; ACM outreach in one week. 2/12/2020  - reports fasting blood glucose this morning of 120 mg/dL, after lunch 190 mg/dL. Unable to review additional recent blood glucose readings as results are stored on glucometer and patient is unsure how to recall historical results; pt will take her glucometer to scheduled OV with Dr. Ita Cardona . - reports taking mealtime insulin prior to meals; states, \"Sometimes I forget and I take it right after I eat, but I'm trying to get use to it and take it before I eat. \"  - still taking humalog 24 units before breakfast (if blood glucose is <150 mg/dL she takes 22 units), 24 units before lunch, 36 units before dinner and Toujeo 60 units nightly; pt did not contact Dr. Vero Fitch office after last ACM outreach to confirm insulin orders, however reports that she reviewed her most recent office visit AVS and confirmed that the unit doses she is currently taking are correct. - continued lack of adherence with TID blood glucose monitoring; inquired about patient barriers to adherence - states, \"I am not sure I am going to be able to do that because when I stick it it hurts too bad and I've been doing this for 15 years and my fingers have had it. \" Informed patient about 14-day blood glucose monitoring system, Freestyle Jack, and encouraged patient to discuss this option with Dr. Ita Cardona at her upcoming office visit to increase patient adherence with blood glucose monitoring.  -  Inquired as to whether patient is following a diabetic diet; pt states, \"Kind of, yeah; most of the time. \" Diet is described as \"about the same; nothing more, nothing less. \" Patient is not interested in keeping a food diary to review with this NN. Patient is not interested in additional education/review of diabetic diet; notes that she previously met with CDE NN, Mary Acharya.  - no longer participating in water aerobics; currently not engaging in moderate exercise. She reports that she walks around her house for exercise; states, \"I just get up and start walking from the side door around to the front door and do it a couple of times. \" Patient reported barrier to exercise/increasing physical activity is back spasms which prevent her from \"doing a lot of moving or standing unless I am in the water. \" Reports that barrier to attending water aerobics at present is transportation and winter season. - will attend scheduled OV with Dr. Alok Oliver 2/28/2020  - pt requests next ACM follow-up in three weeks after she attends scheduled OV with Dr. Alok Oliver    1/10/20  - Hgb A1c 11/25/19 - 10.1%  - pt notes history of Type 2 DM for 15 years  - checking blood glucose 1-2x/day  - ordered insulin regimen/frequency is Humalog TID (before breakfast, lunch and dinner) and Toujeo nightly  - reports taking humalog 24 units before breakfast (if blood glucose is <150 mg/dL she takes 22 units), 24 units before lunch, 36 units before dinner and Toujeo 60 units nightly. Most recent endocrinology OV note reviewed; per OV note, most recent Humalog order (11/25/19) - 22 units for breakfast and lunch and 36 units for dinner and Toujeo 60 units nightly (12/16/19), pt was noted to be taking mealtime insulin 2-3 hours after meal .Further clarification is needed regarding what current insulin unit dose orders are; patient is advised to contact Dr. Alok Oliver to confirm/clarify current insulin unit dose orders. - per last endocrinology OV note, pt was encouraged to improve diet and physical activity to tolerance  - encouraged to take mealtime insulin prior to eating meal  - encouraged to check blood glucose TID as ordered and keep blood glucose log      10/29/19  8/14/19-10.9%  4/22/19 - Hgb A1c 10.1%  - Current level of understanding: checks blood sugar once daily before breakfast. Completed Disease Specific CM Program for Diabetes Self-Management (w/ Deric Watson, RN NN CDE) on 8/27/19. Does not keep written blood glucose log; reports that glucometer stores historical blood glucose results. Pt reported fasting blood glucose on 10/28 of 169 mg/dL and on 10/29 of 93 mg/dL.  Pt notes difficulty adhering to diabetic diet - enjoys pasta, potatoes, chips - states, \"my problem is food. I'm not that strong on sweets. \"; pt reported barrier to diabetic diet adherence is financial. Does not drink regular soda; drinks diet coke (no more than 2/day) and water. Currently exercising two times/week for 60-90 minutes- water aerobics. - Desired Outcome: Improve diet and exercise, decrease carbohydrate intake. Lower Hgb A1c.  - Plan: Further assess financial barrier(s). Will continue provide and reinforce pt education re: diabetic diet, assist with meal planning. Pt could benefit from keeping a food diary; will discuss further during subsequent outreach encounter as pt is unable to continue call at this time due to getting ready to leave her house.               Future Appointments:  Future Appointments   Date Time Provider Ovidio Allan   12/10/2021 10:30 AM Jordan Ervin MD [facility] BS AMB   1/17/2022  1:50 PM Jeanna Mason MD RDE  BS AMB   2/1/2022  9:40 AM Drew Alexandre MD [facility] BS AMB   5/11/2022  1:15 PM Susu Casanova MD [facility] BS AMB

## 2021-11-09 NOTE — PROGRESS NOTES
11/9/2021  3:27 PM    ACM outreach to [address] [place] [address] today regarding order for FreeStyle Jack 14 day reader; spoke with Mamadou Montalvo at [address] [place] [address]. ACM provided patient contact and insurance information to Mamadou Montalvo; Mamadou Montalvo will verify the patient's insurance, contact the patient and then submit order request to Dr. Bruce Garcia office for review and provider signature. ACM contacted the patient today to provide notification of the above update; pt verbalizes understanding. Goals Addressed                 This Visit's Progress       Diabetes     Patient verbalizes understanding of self -management goals of living with Diabetes. On track     11/9/2021  Ramond Sheets outreach to [address] [place] [address] today regarding order for FreeStyle [facility] 14 day reader; pt contact and insurance information provided. [facility] will be contacting the patient after completing insurance verification and will then submit order request to Dr. Bruce Garcia office for review and provider signature. Patient notified by AC today that [address] [place] [address] will be contacting her; pt verbalizes understanding.  Patient report that she spoke with [facility] today and requested that they cancel the previous order for the [facility] and [employer].  ACM outreach in approximately one week to follow-up on order status with [facility]. 11/5/2021   Attended office visit with Dr. Eligio Ware on 10/15/21.  [facility] Eligio [facility] on 10/15/21. She tried to fill this order at [employer] and [facility]. Pt reports that [facility] informed her that out-of-pocket cost for the reader device was >$200; pt unable to afford this cost and had orders transferred to [employer]. Patient was informed by [address] [employer] [facility] [employer] reader was $79; this is affordable to her, however she has not purchased the reader yet because she was unable to get the sensors.  Patient is not sure of her out-of-pocket cost for the [employer]; [employer] was unable to advise her of the 14-day sensors copay because [facility] needs to void out where they already filled the order. Pt was informed by [address] that order for [facility] should be sent to a DME Provider.  She is checking her blood glucose twice daily in the morning and in the evening. One episode of hypoglycemia is reported by patient within the past week with fasting blood glucose level of 59 mg/dL, however she reports that she was asymptomatic. Reports that episodes of fasting low blood sugar have decreased since previous ACM outreach encounter.  She forgot to take her Toujeou last night and her fasting blood glucose this morning was 160 mg/dL   Patient will continue to check her blood glucose twice daily in the morning and in the evening; she will maintain blood glucose log to review with Dr. Jie AZEVEDO.  Medicare Coverage Criteria for [employer] reviewed by this ACM today (abbott resource); patient should meet the criteria for the [address] [place] [address] Drive to be covered by her Medicare Part B. ACM outreach to [facility] DME Provider to inquire if they are able to assist with order; lvm requesting a return phone call to this ACM.  ACM provided patient with update; will attempt to have order for [address], reader and sensors, supplied by DME Provider under her Medicare Part B.    10/1/2021   She was checking her blood glucose twice daily and maintaining her blood glucose log up until last week; she checked her blood glucose once daily last week because her fingers were sore and she has not written her blood sugars down since last week. Patient states, \"But I know I should (check her blood glucose twice daily) because I really don't feel any different when it's (blood sugar) up or down. \"    \"I've been having some very low sugar in the morning. \" Pt reported fasting blood glucose this morning was 93 mg/dL; reports that her lowest fasting blood glucose level since previous AC outreach encounter was on 9/8/21 and it was 52 mg/dL. Patient reports associated symptoms of sweating and weakness when her blood glucose is low, \"but not to the point where I felt like I was going to pass out. \" ACM reviewed treatment of hypoglycemia with patient today; pt verbalizes understanding.  Patient was able to review her blood glucose log and visualize the effect that certain foods had on her blood sugar; states, \"When it (blood sugar) went up at night I had either rice, potato or bread. \"   She is still interested in the [facility], if eligible; she could benefit from the [facility] to assist with treatment plan adherence for blood glucose monitoring. Barrier to patient adherence with blood glucose monitoring at present is that her fingertips get sore with checking her blood glucose 2-3 times per day. She has not received outreach from [facility] regarding this; referral was previously sent by this ACM on 8/18/21. Patient will discuss this with Dr. Tacho Rich at her scheduled appointment on 10/15/21.  Patient will resume checking her blood glucose twice daily and maintain her blood glucose log.  Patient will attend previously scheduled appointment with Dr. Tacho Rich on 10/15/21; she will take her blood glucose log to this appointment.  Encounter routed to Dr. Tacho Rich today for notification of patient reported most recent blood glucose results.  Plan for next [facility] outreach in approximately two weeks. 8/18/2021  She has not resumed water aerobics class; she still plans on returning to this class, however her schedule has been busy. Checking her blood glucose once daily. She is still interested in trying to get the [facility]; notes that it was previously not covered by Medicare, however she has recently heard that it is now covered. Pt reported fasting blood glucose this morning was 140 mg/dL.  Attended office visit with Dr. Tacho Rich on 7/14/21; hemoglobin A1c 7/14/21 9.2%  Next scheduled follow-up with Dr. Letty Morales is 10/15/21. Encouraged patient to increase blood glucose monitoring to twice daily; order per Dr. Letty Morales is for three times daily. ACM will consult with [facility] regarding Freestyle Jack and Medicare coverage; pt could benefit from the [place] from an adherence standpoint with blood glucose monitoring. 7/8/2021  Plans to resume water aerobics next week. She contacted the [facility] and was notified that water aerobics class has resumed; plans to go to local [facility] on 7/13 to sign up and pay fee. Patient states, \"I'm definitely looking forward to that. \"  Checking her blood glucose twice daily; states, \"I haven't had anything over 160 (mg/dL). \" She is interested in getting the [facility] 14-day system and will discuss this with Dr. Letty Morales during her upcoming visit; pt notes that when this was previously ordered for her in 2020, however she did not get it because it was not covered by her Medicare. Patient has office visit scheduled with Dr. Letty Morales on 7/14.    4/20/2021  most recent Hgb A1c 3/17/21 8.9%. Patient reports that she has made an effort to improve her diet since the beginning of the year and has decreased her intake of starchy foods. Patient reports that current level of physical activity includes walking around inside of her house; barrier to increased physical activity is chronic pain in her right knee and back. Patient reports plan to increase physical activity with the warmer weather by walking in her pool once she gets it open. 3/5/2020  - attended OV with Dr. Letty Morales 2/28/2020; Hgb A1c 9.9%, compare to previous result of 10.1%. Pt reported to Dr. Letty Morales that she had not taken metformin since her previous OV (11/25/19) due to continued GI distress.  Pt was advised to increase her physical activity to tolerance and was asked to check blood sugars at 11:00PM for 1 week and then contact Dr. Letty Morales to report readings; see OV note for details regarding OV encounter.   - Patient is not receptive to ACM follow-up today due to acute illness; ACM outreach in one week. 2/12/2020  - reports fasting blood glucose this morning of 120 mg/dL, after lunch 190 mg/dL. Unable to review additional recent blood glucose readings as results are stored on glucometer and patient is unsure how to recall historical results; pt will take her glucometer to scheduled OV with Dr. Chance Christine . - reports taking mealtime insulin prior to meals; states, \"Sometimes I forget and I take it right after I eat, but I'm trying to get use to it and take it before I eat. \"  - still taking humalog 24 units before breakfast (if blood glucose is <150 mg/dL she takes 22 units), 24 units before lunch, 36 units before dinner and Toujeo 60 units nightly; pt did not contact Dr. Vera Gatica office after last ACM outreach to confirm insulin orders, however reports that she reviewed her most recent office visit AVS and confirmed that the unit doses she is currently taking are correct. - continued lack of adherence with TID blood glucose monitoring; inquired about patient barriers to adherence - states, \"I am not sure I am going to be able to do that because when I stick it it hurts too bad and I've been doing this for 15 years and my fingers have had it. \" Informed patient about 14-day blood glucose monitoring system, Freestyle Jack, and encouraged patient to discuss this option with Dr. Chance Christine at her upcoming office visit to increase patient adherence with blood glucose monitoring.  -  Inquired as to whether patient is following a diabetic diet; pt states, \"Kind of, yeah; most of the time. \" Diet is described as \"about the same; nothing more, nothing less. \" Patient is not interested in keeping a food diary to review with this NN.  Patient is not interested in additional education/review of diabetic diet; notes that she previously met with CDE NN, Kimberly Matta.  - no longer participating in water aerobics; currently not engaging in moderate exercise. She reports that she walks around her house for exercise; states, \"I just get up and start walking from the side door around to the front door and do it a couple of times. \" Patient reported barrier to exercise/increasing physical activity is back spasms which prevent her from \"doing a lot of moving or standing unless I am in the water. \" Reports that barrier to attending water aerobics at present is transportation and winter season. - will attend scheduled OV with Dr. Titus How 2/28/2020  - pt requests next ACM follow-up in three weeks after she attends scheduled OV with Dr. Titus How    1/10/20  - Hgb A1c 11/25/19 - 10.1%  - pt notes history of Type 2 DM for 15 years  - checking blood glucose 1-2x/day  - ordered insulin regimen/frequency is Humalog TID (before breakfast, lunch and dinner) and Toujeo nightly  - reports taking humalog 24 units before breakfast (if blood glucose is <150 mg/dL she takes 22 units), 24 units before lunch, 36 units before dinner and Toujeo 60 units nightly. Most recent endocrinology OV note reviewed; per OV note, most recent Humalog order (11/25/19) - 22 units for breakfast and lunch and 36 units for dinner and Toujeo 60 units nightly (12/16/19), pt was noted to be taking mealtime insulin 2-3 hours after meal .Further clarification is needed regarding what current insulin unit dose orders are; patient is advised to contact Dr. Titus How to confirm/clarify current insulin unit dose orders.    - per last endocrinology OV note, pt was encouraged to improve diet and physical activity to tolerance  - encouraged to take mealtime insulin prior to eating meal  - encouraged to check blood glucose TID as ordered and keep blood glucose log      10/29/19  8/14/19-10.9%  4/22/19 - Hgb A1c 10.1%  - Current level of understanding: checks blood sugar once daily before breakfast. Completed Disease Specific CM Program for Diabetes Self-Management (avani/ Analilia Gunn Teddy RN NN CDE) on 8/27/19. Does not keep written blood glucose log; reports that glucometer stores historical blood glucose results. Pt reported fasting blood glucose on 10/28 of 169 mg/dL and on 10/29 of 93 mg/dL. Pt notes difficulty adhering to diabetic diet - enjoys pasta, potatoes, chips - states, \"my problem is food. I'm not that strong on sweets. \"; pt reported barrier to diabetic diet adherence is financial. Does not drink regular soda; drinks diet coke (no more than 2/day) and water. Currently exercising two times/week for 60-90 minutes- water aerobics. - Desired Outcome: Improve diet and exercise, decrease carbohydrate intake. Lower Hgb A1c.  - Plan: Further assess financial barrier(s). Will continue provide and reinforce pt education re: diabetic diet, assist with meal planning. Pt could benefit from keeping a food diary; will discuss further during subsequent outreach encounter as pt is unable to continue call at this time due to getting ready to leave her house. Encounter routed to Dr. Audra Banegas today for notification.

## 2021-11-11 ENCOUNTER — TELEPHONE (OUTPATIENT)
Dept: ENDOCRINOLOGY | Age: 71
End: 2021-11-11

## 2021-11-11 DIAGNOSIS — E11.8 TYPE 2 DIABETES MELLITUS WITH COMPLICATION, WITH LONG-TERM CURRENT USE OF INSULIN (HCC): Primary | ICD-10-CM

## 2021-11-11 DIAGNOSIS — Z79.4 TYPE 2 DIABETES MELLITUS WITH COMPLICATION, WITH LONG-TERM CURRENT USE OF INSULIN (HCC): Primary | ICD-10-CM

## 2021-11-11 RX ORDER — FLASH GLUCOSE SENSOR
1 KIT MISCELLANEOUS
Qty: 6 KIT | Refills: 3 | Status: SHIPPED | OUTPATIENT
Start: 2021-11-11

## 2021-11-11 RX ORDER — FLASH GLUCOSE SCANNING READER
1 EACH MISCELLANEOUS DAILY
Qty: 1 EACH | Refills: 0 | Status: SHIPPED | OUTPATIENT
Start: 2021-11-11

## 2021-11-11 NOTE — TELEPHONE ENCOUNTER
----- Message from Lauryn Velazco sent at 11/11/2021  9:26 AM EST -----  Regarding: Dr. Luz Strange  General Message/Vendor Calls    Caller's first and last name: Reji Benton      Reason for call: Insurance will only cover the newer Free Style 2 which is almost 14days with alarms      Callback required yes/no and why: Yes Doctors approval needed that the Free Style 2 is ok the ship       Best contact number(s): [phone]       Details to clarify the request: The patients insurance will only cover the newer Free Style which is the same as the 14 but with alarms if sugar drops or lowers. Please call to approve as soon as possible so it can be shipped.  Thank you      Lauryn Velazco

## 2021-11-12 ENCOUNTER — TELEPHONE (OUTPATIENT)
Dept: ENDOCRINOLOGY | Age: 71
End: 2021-11-12

## 2021-11-12 NOTE — TELEPHONE ENCOUNTER
----- Message from [facility] sent at 11/12/2021  8:06 AM EST -----  Regarding: /Telephone  General Message/Vendor Calls    Caller's first and last name: Sandra Guardado. Customer enroll.  [facility]      Reason for call:[place] pat      Callback required yes/no and why:Y      Best contact number(s):[phone] ext [phone]      Details to clarify the request:Want to know what free style :\"Jack\" to give pat   \"Jack 14\"or \"Jack 2\" they both are 14 days :[place] 2\" have alarm on it pat.want to get jack 2 just want to confirm giving austyn 2 was ok      [facility]

## 2021-11-15 ENCOUNTER — TRANSCRIBE ORDER (OUTPATIENT)
Dept: NEUROLOGY | Age: 71
End: 2021-11-15

## 2021-11-15 ENCOUNTER — TELEPHONE (OUTPATIENT)
Dept: ENDOCRINOLOGY | Age: 71
End: 2021-11-15

## 2021-11-15 NOTE — TELEPHONE ENCOUNTER
----- Message from Adam Pyle sent at 11/15/2021  8:18 AM EST -----  Regarding: CHERY/MD/TELEPHONE  Contact: [phone]  General Message/Vendor Calls    Caller's first and last name: Kar Canchola       Reason for call: Free style eleni      Callback required yes/no and why: Yes      Best contact number(s): [phone] [phone]      Details to clarify the request: Per Kar Canchola from [facility] patient's order was for a free style eleni 14 and she would like to know if it's ok to give the patient the newer version the free style eleni 2 which has an alarm that goes off when the sugar drops.  Per Kar Canchola a detail message can be left        3rd attempt      Adam Pyle

## 2021-11-15 NOTE — TELEPHONE ENCOUNTER
----- Message from Elisha Kate sent at 11/15/2021  9:29 AM EST -----  Regarding: Nora Alcaraz MD  General Message/Vendor Calls    Caller's first and last name:patient      Reason for call:callback      Callback required yes/no and why:yes      Best contact number(s):[phone]      Details to clarify the request:patient needs a callback regarding her diabetic supplies       Elisha Kate

## 2021-11-15 NOTE — TELEPHONE ENCOUNTER
Called [facility] and spoke to Asaf Meyer. She informed me that they spoke to the patient who has agreed to switch to the Tom 2 reader. Asaf Meyer stated that they need the last office visit note faxed to them. Informed Asaf Meyer that we will fax them over today.

## 2021-11-16 NOTE — TELEPHONE ENCOUNTER
Spoke to the patient yesterday. She was letting us know that she has switched her order to the [place] 2 with [facility]. Informed her that I received a call from one of their reps and that we will fax over the required documents that they are asking for.

## 2021-11-23 ENCOUNTER — TELEPHONE (OUTPATIENT)
Dept: ENDOCRINOLOGY | Age: 71
End: 2021-11-23

## 2021-11-23 NOTE — TELEPHONE ENCOUNTER
Spoke to Valeriy Danny Yusuf and informed her that we faxed the updated chart notes to [address] [place] [address] yesterday afternoon. Mrs. Danny Yusuf stated that she will contact the company again to inform them that we have already faxed the notes.

## 2021-11-23 NOTE — TELEPHONE ENCOUNTER
----- Message from Vero Rose sent at 11/23/2021 11:54 AM EST -----  Regarding: /teleevangelina  General Message/Vendor Calls    Caller's first and last name:self      Reason for call: approval for flash glucose sensor (FreeStyle Jack 2 Sensor) kit       Callback required yes/no and why:yes to speak with Dr. Bianca Leach contact number(s):[phone]        Details to clarify the request:Pt called to speak with Stanley Frey, regarding on the pt requesting for an approval of \"flash glucose sensor (FreeStyle Jack 2 Sensor) kit\".     Vero Rose

## 2021-12-03 ENCOUNTER — TELEPHONE (OUTPATIENT)
Dept: ENDOCRINOLOGY | Age: 71
End: 2021-12-03

## 2021-12-03 NOTE — TELEPHONE ENCOUNTER
Pt called requesting to speak with nurse regarding the [place] 2. Pt stated she has some questions because she did not receive what she thought she was supposed to. Thank you.

## 2021-12-07 ENCOUNTER — PATIENT OUTREACH (OUTPATIENT)
Dept: CASE MANAGEMENT | Age: 71
End: 2021-12-07

## 2021-12-08 DIAGNOSIS — Z79.4 TYPE 2 DIABETES MELLITUS WITH COMPLICATION, WITH LONG-TERM CURRENT USE OF INSULIN (HCC): ICD-10-CM

## 2021-12-08 DIAGNOSIS — E11.8 TYPE 2 DIABETES MELLITUS WITH COMPLICATION, WITH LONG-TERM CURRENT USE OF INSULIN (HCC): ICD-10-CM

## 2021-12-08 RX ORDER — CALCIUM CITRATE/VITAMIN D3 200MG-6.25
TABLET ORAL
Qty: 300 STRIP | Refills: 3 | Status: SHIPPED | OUTPATIENT
Start: 2021-12-08

## 2021-12-08 NOTE — TELEPHONE ENCOUNTER
Requested Prescriptions     Pending Prescriptions Disp Refills    glucose blood VI test strips (True Metrix Glucose Test Strip) strip 300 Strip 3     Sig: Monitor blood sugar 3 times daily

## 2021-12-08 NOTE — PROGRESS NOTES
12/8/2021  4:37 PM    Top Challenges reviewed with the provider   Patient reports feeling depressed. Depression screening completed today. Patient denies thoughts of harming herself or others. Patient states, \"I feel like something's missing. \" She notes that this is her first Holiday season without her mother and her brother; they passed away last year. Patient is encouraged to discuss this with her PCP at upcoming appointment on 12/10/21; pt is agreeable to this plan. Patient has PCP appointment scheduled for 12/10/21; encounter routed today for PCP notification of above. Goals Addressed                 This Visit's Progress       Diabetes     Patient verbalizes understanding of self -management goals of living with Diabetes. On track     12/8/2021   She has not received the [employer]. She most recently spoke to [address] [place] [address] on today; reports being advised that Medicare denied coverage due to an issue with the order and supporting documentation that was submitted to Medicare for coverage.  Patient does not have her blood glucose log available at this time to review with ACM during this call. She does report elevated blood glucose readings since previous ACM outreach encounter; pt attributes elevated blood glucose readings to increased stress level related to barrier with getting the [place] as well as the stress of the holiday season because it's the first holiday season without her mother and brother (they passed away last year).  She is making an effort to increase her physical activity; barrier is decreased strength in her lower extremities and bilateral knee pain. She is ambulating with a rollator in public settings; ambulates with cane at home. She notes that she has not seen an ortho provider in many years; she has never seen ortho for her bilateral knee pain.  Patient has PCP follow-up scheduled with Dr. Tatiana Lundberg on 12/10/21 and she will discuss this further with Dr. Pk Cleveland at her upcoming appointment.  Staff message routed to Dr. Shelley Xavier clinical staff to inquire about Freestyle Jack Order; awaiting response. [facility] will contact [address] [place] [address] on tomorrow to inquire about Medicare coverage barrier/denial.    11/9/2021   AC outreach to [address] [place] [address] today regarding order for FreeStyle Jack 14 day reader; pt contact and insurance information provided. [facility] will be contacting the patient after completing insurance verification and will then submit order request to Dr. Shelley Xavier office for review and provider signature. Patient notified by [facility] today that [facility] will be contacting her; pt verbalizes understanding.  Patient report that she spoke with [facility] today and requested that they cancel the previous order for the [employer].  [facility] outreach in approximately one week to follow-up on order status with [facility]. 11/5/2021   Attended office visit with Dr. Sunni Silva on 10/15/21.  Script for [employer] written by Dr. Sunni Silva on 10/15/21. She tried to fill this order at [employer] and [employer]. Pt reports that [facility] informed her that out-of-pocket cost for the reader device was >$200; pt unable to afford this cost and had orders transferred to [employer]. Patient was informed by [employer] reader was $79; this is affordable to her, however she has not purchased the reader yet because she was unable to get the sensors. Patient is not sure of her out-of-pocket cost for the [facility] Rx; [employer] was unable to advise her of the 14-day sensors copay because [facility] needs to void out where they already filled the order. Pt was informed by [employer] that order for [facility] Rx should be sent to a DME Provider.  She is checking her blood glucose twice daily in the morning and in the evening.  One episode of hypoglycemia is reported by patient within the past week with fasting blood glucose level of 59 mg/dL, however she reports that she was asymptomatic. Reports that episodes of fasting low blood sugar have decreased since previous ACM outreach encounter.  She forgot to take her Toujeou last night and her fasting blood glucose this morning was 160 mg/dL   Patient will continue to check her blood glucose twice daily in the morning and in the evening; she will maintain blood glucose log to review with Dr. Roshan AZEVEDO.  Medicare Coverage Criteria for [address] reviewed by this ACM today ([employer]ott resource); patient should meet the criteria for the [address] [place] [address] Drive to be covered by her Medicare Part B. ACM outreach to [facility] DME Provider to inquire if they are able to assist with order; lvm requesting a return phone call to this ACM.  ACM provided patient with update; will attempt to have order for [address], reader and sensors, supplied by DME Provider under her Medicare Part B.    10/1/2021   She was checking her blood glucose twice daily and maintaining her blood glucose log up until last week; she checked her blood glucose once daily last week because her fingers were sore and she has not written her blood sugars down since last week. Patient states, \"But I know I should (check her blood glucose twice daily) because I really don't feel any different when it's (blood sugar) up or down. \"    \"I've been having some very low sugar in the morning. \" Pt reported fasting blood glucose this morning was 93 mg/dL; reports that her lowest fasting blood glucose level since previous ACM outreach encounter was on 9/8/21 and it was 52 mg/dL. Patient reports associated symptoms of sweating and weakness when her blood glucose is low, \"but not to the point where I felt like I was going to pass out. \" ACM reviewed treatment of hypoglycemia with patient today; pt verbalizes understanding.    Patient was able to review her blood glucose log and visualize the effect that certain foods had on her blood sugar; states, \"When it (blood sugar) went up at night I had either rice, potato or bread. \"   She is still interested in the [facility], if eligible; she could benefit from the [facility] to assist with treatment plan adherence for blood glucose monitoring. Barrier to patient adherence with blood glucose monitoring at present is that her fingertips get sore with checking her blood glucose 2-3 times per day. She has not received outreach from [facility] regarding this; referral was previously sent by this ACM on 8/18/21. Patient will discuss this with Dr. Kim Cisse at her scheduled appointment on 10/15/21.  Patient will resume checking her blood glucose twice daily and maintain her blood glucose log.  Patient will attend previously scheduled appointment with Dr. Kim Cisse on 10/15/21; she will take her blood glucose log to this appointment.  Encounter routed to Dr. Kim Cisse today for notification of patient reported most recent blood glucose results.  Plan for next AC outreach in approximately two weeks. 8/18/2021  She has not resumed water aerobics class; she still plans on returning to this class, however her schedule has been busy. Checking her blood glucose once daily. She is still interested in trying to get the [facility]; notes that it was previously not covered by Medicare, however she has recently heard that it is now covered. Pt reported fasting blood glucose this morning was 140 mg/dL. Attended office visit with Dr. Kim Cisse on 7/14/21; hemoglobin A1c 7/14/21 9.2%  Next scheduled follow-up with Dr. Kim Cisse is 10/15/21. Encouraged patient to increase blood glucose monitoring to twice daily; order per Dr. Kim Cisse is for three times daily.   ACM will consult with [facility] regarding Freestyle Jack and Medicare coverage; pt could benefit from the [place] from an adherence standpoint with blood glucose monitoring. 7/8/2021  Plans to resume water aerobics next week. She contacted the [facility] and was notified that water aerobics class has resumed; plans to go to local [facility] on 7/13 to sign up and pay fee. Patient states, \"I'm definitely looking forward to that. \"  Checking her blood glucose twice daily; states, \"I haven't had anything over 160 (mg/dL). \" She is interested in getting the [employer] 14-day system and will discuss this with Dr. Lianna Garcia during her upcoming visit; pt notes that when this was previously ordered for her in 2020, however she did not get it because it was not covered by her Medicare. Patient has office visit scheduled with Dr. Lianna Garcia on 7/14.    4/20/2021  most recent Hgb A1c 3/17/21 8.9%. Patient reports that she has made an effort to improve her diet since the beginning of the year and has decreased her intake of starchy foods. Patient reports that current level of physical activity includes walking around inside of her house; barrier to increased physical activity is chronic pain in her right knee and back. Patient reports plan to increase physical activity with the warmer weather by walking in her pool once she gets it open. 3/5/2020  - attended OV with Dr. Lianna Garcia 2/28/2020; Hgb A1c 9.9%, compare to previous result of 10.1%. Pt reported to Dr. Lainna Garcia that she had not taken metformin since her previous OV (11/25/19) due to continued GI distress. Pt was advised to increase her physical activity to tolerance and was asked to check blood sugars at 11:00PM for 1 week and then contact Dr. Lianna Garcia to report readings; see [facility] [address] note for details regarding OV encounter.   - Patient is not receptive to ACM follow-up today due to acute illness; ACM outreach in one week. 2/12/2020  - reports fasting blood glucose this morning of 120 mg/dL, after lunch 190 mg/dL.  Unable to review additional recent blood glucose readings as results are stored on glucometer and patient is unsure how to recall historical results; pt will take her glucometer to scheduled OV with Dr. Aly Wakefield . - reports taking mealtime insulin prior to meals; states, \"Sometimes I forget and I take it right after I eat, but I'm trying to get use to it and take it before I eat. \"  - still taking humalog 24 units before breakfast (if blood glucose is <150 mg/dL she takes 22 units), 24 units before lunch, 36 units before dinner and Toujeo 60 units nightly; pt did not contact Dr. Bailee Khan office after last ACM outreach to confirm insulin orders, however reports that she reviewed her most recent office visit [facility] and confirmed that the unit doses she is currently taking are correct. - continued lack of adherence with TID blood glucose monitoring; inquired about patient barriers to adherence - states, \"I am not sure I am going to be able to do that because when I stick it it hurts too bad and I've been doing this for 15 years and my fingers have had it. \" Informed patient about 14-day blood glucose monitoring system, Freestyle Jack, and encouraged patient to discuss this option with Dr. Aly Wakefield at her upcoming office visit to increase patient adherence with blood glucose monitoring.  -  Inquired as to whether patient is following a diabetic diet; pt states, \"Kind of, yeah; most of the time. \" Diet is described as \"about the same; nothing more, nothing less. \" Patient is not interested in keeping a food diary to review with this NN. Patient is not interested in additional education/review of diabetic diet; notes that she previously met with CDE NN, Kera Kelly.  - no longer participating in water aerobics; currently not engaging in moderate exercise. She reports that she walks around her house for exercise; states, \"I just get up and start walking from the side door around to the front door and do it a couple of times. \" Patient reported barrier to exercise/increasing physical activity is back spasms which prevent her from \"doing a lot of moving or standing unless I am in the water. \" Reports that barrier to attending water aerobics at present is transportation and winter season. - will attend scheduled OV with Dr. Asa Greco 2/28/2020  - pt requests next ACM follow-up in three weeks after she attends scheduled OV with Dr. Asa Greco    1/10/20  - Hgb A1c 11/25/19 - 10.1%  - pt notes history of Type 2 DM for 15 years  - checking blood glucose 1-2x/day  - ordered insulin regimen/frequency is Humalog TID (before breakfast, lunch and dinner) and Toujeo nightly  - reports taking humalog 24 units before breakfast (if blood glucose is <150 mg/dL she takes 22 units), 24 units before lunch, 36 units before dinner and Toujeo 60 units nightly. Most recent endocrinology OV note reviewed; per OV note, most recent Humalog order (11/25/19) - 22 units for breakfast and lunch and 36 units for dinner and Toujeo 60 units nightly (12/16/19), pt was noted to be taking mealtime insulin 2-3 hours after meal .Further clarification is needed regarding what current insulin unit dose orders are; patient is advised to contact Dr. Asa Greco to confirm/clarify current insulin unit dose orders. - per last endocrinology OV note, pt was encouraged to improve diet and physical activity to tolerance  - encouraged to take mealtime insulin prior to eating meal  - encouraged to check blood glucose TID as ordered and keep blood glucose log      10/29/19  8/14/19-10.9%  4/22/19 - Hgb A1c 10.1%  - Current level of understanding: checks blood sugar once daily before breakfast. Completed Disease Specific CM Program for Diabetes Self-Management (avani/ Zabrina Ibrahim RN NN CDE) on 8/27/19. Does not keep written blood glucose log; reports that glucometer stores historical blood glucose results. Pt reported fasting blood glucose on 10/28 of 169 mg/dL and on 10/29 of 93 mg/dL. Pt notes difficulty adhering to diabetic diet - enjoys pasta, potatoes, chips - states, \"my problem is food. I'm not that strong on sweets. \"; pt reported barrier to diabetic diet adherence is financial. Does not drink regular soda; drinks diet coke (no more than 2/day) and water. Currently exercising two times/week for 60-90 minutes- water aerobics. - Desired Outcome: Improve diet and exercise, decrease carbohydrate intake. Lower Hgb A1c.  - Plan: Further assess financial barrier(s). Will continue provide and reinforce pt education re: diabetic diet, assist with meal planning. Pt could benefit from keeping a food diary; will discuss further during subsequent outreach encounter as pt is unable to continue call at this time due to getting ready to leave her house.             Future Appointments:  Future Appointments   Date Time Provider Ovidio Allan   12/10/2021 10:30 AM Marbin Mckeon MD [facility] BS AMB   1/17/2022  1:50 PM Edgar Spangler MD RDE  BS AMB   2/1/2022  9:40 AM Zafar Simons MD [facility] BS AMB   5/11/2022  1:15 PM Tom Santiago MD [facility] BS AMB

## 2021-12-08 NOTE — TELEPHONE ENCOUNTER
Spoke to the patient and she stated that she needs a refill on her True Metrix test strips. Patient also stated that [address] [place] [address] stated that we have denied her request for the [facility]. Informed the patient that we did not deny her request and that we actually faxed over the required documents to [address] [place] [address] three times. Patient stated that she will contact them again to figure out what is going on.

## 2021-12-10 ENCOUNTER — OFFICE VISIT (OUTPATIENT)
Dept: INTERNAL MEDICINE CLINIC | Age: 71
End: 2021-12-10
Payer: MEDICARE

## 2021-12-10 VITALS
HEIGHT: 63 IN | RESPIRATION RATE: 16 BRPM | DIASTOLIC BLOOD PRESSURE: 60 MMHG | HEART RATE: 77 BPM | OXYGEN SATURATION: 97 % | TEMPERATURE: 98.1 F | WEIGHT: 223 LBS | BODY MASS INDEX: 39.51 KG/M2 | SYSTOLIC BLOOD PRESSURE: 120 MMHG

## 2021-12-10 DIAGNOSIS — F32.A DEPRESSION, UNSPECIFIED DEPRESSION TYPE: ICD-10-CM

## 2021-12-10 DIAGNOSIS — E78.00 PURE HYPERCHOLESTEROLEMIA: ICD-10-CM

## 2021-12-10 DIAGNOSIS — E11.69 TYPE 2 DIABETES MELLITUS WITH OTHER SPECIFIED COMPLICATION, WITH LONG-TERM CURRENT USE OF INSULIN (HCC): Primary | ICD-10-CM

## 2021-12-10 DIAGNOSIS — F33.0 MILD EPISODE OF RECURRENT MAJOR DEPRESSIVE DISORDER (HCC): ICD-10-CM

## 2021-12-10 DIAGNOSIS — E03.9 ACQUIRED HYPOTHYROIDISM: ICD-10-CM

## 2021-12-10 DIAGNOSIS — N18.30 STAGE 3 CHRONIC KIDNEY DISEASE, UNSPECIFIED WHETHER STAGE 3A OR 3B CKD (HCC): ICD-10-CM

## 2021-12-10 DIAGNOSIS — I10 HYPERTENSION, UNSPECIFIED TYPE: ICD-10-CM

## 2021-12-10 DIAGNOSIS — Z79.4 TYPE 2 DIABETES MELLITUS WITH OTHER SPECIFIED COMPLICATION, WITH LONG-TERM CURRENT USE OF INSULIN (HCC): Primary | ICD-10-CM

## 2021-12-10 DIAGNOSIS — I25.10 CORONARY ARTERY DISEASE INVOLVING NATIVE CORONARY ARTERY OF NATIVE HEART WITHOUT ANGINA PECTORIS: ICD-10-CM

## 2021-12-10 PROBLEM — F33.1 MAJOR DEPRESSIVE DISORDER, RECURRENT, MODERATE (HCC): Status: ACTIVE | Noted: 2021-12-10

## 2021-12-10 PROBLEM — F33.9 MAJOR DEPRESSIVE DISORDER, RECURRENT, UNSPECIFIED (HCC): Status: ACTIVE | Noted: 2021-12-10

## 2021-12-10 LAB
ALBUMIN SERPL-MCNC: 3.5 G/DL (ref 3.5–5)
ALBUMIN/GLOB SERPL: 0.9 {RATIO} (ref 1.1–2.2)
ALP SERPL-CCNC: 165 U/L (ref 45–117)
ALT SERPL-CCNC: 17 U/L (ref 12–78)
ANION GAP SERPL CALC-SCNC: 6 MMOL/L (ref 5–15)
AST SERPL-CCNC: 8 U/L (ref 15–37)
BILIRUB SERPL-MCNC: 0.5 MG/DL (ref 0.2–1)
BUN SERPL-MCNC: 21 MG/DL (ref 6–20)
BUN/CREAT SERPL: 14 (ref 12–20)
CALCIUM SERPL-MCNC: 9.8 MG/DL (ref 8.5–10.1)
CHLORIDE SERPL-SCNC: 101 MMOL/L (ref 97–108)
CHOLEST SERPL-MCNC: 152 MG/DL
CO2 SERPL-SCNC: 34 MMOL/L (ref 21–32)
CREAT SERPL-MCNC: 1.47 MG/DL (ref 0.55–1.02)
CREAT UR-MCNC: 130 MG/DL
GLOBULIN SER CALC-MCNC: 4.1 G/DL (ref 2–4)
GLUCOSE SERPL-MCNC: 262 MG/DL (ref 65–100)
HDLC SERPL-MCNC: 62 MG/DL
HDLC SERPL: 2.5 {RATIO} (ref 0–5)
LDLC SERPL CALC-MCNC: 65.6 MG/DL (ref 0–100)
MICROALBUMIN UR-MCNC: 2.09 MG/DL
MICROALBUMIN/CREAT UR-RTO: 16 MG/G (ref 0–30)
POTASSIUM SERPL-SCNC: 3.7 MMOL/L (ref 3.5–5.1)
PROT SERPL-MCNC: 7.6 G/DL (ref 6.4–8.2)
SODIUM SERPL-SCNC: 141 MMOL/L (ref 136–145)
TRIGL SERPL-MCNC: 122 MG/DL (ref ?–150)
TSH SERPL DL<=0.05 MIU/L-ACNC: 1.3 UIU/ML (ref 0.36–3.74)
VLDLC SERPL CALC-MCNC: 24.4 MG/DL

## 2021-12-10 PROCEDURE — G8752 SYS BP LESS 140: HCPCS | Performed by: INTERNAL MEDICINE

## 2021-12-10 PROCEDURE — G9899 SCRN MAM PERF RSLTS DOC: HCPCS | Performed by: INTERNAL MEDICINE

## 2021-12-10 PROCEDURE — G8399 PT W/DXA RESULTS DOCUMENT: HCPCS | Performed by: INTERNAL MEDICINE

## 2021-12-10 PROCEDURE — G8432 DEP SCR NOT DOC, RNG: HCPCS | Performed by: INTERNAL MEDICINE

## 2021-12-10 PROCEDURE — G8536 NO DOC ELDER MAL SCRN: HCPCS | Performed by: INTERNAL MEDICINE

## 2021-12-10 PROCEDURE — 3017F COLORECTAL CA SCREEN DOC REV: CPT | Performed by: INTERNAL MEDICINE

## 2021-12-10 PROCEDURE — G8427 DOCREV CUR MEDS BY ELIG CLIN: HCPCS | Performed by: INTERNAL MEDICINE

## 2021-12-10 PROCEDURE — G0463 HOSPITAL OUTPT CLINIC VISIT: HCPCS | Performed by: INTERNAL MEDICINE

## 2021-12-10 PROCEDURE — 1101F PT FALLS ASSESS-DOCD LE1/YR: CPT | Performed by: INTERNAL MEDICINE

## 2021-12-10 PROCEDURE — G8754 DIAS BP LESS 90: HCPCS | Performed by: INTERNAL MEDICINE

## 2021-12-10 PROCEDURE — 1090F PRES/ABSN URINE INCON ASSESS: CPT | Performed by: INTERNAL MEDICINE

## 2021-12-10 PROCEDURE — 99214 OFFICE O/P EST MOD 30 MIN: CPT | Performed by: INTERNAL MEDICINE

## 2021-12-10 PROCEDURE — G8417 CALC BMI ABV UP PARAM F/U: HCPCS | Performed by: INTERNAL MEDICINE

## 2021-12-10 PROCEDURE — 3046F HEMOGLOBIN A1C LEVEL >9.0%: CPT | Performed by: INTERNAL MEDICINE

## 2021-12-10 PROCEDURE — 2022F DILAT RTA XM EVC RTNOPTHY: CPT | Performed by: INTERNAL MEDICINE

## 2021-12-10 RX ORDER — SERTRALINE HYDROCHLORIDE 25 MG/1
25 TABLET, FILM COATED ORAL DAILY
Qty: 90 TABLET | Refills: 1 | Status: SHIPPED | OUTPATIENT
Start: 2021-12-10 | End: 2022-03-10 | Stop reason: ALTCHOICE

## 2021-12-10 NOTE — PROGRESS NOTES
12/10/2021  1:38 PM    * Per [facility] representative, outreach to Man[place] at Dr. Vera Gatica office was performed on 12/9/21 to explain the Medicare guidelines for CGM and to request that an order addendum be submitted to clarify the number of times per day that the patient is testing her blood glucose and how many times per day the patient is injecting insulin; previously submitted order was unclear. Per Efra Ortez, CGM rep with [facility], order addendum was previously requested from Dr. Vera Gatica office, however was not received. Medicare guidelines for CGM states that the patient has to be monitoring her blood glucose 4-6 times/day and injecting insulin 4-5 times/day; Efra Ortez reports that the previously submitted order/notes stated that patient was only injecting insulin per sliding scale based on her blood glucose levels. CGM order status is currently closed by [facility] due to previously submitted order/notes did not reflect that the patient meets Medicare guidelines and order addendum was not received by Endocrinology office. * ACM outreach to patient today to clarify her current insulin regimen. Two patient identifiers verified. Per patient, her current insulin orders are as follows:    Humalog -24 units with breakfast, 20 units with lunch, 42 units with dinner   Toujeou - 62 units before bedtime; reports that unit dose was  previously increased from 60 units to 62 units by Dr. Chance Christine, however she is unable to recall when the dose was changed. * Patient notes that her breakfast and lunch Humalog unit dose can vary depending on her blood glucose readings, however she does take insulin four times daily. Goals Addressed                 This Visit's Progress       Chronic Disease     Supportive resources in place to maintain patient in the community (ie.  Home Health, DME equipment, refer to, medication assistant plan, etc.)          Diabetes     Patient verbalizes understanding of self -management goals of living with Diabetes. On track     12/10/2021   Per [facility] representative, order for CGM is currently closed; previously submitted order was denied by Medicare due to need for clarification regarding number of times per day that the patient is checking her blood glucose and injecting insulin, however requested order addendum (by [facility]) was not received from endocrinology office. Patient attended office visit with PCP on 12/10/21 and was referred to ambulatory PharmD for assistance with [facility] order; encounter routed to Ambulatory PharmD today for continuity of care.  Patient has an appointment scheduled with Ambulatory PharmD on 12/22/21 to discuss [facility] [employer] [facility]. Patient will attend this appointment as scheduled.  Plan for ACM outreach in approximately two weeks. 12/8/2021   She has not received the [facility]. She most recently spoke to [address] [place] [address] on today; reports being advised that Medicare denied coverage due to an issue with the order and supporting documentation that was submitted to Medicare for coverage.  Patient does not have her blood glucose log available at this time to review with ACM during this call. She does report elevated blood glucose readings since previous ACM outreach encounter; pt attributes elevated blood glucose readings to increased stress level related to barrier with getting the [facility] as well as the stress of the holiday season because it's the first holiday season without her mother and brother (they passed away last year).  She is making an effort to increase her physical activity; barrier is decreased strength in her lower extremities and bilateral knee pain. She is ambulating with a rollator in public settings; ambulates with cane at home. She notes that she has not seen an ortho provider in many years; she has never seen ortho for her bilateral knee pain.  Patient has PCP follow-up scheduled with Dr. Josafat Capps on 12/10/21 and she will discuss this further with Dr. Josafat Capps at her upcoming appointment.  Staff message routed to Dr. [facility] clinical staff to inquire about Freestyle Jack Order; awaiting response. AC will contact [address] [place] [address] on tomorrow to inquire about Medicare coverage barrier/denial.    11/9/2021   AC outreach to [address] [place] [address] today regarding order for FreeStyle Jack 14 day reader; pt contact and insurance information provided. [facility] will be contacting the patient after completing insurance verification and will then submit order request to Dr. [facility] office for review and provider signature. Patient notified by [facility] today that [facility] will be contacting her; pt verbalizes understanding.  Patient report that she spoke with [facility] today and requested that they cancel the previous order for the [employer].  AC outreach in approximately one week to follow-up on order status with [facility]. 11/5/2021   Attended office visit with Dr. Phil Macias on 10/15/21.  Script for [employer] Amy written by Dr. Phil Macias on 10/15/21. She tried to fill this order at [employer] and [facility]. Pt reports that [facility] informed her that out-of-pocket cost for the reader device was >$200; pt unable to afford this cost and had orders transferred to [facility]. Patient was informed by [facility] 100Plus reader was $79; this is affordable to her, however she has not purchased the reader yet because she was unable to get the sensors. Patient is not sure of her out-of-pocket cost for the [facility]; [facility] was unable to advise her of the 14-day sensors copay because [facility] needs to void out where they already filled the order. Pt was informed by Tatiana Cristobal that order for [facility] should be sent to a DME Provider.    She is checking her blood glucose twice daily in the morning and in the evening. One episode of hypoglycemia is reported by patient within the past week with fasting blood glucose level of 59 mg/dL, however she reports that she was asymptomatic. Reports that episodes of fasting low blood sugar have decreased since previous ACM outreach encounter.  She forgot to take her Toujeou last night and her fasting blood glucose this morning was 160 mg/dL   Patient will continue to check her blood glucose twice daily in the morning and in the evening; she will maintain blood glucose log to review with Dr. Parris [facility].  Medicare Coverage Criteria for [address] reviewed by this ACM today ([facility] resource); patient should meet the criteria for the [address] [place] [address] Drive to be covered by her Medicare Part B. ACM outreach to [facility] DME Provider to inquire if they are able to assist with order; lvm requesting a return phone call to this ACM.  ACM provided patient with update; will attempt to have order for [address], reader and sensors, supplied by DME Provider under her Medicare Part B.    10/1/2021   She was checking her blood glucose twice daily and maintaining her blood glucose log up until last week; she checked her blood glucose once daily last week because her fingers were sore and she has not written her blood sugars down since last week. Patient states, \"But I know I should (check her blood glucose twice daily) because I really don't feel any different when it's (blood sugar) up or down. \"    \"I've been having some very low sugar in the morning. \" Pt reported fasting blood glucose this morning was 93 mg/dL; reports that her lowest fasting blood glucose level since previous ACM outreach encounter was on 9/8/21 and it was 52 mg/dL. Patient reports associated symptoms of sweating and weakness when her blood glucose is low, \"but not to the point where I felt like I was going to pass out. \" AC reviewed treatment of hypoglycemia with patient today; pt verbalizes understanding.  Patient was able to review her blood glucose log and visualize the effect that certain foods had on her blood sugar; states, \"When it (blood sugar) went up at night I had either rice, potato or bread. \"   She is still interested in the [facility], if eligible; she could benefit from the [facility] to assist with treatment plan adherence for blood glucose monitoring. Barrier to patient adherence with blood glucose monitoring at present is that her fingertips get sore with checking her blood glucose 2-3 times per day. She has not received outreach from Ambulatory PharmD regarding this; referral was previously sent by this ACM on 8/18/21. Patient will discuss this with Dr. Letty Morales at her scheduled appointment on 10/15/21.  Patient will resume checking her blood glucose twice daily and maintain her blood glucose log.  Patient will attend previously scheduled appointment with Dr. Letty Morales on 10/15/21; she will take her blood glucose log to this appointment.  Encounter routed to Dr. Letty Morales today for notification of patient reported most recent blood glucose results.  Plan for next [facility] outreach in approximately two weeks. 8/18/2021  She has not resumed water aerobics class; she still plans on returning to this class, however her schedule has been busy. Checking her blood glucose once daily. She is still interested in trying to get the [facility]; notes that it was previously not covered by Medicare, however she has recently heard that it is now covered. Pt reported fasting blood glucose this morning was 140 mg/dL. Attended office visit with Dr. Letty Morales on 7/14/21; hemoglobin A1c 7/14/21 9.2%  Next scheduled follow-up with Dr. Letty Morales is 10/15/21. Encouraged patient to increase blood glucose monitoring to twice daily; order per Dr. Letty Morales is for three times daily.   ACM will consult with Ambulatory PharmD regarding Freestyle Jack and Medicare coverage; pt could benefit from the [place] from an adherence standpoint with blood glucose monitoring. 7/8/2021  Plans to resume water aerobics next week. She contacted the [facility] and was notified that water aerobics class has resumed; plans to go to local [facility] on 7/13 to sign up and pay fee. Patient states, \"I'm definitely looking forward to that. \"  Checking her blood glucose twice daily; states, \"I haven't had anything over 160 (mg/dL). \" She is interested in getting the [facility] 14-day system and will discuss this with Dr. Darya Hurst during her upcoming visit; pt notes that when this was previously ordered for her in 2020, however she did not get it because it was not covered by her Medicare. Patient has office visit scheduled with Dr. Darya Hurst on 7/14.    4/20/2021  most recent Hgb A1c 3/17/21 8.9%. Patient reports that she has made an effort to improve her diet since the beginning of the year and has decreased her intake of starchy foods. Patient reports that current level of physical activity includes walking around inside of her house; barrier to increased physical activity is chronic pain in her right knee and back. Patient reports plan to increase physical activity with the warmer weather by walking in her pool once she gets it open. 3/5/2020  - attended OV with Dr. Darya Hurst 2/28/2020; Hgb A1c 9.9%, compare to previous result of 10.1%. Pt reported to Dr. Darya Hurst that she had not taken metformin since her previous OV (11/25/19) due to continued GI distress. Pt was advised to increase her physical activity to tolerance and was asked to check blood sugars at 11:00PM for 1 week and then contact Dr. Darya Hurst to report readings; see Cynthia Medina note for details regarding OV encounter.   - Patient is not receptive to ACM follow-up today due to acute illness; ACM outreach in one week. 2/12/2020  - reports fasting blood glucose this morning of 120 mg/dL, after lunch 190 mg/dL.  Unable to review additional recent blood glucose readings as results are stored on glucometer and patient is unsure how to recall historical results; pt will take her glucometer to scheduled OV with Dr. Eligio Ware . - reports taking mealtime insulin prior to meals; states, \"Sometimes I forget and I take it right after I eat, but I'm trying to get use to it and take it before I eat. \"  - still taking humalog 24 units before breakfast (if blood glucose is <150 mg/dL she takes 22 units), 24 units before lunch, 36 units before dinner and Toujeo 60 units nightly; pt did not contact Dr. Bruce Garcia office after last ACM outreach to confirm insulin orders, however reports that she reviewed her most recent office visit AVS and confirmed that the unit doses she is currently taking are correct. - continued lack of adherence with TID blood glucose monitoring; inquired about patient barriers to adherence - states, \"I am not sure I am going to be able to do that because when I stick it it hurts too bad and I've been doing this for 15 years and my fingers have had it. \" Informed patient about 14-day blood glucose monitoring system, Freestyle Jack, and encouraged patient to discuss this option with Dr. Eligio Ware at her upcoming office visit to increase patient adherence with blood glucose monitoring.  -  Inquired as to whether patient is following a diabetic diet; pt states, \"Kind of, yeah; most of the time. \" Diet is described as \"about the same; nothing more, nothing less. \" Patient is not interested in keeping a food diary to review with this NN. Patient is not interested in additional education/review of diabetic diet; notes that she previously met with CDE NN, Natalia Sawyer.  - no longer participating in water aerobics; currently not engaging in moderate exercise. She reports that she walks around her house for exercise; states, \"I just get up and start walking from the side door around to the front door and do it a couple of times. \" Patient reported barrier to exercise/increasing physical activity is back spasms which prevent her from \"doing a lot of moving or standing unless I am in the water. \" Reports that barrier to attending water aerobics at present is transportation and winter season. - will attend scheduled OV with Dr. Vadim Del Rio 2/28/2020  - pt requests next ACM follow-up in three weeks after she attends scheduled OV with Dr. Vadim Del Rio    1/10/20  - Hgb A1c 11/25/19 - 10.1%  - pt notes history of Type 2 DM for 15 years  - checking blood glucose 1-2x/day  - ordered insulin regimen/frequency is Humalog TID (before breakfast, lunch and dinner) and Toujeo nightly  - reports taking humalog 24 units before breakfast (if blood glucose is <150 mg/dL she takes 22 units), 24 units before lunch, 36 units before dinner and Toujeo 60 units nightly. Most recent endocrinology OV note reviewed; per OV note, most recent Humalog order (11/25/19) - 22 units for breakfast and lunch and 36 units for dinner and Toujeo 60 units nightly (12/16/19), pt was noted to be taking mealtime insulin 2-3 hours after meal .Further clarification is needed regarding what current insulin unit dose orders are; patient is advised to contact Dr. Vadim Del Rio to confirm/clarify current insulin unit dose orders. - per last endocrinology OV note, pt was encouraged to improve diet and physical activity to tolerance  - encouraged to take mealtime insulin prior to eating meal  - encouraged to check blood glucose TID as ordered and keep blood glucose log      10/29/19  8/14/19-10.9%  4/22/19 - Hgb A1c 10.1%  - Current level of understanding: checks blood sugar once daily before breakfast. Completed Disease Specific CM Program for Diabetes Self-Management (w/ Portia Torres, RN NN CDE) on 8/27/19. Does not keep written blood glucose log; reports that glucometer stores historical blood glucose results. Pt reported fasting blood glucose on 10/28 of 169 mg/dL and on 10/29 of 93 mg/dL.  Pt notes difficulty adhering to diabetic diet - enjoys pasta, potatoes, chips - states, \"my problem is food. I'm not that strong on sweets. \"; pt reported barrier to diabetic diet adherence is financial. Does not drink regular soda; drinks diet coke (no more than 2/day) and water. Currently exercising two times/week for 60-90 minutes- water aerobics. - Desired Outcome: Improve diet and exercise, decrease carbohydrate intake. Lower Hgb A1c.  - Plan: Further assess financial barrier(s). Will continue provide and reinforce pt education re: diabetic diet, assist with meal planning. Pt could benefit from keeping a food diary; will discuss further during subsequent outreach encounter as pt is unable to continue call at this time due to getting ready to leave her house.

## 2021-12-10 NOTE — PROGRESS NOTES
HISTORY OF PRESENT ILLNESS  Makayla Adrian is a 78 y.o. female. HPI   F/u HTN hld asthma hx CVA-DM-2 osteopenia , hx PE morbid obesity   Last a1c 9.3 seen by Dr Lianna Garcia  Last   Enrolled in case management-notes reviewed-does not have freestlye eleni yet-checks fsb 3times per day and takes insulin 4 times day-has hypoglycemia sometimes 50-60 for weeks in a row  Has been depressed since loss of mother and brother last El[place] Garcia thus year daily  Physical limitations are causing her stool feel tired and maybe depressed  Some loss of interest and anxiety too   No SI/HI  PHQ-4    Last OV         Last a1c 8.9 LDl 161 but not taking crestor every daily--skips at night often  Sees Dr Bravo for Dm-2 management will get labs again in July next appt  Last dexa 2018  Sees Dr Jaylen Quintana yearly for hx mild CAD-no cp or angina  Some tinglong in feet  Uses rollator due to imbalance s/p cva-asking about a motorized scooter.  Unable to walk long distances due to back pain    Patient Active Problem List    Diagnosis Date Noted    Major depressive disorder, recurrent, mild 12/10/2021    Major depressive disorder, recurrent, moderate 12/10/2021    Major depressive disorder, recurrent, unspecified 12/10/2021    Type 2 diabetes mellitus with diabetic neuropathy ([address] 04/29/2019    Type 2 diabetes with nephropathy ([address] 12/14/2018    History of CVA (cerebrovascular accident) 07/13/2018    Obesity, morbid ([address] 01/25/2018    Warthin's tumor 07/01/2016    HTN (hypertension) 09/13/2013    Axillary hidradenitis suppurativa 08/07/2013    Esophageal reflux 01/15/2013    Renal failure, acute ([address] 01/13/2013    Asthma 12/14/2012    Myocardial ischemia 06/06/2012    Shortness of breath 06/06/2012    Coronary artery disease 06/06/2012    PVC's (premature ventricular contractions) 06/01/2012    DM type 2 (diabetes mellitus, type 2) ([address] 04/23/2012    Grave's disease 10/15/2010    DJD (degenerative joint disease) of hip 10/20/2009    DJD (degenerative joint disease) of knee 10/20/2009    CTS (Carpal Tunnel Syndrome)-b/l 10/20/2009    CVA (cerebral infarction) 10/20/2009    Diverticulosis 10/20/2009    Osteopenia 10/20/2009     Current Outpatient Medications   Medication Sig Dispense Refill    sertraline (ZOLOFT) 25 mg tablet Take 1 Tablet by mouth daily. 90 Tablet 1    glucose blood VI test strips (True Metrix Glucose Test Strip) strip Monitor blood sugar 3 times daily 300 Strip 3    hydrALAZINE (APRESOLINE) 50 mg tablet Take 1 Tablet by mouth every eight (8) hours. (Patient taking differently: Take 50 mg by mouth every eight (8) hours. Taking one AM  And one PM.) 180 Tablet 1    gabapentin (NEURONTIN) 100 mg capsule TAKE 1 CAPSULE THREE TIMES A DAY 90 Capsule 3    rosuvastatin (CRESTOR) 20 mg tablet Take 1 Tablet by mouth daily. 90 Tablet 3    levothyroxine (SYNTHROID) 137 mcg tablet TAKE 1 TABLET DAILY 90 Tablet 3    albuterol (ProAir HFA) 90 mcg/actuation inhaler USE 1 INHALATION EVERY 4 HOURS AS NEEDED WHEEZING OR SHORTNESS OF BREATH 3 Inhaler 3    losartan (COZAAR) 100 mg tablet Take 1 Tab by mouth daily. 90 Tab 3    insulin lispro (HumaLOG KwikPen Insulin) 100 unit/mL kwikpen 20 units for breakfast and lunch and 40 units for dinner (Patient taking differently: 22 units for breakfast, 24 units for lunch and 42 units for dinner) 25 Adjustable Dose Pre-filled Pen Syringe 3    insulin glargine U-300 conc (Toujeo SoloStar U-300 Insulin) 300 unit/mL (1.5 mL) inpn pen 60 Units by SubCUTAneous route nightly. Indications: type 2 diabetes mellitus (Patient taking differently: 62 Units by SubCUTAneous route nightly. Indications: type 2 diabetes mellitus) 10 Pen 4    metoprolol tartrate (LOPRESSOR) 50 mg tablet Take 1 Tab by mouth two (2) times a day.  180 Tab 3    glucose blood VI test strips (ONETOUCH ULTRA BLUE TEST STRIP) strip Monitor blood sugar 3 times daily 300 Strip 3    albuterol (ACCUNEB) 1.25 mg/3 mL nebu 3 mL by Nebulization route every four (4) hours as needed (wheezing). 225 mL 3    BD INSULIN PEN NEEDLE UF SHORT 31 gauge x 5/16\" ndle       aspirin delayed-release 81 mg tablet Take 81 mg by mouth daily.  flash glucose sensor (FreeStyle Jack 2 Sensor) kit 1 Each by Does Not Apply route every fourteen (14) days. (Patient not taking: Reported on 12/10/2021) 6 Kit 3    flash glucose scanning reader (FreeStyle Jack 2 [place]) misc 1 Each by Does Not Apply route daily. (Patient not taking: Reported on 12/10/2021) 1 Each 0    triamterene-hydroCHLOROthiazide (DYAZIDE) 37.5-25 mg per capsule Take 1 Capsule by mouth daily. 180 Capsule 3    budesonide-formoteroL (SYMBICORT) 80-4.5 mcg/actuation HFAA Take 2 Puffs by inhalation two (2) times a day.  (Patient not taking: Reported on 12/10/2021) 3 Inhaler 3    ADVAIR DISKUS 100-50 mcg/dose diskus inhaler USE 1 INHALATION TWICE A DAY (Patient not taking: Reported on 11/5/2021) 180 Each 3     Allergies   Allergen Reactions    Latex Itching    Codeine Itching and Other (comments)     hallucinate    Contrast Dye [Iodine] Rash and Itching     Given pre-cardiac cath    Seafood [facility] Containing Products] Swelling      Lab Results   Component Value Date/Time    WBC 6.2 12/16/2019 02:02 PM    HGB 11.8 12/16/2019 02:02 PM    Hemoglobin (POC) 14.6 04/10/2015 12:41 PM    HCT 39.0 12/16/2019 02:02 PM    Hematocrit (POC) 43 04/10/2015 12:41 PM    PLATELET 808 70/33/0859 02:02 PM    MCV 77 (L) 12/16/2019 02:02 PM     Lab Results   Component Value Date/Time    Hemoglobin A1c 8.9 (H) 03/17/2021 09:08 AM    Hemoglobin A1c 9.7 (H) 12/18/2020 09:01 AM    Hemoglobin A1c 10.3 (H) 09/24/2020 10:23 AM    Hemoglobin A1c, External 10.6 11/19/2018 12:00 AM    Hemoglobin A1c, External 9.4 05/20/2016 12:00 AM    Hemoglobin A1c, External 8.9 08/17/2015 02:37 PM    Glucose 85 03/17/2021 09:08 AM    Glucose (POC) 129 (H) 09/22/2015 07:11 AM    Microalb/Creat ratio (ug/mg creat.) 18 06/10/2020 08:08 AM    LDL, calculated 161 (H) 03/17/2021 09:08 AM    LDL, calculated 146 (H) 06/10/2020 08:08 AM    Creatinine (POC) 0.9 04/10/2015 12:41 PM    Creatinine 1.41 (H) 03/17/2021 09:08 AM      Lab Results   Component Value Date/Time    Cholesterol, total 243 (H) 03/17/2021 09:08 AM    HDL Cholesterol 53 03/17/2021 09:08 AM    LDL, calculated 161 (H) 03/17/2021 09:08 AM    LDL, calculated 146 (H) 06/10/2020 08:08 AM    LDL-C, External 72 05/20/2016 12:00 AM    Triglyceride 162 (H) 03/17/2021 09:08 AM     Lab Results   Component Value Date/Time    GFR est non-AA 38 (L) 03/17/2021 09:08 AM    GFRNA, POC >60 04/10/2015 12:41 PM    GFR est AA 44 (L) 03/17/2021 09:08 AM    GFRAA, POC >60 04/10/2015 12:41 PM    Creatinine 1.41 (H) 03/17/2021 09:08 AM    Creatinine (POC) 0.9 04/10/2015 12:41 PM    BUN 18 03/17/2021 09:08 AM    BUN (POC) 7 (L) 04/10/2015 12:41 PM    Sodium 146 (H) 03/17/2021 09:08 AM    Sodium (POC) 143 04/10/2015 12:41 PM    Potassium 3.5 03/17/2021 09:08 AM    Potassium (POC) 2.9 (L) 04/10/2015 12:41 PM    Chloride 102 03/17/2021 09:08 AM    Chloride (POC) 103 04/10/2015 12:41 PM    CO2 31 (H) 03/17/2021 09:08 AM    Magnesium 1.9 01/14/2013 04:30 AM        ROS    Physical Exam  Vitals and nursing note reviewed. Constitutional:       Appearance: She is well-developed. She is obese. Comments: Appears stated age   Cardiovascular:      Rate and Rhythm: Normal rate and regular rhythm. Heart sounds: Normal heart sounds. No murmur heard. No friction rub. No gallop. Pulmonary:      Effort: Pulmonary effort is normal. No respiratory distress. Breath sounds: Normal breath sounds. No wheezing. Abdominal:      General: Bowel sounds are normal.      Palpations: Abdomen is soft. Neurological:      Mental Status: She is alert. Psychiatric:         Behavior: Behavior normal.         Thought Content:  Thought content normal.         Judgment: Judgment normal.         ASSESSMENT and PLAN  Diagnoses and all orders for this visit:    1. Type 2 diabetes mellitus with other specified complication, with long-term current use of insulin (HCC)  -     METABOLIC PANEL, COMPREHENSIVE; Future  -     MICROALBUMIN, UR, RAND W/ MICROALB/CREAT RATIO; Future   Fu Dr Jasmin Plunkett   Will ask pt to get appt with Pharm D just to assist with [facility] approval and set up  2. Hypertension, unspecified type  -     METABOLIC PANEL, COMPREHENSIVE; Future   controlled  3. Pure hypercholesterolemia  -     LIPID PANEL; Future  -     METABOLIC PANEL, COMPREHENSIVE; Future   On crestor  4. Stage 3 chronic kidney disease, unspecified whether stage 3a or 3b CKD (HCC)  -     METABOLIC PANEL, COMPREHENSIVE; Future   MIKE vs CKD3  5. Depression, unspecified depression type   Mild-phq-4 but ongloiing > 1 year   Start zoloft 25 mg qf   Pt declines referral to LCSW  6. Acquired hypothyroidism  -     TSH 3RD GENERATION; Future    7. Coronary artery disease involving native coronary artery of native heart without angina pectoris   No cp or angina  18. Mild episode of recurrent major depressive disorder [address]    Other orders  -     sertraline (ZOLOFT) 25 mg tablet; Take 1 Tablet by mouth daily. Follow-up and Dispositions    · Return in about 6 months (around 6/10/2022) for medicare wellbness and fu MDD.

## 2021-12-10 NOTE — PATIENT INSTRUCTIONS
Office Policies    Phone calls/patient messages:            Please allow up to 24 hours for someone in the office to contact you about your call or message. Be mindful your provider may be out of the office or your message may require further review. We encourage you to use [employer] for your messages as this is a faster, more efficient way to communicate with our office                         Medication Refills:            Prescription medications require 48-72 business hours to process. We encourage you to use [employer] for your refills. For controlled medications: Please allow 72 business hours to process. Certain medications may require you to  a written prescription at our office. NO narcotic/controlled medications will be prescribed after 4pm Monday through Friday or on weekends              Form/Paperwork Completion:            Please note a $25 fee may incur for all paperwork for completed by our providers. We ask that you allow 7-10 business days. Pre-payment is due prior to picking up/faxing the completed form. You may also download your forms to [employer] to have your doctor print off.

## 2021-12-11 NOTE — PROGRESS NOTES
Tell pt normal urine protein. Had moderate kidey function impairment from age and HTN--refer to Dr Tessie Hager for assocaite for ckd 3 please. Avoid nsaids.   Normal cholesterol and thyroid levels--continue medicines

## 2021-12-12 DIAGNOSIS — N18.30 STAGE 3 CHRONIC KIDNEY DISEASE, UNSPECIFIED WHETHER STAGE 3A OR 3B CKD (HCC): Primary | ICD-10-CM

## 2021-12-12 NOTE — PROGRESS NOTES
Called, spoke with Pt. Two pt identifiers confirmed. Pt informed of lab results and recommendations per Dr. Chava Medina. Pt asked for referral information for Kidney doctor to be sent via my chart. Pt informed will send information on [employer] as well. Pt verbalized understanding of information discussed w/ no further questions at this time.

## 2021-12-19 NOTE — PROGRESS NOTES
Pharmacy Progress Note - Diabetes Management      Assessment / Plan:   Diabetes Management:  - Per ADA guidelines, Pt's A1c is not at goal of < 7%. BP > 130/80.    - Will start on a new CGM application for Jack 2 CGM. - Continue to monitor SMBGs  in the interim. S/O: Ms. Kimberli Miguel is a 73 y.o. female, referred by Dr. Kristyn Ng MD, with a PMH of T2DM, was seen today for diabetes management/CGM access. Patient's last A1c was 9.3% (Oct 2021) 9.2% (2021), 8.9% (2021), 9.7% (Dec 2020), 10.3% (2020). Ambulates with a rollator. Diabetes Management:  Followed by Dr Stanley Lloyd ([facility])    Current anti-hyperglycemic regimen includes:    Reports  - Toujeo -- 62 units daily HS  - Humalog --- 24 units before breakfast, 20 units lunch, and 42 units before dinner    ASA 81 mg daily, Losartan 100 mg daily, crestor 20 mg daily,  ; T; non  (2021)    ROS:  Today, Pt endorses:  - Symptoms of Hyperglycemia: none  - Symptoms of Hypoglycemia: none    Self Monitoring Blood Glucose (SMBG) or CGM:  Having trouble getting CGM covered. Her previous case with [address] [place] [address] was closed.    Date Morning  Evening   2021 287 256   2021 280 123   2021 150    2021 240    2021 210    2021 136 386   2021 202 274   12/3/2021 120 368   2021 115 183   2021 112 174   2021 119 210   2021 127 199   2021 136 246   2021 95    12/10/2021 142 315   2021 93 189   2021 88 203   2021 145 397   2021 88 203   12/15/2021 145 397   2021 155    2021 83    2021 92    2021 155    2021 97    2021 83    2021 152      The 10-year ASCVD risk score (Maged Armstrong et al., 2013) is: 31.5%    Values used to calculate the score:      Age: 73 years      Sex: Female      Is Non- : Yes      Diabetic: Yes      Tobacco smoker: No      Systolic Blood Pressure: 165 mmHg      Is BP treated: Yes      HDL Cholesterol: 62 MG/DL      Total Cholesterol: 152 MG/DL    Vitals: Wt Readings from Last 3 Encounters:   12/22/21 224 lb (101.6 kg)   12/10/21 223 lb (101.2 kg)   11/04/21 235 lb 4.8 oz (106.7 kg)     BP Readings from Last 3 Encounters:   12/22/21 (!) 165/76   12/10/21 120/60   11/04/21 (!) 140/70     Pulse Readings from Last 3 Encounters:   12/22/21 70   12/10/21 77   11/04/21 69       Past Medical History:   Diagnosis Date    Asthma     CAD (coronary artery disease)     \"mild\" per Dr Hudson Blood note    Diabetes [facility])     Dr Meron Lorenzana Hyperlipidemia     Hypertension     Long term current use of anticoagulant therapy     Pulmonary embolism ([facility] [address]     Screening for colon cancer 2/16/05    Dr Darrel Carr in 10 years    Stroke [facility]) 2004    Rt side weaker than left, uses a cane: no longer followed by neuro    Thromboembolus ([facility] [address] 1976    Rt leg moved to lung     Thyroid disease     Unspecified sleep apnea     uses CPAP     Allergies   Allergen Reactions    Latex Itching    Codeine Itching and Other (comments)     hallucinate    Contrast Dye [Iodine] Rash and Itching     Given pre-cardiac cath    Seafood [Shellfish Containing Products] Swelling       Current Outpatient Medications   Medication Sig    sertraline (ZOLOFT) 25 mg tablet Take 1 Tablet by mouth daily.  glucose blood VI test strips (True Metrix Glucose Test Strip) strip Monitor blood sugar 3 times daily    flash glucose sensor (FreeStyle Jack 2 Sensor) kit 1 Each by Does Not Apply route every fourteen (14) days. (Patient not taking: Reported on 12/10/2021)    flash glucose scanning reader (FreeStyle Jack 2 [place]) misc 1 Each by Does Not Apply route daily. (Patient not taking: Reported on 12/10/2021)    hydrALAZINE (APRESOLINE) 50 mg tablet Take 1 Tablet by mouth every eight (8) hours. (Patient taking differently: Take 50 mg by mouth every eight (8) hours.  Taking one AM  And one PM.)    triamterene-hydroCHLOROthiazide (DYAZIDE) 37.5-25 mg per capsule Take 1 Capsule by mouth daily.  gabapentin (NEURONTIN) 100 mg capsule TAKE 1 CAPSULE THREE TIMES A DAY    rosuvastatin (CRESTOR) 20 mg tablet Take 1 Tablet by mouth daily.  levothyroxine (SYNTHROID) 137 mcg tablet TAKE 1 TABLET DAILY    budesonide-formoteroL (SYMBICORT) 80-4.5 mcg/actuation HFAA Take 2 Puffs by inhalation two (2) times a day. (Patient not taking: Reported on 12/10/2021)    albuterol (ProAir HFA) 90 mcg/actuation inhaler USE 1 INHALATION EVERY 4 HOURS AS NEEDED WHEEZING OR SHORTNESS OF BREATH    losartan (COZAAR) 100 mg tablet Take 1 Tab by mouth daily.  insulin lispro (HumaLOG KwikPen Insulin) 100 unit/mL kwikpen 20 units for breakfast and lunch and 40 units for dinner (Patient taking differently: 22 units for breakfast, 24 units for lunch and 42 units for dinner)    insulin glargine U-300 conc (Toujeo SoloStar U-300 Insulin) 300 unit/mL (1.5 mL) inpn pen 60 Units by SubCUTAneous route nightly. Indications: type 2 diabetes mellitus (Patient taking differently: 62 Units by SubCUTAneous route nightly. Indications: type 2 diabetes mellitus)    metoprolol tartrate (LOPRESSOR) 50 mg tablet Take 1 Tab by mouth two (2) times a day.  glucose blood VI test strips (ONETOUCH ULTRA BLUE TEST STRIP) strip Monitor blood sugar 3 times daily    albuterol (ACCUNEB) 1.25 mg/3 mL nebu 3 mL by Nebulization route every four (4) hours as needed (wheezing).  ADVAIR DISKUS 100-50 mcg/dose diskus inhaler USE 1 INHALATION TWICE A DAY (Patient not taking: Reported on 11/5/2021)    BD INSULIN PEN NEEDLE UF SHORT 31 gauge x 5/16\" ndle     aspirin delayed-release 81 mg tablet Take 81 mg by mouth daily. No current facility-administered medications for this visit.      Lab Results   Component Value Date/Time    Sodium 141 12/10/2021 11:45 AM    Potassium 3.7 12/10/2021 11:45 AM    Chloride 101 12/10/2021 11:45 AM    CO2 34 (H) 12/10/2021 11:45 AM    Anion gap 6 12/10/2021 11:45 AM    Glucose 262 (H) 12/10/2021 11:45 AM    BUN 21 (H) 12/10/2021 11:45 AM    Creatinine 1.47 (H) 12/10/2021 11:45 AM    BUN/Creatinine ratio 14 12/10/2021 11:45 AM    GFR est AA 43 (L) 12/10/2021 11:45 AM    GFR est non-AA 35 (L) 12/10/2021 11:45 AM    Calcium 9.8 12/10/2021 11:45 AM    Bilirubin, total 0.5 12/10/2021 11:45 AM    Alk.  phosphatase 165 (H) 12/10/2021 11:45 AM    Protein, total 7.6 12/10/2021 11:45 AM    Albumin 3.5 12/10/2021 11:45 AM    Globulin 4.1 (H) 12/10/2021 11:45 AM    A-G Ratio 0.9 (L) 12/10/2021 11:45 AM    ALT (SGPT) 17 12/10/2021 11:45 AM     Lab Results   Component Value Date/Time    Cholesterol, total 152 12/10/2021 11:45 AM    HDL Cholesterol 62 12/10/2021 11:45 AM    LDL, calculated 65.6 12/10/2021 11:45 AM    VLDL, calculated 24.4 12/10/2021 11:45 AM    Triglyceride 122 12/10/2021 11:45 AM    CHOL/HDL Ratio 2.5 12/10/2021 11:45 AM     Lab Results   Component Value Date/Time    WBC 6.2 12/16/2019 02:02 PM    WBC 7.8 06/04/2012 12:00 AM    Hemoglobin (POC) 14.6 04/10/2015 12:41 PM    HGB 11.8 12/16/2019 02:02 PM    Hematocrit (POC) 43 04/10/2015 12:41 PM    HCT 39.0 12/16/2019 02:02 PM    PLATELET 589 02/19/7854 02:02 PM    MCV 77 (L) 12/16/2019 02:02 PM       Lab Results   Component Value Date/Time    Microalbumin/Creat ratio (mg/g creat) 16 12/10/2021 11:45 AM    Microalbumin,urine random 2.09 12/10/2021 11:45 AM    Microalbumin urine (POC) 30 02/27/2013 08:59 AM       Lab Results   Component Value Date/Time    Hemoglobin A1c 8.9 (H) 03/17/2021 09:08 AM    Hemoglobin A1c 9.7 (H) 12/18/2020 09:01 AM    Hemoglobin A1c 10.3 (H) 09/24/2020 10:23 AM    Hemoglobin A1c, External 10.6 11/19/2018 12:00 AM    Hemoglobin A1c, External 9.4 05/20/2016 12:00 AM    Hemoglobin A1c, External 8.9 08/17/2015 02:37 PM     Hemoglobin A1c (POC)   Date Value Ref Range Status   10/15/2021 9.3 % Final        Estimated Creatinine Clearance: 41.1 mL/min (A) (by C-G formula based on SCr of 1.47 mg/dL (H)). Medication reconciliation was completed during the visit. Medications Discontinued During This Encounter   Medication Reason    budesonide-formoteroL (SYMBICORT) 80-4.5 mcg/actuation HFAA Not A Current Medication    glucose blood VI test strips (ONETOUCH ULTRA BLUE TEST STRIP) strip LIST CLEANUP       Patient verbalized understanding of the information presented and all of the patients questions were answered. AVS was handed to the patient. Patient advised to call the office with any additional questions or concerns. Notifications of recommendations will be sent to Dr. Lisa Boucher MD for review. Thank you for the consult,  Cierra Raphael, PharmD, BCACP, [address] in place:  Yes   Recommendation Provided To: Patient/Caregiver: 4 via In person   Intervention Detail: Discontinued Rx: 2, reason: Therapy Complete and New Rx: 2, reason: Patient Preference   Intervention Accepted By: Patient/Caregiver: 4   Time Spent (min): 20

## 2021-12-22 ENCOUNTER — OFFICE VISIT (OUTPATIENT)
Dept: INTERNAL MEDICINE CLINIC | Age: 71
End: 2021-12-22

## 2021-12-22 VITALS
OXYGEN SATURATION: 96 % | WEIGHT: 224 LBS | HEIGHT: 63 IN | TEMPERATURE: 97.2 F | SYSTOLIC BLOOD PRESSURE: 165 MMHG | HEART RATE: 70 BPM | BODY MASS INDEX: 39.69 KG/M2 | DIASTOLIC BLOOD PRESSURE: 76 MMHG

## 2021-12-22 DIAGNOSIS — Z79.4 TYPE 2 DIABETES MELLITUS WITH DIABETIC POLYNEUROPATHY, WITH LONG-TERM CURRENT USE OF INSULIN (HCC): Primary | ICD-10-CM

## 2021-12-22 DIAGNOSIS — E11.42 TYPE 2 DIABETES MELLITUS WITH DIABETIC POLYNEUROPATHY, WITH LONG-TERM CURRENT USE OF INSULIN (HCC): Primary | ICD-10-CM

## 2022-01-05 ENCOUNTER — TELEPHONE (OUTPATIENT)
Dept: INTERNAL MEDICINE CLINIC | Age: 72
End: 2022-01-05

## 2022-01-05 RX ORDER — INSULIN GLARGINE 300 U/ML
62 INJECTION, SOLUTION SUBCUTANEOUS DAILY
Qty: 1 PEN | Refills: 0 | Status: SHIPPED | COMMUNITY
Start: 2022-01-05 | End: 2022-01-13 | Stop reason: SDUPTHER

## 2022-01-05 NOTE — TELEPHONE ENCOUNTER
Pharmacy Progress Note - Telephone Encounter    S/O: Ms. Jermaine Velásquez 73 y.o. female, referred by Dr. Magdalena Chacon MD, was contacted via an outbound telephone call to discuss her Jack CGM today. Verified patients identifiers (name & ) per HIPAA policy. - Pt reports she received a call from DME. Jack CGM order currently processed for shipment. Should receive supply soon. - Patient states she has 1 pen left of Toujeo. Having trouble meeting her deductible    A/P:  - Appreciate patient's update.   - Will assist patient with some samples. - Patient endorses understanding to the provided information. All questions answered at this time. There are no discontinued medications. Orders Placed This Encounter    insulin glargine U-300 conc (Toujeo SoloStar U-300 Insulin) 300 unit/mL (1.5 mL) inpn pen     Si Units by SubCUTAneous route daily. Indications: type 2 diabetes mellitus     Dispense:  1 Pen     Refill:  0       Thank you,  Cierra Crawford, PharmD, BCACP, [address] [place] [address] in place:  Yes   Recommendation Provided To: Patient/Caregiver: 1 via Telephone   Intervention Detail: Patient Access Assistance/Sample Provided   Intervention Accepted By: Patient/Caregiver: 1   Time Spent (min): 15

## 2022-01-11 RX ORDER — INSULIN ASPART 100 [IU]/ML
INJECTION, SOLUTION INTRAVENOUS; SUBCUTANEOUS
Qty: 25 PEN | Refills: 3 | Status: SHIPPED | OUTPATIENT
Start: 2022-01-11 | End: 2022-01-13 | Stop reason: CLARIF

## 2022-01-11 NOTE — TELEPHONE ENCOUNTER
Insurance no longer covers Humalog.      Requested Prescriptions     Pending Prescriptions Disp Refills    NovoLOG Flexpen U-100 Insulin 100 unit/mL (3 mL) inpn 25 Pen 3     Sig: Inject 24 units for breakfast, 20 units for lunch, and 42 units for dinner

## 2022-01-12 DIAGNOSIS — E11.8 TYPE 2 DIABETES MELLITUS WITH COMPLICATION, WITH LONG-TERM CURRENT USE OF INSULIN (HCC): ICD-10-CM

## 2022-01-12 DIAGNOSIS — Z79.4 TYPE 2 DIABETES MELLITUS WITH COMPLICATION, WITH LONG-TERM CURRENT USE OF INSULIN (HCC): ICD-10-CM

## 2022-01-12 NOTE — TELEPHONE ENCOUNTER
I called [facility] because we received a fax from them stating that Novolog is not covered by the pt's insurance and that Humalog is. I spoke with Cynthia (rep) and informed her that we received a fax a couple days ago stating that Humalog was not covered and that was the reason why Novolog was sent in. Cynthia stated that she checked again and Humalog is actually covered.

## 2022-01-13 RX ORDER — INSULIN LISPRO 100 [IU]/ML
INJECTION, SOLUTION INTRAVENOUS; SUBCUTANEOUS
Qty: 25 PEN | Refills: 3 | Status: SHIPPED | OUTPATIENT
Start: 2022-01-13 | End: 2022-04-29 | Stop reason: SDUPTHER

## 2022-01-13 RX ORDER — INSULIN GLARGINE 300 U/ML
62 INJECTION, SOLUTION SUBCUTANEOUS DAILY
Qty: 12 PEN | Refills: 3 | Status: SHIPPED | OUTPATIENT
Start: 2022-01-13 | End: 2022-01-14 | Stop reason: SDUPTHER

## 2022-01-13 NOTE — TELEPHONE ENCOUNTER
Patient stated that [facility] should have sent us a fax regarding her Toujeo refill and not about the Humalog. Informed the patient that I will pend the Toujeo refill request to Dr. Néstor Garcia for approval.     Informed the patient that we can supply her with a couple of Toujeo samples tomorrow at her appointment to last her until she receives her shipment from [address]

## 2022-01-13 NOTE — TELEPHONE ENCOUNTER
1/13/2022  12:56 PM      Patient called and wants a call back from nurse.  She wouldn't give me a reason for the call- please call her back at [phone] thanks

## 2022-01-14 ENCOUNTER — OFFICE VISIT (OUTPATIENT)
Dept: ENDOCRINOLOGY | Age: 72
End: 2022-01-14
Payer: MEDICARE

## 2022-01-14 VITALS
DIASTOLIC BLOOD PRESSURE: 74 MMHG | HEIGHT: 63 IN | SYSTOLIC BLOOD PRESSURE: 179 MMHG | HEART RATE: 71 BPM | BODY MASS INDEX: 40.04 KG/M2 | WEIGHT: 226 LBS

## 2022-01-14 DIAGNOSIS — E11.8 TYPE 2 DIABETES MELLITUS WITH COMPLICATION, WITH LONG-TERM CURRENT USE OF INSULIN (HCC): Primary | ICD-10-CM

## 2022-01-14 DIAGNOSIS — Z79.4 TYPE 2 DIABETES MELLITUS WITH COMPLICATION, WITH LONG-TERM CURRENT USE OF INSULIN (HCC): Primary | ICD-10-CM

## 2022-01-14 DIAGNOSIS — E66.01 OBESITY, MORBID (HCC): ICD-10-CM

## 2022-01-14 DIAGNOSIS — N18.30 STAGE 3 CHRONIC KIDNEY DISEASE, UNSPECIFIED WHETHER STAGE 3A OR 3B CKD (HCC): ICD-10-CM

## 2022-01-14 LAB — HBA1C MFR BLD HPLC: 8.9 %

## 2022-01-14 PROCEDURE — G9717 DOC PT DX DEP/BP F/U NT REQ: HCPCS | Performed by: INTERNAL MEDICINE

## 2022-01-14 PROCEDURE — G8753 SYS BP > OR = 140: HCPCS | Performed by: INTERNAL MEDICINE

## 2022-01-14 PROCEDURE — G8536 NO DOC ELDER MAL SCRN: HCPCS | Performed by: INTERNAL MEDICINE

## 2022-01-14 PROCEDURE — G8754 DIAS BP LESS 90: HCPCS | Performed by: INTERNAL MEDICINE

## 2022-01-14 PROCEDURE — 2022F DILAT RTA XM EVC RTNOPTHY: CPT | Performed by: INTERNAL MEDICINE

## 2022-01-14 PROCEDURE — 1101F PT FALLS ASSESS-DOCD LE1/YR: CPT | Performed by: INTERNAL MEDICINE

## 2022-01-14 PROCEDURE — G9899 SCRN MAM PERF RSLTS DOC: HCPCS | Performed by: INTERNAL MEDICINE

## 2022-01-14 PROCEDURE — G8399 PT W/DXA RESULTS DOCUMENT: HCPCS | Performed by: INTERNAL MEDICINE

## 2022-01-14 PROCEDURE — G8417 CALC BMI ABV UP PARAM F/U: HCPCS | Performed by: INTERNAL MEDICINE

## 2022-01-14 PROCEDURE — 3017F COLORECTAL CA SCREEN DOC REV: CPT | Performed by: INTERNAL MEDICINE

## 2022-01-14 PROCEDURE — G8427 DOCREV CUR MEDS BY ELIG CLIN: HCPCS | Performed by: INTERNAL MEDICINE

## 2022-01-14 PROCEDURE — 1090F PRES/ABSN URINE INCON ASSESS: CPT | Performed by: INTERNAL MEDICINE

## 2022-01-14 PROCEDURE — 99214 OFFICE O/P EST MOD 30 MIN: CPT | Performed by: INTERNAL MEDICINE

## 2022-01-14 PROCEDURE — 3052F HG A1C>EQUAL 8.0%<EQUAL 9.0%: CPT | Performed by: INTERNAL MEDICINE

## 2022-01-14 PROCEDURE — 83036 HEMOGLOBIN GLYCOSYLATED A1C: CPT | Performed by: INTERNAL MEDICINE

## 2022-01-14 RX ORDER — INSULIN GLARGINE 300 U/ML
62 INJECTION, SOLUTION SUBCUTANEOUS DAILY
Qty: 2 PEN | Refills: 0 | Status: SHIPPED | COMMUNITY
Start: 2022-01-14 | End: 2022-03-10

## 2022-01-14 NOTE — PROGRESS NOTES
Ms. Mellisa Cline returns for follow-up of her type 2 diabetes mellitus x 15 years with poor blood sugar control.    Her A1c was 9.7% in September. Her A1c was  8.9% in March 2021 which is the lowest we have ever had.,   Her A1c in October was 9.3%. Her A1c today is 8.9%.  Recent laboratory tests also remarkable for creatinine of 1.41 with a GFR of 44.  LFTs normal.     Current medications  Toujeo insulin 60 units at bedtime  Humalog insulin 24 breakfast (20 for lunch if she eats lunch) and 42 for dinner     This woman is seen every 3-4 months, monitors her blood sugars 3-4 times daily, administers insulin 3-4 times daily and adjusts her insulin dosage based on her readings.         At her last visit, I ordered a freestyle eleni for her but apparently the vendor that she sent the prescription to was not effective. Nevertheless she finally has the device. She brings it with her today. We reviewed her glucose meter. Her blood sugars in the morning are ranging from . Janie Suarez blood sugars 2 to 3 hours after dinner are ranging from 220-350.   Breakfast is juan and eggs but no toast.  She had lunch yesterday with a barbecue sandwich and slaw.  It is important to point out that she usually does not have lunch and so she does not take insulin at that time.  Other hand, if she does not have lunch, she frequently has peanut butter crackers and a Diet Coke and again takes no insulin for that. For dinner she had chitlins potato salad and green beans.  Other times she has a breakfast meal for dinner.  Her blood sugar this morning was 122.  She is not snacking at bedtime.  She occasionally has a piece of fruit after breakfast.  Her physical activity is primarily rehab/PT for back pain.     Examination  Blood pressure 179/74  Pulse 80  Weight 226  BMI 40.0  HEENT unremarkable  Lungs clear  Heart reveals a regular rate and rhythm  Abdomen morbidly obese  Extremities unremarkable  Diabetic foot exam:     Left Foot:   Visual Exam: normal    Pulse DP: 2+ (normal)   Filament test: normal sensation    Vibratory sensation: normal      Right Foot:   Visual Exam: normal    Pulse DP: 2+ (normal)   Filament test: normal sensation    Vibratory sensation: normal    Pression  1. Type 2 diabetes mellitus with persistently poor blood sugar control. 2.  Poor insight into the impact of her food on her blood sugars    Plan:  1. I started her freestyle eleni system. She was instructed in its use. I told her she could begin scanning in 60 minutes. 2.  I advised her to use the device to determine how high her blood sugar goes after meals so that we can tailor her mealtime insulin. 3.  I did also tell her that if her bedtime blood sugar is less than 200, we will likely have to start decreasing the toujeo to prevent hypoglycemia. Currently her blood sugars dropping from 250 at night to 130 in the morning. 4.  I will see her back in 3 months.

## 2022-01-17 ENCOUNTER — TELEPHONE (OUTPATIENT)
Dept: INTERNAL MEDICINE CLINIC | Age: 72
End: 2022-01-17

## 2022-01-17 ENCOUNTER — VIRTUAL VISIT (OUTPATIENT)
Dept: INTERNAL MEDICINE CLINIC | Age: 72
End: 2022-01-17

## 2022-01-17 DIAGNOSIS — E11.21 TYPE 2 DIABETES WITH NEPHROPATHY (HCC): Primary | ICD-10-CM

## 2022-01-17 NOTE — PROGRESS NOTES
Pharmacy Progress Note - CGM Education     S/O: Ms. Belita Claude is a 73 y.o., with a PMH of T2DM, was referred by Wade Rebolledo MD was seen virtually for continuous glucose monitoring (CGM) device teaching today. CGM Device:  Jack 2 CGM      Past Medical History:   Diagnosis Date    Asthma     CAD (coronary artery disease)     \"mild\" per Dr Serenity De La Rosa note    Diabetes [facility])     Dr Kelly Solis Hyperlipidemia     Hypertension     Long term current use of anticoagulant therapy     [facility] [address]     Screening for colon cancer 2/16/05    Dr Lea Guzmán in 10 years    Stroke [facility]) 2004    Rt side weaker than left, uses a cane: no longer followed by neuro    Thromboembolus ([facility] [address] 1976    Rt leg moved to lung     Thyroid disease     Unspecified sleep apnea     uses CPAP     Allergies   Allergen Reactions    Latex Itching    Codeine Itching and Other (comments)     hallucinate    Contrast Dye [Iodine] Rash and Itching     Given pre-cardiac cath    Seafood [Shellfish Containing Products] Swelling       Current Outpatient Medications   Medication Sig    insulin glargine U-300 conc (Toujeo SoloStar U-300 Insulin) 300 unit/mL (1.5 mL) inpn pen 62 Units by SubCUTAneous route daily. Indications: type 2 diabetes mellitus    HumaLOG KwikPen Insulin 100 unit/mL kwikpen 22 units for breakfast, 24 units for lunch and 42 units for dinner    sertraline (ZOLOFT) 25 mg tablet Take 1 Tablet by mouth daily.  glucose blood VI test strips (True Metrix Glucose Test Strip) strip Monitor blood sugar 3 times daily    flash glucose sensor (FreeStyle Jack 2 Sensor) kit 1 Each by Does Not Apply route every fourteen (14) days.  flash glucose scanning reader (FreeStyle Jack 2 [place]) misc 1 Each by Does Not Apply route daily.  hydrALAZINE (APRESOLINE) 50 mg tablet Take 1 Tablet by mouth every eight (8) hours. (Patient taking differently: Take 50 mg by mouth every eight (8) hours.  Taking one AM  And one PM.)    triamterene-hydroCHLOROthiazide (DYAZIDE) 37.5-25 mg per capsule Take 1 Capsule by mouth daily.  gabapentin (NEURONTIN) 100 mg capsule TAKE 1 CAPSULE THREE TIMES A DAY    rosuvastatin (CRESTOR) 20 mg tablet Take 1 Tablet by mouth daily.  levothyroxine (SYNTHROID) 137 mcg tablet TAKE 1 TABLET DAILY    albuterol (ProAir HFA) 90 mcg/actuation inhaler USE 1 INHALATION EVERY 4 HOURS AS NEEDED WHEEZING OR SHORTNESS OF BREATH    losartan (COZAAR) 100 mg tablet Take 1 Tab by mouth daily.  insulin glargine U-300 conc (Toujeo SoloStar U-300 Insulin) 300 unit/mL (1.5 mL) inpn pen 60 Units by SubCUTAneous route nightly. Indications: type 2 diabetes mellitus (Patient taking differently: 62 Units by SubCUTAneous route nightly. Indications: type 2 diabetes mellitus)    metoprolol tartrate (LOPRESSOR) 50 mg tablet Take 1 Tab by mouth two (2) times a day.  albuterol (ACCUNEB) 1.25 mg/3 mL nebu 3 mL by Nebulization route every four (4) hours as needed (wheezing).  ADVAIR DISKUS 100-50 mcg/dose diskus inhaler USE 1 INHALATION TWICE A DAY    BD INSULIN PEN NEEDLE UF SHORT 31 gauge x 5/16\" ndle     aspirin delayed-release 81 mg tablet Take 81 mg by mouth daily. No current facility-administered medications for this visit. Lab Results   Component Value Date/Time    Hemoglobin A1c 8.9 (H) 03/17/2021 09:08 AM    Hemoglobin A1c 9.7 (H) 12/18/2020 09:01 AM    Hemoglobin A1c 10.3 (H) 09/24/2020 10:23 AM    Hemoglobin A1c, External 10.6 11/19/2018 12:00 AM    Hemoglobin A1c, External 9.4 05/20/2016 12:00 AM    Hemoglobin A1c, External 8.9 08/17/2015 02:37 PM     Last Point of Care HGB A1C  Hemoglobin A1c (POC)   Date Value Ref Range Status   01/14/2022 8.9 % Final            A/P:  - Discuss differences between CGM and traditional glucometer device.   - Review how to interpret CGM's arrow and glycemic trend interpretation.   - Patient was able to apply SHARIFA HOWELL [facility] 2 sensor on arm successfully and paired with current reader. She has skin tac. Charging cable is in box. - Recommend patient scan Jack sensor at least 3 times daily (once every 8 hours). - Contact [employer] about other sensor replacement.    - Bring in CGM reader to all future visits. Patient verbalized understanding of the information presented and all of the patients questions were answered. AVS was handed to the patient. Patient advised to call the office with any additional questions or concerns. Notifications of recommendations will be sent to Dr. Linn Runner, MD for review. Thank you for the consult,  Cierra Haynes, PharmD, BCACP, [address]:  Yes   Recommendation Provided To: Patient/Caregiver: 1 via Virtual Visit and Caron Mccracken 20 Intervention Detail: Device Training   Intervention Accepted By: Patient/Caregiver: 1   Time Spent (min): 30

## 2022-01-17 NOTE — TELEPHONE ENCOUNTER
Pharmacy Progress Note - Telephone Encounter    S/O: Ms. Francoise Howard 73 y.o. female, referred by Dr. Vance Corbett MD, was contacted via an outbound telephone call to discuss her mychart messages - re: Rohith Christo today. Verified patients identifiers (name & ) per HIPAA policy. - Sensor fell off yesterday  - [place] stopped working. A/P:  - Scheduling VV so we can troubleshoot her concerns  - Patient endorses understanding to the provided information. All questions answered at this time. Thank you,  Cierra Lares, PharmD, BCACP, [address] [place] [address] in place:  Yes   Recommendation Provided To: Patient/Caregiver: 1 via Telephone   Intervention Detail: Scheduled Appointment   Intervention Accepted By: Patient/Caregiver: 1   Time Spent (min): 5

## 2022-01-25 DIAGNOSIS — E11.8 TYPE 2 DIABETES MELLITUS WITH COMPLICATION, WITH LONG-TERM CURRENT USE OF INSULIN (HCC): ICD-10-CM

## 2022-01-25 DIAGNOSIS — Z79.4 TYPE 2 DIABETES MELLITUS WITH COMPLICATION, WITH LONG-TERM CURRENT USE OF INSULIN (HCC): ICD-10-CM

## 2022-01-25 RX ORDER — GABAPENTIN 100 MG/1
CAPSULE ORAL
Qty: 90 CAPSULE | Refills: 3 | Status: SHIPPED | OUTPATIENT
Start: 2022-01-25 | End: 2022-05-20 | Stop reason: SDUPTHER

## 2022-01-31 ENCOUNTER — TELEPHONE (OUTPATIENT)
Dept: INTERNAL MEDICINE CLINIC | Age: 72
End: 2022-01-31

## 2022-01-31 NOTE — TELEPHONE ENCOUNTER
Pharmacy Progress Note - Telephone Encounter    S/O: Ms. Jermaine Velásquez 73 y.o. female contacted office/me via an inbound telephone call to discuss her CGM today. Verified patients identifiers (name & ) per HIPAA policy. - Reports it's time to replace her Jack CGM. A/P:  - Review CGM application and replacement steps  - Patient endorses understanding to the provided information. All questions answered at this time. Thank you,  Cierra Crawford, PharmD, BCACP, [address] in place:  Yes   Recommendation Provided To: Patient/Caregiver: 1 via Telephone   Intervention Detail: Device Training   Intervention Accepted By: Patient/Caregiver: 1   Time Spent (min): 10

## 2022-02-01 ENCOUNTER — DOCUMENTATION ONLY (OUTPATIENT)
Dept: SLEEP MEDICINE | Age: 72
End: 2022-02-01

## 2022-02-01 ENCOUNTER — VIRTUAL VISIT (OUTPATIENT)
Dept: SLEEP MEDICINE | Age: 72
End: 2022-02-01
Payer: MEDICARE

## 2022-02-01 DIAGNOSIS — Z79.4 TYPE 2 DIABETES MELLITUS WITH COMPLICATION, WITH LONG-TERM CURRENT USE OF INSULIN (HCC): ICD-10-CM

## 2022-02-01 DIAGNOSIS — I10 ESSENTIAL HYPERTENSION: ICD-10-CM

## 2022-02-01 DIAGNOSIS — G47.33 OBSTRUCTIVE SLEEP APNEA (ADULT) (PEDIATRIC): Primary | ICD-10-CM

## 2022-02-01 DIAGNOSIS — E11.8 TYPE 2 DIABETES MELLITUS WITH COMPLICATION, WITH LONG-TERM CURRENT USE OF INSULIN (HCC): ICD-10-CM

## 2022-02-01 PROCEDURE — 3017F COLORECTAL CA SCREEN DOC REV: CPT | Performed by: INTERNAL MEDICINE

## 2022-02-01 PROCEDURE — 1090F PRES/ABSN URINE INCON ASSESS: CPT | Performed by: INTERNAL MEDICINE

## 2022-02-01 PROCEDURE — 1101F PT FALLS ASSESS-DOCD LE1/YR: CPT | Performed by: INTERNAL MEDICINE

## 2022-02-01 PROCEDURE — G9899 SCRN MAM PERF RSLTS DOC: HCPCS | Performed by: INTERNAL MEDICINE

## 2022-02-01 PROCEDURE — 2022F DILAT RTA XM EVC RTNOPTHY: CPT | Performed by: INTERNAL MEDICINE

## 2022-02-01 PROCEDURE — G8399 PT W/DXA RESULTS DOCUMENT: HCPCS | Performed by: INTERNAL MEDICINE

## 2022-02-01 PROCEDURE — G8427 DOCREV CUR MEDS BY ELIG CLIN: HCPCS | Performed by: INTERNAL MEDICINE

## 2022-02-01 PROCEDURE — G9717 DOC PT DX DEP/BP F/U NT REQ: HCPCS | Performed by: INTERNAL MEDICINE

## 2022-02-01 PROCEDURE — G8756 NO BP MEASURE DOC: HCPCS | Performed by: INTERNAL MEDICINE

## 2022-02-01 PROCEDURE — 99213 OFFICE O/P EST LOW 20 MIN: CPT | Performed by: INTERNAL MEDICINE

## 2022-02-01 PROCEDURE — 3052F HG A1C>EQUAL 8.0%<EQUAL 9.0%: CPT | Performed by: INTERNAL MEDICINE

## 2022-02-01 NOTE — PROGRESS NOTES
[address] [facility] [place] [place], [address]  Tel.  [phone]  Fax. [address] [facility], [address] [facility]  Tel.  [phone]  Fax. [phone] [address] [facility] [place], [facility] [place]   Tel.  [phone]  Fax. [phone]       Telemedicine visit performed with verbal consent of the patient. Patient called and identity confirmed with 2 patient identifers  Patient was seen at home  Kvng Charles is a 73 y.o. female who was seen by synchronous (real-time) audio-video technology on 2/1/2022. Kvng Charles, who was evaluated through a synchronous (real-time) audio-video encounter, and/or her healthcare decision maker, is aware that it is a billable service, which includes applicable co-pays, with coverage as determined by her insurance carrier. She provided verbal consent to proceed and patient identification was verified. This visit was conducted pursuant to the emergency declaration under the [facility], [facility] authority and the [employer] Act. A caregiver was present when appropriate. Ability to conduct physical exam was limited. The patient was located at home in a state where the provider was licensed to provide care. --Ani Mc MD on 2/1/2022 at 9:24 AM          I was in the office while conducting this encounter. S>Margretta Willow Duverney is a 73 y.o. female seen at this telemedicine visit for a positive airway pressure follow-up. She reports no problems using the device. She is 100% compliant over the past 30 days. The following problems are identified:    Drowsiness no Problems exhaling no   Snoring no Forget to put on no   Mask Comfortable yes Can't fall asleep no   Dry Mouth no Mask falls off no   Air Leaking no Frequent awakenings no       Download reviewed. she is 100% compliant to PAP therapy.     She admits that her sleep has improved on PAP therapy using nasal pillow mask   Allergies   Allergen Reactions    Latex Itching    Codeine Itching and Other (comments)     hallucinate    Contrast Dye [Iodine] Rash and Itching     Given pre-cardiac cath    Seafood [Shellfish Containing Products] Swelling       She has a current medication list which includes the following prescription(s): gabapentin, toujeo solostar u-300 insulin, humalog kwikpen insulin, sertraline, true metrix glucose test strip, freestyle eleni 2 sensor, freestyle eleni 2 reader, triamterene-hydrochlorothiazide, rosuvastatin, levothyroxine, albuterol, losartan, metoprolol tartrate, albuterol, advair diskus, bd ultra-fine short pen needle, aspirin delayed-release, hydralazine, and toujeo solostar u-300 insulin. .      She  has a past medical history of Asthma, CAD (coronary artery disease), Diabetes ([facility] [address] Hyperlipidemia, Hypertension, Long term current use of anticoagulant therapy, Pulmonary embolism ([facility] [address] Screening for colon cancer (2/16/05), Stroke ([facility] [address] (2004), Thromboembolus ([facility] [address] (1976), Thyroid disease, and Unspecified sleep apnea. She has no past medical history of Atrial fibrillation ([address] Cancer ([address] Carotid artery disease ([address] Chronic kidney disease, Chronic obstructive pulmonary disease ([facility] [address] Clotting disorder ([facility] [address] Congestive heart failure ([facility] [address] Glaucoma, ICD (implantable cardioverter-defibrillator) in place, Murmur, Myocardial infarction [facility]), Pacemaker, Rheumatic fever, Syncope, or Valvular heart disease.     [place] Sleepiness Score: 7      O>      Weight 220 lb  Vital Signs: (As obtained by patient/caregiver at home)        Constitutional: [x] Appears well-developed and well-nourished [x] No apparent distress      [] Abnormal -     Mental status: [x] Alert and awake  [x] Oriented to person/place/time [x] Able to follow commands    [] Abnormal -     Eyes:   EOM    [x]  Normal    [] Abnormal -   Sclera  [x]  Normal    [] Abnormal - Discharge [x]  None visible   [] Abnormal -     HENT: [x] Normocephalic, atraumatic  [] Abnormal -     External Ears [x] Normal  [] Abnormal -    Neck: [x] No visualized mass [] Abnormal -     Pulmonary/Chest: [x] Respiratory effort normal   [x] No visualized signs of difficulty breathing or respiratory distress        [] Abnormal -       Neurological:        [x] No Facial Asymmetry (Cranial nerve 7 motor function) (limited exam due to video visit)          [x] No gaze palsy        [] Abnormal -          Skin:        [x] No significant exanthematous lesions or discoloration noted on facial skin         [] Abnormal -            Psychiatric:       [x] Normal Affect [] Abnormal -        Other pertinent observable physical exam findings:-            A>    ICD-10-CM ICD-9-CM    1. Obstructive sleep apnea (adult) (pediatric)  G47.33 327.23    2. Essential hypertension  I10 401.9    3. Type 2 diabetes mellitus with complication, with long-term current use of insulin ([facility])  E11.8 250.90     Z79.4 V58.67      AHI = 5 (5-17). On CPAP, Respironics, DS2 :  8-10 cmH2O. Compliant:      yes    Therapeutic Response:  Positive    P>    she is compliant with PAP therapy and PAP continues to benefit patient and remains necessary for control of her sleep apnea. PAP setting - she will continue on her current pressure settings. * We have recommended a dedicated weight loss through appropriate diet and an exercise regimen as significant weight reduction has been shown to reduce severity of obstructive sleep apnea. *   Follow-up and Dispositions    · Return in about 1 year (around 2/1/2023). I have ordered replacement supplies  I have reviewed medicare requirements regarding PAP usage     * She was asked to contact our office for any problems regarding PAP therapy. * Counseling was provided regarding the importance of regular PAP use and on proper sleep hygiene and safe driving.     * Re-enforced proper and regular cleaning for the device. she was advised to follow 's recommendations regarding cleaning of PAP device and PAP supplies. 2. Hypertension - she continues on her current regimen. I have reviewed the relationship between hypertension as it relates to sleep-disordered breathing. 3. Hypertension - she continues on her current regimen. I have reviewed the relationship between hypertension as it relates to sleep-disordered breathing. All of her questions were addressed. Pursuant to the emergency declaration under the [address] [place] [address], [facility] waiver authority and the [employer] Act, this Virtual  Visit was conducted, with patient's consent, to reduce the patient's risk of exposure to COVID-19 and provide continuity of care for an established patient. Services were provided through a video synchronous discussion virtually to substitute for in-person clinic visit.     Dima Ruelas MD    Electronically signed by    Julio C Paulino MD  Diplomate in Sleep Medicine  [facility]

## 2022-02-14 ENCOUNTER — PATIENT OUTREACH (OUTPATIENT)
Dept: CASE MANAGEMENT | Age: 72
End: 2022-02-14

## 2022-02-15 RX ORDER — MELATONIN
DAILY
COMMUNITY
End: 2022-10-21

## 2022-02-15 RX ORDER — POTASSIUM CHLORIDE 20 MEQ/1
20 TABLET, EXTENDED RELEASE ORAL DAILY
COMMUNITY
End: 2022-08-02 | Stop reason: SDUPTHER

## 2022-02-15 RX ORDER — CARVEDILOL 12.5 MG/1
12.5 TABLET ORAL 2 TIMES DAILY WITH MEALS
COMMUNITY

## 2022-02-15 NOTE — PROGRESS NOTES
2/15/2022  4:24 PM    Med rec during this call  Current Outpatient Medications   Medication Sig    potassium chloride (K-DUR, KLOR-CON M20) 20 mEq tablet Take 20 mEq by mouth daily.  cholecalciferol (Vitamin D3) (1000 Units /25 mcg) tablet Take  by mouth daily.  carvediloL (COREG) 12.5 mg tablet Take 12.5 mg by mouth two (2) times daily (with meals).  gabapentin (NEURONTIN) 100 mg capsule TAKE 1 CAPSULE THREE TIMES A DAY    HumaLOG KwikPen Insulin 100 unit/mL kwikpen 22 units for breakfast, 24 units for lunch and 42 units for dinner    sertraline (ZOLOFT) 25 mg tablet Take 1 Tablet by mouth daily.  flash glucose sensor (FreeStyle Jack 2 Sensor) kit 1 Each by Does Not Apply route every fourteen (14) days.  flash glucose scanning reader (FreeStyle Jack 2 Garrison) misc 1 Each by Does Not Apply route daily.  hydrALAZINE (APRESOLINE) 50 mg tablet Take 1 Tablet by mouth every eight (8) hours. (Patient taking differently: Take 50 mg by mouth every eight (8) hours. Taking one AM  And one PM.)    triamterene-hydroCHLOROthiazide (DYAZIDE) 37.5-25 mg per capsule Take 1 Capsule by mouth daily.  rosuvastatin (CRESTOR) 20 mg tablet Take 1 Tablet by mouth daily.  levothyroxine (SYNTHROID) 137 mcg tablet TAKE 1 TABLET DAILY    albuterol (ProAir HFA) 90 mcg/actuation inhaler USE 1 INHALATION EVERY 4 HOURS AS NEEDED WHEEZING OR SHORTNESS OF BREATH    losartan (COZAAR) 100 mg tablet Take 1 Tab by mouth daily.  insulin glargine U-300 conc (Toujeo SoloStar U-300 Insulin) 300 unit/mL (1.5 mL) inpn pen 60 Units by SubCUTAneous route nightly. Indications: type 2 diabetes mellitus (Patient taking differently: 62 Units by SubCUTAneous route nightly. Indications: type 2 diabetes mellitus)    albuterol (ACCUNEB) 1.25 mg/3 mL nebu 3 mL by Nebulization route every four (4) hours as needed (wheezing).     ADVAIR DISKUS 100-50 mcg/dose diskus inhaler USE 1 INHALATION TWICE A DAY    BD INSULIN PEN NEEDLE UF SHORT 31 gauge x 5/16\" ndle     aspirin delayed-release 81 mg tablet Take 81 mg by mouth daily.  insulin glargine U-300 conc (Toujeo SoloStar U-300 Insulin) 300 unit/mL (1.5 mL) inpn pen 62 Units by SubCUTAneous route daily. Indications: type 2 diabetes mellitus    glucose blood VI test strips (True Metrix Glucose Test Strip) strip Monitor blood sugar 3 times daily (Patient not taking: Reported on 2/15/2022)     No current facility-administered medications for this visit. Medications Discontinued During This Encounter   Medication Reason    metoprolol tartrate (LOPRESSOR) 50 mg tablet DISCONTINUED BY ANOTHER CLINICIAN         Metoprolol tartrate discontinued because patient reports that this medication was discontinued by her Nephrologist earlier this month. Med rec updated per patient report. Patient reported blood glucose results:   2/10/22 6:09 AM - 103 mg/dL   2/10/22 4:40 PM - 149 mg/dL   2/10/22 9:52 PM - 106 mg/dL  2/12/22  5:50 PM- 191 mg/dL  2/12/22 2:28 AM - 69 mg/dL  2/12/22 4:17 AM - 75 mg/dL  2/12/22 8:16 AM - 135 mg/dL  2/13/22 7: 34 AM - 156 mg/dL  2/13/22 12:28 PM - 191 mg/dL  2/13/22 8:55 PM - 220 mg/dL  2/14/22 2:57 AM - 242 mg/dL  2/14/22 9:37 AM - 194 mg/dL     Goals Addressed                 This Visit's Progress       Diabetes     Patient verbalizes understanding of self -management goals of living with Diabetes. On track     2/15/2022   Pt attended OV with Dr. Maximiliano Welsh (PCP) on 12/10/21, Ambulatory PharmD on 12/22/21, Dr. Irma Garrison (Endocrinology) on 1/14/22, virtual visit with Ambulatory PharmD on 1/17/22.  She received her Freestyle Jack 2 CGM on 1/22/22.  She reports an improved diet since receiving her CGM and decreased consumption of concentrated sweets and starches; \"It's really getting me to really pay attention to what I'm eating. What I used to think wasn't doing anything was actually causing my problem. \"    Med Rec updated today.  Per Dr. Audrey Tai most recent OV note, current DM medication orders are Toujeo insulin 60 units at bedtime and Humalog insulin 24 breakfast (20 for lunch if she eats lunch) and 42 for dinner. Patient reports to [facility] today that she has not taken Toujeou for the past three nights because she is concerned about her blood sugar dropping overnight. Per Dr. Susan Caicedo most recent OV note, toujeou will likely have to be decreased to prevent hypoglycemia if bedtime blood sugar level is <200 mg/dL. Patient is unable to recall if she took Toujeou on 2/9, 2/10 or 2/11; note that she reports a blood glucose level of 69 mg/dL at 2:28 AM on 2/12/22 (she is unable to recall if she took Toujeou at bedtime on 2/11).  ACM reinforced patient education on treatment of hypoglycemia.  ACM will route encounter to Dr. Brent Melgar today for notification and review. ACM will follow-up with patient in approximately one week to review most recent blood glucose levels. 12/10/2021   Per [facility] representative, order for CGM is currently closed; previously submitted order was denied by Medicare due to need for clarification regarding number of times per day that the patient is checking her blood glucose and injecting insulin, however requested order addendum (by [address]) was not received from endocrinology office. Patient attended office visit with PCP on 12/10/21 and was referred to ambulatory PharmD for assistance with [employer] order; encounter routed to Ambulatory PharmD today for continuity of care.  Patient has an appointment scheduled with Ambulatory PharmD on 12/22/21 to discuss [address]. Patient will attend this appointment as scheduled.  Plan for ACM outreach in approximately two weeks. 12/8/2021   She has not received the [address].  She most recently spoke to [address] on today; reports being advised that Medicare denied coverage due to an issue with the order and supporting documentation that was submitted to Medicare for coverage.  Patient does not have her blood glucose log available at this time to review with AC during this call. She does report elevated blood glucose readings since previous ACM outreach encounter; pt attributes elevated blood glucose readings to increased stress level related to barrier with getting the [place] Amy as well as the stress of the holiday season because it's the first holiday season without her mother and brother (they passed away last year).  She is making an effort to increase her physical activity; barrier is decreased strength in her lower extremities and bilateral knee pain. She is ambulating with a rollator in public settings; ambulates with cane at home. She notes that she has not seen an ortho provider in many years; she has never seen ortho for her bilateral knee pain. Patient has PCP follow-up scheduled with Dr. Maximiliano Welsh on 12/10/21 and she will discuss this further with Dr. Maximiliano Welsh at her upcoming appointment.  Staff message routed to Dr. Audrey Tai clinical staff to inquire about Freestyle Jack Order; awaiting response. [facility] will contact [address] [place] [address] on tomorrow to inquire about Medicare coverage barrier/denial.    11/9/2021   AC outreach to [address] [place] [address] today regarding order for FreeStyle Jack 14 day reader; pt contact and insurance information provided. [facility] will be contacting the patient after completing insurance verification and will then submit order request to Dr. Audrey Tai office for review and provider signature. Patient notified by [facility] today that [address] [place] [address] will be contacting her; pt verbalizes understanding.  Patient report that she spoke with [facility] today and requested that they cancel the previous order for the [employer].  AC outreach in approximately one week to follow-up on order status with [facility]. 11/5/2021   Attended office visit with Dr. Irma Garrison on 10/15/21.    Script for [place] Amy written by Dr. Jaun Ventura on 10/15/21. She tried to fill this order at [employer] and [facility]. Pt reports that [facility] informed her that out-of-pocket cost for the reader device was >$200; pt unable to afford this cost and had orders transferred to [facility]. Patient was informed by [address] reader was $79; this is affordable to her, however she has not purchased the reader yet because she was unable to get the sensors. Patient is not sure of her out-of-pocket cost for the [facility]; [facility] was unable to advise her of the 14-day sensors copay because [facility] needs to void out where they already filled the order. Pt was informed by [facility] that order for [facility] should be sent to a DME Provider.  She is checking her blood glucose twice daily in the morning and in the evening. One episode of hypoglycemia is reported by patient within the past week with fasting blood glucose level of 59 mg/dL, however she reports that she was asymptomatic. Reports that episodes of fasting low blood sugar have decreased since previous ACM outreach encounter.  She forgot to take her Toujeou last night and her fasting blood glucose this morning was 160 mg/dL   Patient will continue to check her blood glucose twice daily in the morning and in the evening; she will maintain blood glucose log to review with Dr. Riya Phillips [facility].  Medicare Coverage Criteria for [address] reviewed by this ACM today ([facility]ott resource); patient should meet the criteria for the [address] [place] [address] Drive to be covered by her Medicare Part B. ACM outreach to [facility] DME Provider to inquire if they are able to assist with order; lvm requesting a return phone call to this ACM.    ACM provided patient with update; will attempt to have order for [address], reader and sensors, supplied by DME Provider under her Medicare Part B.    10/1/2021   She was checking her blood glucose twice daily and maintaining her blood glucose log up until last week; she checked her blood glucose once daily last week because her fingers were sore and she has not written her blood sugars down since last week. Patient states, \"But I know I should (check her blood glucose twice daily) because I really don't feel any different when it's (blood sugar) up or down. \"    \"I've been having some very low sugar in the morning. \" Pt reported fasting blood glucose this morning was 93 mg/dL; reports that her lowest fasting blood glucose level since previous ACM outreach encounter was on 9/8/21 and it was 52 mg/dL. Patient reports associated symptoms of sweating and weakness when her blood glucose is low, \"but not to the point where I felt like I was going to pass out. \" ACM reviewed treatment of hypoglycemia with patient today; pt verbalizes understanding.  Patient was able to review her blood glucose log and visualize the effect that certain foods had on her blood sugar; states, \"When it (blood sugar) went up at night I had either rice, potato or bread. \"   She is still interested in the [facility], if eligible; she could benefit from the [facility] to assist with treatment plan adherence for blood glucose monitoring. Barrier to patient adherence with blood glucose monitoring at present is that her fingertips get sore with checking her blood glucose 2-3 times per day. She has not received outreach from Ambulatory PharmD regarding this; referral was previously sent by this ACM on 8/18/21. Patient will discuss this with Dr. Prater Going at her scheduled appointment on 10/15/21.  Patient will resume checking her blood glucose twice daily and maintain her blood glucose log.  Patient will attend previously scheduled appointment with Dr. Prater Going on 10/15/21; she will take her blood glucose log to this appointment.    Encounter routed to Dr. Prater Going today for notification of patient reported most recent blood glucose results.  Plan for next ACM outreach in approximately two weeks. 8/18/2021  She has not resumed water aerobics class; she still plans on returning to this class, however her schedule has been busy. Checking her blood glucose once daily. She is still interested in trying to get the [facility]; notes that it was previously not covered by Medicare, however she has recently heard that it is now covered. Pt reported fasting blood glucose this morning was 140 mg/dL. Attended office visit with Dr. Kriss Peabody on 7/14/21; hemoglobin A1c 7/14/21 9.2%  Next scheduled follow-up with Dr. Kriss Peabody is 10/15/21. Encouraged patient to increase blood glucose monitoring to twice daily; order per Dr. Kriss Peabody is for three times daily. ACM will consult with Ambulatory PharmD regarding Freestyle Jack and Medicare coverage; pt could benefit from the [place] from an adherence standpoint with blood glucose monitoring. 7/8/2021  Plans to resume water aerobics next week. She contacted the [facility] and was notified that water aerobics class has resumed; plans to go to local [facility] on 7/13 to sign up and pay fee. Patient states, \"I'm definitely looking forward to that. \"  Checking her blood glucose twice daily; states, \"I haven't had anything over 160 (mg/dL). \" She is interested in getting the [facility] 14-day system and will discuss this with Dr. Kriss Peabody during her upcoming visit; pt notes that when this was previously ordered for her in 2020, however she did not get it because it was not covered by her Medicare. Patient has office visit scheduled with Dr. Kriss Peabody on 7/14.    4/20/2021  most recent Hgb A1c 3/17/21 8.9%. Patient reports that she has made an effort to improve her diet since the beginning of the year and has decreased her intake of starchy foods.  Patient reports that current level of physical activity includes walking around inside of her house; barrier to increased physical activity is chronic pain in her right knee and back. Patient reports plan to increase physical activity with the warmer weather by walking in her pool once she gets it open. 3/5/2020  - attended OV with Dr. Natty Moody 2/28/2020; Hgb A1c 9.9%, compare to previous result of 10.1%. Pt reported to Dr. aNtty Moody that she had not taken metformin since her previous OV (11/25/19) due to continued GI distress. Pt was advised to increase her physical activity to tolerance and was asked to check blood sugars at 11:00PM for 1 week and then contact Dr. Natty Moody to report readings; see Xavier Hedrick note for details regarding OV encounter.   - Patient is not receptive to ACM follow-up today due to acute illness; ACM outreach in one week. 2/12/2020  - reports fasting blood glucose this morning of 120 mg/dL, after lunch 190 mg/dL. Unable to review additional recent blood glucose readings as results are stored on glucometer and patient is unsure how to recall historical results; pt will take her glucometer to scheduled OV with Dr. Natty Moody . - reports taking mealtime insulin prior to meals; states, \"Sometimes I forget and I take it right after I eat, but I'm trying to get use to it and take it before I eat. \"  - still taking humalog 24 units before breakfast (if blood glucose is <150 mg/dL she takes 22 units), 24 units before lunch, 36 units before dinner and Toujeo 60 units nightly; pt did not contact Dr. Mary Joy office after last ACM outreach to confirm insulin orders, however reports that she reviewed her most recent office visit AVS and confirmed that the unit doses she is currently taking are correct. - continued lack of adherence with TID blood glucose monitoring; inquired about patient barriers to adherence - states, \"I am not sure I am going to be able to do that because when I stick it it hurts too bad and I've been doing this for 15 years and my fingers have had it. \" Informed patient about 14-day blood glucose monitoring system, Freestyle Jack, and encouraged patient to discuss this option with Dr. Sofia Preston at her upcoming office visit to increase patient adherence with blood glucose monitoring.  -  Inquired as to whether patient is following a diabetic diet; pt states, \"Kind of, yeah; most of the time. \" Diet is described as \"about the same; nothing more, nothing less. \" Patient is not interested in keeping a food diary to review with this NN. Patient is not interested in additional education/review of diabetic diet; notes that she previously met with CDE NN, Justin Quesada.  - no longer participating in water aerobics; currently not engaging in moderate exercise. She reports that she walks around her house for exercise; states, \"I just get up and start walking from the side door around to the front door and do it a couple of times. \" Patient reported barrier to exercise/increasing physical activity is back spasms which prevent her from \"doing a lot of moving or standing unless I am in the water. \" Reports that barrier to attending water aerobics at present is transportation and winter season. - will attend scheduled OV with Dr. Sofia Preston 2/28/2020  - pt requests next ACM follow-up in three weeks after she attends scheduled OV with Dr. Sofia Preston    1/10/20  - Hgb A1c 11/25/19 - 10.1%  - pt notes history of Type 2 DM for 15 years  - checking blood glucose 1-2x/day  - ordered insulin regimen/frequency is Humalog TID (before breakfast, lunch and dinner) and Toujeo nightly  - reports taking humalog 24 units before breakfast (if blood glucose is <150 mg/dL she takes 22 units), 24 units before lunch, 36 units before dinner and Toujeo 60 units nightly.  Most recent endocrinology OV note reviewed; per OV note, most recent Humalog order (11/25/19) - 22 units for breakfast and lunch and 36 units for dinner and Toujeo 60 units nightly (12/16/19), pt was noted to be taking mealtime insulin 2-3 hours after meal .Further clarification is needed regarding what current insulin unit dose orders are; patient is advised to contact Dr. Jaun Ventura to confirm/clarify current insulin unit dose orders. - per last endocrinology OV note, pt was encouraged to improve diet and physical activity to tolerance  - encouraged to take mealtime insulin prior to eating meal  - encouraged to check blood glucose TID as ordered and keep blood glucose log      10/29/19  8/14/19-10.9%  4/22/19 - Hgb A1c 10.1%  - Current level of understanding: checks blood sugar once daily before breakfast. Completed Disease Specific CM Program for Diabetes Self-Management (w/ Morales Giraldo, RN NN CDE) on 8/27/19. Does not keep written blood glucose log; reports that glucometer stores historical blood glucose results. Pt reported fasting blood glucose on 10/28 of 169 mg/dL and on 10/29 of 93 mg/dL. Pt notes difficulty adhering to diabetic diet - enjoys pasta, potatoes, chips - states, \"my problem is food. I'm not that strong on sweets. \"; pt reported barrier to diabetic diet adherence is financial. Does not drink regular soda; drinks diet coke (no more than 2/day) and water. Currently exercising two times/week for 60-90 minutes- water aerobics. - Desired Outcome: Improve diet and exercise, decrease carbohydrate intake. Lower Hgb A1c.  - Plan: Further assess financial barrier(s). Will continue provide and reinforce pt education re: diabetic diet, assist with meal planning. Pt could benefit from keeping a food diary; will discuss further during subsequent outreach encounter as pt is unable to continue call at this time due to getting ready to leave her house.                   Future Appointments:  Future Appointments   Date Time Provider Ovidio Allan   4/15/2022  2:30 PM Jhon Darling MD RDE  BS AMB   5/11/2022  1:15 PM Deborah Root MD [facility] BS AMB   6/10/2022 11:00 AM Anamika Duarte MD [facility] BS AMB   2/7/2023 10:00 AM Yvonne Reza MD [facility] BS AMB

## 2022-02-16 ENCOUNTER — TELEPHONE (OUTPATIENT)
Dept: ENDOCRINOLOGY | Age: 72
End: 2022-02-16

## 2022-02-16 NOTE — PROGRESS NOTES
2/16/2022  3:37 PM    Per Dr. Earl Pinto, decrease Toujeou to 50 units at bedtime and continue to follow patient's glucose readings. Patient outreach attempt by this AC today to notify patient of change in Toujeou dose ordered by Dr. Earl Pinto; m requesting a return phone call to this AC. Rohini Dhillon MD  You 5 hours ago (9:43 AM)     REBECA    Thank you    Message text      Shayy George MD 5 hours ago (9:42 AM)     Meera Hussein [place] will let her know and schedule a follow-up call with her next week to review her readings. Thank you! Message text      Rohini Dhillon MD  You 6 hours ago (8:55 AM)     REBECA    Thank you for your note. Yes we should decrease the Toujeo to 50 units and continue to follow her glucose readings. Message text      Shayy George MD 22 hours ago (5:22 PM)         Good evening,   I followed up with Ms. Jose Lozano today by telephone and wanted to give you an update. Patient reports to [facility] 2[facility] today that she has not taken Toujeou for the past three nights because she is concerned about her blood sugar dropping overnight. Per Dr. Taylor Patterson most recent OV note, toujeou will likely have to be decreased to prevent hypoglycemia if bedtime blood sugar level is <200 mg/dL. Patient is unable to recall if she took Toujeou on 2/9, 2/10 or 2/11; note that she reports a blood glucose level of 69 mg/dL at 2:28 AM on 2/12/22 (she is unable to recall if she took Toujeou at bedtime on 2/11). I have included her most recent reported blood glucose results in my progress note for your review. Do you want to make any changes to her current regimen?       Routing comment

## 2022-02-16 NOTE — PROGRESS NOTES
2/16/2022  4:26 PM    Goals Addressed                 This Visit's Progress       Diabetes     Patient verbalizes understanding of self -management goals of living with Diabetes. On track     2/16/2022  Per Dr. Bhupendra Jaimes, decrease Toujeou to 50 units at bedtime and continue to follow patient's glucose readings; patient notified by this ACM today. Plan for next ACM outreach in approximately one week. 2/15/2022   Pt attended OV with Dr. Luis Miguel Negro (PCP) on 12/10/21, Ambulatory PharmD on 12/22/21, Dr. Bhupendra Jaimes (Endocrinology) on 1/14/22, virtual visit with Ambulatory PharmD on 1/17/22.  She received her Freestyle Jack 2 CGM on 1/22/22.  She reports an improved diet since receiving her CGM and decreased consumption of concentrated sweets and starches; \"It's really getting me to really pay attention to what I'm eating. What I used to think wasn't doing anything was actually causing my problem. \"    Med Rec updated today. Per Dr. Bushra Lara most recent OV note, current DM medication orders are Toujeo insulin 60 units at bedtime and Humalog insulin 24 breakfast (20 for lunch if she eats lunch) and 42 for dinner. Patient reports to [facility] today that she has not taken Toujeou for the past three nights because she is concerned about her blood sugar dropping overnight. Per Dr. Bushra Lara most recent OV note, toujeou will likely have to be decreased to prevent hypoglycemia if bedtime blood sugar level is <200 mg/dL. Patient is unable to recall if she took Toujeou on 2/9, 2/10 or 2/11; note that she reports a blood glucose level of 69 mg/dL at 2:28 AM on 2/12/22 (she is unable to recall if she took Toujeou at bedtime on 2/11).  ACM reinforced patient education on treatment of hypoglycemia.  ACM will route encounter to Dr. Bhupendra Jaimes today for notification and review. ACM will follow-up with patient in approximately one week to review most recent blood glucose levels.     12/10/2021   Per [facility] representative, order for CGM is currently closed; previously submitted order was denied by Medicare due to need for clarification regarding number of times per day that the patient is checking her blood glucose and injecting insulin, however requested order addendum (by [address]) was not received from endocrinology office. Patient attended office visit with PCP on 12/10/21 and was referred to ambulatory PharmD for assistance with [facility] order; encounter routed to Ambulatory PharmD today for continuity of care.  Patient has an appointment scheduled with Ambulatory PharmD on 12/22/21 to discuss [facility]. Patient will attend this appointment as scheduled.  Plan for ACM outreach in approximately two weeks. 12/8/2021   She has not received the [facility]. She most recently spoke to [address] [place] [address] on today; reports being advised that Medicare denied coverage due to an issue with the order and supporting documentation that was submitted to Medicare for coverage.  Patient does not have her blood glucose log available at this time to review with ACM during this call. She does report elevated blood glucose readings since previous ACM outreach encounter; pt attributes elevated blood glucose readings to increased stress level related to barrier with getting the [facility] as well as the stress of the holiday season because it's the first holiday season without her mother and brother (they passed away last year).  She is making an effort to increase her physical activity; barrier is decreased strength in her lower extremities and bilateral knee pain. She is ambulating with a rollator in public settings; ambulates with cane at home. She notes that she has not seen an ortho provider in many years; she has never seen ortho for her bilateral knee pain. Patient has PCP follow-up scheduled with Dr. Yeimy Garland on 12/10/21 and she will discuss this further with Dr. Yeimy Garland at her upcoming appointment.    Staff message routed to Dr. Arias Watson clinical staff to inquire about Freestyle Jack Order; awaiting response. [facility] will contact [address] [place] [address] on tomorrow to inquire about Medicare coverage barrier/denial.    11/9/2021   AC outreach to [address] [place] [address] today regarding order for FreeStyle Jack 14 day reader; pt contact and insurance information provided. [facility] will be contacting the patient after completing insurance verification and will then submit order request to Dr. Arias Watson office for review and provider signature. Patient notified by AC today that [facility] will be contacting her; pt verbalizes understanding.  Patient report that she spoke with [employer] today and requested that they cancel the previous order for the [employer].  AC outreach in approximately one week to follow-up on order status with [facility]. 11/5/2021   Attended office visit with Dr. Emma Cooley on 10/15/21.  Script for [employer] written by Dr. Emma Cooley on 10/15/21. She tried to fill this order at [facility] and [facility]. Pt reports that [facility] informed her that out-of-pocket cost for the reader device was >$200; pt unable to afford this cost and had orders transferred to Tatiana Cristobal. Patient was informed by [employer] [facility] [employer] reader was $79; this is affordable to her, however she has not purchased the reader yet because she was unable to get the sensors. Patient is not sure of her out-of-pocket cost for the [facility]; [employer] was unable to advise her of the 14-day sensors copay because [facility] needs to void out where they already filled the order. Pt was informed by Tatiana Cristobal that order for [facility] should be sent to a DME Provider.  She is checking her blood glucose twice daily in the morning and in the evening.  One episode of hypoglycemia is reported by patient within the past week with fasting blood glucose level of 59 mg/dL, however she reports that she was asymptomatic. Reports that episodes of fasting low blood sugar have decreased since previous ACM outreach encounter.  She forgot to take her Toujeou last night and her fasting blood glucose this morning was 160 mg/dL   Patient will continue to check her blood glucose twice daily in the morning and in the evening; she will maintain blood glucose log to review with Dr. Gualberto Theodore ACM.  Medicare Coverage Criteria for [employer] reviewed by this ACM today ([employer] resource); patient should meet the criteria for the [address] [place] [address] Drive to be covered by her Medicare Part B. ACM outreach to [facility] DME Provider to inquire if they are able to assist with order; m requesting a return phone call to this ACM.  ACM provided patient with update; will attempt to have order for [address], reader and sensors, supplied by DME Provider under her Medicare Part B.    10/1/2021   She was checking her blood glucose twice daily and maintaining her blood glucose log up until last week; she checked her blood glucose once daily last week because her fingers were sore and she has not written her blood sugars down since last week. Patient states, \"But I know I should (check her blood glucose twice daily) because I really don't feel any different when it's (blood sugar) up or down. \"    \"I've been having some very low sugar in the morning. \" Pt reported fasting blood glucose this morning was 93 mg/dL; reports that her lowest fasting blood glucose level since previous ACM outreach encounter was on 9/8/21 and it was 52 mg/dL. Patient reports associated symptoms of sweating and weakness when her blood glucose is low, \"but not to the point where I felt like I was going to pass out. \" AC reviewed treatment of hypoglycemia with patient today; pt verbalizes understanding.    Patient was able to review her blood glucose log and visualize the effect that certain foods had on her blood sugar; states, \"When it (blood sugar) went up at night I had either rice, potato or bread. \"   She is still interested in the [facility], if eligible; she could benefit from the [facility] to assist with treatment plan adherence for blood glucose monitoring. Barrier to patient adherence with blood glucose monitoring at present is that her fingertips get sore with checking her blood glucose 2-3 times per day. She has not received outreach from Ambulatory PharmD regarding this; referral was previously sent by this ACM on 8/18/21. Patient will discuss this with Dr. Phil Lee at her scheduled appointment on 10/15/21.  Patient will resume checking her blood glucose twice daily and maintain her blood glucose log.  Patient will attend previously scheduled appointment with Dr. Phil Lee on 10/15/21; she will take her blood glucose log to this appointment.  Encounter routed to Dr. Phil Lee today for notification of patient reported most recent blood glucose results.  Plan for next AC outreach in approximately two weeks. 8/18/2021  She has not resumed water aerobics class; she still plans on returning to this class, however her schedule has been busy. Checking her blood glucose once daily. She is still interested in trying to get the [facility]; notes that it was previously not covered by Medicare, however she has recently heard that it is now covered. Pt reported fasting blood glucose this morning was 140 mg/dL. Attended office visit with Dr. Phil Lee on 7/14/21; hemoglobin A1c 7/14/21 9.2%  Next scheduled follow-up with Dr. Phil Lee is 10/15/21. Encouraged patient to increase blood glucose monitoring to twice daily; order per Dr. Phil Lee is for three times daily. ACM will consult with Ambulatory PharmD regarding Freestyle Jack and Medicare coverage; pt could benefit from the [place] from an adherence standpoint with blood glucose monitoring. 7/8/2021  Plans to resume water aerobics next week.  She contacted the [facility] and was notified that water aerobics class has resumed; plans to go to local [facility] on 7/13 to sign up and pay fee. Patient states, \"I'm definitely looking forward to that. \"  Checking her blood glucose twice daily; states, \"I haven't had anything over 160 (mg/dL). \" She is interested in getting the [facility] 14-day system and will discuss this with Dr. mEma Cooley during her upcoming visit; pt notes that when this was previously ordered for her in 2020, however she did not get it because it was not covered by her Medicare. Patient has office visit scheduled with Dr. Emma Cooley on 7/14.    4/20/2021  most recent Hgb A1c 3/17/21 8.9%. Patient reports that she has made an effort to improve her diet since the beginning of the year and has decreased her intake of starchy foods. Patient reports that current level of physical activity includes walking around inside of her house; barrier to increased physical activity is chronic pain in her right knee and back. Patient reports plan to increase physical activity with the warmer weather by walking in her pool once she gets it open. 3/5/2020  - attended OV with Dr. Emma Cooley 2/28/2020; Hgb A1c 9.9%, compare to previous result of 10.1%. Pt reported to Dr. Emma Cooley that she had not taken metformin since her previous OV (11/25/19) due to continued GI distress. Pt was advised to increase her physical activity to tolerance and was asked to check blood sugars at 11:00PM for 1 week and then contact Dr. Emma Cooley to report readings; see [facility] note for details regarding OV encounter.   - Patient is not receptive to ACM follow-up today due to acute illness; ACM outreach in one week. 2/12/2020  - reports fasting blood glucose this morning of 120 mg/dL, after lunch 190 mg/dL.  Unable to review additional recent blood glucose readings as results are stored on glucometer and patient is unsure how to recall historical results; pt will take her glucometer to scheduled OV with Dr. Emma Cooley .  - reports taking mealtime insulin prior to meals; states, \"Sometimes I forget and I take it right after I eat, but I'm trying to get use to it and take it before I eat. \"  - still taking humalog 24 units before breakfast (if blood glucose is <150 mg/dL she takes 22 units), 24 units before lunch, 36 units before dinner and Toujeo 60 units nightly; pt did not contact Dr. Caitlyn Noe office after last ACM outreach to confirm insulin orders, however reports that she reviewed her most recent office visit AVS and confirmed that the unit doses she is currently taking are correct. - continued lack of adherence with TID blood glucose monitoring; inquired about patient barriers to adherence - states, \"I am not sure I am going to be able to do that because when I stick it it hurts too bad and I've been doing this for 15 years and my fingers have had it. \" Informed patient about 14-day blood glucose monitoring system, Freestyle Jack, and encouraged patient to discuss this option with Dr. Micael Bernheim at her upcoming office visit to increase patient adherence with blood glucose monitoring.  -  Inquired as to whether patient is following a diabetic diet; pt states, \"Kind of, yeah; most of the time. \" Diet is described as \"about the same; nothing more, nothing less. \" Patient is not interested in keeping a food diary to review with this NN. Patient is not interested in additional education/review of diabetic diet; notes that she previously met with CDE NN, Aaron Hayes.  - no longer participating in water aerobics; currently not engaging in moderate exercise. She reports that she walks around her house for exercise; states, \"I just get up and start walking from the side door around to the front door and do it a couple of times. \" Patient reported barrier to exercise/increasing physical activity is back spasms which prevent her from \"doing a lot of moving or standing unless I am in the water. \" Reports that barrier to attending water aerobics at present is transportation and winter season. - will attend scheduled OV with Dr. Sofia Preston 2/28/2020  - pt requests next ACM follow-up in three weeks after she attends scheduled OV with Dr. Sofia Preston    1/10/20  - Hgb A1c 11/25/19 - 10.1%  - pt notes history of Type 2 DM for 15 years  - checking blood glucose 1-2x/day  - ordered insulin regimen/frequency is Humalog TID (before breakfast, lunch and dinner) and Toujeo nightly  - reports taking humalog 24 units before breakfast (if blood glucose is <150 mg/dL she takes 22 units), 24 units before lunch, 36 units before dinner and Toujeo 60 units nightly. Most recent endocrinology OV note reviewed; per OV note, most recent Humalog order (11/25/19) - 22 units for breakfast and lunch and 36 units for dinner and Toujeo 60 units nightly (12/16/19), pt was noted to be taking mealtime insulin 2-3 hours after meal .Further clarification is needed regarding what current insulin unit dose orders are; patient is advised to contact Dr. Sofia Preston to confirm/clarify current insulin unit dose orders. - per last endocrinology OV note, pt was encouraged to improve diet and physical activity to tolerance  - encouraged to take mealtime insulin prior to eating meal  - encouraged to check blood glucose TID as ordered and keep blood glucose log      10/29/19  8/14/19-10.9%  4/22/19 - Hgb A1c 10.1%  - Current level of understanding: checks blood sugar once daily before breakfast. Completed Disease Specific CM Program for Diabetes Self-Management (w/ Justin Quesada, RN NN CDE) on 8/27/19. Does not keep written blood glucose log; reports that glucometer stores historical blood glucose results. Pt reported fasting blood glucose on 10/28 of 169 mg/dL and on 10/29 of 93 mg/dL. Pt notes difficulty adhering to diabetic diet - enjoys pasta, potatoes, chips - states, \"my problem is food. I'm not that strong on sweets. \"; pt reported barrier to diabetic diet adherence is financial. Does not drink regular soda; drinks diet coke (no more than 2/day) and water. Currently exercising two times/week for 60-90 minutes- water aerobics. - Desired Outcome: Improve diet and exercise, decrease carbohydrate intake. Lower Hgb A1c.  - Plan: Further assess financial barrier(s). Will continue provide and reinforce pt education re: diabetic diet, assist with meal planning. Pt could benefit from keeping a food diary; will discuss further during subsequent outreach encounter as pt is unable to continue call at this time due to getting ready to leave her house.

## 2022-02-16 NOTE — TELEPHONE ENCOUNTER
2/16/2022  3:30 PM    Patient called to have nurse call her back about her glucose, that is all she said, wouldn't go into any more detail with me.please call her back at [phone] thanks

## 2022-02-17 NOTE — TELEPHONE ENCOUNTER
Patient would like to know if she should take an additional dose of Humalog insulin if her freestyle eleni reads \"high\" after her meals.

## 2022-02-17 NOTE — TELEPHONE ENCOUNTER
Spoke to pt. She is now taking toujeo 50 units and humalog with meals. She is eating more carbs for breakfast and her blood sugars are going up.  Advised her to either decrease her carbs for breakfast or go up on the Humalog for the higher carb breakfast.

## 2022-02-24 RX ORDER — HYDRALAZINE HYDROCHLORIDE 50 MG/1
50 TABLET, FILM COATED ORAL EVERY 8 HOURS
Qty: 180 TABLET | Refills: 1 | Status: CANCELLED | OUTPATIENT
Start: 2022-02-24

## 2022-02-25 ENCOUNTER — PATIENT OUTREACH (OUTPATIENT)
Dept: CASE MANAGEMENT | Age: 72
End: 2022-02-25

## 2022-02-25 RX ORDER — HYDRALAZINE HYDROCHLORIDE 50 MG/1
50 TABLET, FILM COATED ORAL EVERY 8 HOURS
Qty: 180 TABLET | Refills: 1 | Status: SHIPPED | OUTPATIENT
Start: 2022-02-25 | End: 2022-09-28

## 2022-02-25 NOTE — PROGRESS NOTES
2/25/2022  12:57 PM    Goals Addressed                 This Visit's Progress       Diabetes     Patient verbalizes understanding of self -management goals of living with Diabetes. On track     2/25/2022   Patient has been checking her glucose both with the [facility] sensor and by fingerstick (using her old glucometer) because she is concerned with the accuracy of Jack sensor device. She has noticed a discrepancy in her glucose results between fingerstick readings using her old glucometer device and the jack sensor readings; pt reports the following: Fingerstick 202mg/dL (845AM this morning using her old glucometer), Jack sensor reading 112 mg/dL (846 AM this morning)/ Last night 6:06 PM - Fingerstick 244 mg/dL (using her old glucometer), Jack sensor reading 192 mg/dL. Patient checked her blood glucose today during this call and reports the following - fingerstick with [facility] reader device 340 mg/dL, jack sensor reading 240 mg/dL and fingerstick using her old glucometer 305 mg/dL.  Patient reports the following low glucose readings since previous [facility] outreach encounter: 59 mg/dL on 2/20/22 at 6:40 AM, 69 mg/dL on 2/22/22 at 12:30 AM, 72 mg/dL on 2/22/22 at 6:56 AM. She is unable to recall if she took Toujeou on 2/19 or 2/21 and, if so, how many units; she does have this information written down, however she is unable to find her notepad during this call.  She changed her Jack sensor nine days ago; sensors are changed every 14 days   She took 30 units of Humalog this morning because her blood glucose reading by fingerstick was 202 mg/dL (before breakfast); reports that [facility] sensor reading at this time was 112 mg/dL.  She has been taking 20 units of Toujeou because she is concerned about her blood glucose dropping overnight.  Based on patient's above reported glucose readings, consider possible issue with accuracy of jack sensor.  Patient will contact [employer] today to attempt to troubleshoot issue and request control solution. Patient will continue to monitor her glucose and keep a written log of her glucose readings until issue with [facility] monitoring system can be troubleshooted/resolved and accuracy of Jack/Jack sensor can be confirmed.  Plan for next ACM outreach in approximately three days to follow-up on patient's glucose readings and patient communication with [employer] customer service.  ACM will route encounter to Dr. Shirley Jimenez and Amubulatory PharmD for notification/review. 2/16/2022  Per Dr. Shirley Jimenez, decrease Toujeou to 50 units at bedtime and continue to follow patient's glucose readings; patient notified by this ACM today. Plan for next ACM outreach in approximately one week. 2/15/2022   Pt attended OV with Dr. Jan Pelayo (PCP) on 12/10/21, Ambulatory PharmD on 12/22/21, Dr. Shirley Jimenez (Endocrinology) on 1/14/22, virtual visit with Ambulatory PharmD on 1/17/22.  She received her Freestyle Jack 2 CGM on 1/22/22.  She reports an improved diet since receiving her CGM and decreased consumption of concentrated sweets and starches; \"It's really getting me to really pay attention to what I'm eating. What I used to think wasn't doing anything was actually causing my problem. \"    Med Rec updated today. Per Dr. Remedios Avalos most recent OV note, current DM medication orders are Toujeo insulin 60 units at bedtime and Humalog insulin 24 breakfast (20 for lunch if she eats lunch) and 42 for dinner. Patient reports to Dewain Goldmann today that she has not taken Toujeou for the past three nights because she is concerned about her blood sugar dropping overnight. Per Dr. Remedios Avalos most recent OV note, toujeou will likely have to be decreased to prevent hypoglycemia if bedtime blood sugar level is <200 mg/dL.  Patient is unable to recall if she took Toujeou on 2/9, 2/10 or 2/11; note that she reports a blood glucose level of 69 mg/dL at 2:28 AM on 2/12/22 (she is unable to recall if she took Toujeou at bedtime on 2/11).   ACM reinforced patient education on treatment of hypoglycemia.  ACM will route encounter to Dr. Rafa Gutierrez today for notification and review. ACM will follow-up with patient in approximately one week to review most recent glucose levels. 12/10/2021   Per [facility] representative, order for CGM is currently closed; previously submitted order was denied by Medicare due to need for clarification regarding number of times per day that the patient is checking her blood glucose and injecting insulin, however requested order addendum (by [address]) was not received from endocrinology office. Patient attended office visit with PCP on 12/10/21 and was referred to ambulatory PharmD for assistance with [address] order; encounter routed to Ambulatory PharmD today for continuity of care.  Patient has an appointment scheduled with Ambulatory PharmD on 12/22/21 to discuss [address]. Patient will attend this appointment as scheduled.  Plan for ACM outreach in approximately two weeks. 12/8/2021   She has not received the [address]. She most recently spoke to [address] [place] [address] on today; reports being advised that Medicare denied coverage due to an issue with the order and supporting documentation that was submitted to Medicare for coverage.  Patient does not have her blood glucose log available at this time to review with ACM during this call. She does report elevated blood glucose readings since previous ACM outreach encounter; pt attributes elevated blood glucose readings to increased stress level related to barrier with getting the [address] as well as the stress of the holiday season because it's the first holiday season without her mother and brother (they passed away last year).  She is making an effort to increase her physical activity; barrier is decreased strength in her lower extremities and bilateral knee pain.  She is ambulating with a rollator in public settings; ambulates with cane at home. She notes that she has not seen an ortho provider in many years; she has never seen ortho for her bilateral knee pain. Patient has PCP follow-up scheduled with Dr. Nickolas Bee on 12/10/21 and she will discuss this further with Dr. Nickolas Bee at her upcoming appointment.  Staff message routed to Dr. Chase Oneal clinical staff to inquire about Freestyle Jack Order; awaiting response. AC will contact [address] [place] [address] on tomorrow to inquire about Medicare coverage barrier/denial.    11/9/2021   AC outreach to [address] [place] [address] today regarding order for FreeStyle Jack 14 day reader; pt contact and insurance information provided. [facility] will be contacting the patient after completing insurance verification and will then submit order request to Dr. Chase Oneal office for review and provider signature. Patient notified by [facility] today that [facility] will be contacting her; pt verbalizes understanding.  Patient report that she spoke with [facility] today and requested that they cancel the previous order for the Tom and [employer].  AC outreach in approximately one week to follow-up on order status with [facility]. 11/5/2021   Attended office visit with Dr. Oriana Almanza on 10/15/21.  Script for [facility] written by Dr. Oriana Almanza on 10/15/21. She tried to fill this order at [facility] and [facility]. Pt reports that [facility] informed her that out-of-pocket cost for the reader device was >$200; pt unable to afford this cost and had orders transferred to [facility]bo[facility]. Patient was informed by [address] [facility] ZenDay reader was $79; this is affordable to her, however she has not purchased the reader yet because she was unable to get the sensors.  Patient is not sure of her out-of-pocket cost for the [facility]; [employer] was unable to advise her of the 14-day sensors copay because [facility] needs to void out where they already filled the order. Pt was informed by [address] that order for [facility] should be sent to a DME Provider.  She is checking her blood glucose twice daily in the morning and in the evening. One episode of hypoglycemia is reported by patient within the past week with fasting blood glucose level of 59 mg/dL, however she reports that she was asymptomatic. Reports that episodes of fasting low blood sugar have decreased since previous ACM outreach encounter.  She forgot to take her Toujeou last night and her fasting blood glucose this morning was 160 mg/dL   Patient will continue to check her blood glucose twice daily in the morning and in the evening; she will maintain blood glucose log to review with Dr. Yogi AZEVEDO.  Medicare Coverage Criteria for [employer] [employer] reviewed by this ACM today (abbott resource); patient should meet the criteria for the [address] [place] [address] Drive to be covered by her Medicare Part B. ACM outreach to [facility] DME Provider to inquire if they are able to assist with order; m requesting a return phone call to this ACM.  ACM provided patient with update; will attempt to have order for [address], reader and sensors, supplied by DME Provider under her Medicare Part B.    10/1/2021   She was checking her blood glucose twice daily and maintaining her blood glucose log up until last week; she checked her blood glucose once daily last week because her fingers were sore and she has not written her blood sugars down since last week. Patient states, \"But I know I should (check her blood glucose twice daily) because I really don't feel any different when it's (blood sugar) up or down. \"    \"I've been having some very low sugar in the morning. \" Pt reported fasting blood glucose this morning was 93 mg/dL; reports that her lowest fasting blood glucose level since previous ACM outreach encounter was on 9/8/21 and it was 52 mg/dL.   Patient reports associated symptoms of sweating and weakness when her blood glucose is low, \"but not to the point where I felt like I was going to pass out. \" ACM reviewed treatment of hypoglycemia with patient today; pt verbalizes understanding.  Patient was able to review her blood glucose log and visualize the effect that certain foods had on her blood sugar; states, \"When it (blood sugar) went up at night I had either rice, potato or bread. \"   She is still interested in the [employer], if eligible; she could benefit from the [employer] to assist with treatment plan adherence for blood glucose monitoring. Barrier to patient adherence with blood glucose monitoring at present is that her fingertips get sore with checking her blood glucose 2-3 times per day. She has not received outreach from Ambulatory PharmD regarding this; referral was previously sent by this ACM on 8/18/21. Patient will discuss this with Dr. Silvio Gibson at her scheduled appointment on 10/15/21.  Patient will resume checking her blood glucose twice daily and maintain her blood glucose log.  Patient will attend previously scheduled appointment with Dr. Silvio Gibson on 10/15/21; she will take her blood glucose log to this appointment.  Encounter routed to Dr. Silvio Gibson today for notification of patient reported most recent blood glucose results.  Plan for next [facility] outreach in approximately two weeks. 8/18/2021  She has not resumed water aerobics class; she still plans on returning to this class, however her schedule has been busy. Checking her blood glucose once daily. She is still interested in trying to get the [employer]; notes that it was previously not covered by Medicare, however she has recently heard that it is now covered. Pt reported fasting blood glucose this morning was 140 mg/dL. Attended office visit with Dr. Silvio Gibson on 7/14/21; hemoglobin A1c 7/14/21 9.2%  Next scheduled follow-up with Dr. Silvio Gibson is 10/15/21.   Encouraged patient to increase blood glucose monitoring to twice daily; order per Dr. Khurram Chavez is for three times daily. ACM will consult with Ambulatory PharmD regarding Freestyle Jack and Medicare coverage; pt could benefit from the Tom from an adherence standpoint with blood glucose monitoring. 7/8/2021  Plans to resume water aerobics next week. She contacted the [facility] and was notified that water aerobics class has resumed; plans to go to local [facility] on 7/13 to sign up and pay fee. Patient states, \"I'm definitely looking forward to that. \"  Checking her blood glucose twice daily; states, \"I haven't had anything over 160 (mg/dL). \" She is interested in getting the [facility] 14-day system and will discuss this with Dr. Khurram Chavez during her upcoming visit; pt notes that when this was previously ordered for her in 2020, however she did not get it because it was not covered by her Medicare. Patient has office visit scheduled with Dr. Khurram Chavez on 7/14.    4/20/2021  most recent Hgb A1c 3/17/21 8.9%. Patient reports that she has made an effort to improve her diet since the beginning of the year and has decreased her intake of starchy foods. Patient reports that current level of physical activity includes walking around inside of her house; barrier to increased physical activity is chronic pain in her right knee and back. Patient reports plan to increase physical activity with the warmer weather by walking in her pool once she gets it open. 3/5/2020  - attended OV with Dr. Khurram Chavez 2/28/2020; Hgb A1c 9.9%, compare to previous result of 10.1%. Pt reported to Dr. Khurram Chavez that she had not taken metformin since her previous OV (11/25/19) due to continued GI distress.  Pt was advised to increase her physical activity to tolerance and was asked to check blood sugars at 11:00PM for 1 week and then contact Dr. Khurram Chavez to report readings; see [address] note for details regarding OV encounter.   - Patient is not receptive to ACM follow-up today due to acute illness; ACM outreach in one week.     2/12/2020  - reports fasting blood glucose this morning of 120 mg/dL, after lunch 190 mg/dL. Unable to review additional recent blood glucose readings as results are stored on glucometer and patient is unsure how to recall historical results; pt will take her glucometer to scheduled OV with Dr. Irma Garrison . - reports taking mealtime insulin prior to meals; states, \"Sometimes I forget and I take it right after I eat, but I'm trying to get use to it and take it before I eat. \"  - still taking humalog 24 units before breakfast (if blood glucose is <150 mg/dL she takes 22 units), 24 units before lunch, 36 units before dinner and Toujeo 60 units nightly; pt did not contact Dr. Audrey Tai office after last ACM outreach to confirm insulin orders, however reports that she reviewed her most recent office visit AVS and confirmed that the unit doses she is currently taking are correct. - continued lack of adherence with TID blood glucose monitoring; inquired about patient barriers to adherence - states, \"I am not sure I am going to be able to do that because when I stick it it hurts too bad and I've been doing this for 15 years and my fingers have had it. \" Informed patient about 14-day blood glucose monitoring system, Freestyle Jack, and encouraged patient to discuss this option with Dr. Irma Garrison at her upcoming office visit to increase patient adherence with blood glucose monitoring.  -  Inquired as to whether patient is following a diabetic diet; pt states, \"Kind of, yeah; most of the time. \" Diet is described as \"about the same; nothing more, nothing less. \" Patient is not interested in keeping a food diary to review with this NN. Patient is not interested in additional education/review of diabetic diet; notes that she previously met with CDE NN, Armando Winkler.  - no longer participating in water aerobics; currently not engaging in moderate exercise.  She reports that she walks around her house for exercise; states, \"I just get up and start walking from the side door around to the front door and do it a couple of times. \" Patient reported barrier to exercise/increasing physical activity is back spasms which prevent her from \"doing a lot of moving or standing unless I am in the water. \" Reports that barrier to attending water aerobics at present is transportation and winter season. - will attend scheduled OV with Dr. Gerardo Martin 2/28/2020  - pt requests next ACM follow-up in three weeks after she attends scheduled OV with Dr. Gerardo Martin    1/10/20  - Hgb A1c 11/25/19 - 10.1%  - pt notes history of Type 2 DM for 15 years  - checking blood glucose 1-2x/day  - ordered insulin regimen/frequency is Humalog TID (before breakfast, lunch and dinner) and Toujeo nightly  - reports taking humalog 24 units before breakfast (if blood glucose is <150 mg/dL she takes 22 units), 24 units before lunch, 36 units before dinner and Toujeo 60 units nightly. Most recent endocrinology OV note reviewed; per OV note, most recent Humalog order (11/25/19) - 22 units for breakfast and lunch and 36 units for dinner and Toujeo 60 units nightly (12/16/19), pt was noted to be taking mealtime insulin 2-3 hours after meal .Further clarification is needed regarding what current insulin unit dose orders are; patient is advised to contact Dr. Gerardo Martin to confirm/clarify current insulin unit dose orders. - per last endocrinology OV note, pt was encouraged to improve diet and physical activity to tolerance  - encouraged to take mealtime insulin prior to eating meal  - encouraged to check blood glucose TID as ordered and keep blood glucose log      10/29/19  8/14/19-10.9%  4/22/19 - Hgb A1c 10.1%  - Current level of understanding: checks blood sugar once daily before breakfast. Completed Disease Specific CM Program for Diabetes Self-Management (w/ Jayro Rodriguez, RN NN CDE) on 8/27/19. Does not keep written blood glucose log; reports that glucometer stores historical blood glucose results. Pt reported fasting blood glucose on 10/28 of 169 mg/dL and on 10/29 of 93 mg/dL. Pt notes difficulty adhering to diabetic diet - enjoys pasta, potatoes, chips - states, \"my problem is food. I'm not that strong on sweets. \"; pt reported barrier to diabetic diet adherence is financial. Does not drink regular soda; drinks diet coke (no more than 2/day) and water. Currently exercising two times/week for 60-90 minutes- water aerobics. - Desired Outcome: Improve diet and exercise, decrease carbohydrate intake. Lower Hgb A1c.  - Plan: Further assess financial barrier(s). Will continue provide and reinforce pt education re: diabetic diet, assist with meal planning. Pt could benefit from keeping a food diary; will discuss further during subsequent outreach encounter as pt is unable to continue call at this time due to getting ready to leave her house.             Future Appointments:  Future Appointments   Date Time Provider Ovidio Goodenisti   4/15/2022  2:30 PM Gita Mcginnis MD RDE  BS AMB   5/11/2022  1:15 PM Kylie Smith MD [facility] BS AMB   6/10/2022 11:00 AM Ernie Bolivar MD [facility] BS AMB   2/7/2023 10:00 AM Mikhail Mahmood MD [facility] BS AMB        Last Appointment With Me:  Visit date not found     Last Appointment My Department:  Visit date not found

## 2022-02-28 RX ORDER — LOSARTAN POTASSIUM 100 MG/1
TABLET ORAL
Qty: 90 TABLET | Refills: 3 | Status: SHIPPED | OUTPATIENT
Start: 2022-02-28 | End: 2022-05-24 | Stop reason: SDUPTHER

## 2022-03-01 ENCOUNTER — PATIENT OUTREACH (OUTPATIENT)
Dept: CASE MANAGEMENT | Age: 72
End: 2022-03-01

## 2022-03-01 NOTE — PROGRESS NOTES
MD Anastasia Bautista, RN  Caller: Unspecified (4 days ago, 12:56 PM)  There are several issue to consider with CGM. 1. The device is the closest to the fingerstick in the fasting state because there is an inherent delay between the glucose in the blood and the time it takes for that glucose to get into the subcutaneous tissues. So after a meal the fingerstick is going to be higher than the CGM. 2. There is an inherent 15% variability in blood sugar tests with fingerstick (if the actual blood sugar is 200 the glucose meter could read anywhere between 170 and 230 and still be considered accurate by FDA criteria)   3. The Glennda Carrier tends to be less sensitive in the low range so it often says the blood sugar is low when it is not. I believe this was designed to prevent lows so it overestimates lows   4. The real value of CGM is that folks can see the impact of food etc on their blood sugar in closer to realtime           - Patient was notified of the above information by this ACM during patient outreach encounter today.

## 2022-03-01 NOTE — PROGRESS NOTES
3/1/2022  4:12 PM    Patient reported blood glucose readings (by [facility] sensor):  2/26/22  7:48 AM: 127 mg/dL  10:04 PM: 210 mg/dL - took 20 units of Toujeou  2/27/22  9:55 AM (after breakfast): 204 mg/dL  8:55 PM: 123 mg/dL - did not take Toujeou because she was concerned that her blood glucose would drop overnight if she took Toujeou  2/28/22  6:34 PM: 241 mg/dL - took 40 units of Toujeou  3/1/22  7:42 AM: 153 mg/dL    Goals Addressed                 This Visit's Progress       Diabetes     Patient verbalizes understanding of self -management goals of living with Diabetes. On track     3/1/2022   Patient contacted [employer] customer service last week; they sent her a new Jack sensor and she received it in the mail. She was advised by  to only check her glucose using the jack sensor unless advised otherwise by her doctor to continue to check fingerstick glucose; she reports that she initially started checking her glucose both by [facility] sensor and fingerstick for comparison because she was concerned with the accuracy of the [facility] sensor results. She has only been checking her glucose using the [facility] sensor since speaking with [employer] customer service last week.  ACM reviewed concerns for [facility] sensor inaccuracy with Dr. Randy Brody; pt made aware- see progress note for details.  She has not changed her Jack sensor yet since receiving new sensor in the mail from [employer]; she will change her sensor this evening.  Patient reported most recent glucose readings (per [facility] sensor) are noted in progress note.  Patient will change her Jack sensor this evening and continue to check her glucose using her [employer] sensor/reader. ACM routed encounter to Dr. Randy Brody today for notification/update.  Plan for next ACM outreach in approximately one week; will contact patient sooner if changes to current treatment plan and/or new orders are made by provider.     2/25/2022   Patient has been checking her glucose both with the Tom sensor and by fingerstick (using her old glucometer) because she is concerned with the accuracy of Jack sensor device. She has noticed a discrepancy in her glucose results between fingerstick readings using her old glucometer device and the jack sensor readings; pt reports the following: Fingerstick 202mg/dL (845AM this morning using her old glucometer), Jack sensor reading 112 mg/dL (846 AM this morning)/ Last night 6:06 PM - Fingerstick 244 mg/dL (using her old glucometer), Jack sensor reading 192 mg/dL. Patient checked her blood glucose today during this call and reports the following - fingerstick with Tom reader device 340 mg/dL, jack sensor reading 240 mg/dL and fingerstick using her old glucometer 305 mg/dL.  Patient reports the following low glucose readings since previous [facility] outreach encounter: 59 mg/dL on 2/20/22 at 6:40 AM, 69 mg/dL on 2/22/22 at 12:30 AM, 72 mg/dL on 2/22/22 at 6:56 AM. She is unable to recall if she took Toujeou on 2/19 or 2/21 and, if so, how many units; she does have this information written down, however she is unable to find her notepad during this call.  She changed her Jack sensor nine days ago; sensors are changed every 14 days   She took 30 units of Humalog this morning because her blood glucose reading by fingerstick was 202 mg/dL (before breakfast); reports that Tom sensor reading at this time was 112 mg/dL.  She has been taking 20 units of Toujeou because she is concerned about her blood glucose dropping overnight.  Based on patient's above reported glucose readings, consider possible issue with accuracy of jack sensor. Patient will contact [employer] today to attempt to troubleshoot issue and request control solution.  Patient will continue to monitor her glucose and keep a written log of her glucose readings until issue with Tom monitoring system can be troubleshooted/resolved and accuracy of Jack/Jack sensor can be confirmed.  Plan for next ACM outreach in approximately three days to follow-up on patient's glucose readings and [facility].  ACM will route encounter to Dr. Melissa Andrews and Amubulatory PharmD for notification/review. 2/16/2022  Per Dr. Melissa Andrews, decrease Toujeou to 50 units at bedtime and continue to follow patient's glucose readings; patient notified by this ACM today. Plan for next ACM outreach in approximately one week. 2/15/2022   Pt attended OV with Dr. Wil Mendenhall (PCP) on 12/10/21, Ambulatory PharmD on 12/22/21, Dr. Melissa Andrews (Endocrinology) on 1/14/22, virtual visit with Ambulatory PharmD on 1/17/22.  She received her Freestyle Jack 2 CGM on 1/22/22.  She reports an improved diet since receiving her CGM and decreased consumption of concentrated sweets and starches; \"It's really getting me to really pay attention to what I'm eating. What I used to think wasn't doing anything was actually causing my problem. \"    Med Rec updated today. Per Dr. Zoraida Paulino most recent OV note, current DM medication orders are Toujeo insulin 60 units at bedtime and Humalog insulin 24 breakfast (20 for lunch if she eats lunch) and 42 for dinner. Patient reports to [facility] today that she has not taken Toujeou for the past three nights because she is concerned about her blood sugar dropping overnight. Per Dr. Zoraida Paulino most recent OV note, toujeou will likely have to be decreased to prevent hypoglycemia if bedtime blood sugar level is <200 mg/dL. Patient is unable to recall if she took Toujeou on 2/9, 2/10 or 2/11; note that she reports a blood glucose level of 69 mg/dL at 2:28 AM on 2/12/22 (she is unable to recall if she took Toujeou at bedtime on 2/11).  ACM reinforced patient education on treatment of hypoglycemia.  ACM will route encounter to Dr. Melissa Andrews today for notification and review.  ACM will follow-up with patient in approximately one week to review most recent glucose levels. 12/10/2021   Per [facility] representative, order for CGM is currently closed; previously submitted order was denied by Medicare due to need for clarification regarding number of times per day that the patient is checking her blood glucose and injecting insulin, however requested order addendum (by [address]) was not received from endocrinology office. Patient attended office visit with PCP on 12/10/21 and was referred to ambulatory PharmD for assistance with [facility] order; encounter routed to Ambulatory PharmD today for continuity of care.  Patient has an appointment scheduled with Ambulatory PharmD on 12/22/21 to discuss [facility]. Patient will attend this appointment as scheduled.  Plan for ACM outreach in approximately two weeks. 12/8/2021   She has not received the [facility]. She most recently spoke to [address] [place] [address] on today; reports being advised that Medicare denied coverage due to an issue with the order and supporting documentation that was submitted to Medicare for coverage.  Patient does not have her blood glucose log available at this time to review with ACM during this call. She does report elevated blood glucose readings since previous ACM outreach encounter; pt attributes elevated blood glucose readings to increased stress level related to barrier with getting the [facility] as well as the stress of the holiday season because it's the first holiday season without her mother and brother (they passed away last year).  She is making an effort to increase her physical activity; barrier is decreased strength in her lower extremities and bilateral knee pain. She is ambulating with a rollator in public settings; ambulates with cane at home. She notes that she has not seen an ortho provider in many years; she has never seen ortho for her bilateral knee pain.  Patient has PCP follow-up scheduled with Dr. Demetria Del Rio on 12/10/21 and she will discuss this further with Dr. Ariane Keane at her upcoming appointment.  Staff message routed to Dr. Wero Esquivel clinical staff to inquire about Freestyle Jack Order; awaiting response. [facility] will contact [facility] on tomorrow to inquire about Medicare coverage barrier/denial.    11/9/2021   AC outreach to [address] [place] [address] today regarding order for FreeStyle Jack 14 day reader; pt contact and insurance information provided. [facility] will be contacting the patient after completing insurance verification and will then submit order request to Dr. Wero Esquivel office for review and provider signature. Patient notified by [facility] today that [facility] will be contacting her; pt verbalizes understanding.  Patient report that she spoke with [facility] today and requested that they cancel the previous order for the Tom and [employer].  [facility] outreach in approximately one week to follow-up on order status with [facility]. 11/5/2021   Attended office visit with Dr. Jeff Duong on 10/15/21.  Script for [place] [employer] written by Dr. Jeff Duong on 10/15/21. She tried to fill this order at [employer] and [employer]. Pt reports that [facility] informed her that out-of-pocket cost for the reader device was >$200; pt unable to afford this cost and had orders transferred to Tatiana Erie. Patient was informed by [employer] reader was $79; this is affordable to her, however she has not purchased the reader yet because she was unable to get the sensors. Patient is not sure of her out-of-pocket cost for the [facility]; [employer] was unable to advise her of the 14-day sensors copay because [facility] needs to void out where they already filled the order. Pt was informed by Tatiana [facility]inaorne that order for [facility] should be sent to a DME Provider.  She is checking her blood glucose twice daily in the morning and in the evening.  One episode of hypoglycemia is reported by patient within the past week with fasting blood glucose level of 59 mg/dL, however she reports that she was asymptomatic. Reports that episodes of fasting low blood sugar have decreased since previous ACM outreach encounter.  She forgot to take her Toujeou last night and her fasting blood glucose this morning was 160 mg/dL   Patient will continue to check her blood glucose twice daily in the morning and in the evening; she will maintain blood glucose log to review with Dr. Chavo Lawrence [facility].  Medicare Coverage Criteria for [employer] reviewed by this ACM today ([employer] resource); patient should meet the criteria for the [address] [place] [address] Drive to be covered by her Medicare Part B. ACM outreach to [facility] DME Provider to inquire if they are able to assist with order; lvm requesting a return phone call to this ACM.  ACM provided patient with update; will attempt to have order for [address], reader and sensors, supplied by DME Provider under her Medicare Part B.    10/1/2021   She was checking her blood glucose twice daily and maintaining her blood glucose log up until last week; she checked her blood glucose once daily last week because her fingers were sore and she has not written her blood sugars down since last week. Patient states, \"But I know I should (check her blood glucose twice daily) because I really don't feel any different when it's (blood sugar) up or down. \"    \"I've been having some very low sugar in the morning. \" Pt reported fasting blood glucose this morning was 93 mg/dL; reports that her lowest fasting blood glucose level since previous ACM outreach encounter was on 9/8/21 and it was 52 mg/dL. Patient reports associated symptoms of sweating and weakness when her blood glucose is low, \"but not to the point where I felt like I was going to pass out. \" ACM reviewed treatment of hypoglycemia with patient today; pt verbalizes understanding.    Patient was able to review her blood glucose log and visualize the effect that certain foods had on her blood sugar; states, \"When it (blood sugar) went up at night I had either rice, potato or bread. \"   She is still interested in the [facility], if eligible; she could benefit from the [facility] to assist with treatment plan adherence for blood glucose monitoring. Barrier to patient adherence with blood glucose monitoring at present is that her fingertips get sore with checking her blood glucose 2-3 times per day. She has not received outreach from Ambulatory PharmD regarding this; referral was previously sent by this ACM on 8/18/21. Patient will discuss this with Dr. Angel Yeboah at her scheduled appointment on 10/15/21.  Patient will resume checking her blood glucose twice daily and maintain her blood glucose log.  Patient will attend previously scheduled appointment with Dr. Angel Yeboah on 10/15/21; she will take her blood glucose log to this appointment.  Encounter routed to Dr. Angel Yeboah today for notification of patient reported most recent blood glucose results.  Plan for next AC outreach in approximately two weeks. 8/18/2021  She has not resumed water aerobics class; she still plans on returning to this class, however her schedule has been busy. Checking her blood glucose once daily. She is still interested in trying to get the [facility]; notes that it was previously not covered by Medicare, however she has recently heard that it is now covered. Pt reported fasting blood glucose this morning was 140 mg/dL. Attended office visit with Dr. Angel Yeboah on 7/14/21; hemoglobin A1c 7/14/21 9.2%  Next scheduled follow-up with Dr. Angel Yeboah is 10/15/21. Encouraged patient to increase blood glucose monitoring to twice daily; order per Dr. Angel Yeboah is for three times daily.   ACM will consult with Ambulatory PharmD regarding Freestyle Jack and Medicare coverage; pt could benefit from the [place] from an adherence standpoint with blood glucose monitoring. 7/8/2021  Plans to resume water aerobics next week. She contacted the [facility] and was notified that water aerobics class has resumed; plans to go to local [facility] on 7/13 to sign up and pay fee. Patient states, \"I'm definitely looking forward to that. \"  Checking her blood glucose twice daily; states, \"I haven't had anything over 160 (mg/dL). \" She is interested in getting the [facility] 14-day system and will discuss this with Dr. Angel Yeboah during her upcoming visit; pt notes that when this was previously ordered for her in 2020, however she did not get it because it was not covered by her Medicare. Patient has office visit scheduled with Dr. Angel Yeboah on 7/14.    4/20/2021  most recent Hgb A1c 3/17/21 8.9%. Patient reports that she has made an effort to improve her diet since the beginning of the year and has decreased her intake of starchy foods. Patient reports that current level of physical activity includes walking around inside of her house; barrier to increased physical activity is chronic pain in her right knee and back. Patient reports plan to increase physical activity with the warmer weather by walking in her pool once she gets it open. 3/5/2020  - attended OV with Dr. Angel Yeboah 2/28/2020; Hgb A1c 9.9%, compare to previous result of 10.1%. Pt reported to Dr. Angel Yeboah that she had not taken metformin since her previous OV (11/25/19) due to continued GI distress. Pt was advised to increase her physical activity to tolerance and was asked to check blood sugars at 11:00PM for 1 week and then contact Dr. Angel Yeboah to report readings; see [facility] note for details regarding OV encounter.   - Patient is not receptive to ACM follow-up today due to acute illness; ACM outreach in one week. 2/12/2020  - reports fasting blood glucose this morning of 120 mg/dL, after lunch 190 mg/dL.  Unable to review additional recent blood glucose readings as results are stored on glucometer and patient is unsure how to recall historical results; pt will take her glucometer to scheduled OV with Dr. Micael Bernheim . - reports taking mealtime insulin prior to meals; states, \"Sometimes I forget and I take it right after I eat, but I'm trying to get use to it and take it before I eat. \"  - still taking humalog 24 units before breakfast (if blood glucose is <150 mg/dL she takes 22 units), 24 units before lunch, 36 units before dinner and Toujeo 60 units nightly; pt did not contact Dr. Caitlyn Noe office after last ACM outreach to confirm insulin orders, however reports that she reviewed her most recent office visit AVS and confirmed that the unit doses she is currently taking are correct. - continued lack of adherence with TID blood glucose monitoring; inquired about patient barriers to adherence - states, \"I am not sure I am going to be able to do that because when I stick it it hurts too bad and I've been doing this for 15 years and my fingers have had it. \" Informed patient about 14-day blood glucose monitoring system, Freestyle Jack, and encouraged patient to discuss this option with Dr. Micael Bernheim at her upcoming office visit to increase patient adherence with blood glucose monitoring.  -  Inquired as to whether patient is following a diabetic diet; pt states, \"Kind of, yeah; most of the time. \" Diet is described as \"about the same; nothing more, nothing less. \" Patient is not interested in keeping a food diary to review with this NN. Patient is not interested in additional education/review of diabetic diet; notes that she previously met with CDE NN, Aaron Hayes.  - no longer participating in water aerobics; currently not engaging in moderate exercise. She reports that she walks around her house for exercise; states, \"I just get up and start walking from the side door around to the front door and do it a couple of times. \" Patient reported barrier to exercise/increasing physical activity is back spasms which prevent her from \"doing a lot of moving or standing unless I am in the water. \" Reports that barrier to attending water aerobics at present is transportation and winter season. - will attend scheduled OV with Dr. Janelle Guerra 2/28/2020  - pt requests next ACM follow-up in three weeks after she attends scheduled OV with Dr. Janelle Guerra    1/10/20  - Hgb A1c 11/25/19 - 10.1%  - pt notes history of Type 2 DM for 15 years  - checking blood glucose 1-2x/day  - ordered insulin regimen/frequency is Humalog TID (before breakfast, lunch and dinner) and Toujeo nightly  - reports taking humalog 24 units before breakfast (if blood glucose is <150 mg/dL she takes 22 units), 24 units before lunch, 36 units before dinner and Toujeo 60 units nightly. Most recent endocrinology OV note reviewed; per OV note, most recent Humalog order (11/25/19) - 22 units for breakfast and lunch and 36 units for dinner and Toujeo 60 units nightly (12/16/19), pt was noted to be taking mealtime insulin 2-3 hours after meal .Further clarification is needed regarding what current insulin unit dose orders are; patient is advised to contact Dr. Janelle Guerra to confirm/clarify current insulin unit dose orders. - per last endocrinology OV note, pt was encouraged to improve diet and physical activity to tolerance  - encouraged to take mealtime insulin prior to eating meal  - encouraged to check blood glucose TID as ordered and keep blood glucose log      10/29/19  8/14/19-10.9%  4/22/19 - Hgb A1c 10.1%  - Current level of understanding: checks blood sugar once daily before breakfast. Completed Disease Specific CM Program for Diabetes Self-Management (w/ Sonya Torres, RN NN CDE) on 8/27/19. Does not keep written blood glucose log; reports that glucometer stores historical blood glucose results. Pt reported fasting blood glucose on 10/28 of 169 mg/dL and on 10/29 of 93 mg/dL. Pt notes difficulty adhering to diabetic diet - enjoys pasta, potatoes, chips - states, \"my problem is food. I'm not that strong on sweets. \"; pt reported barrier to diabetic diet adherence is financial. Does not drink regular soda; drinks diet coke (no more than 2/day) and water. Currently exercising two times/week for 60-90 minutes- water aerobics. - Desired Outcome: Improve diet and exercise, decrease carbohydrate intake. Lower Hgb A1c.  - Plan: Further assess financial barrier(s). Will continue provide and reinforce pt education re: diabetic diet, assist with meal planning. Pt could benefit from keeping a food diary; will discuss further during subsequent outreach encounter as pt is unable to continue call at this time due to getting ready to leave her house.               Future Appointments:  Future Appointments   Date Time Provider Ovidio Allan   4/15/2022  2:30 PM MD SAMINA Irizarry  BS AMB   5/11/2022  1:15 PM MD DARLINE Galicia BS AMB   6/10/2022 11:00 AM Jung Solis MD [facility] BS AMB   2/7/2023 10:00 AM Mp Martino MD [facility] BS AMB        Last Appointment With Me:  Visit date not found     Last Appointment My Department:  Visit date not found

## 2022-03-07 ENCOUNTER — NURSE TRIAGE (OUTPATIENT)
Dept: OTHER | Facility: CLINIC | Age: 72
End: 2022-03-07

## 2022-03-07 NOTE — TELEPHONE ENCOUNTER
Received call from [address] at [facility] with Red Flag Complaint. Subjective: Caller states \"My feet keep staying swollen. I don't care what type of shoes I have on or whatever, they stay puffy. I elevate my feet and I wake up with them puffy. This has been going off and on for a couple of weeks. Also I have been having a lot of gas, upper GI burping and constipation. \"     Current Symptoms: bilateral ankle and feet edema. Right significantly larger than left. No redness. not hot to the touch. States edema not a chronic condition. No injury. Denies chest pain or SOB at rest.     Onset: 1 month ago; unchanged    Associated Symptoms: feet not cold or blue. No calf pain. Pain Severity: 5/10; aching; intermittent    Temperature: denies    What has been tried: elevation, compression stockings    LMP: NA Pregnant: NA    Recommended disposition: See HCP within 4 Hours (or PCP triage) pt agreeable with plan. Care advice provided, patient verbalizes understanding; denies any other questions or concerns; instructed to call back for any new or worsening symptoms. Writer provided warm transfer to [place] [facility] 3rd Floor for 2nd level triage for pedal edema    Attention Provider: Thank you for allowing me to participate in the care of your patient. The patient was connected to triage in response to information provided to the ECC. Please do not respond through this encounter as the response is not directed to a shared pool.         Reason for Disposition   Thigh, calf, or ankle swelling in both legs, but one side is definitely more swollen (Exception: longstanding difference between legs)    Protocols used: LEG SWELLING AND EDEMA-ADULT-OH

## 2022-03-10 ENCOUNTER — OFFICE VISIT (OUTPATIENT)
Dept: INTERNAL MEDICINE CLINIC | Age: 72
End: 2022-03-10
Payer: MEDICARE

## 2022-03-10 VITALS
OXYGEN SATURATION: 98 % | DIASTOLIC BLOOD PRESSURE: 80 MMHG | SYSTOLIC BLOOD PRESSURE: 132 MMHG | TEMPERATURE: 98 F | RESPIRATION RATE: 16 BRPM | HEIGHT: 63 IN | WEIGHT: 222 LBS | HEART RATE: 88 BPM | BODY MASS INDEX: 39.34 KG/M2

## 2022-03-10 DIAGNOSIS — M79.606 PAIN OF LOWER EXTREMITY, UNSPECIFIED LATERALITY: ICD-10-CM

## 2022-03-10 DIAGNOSIS — M79.641 PAIN IN BOTH HANDS: Primary | ICD-10-CM

## 2022-03-10 DIAGNOSIS — F33.0 MILD EPISODE OF RECURRENT MAJOR DEPRESSIVE DISORDER (HCC): ICD-10-CM

## 2022-03-10 DIAGNOSIS — M79.642 PAIN IN BOTH HANDS: Primary | ICD-10-CM

## 2022-03-10 PROBLEM — N18.30 CKD (CHRONIC KIDNEY DISEASE) STAGE 3, GFR 30-59 ML/MIN (HCC): Status: ACTIVE | Noted: 2022-03-10

## 2022-03-10 PROCEDURE — G0463 HOSPITAL OUTPT CLINIC VISIT: HCPCS | Performed by: INTERNAL MEDICINE

## 2022-03-10 PROCEDURE — G8399 PT W/DXA RESULTS DOCUMENT: HCPCS | Performed by: INTERNAL MEDICINE

## 2022-03-10 PROCEDURE — G8536 NO DOC ELDER MAL SCRN: HCPCS | Performed by: INTERNAL MEDICINE

## 2022-03-10 PROCEDURE — 1090F PRES/ABSN URINE INCON ASSESS: CPT | Performed by: INTERNAL MEDICINE

## 2022-03-10 PROCEDURE — 99213 OFFICE O/P EST LOW 20 MIN: CPT | Performed by: INTERNAL MEDICINE

## 2022-03-10 PROCEDURE — G9717 DOC PT DX DEP/BP F/U NT REQ: HCPCS | Performed by: INTERNAL MEDICINE

## 2022-03-10 PROCEDURE — 1101F PT FALLS ASSESS-DOCD LE1/YR: CPT | Performed by: INTERNAL MEDICINE

## 2022-03-10 PROCEDURE — G8427 DOCREV CUR MEDS BY ELIG CLIN: HCPCS | Performed by: INTERNAL MEDICINE

## 2022-03-10 PROCEDURE — G9899 SCRN MAM PERF RSLTS DOC: HCPCS | Performed by: INTERNAL MEDICINE

## 2022-03-10 PROCEDURE — G8417 CALC BMI ABV UP PARAM F/U: HCPCS | Performed by: INTERNAL MEDICINE

## 2022-03-10 PROCEDURE — 3017F COLORECTAL CA SCREEN DOC REV: CPT | Performed by: INTERNAL MEDICINE

## 2022-03-10 PROCEDURE — G8754 DIAS BP LESS 90: HCPCS | Performed by: INTERNAL MEDICINE

## 2022-03-10 PROCEDURE — G8752 SYS BP LESS 140: HCPCS | Performed by: INTERNAL MEDICINE

## 2022-03-10 NOTE — PROGRESS NOTES
HISTORY OF PRESENT ILLNESS  Makayla Ellington is a 73 y.o. female.   HPI     F/u HTN hld asthma hx CVA-DM-2 osteopenia , hx PE morbid obesity ckd 3  C/o hand pain , stiffnessand swelling x 1 month-right >left  Difficulty with making a fist b/l  Hx carpal tunnel -did not have surgery  Have pain mostly now in 5th fingers      C/o ankle and foot swelling b/l x 1 months  Swelling usually worse at end of the day  Some pain in legs walking limited by legs feeling weak and some soto  only new med is coreg per renal MD or CARD MD      last OV    Last a1c 9.3 seen by Dr Angel Yeboah  Last   Enrolled in case management-notes reviewed-does not have freestlye eleni yet-checks fsb 3times per day and takes insulin 4 times day-has hypoglycemia sometimes 50-60 for weeks in a row  Has been depressed since loss of mother and brother last Christo Estrada thus year daily  Physical limitations are causing her stool feel tired and maybe depressed  Some loss of interest and anxiety too   No SI/HI  PHQ-4    Patient Active Problem List    Diagnosis Date Noted    CKD (chronic kidney disease) stage 3, GFR 30-59 ml/min ([facility] [address] 03/10/2022    Major depressive disorder, recurrent, mild 12/10/2021    Major depressive disorder, recurrent, moderate 12/10/2021    Major depressive disorder, recurrent, unspecified 12/10/2021    Type 2 diabetes mellitus with diabetic neuropathy ([address] 04/29/2019    Type 2 diabetes with nephropathy ([address] 12/14/2018    History of CVA (cerebrovascular accident) 07/13/2018    Obesity, morbid ([address] 01/25/2018    Warthin's tumor 07/01/2016    HTN (hypertension) 09/13/2013    Axillary hidradenitis suppurativa 08/07/2013    Esophageal reflux 01/15/2013    Renal failure, acute ([address] 01/13/2013    Asthma 12/14/2012    Myocardial ischemia 06/06/2012    Shortness of breath 06/06/2012    Coronary artery disease 06/06/2012    PVC's (premature ventricular contractions) 06/01/2012    DM type 2 (diabetes mellitus, type 2) ([address] 04/23/2012    Grave's disease 10/15/2010    DJD (degenerative joint disease) of hip 10/20/2009    DJD (degenerative joint disease) of knee 10/20/2009    CTS (Carpal Tunnel Syndrome)-b/l 10/20/2009    CVA (cerebral infarction) 10/20/2009    Diverticulosis 10/20/2009    Osteopenia 10/20/2009     Current Outpatient Medications   Medication Sig Dispense Refill    losartan (COZAAR) 100 mg tablet TAKE 1 TABLET DAILY 90 Tablet 3    hydrALAZINE (APRESOLINE) 50 mg tablet Take 1 Tablet by mouth every eight (8) hours. 180 Tablet 1    potassium chloride (K-DUR, KLOR-CON M20) 20 mEq tablet Take 20 mEq by mouth daily.  cholecalciferol (Vitamin D3) (1000 Units /25 mcg) tablet Take  by mouth daily.  carvediloL (COREG) 12.5 mg tablet Take 12.5 mg by mouth two (2) times daily (with meals).  gabapentin (NEURONTIN) 100 mg capsule TAKE 1 CAPSULE THREE TIMES A DAY 90 Capsule 3    HumaLOG KwikPen Insulin 100 unit/mL kwikpen 22 units for breakfast, 24 units for lunch and 42 units for dinner (Patient taking differently: 30 units for breakfast, 24 units for lunch and 42 units for dinner) 25 Pen 3    flash glucose sensor (FreeStyle Jack 2 Sensor) kit 1 Each by Does Not Apply route every fourteen (14) days. 6 Kit 3    flash glucose scanning reader (FreeStyle Jack 2 [place]) misc 1 Each by Does Not Apply route daily. 1 Each 0    triamterene-hydroCHLOROthiazide (DYAZIDE) 37.5-25 mg per capsule Take 1 Capsule by mouth daily. 180 Capsule 3    rosuvastatin (CRESTOR) 20 mg tablet Take 1 Tablet by mouth daily. 90 Tablet 3    levothyroxine (SYNTHROID) 137 mcg tablet TAKE 1 TABLET DAILY 90 Tablet 3    albuterol (ProAir HFA) 90 mcg/actuation inhaler USE 1 INHALATION EVERY 4 HOURS AS NEEDED WHEEZING OR SHORTNESS OF BREATH 3 Inhaler 3    insulin glargine U-300 conc (Toujeo SoloStar U-300 Insulin) 300 unit/mL (1.5 mL) inpn pen 60 Units by SubCUTAneous route nightly.  Indications: type 2 diabetes mellitus (Patient taking differently: 50 Units by SubCUTAneous route nightly. Indications: type 2 diabetes mellitus) 10 Pen 4    ADVAIR DISKUS 100-50 mcg/dose diskus inhaler USE 1 INHALATION TWICE A  Each 3    BD INSULIN PEN NEEDLE UF SHORT 31 gauge x 5/16\" ndle       aspirin delayed-release 81 mg tablet Take 81 mg by mouth daily.  glucose blood VI test strips (True Metrix Glucose Test Strip) strip Monitor blood sugar 3 times daily (Patient not taking: Reported on 2/15/2022) 300 Strip 3    albuterol (ACCUNEB) 1.25 mg/3 mL nebu 3 mL by Nebulization route every four (4) hours as needed (wheezing).  225 mL 3     Allergies   Allergen Reactions    Latex Itching    Codeine Itching and Other (comments)     hallucinate    Contrast Dye [Iodine] Rash and Itching     Given pre-cardiac cath    Seafood [facility] Containing Products] Swelling      Lab Results   Component Value Date/Time    WBC 6.2 12/16/2019 02:02 PM    HGB 11.8 12/16/2019 02:02 PM    Hemoglobin (POC) 14.6 04/10/2015 12:41 PM    HCT 39.0 12/16/2019 02:02 PM    Hematocrit (POC) 43 04/10/2015 12:41 PM    PLATELET 317 72/24/3760 02:02 PM    MCV 77 (L) 12/16/2019 02:02 PM     Lab Results   Component Value Date/Time    Hemoglobin A1c 8.9 (H) 03/17/2021 09:08 AM    Hemoglobin A1c 9.7 (H) 12/18/2020 09:01 AM    Hemoglobin A1c 10.3 (H) 09/24/2020 10:23 AM    Hemoglobin A1c, External 10.6 11/19/2018 12:00 AM    Hemoglobin A1c, External 9.4 05/20/2016 12:00 AM    Hemoglobin A1c, External 8.9 08/17/2015 02:37 PM    Glucose 262 (H) 12/10/2021 11:45 AM    Glucose (POC) 129 (H) 09/22/2015 07:11 AM    Microalbumin/Creat ratio (mg/g creat) 16 12/10/2021 11:45 AM    Microalbumin,urine random 2.09 12/10/2021 11:45 AM    LDL, calculated 65.6 12/10/2021 11:45 AM    Creatinine (POC) 0.9 04/10/2015 12:41 PM    Creatinine 1.47 (H) 12/10/2021 11:45 AM      Lab Results   Component Value Date/Time    Cholesterol, total 152 12/10/2021 11:45 AM    HDL Cholesterol 62 12/10/2021 11:45 AM LDL, calculated 65.6 12/10/2021 11:45 AM    LDL-C, External 72 05/20/2016 12:00 AM    Triglyceride 122 12/10/2021 11:45 AM    CHOL/HDL Ratio 2.5 12/10/2021 11:45 AM     Lab Results   Component Value Date/Time    GFR est non-AA 35 (L) 12/10/2021 11:45 AM    GFRNA, POC >60 04/10/2015 12:41 PM    GFR est AA 43 (L) 12/10/2021 11:45 AM    GFRAA, POC >60 04/10/2015 12:41 PM    Creatinine 1.47 (H) 12/10/2021 11:45 AM    Creatinine (POC) 0.9 04/10/2015 12:41 PM    BUN 21 (H) 12/10/2021 11:45 AM    BUN (POC) 7 (L) 04/10/2015 12:41 PM    Sodium 141 12/10/2021 11:45 AM    Sodium (POC) 143 04/10/2015 12:41 PM    Potassium 3.7 12/10/2021 11:45 AM    Potassium (POC) 2.9 (L) 04/10/2015 12:41 PM    Chloride 101 12/10/2021 11:45 AM    Chloride (POC) 103 04/10/2015 12:41 PM    CO2 34 (H) 12/10/2021 11:45 AM    Magnesium 1.9 01/14/2013 04:30 AM        ROS    Physical Exam  Vitals and nursing note reviewed. Constitutional:       Appearance: She is well-developed. Comments: Appears stated age   Cardiovascular:      Rate and Rhythm: Normal rate and regular rhythm. Heart sounds: Normal heart sounds. No murmur heard. No friction rub. No gallop. Comments: Faint DP pulses b/l  Pulmonary:      Effort: Pulmonary effort is normal. No respiratory distress. Breath sounds: Normal breath sounds. No wheezing. Abdominal:      General: Bowel sounds are normal.      Palpations: Abdomen is soft. Musculoskeletal:      Right lower leg: Edema present. Left lower leg: Edema present. Comments: 1 pls b/l ankle edema  Toes are cold, foot is warm b/l    Difficulty with flexeion of hands /fingers b/l  No oa nodules and no synovitis   Neurological:      Mental Status: She is alert. Comments: tinels negative b/l           ASSESSMENT and PLAN  Diagnoses and all orders for this visit:    1. Pain in both hands  -     REFERRAL TO ORTHOPEDICS    2. Mild episode of recurrent major depressive disorder ([facility]   controlled  3.  Pain of lower extremity, unspecified laterality  -     ANKLE BRACHIAL INDEX; Future    4.  Ankle edema   rx jobst stockings   Elevate legs/feet

## 2022-03-10 NOTE — PATIENT INSTRUCTIONS
Office Policies    Phone calls/patient messages:            Please allow up to 24 hours for someone in the office to contact you about your call or message. Be mindful your provider may be out of the office or your message may require further review. We encourage you to use [facility] for your messages as this is a faster, more efficient way to communicate with our office                         Medication Refills:            Prescription medications require 48-72 business hours to process. We encourage you to use [facility] for your refills. For controlled medications: Please allow 72 business hours to process. Certain medications may require you to  a written prescription at our office. NO narcotic/controlled medications will be prescribed after 4pm Monday through Friday or on weekends              Form/Paperwork Completion:            Please note a $25 fee may incur for all paperwork for completed by our providers. We ask that you allow 7-10 business days. Pre-payment is due prior to picking up/faxing the completed form. You may also download your forms to [facility] to have your doctor print off.

## 2022-03-10 NOTE — PROGRESS NOTES
1. \"Have you been to the ER, urgent care clinic since your last visit? Hospitalized since your last visit? \"  No    2. \"Have you seen or consulted any other health care providers outside of the [address] [place] [address] since your last visit? \" No    3. For patients aged 39-70: Has the patient had a colonoscopy / FIT/ Cologuard? Last colonoscopy 2016      If the patient is female:    4. For patients aged 41-77: Has the patient had a mammogram within the past 2 years? Yes      5. For patients aged 21-65: Has the patient had a pap smear?   No

## 2022-03-11 ENCOUNTER — PATIENT OUTREACH (OUTPATIENT)
Dept: CASE MANAGEMENT | Age: 72
End: 2022-03-11

## 2022-03-11 NOTE — PROGRESS NOTES
3/11/2022  1:07 PM    Patient reported most recent glucose results:  3/7  6:56 AM: 134 mg/dL  9:38 PM: 236 mg/dL (took 38 units Toujeou- she only had 38 units remaining in pen and did not want to have to inject herself twice). 3/8  7:34 AM: 162 mg/dL  12:34 PM: 213 mg/dL  10:42 PM: 192 mg/dL (did not take Toujeou)    3/9  9:28 AM: 164 mg/dL  8:44 PM: 213 mg/dL (took 30 units Toujeou)    3/10  8:23 AM: 140 mg/dL  9:10 PM: 209 mg/dL (took 40 units Toujeou)    3/11  6:08 AM (Before breakfast): 157 mg/dL    Goals Addressed                 This Visit's Progress       Diabetes     Patient verbalizes understanding of self -management goals of living with Diabetes. On track     3/11/2022   Reviewed patient's most recent glucose results today; pt reported glucose readings are documented in encounter progress note.  She continues to take note of how certain foods affect her glucose level and is using this information to improve her diet; notes that her glucose level increased to 250 mg/dL after she ate a small serving of rice pudding and as a result she hasn't eaten any more because she saw how it affected her glucose level.  She reports goal of improving her Hgb A1c and states, \"I'm really trying to get it down. \" ACM reviewed trend of Hgb A1c results with patient today; note that her A1c has been trending downward since 8/2019 - A1c 8/14/2019 was 10.9% and A1c result 1/14/22 was 8.9%. Positive reinforcement provided to patient today by this ACM for her goal progress thus far.  She has been taking 0-40 units of Toujeou at night depending on what her evening glucose reading is; she has been keeping a record of how many units she takes and this is documented in the encounter progress note for provider review. She has not been taking Toujeou if her bedtime glucose result is <200 mg/dL because she is concerned it will cause her blood glucose level to drop overnight.    Patient will continue to check her glucose levels and keep a record of her glucose readings (to include date, time, reading) as well as how many units of Toujeou she takes. Plan for next AC outreach in approximately two weeks.  Encounter routed to Dr. Phil Lee by this [facility] today for provider review  . 3/1/2022   Patient contacted [employer] customer service last week; they sent her a new Jack sensor and she received it in the mail. She was advised by  to only check her glucose using the jack sensor unless advised otherwise by her doctor to continue to check fingerstick glucose; she reports that she initially started checking her glucose both by [facility] sensor and fingerstick for comparison because she was concerned with the accuracy of the Tia Ax sensor results. She has only been checking her glucose using the Tia Ax sensor since speaking with [employer] customer service last week.  [facility] reviewed concerns for Tia Ax sensor inaccuracy with Dr. Phil Lee; pt made aware- see progress note for details.  She has not changed her Jack sensor yet since receiving new sensor in the mail from [employer]; she will change her sensor this evening.  Patient reported most recent glucose readings (per Tia Ax sensor) are noted in progress note.  Patient will change her Jack sensor this evening and continue to check her glucose using her [employer] sensor/reader. [facility] routed encounter to Dr. Phil Lee today for notification/update.  Plan for next [facility] outreach in approximately one week; will contact patient sooner if changes to current treatment plan and/or new orders are made by provider. 2/25/2022   Patient has been checking her glucose both with the Tia Ax sensor and by fingerstick (using her old glucometer) because she is concerned with the accuracy of Jack sensor device.  She has noticed a discrepancy in her glucose results between fingerstick readings using her old glucometer device and the jack sensor readings; pt reports the following: Fingerstick 202mg/dL (845AM this morning using her old glucometer), Jack sensor reading 112 mg/dL (846 AM this morning)/ Last night 6:06 PM - Fingerstick 244 mg/dL (using her old glucometer), Jack sensor reading 192 mg/dL. Patient checked her blood glucose today during this call and reports the following - fingerstick with [facility] reader device 340 mg/dL, jack sensor reading 240 mg/dL and fingerstick using her old glucometer 305 mg/dL.  Patient reports the following low glucose readings since previous AC outreach encounter: 59 mg/dL on 2/20/22 at 6:40 AM, 69 mg/dL on 2/22/22 at 12:30 AM, 72 mg/dL on 2/22/22 at 6:56 AM. She is unable to recall if she took Toujeou on 2/19 or 2/21 and, if so, how many units; she does have this information written down, however she is unable to find her notepad during this call.  She changed her Jack sensor nine days ago; sensors are changed every 14 days   She took 30 units of Humalog this morning because her blood glucose reading by fingerstick was 202 mg/dL (before breakfast); reports that [facility] sensor reading at this time was 112 mg/dL.  She has been taking 20 units of Toujeou because she is concerned about her blood glucose dropping overnight.  Based on patient's above reported glucose readings, consider possible issue with accuracy of jack sensor. Patient will contact [employer] service today to attempt to troubleshoot issue and request control solution. Patient will continue to monitor her glucose and keep a written log of her glucose readings until issue with [facility] monitoring system can be troubleshooted/resolved and accuracy of Jack/Jack sensor can be confirmed.  Plan for next [facility] outreach in approximately three days to follow-up on patient's glucose readings and patient communication with [employer] customer service.  AC will route encounter to Dr. Lindsey Watson and [facility] for notification/review.       2/16/2022  Per Dr. Lindsey Watson, decrease Toujeou to 50 units at bedtime and continue to follow patient's glucose readings; patient notified by this ACM today. Plan for next ACM outreach in approximately one week. 2/15/2022   Pt attended OV with Dr. Antione Gutierrez (PCP) on 12/10/21, Ambulatory PharmD on 12/22/21, Dr. Jaun Ventura (Endocrinology) on 1/14/22, virtual visit with Ambulatory PharmD on 1/17/22.  She received her Freestyle Jack 2 CGM on 1/22/22.  She reports an improved diet since receiving her CGM and decreased consumption of concentrated sweets and starches; \"It's really getting me to really pay attention to what I'm eating. What I used to think wasn't doing anything was actually causing my problem. \"    Med Rec updated today. Per Dr. Vasquez Shafer most recent OV note, current DM medication orders are Toujeo insulin 60 units at bedtime and Humalog insulin 24 breakfast (20 for lunch if she eats lunch) and 42 for dinner. Patient reports to [facility] today that she has not taken Toujeou for the past three nights because she is concerned about her blood sugar dropping overnight. Per Dr. Vasquez Shafer most recent OV note, toujeou will likely have to be decreased to prevent hypoglycemia if bedtime blood sugar level is <200 mg/dL. Patient is unable to recall if she took Toujeou on 2/9, 2/10 or 2/11; note that she reports a blood glucose level of 69 mg/dL at 2:28 AM on 2/12/22 (she is unable to recall if she took Toujeou at bedtime on 2/11).  ACM reinforced patient education on treatment of hypoglycemia.  ACM will route encounter to Dr. Jaun Ventura today for notification and review. ACM will follow-up with patient in approximately one week to review most recent glucose levels.     12/10/2021   Per [facility] representative, order for CGM is currently closed; previously submitted order was denied by Medicare due to need for clarification regarding number of times per day that the patient is checking her blood glucose and injecting insulin, however requested order addendum (by [address] [place] [address]) was not received from endocrinology office. Patient attended office visit with PCP on 12/10/21 and was referred to ambulatory PharmD for assistance with [facility] order; encounter routed to Ambulatory PharmD today for continuity of care.  Patient has an appointment scheduled with Ambulatory PharmD on 12/22/21 to discuss [facility]. Patient will attend this appointment as scheduled.  Plan for ACM outreach in approximately two weeks. 12/8/2021   She has not received the [facility]. She most recently spoke to [address] [place] on today; reports being advised that Medicare denied coverage due to an issue with the order and supporting documentation that was submitted to Medicare for coverage.  Patient does not have her blood glucose log available at this time to review with ACM during this call. She does report elevated blood glucose readings since previous ACM outreach encounter; pt attributes elevated blood glucose readings to increased stress level related to barrier with getting the [facility] as well as the stress of the holiday season because it's the first holiday season without her mother and brother (they passed away last year).  She is making an effort to increase her physical activity; barrier is decreased strength in her lower extremities and bilateral knee pain. She is ambulating with a rollator in public settings; ambulates with cane at home. She notes that she has not seen an ortho provider in many years; she has never seen ortho for her bilateral knee pain. Patient has PCP follow-up scheduled with Dr. Kenrick Solares on 12/10/21 and she will discuss this further with Dr. Kenrick Solares at her upcoming appointment.  Staff message routed to Dr. Pilar Dugan clinical staff to inquire about Freestyle Jack Order; awaiting response.  ACM will contact [facility] on tomorrow to inquire about Medicare coverage barrier/denial.    11/9/2021   ACM outreach to [facility] today regarding order for FreeStyle Jack 14 day reader; pt contact and insurance information provided. [facility] will be contacting the patient after completing insurance verification and will then submit order request to Dr. Jesus Beck office for review and provider signature. Patient notified by [facility] today that [facility] will be contacting her; pt verbalizes understanding.  Patient report that she spoke with [facility] today and requested that they cancel the previous order for the [employer].  [facility] outreach in approximately one week to follow-up on order status with [facility]. 11/5/2021   Attended office visit with Dr. Lindsey Watson on 10/15/21.  Script for [facility] Denise[facility] written by Dr. Lindsey Watson on 10/15/21. She tried to fill this order at [employer] and [facility]. Pt reports that [facility] informed her that out-of-pocket cost for the reader device was >$200; pt unable to afford this cost and had orders transferred to [facility]. Patient was informed by [employer] supply [employer] [facility] [employer] reader was $79; this is affordable to her, however she has not purchased the reader yet because she was unable to get the sensors. Patient is not sure of her out-of-pocket cost for the [facility]; [employer] was unable to advise her of the 14-day sensors copay because [facility] needs to void out where they already filled the order. Pt was informed by [facility] that order for [facility] should be sent to a DME Provider.  She is checking her blood glucose twice daily in the morning and in the evening. One episode of hypoglycemia is reported by patient within the past week with fasting blood glucose level of 59 mg/dL, however she reports that she was asymptomatic. Reports that episodes of fasting low blood sugar have decreased since previous [facility] outreach encounter.    She forgot to take her Toujeou last night and her fasting blood glucose this morning was 160 mg/dL   Patient will continue to check her blood glucose twice daily in the morning and in the evening; she will maintain blood glucose log to review with Dr. Jennifer Villaseñor.  Medicare Coverage Criteria for [address] reviewed by this ACM today ([employer] resource); patient should meet the criteria for the [address] [place] [address] Drive to be covered by her Medicare Part B. ACM outreach to [facility] DME Provider to inquire if they are able to assist with order; m requesting a return phone call to this ACM.  ACM provided patient with update; will attempt to have order for [address], reader and sensors, supplied by DME Provider under her Medicare Part B.    10/1/2021   She was checking her blood glucose twice daily and maintaining her blood glucose log up until last week; she checked her blood glucose once daily last week because her fingers were sore and she has not written her blood sugars down since last week. Patient states, \"But I know I should (check her blood glucose twice daily) because I really don't feel any different when it's (blood sugar) up or down. \"    \"I've been having some very low sugar in the morning. \" Pt reported fasting blood glucose this morning was 93 mg/dL; reports that her lowest fasting blood glucose level since previous ACM outreach encounter was on 9/8/21 and it was 52 mg/dL. Patient reports associated symptoms of sweating and weakness when her blood glucose is low, \"but not to the point where I felt like I was going to pass out. \" ACM reviewed treatment of hypoglycemia with patient today; pt verbalizes understanding.  Patient was able to review her blood glucose log and visualize the effect that certain foods had on her blood sugar; states, \"When it (blood sugar) went up at night I had either rice, potato or bread. \"   She is still interested in the [facility], if eligible; she could benefit from the [facility] to assist with treatment plan adherence for blood glucose monitoring. Barrier to patient adherence with blood glucose monitoring at present is that her fingertips get sore with checking her blood glucose 2-3 times per day. She has not received outreach from Ambulatory PharmD regarding this; referral was previously sent by this ACM on 8/18/21. Patient will discuss this with Dr. Kath Tierney at her scheduled appointment on 10/15/21.  Patient will resume checking her blood glucose twice daily and maintain her blood glucose log.  Patient will attend previously scheduled appointment with Dr. Kath Tierney on 10/15/21; she will take her blood glucose log to this appointment.  Encounter routed to Dr. Kath Tierney today for notification of patient reported most recent blood glucose results.  Plan for next AC outreach in approximately two weeks. 8/18/2021  She has not resumed water aerobics class; she still plans on returning to this class, however her schedule has been busy. Checking her blood glucose once daily. She is still interested in trying to get the [facility]; notes that it was previously not covered by Medicare, however she has recently heard that it is now covered. Pt reported fasting blood glucose this morning was 140 mg/dL. Attended office visit with Dr. Kath Tierney on 7/14/21; hemoglobin A1c 7/14/21 9.2%  Next scheduled follow-up with Dr. Kath Tierney is 10/15/21. Encouraged patient to increase blood glucose monitoring to twice daily; order per Dr. Kath Tierney is for three times daily. ACM will consult with Ambulatory PharmD regarding Freestyle Jack and Medicare coverage; pt could benefit from the [place] from an adherence standpoint with blood glucose monitoring. 7/8/2021  Plans to resume water aerobics next week. She contacted the [facility] and was notified that water aerobics class has resumed; plans to go to local [facility] on 7/13 to sign up and pay fee. Patient states, \"I'm definitely looking forward to that. \"  Checking her blood glucose twice daily; states, \"I haven't had anything over 160 (mg/dL). \" She is interested in getting the [facility] 14-day system and will discuss this with Dr. Yury Robles during her upcoming visit; pt notes that when this was previously ordered for her in 2020, however she did not get it because it was not covered by her Medicare. Patient has office visit scheduled with Dr. Yury Robles on 7/14.    4/20/2021  most recent Hgb A1c 3/17/21 8.9%. Patient reports that she has made an effort to improve her diet since the beginning of the year and has decreased her intake of starchy foods. Patient reports that current level of physical activity includes walking around inside of her house; barrier to increased physical activity is chronic pain in her right knee and back. Patient reports plan to increase physical activity with the warmer weather by walking in her pool once she gets it open. 3/5/2020  - attended OV with Dr. Yury Robles 2/28/2020; Hgb A1c 9.9%, compare to previous result of 10.1%. Pt reported to Dr. Yury Robles that she had not taken metformin since her previous OV (11/25/19) due to continued GI distress. Pt was advised to increase her physical activity to tolerance and was asked to check blood sugars at 11:00PM for 1 week and then contact Dr. Yury Robles to report readings; see Christiane Mesa note for details regarding OV encounter.   - Patient is not receptive to ACM follow-up today due to acute illness; ACM outreach in one week. 2/12/2020  - reports fasting blood glucose this morning of 120 mg/dL, after lunch 190 mg/dL. Unable to review additional recent blood glucose readings as results are stored on glucometer and patient is unsure how to recall historical results; pt will take her glucometer to scheduled OV with Dr. Yury Robles . - reports taking mealtime insulin prior to meals; states, \"Sometimes I forget and I take it right after I eat, but I'm trying to get use to it and take it before I eat. \"  - still taking humalog 24 units before breakfast (if blood glucose is <150 mg/dL she takes 22 units), 24 units before lunch, 36 units before dinner and Toujeo 60 units nightly; pt did not contact Dr. Vasquez Shafer office after last ACM outreach to confirm insulin orders, however reports that she reviewed her most recent office visit AVS and confirmed that the unit doses she is currently taking are correct. - continued lack of adherence with TID blood glucose monitoring; inquired about patient barriers to adherence - states, \"I am not sure I am going to be able to do that because when I stick it it hurts too bad and I've been doing this for 15 years and my fingers have had it. \" Informed patient about 14-day blood glucose monitoring system, Freestyle Jack, and encouraged patient to discuss this option with Dr. Jaun Ventura at her upcoming office visit to increase patient adherence with blood glucose monitoring.  -  Inquired as to whether patient is following a diabetic diet; pt states, \"Kind of, yeah; most of the time. \" Diet is described as \"about the same; nothing more, nothing less. \" Patient is not interested in keeping a food diary to review with this NN. Patient is not interested in additional education/review of diabetic diet; notes that she previously met with CDE NN, [facility].  - no longer participating in water aerobics; currently not engaging in moderate exercise. She reports that she walks around her house for exercise; states, \"I just get up and start walking from the side door around to the front door and do it a couple of times. \" Patient reported barrier to exercise/increasing physical activity is back spasms which prevent her from \"doing a lot of moving or standing unless I am in the water. \" Reports that barrier to attending water aerobics at present is transportation and winter season.   - will attend scheduled OV with Dr. Jaun Ventura 2/28/2020  - pt requests next ACM follow-up in three weeks after she attends scheduled OV with Dr. Jaun Ventura    1/10/20  - Hgb A1c 11/25/19 - 10.1%  - pt notes history of Type 2 DM for 15 years  - checking blood glucose 1-2x/day  - ordered insulin regimen/frequency is Humalog TID (before breakfast, lunch and dinner) and Toujeo nightly  - reports taking humalog 24 units before breakfast (if blood glucose is <150 mg/dL she takes 22 units), 24 units before lunch, 36 units before dinner and Toujeo 60 units nightly. Most recent endocrinology OV note reviewed; per OV note, most recent Humalog order (11/25/19) - 22 units for breakfast and lunch and 36 units for dinner and Toujeo 60 units nightly (12/16/19), pt was noted to be taking mealtime insulin 2-3 hours after meal .Further clarification is needed regarding what current insulin unit dose orders are; patient is advised to contact Dr. Micael Bernheim to confirm/clarify current insulin unit dose orders. - per last endocrinology OV note, pt was encouraged to improve diet and physical activity to tolerance  - encouraged to take mealtime insulin prior to eating meal  - encouraged to check blood glucose TID as ordered and keep blood glucose log      10/29/19  8/14/19-10.9%  4/22/19 - Hgb A1c 10.1%  - Current level of understanding: checks blood sugar once daily before breakfast. Completed Disease Specific CM Program for Diabetes Self-Management (w/ Aaron Hayes RN NN CDE) on 8/27/19. Does not keep written blood glucose log; reports that glucometer stores historical blood glucose results. Pt reported fasting blood glucose on 10/28 of 169 mg/dL and on 10/29 of 93 mg/dL. Pt notes difficulty adhering to diabetic diet - enjoys pasta, potatoes, chips - states, \"my problem is food. I'm not that strong on sweets. \"; pt reported barrier to diabetic diet adherence is financial. Does not drink regular soda; drinks diet coke (no more than 2/day) and water. Currently exercising two times/week for 60-90 minutes- water aerobics. - Desired Outcome: Improve diet and exercise, decrease carbohydrate intake. Lower Hgb A1c.  - Plan: Further assess financial barrier(s).  Will continue provide and reinforce pt education re: diabetic diet, assist with meal planning. Pt could benefit from keeping a food diary; will discuss further during subsequent outreach encounter as pt is unable to continue call at this time due to getting ready to leave her house.             Future Appointments:  Future Appointments   Date Time Provider Ovidio Allan   4/15/2022  2:30 PM MD SAMINA Scott  BS AMB   5/11/2022  1:15 PM MD DARLINE Reese BS AMB   6/10/2022 11:00 AM Sandra Pineda MD [facility] BS AMB   2/7/2023 10:00 AM Carissa Ortega MD [facility] BS AMB        Last Appointment With Me:  Visit date not found     Last Appointment My Department:  Visit date not found

## 2022-03-17 ENCOUNTER — HOSPITAL ENCOUNTER (OUTPATIENT)
Dept: VASCULAR SURGERY | Age: 72
Discharge: HOME | End: 2022-03-17
Attending: INTERNAL MEDICINE
Payer: MEDICARE

## 2022-03-17 DIAGNOSIS — M79.606 PAIN OF LOWER EXTREMITY, UNSPECIFIED LATERALITY: ICD-10-CM

## 2022-03-17 LAB
LEFT ABI: 1.05
LEFT POSTERIOR TIBIAL: 204 MMHG
LEFT TBI: 0.96
LEFT TOE PRESSURE: 191 MMHG
RIGHT ABI: 1.02
RIGHT ARM BP: 200 MMHG
RIGHT POSTERIOR TIBIAL: 200 MMHG
RIGHT TBI: 1
RIGHT TOE PRESSURE: 199 MMHG
VAS LEFT DORSALIS PEDIS BP: 209 MMHG
VAS RIGHT DORSALIS PEDIS BP: 203 MMHG

## 2022-03-17 PROCEDURE — 93922 UPR/L XTREMITY ART 2 LEVELS: CPT

## 2022-03-18 PROBLEM — F33.1 MAJOR DEPRESSIVE DISORDER, RECURRENT, MODERATE (HCC): Status: ACTIVE | Noted: 2021-12-10

## 2022-03-18 PROBLEM — F33.9 MAJOR DEPRESSIVE DISORDER, RECURRENT, UNSPECIFIED (HCC): Status: ACTIVE | Noted: 2021-12-10

## 2022-03-19 PROBLEM — E11.40 TYPE 2 DIABETES MELLITUS WITH DIABETIC NEUROPATHY (HCC): Status: ACTIVE | Noted: 2019-04-29

## 2022-03-19 PROBLEM — Z86.73 HISTORY OF CVA (CEREBROVASCULAR ACCIDENT): Status: ACTIVE | Noted: 2018-07-13

## 2022-03-19 PROBLEM — E11.21 TYPE 2 DIABETES WITH NEPHROPATHY (HCC): Status: ACTIVE | Noted: 2018-12-14

## 2022-03-19 PROBLEM — E66.01 OBESITY, MORBID (HCC): Status: ACTIVE | Noted: 2018-01-25

## 2022-03-19 PROBLEM — N18.30 CKD (CHRONIC KIDNEY DISEASE) STAGE 3, GFR 30-59 ML/MIN (HCC): Status: ACTIVE | Noted: 2022-03-10

## 2022-03-19 PROBLEM — F33.0 MAJOR DEPRESSIVE DISORDER, RECURRENT, MILD (HCC): Status: ACTIVE | Noted: 2021-12-10

## 2022-04-13 ENCOUNTER — PATIENT OUTREACH (OUTPATIENT)
Dept: CASE MANAGEMENT | Age: 72
End: 2022-04-13

## 2022-04-13 NOTE — PROGRESS NOTES
4/13/2022  4:14 PM    Patient reported home glucose results:    4/13/22  8:04AM (before breakfast): 84 mg/dL    4/12/22  07:00 AM (before breakfast): 126 mg/dL  6:31PM (after dinner): 161 mg/dL  9:15PM: 178 mg/dL - took 40 units of Toujeou    4/11/22  5:40 PM: 124 mg/dL  9:35 PM: 232 mg/dL - took 40 units Toujeou    4/10/22  7:53 PM: 234 mg/dL (ate dinner out to celebrate her daughter's birthday)    4/9/22  Before bedtime: 318 mg/dL (ate at [employer], she ate pasta- celebrating her daughter's birthday)    Goals Addressed                 This Visit's Progress       Diabetes     Patient verbalizes understanding of self -management goals of living with Diabetes. On track     4/13/2022   Reviewed patient's most recent glucose results today; pt reported glucose readings are documented in encounter progress note. [facility] was unable to review patient's glucose results prior to 4/9 with her because her Freestyle reader turned off during this call today; she reports that her reader is fully charged, so she thinks it is because her sensor needs to be replaced.  Patient expresses concern with her elevated glucose results on 4/10 and 4/9 and how these may affect her hemoglobin a1c when it is checked at her upcoming appointment with Dr. Rivas Frank on Friday, 4/15; she notes that she ate out for dinner on both of these nights to celebrate her daughter's birthday, on 4/9 they ate at [employer] and she ate pasta. [facility] educated patient on hemoglobin a1c test and explained that it measures her average blood sugar levels over the past three months.  Patient reports that she is due to change her Freestyle [employer] today; she will do this after [facility] call   Patient has an office visit scheduled with Dr. Rivas Frank on 4/15/22. She reports that she has an appt scheduled with her nephrologist on 5/4/22.  [facility] will contact patient on 4/14 to continue review of home glucose readings.     3/11/2022   Reviewed patient's most recent glucose results today; pt reported glucose readings are documented in encounter progress note.  She continues to take note of how certain foods affect her glucose level and is using this information to improve her diet; notes that her glucose level increased to 250 mg/dL after she ate a small serving of rice pudding and as a result she hasn't eaten any more because she saw how it affected her glucose level.  She reports goal of improving her Hgb A1c and states, \"I'm really trying to get it down. \" ACM reviewed trend of Hgb A1c results with patient today; note that her A1c has been trending downward since 8/2019 - A1c 8/14/2019 was 10.9% and A1c result 1/14/22 was 8.9%. Positive reinforcement provided to patient today by this AC for her goal progress thus far.  She has been taking 0-40 units of Toujeou at night depending on what her evening glucose reading is; she has been keeping a record of how many units she takes and this is documented in the encounter progress note for provider review. She has not been taking Toujeou if her bedtime glucose result is <200 mg/dL because she is concerned it will cause her blood glucose level to drop overnight.  Patient will continue to check her glucose levels and keep a record of her glucose readings (to include date, time, reading) as well as how many units of Toujeou she takes. Plan for next [facility] outreach in approximately two weeks.  Encounter routed to Dr. Toñito Khan by this [facility] today for provider review  . 3/1/2022   Patient contacted [employer] customer service last week; they sent her a new Jack sensor and she received it in the mail.  She was advised by  to only check her glucose using the jack sensor unless advised otherwise by her doctor to continue to check fingerstick glucose; she reports that she initially started checking her glucose both by jack sensor and fingerstick for comparison because she was concerned with the accuracy of the Tom sensor results. She has only been checking her glucose using the Tom sensor since speaking with [employer] customer service last week.  ACM reviewed concerns for Tom sensor inaccuracy with Dr. Fredis Nunes; pt made aware- see progress note for details.  She has not changed her Jack sensor yet since receiving new sensor in the mail from [employer]; she will change her sensor this evening.  Patient reported most recent glucose readings (per Tom sensor) are noted in progress note.  Patient will change her Jack sensor this evening and continue to check her glucose using her Jack sensor/reader. ACM routed encounter to Dr. Fredis Nunes today for notification/update.  Plan for next AC outreach in approximately one week; will contact patient sooner if changes to current treatment plan and/or new orders are made by provider. 2/25/2022   Patient has been checking her glucose both with the Tom sensor and by fingerstick (using her old glucometer) because she is concerned with the accuracy of Jack sensor device. She has noticed a discrepancy in her glucose results between fingerstick readings using her old glucometer device and the jack sensor readings; pt reports the following: Fingerstick 202mg/dL (845AM this morning using her old glucometer), Jack sensor reading 112 mg/dL (846 AM this morning)/ Last night 6:06 PM - Fingerstick 244 mg/dL (using her old glucometer), Jack sensor reading 192 mg/dL. Patient checked her blood glucose today during this call and reports the following - fingerstick with Tom reader device 340 mg/dL, jack sensor reading 240 mg/dL and fingerstick using her old glucometer 305 mg/dL.    Patient reports the following low glucose readings since previous ACM outreach encounter: 59 mg/dL on 2/20/22 at 6:40 AM, 69 mg/dL on 2/22/22 at 12:30 AM, 72 mg/dL on 2/22/22 at 6:56 AM. She is unable to recall if she took Toujeou on 2/19 or 2/21 and, if so, how many units; she does have this information written down, however she is unable to find her notepad during this call.  She changed her Jack sensor nine days ago; sensors are changed every 14 days   She took 30 units of Humalog this morning because her blood glucose reading by fingerstick was 202 mg/dL (before breakfast); reports that Tom sensor reading at this time was 112 mg/dL.  She has been taking 20 units of Toujeou because she is concerned about her blood glucose dropping overnight.  Based on patient's above reported glucose readings, consider possible issue with accuracy of jack sensor. Patient will contact [employer] service today to attempt to troubleshoot issue and request control solution. Patient will continue to monitor her glucose and keep a written log of her glucose readings until issue with Tom monitoring system can be troubleshooted/resolved and accuracy of Jack/Jack sensor can be confirmed.  Plan for next ACM outreach in approximately three days to follow-up on patient's glucose readings and patient communication with [employer] customer service.  ACM will route encounter to Dr. Tomasz Holt and Amubulatory PharmD for notification/review. 2/16/2022  Per Dr. Tomasz Holt, decrease Toujeou to 50 units at bedtime and continue to follow patient's glucose readings; patient notified by this ACM today. Plan for next ACM outreach in approximately one week. 2/15/2022   Pt attended OV with Dr. Ofelia Dyer (PCP) on 12/10/21, Ambulatory PharmD on 12/22/21, Dr. Tomasz Holt (Endocrinology) on 1/14/22, virtual visit with Ambulatory PharmD on 1/17/22.  She received her Freestyle Jack 2 CGM on 1/22/22.  She reports an improved diet since receiving her CGM and decreased consumption of concentrated sweets and starches; \"It's really getting me to really pay attention to what I'm eating. What I used to think wasn't doing anything was actually causing my problem. \"    Med Rec updated today.  Per Dr. Edgard Gr most recent OV note, current DM medication orders are Toujeo insulin 60 units at bedtime and Humalog insulin 24 breakfast (20 for lunch if she eats lunch) and 42 for dinner. [facility] to [facility] today that she has not taken Toujeou for the past three nights because she is concerned about her blood sugar dropping overnight. Per Dr. Deon Varner most recent OV note, toujeou will likely have to be decreased to prevent hypoglycemia if bedtime blood sugar level is <200 mg/dL. Patient is unable to recall if she took Toujeou on 2/9, 2/10 or 2/11; note that she reports a blood glucose level of 69 mg/dL at 2:28 AM on 2/12/22 (she is unable to recall if she took Toujeou at bedtime on 2/11).  ACM reinforced patient education on treatment of hypoglycemia.  ACM will route encounter to Dr. Heather Vences today for notification and review. ACM will follow-up with patient in approximately one week to review most recent glucose levels. 12/10/2021   Per [facility] representative, order for CGM is currently closed; previously submitted order was denied by Medicare due to need for clarification regarding number of times per day that the patient is checking her blood glucose and injecting insulin, however requested order addendum (by [address]) was not received from endocrinology office. Patient attended office visit with PCP on 12/10/21 and was referred to ambulatory PharmD for assistance with [facility] order; encounter routed to Ambulatory PharmD today for continuity of care.  Patient has an appointment scheduled with Ambulatory PharmD on 12/22/21 to discuss [facility]. Patient will attend this appointment as scheduled.  Plan for ACM outreach in approximately two weeks. 12/8/2021   She has not received the [facility].  She most recently spoke to [employer] [facility] on today; reports being advised that Medicare denied coverage due to an issue with the order and supporting documentation that was submitted to Medicare for coverage.  Patient does not have her blood glucose log available at this time to review with AC during this call. She does report elevated blood glucose readings since previous ACM outreach encounter; pt attributes elevated blood glucose readings to increased stress level related to barrier with getting the Burleigh Amy as well as the stress of the holiday season because it's the first holiday season without her mother and brother (they passed away last year).  She is making an effort to increase her physical activity; barrier is decreased strength in her lower extremities and bilateral knee pain. She is ambulating with a rollator in public settings; ambulates with cane at home. She notes that she has not seen an ortho provider in many years; she has never seen ortho for her bilateral knee pain. Patient has PCP follow-up scheduled with Dr. Jamila Horn on 12/10/21 and she will discuss this further with Dr. Jamila Horn at her upcoming appointment.  Staff message routed to Dr. Vicky Hutson clinical staff to inquire about Freestyle Jack Order; awaiting response. [facility] will contact [address] [place] [address] on tomorrow to inquire about Medicare coverage barrier/denial.    11/9/2021   AC outreach to [address] [place] [address] today regarding order for FreeStyle Jack 14 day reader; pt contact and insurance information provided. [facility] will be contacting the patient after completing insurance verification and will then submit order request to Dr. Vicky Hutson office for review and provider signature. Patient notified by [facility] today that [address] [place] [address] will be contacting her; pt verbalizes understanding.  Patient report that she spoke with [facility] [employer] today and requested that they cancel the previous order for the Tom and [employer].  [facility] outreach in approximately one week to follow-up on order status with [facility]. 11/5/2021   Attended office visit with Dr. Julian Ortiz on 10/15/21.    Script for Burleigh Amy written by  Melissa Maria on 10/15/21. She tried to fill this order at [employer] and [facility]. Pt reports that [facility] informed her that out-of-pocket cost for the reader device was >$200; pt unable to afford this cost and had orders transferred to [facility]. Patient was informed by 2030 bMobilized reader was $79; this is affordable to her, however she has not purchased the reader yet because she was unable to get the sensors. Patient is not sure of her out-of-pocket cost for the [facility]; [employer] was unable to advise her of the 14-day sensors copay because [facility] needs to void out where they already filled the order. Pt was informed by [facility] Cristobal that order for [facility] [facility] should be sent to a DME Provider.  She is checking her blood glucose twice daily in the morning and in the evening. One episode of hypoglycemia is reported by patient within the past week with fasting blood glucose level of 59 mg/dL, however she reports that she was asymptomatic. Reports that episodes of fasting low blood sugar have decreased since previous ACM outreach encounter.  She forgot to take her Toujeou last night and her fasting blood glucose this morning was 160 mg/dL   Patient will continue to check her blood glucose twice daily in the morning and in the evening; she will maintain blood glucose log to review with Dr. Sergio AZEVEDO.  Medicare Coverage Criteria for [address] reviewed by this ACM today ([employer] resource); patient should meet the criteria for the [address] [place] [address] Drive to be covered by her Medicare Part B. ACM outreach to [facility] DME Provider to inquire if they are able to assist with order; lvm requesting a return phone call to this ACM.    ACM provided patient with update; will attempt to have order for [address], reader and sensors, supplied by DME Provider under her Medicare Part B.    10/1/2021   She was checking her blood glucose twice daily and maintaining her blood glucose log up until last week; she checked her blood glucose once daily last week because her fingers were sore and she has not written her blood sugars down since last week. Patient states, \"But I know I should (check her blood glucose twice daily) because I really don't feel any different when it's (blood sugar) up or down. \"    \"I've been having some very low sugar in the morning. \" Pt reported fasting blood glucose this morning was 93 mg/dL; reports that her lowest fasting blood glucose level since previous ACM outreach encounter was on 9/8/21 and it was 52 mg/dL. Patient reports associated symptoms of sweating and weakness when her blood glucose is low, \"but not to the point where I felt like I was going to pass out. \" ACM reviewed treatment of hypoglycemia with patient today; pt verbalizes understanding.  Patient was able to review her blood glucose log and visualize the effect that certain foods had on her blood sugar; states, \"When it (blood sugar) went up at night I had either rice, potato or bread. \"   She is still interested in the [place], if eligible; she could benefit from the [place] to assist with treatment plan adherence for blood glucose monitoring. Barrier to patient adherence with blood glucose monitoring at present is that her fingertips get sore with checking her blood glucose 2-3 times per day. She has not received outreach from Ambulatory PharmD regarding this; referral was previously sent by this ACM on 8/18/21. Patient will discuss this with Dr. Toñito Khan at her scheduled appointment on 10/15/21.  Patient will resume checking her blood glucose twice daily and maintain her blood glucose log.  Patient will attend previously scheduled appointment with Dr. Toñito Khan on 10/15/21; she will take her blood glucose log to this appointment.    Encounter routed to Dr. Toñito Khan today for notification of patient reported most recent blood glucose results.  Plan for next AC outreach in approximately two weeks. 8/18/2021  She has not resumed water aerobics class; she still plans on returning to this class, however her schedule has been busy. Checking her blood glucose once daily. She is still interested in trying to get the [facility]; notes that it was previously not covered by Medicare, however she has recently heard that it is now covered. Pt reported fasting blood glucose this morning was 140 mg/dL. Attended office visit with Dr. Sofia Madison on 7/14/21; hemoglobin A1c 7/14/21 9.2%  Next scheduled follow-up with Dr. Sofia Madison is 10/15/21. Encouraged patient to increase blood glucose monitoring to twice daily; order per Dr. Sofia Madison is for three times daily. ACM will consult with Ambulatory PharmD regarding Freestyle Jack and Medicare coverage; pt could benefit from the [place] from an adherence standpoint with blood glucose monitoring. 7/8/2021  Plans to resume water aerobics next week. She contacted the [facility] and was notified that water aerobics class has resumed; plans to go to local [facility] on 7/13 to sign up and pay fee. Patient states, \"I'm definitely looking forward to that. \"  Checking her blood glucose twice daily; states, \"I haven't had anything over 160 (mg/dL). \" She is interested in getting the [facility] 14-day system and will discuss this with Dr. Sofia Madison during her upcoming visit; pt notes that when this was previously ordered for her in 2020, however she did not get it because it was not covered by her Medicare. Patient has office visit scheduled with Dr. Sofia Madison on 7/14.    4/20/2021  most recent Hgb A1c 3/17/21 8.9%. Patient reports that she has made an effort to improve her diet since the beginning of the year and has decreased her intake of starchy foods. Patient reports that current level of physical activity includes walking around inside of her house; barrier to increased physical activity is chronic pain in her right knee and back. Patient reports plan to increase physical activity with the warmer weather by walking in her pool once she gets it open. 3/5/2020  - attended OV with Dr. Jaylin Nj 2/28/2020; Hgb A1c 9.9%, compare to previous result of 10.1%. Pt reported to Dr. Jaylin Nj that she had not taken metformin since her previous OV (11/25/19) due to continued GI distress. Pt was advised to increase her physical activity to tolerance and was asked to check blood sugars at 11:00PM for 1 week and then contact Dr. Jaylin Nj to report readings; see [facility] note for details regarding OV encounter.   - Patient is not receptive to ACM follow-up today due to acute illness; ACM outreach in one week. 2/12/2020  - reports fasting blood glucose this morning of 120 mg/dL, after lunch 190 mg/dL. Unable to review additional recent blood glucose readings as results are stored on glucometer and patient is unsure how to recall historical results; pt will take her glucometer to scheduled OV with Dr. Jaylin Nj . - reports taking mealtime insulin prior to meals; states, \"Sometimes I forget and I take it right after I eat, but I'm trying to get use to it and take it before I eat. \"  - still taking humalog 24 units before breakfast (if blood glucose is <150 mg/dL she takes 22 units), 24 units before lunch, 36 units before dinner and Toujeo 60 units nightly; pt did not contact Dr. Seth Hicks office after last ACM outreach to confirm insulin orders, however reports that she reviewed her most recent office visit AVS and confirmed that the unit doses she is currently taking are correct. - continued lack of adherence with TID blood glucose monitoring; inquired about patient barriers to adherence - states, \"I am not sure I am going to be able to do that because when I stick it it hurts too bad and I've been doing this for 15 years and my fingers have had it. \" Informed patient about 14-day blood glucose monitoring system, Freestyle Jack, and encouraged patient to discuss this option with Dr. Sean So at her upcoming office visit to increase patient adherence with blood glucose monitoring.  -  Inquired as to whether patient is following a diabetic diet; pt states, \"Kind of, yeah; most of the time. \" Diet is described as \"about the same; nothing more, nothing less. \" Patient is not interested in keeping a food diary to review with this NN. Patient is not interested in additional education/review of diabetic diet; notes that she previously met with CDE NN, Mikey Mcpherson.  - no longer participating in water aerobics; currently not engaging in moderate exercise. She reports that she walks around her house for exercise; states, \"I just get up and start walking from the side door around to the front door and do it a couple of times. \" Patient reported barrier to exercise/increasing physical activity is back spasms which prevent her from \"doing a lot of moving or standing unless I am in the water. \" Reports that barrier to attending water aerobics at present is transportation and winter season. - will attend scheduled OV with Dr. Sean So 2/28/2020  - pt requests next ACM follow-up in three weeks after she attends scheduled OV with Dr. Sean So    1/10/20  - Hgb A1c 11/25/19 - 10.1%  - pt notes history of Type 2 DM for 15 years  - checking blood glucose 1-2x/day  - ordered insulin regimen/frequency is Humalog TID (before breakfast, lunch and dinner) and Toujeo nightly  - reports taking humalog 24 units before breakfast (if blood glucose is <150 mg/dL she takes 22 units), 24 units before lunch, 36 units before dinner and Toujeo 60 units nightly.  Most recent endocrinology OV note reviewed; per OV note, most recent Humalog order (11/25/19) - 22 units for breakfast and lunch and 36 units for dinner and Toujeo 60 units nightly (12/16/19), pt was noted to be taking mealtime insulin 2-3 hours after meal .Further clarification is needed regarding what current insulin unit dose orders are; patient is advised to contact  [facility] to confirm/clarify current insulin unit dose orders. - per last endocrinology OV note, pt was encouraged to improve diet and physical activity to tolerance  - encouraged to take mealtime insulin prior to eating meal  - encouraged to check blood glucose TID as ordered and keep blood glucose log      10/29/19  8/14/19-10.9%  4/22/19 - Hgb A1c 10.1%  - Current level of understanding: checks blood sugar once daily before breakfast. Completed Disease Specific CM Program for Diabetes Self-Management (w/ Saumya Parson, RN NN CDE) on 8/27/19. Does not keep written blood glucose log; reports that glucometer stores historical blood glucose results. Pt reported fasting blood glucose on 10/28 of 169 mg/dL and on 10/29 of 93 mg/dL. Pt notes difficulty adhering to diabetic diet - enjoys pasta, potatoes, chips - states, \"my problem is food. I'm not that strong on sweets. \"; pt reported barrier to diabetic diet adherence is financial. Does not drink regular soda; drinks diet coke (no more than 2/day) and water. Currently exercising two times/week for 60-90 minutes- water aerobics. - Desired Outcome: Improve diet and exercise, decrease carbohydrate intake. Lower Hgb A1c.  - Plan: Further assess financial barrier(s). Will continue provide and reinforce pt education re: diabetic diet, assist with meal planning. Pt could benefit from keeping a food diary; will discuss further during subsequent outreach encounter as pt is unable to continue call at this time due to getting ready to leave her house.             Future Appointments:  Future Appointments   Date Time Provider Ovidio Allan   4/15/2022  2:30 PM Loretto Hamman, MD RDE  BS AMB   6/6/2022  2:00 PM MD DARLINE Adhikari BS AMB   6/10/2022 11:00 AM Bettie Alvarado MD [facility] BS AMB   2/7/2023 10:00 AM Narcisa Billy MD [facility] BS AMB        Last Appointment With Me:  Visit date not found     Last Appointment My Department:  Visit date not found - - -

## 2022-04-14 NOTE — PATIENT INSTRUCTIONS
Hypoglycemia: Care Instructions  Overview     Hypoglycemia means that your blood sugar is low and your body is not getting enough fuel. Some people get low blood sugar from not eating often enough. Some medicines to treat diabetes can cause low blood sugar. People who have had surgery on their stomachs or intestines may get hypoglycemia. Problems with the pancreas, kidneys, or liver also can cause low blood sugar. A snack or drink with sugar in it will raise your blood sugar and should ease your symptoms right away. Your doctor may recommend that you change or stop your medicines until you can get your blood sugar levels under control. In the long run, you may need to change your diet and eating habits so that you get enough fuel for your body throughout the day. Follow-up care is a key part of your treatment and safety. Be sure to make and go to all appointments, and call your doctor if you are having problems. It's also a good idea to know your test results and keep a list of the medicines you take. How can you care for yourself at home? · Learn your signs of low blood sugar. They are different for everyone. Some common early signs include:  ? Nausea. ? Hunger. ? Feeling nervous, irritable, or shaky. ? Cold, clammy skin. ? Sweating (when you're not exercising). · Use the \"rule of 15\" to treat low blood sugar. This includes eating 15 grams of carbohydrate from a quick-sugar food, such as 3 or 4 glucose tablets or ½ cup of juice. Wait 15 minutes and check your blood sugar. If it is still below 70 mg/dL, eat another 15 grams of carbohydrate. Repeat this every 15 minutes until your blood sugar is in a safe target range. · Once your blood sugar is in a safe range, eat a snack or meal to prevent recurrent low blood sugar. · Make sure family, friends, and coworkers know the symptoms of low blood sugar and know how to get your sugar level up.   · If you were prescribed a glucagon kit, always have it with you. Make sure friends and family know how to use it. When should you call for help? Call 911 anytime you think you may need emergency care. For example, call if:    · You passed out (lost consciousness).     · You are confused or cannot think clearly.     · Your blood sugar is very high or very low. Watch closely for changes in your health, and be sure to contact your doctor if:    · Your blood sugar stays outside the level your doctor set for you.     · You have any problems. Where can you learn more? Go to http://www.hall.com/  Enter R955 in the search box to learn more about \"Hypoglycemia: Care Instructions. \"  Current as of: July 28, 2021               Content Version: 13.2  © 2006-2022 [employer]. Care instructions adapted under license by [employer] (which disclaims liability or warranty for this information). If you have questions about a medical condition or this instruction, always ask your healthcare professional. [place] [address] any warranty or liability for your use of this information. Hypoglycemia: Care Instructions  Overview     Hypoglycemia means that your blood sugar is low and your body is not getting enough fuel. Some people get low blood sugar from not eating often enough. Some medicines to treat diabetes can cause low blood sugar. People who have had surgery on their stomachs or intestines may get hypoglycemia. Problems with the pancreas, kidneys, or liver also can cause low blood sugar. A snack or drink with sugar in it will raise your blood sugar and should ease your symptoms right away. Your doctor may recommend that you change or stop your medicines until you can get your blood sugar levels under control. In the long run, you may need to change your diet and eating habits so that you get enough fuel for your body throughout the day. Follow-up care is a key part of your treatment and safety.  Be sure to make and go to all appointments, and call your doctor if you are having problems. It's also a good idea to know your test results and keep a list of the medicines you take. How can you care for yourself at home? · Learn your signs of low blood sugar. They are different for everyone. Some common early signs include:  ? Nausea. ? Hunger. ? Feeling nervous, irritable, or shaky. ? Cold, clammy skin. ? Sweating (when you're not exercising). · Use the \"rule of 15\" to treat low blood sugar. This includes eating 15 grams of carbohydrate from a quick-sugar food, such as 3 or 4 glucose tablets or ½ cup of juice. Wait 15 minutes and check your blood sugar. If it is still below 70 mg/dL, eat another 15 grams of carbohydrate. Repeat this every 15 minutes until your blood sugar is in a safe target range. · Once your blood sugar is in a safe range, eat a snack or meal to prevent recurrent low blood sugar. · Make sure family, friends, and coworkers know the symptoms of low blood sugar and know how to get your sugar level up. · If you were prescribed a glucagon kit, always have it with you. Make sure friends and family know how to use it. When should you call for help? Call 911 anytime you think you may need emergency care. For example, call if:    · You passed out (lost consciousness).     · You are confused or cannot think clearly.     · Your blood sugar is very high or very low. Watch closely for changes in your health, and be sure to contact your doctor if:    · Your blood sugar stays outside the level your doctor set for you.     · You have any problems. Where can you learn more? Go to http://www.hall.com/  Enter R955 in the search box to learn more about \"Hypoglycemia: Care Instructions. \"  Current as of: July 28, 2021               Content Version: 13.2  © 0184-4181 [employer], [employer].    Care instructions adapted under license by [employer] (which disclaims liability or warranty for this information). If you have questions about a medical condition or this instruction, always ask your healthcare professional. [place] [address] any warranty or liability for your use of this information.

## 2022-04-14 NOTE — PROGRESS NOTES
4/14/2022  3:39 PM    Patient reported home glucose results:    4/14/22  6:39 AM (before breakfast): 121 mg/dL  9:49 AM (after breakfast): 178 mg/dL  4:14 PM: 126 mg/dL    4/13/22  8:04 AM (before breakfast): 84 mg/dL  4:29 PM: 59 mg/dL/ patient reports feeling \"a little lightheaded\"/ she had one glass of regular soda for treatment of low blood sugar  7:29 PM: 100 mg/dL  - she did not take Toujeou    4/12/22  07:00 AM (before breakfast): 126 mg/dL  6:31PM (after dinner): 161 mg/dL  9:15PM: 178 mg/dL - took 40 units of Toujeou    4/11/22  5:40 PM: 124 mg/dL  9:35 PM: 232 mg/dL - took 40 units Toujeou    4/10/22  6:50 AM (before breakfast): 207 mg/dL   7:53 PM: 234 mg/dL (ate dinner out to celebrate her daughter's birthday) - took 40 units Toujeou    4/9/22  8:11 AM: 163 mg/dL  1:30 PM (after lunch): 236 mg/dL  Before bedtime: 318 mg/dL (ate at [facility], she ate pasta- celebrating her daughter's birthday) - took 48 units Toujeou    4/8/22  4:35 AM: 62 mg/dL (drank two glasses of orange juice)  2:14 PM: 175 mg/dL  8:19 PM: 214 mg/dL - took 30 units Toujeou    Goals Addressed                 This Visit's Progress       Diabetes     Patient verbalizes understanding of self -management goals of living with Diabetes. On track     4/14/2022   Patient changed her Tom Sensor on 4/13/22.  Patient is concerned that Caviar reader may not be functioning properly because she ate four thin mint girl  cookies at 1:00 PM today and her glucose during this call is 126 mg/dL (4:14 PM); she expected her glucose reading to be higher since she ate four girl  cookies. Discussed with patient that glucose level typically peaks within 90 minutes after eating; she also reports that she took Humalog 18 units at 1:00 PM before eating lunch and the cookies. Provided patient education on Humalog and that it is a rapid-acting insulin; educated patient on onset, peak and duration of rapid-acting insulin.    Patient is encouraged to check her glucose before eating dinner this evening and also 1-2 hours after eating dinner as this will provide insight as to whether her CGM is functioning properly and providing accurate glucose readings.  ACM reviewed home treatment of hypoglycemia with patient today. Patient appropriately treats low blood sugar by consuming quick-sugar food, however she has not been rechecking her glucose level to ensure that it is in a safe range; pt is advised to recheck her glucose level 15 minutes after consumption of quick-sugar to confirm that low glucose level is resolved and if glucose level is still <70 mg/dL then repeat the steps for treatment of low glucose. She is also advised to eat a snack or a meal after low glucose is resolved to prevent recurrent hypoglycemia. Hypoglycemia clinical reference tip sheet sent to patient via [employer] for home reference and review.  Educated patient on ADA recommended glucose ranges for pre and post-prandial glucose levels - 80 mg/dL to 130 mg/dL before a meal and less than 180 mg/dL 1 to 2 hours after a meal   She is taking between 30-50 units of Toujeou daily depending on her bedtime glucose result; she did not take Toujeou on 4/13/22 due to bedtime glucose result of 100 mg/dL. Note that current provider orders are for Toujeou 50 units nightly. Patient will discuss Toujeou dose with provider during her appointment on 4/15; she is advised to take her home log with her where she has documented how many units of Toujeou she takes every night.  She is taking Humalog 30 units for breakfast, 18-20 units for lunch (depending on her glucose reading) and 42 units for dinner   Patient will attend her previously scheduled appointment with Dr. Fito Warren on tomorrow; she will take her [facility] reader with her to this appointment. ACM encounter routed to Dr. Fito Warren today for notification.    Plan for next ACM outreach in approximately one week    4/13/2022   Reviewed patient's most recent glucose results today; pt reported glucose readings are documented in encounter progress note. [facility] was unable to review patient's glucose results prior to 4/9 with her because her Freestyle reader turned off during this call today; she reports that her reader is fully charged, so she thinks it is because her sensor needs to be replaced.  Patient expresses concern with her elevated glucose results on 4/10 and 4/9 and how these may affect her hemoglobin a1c when it is checked at her upcoming appointment with Dr. Akash Franklin on Friday, 4/15; she notes that she ate out for dinner on both of these nights to celebrate her daughter's birthday, on 4/9 they ate at an [employer] and she ate pasta. [facility] educated patient on hemoglobin a1c test and explained that it measures her average blood sugar levels over the past three months.  Patient reports that she is due to change her Freestyle [employer] today; she will do this after [facility] call   Patient has an office visit scheduled with Dr. Akash Franklin on 4/15/22. She reports that she has an appt scheduled with her nephrologist on 5/4/22.  [facility] will contact patient on 4/14 to continue review of home glucose readings. 3/11/2022   Reviewed patient's most recent glucose results today; pt reported glucose readings are documented in encounter progress note.  She continues to take note of how certain foods affect her glucose level and is using this information to improve her diet; notes that her glucose level increased to 250 mg/dL after she ate a small serving of rice pudding and as a result she hasn't eaten any more because she saw how it affected her glucose level.  She reports goal of improving her Hgb A1c and states, \"I'm really trying to get it down. \" [facility] reviewed trend of Hgb A1c results with patient today; note that her A1c has been trending downward since 8/2019 - A1c 8/14/2019 was 10.9% and A1c result 1/14/22 was 8.9%.  Positive reinforcement provided to patient today by this AC for her goal progress thus far.  She has been taking 0-40 units of Toujeou at night depending on what her evening glucose reading is; she has been keeping a record of how many units she takes and this is documented in the encounter progress note for provider review. She has not been taking Toujeou if her bedtime glucose result is <200 mg/dL because she is concerned it will cause her blood glucose level to drop overnight.  Patient will continue to check her glucose levels and keep a record of her glucose readings (to include date, time, reading) as well as how many units of Toujeou she takes. Plan for next AC outreach in approximately two weeks.  Encounter routed to Dr. Fredis Nunes by this [facility] today for provider review  . 3/1/2022   Patient contacted [employer] customer service last week; they sent her a new Jack sensor and she received it in the mail. She was advised by  to only check her glucose using the jack sensor unless advised otherwise by her doctor to continue to check fingerstick glucose; she reports that she initially started checking her glucose both by [facility] sensor and fingerstick for comparison because she was concerned with the accuracy of the Tom sensor results. She has only been checking her glucose using the Tom sensor since speaking with [employer] customer service last week.  [facility] reviewed concerns for Tom sensor inaccuracy with Dr. Fredis Nunes; pt made aware- see progress note for details.  She has not changed her Jack sensor yet since receiving new sensor in the mail from [employer]; she will change her sensor this evening.  Patient reported most recent glucose readings (per Tom sensor) are noted in progress note.  Patient will change her Jack sensor this evening and continue to check her glucose using her Jack sensor/reader. AC routed encounter to Dr. Fredis Nunes today for notification/update.    Plan for next AC outreach in approximately one week; will contact patient sooner if changes to current treatment plan and/or new orders are made by provider. 2/25/2022   Patient has been checking her glucose both with the [facility] sensor and by fingerstick (using her old glucometer) because she is concerned with the accuracy of Jack sensor device. She has noticed a discrepancy in her glucose results between fingerstick readings using her old glucometer device and the jack sensor readings; pt reports the following: Fingerstick 202mg/dL (845AM this morning using her old glucometer), Jack sensor reading 112 mg/dL (846 AM this morning)/ Last night 6:06 PM - Fingerstick 244 mg/dL (using her old glucometer), Jack sensor reading 192 mg/dL. Patient checked her blood glucose today during this call and reports the following - fingerstick with [facility] reader device 340 mg/dL, jack sensor reading 240 mg/dL and fingerstick using her old glucometer 305 mg/dL.  Patient reports the following low glucose readings since previous [facility] outreach encounter: 59 mg/dL on 2/20/22 at 6:40 AM, 69 mg/dL on 2/22/22 at 12:30 AM, 72 mg/dL on 2/22/22 at 6:56 AM. She is unable to recall if she took Toujeou on 2/19 or 2/21 and, if so, how many units; she does have this information written down, however she is unable to find her notepad during this call.  She changed her Jack sensor nine days ago; sensors are changed every 14 days   She took 30 units of Humalog this morning because her blood glucose reading by fingerstick was 202 mg/dL (before breakfast); reports that [facility] sensor reading at this time was 112 mg/dL.  She has been taking 20 units of Toujeou because she is concerned about her blood glucose dropping overnight.  Based on patient's above reported glucose readings, consider possible issue with accuracy of jack sensor. Patient will contact [employer] customer service today to attempt to troubleshoot issue and request control solution.  Patient will continue to monitor her glucose and keep a written log of her glucose readings until issue with Tom monitoring system can be troubleshooted/resolved and accuracy of Jack/Jack sensor can be confirmed.  Plan for next ACM outreach in approximately three days to follow-up on patient's glucose readings and patient communication with [employer] customer service.  ACM will route encounter to Dr. Reza Lara and Amubulatory PharmD for notification/review. 2/16/2022  Per Dr. Reza Lara, decrease Toujeou to 50 units at bedtime and continue to follow patient's glucose readings; patient notified by this ACM today. Plan for next ACM outreach in approximately one week. 2/15/2022   Pt attended OV with Dr. Keerthi Kaur (PCP) on 12/10/21, Ambulatory PharmD on 12/22/21, Dr. Reza Lara (Endocrinology) on 1/14/22, virtual visit with Ambulatory PharmD on 1/17/22.  She received her Freestyle Jack 2 CGM on 1/22/22.  She reports an improved diet since receiving her CGM and decreased consumption of concentrated sweets and starches; \"It's really getting me to really pay attention to what I'm eating. What I used to think wasn't doing anything was actually causing my problem. \"    Med Rec updated today. Per Dr. [facility] most recent OV note, current DM medication orders are Toujeo insulin 60 units at bedtime and Humalog insulin 24 breakfast (20 for lunch if she eats lunch) and 42 for dinner. Patient reports to [employer] today that she has not taken Toujeou for the past three nights because she is concerned about her blood sugar dropping overnight. Per Dr. [facility] most recent OV note, toujeou will likely have to be decreased to prevent hypoglycemia if bedtime blood sugar level is <200 mg/dL. Patient is unable to recall if she took Toujeou on 2/9, 2/10 or 2/11; note that she reports a blood glucose level of 69 mg/dL at 2:28 AM on 2/12/22 (she is unable to recall if she took Toujeou at bedtime on 2/11).    ACM reinforced patient education on treatment of hypoglycemia.  ACM will route encounter to Dr. Michelle Rogers today for notification and review. ACM will follow-up with patient in approximately one week to review most recent glucose levels. 12/10/2021   Per [facility] representative, order for CGM is currently closed; previously submitted order was denied by Medicare due to need for clarification regarding number of times per day that the patient is checking her blood glucose and injecting insulin, however requested order addendum (by [address]) was not received from endocrinology office. Patient attended office visit with PCP on 12/10/21 and was referred to ambulatory PharmD for assistance with [facility] order; encounter routed to Ambulatory PharmD today for continuity of care.  Patient has an appointment scheduled with Ambulatory PharmD on 12/22/21 to discuss [facility]. Patient will attend this appointment as scheduled.  Plan for ACM outreach in approximately two weeks. 12/8/2021   She has not received the [facility]. She most recently spoke to [address] [place] [address] on today; reports being advised that Medicare denied coverage due to an issue with the order and supporting documentation that was submitted to Medicare for coverage.  Patient does not have her blood glucose log available at this time to review with ACM during this call. She does report elevated blood glucose readings since previous ACM outreach encounter; pt attributes elevated blood glucose readings to increased stress level related to barrier with getting the [facility] as well as the stress of the holiday season because it's the first holiday season without her mother and brother (they passed away last year).  She is making an effort to increase her physical activity; barrier is decreased strength in her lower extremities and bilateral knee pain. She is ambulating with a rollator in public settings; ambulates with cane at home.  She notes that she has not seen an ortho provider in many years; she has never seen ortho for her bilateral knee pain. Patient has PCP follow-up scheduled with Dr. Catalina Cho on 12/10/21 and she will discuss this further with Dr. Catalina Cho at her upcoming appointment.  Staff message routed to Dr. Savanna Montano clinical staff to inquire about Freestyle Jack Order; awaiting response. AC will contact [address] [place] [address] on tomorrow to inquire about Medicare coverage barrier/denial.    11/9/2021   AC outreach to [facility] today regarding order for FreeStyle Jack 14 day reader; pt contact and insurance information provided. [facility] will be contacting the patient after completing insurance verification and will then submit order request to Dr. Savanna Montano office for review and provider signature. Patient notified by [facility] today that [facility] will be contacting her; pt verbalizes understanding.  Patient report that she spoke with [facility] today and requested that they cancel the previous order for the Tom and [employer].  AC outreach in approximately one week to follow-up on order status with [facility]. 11/5/2021   Attended office visit with Dr. Toñito Khan on 10/15/21.  [facility] To[facility]ito [facility] on 10/15/21. She tried to fill this order at [employer] and [facility]ina[facility]. Pt reports that [facility] informed her that out-of-pocket cost for the reader device was >$200; pt unable to afford this cost and had orders transferred to Tatiana Cristobal. Patient was informed by [facility] [employer] reader was $79; this is affordable to her, however she has not purchased the reader yet because she was unable to get the sensors. Patient is not sure of her out-of-pocket cost for the [employer] [facility]; [employer] was unable to advise her of the 14-day sensors copay because [facility] needs to void out where they already filled the order.  Pt was informed by Tatiana Medinaorne that order for [facility] should be sent to a DME Provider.  She is checking her blood glucose twice daily in the morning and in the evening. One episode of hypoglycemia is reported by patient within the past week with fasting blood glucose level of 59 mg/dL, however she reports that she was asymptomatic. Reports that episodes of fasting low blood sugar have decreased since previous ACM outreach encounter.  She forgot to take her Toujeou last night and her fasting blood glucose this morning was 160 mg/dL   Patient will continue to check her blood glucose twice daily in the morning and in the evening; she will maintain blood glucose log to review with Dr. Saumya AZEVEDO.  Medicare Coverage Criteria for [employer] reviewed by this ACM today ([employer] resource); patient should meet the criteria for the [address] [place] [address] Drive to be covered by her Medicare Part B. ACM outreach to [facility] DME Provider to inquire if they are able to assist with order; m requesting a return phone call to this ACM.  ACM provided patient with update; will attempt to have order for [address], reader and sensors, supplied by DME Provider under her Medicare Part B.    10/1/2021   She was checking her blood glucose twice daily and maintaining her blood glucose log up until last week; she checked her blood glucose once daily last week because her fingers were sore and she has not written her blood sugars down since last week. Patient states, \"But I know I should (check her blood glucose twice daily) because I really don't feel any different when it's (blood sugar) up or down. \"    \"I've been having some very low sugar in the morning. \" Pt reported fasting blood glucose this morning was 93 mg/dL; reports that her lowest fasting blood glucose level since previous ACM outreach encounter was on 9/8/21 and it was 52 mg/dL.   Patient reports associated symptoms of sweating and weakness when her blood glucose is low, \"but not to the point where I felt like I was going to pass out. \" ACM reviewed treatment of hypoglycemia with patient today; pt verbalizes understanding.  Patient was able to review her blood glucose log and visualize the effect that certain foods had on her blood sugar; states, \"When it (blood sugar) went up at night I had either rice, potato or bread. \"   She is still interested in the [employer], if eligible; she could benefit from the [employer] to assist with treatment plan adherence for blood glucose monitoring. Barrier to patient adherence with blood glucose monitoring at present is that her fingertips get sore with checking her blood glucose 2-3 times per day. She has not received outreach from Ambulatory PharmD regarding this; referral was previously sent by this ACM on 8/18/21. Patient will discuss this with Dr. Fito Warren at her scheduled appointment on 10/15/21.  Patient will resume checking her blood glucose twice daily and maintain her blood glucose log.  Patient will attend previously scheduled appointment with Dr. Fito Warren on 10/15/21; she will take her blood glucose log to this appointment.  Encounter routed to Dr. Fito Warren today for notification of patient reported most recent blood glucose results.  Plan for next [facility] outreach in approximately two weeks. 8/18/2021  She has not resumed water aerobics class; she still plans on returning to this class, however her schedule has been busy. Checking her blood glucose once daily. She is still interested in trying to get the [employer]; notes that it was previously not covered by Medicare, however she has recently heard that it is now covered. Pt reported fasting blood glucose this morning was 140 mg/dL. Attended office visit with Dr. Fito Warren on 7/14/21; hemoglobin A1c 7/14/21 9.2%  Next scheduled follow-up with Dr. Fito Warren is 10/15/21.   Encouraged patient to increase blood glucose monitoring to twice daily; order per Dr. Fito Warren is for three times daily. ACM will consult with Ambulatory PharmD regarding Freestyle Jack and Medicare coverage; pt could benefit from the Rogerio Fuchs from an adherence standpoint with blood glucose monitoring. 7/8/2021  Plans to resume water aerobics next week. She contacted the [facility] and was notified that water aerobics class has resumed; plans to go to local [facility] on 7/13 to sign up and pay fee. Patient states, \"I'm definitely looking forward to that. \"  Checking her blood glucose twice daily; states, \"I haven't had anything over 160 (mg/dL). \" She is interested in getting the [employer] 14-day system and will discuss this with Dr. Akash Franklin during her upcoming visit; pt notes that when this was previously ordered for her in 2020, however she did not get it because it was not covered by her Medicare. Patient has office visit scheduled with Dr. Akash Franklin on 7/14.    4/20/2021  most recent Hgb A1c 3/17/21 8.9%. Patient reports that she has made an effort to improve her diet since the beginning of the year and has decreased her intake of starchy foods. Patient reports that current level of physical activity includes walking around inside of her house; barrier to increased physical activity is chronic pain in her right knee and back. Patient reports plan to increase physical activity with the warmer weather by walking in her pool once she gets it open. 3/5/2020  - attended OV with Dr. Akash Franklin 2/28/2020; Hgb A1c 9.9%, compare to previous result of 10.1%. Pt reported to Dr. Akash Franklin that she had not taken metformin since her previous OV (11/25/19) due to continued GI distress. Pt was advised to increase her physical activity to tolerance and was asked to check blood sugars at 11:00PM for 1 week and then contact Dr. Akash Franklin to report readings; see [facility] note for details regarding OV encounter.   - Patient is not receptive to ACM follow-up today due to acute illness; ACM outreach in one week.      2/12/2020  - reports fasting blood glucose this morning of 120 mg/dL, after lunch 190 mg/dL. Unable to review additional recent blood glucose readings as results are stored on glucometer and patient is unsure how to recall historical results; pt will take her glucometer to scheduled OV with Dr. Alexandru Russell . - reports taking mealtime insulin prior to meals; states, \"Sometimes I forget and I take it right after I eat, but I'm trying to get use to it and take it before I eat. \"  - still taking humalog 24 units before breakfast (if blood glucose is <150 mg/dL she takes 22 units), 24 units before lunch, 36 units before dinner and Toujeo 60 units nightly; pt did not contact Dr. Lexi Romero office after last ACM outreach to confirm insulin orders, however reports that she reviewed her most recent office visit AVS and confirmed that the unit doses she is currently taking are correct. - continued lack of adherence with TID blood glucose monitoring; inquired about patient barriers to adherence - states, \"I am not sure I am going to be able to do that because when I stick it it hurts too bad and I've been doing this for 15 years and my fingers have had it. \" Informed patient about 14-day blood glucose monitoring system, Freestyle Jack, and encouraged patient to discuss this option with Dr. Alexandru Russell at her upcoming office visit to increase patient adherence with blood glucose monitoring.  -  Inquired as to whether patient is following a diabetic diet; pt states, \"Kind of, yeah; most of the time. \" Diet is described as \"about the same; nothing more, nothing less. \" Patient is not interested in keeping a food diary to review with this NN. Patient is not interested in additional education/review of diabetic diet; notes that she previously met with CDE NN, David Cheung.  - no longer participating in water aerobics; currently not engaging in moderate exercise.  She reports that she walks around her house for exercise; states, \"I just get up and start walking from the side door around to the front door and do it a couple of times. \" Patient reported barrier to exercise/increasing physical activity is back spasms which prevent her from \"doing a lot of moving or standing unless I am in the water. \" Reports that barrier to attending water aerobics at present is transportation and winter season. - will attend scheduled OV with Dr. Rivas Frank 2/28/2020  - pt requests next ACM follow-up in three weeks after she attends scheduled OV with Dr. Rivas Frank    1/10/20  - Hgb A1c 11/25/19 - 10.1%  - pt notes history of Type 2 DM for 15 years  - checking blood glucose 1-2x/day  - ordered insulin regimen/frequency is Humalog TID (before breakfast, lunch and dinner) and Toujeo nightly  - reports taking humalog 24 units before breakfast (if blood glucose is <150 mg/dL she takes 22 units), 24 units before lunch, 36 units before dinner and Toujeo 60 units nightly. Most recent endocrinology OV note reviewed; per OV note, most recent Humalog order (11/25/19) - 22 units for breakfast and lunch and 36 units for dinner and Toujeo 60 units nightly (12/16/19), pt was noted to be taking mealtime insulin 2-3 hours after meal .Further clarification is needed regarding what current insulin unit dose orders are; patient is advised to contact Dr. Rivas Frank to confirm/clarify current insulin unit dose orders. - per last endocrinology OV note, pt was encouraged to improve diet and physical activity to tolerance  - encouraged to take mealtime insulin prior to eating meal  - encouraged to check blood glucose TID as ordered and keep blood glucose log      10/29/19  8/14/19-10.9%  4/22/19 - Hgb A1c 10.1%  - Current level of understanding: checks blood sugar once daily before breakfast. Completed Disease Specific [facility] Program for Diabetes Self-Management (w/ Neftali Gomez RN NN CDE) on 8/27/19. Does not keep written blood glucose log; reports that glucometer stores historical blood glucose results.  Pt reported fasting blood glucose on 10/28 of 169 mg/dL and on 10/29 of 93 mg/dL. Pt notes difficulty adhering to diabetic diet - enjoys pasta, potatoes, chips - states, \"my problem is food. I'm not that strong on sweets. \"; pt reported barrier to diabetic diet adherence is financial. Does not drink regular soda; drinks diet coke (no more than 2/day) and water. Currently exercising two times/week for 60-90 minutes- water aerobics. - Desired Outcome: Improve diet and exercise, decrease carbohydrate intake. Lower Hgb A1c.  - Plan: Further assess financial barrier(s). Will continue provide and reinforce pt education re: diabetic diet, assist with meal planning. Pt could benefit from keeping a food diary; will discuss further during subsequent outreach encounter as pt is unable to continue call at this time due to getting ready to leave her house.                 Future Appointments:  Future Appointments   Date Time Provider Ovidio Allan   4/15/2022  2:30 PM Marcela Bruner MD RDE  BS AMB   6/6/2022  2:00 PM MD DARLINE Gandara BS AMB   6/10/2022 11:00 AM Argenis Joyce MD [facility] BS AMB   2/7/2023 10:00 AM Talia Bah MD [facility] BS AMB        Last Appointment With Me:  Visit date not found     Last Appointment My Department:  Visit date not found

## 2022-04-15 ENCOUNTER — OFFICE VISIT (OUTPATIENT)
Dept: ENDOCRINOLOGY | Age: 72
End: 2022-04-15
Payer: MEDICARE

## 2022-04-15 VITALS
WEIGHT: 223.8 LBS | BODY MASS INDEX: 39.65 KG/M2 | SYSTOLIC BLOOD PRESSURE: 180 MMHG | HEART RATE: 74 BPM | HEIGHT: 63 IN | DIASTOLIC BLOOD PRESSURE: 73 MMHG

## 2022-04-15 DIAGNOSIS — E11.8 TYPE 2 DIABETES MELLITUS WITH COMPLICATION, WITH LONG-TERM CURRENT USE OF INSULIN (HCC): Primary | ICD-10-CM

## 2022-04-15 DIAGNOSIS — N18.30 STAGE 3 CHRONIC KIDNEY DISEASE, UNSPECIFIED WHETHER STAGE 3A OR 3B CKD (HCC): ICD-10-CM

## 2022-04-15 DIAGNOSIS — E66.01 OBESITY, MORBID (HCC): ICD-10-CM

## 2022-04-15 DIAGNOSIS — Z79.4 TYPE 2 DIABETES MELLITUS WITH COMPLICATION, WITH LONG-TERM CURRENT USE OF INSULIN (HCC): Primary | ICD-10-CM

## 2022-04-15 LAB — HBA1C MFR BLD HPLC: 9 %

## 2022-04-15 PROCEDURE — G8753 SYS BP > OR = 140: HCPCS | Performed by: INTERNAL MEDICINE

## 2022-04-15 PROCEDURE — 99214 OFFICE O/P EST MOD 30 MIN: CPT | Performed by: INTERNAL MEDICINE

## 2022-04-15 PROCEDURE — G9717 DOC PT DX DEP/BP F/U NT REQ: HCPCS | Performed by: INTERNAL MEDICINE

## 2022-04-15 PROCEDURE — G9899 SCRN MAM PERF RSLTS DOC: HCPCS | Performed by: INTERNAL MEDICINE

## 2022-04-15 PROCEDURE — 1101F PT FALLS ASSESS-DOCD LE1/YR: CPT | Performed by: INTERNAL MEDICINE

## 2022-04-15 PROCEDURE — G8399 PT W/DXA RESULTS DOCUMENT: HCPCS | Performed by: INTERNAL MEDICINE

## 2022-04-15 PROCEDURE — 1090F PRES/ABSN URINE INCON ASSESS: CPT | Performed by: INTERNAL MEDICINE

## 2022-04-15 PROCEDURE — 2022F DILAT RTA XM EVC RTNOPTHY: CPT | Performed by: INTERNAL MEDICINE

## 2022-04-15 PROCEDURE — G8536 NO DOC ELDER MAL SCRN: HCPCS | Performed by: INTERNAL MEDICINE

## 2022-04-15 PROCEDURE — G8427 DOCREV CUR MEDS BY ELIG CLIN: HCPCS | Performed by: INTERNAL MEDICINE

## 2022-04-15 PROCEDURE — G8754 DIAS BP LESS 90: HCPCS | Performed by: INTERNAL MEDICINE

## 2022-04-15 PROCEDURE — 83036 HEMOGLOBIN GLYCOSYLATED A1C: CPT | Performed by: INTERNAL MEDICINE

## 2022-04-15 PROCEDURE — G8417 CALC BMI ABV UP PARAM F/U: HCPCS | Performed by: INTERNAL MEDICINE

## 2022-04-15 PROCEDURE — 3017F COLORECTAL CA SCREEN DOC REV: CPT | Performed by: INTERNAL MEDICINE

## 2022-04-15 PROCEDURE — 3046F HEMOGLOBIN A1C LEVEL >9.0%: CPT | Performed by: INTERNAL MEDICINE

## 2022-04-15 NOTE — PROGRESS NOTES
Ms. Guicho Warren returns for follow-up of her type 2 diabetes mellitus x 15 years with poor blood sugar control.    Her A1c was 9.7% in September. Her A1c was  8.9% in March 2021 which is the lowest we have ever had.,   Her A1c in October was 9.3%. Her A1c today is 9.0%.  Recent laboratory tests also remarkable for creatinine of 1.41 with a GFR of 44.  LFTs normal.     Current medications  Toujeo insulin 10-50 units at bedtime  Humalog insulin 24 breakfast (20 for lunch if she eats lunch) and 42 for dinner     This woman is seen every 3-4 months, monitors her blood sugars 3-4 times daily, administers insulin 3-4 times daily and adjusts her insulin dosage based on her readings.     At her last visit, I ordered a freestyle eleni for her but apparently the vendor that she sent the prescription to was not effective. Nevertheless she finally has the device. She brings it with her today.       Sugars are not well controlled. She is varying the insulin depending on what she sees on her meter as a result. She can sometimes take 10 units of Toujeo at night and other days take 50 units of Toujeo at night. She waits until she sees a blood sugar of 350 and takes 50 units. If her blood sugar is normal she takes 10 units. As result her blood sugars in the morning are frequently elevated. She has multiple days where she forgets to take the Humalog and then has a blood sugar 3-400 at night and she will take 50 units of Toujeo. There is absolutely no regularity to her insulin dosing or her meals.       Breakfast is juan and eggs but no toast.  She had lunch yesterday with a barbecue sandwich and slaw.  It is important to point out that she usually does not have lunch and so she does not take insulin at that time.  Other hand, if she does not have lunch, she frequently has peanut butter crackers and a Diet Coke and again takes no insulin for that. For dinner she had chitlins potato salad and green beans.  Other times she has a breakfast meal for dinner.   She is not snacking at bedtime.  She occasionally has a piece of fruit after breakfast.  Her physical activity is primarily rehab/PT for back pain. Examination  Blood pressure 180/73  Pulse 80  Weight 223  BMI 39.6    Impression  1. Type 2 diabetes mellitus with an A1c of 9%  2. Obesity    Plan:  1. I have moved the toujeo to the morning to decrease the concern about overnight hypoglycemia  2. I have asked her to take 30 units of Toujeo every morning  3. I change the Humalog to 20 units with each meal.  I did ask her to carry the Humalog pen with her wherever she goes so she can take the insulin with every meal  4. I will see her back in 1 month.   She needs a foot exam at that time.

## 2022-04-29 ENCOUNTER — PATIENT OUTREACH (OUTPATIENT)
Dept: CASE MANAGEMENT | Age: 72
End: 2022-04-29

## 2022-04-29 DIAGNOSIS — E11.8 TYPE 2 DIABETES MELLITUS WITH COMPLICATION, WITH LONG-TERM CURRENT USE OF INSULIN (HCC): ICD-10-CM

## 2022-04-29 DIAGNOSIS — Z79.4 TYPE 2 DIABETES MELLITUS WITH COMPLICATION, WITH LONG-TERM CURRENT USE OF INSULIN (HCC): ICD-10-CM

## 2022-04-29 RX ORDER — LEVOTHYROXINE SODIUM 137 UG/1
137 TABLET ORAL
Qty: 90 TABLET | Refills: 3 | Status: SHIPPED | OUTPATIENT
Start: 2022-04-29

## 2022-04-29 RX ORDER — INSULIN LISPRO 100 [IU]/ML
INJECTION, SOLUTION INTRAVENOUS; SUBCUTANEOUS
Qty: 25 PEN | Refills: 3 | Status: SHIPPED | OUTPATIENT
Start: 2022-04-29

## 2022-04-29 NOTE — TELEPHONE ENCOUNTER
Requested Prescriptions     Pending Prescriptions Disp Refills    HumaLOG KwikPen Insulin 100 unit/mL kwikpen 25 Pen 3     Si units for breakfast, 24 units for lunch and 42 units for dinner    levothyroxine (SYNTHROID) 137 mcg tablet 90 Tablet 3     Sig: Take 1 Tablet by mouth Daily (before breakfast).

## 2022-04-29 NOTE — PROGRESS NOTES
4/29/2022  1:45 PM    Goals Addressed                 This Visit's Progress       Diabetes     Patient verbalizes understanding of self -management goals of living with Diabetes. On track     4/29/2022   Patient attended OV with Dr. Josh Nickerson on 4/15/22; per provider visit note - her A1c was 9% (compare to 9.3% 10/15/21), take Toujeou in the morning to decrease concern about overnight hypoglycemia, take 30 units Toujeou every morning, change Humalog to 20 units with each meal, follow-up in one month (with foot exam to be completed at that time.)   Patient denies any additional episodes of overnight hypoglycemia since she has moved Toujeou to the morning. She reports adherence with Toujeou 30 units in the morning and humalog 20 units with each meal.   Unable to review patient's most recent glucose readings during this encounter because she is not currently at home.  Plan for ACM follow-up next week. -GERARD RN      4/14/2022   Patient changed her [employer] Sensor on 4/13/22.  Patient is concerned that [employer] reader may not be functioning properly because she ate four thin mint girl  cookies at 1:00 PM today and her glucose during this call is 126 mg/dL (4:14 PM); she expected her glucose reading to be higher since she ate four girl  cookies. Discussed with patient that glucose level typically peaks within 90 minutes after eating; she also reports that she took Humalog 18 units at 1:00 PM before eating lunch and the cookies. Provided patient education on Humalog and that it is a rapid-acting insulin; educated patient on onset, peak and duration of rapid-acting insulin.  Patient is encouraged to check her glucose before eating dinner this evening and also 1-2 hours after eating dinner as this will provide insight as to whether her CGM is functioning properly and providing accurate glucose readings.  ACM reviewed home treatment of hypoglycemia with patient today.  Patient appropriately treats low blood sugar by consuming quick-sugar food, however she has not been rechecking her glucose level to ensure that it is in a safe range; pt is advised to recheck her glucose level 15 minutes after consumption of quick-sugar to confirm that low glucose level is resolved and if glucose level is still <70 mg/dL then repeat the steps for treatment of low glucose. She is also advised to eat a snack or a meal after low glucose is resolved to prevent recurrent hypoglycemia. Hypoglycemia clinical reference tip sheet sent to patient via [employer] for home reference and review.  Educated patient on ADA recommended glucose ranges for pre and post-prandial glucose levels - 80 mg/dL to 130 mg/dL before a meal and less than 180 mg/dL 1 to 2 hours after a meal   She is taking between 30-50 units of Toujeou daily depending on her bedtime glucose result; she did not take Toujeou on 4/13/22 due to bedtime glucose result of 100 mg/dL. Note that current provider orders are for Toujeou 50 units nightly. Patient will discuss Toujeou dose with provider during her appointment on 4/15; she is advised to take her home log with her where she has documented how many units of Toujeou she takes every night.  She is taking Humalog 30 units for breakfast, 18-20 units for lunch (depending on her glucose reading) and 42 units for dinner   Patient will attend her previously scheduled appointment with Dr. Ca Hogan on tomorrow; she will take her [employer] reader with her to this appointment. ACM encounter routed to Dr. Ca Hogan today for notification.  Plan for next ACM outreach in approximately one week    4/13/2022   Reviewed patient's most recent glucose results today; pt reported glucose readings are documented in encounter progress note.  ACM was unable to review patient's glucose results prior to 4/9 with her because her Freestyle reader turned off during this call today; she reports that her reader is fully charged, so she thinks it is because her sensor needs to be replaced.  Patient expresses concern with her elevated glucose results on 4/10 and 4/9 and how these may affect her hemoglobin a1c when it is checked at her upcoming appointment with Dr. Yovanny Martini on Friday, 4/15; she notes that she ate out for dinner on both of these nights to celebrate her daughter's birthday, on 4/9 they ate at an [employer] and she ate pasta. ACM educated patient on hemoglobin a1c test and explained that it measures her average blood sugar levels over the past three months.  Patient reports that she is due to change her Freestyle [employer] today; she will do this after ACM call   Patient has an office visit scheduled with Dr. Yovanny Martini on 4/15/22. She reports that she has an appt scheduled with her nephrologist on 5/4/22.  ACM will contact patient on 4/14 to continue review of home glucose readings. 3/11/2022   Reviewed patient's most recent glucose results today; pt reported glucose readings are documented in encounter progress note.  She continues to take note of how certain foods affect her glucose level and is using this information to improve her diet; notes that her glucose level increased to 250 mg/dL after she ate a small serving of rice pudding and as a result she hasn't eaten any more because she saw how it affected her glucose level.  She reports goal of improving her Hgb A1c and states, \"I'm really trying to get it down. \" ACM reviewed trend of Hgb A1c results with patient today; note that her A1c has been trending downward since 8/2019 - A1c 8/14/2019 was 10.9% and A1c result 1/14/22 was 8.9%. Positive reinforcement provided to patient today by this ACM for her goal progress thus far.  She has been taking 0-40 units of Toujeou at night depending on what her evening glucose reading is; she has been keeping a record of how many units she takes and this is documented in the encounter progress note for provider review.  She has not been taking Toujeou if her bedtime glucose result is <200 mg/dL because she is concerned it will cause her blood glucose level to drop overnight.  Patient will continue to check her glucose levels and keep a record of her glucose readings (to include date, time, reading) as well as how many units of Toujeou she takes. Plan for next [facility] outreach in approximately two weeks.  Encounter routed to Dr. Ltety Bonilla by this [facility] today for provider review  . 3/1/2022   Patient contacted [employer] customer service last week; they sent her a new Jack sensor and she received it in the mail. She was advised by  to only check her glucose using the jack sensor unless advised otherwise by her doctor to continue to check fingerstick glucose; she reports that she initially started checking her glucose both by [facility] sensor and fingerstick for comparison because she was concerned with the accuracy of the Tom sensor results. She has only been checking her glucose using the Tom sensor since speaking with [employer] customer service last week.  [facility] reviewed concerns for Tom sensor inaccuracy with Dr. Letty Bonilla; pt made aware- see progress note for details.  She has not changed her Jack sensor yet since receiving new sensor in the mail from [employer]; she will change her sensor this evening.  Patient reported most recent glucose readings (per Tom sensor) are noted in progress note.  Patient will change her Jack sensor this evening and continue to check her glucose using her Jack sensor/reader. [facility] routed encounter to Dr. Letty Bonilla today for notification/update.  Plan for next [facility] outreach in approximately one week; will contact patient sooner if changes to current treatment plan and/or new orders are made by provider. 2/25/2022   Patient has been checking her glucose both with the Tom sensor and by fingerstick (using her old glucometer) because she is concerned with the accuracy of Jack sensor device.  She has noticed a discrepancy in her glucose results between fingerstick readings using her old glucometer device and the jack sensor readings; pt reports the following: Fingerstick 202mg/dL (845AM this morning using her old glucometer), Jack sensor reading 112 mg/dL (846 AM this morning)/ Last night 6:06 PM - Fingerstick 244 mg/dL (using her old glucometer), Jack sensor reading 192 mg/dL. Patient checked her blood glucose today during this call and reports the following - fingerstick with [facility] reader device 340 mg/dL, jack sensor reading 240 mg/dL and fingerstick using her old glucometer 305 mg/dL.  Patient reports the following low glucose readings since previous AC outreach encounter: 59 mg/dL on 2/20/22 at 6:40 AM, 69 mg/dL on 2/22/22 at 12:30 AM, 72 mg/dL on 2/22/22 at 6:56 AM. She is unable to recall if she took Toujeou on 2/19 or 2/21 and, if so, how many units; she does have this information written down, however she is unable to find her notepad during this call.  She changed her Jack sensor nine days ago; sensors are changed every 14 days   She took 30 units of Humalog this morning because her blood glucose reading by fingerstick was 202 mg/dL (before breakfast); reports that [facility] sensor reading at this time was 112 mg/dL.  She has been taking 20 units of Toujeou because she is concerned about her blood glucose dropping overnight.  Based on patient's above reported glucose readings, consider possible issue with accuracy of jack sensor. Patient will contact [phone] service today to attempt to troubleshoot issue and request control solution. Patient will continue to monitor her glucose and keep a written log of her glucose readings until issue with [facility] monitoring system can be troubleshooted/resolved and accuracy of Jack/Jack sensor can be confirmed.    Plan for next AC outreach in approximately three days to follow-up on patient's glucose readings and patient communication with [employer] service.  ACM will route encounter to Dr. Nancy Chin and Amubulatory PharmD for notification/review. 2/16/2022  Per Dr. Nancy Chin, decrease Toujeou to 50 units at bedtime and continue to follow patient's glucose readings; patient notified by this ACM today. Plan for next ACM outreach in approximately one week. 2/15/2022   Pt attended OV with Dr. Liz Loco (PCP) on 12/10/21, Ambulatory PharmD on 12/22/21, Dr. Nancy Chin (Endocrinology) on 1/14/22, virtual visit with Ambulatory PharmD on 1/17/22.  She received her Freestyle Jack 2 CGM on 1/22/22.  She reports an improved diet since receiving her CGM and decreased consumption of concentrated sweets and starches; \"It's really getting me to really pay attention to what I'm eating. What I used to think wasn't doing anything was actually causing my problem. \"    Med Rec updated today. Per Dr. Amador Samuels most recent OV note, current DM medication orders are Toujeo insulin 60 units at bedtime and Humalog insulin 24 breakfast (20 for lunch if she eats lunch) and 42 for dinner. Patient reports to [facility] today that she has not taken Toujeou for the past three nights because she is concerned about her blood sugar dropping overnight. Per Dr. Amador Samuels most recent OV note, toujeou will likely have to be decreased to prevent hypoglycemia if bedtime blood sugar level is <200 mg/dL. Patient is unable to recall if she took Toujeou on 2/9, 2/10 or 2/11; note that she reports a blood glucose level of 69 mg/dL at 2:28 AM on 2/12/22 (she is unable to recall if she took Toujeou at bedtime on 2/11).  ACM reinforced patient education on treatment of hypoglycemia.  ACM will route encounter to Dr. Nancy Chin today for notification and review. ACM will follow-up with patient in approximately one week to review most recent glucose levels.     12/10/2021   Per [facility] representative, order for CGM is currently closed; previously submitted order was denied by Medicare due to need for clarification regarding number of times per day that the patient is checking her blood glucose and injecting insulin, however requested order addendum (by [address]) was not received from endocrinology office. Patient attended office visit with PCP on 12/10/21 and was referred to ambulatory PharmD for assistance with Bolivar Amy order; encounter routed to Ambulatory PharmD today for continuity of care.  Patient has an appointment scheduled with Ambulatory PharmD on 12/22/21 to discuss Bolivar Amy. Patient will attend this appointment as scheduled.  Plan for ACM outreach in approximately two weeks. 12/8/2021   She has not received the Bolivar Amy. She most recently spoke to [address] on today; reports being advised that Medicare denied coverage due to an issue with the order and supporting documentation that was submitted to Medicare for coverage.  Patient does not have her blood glucose log available at this time to review with ACM during this call. She does report elevated blood glucose readings since previous ACM outreach encounter; pt attributes elevated blood glucose readings to increased stress level related to barrier with getting the Bolivar Amy as well as the stress of the holiday season because it's the first holiday season without her mother and brother (they passed away last year).  She is making an effort to increase her physical activity; barrier is decreased strength in her lower extremities and bilateral knee pain. She is ambulating with a rollator in public settings; ambulates with cane at home. She notes that she has not seen an ortho provider in many years; she has never seen ortho for her bilateral knee pain. Patient has PCP follow-up scheduled with Dr. Zoila Tamayo on 12/10/21 and she will discuss this further with Dr. Zoila Tamayo at her upcoming appointment.  Staff message routed to Dr. Joshua Gomes clinical staff to inquire about Freestyle Jack Order; awaiting response. AC will contact [address] [place] [address] on tomorrow to inquire about Medicare coverage barrier/denial.    11/9/2021   AC outreach to [address] [place] [address] today regarding order for FreeStyle Jack 14 day reader; pt contact and insurance information provided. [facility] will be contacting the patient after completing insurance verification and will then submit order request to Dr. Claudio Lopez office for review and provider signature. Patient notified by [facility] today that [address] [place] [address] will be contacting her; pt verbalizes understanding.  Patient report that she spoke with [facility] today and requested that they cancel the previous order for the [facility] and [employer].  AC outreach in approximately one week to follow-up on order status with [facility]. 11/5/2021   Attended office visit with Dr. Rohini Myers on 10/15/21.  Script for [employer] written by Dr. Rohini Myers on 10/15/21. She tried to fill this order at [employer] and [facility]. Pt reports that [facility] informed her that out-of-pocket cost for the reader device was >$200; pt unable to afford this cost and had orders transferred to Tatiana Cristobal. Patient was informed by [employer] [facility] [email or web address] reader was $79; this is affordable to her, however she has not purchased the reader yet because she was unable to get the sensors. Patient is not sure of her out-of-pocket cost for the [facility]; [employer] was unable to advise her of the 14-day sensors copay because [facility] needs to void out where they already filled the order. Pt was informed by Tatiana Cristobal that order for [facility] Rx should be sent to a DME Provider.  She is checking her blood glucose twice daily in the morning and in the evening. One episode of hypoglycemia is reported by patient within the past week with fasting blood glucose level of 59 mg/dL, however she reports that she was asymptomatic.  Reports that episodes of fasting low blood sugar have decreased since previous ACM outreach encounter.  She forgot to take her Toujeou last night and her fasting blood glucose this morning was 160 mg/dL   Patient will continue to check her blood glucose twice daily in the morning and in the evening; she will maintain blood glucose log to review with Dr. Nicko MORENO.  Medicare Coverage Criteria for [address] reviewed by this ACM today ([employer] resource); patient should meet the criteria for the [address] [place] [address] Drive to be covered by her Medicare Part B. ACM outreach to [facility] DME Provider to inquire if they are able to assist with order; lvm requesting a return phone call to this ACM.  ACM provided patient with update; will attempt to have order for [address], reader and sensors, supplied by DME Provider under her Medicare Part B.    10/1/2021   She was checking her blood glucose twice daily and maintaining her blood glucose log up until last week; she checked her blood glucose once daily last week because her fingers were sore and she has not written her blood sugars down since last week. Patient states, \"But I know I should (check her blood glucose twice daily) because I really don't feel any different when it's (blood sugar) up or down. \"    \"I've been having some very low sugar in the morning. \" Pt reported fasting blood glucose this morning was 93 mg/dL; reports that her lowest fasting blood glucose level since previous ACM outreach encounter was on 9/8/21 and it was 52 mg/dL. Patient reports associated symptoms of sweating and weakness when her blood glucose is low, \"but not to the point where I felt like I was going to pass out. \" ACM reviewed treatment of hypoglycemia with patient today; pt verbalizes understanding.    Patient was able to review her blood glucose log and visualize the effect that certain foods had on her blood sugar; states, \"When it (blood sugar) went up at night I had either rice, potato or bread.\"   She is still interested in the [facility], if eligible; she could benefit from the [facility] to assist with treatment plan adherence for blood glucose monitoring. Barrier to patient adherence with blood glucose monitoring at present is that her fingertips get sore with checking her blood glucose 2-3 times per day. She has not received outreach from Ambulatory PharmD regarding this; referral was previously sent by this ACM on 8/18/21. Patient will discuss this with Dr. Akash Franklin at her scheduled appointment on 10/15/21.  Patient will resume checking her blood glucose twice daily and maintain her blood glucose log.  Patient will attend previously scheduled appointment with Dr. Akash Franklin on 10/15/21; she will take her blood glucose log to this appointment.  Encounter routed to Dr. Akash Franklin today for notification of patient reported most recent blood glucose results.  Plan for next AC outreach in approximately two weeks. 8/18/2021  She has not resumed water aerobics class; she still plans on returning to this class, however her schedule has been busy. Checking her blood glucose once daily. She is still interested in trying to get the [facility]; notes that it was previously not covered by Medicare, however she has recently heard that it is now covered. Pt reported fasting blood glucose this morning was 140 mg/dL. Attended office visit with Dr. Akash Franklin on 7/14/21; hemoglobin A1c 7/14/21 9.2%  Next scheduled follow-up with Dr. Akash Franklin is 10/15/21. Encouraged patient to increase blood glucose monitoring to twice daily; order per Dr. Akash Franklin is for three times daily. ACM will consult with Ambulatory PharmD regarding Freestyle Jack and Medicare coverage; pt could benefit from the [place] from an adherence standpoint with blood glucose monitoring. 7/8/2021  Plans to resume water aerobics next week.  She contacted the [facility] and was notified that water aerobics class has resumed; plans to go to local [facility] on 7/13 to sign up and pay fee. Patient states, \"I'm definitely looking forward to that. \"  Checking her blood glucose twice daily; states, \"I haven't had anything over 160 (mg/dL). \" She is interested in getting the [employer] 14-day system and will discuss this with Dr. Sandra Longo during her upcoming visit; pt notes that when this was previously ordered for her in 2020, however she did not get it because it was not covered by her Medicare. Patient has office visit scheduled with Dr. Sandra Longo on 7/14.    4/20/2021  most recent Hgb A1c 3/17/21 8.9%. Patient reports that she has made an effort to improve her diet since the beginning of the year and has decreased her intake of starchy foods. Patient reports that current level of physical activity includes walking around inside of her house; barrier to increased physical activity is chronic pain in her right knee and back. Patient reports plan to increase physical activity with the warmer weather by walking in her pool once she gets it open. 3/5/2020  - attended OV with Dr. Sandra Longo 2/28/2020; Hgb A1c 9.9%, compare to previous result of 10.1%. Pt reported to Dr. Sandra Longo that she had not taken metformin since her previous OV (11/25/19) due to continued GI distress. Pt was advised to increase her physical activity to tolerance and was asked to check blood sugars at 11:00PM for 1 week and then contact Dr. Sandra Longo to report readings; see [facility] note for details regarding OV encounter.   - Patient is not receptive to ACM follow-up today due to acute illness; ACM outreach in one week. 2/12/2020  - reports fasting blood glucose this morning of 120 mg/dL, after lunch 190 mg/dL. Unable to review additional recent blood glucose readings as results are stored on glucometer and patient is unsure how to recall historical results; pt will take her glucometer to scheduled OV with Dr. Sandra Longo .   - reports taking mealtime insulin prior to meals; states, \"Sometimes I forget and I take it right after I eat, but I'm trying to get use to it and take it before I eat. \"  - still taking humalog 24 units before breakfast (if blood glucose is <150 mg/dL she takes 22 units), 24 units before lunch, 36 units before dinner and Toujeo 60 units nightly; pt did not contact Dr. Robson Basilio office after last ACM outreach to confirm insulin orders, however reports that she reviewed her most recent office visit AVS and confirmed that the unit doses she is currently taking are correct. - continued lack of adherence with TID blood glucose monitoring; inquired about patient barriers to adherence - states, \"I am not sure I am going to be able to do that because when I stick it it hurts too bad and I've been doing this for 15 years and my fingers have had it. \" Informed patient about 14-day blood glucose monitoring system, Freestyle Jack, and encouraged patient to discuss this option with Dr. Josh Nickerson at her upcoming office visit to increase patient adherence with blood glucose monitoring.  -  Inquired as to whether patient is following a diabetic diet; pt states, \"Kind of, yeah; most of the time. \" Diet is described as \"about the same; nothing more, nothing less. \" Patient is not interested in keeping a food diary to review with this NN. Patient is not interested in additional education/review of diabetic diet; notes that she previously met with CDE NN, Emilia Gamino.  - no longer participating in water aerobics; currently not engaging in moderate exercise. She reports that she walks around her house for exercise; states, \"I just get up and start walking from the side door around to the front door and do it a couple of times. \" Patient reported barrier to exercise/increasing physical activity is back spasms which prevent her from \"doing a lot of moving or standing unless I am in the water. \" Reports that barrier to attending water aerobics at present is transportation and winter season.   - will attend scheduled OV with Dr. Josh Nickerson 2/28/2020  - pt requests next ACM follow-up in three weeks after she attends scheduled OV with Dr. Leonardo Hurtado    1/10/20  - Hgb A1c 11/25/19 - 10.1%  - pt notes history of Type 2 DM for 15 years  - checking blood glucose 1-2x/day  - ordered insulin regimen/frequency is Humalog TID (before breakfast, lunch and dinner) and Toujeo nightly  - reports taking humalog 24 units before breakfast (if blood glucose is <150 mg/dL she takes 22 units), 24 units before lunch, 36 units before dinner and Toujeo 60 units nightly. Most recent endocrinology OV note reviewed; per OV note, most recent Humalog order (11/25/19) - 22 units for breakfast and lunch and 36 units for dinner and Toujeo 60 units nightly (12/16/19), pt was noted to be taking mealtime insulin 2-3 hours after meal .Further clarification is needed regarding what current insulin unit dose orders are; patient is advised to contact Dr. Leonardo Hurtado to confirm/clarify current insulin unit dose orders. - per last endocrinology OV note, pt was encouraged to improve diet and physical activity to tolerance  - encouraged to take mealtime insulin prior to eating meal  - encouraged to check blood glucose TID as ordered and keep blood glucose log      10/29/19  8/14/19-10.9%  4/22/19 - Hgb A1c 10.1%  - Current level of understanding: checks blood sugar once daily before breakfast. Completed Disease Specific CM Program for Diabetes Self-Management (w/ Lizbet Rae, RN NN CDE) on 8/27/19. Does not keep written blood glucose log; reports that glucometer stores historical blood glucose results. Pt reported fasting blood glucose on 10/28 of 169 mg/dL and on 10/29 of 93 mg/dL. Pt notes difficulty adhering to diabetic diet - enjoys pasta, potatoes, chips - states, \"my problem is food. I'm not that strong on sweets. \"; pt reported barrier to diabetic diet adherence is financial. Does not drink regular soda; drinks diet coke (no more than 2/day) and water.  Currently exercising two times/week for 60-90 minutes- water aerobics. - Desired Outcome: Improve diet and exercise, decrease carbohydrate intake. Lower Hgb A1c.  - Plan: Further assess financial barrier(s). Will continue provide and reinforce pt education re: diabetic diet, assist with meal planning. Pt could benefit from keeping a food diary; will discuss further during subsequent outreach encounter as pt is unable to continue call at this time due to getting ready to leave her house.             Future Appointments:  Future Appointments   Date Time Provider Ovidio Sanjana   5/20/2022 10:10 AM MD SAMINA Rivas [address] BS AMB   6/6/2022  2:00 PM MD DARLINE Hurst BS AMB   6/10/2022 11:00 AM Giovanna Darling MD [facility] BS AMB   2/7/2023 10:00 AM Lakesha Asher MD [facility] BS AMB        Last Appointment With Me:  Visit date not found     Last Appointment My Department:  Visit date not found

## 2022-05-04 ENCOUNTER — TRANSCRIBE ORDER (OUTPATIENT)
Dept: SCHEDULING | Age: 72
End: 2022-05-04

## 2022-05-04 DIAGNOSIS — Z12.31 BREAST CANCER SCREENING BY MAMMOGRAM: Primary | ICD-10-CM

## 2022-05-10 DIAGNOSIS — J45.909 MILD ASTHMA WITHOUT COMPLICATION, UNSPECIFIED WHETHER PERSISTENT: ICD-10-CM

## 2022-05-10 RX ORDER — ALBUTEROL SULFATE 1.25 MG/3ML
1.25 SOLUTION RESPIRATORY (INHALATION)
Qty: 225 ML | Refills: 3 | Status: SHIPPED | OUTPATIENT
Start: 2022-05-10 | End: 2022-05-11 | Stop reason: SDUPTHER

## 2022-05-10 NOTE — TELEPHONE ENCOUNTER
PCP: Helen Berkowitz MD    Last appt: 3/10/2022  Future Appointments   Date Time Provider Ovidio Allan   5/20/2022 10:10 AM Allison Dietrich MD RDE  BS AMB   5/20/2022 12:30 PM [id number] [facility] REG   6/6/2022  2:00 PM MD DARLINE Dumas BS AMB   6/10/2022 11:00 AM eHlen Berkowitz MD [facility] BS AMB   2/7/2023 10:00 AM Milagros Perez MD [facility] BS AMB       Requested Prescriptions     Pending Prescriptions Disp Refills    albuterol (ACCUNEB) 1.25 mg/3 mL nebu 225 mL 3     Sig: 3 mL by Nebulization route every four (4) hours as needed (wheezing).        Prior labs and Blood pressures:  BP Readings from Last 3 Encounters:   04/15/22 (!) 180/73   03/10/22 132/80   01/14/22 (!) 179/74     Lab Results   Component Value Date/Time    Sodium 141 12/10/2021 11:45 AM    Potassium 3.7 12/10/2021 11:45 AM    Chloride 101 12/10/2021 11:45 AM    CO2 34 (H) 12/10/2021 11:45 AM    Anion gap 6 12/10/2021 11:45 AM    Glucose 262 (H) 12/10/2021 11:45 AM    BUN 21 (H) 12/10/2021 11:45 AM    Creatinine 1.47 (H) 12/10/2021 11:45 AM    BUN/Creatinine ratio 14 12/10/2021 11:45 AM    GFR est AA 43 (L) 12/10/2021 11:45 AM    GFR est non-AA 35 (L) 12/10/2021 11:45 AM    Calcium 9.8 12/10/2021 11:45 AM     Lab Results   Component Value Date/Time    Hemoglobin A1c 8.9 (H) 03/17/2021 09:08 AM    Hemoglobin A1c (POC) 9.0 04/15/2022 02:50 PM    Hemoglobin A1c, External 10.6 11/19/2018 12:00 AM     Lab Results   Component Value Date/Time    Cholesterol, total 152 12/10/2021 11:45 AM    HDL Cholesterol 62 12/10/2021 11:45 AM    LDL, calculated 65.6 12/10/2021 11:45 AM    VLDL, calculated 24.4 12/10/2021 11:45 AM    Triglyceride 122 12/10/2021 11:45 AM    CHOL/HDL Ratio 2.5 12/10/2021 11:45 AM     No results found for: Vlad Lean, [id number]    Lab Results   Component Value Date/Time    TSH 1.30 12/10/2021 11:45 AM

## 2022-05-11 RX ORDER — ALBUTEROL SULFATE 1.25 MG/3ML
1.25 SOLUTION RESPIRATORY (INHALATION)
Qty: 225 ML | Refills: 3 | Status: SHIPPED | OUTPATIENT
Start: 2022-05-11

## 2022-05-14 ENCOUNTER — OFFICE VISIT (OUTPATIENT)
Dept: URGENT CARE | Age: 72
End: 2022-05-14
Payer: MEDICARE

## 2022-05-14 VITALS
WEIGHT: 214.8 LBS | HEIGHT: 63 IN | BODY MASS INDEX: 38.06 KG/M2 | DIASTOLIC BLOOD PRESSURE: 81 MMHG | TEMPERATURE: 97.7 F | OXYGEN SATURATION: 97 % | RESPIRATION RATE: 18 BRPM | SYSTOLIC BLOOD PRESSURE: 155 MMHG | HEART RATE: 61 BPM

## 2022-05-14 DIAGNOSIS — R05.9 COUGH: Primary | ICD-10-CM

## 2022-05-14 PROCEDURE — 99213 OFFICE O/P EST LOW 20 MIN: CPT | Performed by: NURSE PRACTITIONER

## 2022-05-14 RX ORDER — BENZONATATE 200 MG/1
200 CAPSULE ORAL
Qty: 21 CAPSULE | Refills: 0 | Status: SHIPPED | OUTPATIENT
Start: 2022-05-14 | End: 2022-05-21

## 2022-05-14 NOTE — PROGRESS NOTES
HISTORY OF PRESENT ILLNESS  Makayla Hoskins is a 73 y.o. female. The patient presents with concern for cough. This has been going on for about a 5 days and is accompanied by nasal congestion and post-nasal drip. The patient states she is having shortness of breath. She has been using her Advair and home nebulizers. She has plenty of those and they are giving her some relief. She presents today to just make sure that her lungs are okay. She states that she feels better today overall than she has but apparently had a difficult night. Patient denies fever or chills. She denies any other viral symptoms. Review of Systems   Constitutional: Negative for chills, fever and malaise/fatigue. HENT: Positive for congestion. Respiratory: Positive for cough, sputum production (Thin yellow) and shortness of breath. Negative for wheezing. Gastrointestinal: Negative for nausea and vomiting. Musculoskeletal: Negative for myalgias. Physical Exam  Constitutional:       General: She is not in acute distress. Appearance: She is well-developed. She is not ill-appearing or diaphoretic. HENT:      Head: Normocephalic. Right Ear: No drainage. Tympanic membrane is not erythematous or bulging. Left Ear: No drainage. Tympanic membrane is not erythematous or bulging. Nose: Congestion and rhinorrhea present. Mouth/Throat:      Pharynx: No oropharyngeal exudate or posterior oropharyngeal erythema. Cardiovascular:      Rate and Rhythm: Normal rate and regular rhythm. Heart sounds: Normal heart sounds. Pulmonary:      Effort: Pulmonary effort is normal. No respiratory distress. Breath sounds: Normal breath sounds. No decreased breath sounds or rhonchi. Lymphadenopathy:      Cervical: No cervical adenopathy. Neurological:      Mental Status: She is alert. Psychiatric:         Behavior: Behavior is cooperative. ASSESSMENT and PLAN    ICD-10-CM ICD-9-CM    1. Cough  R05.9 786.2      Medications Ordered Today   Medications    benzonatate (TESSALON) 200 mg capsule     Sig: Take 1 Capsule by mouth three (3) times daily as needed for Cough for up to 7 days. Dispense:  21 Capsule     Refill:  0     No results found for any visits on 05/14/22. The patients condition was discussed with the patient and they understand. The patient is to follow up with primary care doctor. If signs and symptoms become worse the pt is to go to the ER. The patient is to take medications as prescribed. Discussed with patient radiographic imaging of the lungs to rule out pneumonia. Also discussed COVID testing. Patient declines both of these. Patient requesting something for the cough. Discussed with her the use of Tessalon Perles. Also discussed prednisone. Patient has had this in the past for her asthma and does not feel like she is at the point that she needs it currently. Patient will follow up with her primary care provider as scheduled in the next week or so.

## 2022-05-14 NOTE — PATIENT INSTRUCTIONS
Cough: Care Instructions  Overview     A cough is your body's response to something that bothers your throat or airways. Many things can cause a cough. You might cough because of a cold or the flu, bronchitis, or asthma. Smoking, postnasal drip, allergies, and stomach acid that backs up into your throat also can cause coughs. A cough is a symptom, not a disease. Most coughs stop when the cause, such as a cold, goes away. You can take a few steps at home to cough less and feel better. Follow-up care is a key part of your treatment and safety. Be sure to make and go to all appointments, and call your doctor if you are having problems. It's also a good idea to know your test results and keep a list of the medicines you take. How can you care for yourself at home? · Drink lots of water and other fluids. This helps thin the mucus and soothes a dry or sore throat. Honey or lemon juice in hot water or tea may ease a dry cough. · Take cough medicine as directed by your doctor. · Prop up your head on pillows to help you breathe and ease a dry cough. · Try cough drops or hard candy to soothe a dry or sore throat. · Do not smoke. Avoid secondhand smoke. If you need help quitting, talk to your doctor about stop-smoking programs and medicines. These can increase your chances of quitting for good. When should you call for help? Call 911 anytime you think you may need emergency care. For example, call if:    · You have severe trouble breathing. Call your doctor now or seek immediate medical care if:    · You cough up blood.     · You have new or worse trouble breathing.     · You have a new or higher fever.     · You have a new rash.    Watch closely for changes in your health, and be sure to contact your doctor if:    · You cough more deeply or more often, especially if you notice more mucus or a change in the color of your mucus.     · You have new symptoms, such as a sore throat, an earache, or sinus pain.     · You do not get better as expected. Where can you learn more? Go to http://www.gray.com/  Enter D279 in the search box to learn more about \"Cough: Care Instructions. \"  Current as of: July 6, 2021               Content Version: 13.2  © 6338-3509 [employer], [employer]. Care instructions adapted under license by [employer] (which disclaims liability or warranty for this information). If you have questions about a medical condition or this instruction, always ask your healthcare professional. [place] [address] any warranty or liability for your use of this information.

## 2022-05-20 ENCOUNTER — HOSPITAL ENCOUNTER (OUTPATIENT)
Dept: MAMMOGRAPHY | Age: 72
Discharge: HOME | End: 2022-05-20
Attending: INTERNAL MEDICINE
Payer: MEDICARE

## 2022-05-20 ENCOUNTER — OFFICE VISIT (OUTPATIENT)
Dept: ENDOCRINOLOGY | Age: 72
End: 2022-05-20
Payer: MEDICARE

## 2022-05-20 VITALS
SYSTOLIC BLOOD PRESSURE: 179 MMHG | WEIGHT: 216.8 LBS | HEIGHT: 63 IN | HEART RATE: 72 BPM | BODY MASS INDEX: 38.41 KG/M2 | DIASTOLIC BLOOD PRESSURE: 71 MMHG

## 2022-05-20 DIAGNOSIS — N18.30 STAGE 3 CHRONIC KIDNEY DISEASE, UNSPECIFIED WHETHER STAGE 3A OR 3B CKD (HCC): ICD-10-CM

## 2022-05-20 DIAGNOSIS — E11.8 TYPE 2 DIABETES MELLITUS WITH COMPLICATION, WITH LONG-TERM CURRENT USE OF INSULIN (HCC): ICD-10-CM

## 2022-05-20 DIAGNOSIS — E11.8 TYPE 2 DIABETES MELLITUS WITH COMPLICATION, WITH LONG-TERM CURRENT USE OF INSULIN (HCC): Primary | ICD-10-CM

## 2022-05-20 DIAGNOSIS — Z12.31 BREAST CANCER SCREENING BY MAMMOGRAM: ICD-10-CM

## 2022-05-20 DIAGNOSIS — Z79.4 TYPE 2 DIABETES MELLITUS WITH COMPLICATION, WITH LONG-TERM CURRENT USE OF INSULIN (HCC): Primary | ICD-10-CM

## 2022-05-20 DIAGNOSIS — Z79.4 TYPE 2 DIABETES MELLITUS WITH COMPLICATION, WITH LONG-TERM CURRENT USE OF INSULIN (HCC): ICD-10-CM

## 2022-05-20 DIAGNOSIS — E66.01 OBESITY, MORBID (HCC): ICD-10-CM

## 2022-05-20 PROCEDURE — G8754 DIAS BP LESS 90: HCPCS | Performed by: INTERNAL MEDICINE

## 2022-05-20 PROCEDURE — 77063 BREAST TOMOSYNTHESIS BI: CPT

## 2022-05-20 PROCEDURE — G9717 DOC PT DX DEP/BP F/U NT REQ: HCPCS | Performed by: INTERNAL MEDICINE

## 2022-05-20 PROCEDURE — G8427 DOCREV CUR MEDS BY ELIG CLIN: HCPCS | Performed by: INTERNAL MEDICINE

## 2022-05-20 PROCEDURE — G8417 CALC BMI ABV UP PARAM F/U: HCPCS | Performed by: INTERNAL MEDICINE

## 2022-05-20 PROCEDURE — 2022F DILAT RTA XM EVC RTNOPTHY: CPT | Performed by: INTERNAL MEDICINE

## 2022-05-20 PROCEDURE — 99214 OFFICE O/P EST MOD 30 MIN: CPT | Performed by: INTERNAL MEDICINE

## 2022-05-20 PROCEDURE — 1101F PT FALLS ASSESS-DOCD LE1/YR: CPT | Performed by: INTERNAL MEDICINE

## 2022-05-20 PROCEDURE — 3046F HEMOGLOBIN A1C LEVEL >9.0%: CPT | Performed by: INTERNAL MEDICINE

## 2022-05-20 PROCEDURE — 1090F PRES/ABSN URINE INCON ASSESS: CPT | Performed by: INTERNAL MEDICINE

## 2022-05-20 PROCEDURE — G8399 PT W/DXA RESULTS DOCUMENT: HCPCS | Performed by: INTERNAL MEDICINE

## 2022-05-20 PROCEDURE — 3017F COLORECTAL CA SCREEN DOC REV: CPT | Performed by: INTERNAL MEDICINE

## 2022-05-20 PROCEDURE — G9899 SCRN MAM PERF RSLTS DOC: HCPCS | Performed by: INTERNAL MEDICINE

## 2022-05-20 PROCEDURE — G8536 NO DOC ELDER MAL SCRN: HCPCS | Performed by: INTERNAL MEDICINE

## 2022-05-20 PROCEDURE — G8753 SYS BP > OR = 140: HCPCS | Performed by: INTERNAL MEDICINE

## 2022-05-20 RX ORDER — GABAPENTIN 100 MG/1
CAPSULE ORAL
Qty: 90 CAPSULE | Refills: 3 | Status: SHIPPED | OUTPATIENT
Start: 2022-05-20

## 2022-05-20 NOTE — TELEPHONE ENCOUNTER
Requested Prescriptions     Pending Prescriptions Disp Refills    gabapentin (NEURONTIN) 100 mg capsule 90 Capsule 3     Sig: TAKE 1 CAPSULE THREE TIMES A DAY

## 2022-05-20 NOTE — PROGRESS NOTES
Ms. James Rees returns for follow-up of her type 2 diabetes mellitus x 15 years with poor blood sugar control.    Her A1c was 9.7% in September. Her A1c was  8.9% in March 2021 which is the lowest we have ever had.,   Her A1c in October was 9.3%.  Her A1c today is 9.0%.  Recent laboratory tests also remarkable for creatinine of 1.41 with a GFR of 44.  LFTs normal.     Current medications  Toujeo insulin 30 units at bedtime  Humalog insulin 20 units before each meal      This woman is seen every 3-4 months, monitors her blood sugars 3-4 times daily, administers insulin 3-4 times daily and adjusts her insulin dosage based on her readings.     At her last visit, I ordered a freestyle eleni for her but apparently the vendor that she sent the prescription to was not effective.  Nevertheless she finally has the device.  She brings it with her today.           Sugars are significantly improved since I saw her last.  The major changes that we have made is that we have regularized her insulin dosing. Instead of taking toujeo insulin somewhere between 10 and 60 units a day she is now taking 30 units every day at the same time every day. She is also taking 20 units of Humalog every time she eats a meal.     Today for breakfast she had oatmeal juan and orange juice. Last night for dinner she had a hot dog and [employer] fries. Importantly, she took insulin for all of those meals and again as noted above, her blood sugars are much better. Examination  Blood pressure 179/71  Pulse 80  Weight 260  BMI 38.4  HEENT unremarkable  Lungs clear  Heart reveals a regular rate and rhythm  Abdomen obese  Extremities unremarkable  Diabetic foot exam:     Left Foot:   Visual Exam: normal    Pulse DP: 2+ (normal)   Filament test: normal sensation    Vibratory sensation: normal      Right Foot:   Visual Exam: normal    Pulse DP: 2+ (normal)   Filament test: normal sensation    Vibratory sensation: normal    Impression  1.   Type 2 diabetes mellitus with significantly improved glucose control due to consistency of insulin dosing  2. Chronic kidney disease stage IIIa  3. Obesity    Plan:  1. I did not change the regimen  2. I encouraged her to keep the consistency going  3. I will see her back in 2 more months and we will repeat her A1c.

## 2022-05-20 NOTE — TELEPHONE ENCOUNTER
Pt called and LVM 5/20 @ 3:31pm. Pt stated that she needs a refill of her gabapentin [facility]. Pt stated that she is completely out of this medication.     Pt# [phone]

## 2022-05-24 ENCOUNTER — PATIENT MESSAGE (OUTPATIENT)
Dept: INTERNAL MEDICINE CLINIC | Age: 72
End: 2022-05-24

## 2022-05-24 RX ORDER — LOSARTAN POTASSIUM 100 MG/1
TABLET ORAL
Qty: 90 TABLET | Refills: 3 | Status: SHIPPED | OUTPATIENT
Start: 2022-05-24

## 2022-06-01 ENCOUNTER — PATIENT OUTREACH (OUTPATIENT)
Dept: CASE MANAGEMENT | Age: 72
End: 2022-06-01

## 2022-06-01 NOTE — PROGRESS NOTES
6/1/2022  5:10 PM    Patient outreach by this ACM today to perform CCM follow-up (evaluate goal progress). Two patient identifiers verified. Patient is agreeable to [facility] outreach week of 6/13.

## 2022-06-06 ENCOUNTER — OFFICE VISIT (OUTPATIENT)
Dept: CARDIOLOGY CLINIC | Age: 72
End: 2022-06-06
Payer: MEDICARE

## 2022-06-06 VITALS
BODY MASS INDEX: 37.07 KG/M2 | SYSTOLIC BLOOD PRESSURE: 146 MMHG | RESPIRATION RATE: 16 BRPM | OXYGEN SATURATION: 93 % | WEIGHT: 209.2 LBS | HEART RATE: 72 BPM | HEIGHT: 63 IN | DIASTOLIC BLOOD PRESSURE: 82 MMHG

## 2022-06-06 DIAGNOSIS — I25.10 CORONARY ARTERY DISEASE INVOLVING NATIVE CORONARY ARTERY OF NATIVE HEART WITHOUT ANGINA PECTORIS: Primary | ICD-10-CM

## 2022-06-06 DIAGNOSIS — Z86.73 HISTORY OF CVA (CEREBROVASCULAR ACCIDENT): ICD-10-CM

## 2022-06-06 DIAGNOSIS — N18.30 STAGE 3 CHRONIC KIDNEY DISEASE, UNSPECIFIED WHETHER STAGE 3A OR 3B CKD (HCC): ICD-10-CM

## 2022-06-06 DIAGNOSIS — G47.33 OSA (OBSTRUCTIVE SLEEP APNEA): ICD-10-CM

## 2022-06-06 DIAGNOSIS — I10 ESSENTIAL HYPERTENSION: ICD-10-CM

## 2022-06-06 DIAGNOSIS — E78.2 MIXED HYPERLIPIDEMIA: ICD-10-CM

## 2022-06-06 PROCEDURE — G8536 NO DOC ELDER MAL SCRN: HCPCS | Performed by: INTERNAL MEDICINE

## 2022-06-06 PROCEDURE — G0463 HOSPITAL OUTPT CLINIC VISIT: HCPCS | Performed by: INTERNAL MEDICINE

## 2022-06-06 PROCEDURE — 3017F COLORECTAL CA SCREEN DOC REV: CPT | Performed by: INTERNAL MEDICINE

## 2022-06-06 PROCEDURE — G8427 DOCREV CUR MEDS BY ELIG CLIN: HCPCS | Performed by: INTERNAL MEDICINE

## 2022-06-06 PROCEDURE — G8753 SYS BP > OR = 140: HCPCS | Performed by: INTERNAL MEDICINE

## 2022-06-06 PROCEDURE — 93010 ELECTROCARDIOGRAM REPORT: CPT | Performed by: INTERNAL MEDICINE

## 2022-06-06 PROCEDURE — G8754 DIAS BP LESS 90: HCPCS | Performed by: INTERNAL MEDICINE

## 2022-06-06 PROCEDURE — 1123F ACP DISCUSS/DSCN MKR DOCD: CPT | Performed by: INTERNAL MEDICINE

## 2022-06-06 PROCEDURE — 1090F PRES/ABSN URINE INCON ASSESS: CPT | Performed by: INTERNAL MEDICINE

## 2022-06-06 PROCEDURE — G9899 SCRN MAM PERF RSLTS DOC: HCPCS | Performed by: INTERNAL MEDICINE

## 2022-06-06 PROCEDURE — 93005 ELECTROCARDIOGRAM TRACING: CPT | Performed by: INTERNAL MEDICINE

## 2022-06-06 PROCEDURE — 1101F PT FALLS ASSESS-DOCD LE1/YR: CPT | Performed by: INTERNAL MEDICINE

## 2022-06-06 PROCEDURE — G9717 DOC PT DX DEP/BP F/U NT REQ: HCPCS | Performed by: INTERNAL MEDICINE

## 2022-06-06 PROCEDURE — 99214 OFFICE O/P EST MOD 30 MIN: CPT | Performed by: INTERNAL MEDICINE

## 2022-06-06 PROCEDURE — G8399 PT W/DXA RESULTS DOCUMENT: HCPCS | Performed by: INTERNAL MEDICINE

## 2022-06-06 PROCEDURE — G8417 CALC BMI ABV UP PARAM F/U: HCPCS | Performed by: INTERNAL MEDICINE

## 2022-06-06 NOTE — LETTER
6/6/2022    Patient: Lianne Cramer   YOB: 1950   Date of Visit: 6/6/2022     Tin Green MD  [address]  [address]  [facility] [place] [address]    Dear Tin Green MD,      Thank you for referring Ms. Lianne Cramer to [facility] for evaluation. My notes for this consultation are attached. If you have questions, please do not hesitate to call me. I look forward to following your patient along with you.       Sincerely,    Heron Claude, MD

## 2022-06-06 NOTE — PROGRESS NOTES
[place] , [place], [address] [place] [address]  [phone]     Subjective:      Angy Carrillo is a 73 y.o. female is here for routine f/u. She has a PMHx of CAD, DM2, HTN, HLD, RODRÍGUEZ, obesity, and asthma/COPD. Last seen in 11/2021:  reports resolution of ankle swelling upon stopping amlodipine, home bp controlled 130s-140s/70s        Today, no complaints, but has not been checking blood pressures at home. Blood pressure mildly elevated in the office today. the patient denies chest pain/ shortness of breath, orthopnea, PND, LE edema, palpitations, syncope, or presyncope.        Patient Active Problem List    Diagnosis Date Noted    CKD (chronic kidney disease) stage 3, GFR 30-59 ml/min ([address] 03/10/2022    Major depressive disorder, recurrent, mild 12/10/2021    Major depressive disorder, recurrent, moderate 12/10/2021    Major depressive disorder, recurrent, unspecified 12/10/2021    Type 2 diabetes mellitus with diabetic neuropathy ([address] 04/29/2019    Type 2 diabetes with nephropathy ([address] 12/14/2018    History of CVA (cerebrovascular accident) 07/13/2018    Obesity, morbid ([address] 01/25/2018    Warthin's tumor 07/01/2016    HTN (hypertension) 09/13/2013    Axillary hidradenitis suppurativa 08/07/2013    Esophageal reflux 01/15/2013    Renal failure, acute ([address] 01/13/2013    Asthma 12/14/2012    Myocardial ischemia 06/06/2012    Shortness of breath 06/06/2012    Coronary artery disease 06/06/2012    PVC's (premature ventricular contractions) 06/01/2012    DM type 2 (diabetes mellitus, type 2) ([address] 04/23/2012    Grave's disease 10/15/2010    DJD (degenerative joint disease) of hip 10/20/2009    DJD (degenerative joint disease) of knee 10/20/2009    CTS (Carpal Tunnel Syndrome)-b/l 10/20/2009    CVA (cerebral infarction) 10/20/2009    Diverticulosis 10/20/2009    Osteopenia 10/20/2009      Marissa Wallace MD  Past Medical History:   Diagnosis Date    Asthma     CAD (coronary artery disease)     \"mild\" per Dr Katherin Vizcaino note    Diabetes [facility])     Dr Imelda Walton Hyperlipidemia     Hypertension     Long term current use of anticoagulant therapy     Pulmonary embolism ([facility]     Screening for colon cancer 05    Dr Wendy Dumont in 10 years    Stroke [facility])     Rt side weaker than left, uses a cane: no longer followed by neuro    Thromboembolus ([address]     Rt leg moved to lung     Thyroid disease     Unspecified sleep apnea     uses CPAP      Past Surgical History:   Procedure Laterality Date    CARDIAC CATHETERIZATION  2012         HX HEART CATHETERIZATION      HX HEENT      tonsillectomy    HX HEMORRHOIDECTOMY      HX HYSTERECTOMY      HX ORTHOPAEDIC  2011    left shoulder    ID CARDIAC SURG PROCEDURE UNLIST      heart surgery     Allergies   Allergen Reactions    Latex Itching    Codeine Itching and Other (comments)     hallucinate    Contrast Dye [Iodine] Rash and Itching     Given pre-cardiac cath    Seafood [Shellfish Containing Products] Swelling      Family History   Problem Relation Age of Onset    Diabetes Mother     Cancer Mother     Breast Cancer Mother 79    Heart Disease Father     Hypertension Father     Stroke Father     Coronary Art Dis Brother 54      Social History     Socioeconomic History    Marital status:      Spouse name: Not on file    Number of children: 2    Years of education: Not on file    Highest education level: Some college, no degree   Occupational History    Not on file   Tobacco Use    Smoking status: Former Smoker     Packs/day: 1.00     Types: Cigarettes     Quit date: 2004     Years since quittin.7    Smokeless tobacco: Never Used   Substance and Sexual Activity    Alcohol use: No    Drug use: Not Currently     Comment: CBD oil(cream)    Sexual activity: Not Currently   Other Topics Concern    Not on file   Social History Narrative    Lives with Son, grandson and her partner. Patient does not drive; she has not driven since she had CVA. Social Determinants of Health     Financial Resource Strain:     Difficulty of Paying Living Expenses: Not on file   Food Insecurity:     Worried About Running Out of Food in the Last Year: Not on file    [place] of Food in the Last Year: Not on file   Transportation Needs:     Lack of Transportation (Medical): Not on file    Lack of Transportation (Non-Medical): Not on file   Physical Activity:     Days of Exercise per Week: Not on file    Minutes of Exercise per Session: Not on file   Stress:     Feeling of Stress : Not on file   Social Connections:     Frequency of Communication with Friends and Family: Not on file    Frequency of Social Gatherings with Friends and Family: Not on file    Attends Jain Services: Not on file    Active Member of [address] [place] [address] [employer] or Organizations: Not on file    Attends Club or Organization Meetings: Not on file    Marital Status: Not on file   Intimate Partner Violence:     Fear of Current or Ex-Partner: Not on file    Emotionally Abused: Not on file    Physically Abused: Not on file    Sexually Abused: Not on file   Housing Stability:     Unable to Pay for Housing in the Last Year: Not on file    Number of [place] in the Last Year: Not on file    Unstable Housing in the Last Year: Not on file      Current Outpatient Medications   Medication Sig    losartan (COZAAR) 100 mg tablet TAKE 1 TABLET DAILY    gabapentin (NEURONTIN) 100 mg capsule TAKE 1 CAPSULE THREE TIMES A DAY    albuterol (ACCUNEB) 1.25 mg/3 mL nebu 3 mL by Nebulization route every four (4) hours as needed (wheezing).  HumaLOG KwikPen Insulin 100 unit/mL kwikpen 30 units for breakfast, 24 units for lunch and 42 units for dinner (Patient taking differently: 20 units for breakfast, 20 units for lunch and 20 units for dinner)    levothyroxine (SYNTHROID) 137 mcg tablet Take 1 Tablet by mouth Daily (before breakfast).     hydrALAZINE (APRESOLINE) 50 mg tablet Take 1 Tablet by mouth every eight (8) hours.  potassium chloride (K-DUR, KLOR-CON M20) 20 mEq tablet Take 20 mEq by mouth daily.  cholecalciferol (Vitamin D3) (1000 Units /25 mcg) tablet Take  by mouth daily.  carvediloL (COREG) 12.5 mg tablet Take 12.5 mg by mouth two (2) times daily (with meals).  glucose blood VI test strips (True Metrix Glucose Test Strip) strip Monitor blood sugar 3 times daily    flash glucose sensor (FreeStyle Jack 2 Sensor) kit 1 Each by Does Not Apply route every fourteen (14) days.  flash glucose scanning reader (FreeStyle Jack 2 [place]) misc 1 Each by Does Not Apply route daily.  triamterene-hydroCHLOROthiazide (DYAZIDE) 37.5-25 mg per capsule Take 1 Capsule by mouth daily.  rosuvastatin (CRESTOR) 20 mg tablet Take 1 Tablet by mouth daily.  albuterol (ProAir HFA) 90 mcg/actuation inhaler USE 1 INHALATION EVERY 4 HOURS AS NEEDED WHEEZING OR SHORTNESS OF BREATH    insulin glargine U-300 conc (Toujeo SoloStar U-300 Insulin) 300 unit/mL (1.5 mL) inpn pen 60 Units by SubCUTAneous route nightly. Indications: type 2 diabetes mellitus (Patient taking differently: 30 Units by SubCUTAneous route every morning. Indications: type 2 diabetes mellitus)    ADVAIR DISKUS 100-50 mcg/dose diskus inhaler USE 1 INHALATION TWICE A DAY    BD INSULIN PEN NEEDLE UF SHORT 31 gauge x 5/16\" ndle     aspirin delayed-release 81 mg tablet Take 81 mg by mouth daily. No current facility-administered medications for this visit. Review of Symptoms:  11 systems reviewed, negative other than as stated in the HPI    Physical ExamPhysical Exam:    Vitals:    06/06/22 1404   BP: (!) 146/82   Pulse: 72   Resp: 16   SpO2: 93%   Weight: 209 lb 3.2 oz (94.9 kg)   Height: 5' 3\" (1.6 m)     Body mass index is 37.06 kg/m². General PE  Gen:  NAD  Mental Status - Alert. General Appearance - Not in acute distress.    HEENT:  PERRL, no carotid bruits or JVD  Chest and Lung Exam   Inspection: Accessory muscles - No use of accessory muscles in breathing. Auscultation:   Breath sounds: - Normal.   Cardiovascular   Inspection: Jugular vein - Bilateral - Inspection Normal.   Palpation/Percussion:   Apical Impulse: - Normal.   Auscultation: Rhythm - Regular. Heart Sounds - S1 WNL and S2 WNL. No S3 or S4. Murmurs & Other Heart Sounds: Auscultation of the heart reveals - No Murmurs. Peripheral Vascular   Upper Extremity: Inspection - Bilateral - No Cyanotic nailbeds or Digital clubbing. Lower Extremity:   Palpation: Edema - Bilateral - No edema. Abdomen:   Soft, non-tender, bowel sounds are active.   Neuro: A&O times 3, CN and motor grossly WNL    Labs:   Lab Results   Component Value Date/Time    Cholesterol, total 152 12/10/2021 11:45 AM    Cholesterol, total 243 (H) 03/17/2021 09:08 AM    Cholesterol, total 236 (H) 06/10/2020 08:08 AM    Cholesterol, total 157 04/22/2019 03:33 PM    Cholesterol, total 157 06/04/2012 12:00 AM    HDL Cholesterol 62 12/10/2021 11:45 AM    HDL Cholesterol 53 03/17/2021 09:08 AM    HDL Cholesterol 57 06/10/2020 08:08 AM    HDL Cholesterol 55 04/22/2019 03:33 PM    HDL Cholesterol 55 06/04/2012 12:00 AM    LDL, calculated 65.6 12/10/2021 11:45 AM    LDL, calculated 161 (H) 03/17/2021 09:08 AM    LDL, calculated 146 (H) 06/10/2020 08:08 AM    LDL, calculated 69 04/22/2019 03:33 PM    LDL, calculated 84 06/04/2012 12:00 AM    Triglyceride 122 12/10/2021 11:45 AM    Triglyceride 162 (H) 03/17/2021 09:08 AM    Triglyceride 165 (H) 06/10/2020 08:08 AM    Triglyceride 166 (H) 04/22/2019 03:33 PM    Triglyceride 90 06/04/2012 12:00 AM    CHOL/HDL Ratio 2.5 12/10/2021 11:45 AM     No results found for: CPK, CPKX, CPX  Lab Results   Component Value Date/Time    Sodium 141 12/10/2021 11:45 AM    Potassium 3.7 12/10/2021 11:45 AM    Chloride 101 12/10/2021 11:45 AM    CO2 34 (H) 12/10/2021 11:45 AM    Anion gap 6 12/10/2021 11:45 AM    Glucose 262 (H) 12/10/2021 11:45 AM    BUN 21 (H) 12/10/2021 11:45 AM    Creatinine 1.47 (H) 12/10/2021 11:45 AM    BUN/Creatinine ratio 14 12/10/2021 11:45 AM    GFR est AA 43 (L) 12/10/2021 11:45 AM    GFR est non-AA 35 (L) 12/10/2021 11:45 AM    Calcium 9.8 12/10/2021 11:45 AM    Bilirubin, total 0.5 12/10/2021 11:45 AM    Alk. phosphatase 165 (H) 12/10/2021 11:45 AM    Protein, total 7.6 12/10/2021 11:45 AM    Albumin 3.5 12/10/2021 11:45 AM    Globulin 4.1 (H) 12/10/2021 11:45 AM    A-G Ratio 0.9 (L) 12/10/2021 11:45 AM    ALT (SGPT) 17 12/10/2021 11:45 AM       EKG:  NSR, poor R wave progression     Assessment:          ICD-10-CM ICD-9-CM    1. Coronary artery disease involving native coronary artery of native heart without angina pectoris  I25.10 414.01 AMB POC EKG ROUTINE W/ 12 LEADS, INTER & REP   2. History of CVA (cerebrovascular accident)  Z86.73 V12.54    3. Essential hypertension  I10 401.9    4. Mixed hyperlipidemia  E78.2 272.2    5. RODRÍGUEZ (obstructive sleep apnea)  G47.33 327.23    6. Stage 3 chronic kidney disease, unspecified whether stage 3a or 3b CKD ([facility]  N18.30 585.3        Orders Placed This Encounter    AMB POC EKG ROUTINE W/ 12 LEADS, INTER & REP     Order Specific Question:   Reason for Exam:     Answer:   Routine        Plan:       Coronary artery disease involving native coronary artery of native heart without angina pectoris  Hx of non-obstructive CAD per cath in 2012  Lexiscan stress test done 1/2020 without evidence of ischemia  Continue medical management and risk factor modification---ASA, BB, statin     Essential hypertension  Mildly elevated today in office, rechecked by me at 146/80  She states she will see her PCP next week  The patient will monitor BP at home and call if generally >140/85.    Recall that at last visit, we discontinued amlodipine due to swelling in November 2021  Serum Cr 1.47 in 12/2021        Mixed hyperlipidemia  12/2021 LDL down to 65, continue rosuva 20 mg daily  3/2021  On rosuva 20 mg daily Labs and lipids per Dr Josh Nickerson       Leg swelling, resolved upon stopping amlodipine  Normal EF no significant valvular disease per echo in 1/2020  Continue leg elevation and mild exercise  Continue compression stockings        DM  On Insulin  Followed by Dr Josh Nickerson     CKD III  Serum Cr 1.47 in 12/2021    RODRÍGUEZ  On pap therapy     Hx CVA in 2004  On ASA, statin     Counseled on diet and exercise- eventual goal of 30-60 minutes 5-7 times a week as per AHA guidelines.      Continue current care and f/u in 1 year, sooner PRN. Please note that the nurse practitioner helped with precharting, but did not see the patient today, and the entirety of the history, physical, and assessment and plan is mine.       Ana Sewell MD

## 2022-06-06 NOTE — PATIENT INSTRUCTIONS
Please check your BP every day before Blood pressure medication and 2 hours after.   Come back for a follow up with  in 1 year

## 2022-06-10 ENCOUNTER — OFFICE VISIT (OUTPATIENT)
Dept: INTERNAL MEDICINE CLINIC | Age: 72
End: 2022-06-10
Payer: MEDICARE

## 2022-06-10 VITALS
RESPIRATION RATE: 20 BRPM | HEART RATE: 73 BPM | DIASTOLIC BLOOD PRESSURE: 80 MMHG | HEIGHT: 63 IN | BODY MASS INDEX: 38.66 KG/M2 | OXYGEN SATURATION: 95 % | TEMPERATURE: 97.7 F | WEIGHT: 218.2 LBS | SYSTOLIC BLOOD PRESSURE: 140 MMHG

## 2022-06-10 DIAGNOSIS — Z79.4 TYPE 2 DIABETES MELLITUS WITH OTHER SPECIFIED COMPLICATION, WITH LONG-TERM CURRENT USE OF INSULIN (HCC): Primary | ICD-10-CM

## 2022-06-10 DIAGNOSIS — E78.00 PURE HYPERCHOLESTEROLEMIA: ICD-10-CM

## 2022-06-10 DIAGNOSIS — Z86.73 HISTORY OF CVA (CEREBROVASCULAR ACCIDENT): ICD-10-CM

## 2022-06-10 DIAGNOSIS — F32.A DEPRESSION, UNSPECIFIED DEPRESSION TYPE: ICD-10-CM

## 2022-06-10 DIAGNOSIS — N18.30 STAGE 3 CHRONIC KIDNEY DISEASE, UNSPECIFIED WHETHER STAGE 3A OR 3B CKD (HCC): ICD-10-CM

## 2022-06-10 DIAGNOSIS — I10 HYPERTENSION, UNSPECIFIED TYPE: ICD-10-CM

## 2022-06-10 DIAGNOSIS — Z00.00 MEDICARE ANNUAL WELLNESS VISIT, SUBSEQUENT: ICD-10-CM

## 2022-06-10 DIAGNOSIS — E11.69 TYPE 2 DIABETES MELLITUS WITH OTHER SPECIFIED COMPLICATION, WITH LONG-TERM CURRENT USE OF INSULIN (HCC): Primary | ICD-10-CM

## 2022-06-10 PROCEDURE — G8754 DIAS BP LESS 90: HCPCS | Performed by: INTERNAL MEDICINE

## 2022-06-10 PROCEDURE — G8399 PT W/DXA RESULTS DOCUMENT: HCPCS | Performed by: INTERNAL MEDICINE

## 2022-06-10 PROCEDURE — G8417 CALC BMI ABV UP PARAM F/U: HCPCS | Performed by: INTERNAL MEDICINE

## 2022-06-10 PROCEDURE — G0463 HOSPITAL OUTPT CLINIC VISIT: HCPCS | Performed by: INTERNAL MEDICINE

## 2022-06-10 PROCEDURE — G8536 NO DOC ELDER MAL SCRN: HCPCS | Performed by: INTERNAL MEDICINE

## 2022-06-10 PROCEDURE — 1090F PRES/ABSN URINE INCON ASSESS: CPT | Performed by: INTERNAL MEDICINE

## 2022-06-10 PROCEDURE — G9717 DOC PT DX DEP/BP F/U NT REQ: HCPCS | Performed by: INTERNAL MEDICINE

## 2022-06-10 PROCEDURE — G8753 SYS BP > OR = 140: HCPCS | Performed by: INTERNAL MEDICINE

## 2022-06-10 PROCEDURE — G0439 PPPS, SUBSEQ VISIT: HCPCS | Performed by: INTERNAL MEDICINE

## 2022-06-10 PROCEDURE — 99213 OFFICE O/P EST LOW 20 MIN: CPT | Performed by: INTERNAL MEDICINE

## 2022-06-10 PROCEDURE — 2022F DILAT RTA XM EVC RTNOPTHY: CPT | Performed by: INTERNAL MEDICINE

## 2022-06-10 PROCEDURE — G9899 SCRN MAM PERF RSLTS DOC: HCPCS | Performed by: INTERNAL MEDICINE

## 2022-06-10 PROCEDURE — G8427 DOCREV CUR MEDS BY ELIG CLIN: HCPCS | Performed by: INTERNAL MEDICINE

## 2022-06-10 PROCEDURE — 3017F COLORECTAL CA SCREEN DOC REV: CPT | Performed by: INTERNAL MEDICINE

## 2022-06-10 PROCEDURE — 1101F PT FALLS ASSESS-DOCD LE1/YR: CPT | Performed by: INTERNAL MEDICINE

## 2022-06-10 RX ORDER — CALCITRIOL 0.25 UG/1
CAPSULE ORAL
COMMUNITY
Start: 2022-04-13

## 2022-06-10 NOTE — PROGRESS NOTES
1. \"Have you been to the ER, urgent care clinic since your last visit? Hospitalized since your last visit? \" Yes, UC with [employer]    2. \"Have you seen or consulted any other health care providers outside of the [address] [place] [address] since your last visit? \" No     3. For patients aged 39-70: Has the patient had a colonoscopy / FIT/ Cologuard? Scheduled 10/2022 with La Mahoney      If the patient is female:    4. For patients aged 41-77: Has the patient had a mammogram within the past 2 years? Yes , Noted in chart      5. For patients aged 21-65: Has the patient had a pap smear?  no

## 2022-06-10 NOTE — PROGRESS NOTES
HISTORY OF PRESENT ILLNESS  Makayla Zuniga is a 73 y.o. female. HPI     F/u HTN hld asthma hx CVA-DM-2 osteopenia , hx PE morbid obesity ckd 3 depression and medicare wellness----  Had HEBER this year for leg pain--normal  Was started on zoloft 6 months ago for depression-did not start the medication. Declines counseling  Sees Dr Stanley Treviño for DM-2 on insulin-has CGM now- glucose readngs  Lowered toujeo to 30 units qam, humalog 20 units tid AC---30d around 150 or lower per pt  Dr Zeferino Dunbar for nonobstructive CAD  Uses rollator  Does not drive but capable  Saw Dr Meron Ojeda for HTN due to HTN --has fu later this year.  Was started on new BP med per pt  On schedule for colonoscopyu tejas holguin --FH colon cancer  Last OV    C/o hand pain , stiffnessand swelling x 1 month-right >left  Difficulty with making a fist b/l  Hx carpal tunnel -did not have surgery  Have pain mostly now in 5th fingers        C/o ankle and foot swelling b/l x 1 months  Swelling usually worse at end of the day  Some pain in legs walking limited by legs feeling weak and some soto  only new med is coreg per renal MD or CARD MD       Patient Active Problem List    Diagnosis Date Noted    CKD (chronic kidney disease) stage 3, GFR 30-59 ml/min ([address] 03/10/2022    Major depressive disorder, recurrent, mild 12/10/2021    Major depressive disorder, recurrent, moderate 12/10/2021    Major depressive disorder, recurrent, unspecified 12/10/2021    Type 2 diabetes mellitus with diabetic neuropathy ([address] 04/29/2019    Type 2 diabetes with nephropathy ([address] 12/14/2018    History of CVA (cerebrovascular accident) 07/13/2018    Obesity, morbid ([address] 01/25/2018    Warthin's tumor 07/01/2016    HTN (hypertension) 09/13/2013    Axillary hidradenitis suppurativa 08/07/2013    Esophageal reflux 01/15/2013    Renal failure, acute ([address] 01/13/2013    Asthma 12/14/2012    Myocardial ischemia 06/06/2012    Shortness of breath 06/06/2012    Coronary artery disease 06/06/2012    PVC's (premature ventricular contractions) 06/01/2012    DM type 2 (diabetes mellitus, type 2) ([facility] 04/23/2012    Grave's disease 10/15/2010    DJD (degenerative joint disease) of hip 10/20/2009    DJD (degenerative joint disease) of knee 10/20/2009    CTS (Carpal Tunnel Syndrome)-b/l 10/20/2009    CVA (cerebral infarction) 10/20/2009    Diverticulosis 10/20/2009    Osteopenia 10/20/2009     Current Outpatient Medications   Medication Sig Dispense Refill    calcitRIOL (ROCALTROL) 0.25 mcg capsule       losartan (COZAAR) 100 mg tablet TAKE 1 TABLET DAILY 90 Tablet 3    gabapentin (NEURONTIN) 100 mg capsule TAKE 1 CAPSULE THREE TIMES A DAY 90 Capsule 3    albuterol (ACCUNEB) 1.25 mg/3 mL nebu 3 mL by Nebulization route every four (4) hours as needed (wheezing). 225 mL 3    HumaLOG KwikPen Insulin 100 unit/mL kwikpen 30 units for breakfast, 24 units for lunch and 42 units for dinner (Patient taking differently: 20 units for breakfast, 20 units for lunch and 20 units for dinner) 25 Pen 3    levothyroxine (SYNTHROID) 137 mcg tablet Take 1 Tablet by mouth Daily (before breakfast). 90 Tablet 3    hydrALAZINE (APRESOLINE) 50 mg tablet Take 1 Tablet by mouth every eight (8) hours. 180 Tablet 1    potassium chloride (K-DUR, KLOR-CON M20) 20 mEq tablet Take 20 mEq by mouth daily.  cholecalciferol (Vitamin D3) (1000 Units /25 mcg) tablet Take  by mouth daily.  carvediloL (COREG) 12.5 mg tablet Take 12.5 mg by mouth two (2) times daily (with meals).  glucose blood VI test strips (True Metrix Glucose Test Strip) strip Monitor blood sugar 3 times daily 300 Strip 3    flash glucose sensor (FreeStyle Jack 2 Sensor) kit 1 Each by Does Not Apply route every fourteen (14) days. 6 Kit 3    flash glucose scanning reader (FreeStyle Jack 2 [place]) misc 1 Each by Does Not Apply route daily.  1 Each 0    triamterene-hydroCHLOROthiazide (DYAZIDE) 37.5-25 mg per capsule Take 1 Capsule by mouth daily. 180 Capsule 3    rosuvastatin (CRESTOR) 20 mg tablet Take 1 Tablet by mouth daily. 90 Tablet 3    albuterol (ProAir HFA) 90 mcg/actuation inhaler USE 1 INHALATION EVERY 4 HOURS AS NEEDED WHEEZING OR SHORTNESS OF BREATH 3 Inhaler 3    insulin glargine U-300 conc (Toujeo SoloStar U-300 Insulin) 300 unit/mL (1.5 mL) inpn pen 60 Units by SubCUTAneous route nightly. Indications: type 2 diabetes mellitus (Patient taking differently: 30 Units by SubCUTAneous route every morning. Indications: type 2 diabetes mellitus) 10 Pen 4    ADVAIR DISKUS 100-50 mcg/dose diskus inhaler USE 1 INHALATION TWICE A  Each 3    BD INSULIN PEN NEEDLE UF SHORT 31 gauge x 5/16\" ndle       aspirin delayed-release 81 mg tablet Take 81 mg by mouth daily.        Allergies   Allergen Reactions    Latex Itching    Codeine Itching and Other (comments)     hallucinate    Contrast Dye [Iodine] Rash and Itching     Given pre-cardiac cath    Seafood [facility] Containing Products] Swelling      Lab Results   Component Value Date/Time    WBC 6.2 12/16/2019 02:02 PM    HGB 11.8 12/16/2019 02:02 PM    Hemoglobin (POC) 14.6 04/10/2015 12:41 PM    HCT 39.0 12/16/2019 02:02 PM    Hematocrit (POC) 43 04/10/2015 12:41 PM    PLATELET 618 64/86/1389 02:02 PM    MCV 77 (L) 12/16/2019 02:02 PM     Lab Results   Component Value Date/Time    Hemoglobin A1c 8.9 (H) 03/17/2021 09:08 AM    Hemoglobin A1c 9.7 (H) 12/18/2020 09:01 AM    Hemoglobin A1c 10.3 (H) 09/24/2020 10:23 AM    Hemoglobin A1c, External 10.6 11/19/2018 12:00 AM    Hemoglobin A1c, External 9.4 05/20/2016 12:00 AM    Hemoglobin A1c, External 8.9 08/17/2015 02:37 PM    Glucose 262 (H) 12/10/2021 11:45 AM    Glucose (POC) 129 (H) 09/22/2015 07:11 AM    Microalbumin/Creat ratio (mg/g creat) 16 12/10/2021 11:45 AM    Microalbumin,urine random 2.09 12/10/2021 11:45 AM    LDL, calculated 65.6 12/10/2021 11:45 AM    Creatinine (POC) 0.9 04/10/2015 12:41 PM    Creatinine 1.47 (H) 12/10/2021 11:45 AM      Lab Results   Component Value Date/Time    Cholesterol, total 152 12/10/2021 11:45 AM    HDL Cholesterol 62 12/10/2021 11:45 AM    LDL, calculated 65.6 12/10/2021 11:45 AM    LDL-C, External 72 05/20/2016 12:00 AM    Triglyceride 122 12/10/2021 11:45 AM    CHOL/HDL Ratio 2.5 12/10/2021 11:45 AM     Lab Results   Component Value Date/Time    GFR est non-AA 35 (L) 12/10/2021 11:45 AM    GFRNA, POC >60 04/10/2015 12:41 PM    GFR est AA 43 (L) 12/10/2021 11:45 AM    GFRAA, POC >60 04/10/2015 12:41 PM    Creatinine 1.47 (H) 12/10/2021 11:45 AM    Creatinine (POC) 0.9 04/10/2015 12:41 PM    BUN 21 (H) 12/10/2021 11:45 AM    BUN (POC) 7 (L) 04/10/2015 12:41 PM    Sodium 141 12/10/2021 11:45 AM    Sodium (POC) 143 04/10/2015 12:41 PM    Potassium 3.7 12/10/2021 11:45 AM    Potassium (POC) 2.9 (L) 04/10/2015 12:41 PM    Chloride 101 12/10/2021 11:45 AM    Chloride (POC) 103 04/10/2015 12:41 PM    CO2 34 (H) 12/10/2021 11:45 AM    Magnesium 1.9 01/14/2013 04:30 AM        ROS    Physical Exam  Vitals and nursing note reviewed. Constitutional:       Appearance: Normal appearance. She is well-developed. She is obese. Comments: Appears stated age   Cardiovascular:      Rate and Rhythm: Normal rate and regular rhythm. Heart sounds: Normal heart sounds. No murmur heard. No friction rub. No gallop. Pulmonary:      Effort: Pulmonary effort is normal. No respiratory distress. Breath sounds: Normal breath sounds. No wheezing. Abdominal:      General: Bowel sounds are normal.      Palpations: Abdomen is soft. Musculoskeletal:      Right lower leg: Edema present. Left lower leg: Edema present. Neurological:      Mental Status: She is alert. ASSESSMENT and PLAN  Diagnoses and all orders for this visit:    1. Type 2 diabetes mellitus with other specified complication, with long-term current use of insulin (HCC)  -     METABOLIC PANEL, COMPREHENSIVE; Future    2.  Hypertension, unspecified type  -     CBC W/O DIFF; Future  -     METABOLIC PANEL, COMPREHENSIVE; Future    3. Pure hypercholesterolemia    4. History of CVA (cerebrovascular accident)    5. Depression, unspecified depression type    6. Stage 3 chronic kidney disease, unspecified whether stage 3a or 3b CKD (HCC)  -     CBC W/O DIFF; Future  -     METABOLIC PANEL, COMPREHENSIVE; Future           This is the Subsequent Medicare Annual Wellness Exam, performed 12 months or more after the Initial AWV or the last Subsequent AWV    I have reviewed the patient's medical history in detail and updated the computerized patient record. Assessment/Plan   Education and counseling provided:  Are appropriate based on today's review and evaluation  tdap abnd shingrx recommended    1. Type 2 diabetes mellitus with other specified complication, with long-term current use of insulin (HCC)  -     METABOLIC PANEL, COMPREHENSIVE; Future   Fu Dr Rivas Frank  2. Hypertension, unspecified type  -     CBC W/O DIFF; Future  -     METABOLIC PANEL, COMPREHENSIVE; Future   Fair control-continue medicines fu Dr Lyndon Musa  3. Pure hypercholesterolemia   LDl at goal  4. History of CVA (cerebrovascular accident)  5. Depression, unspecified depression type-declines medication and or counseling, mild symptoms  6. Stage 3 chronic kidney disease, unspecified whether stage 3a or 3b CKD (HCC)-fu Dr Bisi Wilson  -     CBC W/O DIFF; Future  -     METABOLIC PANEL, COMPREHENSIVE;  Future       Depression Risk Factor Screening     3 most recent PHQ Screens 6/10/2022   Little interest or pleasure in doing things Not at all   Feeling down, depressed, irritable, or hopeless Not at all   Total Score PHQ 2 0       Alcohol & Drug Abuse Risk Screen    Do you average more than 1 drink per night or more than 7 drinks a week:  No    On any one occasion in the past three months have you have had more than 3 drinks containing alcohol:  No          Functional Ability and Level of Safety Hearing: The patient needs further evaluation. Activities of Daily Living: The home contains: no safety equipment. and chair lift for stairs  Patient needs help with:  transportation      Ambulation: with difficulty, uses a rollator     Fall Risk:  Fall Risk Assessment, last 12 mths 6/6/2022   Able to walk? Yes   Fall in past 12 months? 0   Do you feel unsteady? 0   Are you worried about falling 0   Number of falls in past 12 months -   Fall with injury?  -      Abuse Screen:  Patient is not abused       Cognitive Screening    Has your family/caregiver stated any concerns about your memory: no     Cognitive Screening: Normal - Verbal Fluency Test    Health Maintenance Due     Health Maintenance Due   Topic Date Due    DTaP/Tdap/Td series (1 - Tdap) Never done    Shingrix Vaccine Age 50> (1 of 2) Never done    Eye Exam Retinal or Dilated  11/25/2021    Medicare Yearly Exam  06/05/2022       Patient Care Team   Patient Care Team:  Lamin Rico MD as PCP - [facility]  Lamin Rico MD as PCP - [facility]  Taras Talbert MD as Physician (Cardiovascular Disease Physician)  Sharon Charles MD as Surgeon (General Surgery)  Eliot Gasca MD as Physician (Sleep Medicine Physician)  Maria Elena Mendez NP as Nurse Practitioner (Nurse Practitioner)  Naomi Lin RN as Ambulatory Care Manager  Toyin Werner MD as Consulting Provider (Endocrinology Physician)  Rnady Graham NP as Nurse Practitioner (Nurse Practitioner)    History     Patient Active Problem List   Diagnosis Code    DJD (degenerative joint disease) of hip M16.9    DJD (degenerative joint disease) of knee M17.10    CTS (Carpal Tunnel Syndrome)-b/l G56.00    CVA (cerebral infarction) I63.9    Diverticulosis K57.90    Osteopenia M85.80    Grave's disease     DM type 2 (diabetes mellitus, type 2) ([facility] E11.9    PVC's (premature ventricular contractions) I49.3    Myocardial ischemia I25.9    Shortness of breath R06.02    Coronary artery disease I25.10    Asthma J45.909    Renal failure, acute (HCC) N17.9    Esophageal reflux K21.9    Axillary hidradenitis suppurativa L73.2    HTN (hypertension) I10    Warthin's tumor D11.9    Obesity, morbid ([facility]) E66.01    History of CVA (cerebrovascular accident) Z80.78    Type 2 diabetes with nephropathy ([facility] E11.21    Type 2 diabetes mellitus with diabetic neuropathy ([facility]) E11.40    Major depressive disorder, recurrent, mild F33.0    Major depressive disorder, recurrent, moderate F33.1    Major depressive disorder, recurrent, unspecified F33.9    CKD (chronic kidney disease) stage 3, GFR 30-59 ml/min ([facility]) N18.30     Past Medical History:   Diagnosis Date    Asthma     CAD (coronary artery disease)     \"mild\" per Dr Flip Ling note    Diabetes [facility])     Dr Blake Ortiz Hyperlipidemia     Hypertension     Long term current use of anticoagulant therapy     Pulmonary embolism ([facility]     Screening for colon cancer 2/16/05    Dr Magdy Morse in 10 years    Stroke [facility]) 2004    Rt side weaker than left, uses a cane: no longer followed by neuro    Thromboembolus ([facility] 1976    Rt leg moved to lung     Thyroid disease     Unspecified sleep apnea     uses CPAP      Past Surgical History:   Procedure Laterality Date    CARDIAC CATHETERIZATION  6/6/2012         HX HEART CATHETERIZATION      HX HEENT      tonsillectomy    HX HEMORRHOIDECTOMY      HX HYSTERECTOMY      HX ORTHOPAEDIC  8/2011    left shoulder    [place] CARDIAC SURG PROCEDURE UNLIST      heart surgery     Current Outpatient Medications   Medication Sig Dispense Refill    calcitRIOL (ROCALTROL) 0.25 mcg capsule       losartan (COZAAR) 100 mg tablet TAKE 1 TABLET DAILY 90 Tablet 3    gabapentin (NEURONTIN) 100 mg capsule TAKE 1 CAPSULE THREE TIMES A DAY 90 Capsule 3    albuterol (ACCUNEB) 1.25 mg/3 mL nebu 3 mL by Nebulization route every four (4) hours as needed (wheezing).  225 mL 3    HumaLOG KwikPen Insulin 100 unit/mL kwikpen 30 units for breakfast, 24 units for lunch and 42 units for dinner (Patient taking differently: 20 units for breakfast, 20 units for lunch and 20 units for dinner) 25 Pen 3    levothyroxine (SYNTHROID) 137 mcg tablet Take 1 Tablet by mouth Daily (before breakfast). 90 Tablet 3    hydrALAZINE (APRESOLINE) 50 mg tablet Take 1 Tablet by mouth every eight (8) hours. 180 Tablet 1    potassium chloride (K-DUR, KLOR-CON M20) 20 mEq tablet Take 20 mEq by mouth daily.  cholecalciferol (Vitamin D3) (1000 Units /25 mcg) tablet Take  by mouth daily.  carvediloL (COREG) 12.5 mg tablet Take 12.5 mg by mouth two (2) times daily (with meals).  glucose blood VI test strips (True Metrix Glucose Test Strip) strip Monitor blood sugar 3 times daily 300 Strip 3    flash glucose sensor (FreeStyle Jack 2 Sensor) kit 1 Each by Does Not Apply route every fourteen (14) days. 6 Kit 3    flash glucose scanning reader (FreeStyle Jack 2 [place]) misc 1 Each by Does Not Apply route daily. 1 Each 0    triamterene-hydroCHLOROthiazide (DYAZIDE) 37.5-25 mg per capsule Take 1 Capsule by mouth daily. 180 Capsule 3    rosuvastatin (CRESTOR) 20 mg tablet Take 1 Tablet by mouth daily. 90 Tablet 3    albuterol (ProAir HFA) 90 mcg/actuation inhaler USE 1 INHALATION EVERY 4 HOURS AS NEEDED WHEEZING OR SHORTNESS OF BREATH 3 Inhaler 3    insulin glargine U-300 conc (Toujeo SoloStar U-300 Insulin) 300 unit/mL (1.5 mL) inpn pen 60 Units by SubCUTAneous route nightly. Indications: type 2 diabetes mellitus (Patient taking differently: 30 Units by SubCUTAneous route every morning. Indications: type 2 diabetes mellitus) 10 Pen 4    ADVAIR DISKUS 100-50 mcg/dose diskus inhaler USE 1 INHALATION TWICE A  Each 3    BD INSULIN PEN NEEDLE UF SHORT 31 gauge x 5/16\" ndle       aspirin delayed-release 81 mg tablet Take 81 mg by mouth daily.        Allergies   Allergen Reactions    Latex Itching    Codeine Itching and Other (comments)     hallucinate    Contrast Dye [Iodine] Rash and Itching     Given pre-cardiac cath    Seafood [Shellfish Containing Products] Swelling       Family History   Problem Relation Age of Onset   Cervantes Diabetes Mother     Cancer Mother     Breast Cancer Mother 79    Heart Disease Father     Hypertension Father     Stroke Father     Coronary Art Dis Brother 54     Social History     Tobacco Use    Smoking status: Former Smoker     Packs/day: 1.00     Types: Cigarettes     Quit date: 2004     Years since quittin.7    Smokeless tobacco: Never Used   Substance Use Topics    Alcohol use: No         Toni Mcfarlane MD

## 2022-06-11 LAB
ALBUMIN SERPL-MCNC: 3.2 G/DL (ref 3.5–5)
ALBUMIN/GLOB SERPL: 0.7 {RATIO} (ref 1.1–2.2)
ALP SERPL-CCNC: 118 U/L (ref 45–117)
ALT SERPL-CCNC: 16 U/L (ref 12–78)
ANION GAP SERPL CALC-SCNC: 6 MMOL/L (ref 5–15)
AST SERPL-CCNC: 14 U/L (ref 15–37)
BILIRUB SERPL-MCNC: 0.4 MG/DL (ref 0.2–1)
BUN SERPL-MCNC: 16 MG/DL (ref 6–20)
BUN/CREAT SERPL: 12 (ref 12–20)
CALCIUM SERPL-MCNC: 10.2 MG/DL (ref 8.5–10.1)
CHLORIDE SERPL-SCNC: 102 MMOL/L (ref 97–108)
CO2 SERPL-SCNC: 33 MMOL/L (ref 21–32)
CREAT SERPL-MCNC: 1.36 MG/DL (ref 0.55–1.02)
ERYTHROCYTE [DISTWIDTH] IN BLOOD BY AUTOMATED COUNT: 16.8 % (ref 11.5–14.5)
GLOBULIN SER CALC-MCNC: 4.5 G/DL (ref 2–4)
GLUCOSE SERPL-MCNC: 211 MG/DL (ref 65–100)
HCT VFR BLD AUTO: 40.6 % (ref 35–47)
HGB BLD-MCNC: 11.3 G/DL (ref 11.5–16)
MCH RBC QN AUTO: 22.9 PG (ref 26–34)
MCHC RBC AUTO-ENTMCNC: 27.8 G/DL (ref 30–36.5)
MCV RBC AUTO: 82.2 FL (ref 80–99)
NRBC # BLD: 0 K/UL (ref 0–0.01)
NRBC BLD-RTO: 0 PER 100 WBC
PLATELET # BLD AUTO: 245 K/UL (ref 150–400)
PMV BLD AUTO: 12.2 FL (ref 8.9–12.9)
POTASSIUM SERPL-SCNC: 3.4 MMOL/L (ref 3.5–5.1)
PROT SERPL-MCNC: 7.7 G/DL (ref 6.4–8.2)
RBC # BLD AUTO: 4.94 M/UL (ref 3.8–5.2)
SODIUM SERPL-SCNC: 141 MMOL/L (ref 136–145)
WBC # BLD AUTO: 8.6 K/UL (ref 3.6–11)

## 2022-06-13 ENCOUNTER — TELEPHONE (OUTPATIENT)
Dept: INTERNAL MEDICINE CLINIC | Age: 72
End: 2022-06-13

## 2022-06-13 NOTE — TELEPHONE ENCOUNTER
Spoke with [facility] concerning  Recent add on lab SPEP. Marjorie Navarro the lab was unable to perform due to wrong tube.

## 2022-06-14 NOTE — PROGRESS NOTES
Tell pt potassium 3.4 is mildly low--continue kcl 20 meq every day.   Stable ckd 3  Glucose 211--fu Dr Constance Franco  Blood cell counts are ok-mild anemia due to CKD 3-fu kidney MD as scheduled  Same medicines

## 2022-06-14 NOTE — PROGRESS NOTES
Spoke with patient using 2 identifiers,. Patient was informed of lab results and Dr. Batool Amanda recommendations. Patient verbalized understanding.

## 2022-06-24 ENCOUNTER — PATIENT OUTREACH (OUTPATIENT)
Dept: CASE MANAGEMENT | Age: 72
End: 2022-06-24

## 2022-06-24 NOTE — PROGRESS NOTES
6/24/2022  12:27 PM    Patient has graduated from the Complex Case Management  program on 6/24/2022. Patient/family has the ability to self-manage at this time. Care management goals have been completed. No further Ambulatory Care Manager follow up scheduled. Goals Addressed                 This Visit's Progress       Diabetes     COMPLETED: Patient verbalizes understanding of self -management goals of living with Diabetes. On track     6/24/2022   Patient reports adherence with Toujeou 30 units every morning and Humalog 20 units with each meal. She is checking her glucose level 3-4x/day using the [employer]. Patient is changing her Freestyle Jack 2 sensor every 14 days.  Patient reports fasting glucose this morning of 191 mg/dL and glucose after lunch at 12:26 PM was 253 mg/dL; she notes that she ate breakfast at 9 AM and lunch at noon. Patient ate one pancake with light maple syrup, juan and eggs for breakfast; for lunch she ate a bologna sandwich with lettuce and tomato and chips. She reports that she took humalog 20 units with breakfast and humalog 20 units with lunch today.  Patient reports that her average daily glucose over the past 7 days is 170 mg/dL; average daily glucose over the past 14 days is 148 mg/dL. Average daily glucose over the past 30 days is 161 mg/dL; average daily glucose for the past 90 days is 163 mg/dL (as reported by [employer] [place]).  Patient denies any glucose readings of <70 mg/dL over the past week.  Last hemoglobin a1c on 4/15/22 was 9.0%; hemoglobin a1c is improved when compared to result on 8/14/19 of 10.9%.  Last office visit with Dr. Reza Lara was 5/20/22 with next recommended follow-up in two months; next scheduled appointment is 8/29/22. Per chart review, no medication changes were ordered during last office visit.    Reviewed ADA recommended fasting and post-prandial blood glucose level ranges with patient today; 80 milligrams per deciliter (mg/dL) to 130 mg/dL before a meal and Less than 180 mg/dL 1 to 2 hours after a meal. Patient is encouraged to notify Dr. Chuy Nayak office if her fasting and/or post-prandial glucose levels continue to be elevated during her next three glucose checks.  Last office visit with Dr. Dana Villa was 6/10/22 with next recommended follow-up in 6 months; no future appointments scheduled at this time.  Patient has made progress towards this goal. Patient has been provided with the necessary resources/support to successfully meet this goal and patient will continue to independently progress towards goal. No further patient outreach by this ACM is scheduled at this time. -MH, RN    6/1/2022   ACM follow-up today is brief; pt denies any questions/concerns at this time. Plan for ACM outreach week of 6/13. -MH, RN    4/29/2022   Patient attended OV with Dr. Héctor Bearden on 4/15/22; per provider visit note - her A1c was 9% (compare to 9.3% 10/15/21), take Toujeou in the morning to decrease concern about overnight hypoglycemia, take 30 units Toujeou every morning, change Humalog to 20 units with each meal, follow-up in one month (with foot exam to be completed at that time.)   Patient denies any additional episodes of overnight hypoglycemia since she has moved Toujeou to the morning. She reports adherence with Toujeou 30 units in the morning and humalog 20 units with each meal.   Unable to review patient's most recent glucose readings during this encounter because she is not currently at home.  Plan for ACM follow-up next week. -MH, RN      4/14/2022   Patient changed her SHARIFA HOWELL [facility] Sensor on 4/13/22.  Patient is concerned that Susan Dumont reader may not be functioning properly because she ate four thin mint girl  cookies at 1:00 PM today and her glucose during this call is 126 mg/dL (4:14 PM); she expected her glucose reading to be higher since she ate four girl  cookies.  Discussed with patient that glucose level typically peaks within 90 minutes after eating; she also reports that she took Humalog 18 units at 1:00 PM before eating lunch and the cookies. Provided patient education on Humalog and that it is a rapid-acting insulin; educated patient on onset, peak and duration of rapid-acting insulin.  Patient is encouraged to check her glucose before eating dinner this evening and also 1-2 hours after eating dinner as this will provide insight as to whether her CGM is functioning properly and providing accurate glucose readings.  ACM reviewed home treatment of hypoglycemia with patient today. Patient appropriately treats low blood sugar by consuming quick-sugar food, however she has not been rechecking her glucose level to ensure that it is in a safe range; pt is advised to recheck her glucose level 15 minutes after consumption of quick-sugar to confirm that low glucose level is resolved and if glucose level is still <70 mg/dL then repeat the steps for treatment of low glucose. She is also advised to eat a snack or a meal after low glucose is resolved to prevent recurrent hypoglycemia. Hypoglycemia clinical reference tip sheet sent to patient via [employer] today for home reference and review.  Educated patient on ADA recommended glucose ranges for pre and post-prandial glucose levels - 80 mg/dL to 130 mg/dL before a meal and less than 180 mg/dL 1 to 2 hours after a meal   She is taking between 30-50 units of Toujeou daily depending on her bedtime glucose result; she did not take Toujeou on 4/13/22 due to bedtime glucose result of 100 mg/dL. Note that current provider orders are for Toujeou 50 units nightly. Patient will discuss Toujeou dose with provider during her appointment on 4/15; she is advised to take her home log with her where she has documented how many units of Toujeou she takes every night.    She is taking Humalog 30 units for breakfast, 18-20 units for lunch (depending on her glucose reading) and 42 units for dinner   Patient will attend her previously scheduled appointment with Dr. Rivas Frank on tomorrow; she will take her SHARIFA HOWELL [facility] reader with her to this appointment. AC encounter routed to Dr. Rivas Frank today for notification.  Plan for next AC outreach in approximately one week    4/13/2022   Reviewed patient's most recent glucose results today; pt reported glucose readings are documented in encounter progress note. [facility] was unable to review patient's glucose results prior to 4/9 with her because her Freestyle reader turned off during this call today; she reports that her reader is fully charged, so she thinks it is because her sensor needs to be replaced.  Patient expresses concern with her elevated glucose results on 4/10 and 4/9 and how these may affect her hemoglobin a1c when it is checked at her upcoming appointment with Dr. Rivas Frank on Friday, 4/15; she notes that she ate out for dinner on both of these nights to celebrate her daughter's birthday, on 4/9 they ate at an [employer] and she ate pasta. AC educated patient on hemoglobin a1c test and explained that it measures her average blood sugar levels over the past three months.  Patient reports that she is due to change her Freestyle [employer] today; she will do this after AC call   Patient has an office visit scheduled with Dr. Rivas Frank on 4/15/22. She reports that she has an appt scheduled with her nephrologist on 5/4/22.  AC will contact patient on 4/14 to continue review of home glucose readings. 3/11/2022   Reviewed patient's most recent glucose results today; pt reported glucose readings are documented in encounter progress note.  She continues to take note of how certain foods affect her glucose level and is using this information to improve her diet; notes that her glucose level increased to 250 mg/dL after she ate a small serving of rice pudding and as a result she hasn't eaten any more because she saw how it affected her glucose level.    She reports goal of improving her Hgb A1c and states, \"I'm really trying to get it down. \" ACM reviewed trend of Hgb A1c results with patient today; note that her A1c has been trending downward since 8/2019 - A1c 8/14/2019 was 10.9% and A1c result 1/14/22 was 8.9%. Positive reinforcement provided to patient today by this AC for her goal progress thus far.  She has been taking 0-40 units of Toujeou at night depending on what her evening glucose reading is; she has been keeping a record of how many units she takes and this is documented in the encounter progress note for provider review. She has not been taking Toujeou if her bedtime glucose result is <200 mg/dL because she is concerned it will cause her blood glucose level to drop overnight.  Patient will continue to check her glucose levels and keep a record of her glucose readings (to include date, time, reading) as well as how many units of Toujeou she takes. Plan for next [facility] outreach in approximately two weeks.  Encounter routed to Dr. Leonardo Hurtado by this AC today for provider review  . 3/1/2022   Patient contacted [employer] customer service last week; they sent her a new Jack sensor and she received it in the mail. She was advised by  to only check her glucose using the jack sensor unless advised otherwise by her doctor to continue to check fingerstick glucose; she reports that she initially started checking her glucose both by [employer] sensor and fingerstick for comparison because she was concerned with the accuracy of the Tom sensor results. She has only been checking her glucose using the Tom sensor since speaking with [email or web address] service last week.  ACM reviewed concerns for Tom sensor inaccuracy with Dr. Leonardo Hurtado; pt made aware- see progress note for details.  She has not changed her Jack sensor yet since receiving new sensor in the mail from [employer]; she will change her sensor this evening.    Patient reported most recent glucose readings (per Tom sensor) are noted in progress note.  Patient will change her Jack sensor this evening and continue to check her glucose using her Jack sensor/reader. ACM routed encounter to Dr. Rivas Frank today for notification/update.  Plan for next AC outreach in approximately one week; will contact patient sooner if changes to current treatment plan and/or new orders are made by provider. 2/25/2022   Patient has been checking her glucose both with the Tom sensor and by fingerstick (using her old glucometer) because she is concerned with the accuracy of Jack sensor device. She has noticed a discrepancy in her glucose results between fingerstick readings using her old glucometer device and the jack sensor readings; pt reports the following: Fingerstick 202mg/dL (845AM this morning using her old glucometer), Jack sensor reading 112 mg/dL (846 AM this morning)/ Last night 6:06 PM - Fingerstick 244 mg/dL (using her old glucometer), Jack sensor reading 192 mg/dL. Patient checked her blood glucose today during this call and reports the following - fingerstick with Tom reader device 340 mg/dL, jack sensor reading 240 mg/dL and fingerstick using her old glucometer 305 mg/dL.  Patient reports the following low glucose readings since previous AC outreach encounter: 59 mg/dL on 2/20/22 at 6:40 AM, 69 mg/dL on 2/22/22 at 12:30 AM, 72 mg/dL on 2/22/22 at 6:56 AM. She is unable to recall if she took Toujeou on 2/19 or 2/21 and, if so, how many units; she does have this information written down, however she is unable to find her notepad during this call.  She changed her Jack sensor nine days ago; sensors are changed every 14 days   She took 30 units of Humalog this morning because her blood glucose reading by fingerstick was 202 mg/dL (before breakfast); reports that Tom sensor reading at this time was 112 mg/dL.    She has been taking 20 units of Toujeou because she is concerned about her blood glucose dropping overnight.  Based on patient's above reported glucose readings, consider possible issue with accuracy of jack sensor. Patient will contact [employer] service today to attempt to troubleshoot issue and request control solution. Patient will continue to monitor her glucose and keep a written log of her glucose readings until issue with SHARIFA HOWELL [facility] monitoring system can be troubleshooted/resolved and accuracy of Jack/Jack sensor can be confirmed.  Plan for next ACM outreach in approximately three days to follow-up on patient's glucose readings and patient communication with [employer] customer service.  ACM will route encounter to Dr. Rivas Frank and Amubulatory PharmD for notification/review. 2/16/2022  Per Dr. Rivas Frank, decrease Toujeou to 50 units at bedtime and continue to follow patient's glucose readings; patient notified by this ACM today. Plan for next ACM outreach in approximately one week. 2/15/2022   Pt attended OV with Dr. Rakesh Ervin (PCP) on 12/10/21, Ambulatory PharmD on 12/22/21, Dr. Rivas Frank (Endocrinology) on 1/14/22, virtual visit with Ambulatory PharmD on 1/17/22.  She received her Freestyle Jack 2 CGM on 1/22/22.  She reports an improved diet since receiving her CGM and decreased consumption of concentrated sweets and starches; \"It's really getting me to really pay attention to what I'm eating. What I used to think wasn't doing anything was actually causing my problem. \"    Med Rec updated today. Per Dr. Uma Virgen most recent OV note, current DM medication orders are Toujeo insulin 60 units at bedtime and Humalog insulin 24 breakfast (20 for lunch if she eats lunch) and 42 for dinner. Patient reports to Stef[facility] today that she has not taken Toujeou for the past three nights because she is concerned about her blood sugar dropping overnight. Per Dr. Uma Virgen most recent OV note, toujeou will likely have to be decreased to prevent hypoglycemia if bedtime blood sugar level is <200 mg/dL.  Patient is unable to recall if she took Toujeou on 2/9, 2/10 or 2/11; note that she reports a blood glucose level of 69 mg/dL at 2:28 AM on 2/12/22 (she is unable to recall if she took Toujeou at bedtime on 2/11).  ACM reinforced patient education on treatment of hypoglycemia.  ACM will route encounter to Dr. Macey Tang today for notification and review. ACM will follow-up with patient in approximately one week to review most recent glucose levels. 12/10/2021   Per [facility] representative, order for CGM is currently closed; previously submitted order was denied by Medicare due to need for clarification regarding number of times per day that the patient is checking her blood glucose and injecting insulin, however requested order addendum (by [address] [place] [address]) was not received from endocrinology office. Patient attended office visit with PCP on 12/10/21 and was referred to ambulatory PharmD for assistance with [facility] order; encounter routed to Ambulatory PharmD today for continuity of care.  Patient has an appointment scheduled with Ambulatory PharmD on 12/22/21 to discuss [facility]. Patient will attend this appointment as scheduled.  Plan for ACM outreach in approximately two weeks. 12/8/2021   She has not received the [facility]. She most recently spoke to [employer] [facility] on today; reports being advised that Medicare denied coverage due to an issue with the order and supporting documentation that was submitted to Medicare for coverage.  Patient does not have her blood glucose log available at this time to review with ACM during this call.  She does report elevated blood glucose readings since previous ACM outreach encounter; pt attributes elevated blood glucose readings to increased stress level related to barrier with getting the [facility] as well as the stress of the holiday season because it's the first holiday season without her mother and brother (they passed away last year).  She is making an effort to increase her physical activity; barrier is decreased strength in her lower extremities and bilateral knee pain. She is ambulating with a rollator in public settings; ambulates with cane at home. She notes that she has not seen an ortho provider in many years; she has never seen ortho for her bilateral knee pain. Patient has PCP follow-up scheduled with Dr. Za Urias on 12/10/21 and she will discuss this further with Dr. Za Urias at her upcoming appointment.  Staff message routed to Dr. Enoch Cushing clinical staff to inquire about Freestyle Jack Order; awaiting response. [facility] will contact Stephanie on tomorrow to inquire about Medicare coverage barrier/denial.    11/9/2021   AC outreach to Stephanie today regarding order for FreeStyle Jack 14 day reader; pt contact and insurance information provided. [facility] will be contacting the patient after completing insurance verification and will then submit order request to Dr. Enoch Cushing office for review and provider signature. Patient notified by [facility] today that Stephanie will be contacting her; pt verbalizes understanding.  Patient report that she spoke with [facility] today and requested that they cancel the previous order for the [facility] [employer].  [facility] outreach in approximately one week to follow-up on order status with [facility]. 11/5/2021   Attended office visit with Dr. Yeyo Philip on 10/15/21.  [facility] by Dr. Yeyo Philip on 10/15/21. She tried to fill this order at [employer] and [facility]. Pt reports that [facility] informed her that out-of-pocket cost for the reader device was >$200; pt unable to afford this cost and had orders transferred to [facility].  Patient was informed by [facility] reader was $79; this is affordable to her, however she has not purchased the reader yet because she was unable to get the sensors. Patient is not sure of her out-of-pocket cost for the [facility]; [employer] was unable to advise her of the 14-day sensors copay because [facility] needs to void out where they already filled the order. Pt was informed by [address] that order for [facility] Rx should be sent to a DME Provider.  She is checking her blood glucose twice daily in the morning and in the evening. One episode of hypoglycemia is reported by patient within the past week with fasting blood glucose level of 59 mg/dL, however she reports that she was asymptomatic. Reports that episodes of fasting low blood sugar have decreased since previous ACM outreach encounter.  She forgot to take her Toujeou last night and her fasting blood glucose this morning was 160 mg/dL   Patient will continue to check her blood glucose twice daily in the morning and in the evening; she will maintain blood glucose log to review with Dr. Paty Mendez [facility].  Medicare Coverage Criteria for [address] reviewed by this ACM today ([employer] resource); patient should meet the criteria for the [address] [place] [address] Drive to be covered by her Medicare Part B. ACM outreach to [facility] DME Provider to inquire if they are able to assist with order; m requesting a return phone call to this ACM.  ACM provided patient with update; will attempt to have order for [address], reader and sensors, supplied by DME Provider under her Medicare Part B.    10/1/2021   She was checking her blood glucose twice daily and maintaining her blood glucose log up until last week; she checked her blood glucose once daily last week because her fingers were sore and she has not written her blood sugars down since last week. Patient states, \"But I know I should (check her blood glucose twice daily) because I really don't feel any different when it's (blood sugar) up or down. \"    \"I've been having some very low sugar in the morning. \" Pt reported fasting blood glucose this morning was 93 mg/dL; reports that her lowest fasting blood glucose level since previous ACM outreach encounter was on 9/8/21 and it was 52 mg/dL. Patient reports associated symptoms of sweating and weakness when her blood glucose is low, \"but not to the point where I felt like I was going to pass out. \" ACM reviewed treatment of hypoglycemia with patient today; pt verbalizes understanding.  Patient was able to review her blood glucose log and visualize the effect that certain foods had on her blood sugar; states, \"When it (blood sugar) went up at night I had either rice, potato or bread. \"   She is still interested in the [facility], if eligible; she could benefit from the [facility] to assist with treatment plan adherence for blood glucose monitoring. Barrier to patient adherence with blood glucose monitoring at present is that her fingertips get sore with checking her blood glucose 2-3 times per day. She has not received outreach from Ambulatory PharmD regarding this; referral was previously sent by this ACM on 8/18/21. Patient will discuss this with Dr. Julian Ortiz at her scheduled appointment on 10/15/21.  Patient will resume checking her blood glucose twice daily and maintain her blood glucose log.  Patient will attend previously scheduled appointment with Dr. Julian Ortiz on 10/15/21; she will take her blood glucose log to this appointment.  Encounter routed to Dr. Julian Ortiz today for notification of patient reported most recent blood glucose results.  Plan for next [facility] outreach in approximately two weeks. 8/18/2021  She has not resumed water aerobics class; she still plans on returning to this class, however her schedule has been busy. Checking her blood glucose once daily. She is still interested in trying to get the [facility]; notes that it was previously not covered by Medicare, however she has recently heard that it is now covered.  Pt reported fasting blood glucose this morning was 140 mg/dL. Attended office visit with Dr. Alison Holden on 7/14/21; hemoglobin A1c 7/14/21 9.2%  Next scheduled follow-up with Dr. Alison Holden is 10/15/21. Encouraged patient to increase blood glucose monitoring to twice daily; order per Dr. Alison Holden is for three times daily. ACM will consult with Ambulatory PharmD regarding Freestyle Jack and Medicare coverage; pt could benefit from the [place] from an adherence standpoint with blood glucose monitoring. 7/8/2021  Plans to resume water aerobics next week. She contacted the [facility] and was notified that water aerobics class has resumed; plans to go to local [facility] on 7/13 to sign up and pay fee. Patient states, \"I'm definitely looking forward to that. \"  Checking her blood glucose twice daily; states, \"I haven't had anything over 160 (mg/dL). \" She is interested in getting the [facility] 14-day system and will discuss this with Dr. Alison Holden during her upcoming visit; pt notes that when this was previously ordered for her in 2020, however she did not get it because it was not covered by her Medicare. Patient has office visit scheduled with Dr. Alison Holden on 7/14.    4/20/2021  most recent Hgb A1c 3/17/21 8.9%. Patient reports that she has made an effort to improve her diet since the beginning of the year and has decreased her intake of starchy foods. Patient reports that current level of physical activity includes walking around inside of her house; barrier to increased physical activity is chronic pain in her right knee and back. Patient reports plan to increase physical activity with the warmer weather by walking in her pool once she gets it open. 3/5/2020  - attended OV with Dr. Alison Holden 2/28/2020; Hgb A1c 9.9%, compare to previous result of 10.1%. Pt reported to Dr. Alison Holden that she had not taken metformin since her previous OV (11/25/19) due to continued GI distress.  Pt was advised to increase her physical activity to tolerance and was asked to check blood sugars at 11: 00PM for 1 week and then contact Dr. Tomasz Holt to report readings; see OV note for details regarding OV encounter.   - Patient is not receptive to ACM follow-up today due to acute illness; ACM outreach in one week. 2/12/2020  - reports fasting blood glucose this morning of 120 mg/dL, after lunch 190 mg/dL. Unable to review additional recent blood glucose readings as results are stored on glucometer and patient is unsure how to recall historical results; pt will take her glucometer to scheduled OV with Dr. Tomasz Holt . - reports taking mealtime insulin prior to meals; states, \"Sometimes I forget and I take it right after I eat, but I'm trying to get use to it and take it before I eat. \"  - still taking humalog 24 units before breakfast (if blood glucose is <150 mg/dL she takes 22 units), 24 units before lunch, 36 units before dinner and Toujeo 60 units nightly; pt did not contact Dr. Edgard Gr office after last ACM outreach to confirm insulin orders, however reports that she reviewed her most recent office visit AVS and confirmed that the unit doses she is currently taking are correct. - continued lack of adherence with TID blood glucose monitoring; inquired about patient barriers to adherence - states, \"I am not sure I am going to be able to do that because when I stick it it hurts too bad and I've been doing this for 15 years and my fingers have had it. \" Informed patient about 14-day blood glucose monitoring system, Freestyle Jack, and encouraged patient to discuss this option with Dr. Tomasz Holt at her upcoming office visit to increase patient adherence with blood glucose monitoring.  -  Inquired as to whether patient is following a diabetic diet; pt states, \"Kind of, yeah; most of the time. \" Diet is described as \"about the same; nothing more, nothing less. \" Patient is not interested in keeping a food diary to review with this NN.  Patient is not interested in additional education/review of diabetic diet; notes that she previously met with CDE Vicky VELAZCO.  - no longer participating in water aerobics; currently not engaging in moderate exercise. She reports that she walks around her house for exercise; states, \"I just get up and start walking from the side door around to the front door and do it a couple of times. \" Patient reported barrier to exercise/increasing physical activity is back spasms which prevent her from \"doing a lot of moving or standing unless I am in the water. \" Reports that barrier to attending water aerobics at present is transportation and winter season. - will attend scheduled OV with Dr. Lyle Paz 2/28/2020  - pt requests next ACM follow-up in three weeks after she attends scheduled OV with Dr. Lyle Paz    1/10/20  - Hgb A1c 11/25/19 - 10.1%  - pt notes history of Type 2 DM for 15 years  - checking blood glucose 1-2x/day  - ordered insulin regimen/frequency is Humalog TID (before breakfast, lunch and dinner) and Toujeo nightly  - reports taking humalog 24 units before breakfast (if blood glucose is <150 mg/dL she takes 22 units), 24 units before lunch, 36 units before dinner and Toujeo 60 units nightly. Most recent endocrinology OV note reviewed; per OV note, most recent Humalog order (11/25/19) - 22 units for breakfast and lunch and 36 units for dinner and Toujeo 60 units nightly (12/16/19), pt was noted to be taking mealtime insulin 2-3 hours after meal .Further clarification is needed regarding what current insulin unit dose orders are; patient is advised to contact Dr. Lyle Paz to confirm/clarify current insulin unit dose orders.    - per last endocrinology OV note, pt was encouraged to improve diet and physical activity to tolerance  - encouraged to take mealtime insulin prior to eating meal  - encouraged to check blood glucose TID as ordered and keep blood glucose log      10/29/19  8/14/19-10.9%  4/22/19 - Hgb A1c 10.1%  - Current level of understanding: checks blood sugar once daily before breakfast. Completed Disease Specific CM Program for Diabetes Self-Management (w/ Kala Ho RN NN CDE) on 8/27/19. Does not keep written blood glucose log; reports that glucometer stores historical blood glucose results. Pt reported fasting blood glucose on 10/28 of 169 mg/dL and on 10/29 of 93 mg/dL. Pt notes difficulty adhering to diabetic diet - enjoys pasta, potatoes, chips - states, \"my problem is food. I'm not that strong on sweets. \"; pt reported barrier to diabetic diet adherence is financial. Does not drink regular soda; drinks diet coke (no more than 2/day) and water. Currently exercising two times/week for 60-90 minutes- water aerobics. - Desired Outcome: Improve diet and exercise, decrease carbohydrate intake. Lower Hgb A1c.  - Plan: Further assess financial barrier(s). Will continue provide and reinforce pt education re: diabetic diet, assist with meal planning. Pt could benefit from keeping a food diary; will discuss further during subsequent outreach encounter as pt is unable to continue call at this time due to getting ready to leave her house. Patient has Ambulatory Care Manager's contact information for any further questions, concerns, or needs.   Patients upcoming visits:    Future Appointments   Date Time Provider Ovidio Allan   8/29/2022 12:10 PM Floyd Loaiza MD RDE  BS AMB   2/7/2023 10:00 AM Gris Rodriguez MD [facility] BS AMB   6/12/2023  2:00 PM MD DARLINE Sauceda BS AMB

## 2022-07-22 RX ORDER — ROSUVASTATIN CALCIUM 20 MG/1
TABLET, COATED ORAL
Qty: 90 TABLET | Refills: 3 | Status: SHIPPED | OUTPATIENT
Start: 2022-07-22

## 2022-07-25 ENCOUNTER — OFFICE VISIT (OUTPATIENT)
Dept: URGENT CARE | Age: 72
End: 2022-07-25
Payer: MEDICARE

## 2022-07-25 ENCOUNTER — HOSPITAL ENCOUNTER (EMERGENCY)
Age: 72
Discharge: HOME | End: 2022-07-25
Attending: EMERGENCY MEDICINE
Payer: MEDICARE

## 2022-07-25 ENCOUNTER — APPOINTMENT (OUTPATIENT)
Dept: CT IMAGING | Age: 72
End: 2022-07-25
Attending: EMERGENCY MEDICINE
Payer: MEDICARE

## 2022-07-25 ENCOUNTER — TELEPHONE (OUTPATIENT)
Dept: INTERNAL MEDICINE CLINIC | Age: 72
End: 2022-07-25

## 2022-07-25 VITALS
TEMPERATURE: 98 F | WEIGHT: 218 LBS | SYSTOLIC BLOOD PRESSURE: 189 MMHG | DIASTOLIC BLOOD PRESSURE: 71 MMHG | RESPIRATION RATE: 15 BRPM | OXYGEN SATURATION: 95 % | HEART RATE: 65 BPM | BODY MASS INDEX: 38.62 KG/M2 | HEIGHT: 63 IN

## 2022-07-25 VITALS
DIASTOLIC BLOOD PRESSURE: 73 MMHG | RESPIRATION RATE: 23 BRPM | HEIGHT: 63 IN | BODY MASS INDEX: 37.54 KG/M2 | OXYGEN SATURATION: 96 % | WEIGHT: 211.86 LBS | SYSTOLIC BLOOD PRESSURE: 168 MMHG | HEART RATE: 66 BPM | TEMPERATURE: 98.2 F

## 2022-07-25 DIAGNOSIS — E87.6 HYPOKALEMIA: ICD-10-CM

## 2022-07-25 DIAGNOSIS — R73.9 HYPERGLYCEMIA: ICD-10-CM

## 2022-07-25 DIAGNOSIS — N17.9 AKI (ACUTE KIDNEY INJURY) (HCC): Primary | ICD-10-CM

## 2022-07-25 DIAGNOSIS — R79.89 CREATININE ELEVATION: Primary | ICD-10-CM

## 2022-07-25 DIAGNOSIS — R42 DIZZINESS: ICD-10-CM

## 2022-07-25 DIAGNOSIS — R42 VERTIGO: ICD-10-CM

## 2022-07-25 DIAGNOSIS — I10 HYPERTENSION, UNSPECIFIED TYPE: ICD-10-CM

## 2022-07-25 DIAGNOSIS — R34 DECREASED URINE OUTPUT: ICD-10-CM

## 2022-07-25 LAB
ALBUMIN SERPL-MCNC: 3.2 G/DL
ALBUMIN SERPL-MCNC: 3.3 G/DL (ref 3.5–5)
ALBUMIN/GLOB SERPL: 0.7 {RATIO} (ref 1.1–2.2)
ALKALINE PHOS POC: 125
ALP SERPL-CCNC: 138 U/L (ref 45–117)
ALT SERPL-CCNC: 13 U/L (ref 12–78)
ALT SERPL-CCNC: 14 U/L (ref 7–35)
ANION GAP SERPL CALC-SCNC: 6 MMOL/L (ref 5–15)
AST SERPL-CCNC: 14 U/L (ref 13–35)
AST SERPL-CCNC: 9 U/L (ref 15–37)
BASOPHILS # BLD: 0.1 K/UL (ref 0–0.1)
BASOPHILS NFR BLD: 1 % (ref 0–1)
BILIRUB SERPL-MCNC: 0.6 MG/DL (ref 0.2–1)
BILIRUB UR QL STRIP: NEGATIVE
BUN BLD-MCNC: 39 MG/DL
BUN SERPL-MCNC: 42 MG/DL (ref 6–20)
BUN/CREAT SERPL: 16 (ref 12–20)
CALCIUM BLD-MCNC: 10.6 MG/DL
CALCIUM SERPL-MCNC: 10.5 MG/DL (ref 8.5–10.1)
CHLORIDE BLD-SCNC: 99 MMOL/L
CHLORIDE SERPL-SCNC: 100 MMOL/L (ref 97–108)
CO2 POC: 30 MEQ/L
CO2 SERPL-SCNC: 35 MMOL/L (ref 21–32)
CREAT BLD-MCNC: 2.3 MG/DL
CREAT SERPL-MCNC: 2.55 MG/DL (ref 0.55–1.02)
DIFFERENTIAL METHOD BLD: ABNORMAL
EGFR (POC): NORMAL
EOSINOPHIL # BLD: 0.1 K/UL (ref 0–0.4)
EOSINOPHIL NFR BLD: 1 % (ref 0–7)
ERYTHROCYTE [DISTWIDTH] IN BLOOD BY AUTOMATED COUNT: 16.4 % (ref 11.5–14.5)
GLOBULIN SER CALC-MCNC: 4.9 G/DL (ref 2–4)
GLUCOSE POC: 233 MG/DL
GLUCOSE SERPL-MCNC: 235 MG/DL (ref 65–100)
GLUCOSE UR-MCNC: NEGATIVE MG/DL
HCT VFR BLD AUTO: 39.5 % (ref 35–47)
HGB BLD-MCNC: 11.7 G/DL (ref 11.5–16)
IMM GRANULOCYTES # BLD AUTO: 0 K/UL (ref 0–0.04)
IMM GRANULOCYTES NFR BLD AUTO: 0 % (ref 0–0.5)
KETONES P FAST UR STRIP-MCNC: NEGATIVE MG/DL
LYMPHOCYTES # BLD: 1.6 K/UL (ref 0.8–3.5)
LYMPHOCYTES NFR BLD: 21 % (ref 12–49)
MAGNESIUM SERPL-MCNC: 2.2 MG/DL (ref 1.6–2.4)
MCH RBC QN AUTO: 22.4 PG (ref 26–34)
MCHC RBC AUTO-ENTMCNC: 29.6 G/DL (ref 30–36.5)
MCV RBC AUTO: 75.5 FL (ref 80–99)
MONOCYTES # BLD: 0.3 K/UL (ref 0–1)
MONOCYTES NFR BLD: 5 % (ref 5–13)
NEUTS SEG # BLD: 5.5 K/UL (ref 1.8–8)
NEUTS SEG NFR BLD: 72 % (ref 32–75)
NRBC # BLD: 0 K/UL (ref 0–0.01)
NRBC BLD-RTO: 0 PER 100 WBC
PH UR STRIP: 6 [PH] (ref 4.6–8)
PLATELET # BLD AUTO: 202 K/UL (ref 150–400)
PMV BLD AUTO: 10 FL (ref 8.9–12.9)
POTASSIUM SERPL-SCNC: 3.1 MMOL/L (ref 3.5–5.1)
POTASSIUM SERPL-SCNC: 3.3 MMOL/L
PROT SERPL-MCNC: 7.8 G/DL
PROT SERPL-MCNC: 8.2 G/DL (ref 6.4–8.2)
PROT UR QL STRIP: NORMAL
RBC # BLD AUTO: 5.23 M/UL (ref 3.8–5.2)
SODIUM SERPL-SCNC: 141 MMOL/L (ref 136–145)
SODIUM SERPL-SCNC: 144 MMOL/L
SP GR UR STRIP: 1.01 (ref 1–1.03)
TOTAL BILIRUBIN POC: 0.7 MG/DL
UA UROBILINOGEN AMB POC: NORMAL (ref 0.2–1)
URINALYSIS CLARITY POC: NORMAL
URINALYSIS COLOR POC: NORMAL
URINE BLOOD POC: NEGATIVE
URINE LEUKOCYTES POC: NEGATIVE
URINE NITRITES POC: NEGATIVE
WBC # BLD AUTO: 7.6 K/UL (ref 3.6–11)

## 2022-07-25 PROCEDURE — 74011250637 HC RX REV CODE- 250/637: Performed by: EMERGENCY MEDICINE

## 2022-07-25 PROCEDURE — 74011250636 HC RX REV CODE- 250/636: Performed by: EMERGENCY MEDICINE

## 2022-07-25 PROCEDURE — G8536 NO DOC ELDER MAL SCRN: HCPCS | Performed by: NURSE PRACTITIONER

## 2022-07-25 PROCEDURE — 1090F PRES/ABSN URINE INCON ASSESS: CPT | Performed by: NURSE PRACTITIONER

## 2022-07-25 PROCEDURE — 1123F ACP DISCUSS/DSCN MKR DOCD: CPT | Performed by: NURSE PRACTITIONER

## 2022-07-25 PROCEDURE — 80053 COMPREHEN METABOLIC PANEL: CPT

## 2022-07-25 PROCEDURE — 96360 HYDRATION IV INFUSION INIT: CPT

## 2022-07-25 PROCEDURE — G9717 DOC PT DX DEP/BP F/U NT REQ: HCPCS | Performed by: NURSE PRACTITIONER

## 2022-07-25 PROCEDURE — 93005 ELECTROCARDIOGRAM TRACING: CPT

## 2022-07-25 PROCEDURE — 36415 COLL VENOUS BLD VENIPUNCTURE: CPT

## 2022-07-25 PROCEDURE — G8427 DOCREV CUR MEDS BY ELIG CLIN: HCPCS | Performed by: NURSE PRACTITIONER

## 2022-07-25 PROCEDURE — G8753 SYS BP > OR = 140: HCPCS | Performed by: NURSE PRACTITIONER

## 2022-07-25 PROCEDURE — 70450 CT HEAD/BRAIN W/O DYE: CPT

## 2022-07-25 PROCEDURE — 99214 OFFICE O/P EST MOD 30 MIN: CPT | Performed by: NURSE PRACTITIONER

## 2022-07-25 PROCEDURE — 85025 COMPLETE CBC W/AUTO DIFF WBC: CPT

## 2022-07-25 PROCEDURE — 3017F COLORECTAL CA SCREEN DOC REV: CPT | Performed by: NURSE PRACTITIONER

## 2022-07-25 PROCEDURE — 80053 COMPREHEN METABOLIC PANEL: CPT | Performed by: NURSE PRACTITIONER

## 2022-07-25 PROCEDURE — G8399 PT W/DXA RESULTS DOCUMENT: HCPCS | Performed by: NURSE PRACTITIONER

## 2022-07-25 PROCEDURE — 99284 EMERGENCY DEPT VISIT MOD MDM: CPT

## 2022-07-25 PROCEDURE — G9899 SCRN MAM PERF RSLTS DOC: HCPCS | Performed by: NURSE PRACTITIONER

## 2022-07-25 PROCEDURE — 1101F PT FALLS ASSESS-DOCD LE1/YR: CPT | Performed by: NURSE PRACTITIONER

## 2022-07-25 PROCEDURE — G8754 DIAS BP LESS 90: HCPCS | Performed by: NURSE PRACTITIONER

## 2022-07-25 PROCEDURE — 83735 ASSAY OF MAGNESIUM: CPT

## 2022-07-25 PROCEDURE — 81003 URINALYSIS AUTO W/O SCOPE: CPT | Performed by: NURSE PRACTITIONER

## 2022-07-25 PROCEDURE — G8417 CALC BMI ABV UP PARAM F/U: HCPCS | Performed by: NURSE PRACTITIONER

## 2022-07-25 RX ORDER — POTASSIUM CHLORIDE 750 MG/1
40 TABLET, FILM COATED, EXTENDED RELEASE ORAL
Status: COMPLETED | OUTPATIENT
Start: 2022-07-25 | End: 2022-07-25

## 2022-07-25 RX ADMIN — POTASSIUM CHLORIDE 40 MEQ: 750 TABLET, FILM COATED, EXTENDED RELEASE ORAL at 19:04

## 2022-07-25 RX ADMIN — SODIUM CHLORIDE 1000 ML: 9 INJECTION, SOLUTION INTRAVENOUS at 19:03

## 2022-07-25 NOTE — TELEPHONE ENCOUNTER
Triage call   Patient having dizziness intermittent throughout the weekend   Also nausea     Hasnt checked vs yet today and unable to do so at the moment     Cardiac hx noted     Patient is a/o x 4 denies sob , numbness, tingling or weakness,chest discomfort or other symptoms     Advised to go to urgent care or ER for assessment by MD d/t cardiac hx and symptoms     Patient said she prefers [facility] urgent care     Advised if she does that have someone drive her     She agreed to do so  and will have family take her right away     Pended to md to make aware     Em lpn

## 2022-07-25 NOTE — PROGRESS NOTES
Here for dizziness  Onset 2 days ago  room spinning without sensation of passing out or any visual disturbance. No trouble walking. Feels dehydrated. Has had decrease in urine output. Denies dark urine. She has had this symptoms intermittently several times since. Occurs 1-3 times per day now getting worse. Denies any loss of balance, extremity weakness, chest pain, palpitations, SOB, headaches or urinary symptoms.   She has hx significant of CAD, type II diabetes, HTN, HLD, PE, stroke, asthma  Does not recall what her blood sugars have been today       Past Medical History:   Diagnosis Date    Asthma     CAD (coronary artery disease)     \"mild\" per Dr Luis Vegas note    Diabetes [facility])     Dr Jesus Sanchez     Hyperlipidemia     Hypertension     Long term current use of anticoagulant therapy     Pulmonary embolism ([facility] [address]     Screening for colon cancer 2/16/05    Dr Cherylene Gault in 10 years    Stroke [facility]) 2004    Rt side weaker than left, uses a cane: no longer followed by neuro    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     Thyroid disease     Unspecified sleep apnea     uses CPAP        Past Surgical History:   Procedure Laterality Date    CARDIAC CATHETERIZATION  6/6/2012         HX HEART CATHETERIZATION      HX HEENT      tonsillectomy    HX HEMORRHOIDECTOMY      HX HYSTERECTOMY      HX ORTHOPAEDIC  8/2011    left shoulder    OR CARDIAC SURG PROCEDURE UNLIST      heart surgery         Family History   Problem Relation Age of Onset    Diabetes Mother     Cancer Mother     Breast Cancer Mother 79    Heart Disease Father     Hypertension Father     Stroke Father     Coronary Art Dis Brother 54        Social History     Socioeconomic History    Marital status:      Spouse name: Not on file    Number of children: 2    Years of education: Not on file    Highest education level: Some college, no degree   Occupational History    Not on file   Tobacco Use    Smoking status: Former     Packs/day: 1.00 Types: Cigarettes     Quit date: 2004     Years since quittin.8    Smokeless tobacco: Never   Vaping Use    Vaping Use: Never used   Substance and Sexual Activity    Alcohol use: No    Drug use: Not Currently     Comment: CBD oil(cream)    Sexual activity: Not Currently   Other Topics Concern    Not on file   Social History Narrative    Lives with Son, grandson and her partner. Patient does not drive; she has not driven since she had CVA. Social Determinants of Health     Financial Resource Strain: Low Risk     Difficulty of Paying Living Expenses: Not hard at all   Food Insecurity: No Food Insecurity    Worried About Running Out of Food in the Last Year: Never true    Ran Out of Food in the Last Year: Never true   Transportation Needs: Not on file   Physical Activity: Not on file   Stress: Not on file   Social Connections: Not on file   Intimate Partner Violence: Not on file   Housing Stability: Not on file                ALLERGIES: Latex, Codeine, Contrast dye [iodine], and Seafood [shellfish containing products]    Review of Systems   All other systems reviewed and are negative. Vitals:    22 1028   BP: (!) 189/71   Pulse: 65   Resp: 15   Temp: 98 °F (36.7 °C)   SpO2: 95%   Weight: 218 lb (98.9 kg)   Height: 5' 3\" (1.6 m)       Physical Exam  Vitals reviewed. Constitutional:       General: She is not in acute distress. Appearance: Normal appearance. She is not ill-appearing, toxic-appearing or diaphoretic. HENT:      Right Ear: Tympanic membrane normal.      Left Ear: Tympanic membrane normal.      Nose: Nose normal. No congestion or rhinorrhea. Mouth/Throat:      Mouth: Mucous membranes are moist.      Pharynx: No oropharyngeal exudate or posterior oropharyngeal erythema. Eyes:      Extraocular Movements: Extraocular movements intact. Conjunctiva/sclera: Conjunctivae normal.      Pupils: Pupils are equal, round, and reactive to light.    Cardiovascular: Rate and Rhythm: Normal rate and regular rhythm. Heart sounds: No murmur heard. No friction rub. No gallop. Pulmonary:      Effort: Pulmonary effort is normal. No respiratory distress. Breath sounds: Normal breath sounds. No stridor. No wheezing, rhonchi or rales. Musculoskeletal:      Comments: +1 edema bilat. Wearing compression sock. Skin:     Capillary Refill: Capillary refill takes less than 2 seconds. Neurological:      Mental Status: She is alert and oriented to person, place, and time. Cranial Nerves: No cranial nerve deficit. Sensory: Sensation is intact. No sensory deficit. Motor: No weakness or abnormal muscle tone. Coordination: Coordination normal. Heel to Shin Test normal.      Gait: Gait normal.   Psychiatric:         Mood and Affect: Mood normal.         Behavior: Behavior normal.         Thought Content: Thought content normal.       MDM     Differential Diagnosis; Clinical Impression; Plan:       CLINICAL IMPRESSION:  (R42) Dizziness  (primary encounter diagnosis)  (R79.89) Increase in creatinine  (R34) Decreased urine output    Plan:  Advised to go to ED for further evaluation and management due to dizziness, decreased urine output and increased creatinine to 2.3 from baseline            EKG NSR. Left axis deviation. No ectopy.  non ischemic        Procedures      Results for orders placed or performed in visit on 07/25/22   AMB POC COMPREHENSIVE METABOLIC PANEL   Result Value Ref Range    GLUCOSE 233 mg/dL    BUN 39 mg/dL    Creatinine (POC) 2.3 mg/dL    Sodium (POC) 144 MMOL/L    Potassium (POC) 3.3 MMOL/L    CHLORIDE 99 MMOL/L    CO2 30 mEq/L    CALCIUM 10.6 mg/dL    TOTAL PROTEIN 7.8 g/dL    ALBUMIN 3.2     AST (POC) 14 13 - 35 U/L    ALT (POC) 14 7 - 35 U/L    ALKALINE PHOS 125     TOTAL BILIRUBIN 0.7 mg/dL    eGFR (POC)     AMB POC URINALYSIS DIP STICK AUTO W/O MICRO   Result Value Ref Range    Color (UA POC)      Clarity (UA POC)      Glucose (UA POC) Negative Negative    Bilirubin (UA POC) Negative Negative    Ketones (UA POC) Negative Negative    Specific gravity (UA POC) 1.015 1.001 - 1.035    Blood (UA POC) Negative Negative    pH (UA POC) 6.0 4.6 - 8.0    Protein (UA POC) Trace Negative    Urobilinogen (UA POC) 0.2 mg/dL 0.2 - 1    Nitrites (UA POC) Negative Negative    Leukocyte esterase (UA POC) Negative Negative

## 2022-07-26 LAB
ATRIAL RATE: 64 BPM
CALCULATED P AXIS, ECG09: 82 DEGREES
CALCULATED R AXIS, ECG10: -24 DEGREES
CALCULATED T AXIS, ECG11: -20 DEGREES
DIAGNOSIS, 93000: NORMAL
P-R INTERVAL, ECG05: 192 MS
Q-T INTERVAL, ECG07: 410 MS
QRS DURATION, ECG06: 86 MS
QTC CALCULATION (BEZET), ECG08: 422 MS
VENTRICULAR RATE, ECG03: 64 BPM

## 2022-07-26 NOTE — ED NOTES
Wendi MARCIAL reviewed discharge instructions with the patient. The patient verbalized understanding. All questions and concerns were addressed. The patient is discharged ambulatory with personal walker with instructions and prescriptions in hand. Pt is alert and oriented x 4. Respirations are clear and unlabored.

## 2022-08-01 NOTE — ED PROVIDER NOTES
EMERGENCY DEPARTMENT HISTORY AND PHYSICAL EXAM      Date: 7/25/2022  Patient Name: Yazmin Neely    History of Presenting Illness     Chief Complaint   Patient presents with    Dizziness    Abnormal Lab Results     Pt went to [place] [address] [facility]gent care today because she has been feeling dizzy and lightheaded this weekend. They did EKG and labs. She is sent over for elevated creatinine. History Provided By: Patient    HPI: Yazmin Neely, 73 y.o. female with PMHx as noted below presents the emergency department with chief complaint of lightheadedness. Patient had onset of symptoms over the weekend. Patient states that in the morning she had an episode of feeling both lightheaded but also having sensation that the room was spinning. The episode was brief and had since resolved. Patient states that she currently feels well and is asymptomatic. Patient states that she did go to urgent care today to get checked out and was sent to the ED because her creatinine was elevated. Otherwise patient feels generally well and is at her baseline currently with no active complaints. And states otherwise would not of come to the emergency department today. Pt denies any other alleviating or exacerbating factors. Additionally, pt specifically denies any recent fever, chills, headache, nausea, vomiting, abdominal pain, CP, SOB, numbness, weakness, BLE swelling, heart palpitations, urinary sxs, diarrhea, constipation, melena, hematochezia, cough, or congestion.     PCP: Elyssa Stafford MD    Current Outpatient Medications   Medication Sig Dispense Refill    rosuvastatin (CRESTOR) 20 mg tablet TAKE 1 TABLET DAILY 90 Tablet 3    calcitRIOL (ROCALTROL) 0.25 mcg capsule       losartan (COZAAR) 100 mg tablet TAKE 1 TABLET DAILY 90 Tablet 3    gabapentin (NEURONTIN) 100 mg capsule TAKE 1 CAPSULE THREE TIMES A DAY 90 Capsule 3    albuterol (ACCUNEB) 1.25 mg/3 mL nebu 3 mL by Nebulization route every four (4) hours as needed (wheezing). 225 mL 3    HumaLOG KwikPen Insulin 100 unit/mL kwikpen 30 units for breakfast, 24 units for lunch and 42 units for dinner (Patient taking differently: 20 units for breakfast, 20 units for lunch and 20 units for dinner) 25 Pen 3    levothyroxine (SYNTHROID) 137 mcg tablet Take 1 Tablet by mouth Daily (before breakfast). 90 Tablet 3    hydrALAZINE (APRESOLINE) 50 mg tablet Take 1 Tablet by mouth every eight (8) hours. 180 Tablet 1    potassium chloride (K-DUR, KLOR-CON M20) 20 mEq tablet Take 20 mEq by mouth daily. cholecalciferol (Vitamin D3) (1000 Units /25 mcg) tablet Take  by mouth daily. carvediloL (COREG) 12.5 mg tablet Take 12.5 mg by mouth two (2) times daily (with meals). glucose blood VI test strips (True Metrix Glucose Test Strip) strip Monitor blood sugar 3 times daily 300 Strip 3    flash glucose sensor (FreeStyle Jack 2 Sensor) kit 1 Each by Does Not Apply route every fourteen (14) days. 6 Kit 3    flash glucose scanning reader (FreeStyle Jack 2 [place]) misc 1 Each by Does Not Apply route daily. 1 Each 0    triamterene-hydroCHLOROthiazide (DYAZIDE) 37.5-25 mg per capsule Take 1 Capsule by mouth daily. 180 Capsule 3    albuterol (ProAir HFA) 90 mcg/actuation inhaler USE 1 INHALATION EVERY 4 HOURS AS NEEDED WHEEZING OR SHORTNESS OF BREATH 3 Inhaler 3    insulin glargine U-300 conc (Toujeo SoloStar U-300 Insulin) 300 unit/mL (1.5 mL) inpn pen 60 Units by SubCUTAneous route nightly. Indications: type 2 diabetes mellitus (Patient taking differently: 30 Units by SubCUTAneous route every morning. Indications: type 2 diabetes mellitus) 10 Pen 4    ADVAIR DISKUS 100-50 mcg/dose diskus inhaler USE 1 INHALATION TWICE A  Each 3    BD INSULIN PEN NEEDLE UF SHORT 31 gauge x 5/16\" ndle       aspirin delayed-release 81 mg tablet Take 81 mg by mouth daily.          Past History     Past Medical History:  Past Medical History:   Diagnosis Date    Asthma     CAD (coronary artery disease)     \"mild\" per Dr Jose Rafael Dong note    Diabetes [facility])     Dr Ortiz Contras     Hyperlipidemia     Hypertension     Long term current use of anticoagulant therapy     Pulmonary embolism ([facility] [address]     Screening for colon cancer 05    Dr Lakeshia Carr in 10 years    Stroke [facility])     Rt side weaker than left, uses a cane: no longer followed by neuro    Thromboembolus ([address]     Rt leg moved to lung     Thyroid disease     Unspecified sleep apnea     uses CPAP       Past Surgical History:  Past Surgical History:   Procedure Laterality Date    CARDIAC CATHETERIZATION  2012         HX HEART CATHETERIZATION      HX HEENT      tonsillectomy    HX HEMORRHOIDECTOMY      HX HYSTERECTOMY      HX ORTHOPAEDIC  2011    left shoulder    SD CARDIAC SURG PROCEDURE UNLIST      heart surgery       Family History:  Family History   Problem Relation Age of Onset    Diabetes Mother     Cancer Mother     Breast Cancer Mother 79    Heart Disease Father     Hypertension Father     Stroke Father     Coronary Art Dis Brother 54       Social History:  Social History     Tobacco Use    Smoking status: Former     Packs/day: 1.00     Types: Cigarettes     Quit date: 2004     Years since quittin.8    Smokeless tobacco: Never   Vaping Use    Vaping Use: Never used   Substance Use Topics    Alcohol use: No    Drug use: Not Currently     Comment: CBD oil(cream)       Allergies: Allergies   Allergen Reactions    Latex Itching    Codeine Itching and Other (comments)     hallucinate    Contrast Dye [Iodine] Rash and Itching     Given pre-cardiac cath    Seafood [Shellfish Containing Products] Swelling         Review of Systems   Review of Systems  Constitutional: Negative for fever, chills, and fatigue. HENT: Negative for congestion, sore throat, rhinorrhea, sneezing and neck stiffness   Eyes: Negative for discharge and redness.    Respiratory: Negative for  shortness of breath, wheezing   Cardiovascular: Negative for chest pain, palpitations   Gastrointestinal: Negative for nausea, vomiting, abdominal pain, constipation, diarrhea and blood in stool. Genitourinary: Negative for dysuria, hematuria, flank pain, decreased urine volume, discharge,   Musculoskeletal: Negative for myalgias or joint pain . Skin: Negative for rash or lesions . Neurological: Positive lightheadedness/dizziness negative weakness, numbness and headaches. Physical Exam   Physical Exam    GENERAL: alert and oriented, no acute distress  EYES: PEERL, No injection, discharge or icterus. ENT: Mucous membranes pink and moist.  NECK: Supple  LUNGS: Airway patent. Non-labored respirations. Breath sounds clear with good air entry bilaterally. HEART: Regular rate and rhythm. No peripheral edema  ABDOMEN: Non-distended and non-tender, without guarding or rebound. SKIN:  warm, dry  MSK/EXTREMITIES: Without swelling, tenderness or deformity, symmetric with normal ROM  NEUROLOGICAL:  alert and oriented x 3, CN II-XII grossly intact, strength 5/5 bilateral upper and lower extremities, sensation intact throughout to light touch, no dysmetria or ataxia noted      1a. Level of Consciousness: 0  0 = Alert; keenly responsive. 1 = Not alert; but arousable by minor stimulation to obey, answer, or respond. 2 = Not alert; requires repeated stimulation to attend, or is obtunded and requires strong or painful stimulation to make movements (not stereotyped.)  3 = Responds only with reflex motor or autonomic effects or totally unresponsive, flaccid and areflexic. 1b. LOC Questions: 0   0 = Answers both questions correctly. 1 = Answers one question correctly. 2 = Answers neither question correctly. 1c. LOC Commands: 0       0 = Performs both tasks correctly. 1 = Performs one task correctly. 2 = Performs neither task correctly. 2. Best Gaze: 0  0 = Normal. Able to move both eyes left to right across midline. 1 = Partial gaze palsy.  Gaze is abnormal in one or both eyes, but forced deviation, or total gaze paresis is not present. Able to move one or both eyes, but may not be able to cross midline. 2 = Forced deviation. Total gaze paresis is not overcome by the oculocephalic maneuver. 3.  Best Visual. : 0   0 = No visual loss. 1 = Partial hemianopia. Includes loss in only one quadrant. 2 = Complete hemianopia. Loss of vision in both top and bottom quadrants on the right or left side of a patients visual field. 3 = Bilateral hemianopia. Blindness of any cause, including cortical blindness, or if visual loss is noted on both right and left sides of the visual fields. The next several items of the NIHSS are intended to assess voluntary movement of the stroke patient. 4.  Facial Palsy.: 0  0 = Normal. Symmetrical movements. 1 = Minor paralysis. Flattened nasolabial fold, asymmetry on smiling. 2 = Partial paralysis. Total or near-total paralysis of lower face. 3 = Complete paralysis of one or both sides of face. Absence of movement in the upper and lower face. 5.  Motor Arm. (a. Left Arm, b. Right Arm): 0  0 = No drift. Limb holds 90 (or 45) degrees for a full 10 seconds. 1 = Drift. Limb holds 90 (or 45) degrees, but then drifts down before full 10 seconds; does not hit bed or other support. 2 = Some effort against gravity. Limb cannot get to or maintain 90 (or 45) degrees, drifts down to bed, but has some effort against gravity. 3 = No effort against gravity. Limb falls. 4 = No movements. Flaccid extremities with no effort noted. 6. Motor Leg. ( a. Left Leg, b. Right Leg) : 0  0 = No drift. Leg holds 30 degree position for full five seconds. 1 = Drift. Leg falls by the end of the five second period, but does not hit bed. 2 = Some effort against gravity. Leg falls to bed by five seconds, but has some effort against gravity. 3 = No effort against gravity. Leg falls to bed immediately. 4 = No movement.  Flaccid extremities with no effort noted. 7. Limb Ataxia. : 0  Scale Definition  0 = Absent. 1 = Present in one limb. 2 = Present in two limbs. 8. Sensory: 0  0 = Normal. No sensory loss. 1 = Mild to moderate sensory loss. Patient feels pin prick is less sharp or is dull on the affected side or there is a loss of superficial pain with pin prick, but patient is aware of being touched. 2 = Severe to total sensory loss. Patient is not aware of being touched on the face, arm and leg. 9. Best Language: 0   0 = No aphasia. Normal fluent speech. 1 = Mild to moderate aphasia. Some obvious loss of fluency or facility of comprehension without significant limitation on ideas expressed or form of expression. Reduction of speech and/or comprehension however, makes conversation about provided materials difficult or impossible. For example, in conversation about provided materials, examiner can identify picture or naming card content from patients response. 2 = Severe aphasia. All communication is through fragmentary expression; great need for inference, questioning and guessing by the listener. Often limited to one-word answers. Range of information that can be exchanged is limited; listener carries burden of communication. Examiner cannot identify materials provided from patient response. 3 = Mute. Global aphasia. No usable speech or auditory comprehension. 10. Dysarthria: 0  0 = Normal  1= Mild to moderate dysarthria. Patient slurs at least some words and at worst, can be understood with some difficulty. 2 = Severe dysarthria. Patients speech is so slurred as to be unintelligible in the absence of or out of proportion to any dysphasia or is mute. 11. Extinction and Inattention (formerly neglect): 0  0 = Normal  1 = Visual, tactile, auditory, special or personal inattention or extinction to bilateral simultaneous stimulation in one of the sensory modalities.   2 = [place]-inattention or extinction to more than one modality. Patient does not recognize own hand or orients to only one side of space. Total Score: 0             Diagnostic Study Results     Labs -  Reviewed and interpreted by me    Radiologic Studies -   CT HEAD WO CONT   Final Result         No change or acute abnormality        CT Results  (Last 48 hours)      None          CXR Results  (Last 48 hours)      None              Medical Decision Making     Dacia MEJÍA MD am the first provider for this patient and am the attending of record for this patient encounter. I reviewed the vital signs, available nursing notes, past medical history, past surgical history, family history and social history. Vital Signs-Reviewed the patient's vital signs. Records Reviewed: Nursing Notes and Old Medical Records    Provider Notes (Medical Decision Making): On presentation, the patient is well appearing, in no acute distress with normal vital signs. Based on my history and exam the differential diagnosis for this patient includes stroke, TIA, dehydration, hypertensive emergency, metabolic abnormality, electrolyte abnormality, infection. CT head showed no acute findings. Labs are notable for hyperglycemia, low potassium as well as elevated serum creatinine. Patient was given IV fluids in the ED, potassium was also repleted. Reviewed patient's medications, instructed to avoid any nephrotoxic medications. Had a long discussion with the patient about admission for observation to repeat creatinine in the morning, achieve better blood pressure control, patient would like to be discharged as she is asymptomatic at this time. Patient assures me she will call first thing in the morning to establish follow-up with her primary care physician for blood pressure recheck and does not want to be admitted to the hospital today. Seeing as she is asymptomatic and has reliable follow-up feel this is reasonable.   Have instructed her to begin checking her blood pressure daily, also needs tighter control of her blood sugar. ED Course:   Initial assessment performed. The patients presenting problems have been discussed, and they are in agreement with the care plan formulated and outlined with them. I have encouraged them to ask questions as they arise throughout their visit. Medications   potassium chloride SR (KLOR-CON 10) tablet 40 mEq (40 mEq Oral Given 7/25/22 1904)   sodium chloride 0.9 % bolus infusion 1,000 mL (0 mL IntraVENous IV Completed 7/25/22 2002)         PROGRESS  Makayla Nieto's  results have been reviewed with her. She has been counseled regarding her diagnosis. She verbally conveys understanding and agreement of the signs, symptoms, diagnosis, treatment and prognosis and additionally agrees to follow up as recommended with Dr. Shona Thacker MD in 24 - 48 hours. She also agrees with the care-plan and conveys that all of her questions have been answered. I have also put together some discharge instructions for her that include: 1) educational information regarding their diagnosis, 2) how to care for their diagnosis at home, as well a 3) list of reasons why they would want to return to the ED prior to their follow-up appointment, should their condition change. Disposition:  home    PLAN:  1. Discharge Medication List as of 7/25/2022  7:38 PM        2.    Follow-up Information       Follow up With Specialties Details Why Contact Info    Shona Thacker MD Internal Medicine Physician Schedule an appointment as soon as possible for a visit in 2 days  [address] [facility]  [phone]      [facility] EMERGENCY DEPT Emergency Medicine  If symptoms worsen [address] [place] [address]    Jeff Ward MD Nephrology Schedule an appointment as soon as possible for a visit in 2 days  [address] [facility]  [phone]            Return to ED if worse     Diagnosis     Clinical Impression:   1. MIKE (acute kidney injury) ([facility] [address]    2. Vertigo    3. Hypokalemia    4. Hypertension, unspecified type    5. Hyperglycemia        Please note that this dictation was completed with Dragon, computer voice recognition software. Quite often unanticipated grammatical, syntax, homophones, and other interpretive errors are inadvertently transcribed by the computer software. Please disregard these errors. Additionally, please excuse any errors that have escaped final proofreading.

## 2022-08-02 ENCOUNTER — OFFICE VISIT (OUTPATIENT)
Dept: INTERNAL MEDICINE CLINIC | Age: 72
End: 2022-08-02
Payer: MEDICARE

## 2022-08-02 VITALS
OXYGEN SATURATION: 96 % | RESPIRATION RATE: 16 BRPM | WEIGHT: 214 LBS | HEIGHT: 63 IN | HEART RATE: 71 BPM | TEMPERATURE: 97.3 F | SYSTOLIC BLOOD PRESSURE: 120 MMHG | BODY MASS INDEX: 37.92 KG/M2 | DIASTOLIC BLOOD PRESSURE: 60 MMHG

## 2022-08-02 DIAGNOSIS — I10 HYPERTENSION, UNSPECIFIED TYPE: ICD-10-CM

## 2022-08-02 DIAGNOSIS — N17.9 AKI (ACUTE KIDNEY INJURY) (HCC): Primary | ICD-10-CM

## 2022-08-02 DIAGNOSIS — N18.30 STAGE 3 CHRONIC KIDNEY DISEASE, UNSPECIFIED WHETHER STAGE 3A OR 3B CKD (HCC): ICD-10-CM

## 2022-08-02 DIAGNOSIS — E87.6 LOW SERUM POTASSIUM: ICD-10-CM

## 2022-08-02 PROCEDURE — 1090F PRES/ABSN URINE INCON ASSESS: CPT | Performed by: INTERNAL MEDICINE

## 2022-08-02 PROCEDURE — 3017F COLORECTAL CA SCREEN DOC REV: CPT | Performed by: INTERNAL MEDICINE

## 2022-08-02 PROCEDURE — G9717 DOC PT DX DEP/BP F/U NT REQ: HCPCS | Performed by: INTERNAL MEDICINE

## 2022-08-02 PROCEDURE — G8754 DIAS BP LESS 90: HCPCS | Performed by: INTERNAL MEDICINE

## 2022-08-02 PROCEDURE — G0463 HOSPITAL OUTPT CLINIC VISIT: HCPCS | Performed by: INTERNAL MEDICINE

## 2022-08-02 PROCEDURE — G8417 CALC BMI ABV UP PARAM F/U: HCPCS | Performed by: INTERNAL MEDICINE

## 2022-08-02 PROCEDURE — 1101F PT FALLS ASSESS-DOCD LE1/YR: CPT | Performed by: INTERNAL MEDICINE

## 2022-08-02 PROCEDURE — G8536 NO DOC ELDER MAL SCRN: HCPCS | Performed by: INTERNAL MEDICINE

## 2022-08-02 PROCEDURE — G9899 SCRN MAM PERF RSLTS DOC: HCPCS | Performed by: INTERNAL MEDICINE

## 2022-08-02 PROCEDURE — G8427 DOCREV CUR MEDS BY ELIG CLIN: HCPCS | Performed by: INTERNAL MEDICINE

## 2022-08-02 PROCEDURE — 99213 OFFICE O/P EST LOW 20 MIN: CPT | Performed by: INTERNAL MEDICINE

## 2022-08-02 PROCEDURE — G8752 SYS BP LESS 140: HCPCS | Performed by: INTERNAL MEDICINE

## 2022-08-02 PROCEDURE — G8399 PT W/DXA RESULTS DOCUMENT: HCPCS | Performed by: INTERNAL MEDICINE

## 2022-08-02 RX ORDER — POTASSIUM CHLORIDE 20 MEQ/1
20 TABLET, EXTENDED RELEASE ORAL DAILY
Qty: 90 TABLET | Refills: 3 | Status: SHIPPED | OUTPATIENT
Start: 2022-08-02

## 2022-08-02 NOTE — PATIENT INSTRUCTIONS
Office Policies    Phone calls/patient messages:            Please allow up to 24 hours for someone in the office to contact you about your call or message. Be mindful your provider may be out of the office or your message may require further review. We encourage you to use [employer] for your messages as this is a faster, more efficient way to communicate with our office                         Medication Refills:            Prescription medications require 48-72 business hours to process. We encourage you to use [employer] for your refills. For controlled medications: Please allow 72 business hours to process. Certain medications may require you to  a written prescription at our office. NO narcotic/controlled medications will be prescribed after 4pm Monday through Friday or on weekends              Form/Paperwork Completion:            Please note a $25 fee may incur for all paperwork for completed by our providers. We ask that you allow 7-10 business days. Pre-payment is due prior to picking up/faxing the completed form. You may also download your forms to [employer] to have your doctor print off.

## 2022-08-02 NOTE — PROGRESS NOTES
HISTORY OF PRESENT ILLNESS  Makayla Levin Hancock is a 73 y.o. female. HPI  F/u HTN hld asthma hx CVA-DM-2 osteopenia , hx PE morbid obesity ckd 3 depression. ER FU  Was sent to ER from  for lightheadedness and elevated bun/cr  Head CT-no acute process  Bun/cr 42/2.55--hydrated with IVF.  [place] 16/1.36 Dr Alberta Arias  Potassium 3.1-repleted  Bp was 209/75 down to 168/73  Asymptomatic in ER -sent home  Not dizzy now  Only felt dizzy on day or ER visti when she awoke in bed but not having orthostatic symptoms    Patient Active Problem List    Diagnosis Date Noted    CKD (chronic kidney disease) stage 3, GFR 30-59 ml/min ([facility]) 03/10/2022    Major depressive disorder, recurrent, mild 12/10/2021    Major depressive disorder, recurrent, moderate 12/10/2021    Major depressive disorder, recurrent, unspecified 12/10/2021    Type 2 diabetes mellitus with diabetic neuropathy ([address] 04/29/2019    Type 2 diabetes with nephropathy ([address] 12/14/2018    History of CVA (cerebrovascular accident) 07/13/2018    Obesity, morbid ([address] 01/25/2018    Warthin's tumor 07/01/2016    HTN (hypertension) 09/13/2013    Axillary hidradenitis suppurativa 08/07/2013    Esophageal reflux 01/15/2013    Renal failure, acute ([address] 01/13/2013    Asthma 12/14/2012    Myocardial ischemia 06/06/2012    Shortness of breath 06/06/2012    Coronary artery disease 06/06/2012    PVC's (premature ventricular contractions) 06/01/2012    DM type 2 (diabetes mellitus, type 2) ([address] 04/23/2012    Grave's disease 10/15/2010    DJD (degenerative joint disease) of hip 10/20/2009    DJD (degenerative joint disease) of knee 10/20/2009    CTS (Carpal Tunnel Syndrome)-b/l 10/20/2009    CVA (cerebral infarction) 10/20/2009    Diverticulosis 10/20/2009    Osteopenia 10/20/2009     Current Outpatient Medications   Medication Sig Dispense Refill    rosuvastatin (CRESTOR) 20 mg tablet TAKE 1 TABLET DAILY 90 Tablet 3    calcitRIOL (ROCALTROL) 0.25 mcg capsule       losartan (COZAAR) 100 mg tablet TAKE 1 TABLET DAILY 90 Tablet 3    gabapentin (NEURONTIN) 100 mg capsule TAKE 1 CAPSULE THREE TIMES A DAY 90 Capsule 3    albuterol (ACCUNEB) 1.25 mg/3 mL nebu 3 mL by Nebulization route every four (4) hours as needed (wheezing). 225 mL 3    HumaLOG KwikPen Insulin 100 unit/mL kwikpen 30 units for breakfast, 24 units for lunch and 42 units for dinner (Patient taking differently: 20 units for breakfast, 20 units for lunch and 20 units for dinner) 25 Pen 3    levothyroxine (SYNTHROID) 137 mcg tablet Take 1 Tablet by mouth Daily (before breakfast). 90 Tablet 3    hydrALAZINE (APRESOLINE) 50 mg tablet Take 1 Tablet by mouth every eight (8) hours. 180 Tablet 1    potassium chloride (K-DUR, KLOR-CON M20) 20 mEq tablet Take 20 mEq by mouth daily. cholecalciferol (Vitamin D3) (1000 Units /25 mcg) tablet Take  by mouth daily. carvediloL (COREG) 12.5 mg tablet Take 12.5 mg by mouth two (2) times daily (with meals). glucose blood VI test strips (True Metrix Glucose Test Strip) strip Monitor blood sugar 3 times daily 300 Strip 3    flash glucose sensor (FreeStyle [place] 2 Sensor) kit 1 Each by Does Not Apply route every fourteen (14) days. 6 Kit 3    flash glucose scanning reader (FreeStyle Jack 2 [place]) misc 1 Each by Does Not Apply route daily. 1 Each 0    triamterene-hydroCHLOROthiazide (DYAZIDE) 37.5-25 mg per capsule Take 1 Capsule by mouth daily. 180 Capsule 3    albuterol (ProAir HFA) 90 mcg/actuation inhaler USE 1 INHALATION EVERY 4 HOURS AS NEEDED WHEEZING OR SHORTNESS OF BREATH 3 Inhaler 3    insulin glargine U-300 conc (Toujeo SoloStar U-300 Insulin) 300 unit/mL (1.5 mL) inpn pen 60 Units by SubCUTAneous route nightly. Indications: type 2 diabetes mellitus (Patient taking differently: 30 Units by SubCUTAneous route every morning.  Indications: type 2 diabetes mellitus) 10 Pen 4    ADVAIR DISKUS 100-50 mcg/dose diskus inhaler USE 1 INHALATION TWICE A  Each 3    BD INSULIN PEN NEEDLE UF SHORT 31 gauge x 5/16\" ndle       aspirin delayed-release 81 mg tablet Take 81 mg by mouth daily. Allergies   Allergen Reactions    Latex Itching    Codeine Itching and Other (comments)     hallucinate    Contrast Dye [Iodine] Rash and Itching     Given pre-cardiac cath    Seafood [facility] Containing Products] Swelling      Lab Results   Component Value Date/Time    WBC 7.6 07/25/2022 04:38 PM    HGB 11.7 07/25/2022 04:38 PM    Hemoglobin (POC) 14.6 04/10/2015 12:41 PM    HCT 39.5 07/25/2022 04:38 PM    Hematocrit (POC) 43 04/10/2015 12:41 PM    PLATELET 602 37/85/6069 04:38 PM    MCV 75.5 (L) 07/25/2022 04:38 PM     Lab Results   Component Value Date/Time    GFR est non-AA 19 (L) 07/25/2022 04:38 PM    GFRNA, POC >60 04/10/2015 12:41 PM    GFR est AA 23 (L) 07/25/2022 04:38 PM    GFRAA, POC >60 04/10/2015 12:41 PM    Creatinine 2.55 (H) 07/25/2022 04:38 PM    Creatinine (POC) 2.3 07/25/2022 11:21 AM    Creatinine (POC) 0.9 04/10/2015 12:41 PM    BUN 42 (H) 07/25/2022 04:38 PM    BUN 39 07/25/2022 11:21 AM    BUN (POC) 7 (L) 04/10/2015 12:41 PM    Sodium 141 07/25/2022 04:38 PM    Sodium (POC) 144 07/25/2022 11:21 AM    Sodium (POC) 143 04/10/2015 12:41 PM    Potassium 3.1 (L) 07/25/2022 04:38 PM    Potassium (POC) 3.3 07/25/2022 11:21 AM    Potassium (POC) 2.9 (L) 04/10/2015 12:41 PM    Chloride 100 07/25/2022 04:38 PM    CHLORIDE 99 07/25/2022 11:21 AM    Chloride (POC) 103 04/10/2015 12:41 PM    CO2 35 (H) 07/25/2022 04:38 PM    CO2 30 07/25/2022 11:21 AM    Magnesium 2.2 07/25/2022 04:38 PM        ROS    Physical Exam  Vitals and nursing note reviewed. Constitutional:       Appearance: She is well-developed. She is obese. Comments: Appears stated age   Cardiovascular:      Rate and Rhythm: Normal rate and regular rhythm. Heart sounds: Normal heart sounds. No murmur heard. No friction rub. No gallop.    Pulmonary:      Effort: Pulmonary effort is normal. No respiratory distress. Breath sounds: Normal breath sounds. No wheezing. Abdominal:      General: Bowel sounds are normal.      Palpations: Abdomen is soft. Neurological:      General: No focal deficit present. Mental Status: She is alert. ASSESSMENT and PLAN    ICD-10-CM ICD-9-CM    1. MIKE ([facility]) ([facility]  X98.3 713.9 METABOLIC PANEL, BASIC  Hydration recommended      2. Stage 3 chronic kidney disease, unspecified whether stage 3a or 3b CKD ([facility])  L67.10 844.8 METABOLIC PANEL, Carla He Dr Jia Nieto as scheduled      3. Hypertension, unspecified type  U88 965.3 METABOLIC PANEL, BASIC  Normotensive in office      4.  Low serum potassium  E87.6 276.8 BMP  Refill kcl 200meq every day--pt had stopped medication

## 2022-08-03 LAB
ANION GAP SERPL CALC-SCNC: 7 MMOL/L (ref 5–15)
BUN SERPL-MCNC: 22 MG/DL (ref 6–20)
BUN/CREAT SERPL: 15 (ref 12–20)
CALCIUM SERPL-MCNC: 9.8 MG/DL (ref 8.5–10.1)
CHLORIDE SERPL-SCNC: 102 MMOL/L (ref 97–108)
CO2 SERPL-SCNC: 36 MMOL/L (ref 21–32)
CREAT SERPL-MCNC: 1.43 MG/DL (ref 0.55–1.02)
GLUCOSE SERPL-MCNC: 159 MG/DL (ref 65–100)
POTASSIUM SERPL-SCNC: 3.2 MMOL/L (ref 3.5–5.1)
SODIUM SERPL-SCNC: 145 MMOL/L (ref 136–145)

## 2022-08-03 NOTE — PROGRESS NOTES
Carson skinner her kidney function is back to baseline after ER viist for dehydration. Potassium is still slighty low--take kcl 20 mew bid x 7 days then 20 mew every day. does not know

## 2022-08-03 NOTE — PROGRESS NOTES
Spoke with patient using 2 identifiers . Patient was informed of recent labs and Dr. Taylor Golden recommendations. Patient verbalized  understanding.

## 2022-08-22 RX ORDER — ALBUTEROL SULFATE 90 UG/1
AEROSOL, METERED RESPIRATORY (INHALATION)
Qty: 25.5 G | Refills: 2 | Status: SHIPPED | OUTPATIENT
Start: 2022-08-22

## 2022-08-29 ENCOUNTER — OFFICE VISIT (OUTPATIENT)
Dept: ENDOCRINOLOGY | Age: 72
End: 2022-08-29
Payer: MEDICARE

## 2022-08-29 VITALS
BODY MASS INDEX: 38.09 KG/M2 | DIASTOLIC BLOOD PRESSURE: 81 MMHG | SYSTOLIC BLOOD PRESSURE: 183 MMHG | HEART RATE: 64 BPM | HEIGHT: 63 IN | WEIGHT: 215 LBS

## 2022-08-29 DIAGNOSIS — Z79.4 TYPE 2 DIABETES MELLITUS WITH COMPLICATION, WITH LONG-TERM CURRENT USE OF INSULIN (HCC): Primary | ICD-10-CM

## 2022-08-29 DIAGNOSIS — E11.8 TYPE 2 DIABETES MELLITUS WITH COMPLICATION, WITH LONG-TERM CURRENT USE OF INSULIN (HCC): Primary | ICD-10-CM

## 2022-08-29 LAB — HBA1C MFR BLD HPLC: 9 %

## 2022-08-29 PROCEDURE — 2022F DILAT RTA XM EVC RTNOPTHY: CPT | Performed by: INTERNAL MEDICINE

## 2022-08-29 PROCEDURE — 99214 OFFICE O/P EST MOD 30 MIN: CPT | Performed by: INTERNAL MEDICINE

## 2022-08-29 PROCEDURE — 3017F COLORECTAL CA SCREEN DOC REV: CPT | Performed by: INTERNAL MEDICINE

## 2022-08-29 PROCEDURE — G8399 PT W/DXA RESULTS DOCUMENT: HCPCS | Performed by: INTERNAL MEDICINE

## 2022-08-29 PROCEDURE — G8753 SYS BP > OR = 140: HCPCS | Performed by: INTERNAL MEDICINE

## 2022-08-29 PROCEDURE — G9899 SCRN MAM PERF RSLTS DOC: HCPCS | Performed by: INTERNAL MEDICINE

## 2022-08-29 PROCEDURE — G8754 DIAS BP LESS 90: HCPCS | Performed by: INTERNAL MEDICINE

## 2022-08-29 PROCEDURE — 1090F PRES/ABSN URINE INCON ASSESS: CPT | Performed by: INTERNAL MEDICINE

## 2022-08-29 PROCEDURE — G9717 DOC PT DX DEP/BP F/U NT REQ: HCPCS | Performed by: INTERNAL MEDICINE

## 2022-08-29 PROCEDURE — G8536 NO DOC ELDER MAL SCRN: HCPCS | Performed by: INTERNAL MEDICINE

## 2022-08-29 PROCEDURE — 1101F PT FALLS ASSESS-DOCD LE1/YR: CPT | Performed by: INTERNAL MEDICINE

## 2022-08-29 PROCEDURE — 83036 HEMOGLOBIN GLYCOSYLATED A1C: CPT | Performed by: INTERNAL MEDICINE

## 2022-08-29 PROCEDURE — G8427 DOCREV CUR MEDS BY ELIG CLIN: HCPCS | Performed by: INTERNAL MEDICINE

## 2022-08-29 PROCEDURE — G8417 CALC BMI ABV UP PARAM F/U: HCPCS | Performed by: INTERNAL MEDICINE

## 2022-08-29 PROCEDURE — 3046F HEMOGLOBIN A1C LEVEL >9.0%: CPT | Performed by: INTERNAL MEDICINE

## 2022-08-29 PROCEDURE — 1123F ACP DISCUSS/DSCN MKR DOCD: CPT | Performed by: INTERNAL MEDICINE

## 2022-08-29 NOTE — PROGRESS NOTES
Ms. Jennifer Savage returns for follow-up of her type 2 diabetes mellitus x 14 years with poor blood sugar control. Her A1c was 9.7% in September. Her A1c was  8.9% in March 2021 which is the lowest we have ever had.,   Her A1c in October was 9.3%. Her A1c today is 9.0%. Recent laboratory tests also remarkable for creatinine of 1.43 with a GFR of 44. LFTs normal.     Current medications  Toujeo insulin 30 units in AM   Humalog insulin 20 units before each meal (misses often)  Freestyle Jack 2     This woman is seen every 3-4 months, monitors her blood sugars 3-4 times daily, administers insulin 3-4 times daily and adjusts her insulin dosage based on her readings. At her last visit, I ordered a freestyle jack and she finally has the device. She brings it with her today. She has been using it for 2 months now. Unfortunately her A1c has not improved. She again admits that she is missing doses of Humalog with her meals. She is taking the Toujeo consistently. Download of the device indicates that her blood sugar overnight can be very well controlled but she has significant elevations in blood sugar after breakfast and particularly after dinner which is the time that she usually misses the dose of Humalog. She is not having any episodes of hypoglycemia. Examination  Blood pressure 183/81  Pulse 80  Weight 215  BMI 38.1    Impression  1. Type 2 diabetes mellitus with persistently poor blood sugar control secondary to omission of insulin  2. CKD IIIa  3. Obesity    Plan:  1. I have asked her and she is committed to dialing up her insulin and putting it next to her plate. That way if dose has not been delivered, she can see that and go ahead and give the dose. 2.  I will see her back in 3 months.

## 2022-08-31 ENCOUNTER — TELEPHONE (OUTPATIENT)
Dept: SLEEP MEDICINE | Age: 72
End: 2022-08-31

## 2022-08-31 NOTE — TELEPHONE ENCOUNTER
Patient called to inquire if the sleep lab had remote access to her device. She also inquired about humidification. She was informed that the lab did have remote access to her device, her tube temperature was increased to 3. Patient to follow-up if she is still experiencing a dry mouth.

## 2022-09-19 ENCOUNTER — TELEPHONE (OUTPATIENT)
Dept: SLEEP MEDICINE | Age: 72
End: 2022-09-19

## 2022-09-19 NOTE — TELEPHONE ENCOUNTER
Mrs. Ramona Jordan is still having problems with a dry mouth despite the increase in humidification.   Patient will be schedule for a clinic on 9/21/22 at 11:30 am.

## 2022-10-24 ENCOUNTER — ANESTHESIA (OUTPATIENT)
Dept: ENDOSCOPY | Age: 72
End: 2022-10-24
Payer: MEDICARE

## 2022-10-24 ENCOUNTER — ANESTHESIA EVENT (OUTPATIENT)
Dept: ENDOSCOPY | Age: 72
End: 2022-10-24
Payer: MEDICARE

## 2022-10-24 ENCOUNTER — HOSPITAL ENCOUNTER (OUTPATIENT)
Age: 72
Setting detail: OUTPATIENT SURGERY
Discharge: HOME | End: 2022-10-24
Attending: INTERNAL MEDICINE | Admitting: INTERNAL MEDICINE
Payer: MEDICARE

## 2022-10-24 VITALS
OXYGEN SATURATION: 98 % | BODY MASS INDEX: 37.39 KG/M2 | RESPIRATION RATE: 13 BRPM | DIASTOLIC BLOOD PRESSURE: 63 MMHG | TEMPERATURE: 97.9 F | WEIGHT: 211 LBS | SYSTOLIC BLOOD PRESSURE: 149 MMHG | HEIGHT: 63 IN | HEART RATE: 59 BPM

## 2022-10-24 LAB
GLUCOSE BLD STRIP.AUTO-MCNC: 130 MG/DL (ref 65–117)
SERVICE CMNT-IMP: ABNORMAL

## 2022-10-24 PROCEDURE — 76060000031 HC ANESTHESIA FIRST 0.5 HR: Performed by: INTERNAL MEDICINE

## 2022-10-24 PROCEDURE — 74011000250 HC RX REV CODE- 250: Performed by: NURSE ANESTHETIST, CERTIFIED REGISTERED

## 2022-10-24 PROCEDURE — 82962 GLUCOSE BLOOD TEST: CPT

## 2022-10-24 PROCEDURE — 2709999900 HC NON-CHARGEABLE SUPPLY: Performed by: INTERNAL MEDICINE

## 2022-10-24 PROCEDURE — 77030013992 HC SNR POLYP ENDOSC BSC -B: Performed by: INTERNAL MEDICINE

## 2022-10-24 PROCEDURE — 88305 TISSUE EXAM BY PATHOLOGIST: CPT

## 2022-10-24 PROCEDURE — 74011250636 HC RX REV CODE- 250/636: Performed by: INTERNAL MEDICINE

## 2022-10-24 PROCEDURE — 77030039825 HC MSK NSL PAP SUPERNO2VA VYRM -B: Performed by: NURSE ANESTHETIST, CERTIFIED REGISTERED

## 2022-10-24 PROCEDURE — 74011250636 HC RX REV CODE- 250/636: Performed by: NURSE ANESTHETIST, CERTIFIED REGISTERED

## 2022-10-24 PROCEDURE — 76040000019: Performed by: INTERNAL MEDICINE

## 2022-10-24 RX ORDER — SODIUM CHLORIDE 0.9 % (FLUSH) 0.9 %
5-40 SYRINGE (ML) INJECTION EVERY 8 HOURS
Status: DISCONTINUED | OUTPATIENT
Start: 2022-10-24 | End: 2022-10-24 | Stop reason: HOSPADM

## 2022-10-24 RX ORDER — ATROPINE SULFATE 0.1 MG/ML
0.5 INJECTION INTRAVENOUS
Status: DISCONTINUED | OUTPATIENT
Start: 2022-10-24 | End: 2022-10-24 | Stop reason: HOSPADM

## 2022-10-24 RX ORDER — EPINEPHRINE 0.1 MG/ML
1 INJECTION INTRACARDIAC; INTRAVENOUS
Status: DISCONTINUED | OUTPATIENT
Start: 2022-10-24 | End: 2022-10-24 | Stop reason: HOSPADM

## 2022-10-24 RX ORDER — DEXTROMETHORPHAN/PSEUDOEPHED 2.5-7.5/.8
1.2 DROPS ORAL
Status: DISCONTINUED | OUTPATIENT
Start: 2022-10-24 | End: 2022-10-24 | Stop reason: HOSPADM

## 2022-10-24 RX ORDER — SODIUM CHLORIDE 0.9 % (FLUSH) 0.9 %
5-40 SYRINGE (ML) INJECTION AS NEEDED
Status: DISCONTINUED | OUTPATIENT
Start: 2022-10-24 | End: 2022-10-24 | Stop reason: HOSPADM

## 2022-10-24 RX ORDER — NALOXONE HYDROCHLORIDE 0.4 MG/ML
0.4 INJECTION, SOLUTION INTRAMUSCULAR; INTRAVENOUS; SUBCUTANEOUS
Status: DISCONTINUED | OUTPATIENT
Start: 2022-10-24 | End: 2022-10-24 | Stop reason: HOSPADM

## 2022-10-24 RX ORDER — PROPOFOL 10 MG/ML
INJECTION, EMULSION INTRAVENOUS AS NEEDED
Status: DISCONTINUED | OUTPATIENT
Start: 2022-10-24 | End: 2022-10-24 | Stop reason: HOSPADM

## 2022-10-24 RX ORDER — SODIUM CHLORIDE 9 MG/ML
75 INJECTION, SOLUTION INTRAVENOUS CONTINUOUS
Status: DISCONTINUED | OUTPATIENT
Start: 2022-10-24 | End: 2022-10-24 | Stop reason: HOSPADM

## 2022-10-24 RX ORDER — LIDOCAINE HYDROCHLORIDE 20 MG/ML
INJECTION, SOLUTION EPIDURAL; INFILTRATION; INTRACAUDAL; PERINEURAL AS NEEDED
Status: DISCONTINUED | OUTPATIENT
Start: 2022-10-24 | End: 2022-10-24 | Stop reason: HOSPADM

## 2022-10-24 RX ORDER — FLUMAZENIL 0.1 MG/ML
0.2 INJECTION INTRAVENOUS
Status: DISCONTINUED | OUTPATIENT
Start: 2022-10-24 | End: 2022-10-24 | Stop reason: HOSPADM

## 2022-10-24 RX ORDER — FENTANYL CITRATE 50 UG/ML
25 INJECTION, SOLUTION INTRAMUSCULAR; INTRAVENOUS
Status: DISCONTINUED | OUTPATIENT
Start: 2022-10-24 | End: 2022-10-24 | Stop reason: HOSPADM

## 2022-10-24 RX ORDER — MIDAZOLAM HYDROCHLORIDE 1 MG/ML
.25-5 INJECTION, SOLUTION INTRAMUSCULAR; INTRAVENOUS
Status: DISCONTINUED | OUTPATIENT
Start: 2022-10-24 | End: 2022-10-24 | Stop reason: HOSPADM

## 2022-10-24 RX ADMIN — PROPOFOL 40 MG: 10 INJECTION, EMULSION INTRAVENOUS at 08:13

## 2022-10-24 RX ADMIN — PROPOFOL 30 MG: 10 INJECTION, EMULSION INTRAVENOUS at 08:08

## 2022-10-24 RX ADMIN — SODIUM CHLORIDE 75 ML/HR: 9 INJECTION, SOLUTION INTRAVENOUS at 07:52

## 2022-10-24 RX ADMIN — PROPOFOL 70 MG: 10 INJECTION, EMULSION INTRAVENOUS at 08:03

## 2022-10-24 RX ADMIN — PROPOFOL 20 MG: 10 INJECTION, EMULSION INTRAVENOUS at 08:15

## 2022-10-24 RX ADMIN — LIDOCAINE HYDROCHLORIDE 50 MG: 20 INJECTION, SOLUTION EPIDURAL; INFILTRATION; INTRACAUDAL; PERINEURAL at 08:03

## 2022-10-24 NOTE — DISCHARGE INSTRUCTIONS
Jermaine Velásquez  [id number]  1950    COLON DISCHARGE INSTRUCTIONS  Discomfort:  Redness at IV site- apply warm compress to area; if redness or soreness persist- contact your physician  There may be a slight amount of blood passed from the rectum  Gaseous discomfort- walking, belching will help relieve any discomfort  You may not operate a vehicle for 12 hours  You may not engage in an occupation involving machinery or appliances for rest of today  You may not drink alcoholic beverages for at least 12 hours  Avoid making any critical decisions for at least 24 hour  DIET:   High fiber diet. - however -  remember your colon is empty and a heavy meal will produce gas. Avoid these foods:  vegetables, fried / greasy foods, carbonated drinks for today  MEDICATION:         ACTIVITY:  You may not resume your normal daily activities until tomorrow AM; it is recommended that you spend the remainder of the day resting -  avoid any strenuous activity. CALL M.D. ANY SIGN OF:   Increasing pain, nausea, vomiting  Abdominal distension (swelling)  New increased bleeding (oral or rectal)  Fever (chills)  Pain in chest area  Bloody discharge from nose or mouth  Shortness of breath    IMPRESSION:  -Three 2-5mm sessile polyps noted in transverse colon from 70-80cm; removed with combination of cold and hot snare cautery.   -Diverticulosis noted throughout the entire colon  -Small internal and external hemorrhoids    Follow-up Instructions:   Call Dr. Andry Iyer for the results of procedure / biopsy in 7-10 days  Telephone # [phone]  Repeat colonoscopy in 3 years    Ernesto Jay MD     Patient Education on Sedation / Analgesia Administered for Procedure      For 24 hours after general anesthesia or intravenous analgesia / sedation:  Have someone responsible help you with your care  Limit your activities  Do not drive and operate hazardous machinery  Do not make important personal, legal or business decisions  Do not drink alcoholic beverages  If you have not urinated within 8 hours after discharge, please contact your physician  Resume your medications unless otherwise instructed    For 24 hours after general anesthesia or intravenous analgesia / sedation  you may experience:  Drowsiness, dizziness, sleepiness, or confusion  Difficulty remembering or delayed reaction times  Difficulty with your balance, especially while walking, move slowly and carefully, do not make sudden position changes  Difficulty focusing or blurred vision    You may not be aware of slight changes in your behavior and/or your reaction time because of the medication used during and after your procedure.     Report the following to your physician:  Excessive pain, swelling, redness or odor of or around the surgical area  Temperature over 100.5  Nausea and vomiting lasting longer than 4 hours or if unable to take medications  Any signs of decreased circulation or nerve impairment to extremity: change in color, persistent numbness, tingling, coldness or increase pain  Any questions or concerns    IF YOU REPORT TO AN EMERGENCY ROOM, DOCTOR'S OFFICE OR HOSPITAL WITHIN 24 HOURS AFTER YOUR PROCEDURE, BRING THIS SHEET AND YOUR AFTER VISIT SUMMARY WITH YOU AND GIVE IT TO THE PHYSICIAN OR NURSE ATTENDING YOU.

## 2022-10-24 NOTE — ANESTHESIA PREPROCEDURE EVALUATION
Anesthetic History     PONV (after Hysterectomy)          Review of Systems / Medical History  Patient summary reviewed, nursing notes reviewed and pertinent labs reviewed    Pulmonary        Sleep apnea: CPAP  Smoker (former)  Asthma : well controlled       Neuro/Psych       CVA (right sided weakness; uses cane)  Psychiatric history     Cardiovascular    Hypertension        Dysrhythmias ( asymptomatic) : PVC  CAD    Exercise tolerance: <4 METS  Comments: H/o DVT/PE 1970's  EF 60% on ECHO   GI/Hepatic/Renal  Within defined limits              Endo/Other    Diabetes: well controlled, type 2  Hypothyroidism (s/p ablation for Grave's Dz): well controlled  Morbid obesity and arthritis (DJD of knees)     Other Findings   Comments: Back pain           Physical Exam    Airway  Mallampati: III  TM Distance: < 4 cm  Neck ROM: normal range of motion   Mouth opening: Normal     Cardiovascular    Rhythm: regular  Rate: normal      Pertinent negatives: No murmur   Dental  No notable dental hx       Pulmonary  Breath sounds clear to auscultation               Abdominal  GI exam deferred       Other Findings            Anesthetic Plan    ASA: 3  Anesthesia type: MAC          Induction: Intravenous  Anesthetic plan and risks discussed with: Patient

## 2022-10-24 NOTE — PERIOP NOTES
TRANSFER - IN REPORT:    Verbal report received from [facility] RN(name) on Ilana Irwin  being received from Endo(unit) for routine progression of care      Report consisted of patients Situation, Background, Assessment and   Recommendations(SBAR). Information from the following report(s) SBAR was reviewed with the receiving nurse. Opportunity for questions and clarification was provided. Assessment completed upon patients arrival to unit and care assumed.

## 2022-10-24 NOTE — PROCEDURES
NAME:  Arpan Moy   :   1950   MRN:   [id number]     Date/Time:  10/24/2022 8:30 AM    Colonoscopy Operative Report    Procedure Type:   Colonoscopy with polypectomy (cold snare), polypectomy (snare cautery)     Indications:     Family history of coloretal adenoma  (screening only)  Pre-operative Diagnosis: see indication above  Post-operative Diagnosis:  See findings below  :  Frannie Clinton MD  Referring Provider: Ese Ball MD    Exam:  Airway: clear, no airway problems anticipated  Heart: RRR, without gallops or rubs  Lungs: clear bilaterally without wheezes, crackles, or rhonchi  Abdomen: soft, nontender, nondistended, bowel sounds present  Mental Status: awake, alert and oriented to person, place and time    Sedation:  MAC anesthesia Propofol 160mg IV  Procedure Details:  After informed consent was obtained with all risks and benefits of procedure explained and preoperative exam completed, the patient was taken to the endoscopy suite and placed in the left lateral decubitus position. Upon sequential sedation as per above, a digital rectal exam was performed demonstrating internal and external hemorrhoids. The Olympus videocolonoscope  was inserted in the rectum and carefully advanced to the cecum, which was identified by the ileocecal valve and appendiceal orifice. The quality of preparation was adequate. The colonoscope was slowly withdrawn with careful evaluation between folds. Retroflexion in the rectum was completed demonstrating internal and external hemorrhoids. Findings:     -Three 2-5mm sessile polyps noted in transverse colon from 70-80cm; removed with combination of cold and hot snare cautery. -Diverticulosis noted throughout the entire colon  -Small internal and external hemorrhoids    Specimen Removed:  #1 transverse colon polyps (3)  Complications: None. EBL:  None.     Impression:    -Three 2-5mm sessile polyps noted in transverse colon from 70-80cm; removed with combination of cold and hot snare cautery. -Diverticulosis noted throughout the entire colon  -Small internal and external hemorrhoids    Recommendations: --Await pathology. , -Repeat colonoscopy in 3 years. High fiber diet. Resume normal medication(s). You will receive a letter about the biopsy results in about 10 days. You may be asked to call your doctor's office for the results. Discharge Disposition:  Home in the company of a  when able to ambulate.       Frannie Clinton MD

## 2022-10-24 NOTE — PERIOP NOTES
5188: See anesthesia note and MAR for medications given during procedure. Received report from anesthesia staff on vital signs and status of patient. Endoscope was pre-cleaned at the bedside immediately following procedure by Hayden Mckeon returned to patient.

## 2022-10-24 NOTE — H&P
Gastroenterology Outpatient History and Physical    Patient: Geena Rizzo    Physician: Kaylyn Frederick MD    Chief Complaint: Fam hx olon polyps  History of Present Illness: 73yo F with fam hx colon polyps. Last colonosocp y2016 with diverticulosis and internal hemorrhoids. History:  Past Medical History:   Diagnosis Date    Asthma     CAD (coronary artery disease)     \"mild\" per Dr Pascual Hernández note    Diabetes [facility])     Dr Stanislav Granger     Hyperlipidemia     Hypertension     Long term current use of anticoagulant therapy     Nausea & vomiting     Pulmonary embolism ([facility] [address]     Screening for colon cancer 2005    Dr Emiliano Oneil in 10 years    Stroke [facility])     Rt side weaker than left, uses a cane: no longer followed by neuro    Thromboembolus ([facility] [address]     Rt leg moved to lung     Thyroid disease     Unspecified sleep apnea     uses CPAP      Past Surgical History:   Procedure Laterality Date    CARDIAC CATHETERIZATION  2012         HX HEART CATHETERIZATION      HX HEENT      tonsillectomy    HX HEMORRHOIDECTOMY      HX HYSTERECTOMY      HX ORTHOPAEDIC  2011    left shoulder    MO CARDIAC SURG PROCEDURE UNLIST      heart surgery      Social History     Socioeconomic History    Marital status:     Number of children: 2    Highest education level: Some college, no degree   Tobacco Use    Smoking status: Former     Packs/day: 1.00     Types: Cigarettes     Quit date: 2004     Years since quittin.1    Smokeless tobacco: Never   Vaping Use    Vaping Use: Never used   Substance and Sexual Activity    Alcohol use: No    Drug use: Not Currently     Comment: CBD oil(cream)    Sexual activity: Not Currently   Social History Narrative    Lives with Son, grandson and her partner. Patient does not drive; she has not driven since she had CVA.      Social Determinants of Health     Financial Resource Strain: Low Risk     Difficulty of Paying Living Expenses: Not hard at all   Food Insecurity: No Food Insecurity    Worried About Running Out of Food in the Last Year: Never true    Ran Out of Food in the Last Year: Never true      Family History   Problem Relation Age of Onset    Diabetes Mother     Cancer Mother     Breast Cancer Mother 79    Heart Disease Father     Hypertension Father     Stroke Father     Coronary Art Dis Brother 54      Patient Active Problem List   Diagnosis Code    DJD (degenerative joint disease) of hip M16.9    DJD (degenerative joint disease) of knee M17.9    CTS (Carpal Tunnel Syndrome)-b/l G56.00    CVA (cerebral infarction) I63.9    Diverticulosis K57.90    Osteopenia M85.80    Grave's disease     DM type 2 (diabetes mellitus, type 2) ([facility] [address] E11.9    PVC's (premature ventricular contractions) I49.3    Myocardial ischemia I25.9    Shortness of breath R06.02    Coronary artery disease I25.10    Asthma J45.909    Renal failure, acute (HCC) N17.9    Esophageal reflux K21.9    Axillary hidradenitis suppurativa L73.2    HTN (hypertension) I10    Warthin's tumor D11.9    Obesity, morbid ([facility] [address] E66.01    History of CVA (cerebrovascular accident) Z86.73    Type 2 diabetes with nephropathy (HCC) E11.21    Type 2 diabetes mellitus with diabetic neuropathy ([facility] [address] E11.40    Major depressive disorder, recurrent, mild F33.0    Major depressive disorder, recurrent, moderate F33.1    Major depressive disorder, recurrent, unspecified F33.9    CKD (chronic kidney disease) stage 3, GFR 30-59 ml/min ([facility] N18.30       Allergies: Allergies   Allergen Reactions    Latex Itching    Codeine Itching and Other (comments)     hallucinate    Contrast Dye [Iodine] Rash and Itching     Given pre-cardiac cath    Seafood [Shellfish Containing Products] Swelling     Medications:   Prior to Admission medications    Medication Sig Start Date End Date Taking?  Authorizing Provider   hydrALAZINE (APRESOLINE) 50 mg tablet TAKE 1 TABLET EVERY 8 HOURS 9/28/22  Yes Soham Madera MD   albuterol (PROVENTIL HFA, VENTOLIN HFA, PROAIR HFA) 90 mcg/actuation inhaler USE 1 INHALATION EVERY 4 HOURS AS NEEDED FOR WHEEZING OR SHORTNESS OF BREATH 8/22/22  Yes Flavia Collins MD   potassium chloride (K-DUR, KLOR-CON M20) 20 mEq tablet Take 1 Tablet by mouth in the morning. 8/2/22  Yes Flavia Collins MD   rosuvastatin (CRESTOR) 20 mg tablet TAKE 1 TABLET DAILY 7/22/22  Yes Brooklyn Shepard MD   losartan (COZAAR) 100 mg tablet TAKE 1 TABLET DAILY 5/24/22  Yes Flavia Collins MD   gabapentin (NEURONTIN) 100 mg capsule TAKE 1 CAPSULE THREE TIMES A DAY 5/20/22  Yes Brooklyn Shepard MD   albuterol (ACCUNEB) 1.25 mg/3 mL nebu 3 mL by Nebulization route every four (4) hours as needed (wheezing). 5/11/22  Yes Flavia Collins MD   HumaLOG KwikPen Insulin 100 unit/mL kwikpen 30 units for breakfast, 24 units for lunch and 42 units for dinner  Patient taking differently: 20 units for breakfast, 20 units for lunch and 20 units for dinner 4/29/22  Yes Brooklyn Shepard MD   levothyroxine (SYNTHROID) 137 mcg tablet Take 1 Tablet by mouth Daily (before breakfast). 4/29/22  Yes Brooklyn Shepard MD   carvediloL (COREG) 12.5 mg tablet Take 12.5 mg by mouth two (2) times daily (with meals). Yes Provider, Historical   glucose blood VI test strips (True Metrix Glucose Test Strip) strip Monitor blood sugar 3 times daily 12/8/21  Yes Brooklyn Shepard MD   flash glucose sensor (FreeStyle Jack 2 Sensor) kit 1 Each by Does Not Apply route every fourteen (14) days. 11/11/21  Yes Brooklyn Shepard MD   flash glucose scanning reader [facility] [place]) misc 1 Each by Does Not Apply route daily. 11/11/21  Yes Brooklyn Shepard MD   triamterene-hydroCHLOROthiazide (DYAZIDE) 37.5-25 mg per capsule Take 1 Capsule by mouth daily. 9/17/21  Yes Flavia Collins MD   insulin glargine U-300 conc (Toujeo SoloStar U-300 Insulin) 300 unit/mL (1.5 mL) inpn pen 60 Units by SubCUTAneous route nightly.  Indications: type 2 diabetes mellitus  Patient taking differently: 30 Units by SubCUTAneous route every morning. Indications: type 2 diabetes mellitus 2/17/21  Yes Gio Turner MD   ADVAIR DISKUS 100-50 mcg/dose diskus inhaler USE 1 INHALATION TWICE A DAY 9/17/17  Yes Harvey Robbins MD   BD INSULIN PEN NEEDLE UF SHORT 31 gauge x 5/16\" ndle  3/21/17  Yes Provider, Historical   aspirin delayed-release 81 mg tablet Take 81 mg by mouth daily. Yes Provider, Historical   calcitRIOL (ROCALTROL) 0.25 mcg capsule  4/13/22   Provider, Historical     Physical Exam:   Vital Signs: Blood pressure (!) 145/68, pulse 65, temperature 98 °F (36.7 °C), resp. rate 16, height 5' 3\" (1.6 m), weight 95.7 kg (211 lb), SpO2 96 %, not currently breastfeeding.   General: well developed, well nourished   HEENT: unremarkable   Heart: regular rhythm no mumur    Lungs: clear   Abdominal:  benign   Neurological: unremarkable   Extremities: no edema     Findings/Diagnosis: fam hx colon polyps  Plan of Care/Planned Procedure: colonoscopy with conscious/deep sedation    Signed:  Tamera Chambers MD 10/24/2022

## 2022-10-26 NOTE — ANESTHESIA POSTPROCEDURE EVALUATION
Procedure(s):  COLONOSCOPY  ENDOSCOPIC POLYPECTOMY. MAC    Anesthesia Post Evaluation        Patient location during evaluation: PACU  Note status: Adequate. Level of consciousness: responsive to verbal stimuli and sleepy but conscious  Pain management: satisfactory to patient  Airway patency: patent  Anesthetic complications: no  Cardiovascular status: acceptable  Respiratory status: acceptable  Hydration status: acceptable  Comments: +Post-Anesthesia Evaluation and Assessment    Patient: Tenzin Park MRN: [id number]  SSN: [id number]   YOB: 1950  Age: 73 y.o. Sex: female      Cardiovascular Function/Vital Signs    BP (!) 149/63   Pulse (!) 59   Temp 36.6 °C (97.9 °F)   Resp 13   Ht 5' 3\" (1.6 m)   Wt 95.7 kg (211 lb)   SpO2 98%   Breastfeeding No   BMI 37.38 kg/m²     Patient is status post Procedure(s):  COLONOSCOPY  ENDOSCOPIC POLYPECTOMY. Nausea/Vomiting: Controlled. Postoperative hydration reviewed and adequate. Pain:  Pain Scale 1: Numeric (0 - 10) (10/24/22 0829)  Pain Intensity 1: 0 (10/24/22 0829)   Managed. Neurological Status: At baseline. Mental Status and Level of Consciousness: Arousable. Pulmonary Status:   O2 Device: None (Room air) (10/24/22 0746)   Adequate oxygenation and airway patent. Complications related to anesthesia: None    Post-anesthesia assessment completed. No concerns. Signed By: Curry Rodriguez MD    10/26/2022  Post anesthesia nausea and vomiting:  controlled      INITIAL Post-op Vital signs:   Vitals Value Taken Time   /63 10/24/22 0846   Temp     Pulse 55 10/24/22 0846   Resp 14 10/24/22 0846   SpO2 98 % 10/24/22 0846   Vitals shown include unvalidated device data.

## 2022-10-31 RX ORDER — TRIAMTERENE AND HYDROCHLOROTHIAZIDE 37.5; 25 MG/1; MG/1
CAPSULE ORAL
Qty: 180 CAPSULE | Refills: 3 | Status: SHIPPED | OUTPATIENT
Start: 2022-10-31

## 2022-11-07 ENCOUNTER — TELEPHONE (OUTPATIENT)
Dept: INTERNAL MEDICINE CLINIC | Age: 72
End: 2022-11-07

## 2022-11-07 ENCOUNTER — HOSPITAL ENCOUNTER (EMERGENCY)
Age: 72
Discharge: HOME | End: 2022-11-07
Attending: EMERGENCY MEDICINE
Payer: MEDICARE

## 2022-11-07 VITALS
RESPIRATION RATE: 18 BRPM | WEIGHT: 214 LBS | HEART RATE: 65 BPM | SYSTOLIC BLOOD PRESSURE: 189 MMHG | TEMPERATURE: 98.1 F | DIASTOLIC BLOOD PRESSURE: 78 MMHG | HEIGHT: 63 IN | BODY MASS INDEX: 37.92 KG/M2 | OXYGEN SATURATION: 96 %

## 2022-11-07 DIAGNOSIS — M54.31 SCIATICA OF RIGHT SIDE: ICD-10-CM

## 2022-11-07 DIAGNOSIS — I16.0 HYPERTENSIVE URGENCY: Primary | ICD-10-CM

## 2022-11-07 LAB
ALBUMIN SERPL-MCNC: 3.2 G/DL (ref 3.5–5)
ALBUMIN/GLOB SERPL: 0.7 {RATIO} (ref 1.1–2.2)
ALP SERPL-CCNC: 101 U/L (ref 45–117)
ALT SERPL-CCNC: 15 U/L (ref 12–78)
ANION GAP SERPL CALC-SCNC: 5 MMOL/L (ref 5–15)
AST SERPL-CCNC: 7 U/L (ref 15–37)
ATRIAL RATE: 68 BPM
BASOPHILS # BLD: 0 K/UL (ref 0–0.1)
BASOPHILS NFR BLD: 1 % (ref 0–1)
BILIRUB SERPL-MCNC: 0.5 MG/DL (ref 0.2–1)
BUN SERPL-MCNC: 26 MG/DL (ref 6–20)
BUN/CREAT SERPL: 20 (ref 12–20)
CALCIUM SERPL-MCNC: 10.3 MG/DL (ref 8.5–10.1)
CALCULATED P AXIS, ECG09: 57 DEGREES
CALCULATED R AXIS, ECG10: 23 DEGREES
CALCULATED T AXIS, ECG11: 32 DEGREES
CHLORIDE SERPL-SCNC: 101 MMOL/L (ref 97–108)
CO2 SERPL-SCNC: 37 MMOL/L (ref 21–32)
CREAT SERPL-MCNC: 1.31 MG/DL (ref 0.55–1.02)
DIAGNOSIS, 93000: NORMAL
DIFFERENTIAL METHOD BLD: ABNORMAL
EOSINOPHIL # BLD: 0.2 K/UL (ref 0–0.4)
EOSINOPHIL NFR BLD: 2 % (ref 0–7)
ERYTHROCYTE [DISTWIDTH] IN BLOOD BY AUTOMATED COUNT: 17 % (ref 11.5–14.5)
GLOBULIN SER CALC-MCNC: 4.4 G/DL (ref 2–4)
GLUCOSE SERPL-MCNC: 259 MG/DL (ref 65–100)
HCT VFR BLD AUTO: 37.3 % (ref 35–47)
HGB BLD-MCNC: 10.9 G/DL (ref 11.5–16)
IMM GRANULOCYTES # BLD AUTO: 0 K/UL (ref 0–0.04)
IMM GRANULOCYTES NFR BLD AUTO: 0 % (ref 0–0.5)
LYMPHOCYTES # BLD: 1.7 K/UL (ref 0.8–3.5)
LYMPHOCYTES NFR BLD: 25 % (ref 12–49)
MCH RBC QN AUTO: 23.1 PG (ref 26–34)
MCHC RBC AUTO-ENTMCNC: 29.2 G/DL (ref 30–36.5)
MCV RBC AUTO: 79 FL (ref 80–99)
MONOCYTES # BLD: 0.4 K/UL (ref 0–1)
MONOCYTES NFR BLD: 6 % (ref 5–13)
NEUTS SEG # BLD: 4.6 K/UL (ref 1.8–8)
NEUTS SEG NFR BLD: 66 % (ref 32–75)
NRBC # BLD: 0 K/UL (ref 0–0.01)
NRBC BLD-RTO: 0 PER 100 WBC
P-R INTERVAL, ECG05: 188 MS
PLATELET # BLD AUTO: 185 K/UL (ref 150–400)
PMV BLD AUTO: 10.7 FL (ref 8.9–12.9)
POTASSIUM SERPL-SCNC: 3.1 MMOL/L (ref 3.5–5.1)
PROT SERPL-MCNC: 7.6 G/DL (ref 6.4–8.2)
Q-T INTERVAL, ECG07: 408 MS
QRS DURATION, ECG06: 90 MS
QTC CALCULATION (BEZET), ECG08: 433 MS
RBC # BLD AUTO: 4.72 M/UL (ref 3.8–5.2)
SODIUM SERPL-SCNC: 143 MMOL/L (ref 136–145)
TROPONIN-HIGH SENSITIVITY: 54 NG/L (ref 0–51)
VENTRICULAR RATE, ECG03: 68 BPM
WBC # BLD AUTO: 7 K/UL (ref 3.6–11)

## 2022-11-07 PROCEDURE — 36415 COLL VENOUS BLD VENIPUNCTURE: CPT

## 2022-11-07 PROCEDURE — 74011250637 HC RX REV CODE- 250/637: Performed by: EMERGENCY MEDICINE

## 2022-11-07 PROCEDURE — 84484 ASSAY OF TROPONIN QUANT: CPT

## 2022-11-07 PROCEDURE — 85025 COMPLETE CBC W/AUTO DIFF WBC: CPT

## 2022-11-07 PROCEDURE — 99284 EMERGENCY DEPT VISIT MOD MDM: CPT

## 2022-11-07 PROCEDURE — 80053 COMPREHEN METABOLIC PANEL: CPT

## 2022-11-07 PROCEDURE — 93005 ELECTROCARDIOGRAM TRACING: CPT

## 2022-11-07 RX ORDER — CARVEDILOL 12.5 MG/1
12.5 TABLET ORAL
Status: COMPLETED | OUTPATIENT
Start: 2022-11-07 | End: 2022-11-07

## 2022-11-07 RX ORDER — HYDRALAZINE HYDROCHLORIDE 50 MG/1
50 TABLET, FILM COATED ORAL
Status: COMPLETED | OUTPATIENT
Start: 2022-11-07 | End: 2022-11-07

## 2022-11-07 RX ORDER — LOSARTAN POTASSIUM 100 MG/1
100 TABLET ORAL
Status: COMPLETED | OUTPATIENT
Start: 2022-11-07 | End: 2022-11-07

## 2022-11-07 RX ORDER — TRIAMTERENE/HYDROCHLOROTHIAZID 37.5-25 MG
1 TABLET ORAL
Status: COMPLETED | OUTPATIENT
Start: 2022-11-07 | End: 2022-11-07

## 2022-11-07 RX ORDER — ACETAMINOPHEN 500 MG
1000 TABLET ORAL ONCE
Status: COMPLETED | OUTPATIENT
Start: 2022-11-07 | End: 2022-11-07

## 2022-11-07 RX ORDER — NAPROXEN 250 MG/1
250 TABLET ORAL ONCE
Status: DISCONTINUED | OUTPATIENT
Start: 2022-11-07 | End: 2022-11-07

## 2022-11-07 RX ORDER — TIZANIDINE 4 MG/1
4 TABLET ORAL
Status: COMPLETED | OUTPATIENT
Start: 2022-11-07 | End: 2022-11-07

## 2022-11-07 RX ADMIN — TRIAMTERENE AND HYDROCHLOROTHIAZIDE 1 TABLET: 37.5; 25 TABLET ORAL at 10:57

## 2022-11-07 RX ADMIN — LOSARTAN POTASSIUM 100 MG: 100 TABLET, FILM COATED ORAL at 10:57

## 2022-11-07 RX ADMIN — CARVEDILOL 12.5 MG: 12.5 TABLET, FILM COATED ORAL at 10:14

## 2022-11-07 RX ADMIN — ACETAMINOPHEN 1000 MG: 500 TABLET ORAL at 10:57

## 2022-11-07 RX ADMIN — TIZANIDINE 4 MG: 4 TABLET ORAL at 11:20

## 2022-11-07 RX ADMIN — HYDRALAZINE HYDROCHLORIDE 50 MG: 50 TABLET, FILM COATED ORAL at 10:14

## 2022-11-07 NOTE — TELEPHONE ENCOUNTER
Called and spoke to patient. Patient states that she is having cataract surgery on either 11/16 or 11/17; patient couldn't remember. Advised patient that there are currently no appointments available with Dr. Luis Miguel Negro. Advised patient that I would need to send Dr. Luis Miguel Negro a message to further advise. Patient understood.

## 2022-11-07 NOTE — ED NOTES
patient presents with high blood pressure after her eye drs appointment this morning.   She states she did not take any of her home medications this morning because she didn't have time to eat before her appointment and cant take her meds on an empty stomach

## 2022-11-07 NOTE — ED PROVIDER NOTES
EMERGENCY DEPARTMENT HISTORY AND PHYSICAL EXAM      Date: 11/7/2022  Patient Name: Garrett Wooten    History of Presenting Illness     Chief Complaint   Patient presents with    Hypertension     Pt ambulatory into triage with a cc of high blood pressure that was noticed today at her eye appointment; pt does have hx of HTN and does take medication daily but has not taken it this morning; pt is also complaining of right leg pain x 1 week; pt denies injury        History Provided By: Patient    HPI: Garrett Wooten, 73 y.o. female  presents to the ED with cc of hypertension. Patient went to her ophthalmology appointment this morning and was told that her blood pressure was high with a systolic pressure of 744. Patient is asymptomatic. She denies headaches. No visual changes. No chest pain or shortness of breath. She states she did not take her medicines this morning. She states she is very compliant with her medications but this morning she was rushed to get to her appointment and did not eat or take her medicines. Patient also states that she has sciatica with right leg pain that has been bothering her for the past couple days.   She has not taken any medicines for this prior to arrival.    Past History     Past Medical History:  Past Medical History:   Diagnosis Date    Asthma     CAD (coronary artery disease)     \"mild\" per Dr Christiane Cho note    Diabetes [facility])     Dr Onur River     Hyperlipidemia     Hypertension     Long term current use of anticoagulant therapy     Nausea & vomiting     Pulmonary embolism ([address]     Screening for colon cancer 02/16/2005    Dr Brandie López in 10 years    Stroke [facility]) 2004    Rt side weaker than left, uses a cane: no longer followed by neuro    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     Thyroid disease     Unspecified sleep apnea     uses CPAP       Past Surgical History:  Past Surgical History:   Procedure Laterality Date    CARDIAC CATHETERIZATION 6/6/2012         COLONOSCOPY N/A 10/24/2022    COLONOSCOPY performed by Will Abdi MD at [facility],[address]  10/24/2022    HX HEART CATHETERIZATION      HX HEENT      tonsillectomy    HX HEMORRHOIDECTOMY      HX HYSTERECTOMY      HX ORTHOPAEDIC  8/2011    left shoulder    [place] CARDIAC SURG PROCEDURE UNLIST      heart surgery       Medications:  No current facility-administered medications on file prior to encounter. Current Outpatient Medications on File Prior to Encounter   Medication Sig Dispense Refill    triamterene-hydroCHLOROthiazide (DYAZIDE) 37.5-25 mg per capsule TAKE 1 CAPSULE DAILY 180 Capsule 3    hydrALAZINE (APRESOLINE) 50 mg tablet TAKE 1 TABLET EVERY 8 HOURS 180 Tablet 0    albuterol (PROVENTIL HFA, VENTOLIN HFA, PROAIR HFA) 90 mcg/actuation inhaler USE 1 INHALATION EVERY 4 HOURS AS NEEDED FOR WHEEZING OR SHORTNESS OF BREATH 25.5 g 2    potassium chloride (K-DUR, KLOR-CON M20) 20 mEq tablet Take 1 Tablet by mouth in the morning. 90 Tablet 3    rosuvastatin (CRESTOR) 20 mg tablet TAKE 1 TABLET DAILY 90 Tablet 3    calcitRIOL (ROCALTROL) 0.25 mcg capsule  (Patient not taking: Reported on 10/24/2022)      losartan (COZAAR) 100 mg tablet TAKE 1 TABLET DAILY 90 Tablet 3    gabapentin (NEURONTIN) 100 mg capsule TAKE 1 CAPSULE THREE TIMES A DAY 90 Capsule 3    albuterol (ACCUNEB) 1.25 mg/3 mL nebu 3 mL by Nebulization route every four (4) hours as needed (wheezing). 225 mL 3    HumaLOG KwikPen Insulin 100 unit/mL kwikpen 30 units for breakfast, 24 units for lunch and 42 units for dinner (Patient taking differently: 20 units for breakfast, 20 units for lunch and 20 units for dinner) 25 Pen 3    levothyroxine (SYNTHROID) 137 mcg tablet Take 1 Tablet by mouth Daily (before breakfast). 90 Tablet 3    carvediloL (COREG) 12.5 mg tablet Take 12.5 mg by mouth two (2) times daily (with meals).       glucose blood VI test strips (True Metrix Glucose Test Strip) strip Monitor blood sugar 3 times daily 300 Strip 3    flash glucose sensor (FreeStyle Jack 2 Sensor) kit 1 Each by Does Not Apply route every fourteen (14) days. 6 Kit 3    flash glucose scanning reader (FreeStyle Jack 2 Elmer) misc 1 Each by Does Not Apply route daily. 1 Each 0    insulin glargine U-300 conc (Toujeo SoloStar U-300 Insulin) 300 unit/mL (1.5 mL) inpn pen 60 Units by SubCUTAneous route nightly. Indications: type 2 diabetes mellitus (Patient taking differently: 30 Units by SubCUTAneous route every morning. Indications: type 2 diabetes mellitus) 10 Pen 4    ADVAIR DISKUS 100-50 mcg/dose diskus inhaler USE 1 INHALATION TWICE A  Each 3    BD INSULIN PEN NEEDLE UF SHORT 31 gauge x 5/16\" ndle       aspirin delayed-release 81 mg tablet Take 81 mg by mouth daily. Family History:  Family History   Problem Relation Age of Onset    Diabetes Mother     Cancer Mother     Breast Cancer Mother 79    Heart Disease Father     Hypertension Father     Stroke Father     Coronary Art Dis Brother 54       Social History:  Social History     Tobacco Use    Smoking status: Former     Packs/day: 1.00     Types: Cigarettes     Quit date: 2004     Years since quittin.1    Smokeless tobacco: Never   Vaping Use    Vaping Use: Never used   Substance Use Topics    Alcohol use: No    Drug use: Not Currently     Comment: CBD oil(cream)       Allergies: Allergies   Allergen Reactions    Latex Itching    Codeine Itching and Other (comments)     hallucinate    Contrast Dye [Iodine] Rash and Itching     Given pre-cardiac cath    Seafood [Shellfish Containing Products] Swelling       All the above components of the past  history are auto-populated from the electronic record. They have been reviewed and the patient has been interviewed for any pertinent past history that pertains to the patient's chief complaint and reason for visit. Not all pre-populated components may be accurate at the time this note was generated.     Review of Systems   Review of Systems   Constitutional:  Negative for chills and fever. HENT:  Negative for congestion, ear pain, rhinorrhea, sore throat and trouble swallowing. Eyes:  Negative for visual disturbance. Respiratory:  Negative for cough, chest tightness and shortness of breath. Cardiovascular:  Negative for chest pain and palpitations. Gastrointestinal:  Negative for abdominal pain, blood in stool, constipation, diarrhea, nausea and vomiting. Genitourinary:  Negative for decreased urine volume, difficulty urinating, dysuria and frequency. Musculoskeletal:  Negative for back pain and neck pain. Right leg pain   Skin:  Negative for color change and rash. Neurological:  Negative for dizziness, weakness, light-headedness and headaches. Physical Exam   Physical Exam  Vitals and nursing note reviewed. Constitutional:       General: She is not in acute distress. Appearance: She is well-developed. She is obese. She is not ill-appearing. HENT:      Head: Normocephalic. Eyes:      Conjunctiva/sclera: Conjunctivae normal.   Cardiovascular:      Rate and Rhythm: Normal rate and regular rhythm. Pulmonary:      Effort: Pulmonary effort is normal. No accessory muscle usage or respiratory distress. Abdominal:      General: There is no distension. Musculoskeletal:      Cervical back: Normal range of motion. Skin:     General: Skin is warm and dry. Neurological:      Mental Status: She is alert and oriented to person, place, and time.        Diagnostic Study Results     Labs -     Recent Results (from the past 24 hour(s))   EKG, 12 LEAD, INITIAL    Collection Time: 11/07/22  9:13 AM   Result Value Ref Range    Ventricular Rate 68 BPM    Atrial Rate 68 BPM    P-R Interval 188 ms    QRS Duration 90 ms    Q-T Interval 408 ms    QTC Calculation (Bezet) 433 ms    Calculated P Axis 57 degrees    Calculated R Axis 23 degrees    Calculated T Axis 32 degrees    Diagnosis       Sinus rhythm with premature supraventricular complexes  Cannot rule out Anterior infarct , age undetermined  When compared with ECG of 25-JUL-2022 18:56,  premature supraventricular complexes are now present  ST no longer depressed in Inferior leads  Nonspecific T wave abnormality has replaced inverted T waves in Inferior   leads     CBC WITH AUTOMATED DIFF    Collection Time: 11/07/22  9:18 AM   Result Value Ref Range    WBC 7.0 3.6 - 11.0 K/uL    RBC 4.72 3.80 - 5.20 M/uL    HGB 10.9 (L) 11.5 - 16.0 g/dL    HCT 37.3 35.0 - 47.0 %    MCV 79.0 (L) 80.0 - 99.0 FL    MCH 23.1 (L) 26.0 - 34.0 PG    MCHC 29.2 (L) 30.0 - 36.5 g/dL    RDW 17.0 (H) 11.5 - 14.5 %    PLATELET 386 061 - 194 K/uL    MPV 10.7 8.9 - 12.9 FL    NRBC 0.0 0  WBC    ABSOLUTE NRBC 0.00 0.00 - 0.01 K/uL    NEUTROPHILS 66 32 - 75 %    LYMPHOCYTES 25 12 - 49 %    MONOCYTES 6 5 - 13 %    EOSINOPHILS 2 0 - 7 %    BASOPHILS 1 0 - 1 %    IMMATURE GRANULOCYTES 0 0.0 - 0.5 %    ABS. NEUTROPHILS 4.6 1.8 - 8.0 K/UL    ABS. LYMPHOCYTES 1.7 0.8 - 3.5 K/UL    ABS. MONOCYTES 0.4 0.0 - 1.0 K/UL    ABS. EOSINOPHILS 0.2 0.0 - 0.4 K/UL    ABS. BASOPHILS 0.0 0.0 - 0.1 K/UL    ABS. IMM. GRANS. 0.0 0.00 - 0.04 K/UL    DF AUTOMATED     METABOLIC PANEL, COMPREHENSIVE    Collection Time: 11/07/22  9:18 AM   Result Value Ref Range    Sodium 143 136 - 145 mmol/L    Potassium 3.1 (L) 3.5 - 5.1 mmol/L    Chloride 101 97 - 108 mmol/L    CO2 37 (H) 21 - 32 mmol/L    Anion gap 5 5 - 15 mmol/L    Glucose 259 (H) 65 - 100 mg/dL    BUN 26 (H) 6 - 20 MG/DL    Creatinine 1.31 (H) 0.55 - 1.02 MG/DL    BUN/Creatinine ratio 20 12 - 20      eGFR 44 (L) >60 ml/min/1.73m2    Calcium 10.3 (H) 8.5 - 10.1 MG/DL    Bilirubin, total 0.5 0.2 - 1.0 MG/DL    ALT (SGPT) 15 12 - 78 U/L    AST (SGOT) 7 (L) 15 - 37 U/L    Alk.  phosphatase 101 45 - 117 U/L    Protein, total 7.6 6.4 - 8.2 g/dL    Albumin 3.2 (L) 3.5 - 5.0 g/dL    Globulin 4.4 (H) 2.0 - 4.0 g/dL    A-G Ratio 0.7 (L) 1.1 - 2.2     TROPONIN-HIGH SENSITIVITY    Collection Time: 11/07/22  9:18 AM   Result Value Ref Range    Troponin-High Sensitivity 54 (H) 0 - 51 ng/L       Radiologic Studies -   No orders to display     CT Results  (Last 48 hours)      None          CXR Results  (Last 48 hours)      None              Medical Decision Making     I reviewed the vital signs, available nursing notes, past medical history, past surgical history, family history and social history. Vital Signs-I have reviewed the vital signs that have been made available during the patient's emergency department visit. The vital signs auto-populated below are obtained mostly by electronic means through monitoring devices that have been downloaded into the patient's chart by the nursing staff. Some vital signs are not downloaded into the chart until after the patient has been discharged and this note has been completed, therefore some vital signs may not be available to the physician for review prior to patient's discharge or admission. The physician has reviewed the patient's triage vital signs, monitored the electronic monitoring devices remotely for any significant vital sign abnormalities, and have reviewed vital signs prior to discharge. Some vital signs reviewed at bedside or remotely utilizing electronic monitoring devices may be different than the vital signs downloaded into the electronic medical record. Some vital signs may be erroneous and inaccurate since they are obtained by electronic monitoring devices, and not all vital signs are verified for accuracy by nursing staff prior to downloading into the patient's chart. Patient Vitals for the past 24 hrs:   Temp Pulse Resp BP SpO2   11/07/22 1114 -- -- -- (!) 189/78 96 %   11/07/22 1030 -- -- -- (!) 194/79 93 %   11/07/22 1014 -- 65 -- (!) 218/79 --   11/07/22 0902 98.1 °F (36.7 °C) 64 18 (!) 225/73 96 %         Records Reviewed: Nursing notes for today's visit have been reviewed.   I have also reviewed most recent medical records pertinent to today's complaints, if available in our medical record system. I have also reviewed all labs and imaging results from previous results in comparison to results obtained today. If an EKG was obtained today, it has been compared to previous EKGs, if available. If arriving via EMS, the EMS report has been reviewed if made available to us within the patient's time in the emergency department. ED Course and Medical Decision Making:       MDM  Number of Diagnoses or Management Options  Hypertensive urgency  Sciatica of right side  Diagnosis management comments: Patient presents with asymptomatic hypertension. Patient did not take her morning medications as she was rushing to get to an eye doctor's appointment. Patient was medicated with her home medications here in the emergency department which did show improvement in her blood pressure. Patient also had a complaint of sciatica in the right leg. She was medicated with acetaminophen and tizanidine. Recommend follow-up with primary care. Counseled not to miss her blood pressure medicines. Labs were obtained per advanced nursing triage protocols. Labs were not necessary in this situation as the patient is asymptomatic. I did review them and did not note any significant abnormalities.        Amount and/or Complexity of Data Reviewed  Clinical lab tests: reviewed  Review and summarize past medical records: yes    Risk of Complications, Morbidity, and/or Mortality  Presenting problems: low  Diagnostic procedures: low  Management options: low    Patient Progress  Patient progress: improved           Orders Placed This Encounter    CBC WITH AUTOMATED DIFF    METABOLIC PANEL, COMPREHENSIVE    TROPONIN-HIGH SENSITIVITY    EKG NOTEWRITER(ASAP ONLY)    EKG, 12 LEAD, INITIAL    INSERT PERIPHERAL IV ONE TIME STAT    hydrALAZINE (APRESOLINE) tablet 50 mg    carvediloL (COREG) tablet 12.5 mg    losartan (COZAAR) tablet 100 mg triamterene-hydroCHLOROthiazide (MAXZIDE) 37.5-25 mg per tablet 1 Tablet    DISCONTD: naproxen (NAPROSYN) tablet 250 mg    tiZANidine (ZANAFLEX) tablet 4 mg    acetaminophen (TYLENOL) tablet 1,000 mg       EKG    Date/Time: 11/7/2022 9:13 AM  Performed by: Lj Reyna MD  Authorized by: Lj Reyna MD     ECG reviewed by ED Physician in the absence of a cardiologist: yes    Rate:     ECG rate:  68    ECG rate assessment: normal    Rhythm:     Rhythm: sinus rhythm    Ectopy:     Ectopy: PVCs    QRS:     QRS axis:  Normal    QRS intervals:  Normal    QRS conduction: normal    ST segments:     ST segments:  Normal  T waves:     T waves: normal        Critical Care Time:   0    Disposition:  Discharge    The patient's emergency department evaluation is now complete. I have reviewed all labs, imaging, and pertinent information. I have discussed all results with the patient and/or family. Based on our evaluation today I do believe that the patient is safe to be discharged home. The patient has been provided with at home instructions that are pertinent to their complaint today, although these may not be specific to the exact diagnosis. I have reviewed the patient's home medications and attempted to reconcile if not already done so by pharmacy or nursing staff. I have discussed all new prescriptions with the patient. The patient has been encouraged to follow-up with primary care doctor and/or specialist, and these have been discussed with the patient. The patient has been advised that they may return to the emergency department if they have any worsening symptoms and or new symptoms that are of concern to them. Verbal discharge instructions may have also been provided to the patient that may not be specifically contained in the written discharge instructions. The patient has been given opportunity to ask questions prior to discharge. PLAN:  1.    Discharge Medication List as of 11/7/2022 11:22 AM 2.   Follow-up Information       Follow up With Specialties Details Why Contact Info    Flavia Collins MD Internal Medicine Physician Schedule an appointment as soon as possible for a visit in 1 week  [address] [facility]  [phone]            Return to ED if worse     Diagnosis     Clinical Impression:   1. Hypertensive urgency    2.  Sciatica of right side

## 2022-11-07 NOTE — TELEPHONE ENCOUNTER
See IZEA message from 11/7/22. Left message for patient to return call to office to schedule an appointment.

## 2022-11-07 NOTE — DISCHARGE INSTRUCTIONS
It was a pleasure taking care of you in our Emergency Department today. We know that when you come to [facility], you are entrusting us with your health, comfort, and safety. Our physicians and nurses honor that trust, and truly appreciate the opportunity to care for you and your loved ones. We also value your feedback. If you receive a survey about your Emergency Department experience today, please fill it out. We care about our patients' feedback, and we listen to what you have to say. Please read over your discharge instructions as these contain pertinent information to help you in the healing process. These instructions include a list of prescriptions you were given today. Follow-up information is also noted on your discharge papers. There are attached instructions and information pertaining to the reason why you were seen in the emergency department today. These discharge instructions may not be for exactly why you were here, but may be the closest available instructions that we have. These include important advice for things that you can do at home to feel better, and reasons to return to the emergency department. The evaluation and treatment you received in the emergency department is not always definitive care. If follow-up with your primary care doctor or specialist was recommended, it is important that you make these appointments for follow-up care. You may need further testing, procedures, and/or medications to help you feel better. Further tests may be required that are not available in the emergency department. Failure to make these follow-up appointments may jeopardize your health. The emergency department is here for emergent stabilization and evaluation of life and limb threatening illness and/or injuries.   Further care through a specialist or primary care doctor may be required to assist in your healing and complete your treatment and/or evaluation. We may not always be able to make a diagnosis in the emergency department, or things may change that will alter your diagnosis. Our primary goal is to ensure that nothing serious is occurring and that you are stable to continue your treatment and evaluation at home as an outpatient. Of course, if things change, and you feel worse, you are always encouraged to return to the emergency department for re-evaluation. Lab Results Today:  Recent Results (from the past 8 hour(s))   EKG, 12 LEAD, INITIAL    Collection Time: 11/07/22  9:13 AM   Result Value Ref Range    Ventricular Rate 68 BPM    Atrial Rate 68 BPM    P-R Interval 188 ms    QRS Duration 90 ms    Q-T Interval 408 ms    QTC Calculation (Bezet) 433 ms    Calculated P Axis 57 degrees    Calculated R Axis 23 degrees    Calculated T Axis 32 degrees    Diagnosis       Sinus rhythm with premature supraventricular complexes  Cannot rule out Anterior infarct , age undetermined  When compared with ECG of 25-JUL-2022 18:56,  premature supraventricular complexes are now present  ST no longer depressed in Inferior leads  Nonspecific T wave abnormality has replaced inverted T waves in Inferior   leads     CBC WITH AUTOMATED DIFF    Collection Time: 11/07/22  9:18 AM   Result Value Ref Range    WBC 7.0 3.6 - 11.0 K/uL    RBC 4.72 3.80 - 5.20 M/uL    HGB 10.9 (L) 11.5 - 16.0 g/dL    HCT 37.3 35.0 - 47.0 %    MCV 79.0 (L) 80.0 - 99.0 FL    MCH 23.1 (L) 26.0 - 34.0 PG    MCHC 29.2 (L) 30.0 - 36.5 g/dL    RDW 17.0 (H) 11.5 - 14.5 %    PLATELET 676 149 - 000 K/uL    MPV 10.7 8.9 - 12.9 FL    NRBC 0.0 0  WBC    ABSOLUTE NRBC 0.00 0.00 - 0.01 K/uL    NEUTROPHILS 66 32 - 75 %    LYMPHOCYTES 25 12 - 49 %    MONOCYTES 6 5 - 13 %    EOSINOPHILS 2 0 - 7 %    BASOPHILS 1 0 - 1 %    IMMATURE GRANULOCYTES 0 0.0 - 0.5 %    ABS. NEUTROPHILS 4.6 1.8 - 8.0 K/UL    ABS. LYMPHOCYTES 1.7 0.8 - 3.5 K/UL    ABS. MONOCYTES 0.4 0.0 - 1.0 K/UL    ABS.  EOSINOPHILS 0.2 0.0 - 0.4 K/UL    ABS. BASOPHILS 0.0 0.0 - 0.1 K/UL    ABS. IMM. GRANS. 0.0 0.00 - 0.04 K/UL    DF AUTOMATED     METABOLIC PANEL, COMPREHENSIVE    Collection Time: 11/07/22  9:18 AM   Result Value Ref Range    Sodium 143 136 - 145 mmol/L    Potassium 3.1 (L) 3.5 - 5.1 mmol/L    Chloride 101 97 - 108 mmol/L    CO2 37 (H) 21 - 32 mmol/L    Anion gap 5 5 - 15 mmol/L    Glucose 259 (H) 65 - 100 mg/dL    BUN 26 (H) 6 - 20 MG/DL    Creatinine 1.31 (H) 0.55 - 1.02 MG/DL    BUN/Creatinine ratio 20 12 - 20      eGFR 44 (L) >60 ml/min/1.73m2    Calcium 10.3 (H) 8.5 - 10.1 MG/DL    Bilirubin, total 0.5 0.2 - 1.0 MG/DL    ALT (SGPT) 15 12 - 78 U/L    AST (SGOT) 7 (L) 15 - 37 U/L    Alk. phosphatase 101 45 - 117 U/L    Protein, total 7.6 6.4 - 8.2 g/dL    Albumin 3.2 (L) 3.5 - 5.0 g/dL    Globulin 4.4 (H) 2.0 - 4.0 g/dL    A-G Ratio 0.7 (L) 1.1 - 2.2     TROPONIN-HIGH SENSITIVITY    Collection Time: 11/07/22  9:18 AM   Result Value Ref Range    Troponin-High Sensitivity 54 (H) 0 - 51 ng/L        Radiology Results Today:  No results found.

## 2022-11-07 NOTE — ED NOTES
Went over the patients discharge papers.   Patient verbalized understanding and ambulated out of the ed with a steady gate

## 2022-11-13 NOTE — PROGRESS NOTES
HISTORY OF PRESENT ILLNESS  Makayla Oviedo is a 73 y.o. female. HPI  hx HTN hld asthma hx CVA-DM-2 osteopenia , hx PE morbid obesity ckd 3 depression   Here for preop eye surgery left eye-Sheyla Costello  Has not had difficulty in the past with surgery or anesthesia  No f/c cp sob  Last a1c 9.0  Went to ER from eye MD office 11/7 for sefere bp elevation --SBP>200--had mised all bp medicines for 1 day-asymptomatic. Ekg stable. Troponin 54  Has low risk NST 2020    C/o some intermittent dysphagia last 1 week , some burning in  lower chset and upper abdomen. No exertional CP. Denies increased THOMPSON  Last OV  ER FU  Was sent to ER from  for lightheadedness and elevated bun/cr  Head CT-no acute process  Bun/cr 42/2.55--hydrated with IVF.  [place] 16/1.36 Dr Rizwana Bryan  Potassium 3.1-repleted  Bp was 209/75 down to 168/73  Asymptomatic in ER -sent home  Not dizzy now  Only felt dizzy on day or ER visti when she awoke in bed but not having orthostatic symptoms       Patient Active Problem List    Diagnosis Date Noted    CKD (chronic kidney disease) stage 3, GFR 30-59 ml/min ([facility]) 03/10/2022    Major depressive disorder, recurrent, mild 12/10/2021    Major depressive disorder, recurrent, moderate 12/10/2021    Major depressive disorder, recurrent, unspecified 12/10/2021    Type 2 diabetes mellitus with diabetic neuropathy ([facility] 04/29/2019    Type 2 diabetes with nephropathy ([facility] 12/14/2018    History of CVA (cerebrovascular accident) 07/13/2018    Obesity, morbid ([facility] 01/25/2018    Warthin's tumor 07/01/2016    HTN (hypertension) 09/13/2013    Axillary hidradenitis suppurativa 08/07/2013    Esophageal reflux 01/15/2013    Renal failure, acute ([facility] 01/13/2013    Asthma 12/14/2012    Myocardial ischemia 06/06/2012    Shortness of breath 06/06/2012    Coronary artery disease 06/06/2012    PVC's (premature ventricular contractions) 06/01/2012    DM type 2 (diabetes mellitus, type 2) ([facility] 04/23/2012    Grave's disease 10/15/2010    DJD (degenerative joint disease) of hip 10/20/2009    DJD (degenerative joint disease) of knee 10/20/2009    CTS (Carpal Tunnel Syndrome)-b/l 10/20/2009    CVA (cerebral infarction) 10/20/2009    Diverticulosis 10/20/2009    Osteopenia 10/20/2009     Current Outpatient Medications   Medication Sig Dispense Refill    triamterene-hydroCHLOROthiazide (DYAZIDE) 37.5-25 mg per capsule TAKE 1 CAPSULE DAILY 180 Capsule 3    hydrALAZINE (APRESOLINE) 50 mg tablet TAKE 1 TABLET EVERY 8 HOURS 180 Tablet 0    albuterol (PROVENTIL HFA, VENTOLIN HFA, PROAIR HFA) 90 mcg/actuation inhaler USE 1 INHALATION EVERY 4 HOURS AS NEEDED FOR WHEEZING OR SHORTNESS OF BREATH 25.5 g 2    potassium chloride (K-DUR, KLOR-CON M20) 20 mEq tablet Take 1 Tablet by mouth in the morning. 90 Tablet 3    rosuvastatin (CRESTOR) 20 mg tablet TAKE 1 TABLET DAILY 90 Tablet 3    calcitRIOL (ROCALTROL) 0.25 mcg capsule  (Patient not taking: Reported on 10/24/2022)      losartan (COZAAR) 100 mg tablet TAKE 1 TABLET DAILY 90 Tablet 3    gabapentin (NEURONTIN) 100 mg capsule TAKE 1 CAPSULE THREE TIMES A DAY 90 Capsule 3    albuterol (ACCUNEB) 1.25 mg/3 mL nebu 3 mL by Nebulization route every four (4) hours as needed (wheezing). 225 mL 3    HumaLOG KwikPen Insulin 100 unit/mL kwikpen 30 units for breakfast, 24 units for lunch and 42 units for dinner (Patient taking differently: 20 units for breakfast, 20 units for lunch and 20 units for dinner) 25 Pen 3    levothyroxine (SYNTHROID) 137 mcg tablet Take 1 Tablet by mouth Daily (before breakfast). 90 Tablet 3    carvediloL (COREG) 12.5 mg tablet Take 12.5 mg by mouth two (2) times daily (with meals). glucose blood VI test strips (True Metrix Glucose Test Strip) strip Monitor blood sugar 3 times daily 300 Strip 3    flash glucose sensor (FreeStyle Jack 2 Sensor) kit 1 Each by Does Not Apply route every fourteen (14) days.  6 Kit 3    flash glucose scanning reader (FreeStyle Jack 2 [place]) misc 1 Each by Does Not Apply route daily. 1 Each 0    insulin glargine U-300 conc (Toujeo SoloStar U-300 Insulin) 300 unit/mL (1.5 mL) inpn pen 60 Units by SubCUTAneous route nightly. Indications: type 2 diabetes mellitus (Patient taking differently: 30 Units by SubCUTAneous route every morning. Indications: type 2 diabetes mellitus) 10 Pen 4    ADVAIR DISKUS 100-50 mcg/dose diskus inhaler USE 1 INHALATION TWICE A  Each 3    BD INSULIN PEN NEEDLE UF SHORT 31 gauge x 5/16\" ndle       aspirin delayed-release 81 mg tablet Take 81 mg by mouth daily.        Allergies   Allergen Reactions    Latex Itching    Codeine Itching and Other (comments)     hallucinate    Contrast Dye [Iodine] Rash and Itching     Given pre-cardiac cath    Seafood [facility] Containing Products] Swelling      Lab Results   Component Value Date/Time    WBC 7.0 11/07/2022 09:18 AM    HGB 10.9 (L) 11/07/2022 09:18 AM    Hemoglobin (POC) 14.6 04/10/2015 12:41 PM    HCT 37.3 11/07/2022 09:18 AM    Hematocrit (POC) 43 04/10/2015 12:41 PM    PLATELET 810 23/96/5592 09:18 AM    MCV 79.0 (L) 11/07/2022 09:18 AM     Lab Results   Component Value Date/Time    GFR est non-AA 36 (L) 08/02/2022 12:48 PM    GFRNA, POC >60 04/10/2015 12:41 PM    GFR est AA 44 (L) 08/02/2022 12:48 PM    GFRAA, POC >60 04/10/2015 12:41 PM    Creatinine 1.31 (H) 11/07/2022 09:18 AM    Creatinine (POC) 2.3 07/25/2022 11:21 AM    Creatinine (POC) 0.9 04/10/2015 12:41 PM    BUN 26 (H) 11/07/2022 09:18 AM    BUN 39 07/25/2022 11:21 AM    BUN (POC) 7 (L) 04/10/2015 12:41 PM    Sodium 143 11/07/2022 09:18 AM    Sodium (POC) 144 07/25/2022 11:21 AM    Sodium (POC) 143 04/10/2015 12:41 PM    Potassium 3.1 (L) 11/07/2022 09:18 AM    Potassium (POC) 3.3 07/25/2022 11:21 AM    Potassium (POC) 2.9 (L) 04/10/2015 12:41 PM    Chloride 101 11/07/2022 09:18 AM    CHLORIDE 99 07/25/2022 11:21 AM    Chloride (POC) 103 04/10/2015 12:41 PM    CO2 37 (H) 11/07/2022 09:18 AM    CO2 30 07/25/2022 11:21 AM Magnesium 2.2 07/25/2022 04:38 PM     Lab Results   Component Value Date/Time    Glucose 259 (H) 11/07/2022 09:18 AM    Glucose (POC) 130 (H) 10/24/2022 07:54 AM         ROS    Physical Exam  Vitals and nursing note reviewed. Constitutional:       Appearance: Normal appearance. She is well-developed. She is obese. Comments: Appears stated age   Cardiovascular:      Rate and Rhythm: Normal rate and regular rhythm. Heart sounds: Normal heart sounds. No murmur heard. No friction rub. No gallop. Pulmonary:      Effort: Pulmonary effort is normal. No respiratory distress. Breath sounds: Normal breath sounds. No wheezing. Abdominal:      General: Bowel sounds are normal.      Palpations: Abdomen is soft. Neurological:      Mental Status: She is alert. ASSESSMENT and PLAN    ICD-10-CM ICD-9-CM    1. Preop examination  Z01.818 V72.84 Acceptable candidate and risk for sx-completed preop forms  Hold insulin on AM of surgery      2. Dysphagia, unspecified type  R13.10 787.20 REFERRAL TO GASTROENTEROLOGY-EGD      3. Dyspepsia  R10.13 536.8 REFERRAL TO GASTROENTEROLOGY  Start protonix 40 mg every day  If develops any exertional CP will go to ER      4.  Hypertension, unspecified type  I10 401.9 Labile but controlled currently   Fu as scheduled

## 2022-11-14 ENCOUNTER — OFFICE VISIT (OUTPATIENT)
Dept: INTERNAL MEDICINE CLINIC | Age: 72
End: 2022-11-14
Payer: MEDICARE

## 2022-11-14 VITALS
SYSTOLIC BLOOD PRESSURE: 130 MMHG | OXYGEN SATURATION: 95 % | BODY MASS INDEX: 37.78 KG/M2 | RESPIRATION RATE: 16 BRPM | HEART RATE: 61 BPM | DIASTOLIC BLOOD PRESSURE: 70 MMHG | TEMPERATURE: 97.9 F | HEIGHT: 63 IN | WEIGHT: 213.2 LBS

## 2022-11-14 DIAGNOSIS — R10.13 DYSPEPSIA: ICD-10-CM

## 2022-11-14 DIAGNOSIS — I10 HYPERTENSION, UNSPECIFIED TYPE: ICD-10-CM

## 2022-11-14 DIAGNOSIS — R13.10 DYSPHAGIA, UNSPECIFIED TYPE: ICD-10-CM

## 2022-11-14 DIAGNOSIS — Z01.818 PREOP EXAMINATION: Primary | ICD-10-CM

## 2022-11-14 PROCEDURE — 1101F PT FALLS ASSESS-DOCD LE1/YR: CPT | Performed by: INTERNAL MEDICINE

## 2022-11-14 PROCEDURE — 1090F PRES/ABSN URINE INCON ASSESS: CPT | Performed by: INTERNAL MEDICINE

## 2022-11-14 PROCEDURE — G8536 NO DOC ELDER MAL SCRN: HCPCS | Performed by: INTERNAL MEDICINE

## 2022-11-14 PROCEDURE — G8754 DIAS BP LESS 90: HCPCS | Performed by: INTERNAL MEDICINE

## 2022-11-14 PROCEDURE — 99214 OFFICE O/P EST MOD 30 MIN: CPT | Performed by: INTERNAL MEDICINE

## 2022-11-14 PROCEDURE — G9899 SCRN MAM PERF RSLTS DOC: HCPCS | Performed by: INTERNAL MEDICINE

## 2022-11-14 PROCEDURE — G9717 DOC PT DX DEP/BP F/U NT REQ: HCPCS | Performed by: INTERNAL MEDICINE

## 2022-11-14 PROCEDURE — G8752 SYS BP LESS 140: HCPCS | Performed by: INTERNAL MEDICINE

## 2022-11-14 PROCEDURE — G0463 HOSPITAL OUTPT CLINIC VISIT: HCPCS | Performed by: INTERNAL MEDICINE

## 2022-11-14 PROCEDURE — G8427 DOCREV CUR MEDS BY ELIG CLIN: HCPCS | Performed by: INTERNAL MEDICINE

## 2022-11-14 PROCEDURE — G8417 CALC BMI ABV UP PARAM F/U: HCPCS | Performed by: INTERNAL MEDICINE

## 2022-11-14 PROCEDURE — G8399 PT W/DXA RESULTS DOCUMENT: HCPCS | Performed by: INTERNAL MEDICINE

## 2022-11-14 PROCEDURE — 3017F COLORECTAL CA SCREEN DOC REV: CPT | Performed by: INTERNAL MEDICINE

## 2022-11-14 RX ORDER — PEN NEEDLE, DIABETIC 31 GX5/16"
NEEDLE, DISPOSABLE MISCELLANEOUS
Qty: 360 EACH | Refills: 3 | Status: SHIPPED | OUTPATIENT
Start: 2022-11-14

## 2022-11-14 RX ORDER — PANTOPRAZOLE SODIUM 40 MG/1
40 TABLET, DELAYED RELEASE ORAL DAILY
Qty: 90 TABLET | Refills: 1 | Status: SHIPPED | OUTPATIENT
Start: 2022-11-14

## 2022-11-14 RX ORDER — TIZANIDINE 2 MG/1
2 TABLET ORAL
Qty: 30 TABLET | Refills: 1 | Status: SHIPPED | OUTPATIENT
Start: 2022-11-14

## 2022-11-14 NOTE — PROGRESS NOTES
Chief Complaint   Patient presents with    Pre-op Exam     Pt is cataract surgery at the [facility] on 11/16/22 with Dr. Althea Logan.    1. \"Have you been to the ER, urgent care clinic since your last visit? Hospitalized since your last visit? \" Yes When: [id number] [address] [facility] ER last week for BP    2. \"Have you seen or consulted any other health care providers outside of the [address] [place] [address] since your last visit? \" Yes When: [facility]      3. For patients aged 39-70: Has the patient had a colonoscopy / FIT/ Cologuard? Yes - no Care Gap present      If the patient is female:    4. For patients aged 41-77: Has the patient had a mammogram within the past 2 years? Yes - no Care Gap present      5. For patients aged 21-65: Has the patient had a pap smear?  Yes - no Care Gap present    Visit Vitals  /70 (BP 1 Location: Left upper arm, BP Patient Position: Sitting)   Pulse 61   Temp 97.9 °F (36.6 °C) (Temporal)   Resp 16   Ht 5' 3\" (1.6 m)   Wt 213 lb 3.2 oz (96.7 kg)   SpO2 95%   BMI 37.77 kg/m²

## 2022-11-21 DIAGNOSIS — E11.8 TYPE 2 DIABETES MELLITUS WITH COMPLICATION, WITH LONG-TERM CURRENT USE OF INSULIN (HCC): ICD-10-CM

## 2022-11-21 DIAGNOSIS — Z79.4 TYPE 2 DIABETES MELLITUS WITH COMPLICATION, WITH LONG-TERM CURRENT USE OF INSULIN (HCC): ICD-10-CM

## 2022-11-21 RX ORDER — GABAPENTIN 100 MG/1
CAPSULE ORAL
Qty: 90 CAPSULE | Refills: 3 | Status: SHIPPED | OUTPATIENT
Start: 2022-11-21

## 2022-11-21 NOTE — TELEPHONE ENCOUNTER
Requested Prescriptions     Pending Prescriptions Disp Refills    gabapentin (NEURONTIN) 100 mg capsule 90 Capsule 3     Sig: TAKE 1 CAPSULE THREE TIMES A DAY

## 2022-11-28 ENCOUNTER — OFFICE VISIT (OUTPATIENT)
Dept: ENDOCRINOLOGY | Age: 72
End: 2022-11-28
Payer: MEDICARE

## 2022-11-28 VITALS
DIASTOLIC BLOOD PRESSURE: 57 MMHG | SYSTOLIC BLOOD PRESSURE: 133 MMHG | BODY MASS INDEX: 36.57 KG/M2 | HEIGHT: 63 IN | HEART RATE: 65 BPM | WEIGHT: 206.4 LBS

## 2022-11-28 DIAGNOSIS — N18.30 STAGE 3 CHRONIC KIDNEY DISEASE, UNSPECIFIED WHETHER STAGE 3A OR 3B CKD (HCC): ICD-10-CM

## 2022-11-28 DIAGNOSIS — Z79.4 TYPE 2 DIABETES MELLITUS WITH COMPLICATION, WITH LONG-TERM CURRENT USE OF INSULIN (HCC): Primary | ICD-10-CM

## 2022-11-28 DIAGNOSIS — E66.01 OBESITY, MORBID (HCC): ICD-10-CM

## 2022-11-28 DIAGNOSIS — E11.8 TYPE 2 DIABETES MELLITUS WITH COMPLICATION, WITH LONG-TERM CURRENT USE OF INSULIN (HCC): Primary | ICD-10-CM

## 2022-11-28 LAB — HBA1C MFR BLD HPLC: 8.2 %

## 2022-11-28 PROCEDURE — 99214 OFFICE O/P EST MOD 30 MIN: CPT | Performed by: INTERNAL MEDICINE

## 2022-11-28 PROCEDURE — 83036 HEMOGLOBIN GLYCOSYLATED A1C: CPT | Performed by: INTERNAL MEDICINE

## 2022-11-28 PROCEDURE — G8417 CALC BMI ABV UP PARAM F/U: HCPCS | Performed by: INTERNAL MEDICINE

## 2022-11-28 PROCEDURE — 1101F PT FALLS ASSESS-DOCD LE1/YR: CPT | Performed by: INTERNAL MEDICINE

## 2022-11-28 PROCEDURE — 1123F ACP DISCUSS/DSCN MKR DOCD: CPT | Performed by: INTERNAL MEDICINE

## 2022-11-28 PROCEDURE — 3052F HG A1C>EQUAL 8.0%<EQUAL 9.0%: CPT | Performed by: INTERNAL MEDICINE

## 2022-11-28 PROCEDURE — G8399 PT W/DXA RESULTS DOCUMENT: HCPCS | Performed by: INTERNAL MEDICINE

## 2022-11-28 PROCEDURE — G9899 SCRN MAM PERF RSLTS DOC: HCPCS | Performed by: INTERNAL MEDICINE

## 2022-11-28 PROCEDURE — 1090F PRES/ABSN URINE INCON ASSESS: CPT | Performed by: INTERNAL MEDICINE

## 2022-11-28 PROCEDURE — 3074F SYST BP LT 130 MM HG: CPT | Performed by: INTERNAL MEDICINE

## 2022-11-28 PROCEDURE — G9717 DOC PT DX DEP/BP F/U NT REQ: HCPCS | Performed by: INTERNAL MEDICINE

## 2022-11-28 PROCEDURE — G8754 DIAS BP LESS 90: HCPCS | Performed by: INTERNAL MEDICINE

## 2022-11-28 PROCEDURE — G8427 DOCREV CUR MEDS BY ELIG CLIN: HCPCS | Performed by: INTERNAL MEDICINE

## 2022-11-28 PROCEDURE — 3017F COLORECTAL CA SCREEN DOC REV: CPT | Performed by: INTERNAL MEDICINE

## 2022-11-28 PROCEDURE — G8536 NO DOC ELDER MAL SCRN: HCPCS | Performed by: INTERNAL MEDICINE

## 2022-11-28 PROCEDURE — G8752 SYS BP LESS 140: HCPCS | Performed by: INTERNAL MEDICINE

## 2022-11-28 PROCEDURE — 2022F DILAT RTA XM EVC RTNOPTHY: CPT | Performed by: INTERNAL MEDICINE

## 2022-11-28 PROCEDURE — 3078F DIAST BP <80 MM HG: CPT | Performed by: INTERNAL MEDICINE

## 2022-11-28 NOTE — PROGRESS NOTES
Ms. Maria Luz Ugarte returns for follow-up of her type 2 diabetes mellitus x 14 years with poor blood sugar control. Her A1c was 9.7% in September. Her A1c was  8.9% in March 2021 which is the lowest we have ever had.,   Her A1c in October was 9.3%. Her A1c today is 8.2%. Recent laboratory tests also remarkable for creatinine of 1.43 with a GFR of 44. LFTs normal.     Current medications  Toujeo insulin 30 units in AM   Humalog insulin 20 units before each meal (misses often)  Freestyle Jack 2     This woman is seen every 3-4 months, monitors her blood sugars 3-4 times daily, administers insulin 3-4 times daily and adjusts her insulin dosage based on her readings. At her last visit, I ordered a freestyle jack and she finally has the device. She brings it with her today. She has been using it for 2 months now. Unfortunately her A1c has not improved. She again admits that she is missing doses of Humalog with her meals. She is taking the Toujeo consistently. Download of the device indicates that her blood sugar overnight can be very well controlled but she has significant elevations in blood sugar after breakfast and particularly after dinner which is the time that she usually misses the dose of Humalog. She is not having any episodes of hypoglycemia        Since I saw her last she has been much more attentive to her insulin dosing and its showing. She takes her toujeo in the morning and if she does not eat her blood sugars are perfect throughout the day indicating that the toujeo dosing is accurate and appropriate. In terms of meals, she continues to have some elevations in blood sugar but in general they are much better than they were before reflecting the improved A1c.     Examination  Blood pressure 133/57  Pulse 80 and regular  Weight 206  BMI 36.6  HEENT unremarkable  Lungs clear  Heart reveals a regular rate and rhythm  Abdomen benign  Extremities unremarkable  Diabetic foot exam:     Left Foot:   Visual Exam: normal    Pulse DP: 2+ (normal)   Filament test: normal sensation    Vibratory sensation: normal      Right Foot:   Visual Exam: normal    Pulse DP: 2+ (normal)   Filament test: normal sensation    Vibratory sensation: normal     Impression  1. Type 2 diabetes mellitus with significantly improved glucose control on basal bolus insulin with greater adherence to the regimen  2. Obesity  3. Chronic kidney disease stage IIIb    Plan:  1. I did not change the regimen  2. I will see her back in 4 months.

## 2022-11-29 RX ORDER — HYDRALAZINE HYDROCHLORIDE 50 MG/1
TABLET, FILM COATED ORAL
Qty: 180 TABLET | Refills: 5 | Status: SHIPPED | OUTPATIENT
Start: 2022-11-29

## 2022-11-29 NOTE — TELEPHONE ENCOUNTER
PCP: Trina Silver MD    Last appt: 6/2022  Future Appointments   Date Time Provider Ovidio Allan   12/15/2022  3:00 PM Trina Silver MD [facility] BS AMB   2/7/2023 10:00 AM Cayden Davidson MD [facility] BS AMB   3/3/2023  1:30 PM Leo Briceno MD [facility]  BS AMB   6/12/2023  2:00 PM MD DARLINE Espinal BS AMB       Requested Prescriptions     Signed Prescriptions Disp Refills    hydrALAZINE (APRESOLINE) 50 mg tablet 180 Tablet 5     Sig: TAKE 1 TABLET EVERY 8 HOURS     Authorizing Provider: Tena Pinon     Ordering User: FAISAL FARNSWORTH         Other Comments:  Verbal order per provider. Order (medication, dose, route, frequency, amount, refills) repeated and verified twice.

## 2022-12-15 ENCOUNTER — OFFICE VISIT (OUTPATIENT)
Dept: INTERNAL MEDICINE CLINIC | Age: 72
End: 2022-12-15
Payer: MEDICARE

## 2022-12-15 VITALS
TEMPERATURE: 97.2 F | SYSTOLIC BLOOD PRESSURE: 148 MMHG | OXYGEN SATURATION: 95 % | BODY MASS INDEX: 36.79 KG/M2 | HEART RATE: 67 BPM | RESPIRATION RATE: 18 BRPM | DIASTOLIC BLOOD PRESSURE: 78 MMHG | WEIGHT: 207.6 LBS | HEIGHT: 63 IN

## 2022-12-15 DIAGNOSIS — I10 HYPERTENSION, UNSPECIFIED TYPE: ICD-10-CM

## 2022-12-15 DIAGNOSIS — Z23 ENCOUNTER FOR IMMUNIZATION: ICD-10-CM

## 2022-12-15 DIAGNOSIS — E11.65 TYPE 2 DIABETES MELLITUS WITH HYPERGLYCEMIA, WITH LONG-TERM CURRENT USE OF INSULIN (HCC): ICD-10-CM

## 2022-12-15 DIAGNOSIS — N18.30 STAGE 3 CHRONIC KIDNEY DISEASE, UNSPECIFIED WHETHER STAGE 3A OR 3B CKD (HCC): ICD-10-CM

## 2022-12-15 DIAGNOSIS — Z79.4 TYPE 2 DIABETES MELLITUS WITH HYPERGLYCEMIA, WITH LONG-TERM CURRENT USE OF INSULIN (HCC): ICD-10-CM

## 2022-12-15 DIAGNOSIS — K11.21 PAROTITIS, ACUTE: Primary | ICD-10-CM

## 2022-12-15 DIAGNOSIS — E78.5 HYPERLIPIDEMIA, UNSPECIFIED HYPERLIPIDEMIA TYPE: ICD-10-CM

## 2022-12-15 DIAGNOSIS — Z86.73 HISTORY OF CVA (CEREBROVASCULAR ACCIDENT): ICD-10-CM

## 2022-12-15 PROBLEM — E11.22 TYPE 2 DIABETES MELLITUS WITH CHRONIC KIDNEY DISEASE (HCC): Status: ACTIVE | Noted: 2022-12-15

## 2022-12-15 PROCEDURE — 90694 VACC AIIV4 NO PRSRV 0.5ML IM: CPT | Performed by: INTERNAL MEDICINE

## 2022-12-15 PROCEDURE — G0463 HOSPITAL OUTPT CLINIC VISIT: HCPCS | Performed by: INTERNAL MEDICINE

## 2022-12-15 RX ORDER — FLUTICASONE PROPIONATE AND SALMETEROL 100; 50 UG/1; UG/1
POWDER RESPIRATORY (INHALATION)
Qty: 180 EACH | Refills: 3 | Status: SHIPPED | OUTPATIENT
Start: 2022-12-15

## 2022-12-15 RX ORDER — CEPHALEXIN 500 MG/1
500 CAPSULE ORAL 3 TIMES DAILY
Qty: 30 CAPSULE | Refills: 0 | Status: SHIPPED | OUTPATIENT
Start: 2022-12-15

## 2022-12-15 NOTE — PROGRESS NOTES
1. Have you been to the ER, urgent care clinic since your last visit? Hospitalized since your last visit? No    2. Have you seen or consulted any other health care providers outside of the [address] [place] [address] since your last visit? Include any pap smears or colon screening.  No

## 2022-12-15 NOTE — PROGRESS NOTES
HISTORY OF PRESENT ILLNESS  Makayla Paniagua is a 81 y.o. female. HPI  hx HTN hld asthma hx CVA-DM-2 osteopenia , hx PE morbid obesity ckd 3 depression   Had recent colonoscopy-polyp removed -Dr Henry Khoury  Had left eye surgery  Recent a1c 8. 2=Dr Melissa Ch  Renal Dr Amelia Venegas for CKD 3  Not on med for depression--not depressed  CARD-Dr Milton Macias  Asthma has been quiet  C/o painful swelling right parotid gland chronic swellling but much worse in last week with some pain  Last OV  Here for preop eye surgery left eye-Sheyla Pateinol  Has not had difficulty in the past with surgery or anesthesia  No f/c cp sob  Last a1c 9.0  Went to ER from eye MD office 11/7 for sefere bp elevation --SBP>200--had mised all bp medicines for 1 day-asymptomatic. Ekg stable. Troponin 54  Has low risk NST 2020     C/o some intermittent dysphagia last 1 week , some burning in  lower chset and upper abdomen. No exertional CP.  Denies increased THOMPSON    Patient Active Problem List    Diagnosis Date Noted    CKD (chronic kidney disease) stage 3, GFR 30-59 ml/min ([facility]) 03/10/2022    Major depressive disorder, recurrent, mild 12/10/2021    Major depressive disorder, recurrent, moderate 12/10/2021    Major depressive disorder, recurrent, unspecified 12/10/2021    Type 2 diabetes mellitus with diabetic neuropathy ([address] 04/29/2019    Type 2 diabetes with nephropathy ([address] 12/14/2018    History of CVA (cerebrovascular accident) 07/13/2018    Obesity, morbid ([address] 01/25/2018    Warthin's tumor 07/01/2016    HTN (hypertension) 09/13/2013    Axillary hidradenitis suppurativa 08/07/2013    Esophageal reflux 01/15/2013    Renal failure, acute ([address] 01/13/2013    Asthma 12/14/2012    Myocardial ischemia 06/06/2012    Shortness of breath 06/06/2012    Coronary artery disease 06/06/2012    PVC's (premature ventricular contractions) 06/01/2012    DM type 2 (diabetes mellitus, type 2) ([address] 04/23/2012    Grave's disease 10/15/2010    DJD (degenerative joint disease) of hip 10/20/2009    DJD (degenerative joint disease) of knee 10/20/2009    CTS (Carpal Tunnel Syndrome)-b/l 10/20/2009    CVA (cerebral infarction) 10/20/2009    Diverticulosis 10/20/2009    Osteopenia 10/20/2009     Current Outpatient Medications   Medication Sig Dispense Refill    hydrALAZINE (APRESOLINE) 50 mg tablet TAKE 1 TABLET EVERY 8 HOURS 180 Tablet 5    gabapentin (NEURONTIN) 100 mg capsule TAKE 1 CAPSULE THREE TIMES A DAY 90 Capsule 3    BD Ultra-Fine Short Pen Needle 31 gauge x 5/16\" ndle Use qid E11 360 Each 3    tiZANidine (ZANAFLEX) 2 mg tablet Take 1 Tablet by mouth daily as needed for Muscle Spasm(s). 30 Tablet 1    pantoprazole (PROTONIX) 40 mg tablet Take 1 Tablet by mouth daily. 90 Tablet 1    triamterene-hydroCHLOROthiazide (DYAZIDE) 37.5-25 mg per capsule TAKE 1 CAPSULE DAILY 180 Capsule 3    albuterol (PROVENTIL HFA, VENTOLIN HFA, PROAIR HFA) 90 mcg/actuation inhaler USE 1 INHALATION EVERY 4 HOURS AS NEEDED FOR WHEEZING OR SHORTNESS OF BREATH 25.5 g 2    potassium chloride (K-DUR, KLOR-CON M20) 20 mEq tablet Take 1 Tablet by mouth in the morning. 90 Tablet 3    rosuvastatin (CRESTOR) 20 mg tablet TAKE 1 TABLET DAILY 90 Tablet 3    losartan (COZAAR) 100 mg tablet TAKE 1 TABLET DAILY 90 Tablet 3    albuterol (ACCUNEB) 1.25 mg/3 mL nebu 3 mL by Nebulization route every four (4) hours as needed (wheezing). 225 mL 3    HumaLOG KwikPen Insulin 100 unit/mL kwikpen 30 units for breakfast, 24 units for lunch and 42 units for dinner (Patient taking differently: 20 units for breakfast, 20 units for lunch and 20 units for dinner) 25 Pen 3    levothyroxine (SYNTHROID) 137 mcg tablet Take 1 Tablet by mouth Daily (before breakfast). 90 Tablet 3    carvediloL (COREG) 12.5 mg tablet Take 12.5 mg by mouth two (2) times daily (with meals).       glucose blood VI test strips (True Metrix Glucose Test Strip) strip Monitor blood sugar 3 times daily 300 Strip 3    flash glucose sensor (FreeStyle Tom 2 Sensor) kit 1 Each by Does Not Apply route every fourteen (14) days. 6 Kit 3    flash glucose scanning reader (FreeStyle Jack 2 [place]) misc 1 Each by Does Not Apply route daily. 1 Each 0    insulin glargine U-300 conc (Toujeo SoloStar U-300 Insulin) 300 unit/mL (1.5 mL) inpn pen 60 Units by SubCUTAneous route nightly. Indications: type 2 diabetes mellitus (Patient taking differently: 30 Units by SubCUTAneous route every morning. Indications: type 2 diabetes mellitus) 10 Pen 4    aspirin delayed-release 81 mg tablet Take 81 mg by mouth daily.       calcitRIOL (ROCALTROL) 0.25 mcg capsule  (Patient not taking: Reported on 12/15/2022)      ADVAIR DISKUS 100-50 mcg/dose diskus inhaler USE 1 INHALATION TWICE A DAY (Patient not taking: No sig reported) 180 Each 3     Allergies   Allergen Reactions    Latex Itching    Codeine Itching and Other (comments)     hallucinate    Contrast Dye [Iodine] Rash and Itching     Given pre-cardiac cath    Seafood [facility] Containing Products] Swelling      Lab Results   Component Value Date/Time    WBC 7.0 11/07/2022 09:18 AM    HGB 10.9 (L) 11/07/2022 09:18 AM    Hemoglobin (POC) 14.6 04/10/2015 12:41 PM    HCT 37.3 11/07/2022 09:18 AM    Hematocrit (POC) 43 04/10/2015 12:41 PM    PLATELET 750 54/23/5017 09:18 AM    MCV 79.0 (L) 11/07/2022 09:18 AM     Lab Results   Component Value Date/Time    Hemoglobin A1c 8.9 (H) 03/17/2021 09:08 AM    Hemoglobin A1c 9.7 (H) 12/18/2020 09:01 AM    Hemoglobin A1c 10.3 (H) 09/24/2020 10:23 AM    Hemoglobin A1c, External 10.6 11/19/2018 12:00 AM    Hemoglobin A1c, External 9.4 05/20/2016 12:00 AM    Hemoglobin A1c, External 8.9 08/17/2015 02:37 PM    Glucose 259 (H) 11/07/2022 09:18 AM    Glucose (POC) 130 (H) 10/24/2022 07:54 AM    Microalbumin/Creat ratio (mg/g creat) 16 12/10/2021 11:45 AM    Microalbumin,urine random 2.09 12/10/2021 11:45 AM    LDL, calculated 65.6 12/10/2021 11:45 AM    Creatinine (POC) 2.3 07/25/2022 11:21 AM    Creatinine (POC) 0.9 04/10/2015 12:41 PM    Creatinine 1.31 (H) 11/07/2022 09:18 AM      Lab Results   Component Value Date/Time    Cholesterol, total 152 12/10/2021 11:45 AM    HDL Cholesterol 62 12/10/2021 11:45 AM    LDL, calculated 65.6 12/10/2021 11:45 AM    LDL-C, External 72 05/20/2016 12:00 AM    Triglyceride 122 12/10/2021 11:45 AM    CHOL/HDL Ratio 2.5 12/10/2021 11:45 AM     Lab Results   Component Value Date/Time    GFR est non-AA 36 (L) 08/02/2022 12:48 PM    GFRNA, POC >60 04/10/2015 12:41 PM    GFR est AA 44 (L) 08/02/2022 12:48 PM    GFRAA, POC >60 04/10/2015 12:41 PM    Creatinine 1.31 (H) 11/07/2022 09:18 AM    Creatinine (POC) 2.3 07/25/2022 11:21 AM    Creatinine (POC) 0.9 04/10/2015 12:41 PM    BUN 26 (H) 11/07/2022 09:18 AM    BUN 39 07/25/2022 11:21 AM    BUN (POC) 7 (L) 04/10/2015 12:41 PM    Sodium 143 11/07/2022 09:18 AM    Sodium (POC) 144 07/25/2022 11:21 AM    Sodium (POC) 143 04/10/2015 12:41 PM    Potassium 3.1 (L) 11/07/2022 09:18 AM    Potassium (POC) 3.3 07/25/2022 11:21 AM    Potassium (POC) 2.9 (L) 04/10/2015 12:41 PM    Chloride 101 11/07/2022 09:18 AM    CHLORIDE 99 07/25/2022 11:21 AM    Chloride (POC) 103 04/10/2015 12:41 PM    CO2 37 (H) 11/07/2022 09:18 AM    CO2 30 07/25/2022 11:21 AM    Magnesium 2.2 07/25/2022 04:38 PM        ROS    Physical Exam  Vitals and nursing note reviewed. Constitutional:       Appearance: Normal appearance. She is well-developed. She is obese. Comments: Appears stated age   HENT:      Head:     Cardiovascular:      Rate and Rhythm: Normal rate and regular rhythm. Heart sounds: Normal heart sounds. No murmur heard. No friction rub. No gallop. Pulmonary:      Effort: Pulmonary effort is normal. No respiratory distress. Breath sounds: Normal breath sounds. No wheezing. Abdominal:      General: Bowel sounds are normal.      Palpations: Abdomen is soft. Neurological:      Mental Status: She is alert.        ASSESSMENT and PLAN ICD-10-CM ICD-9-CM    1. Parotitis, acute  K11.21 527.2 cephALEXin (KEFLEX) 500 mg capsule      REFERRAL TO ENT-OTOLARYNGOLOGY  Warm compress  Might need imaging-? Infection vs Warthins'      2. Hypertension, unspecified type  I10 401.9 Reasonable control      3. Type 2 diabetes mellitus with hyperglycemia, with long-term current use of insulin ([facility])  E11.65 250.00 Improving control on CGM  Fu Dr Cynthia Candelario    Z79.4 790.29      V58.67       4. Hyperlipidemia, unspecified hyperlipidemia type  E78.5 272.4 LIPID PANEL      LIPID PANEL      5. Stage 3 chronic kidney disease, unspecified whether stage 3a or 3b CKD (HCC)  N18.30 585. 3 Fu nephrologist      6. History of CVA (cerebrovascular accident)  Z86.73 V12.54       7.  Encounter for immunization  Z23 V03.89 INFLUENZA, FLUAD, (AGE 72 Y+), IM, PF, 0.5 ML   RTC 6 months medicare wellness

## 2022-12-16 LAB
CHOLEST SERPL-MCNC: 163 MG/DL
HDLC SERPL-MCNC: 58 MG/DL
HDLC SERPL: 2.8 {RATIO} (ref 0–5)
LDLC SERPL CALC-MCNC: 76.2 MG/DL (ref 0–100)
TRIGL SERPL-MCNC: 144 MG/DL (ref ?–150)
VLDLC SERPL CALC-MCNC: 28.8 MG/DL

## 2023-01-30 ENCOUNTER — TRANSCRIBE ORDER (OUTPATIENT)
Dept: SCHEDULING | Age: 73
End: 2023-01-30

## 2023-01-30 DIAGNOSIS — Z78.9 NON-SMOKER: ICD-10-CM

## 2023-01-30 DIAGNOSIS — K11.8 MASS OF RIGHT PAROTID GLAND: ICD-10-CM

## 2023-01-30 DIAGNOSIS — Z00.8 OTHER SPECIFIED GENERAL MEDICAL EXAMINATION: Primary | ICD-10-CM

## 2023-03-02 ENCOUNTER — OFFICE VISIT (OUTPATIENT)
Dept: SLEEP MEDICINE | Age: 73
End: 2023-03-02
Payer: MEDICARE

## 2023-03-02 ENCOUNTER — DOCUMENTATION ONLY (OUTPATIENT)
Dept: SLEEP MEDICINE | Age: 73
End: 2023-03-02

## 2023-03-02 VITALS
BODY MASS INDEX: 37.74 KG/M2 | SYSTOLIC BLOOD PRESSURE: 165 MMHG | WEIGHT: 213 LBS | HEART RATE: 89 BPM | OXYGEN SATURATION: 96 % | HEIGHT: 63 IN | DIASTOLIC BLOOD PRESSURE: 75 MMHG | TEMPERATURE: 98 F

## 2023-03-02 DIAGNOSIS — G47.33 OBSTRUCTIVE SLEEP APNEA (ADULT) (PEDIATRIC): Primary | ICD-10-CM

## 2023-03-02 DIAGNOSIS — I10 ESSENTIAL HYPERTENSION: ICD-10-CM

## 2023-03-02 DIAGNOSIS — E66.01 SEVERE OBESITY (BMI 35.0-39.9) WITH COMORBIDITY (HCC): ICD-10-CM

## 2023-03-02 DIAGNOSIS — E11.8 TYPE 2 DIABETES MELLITUS WITH COMPLICATION, WITH LONG-TERM CURRENT USE OF INSULIN (HCC): ICD-10-CM

## 2023-03-02 DIAGNOSIS — Z79.4 TYPE 2 DIABETES MELLITUS WITH COMPLICATION, WITH LONG-TERM CURRENT USE OF INSULIN (HCC): ICD-10-CM

## 2023-03-02 PROCEDURE — 3078F DIAST BP <80 MM HG: CPT | Performed by: INTERNAL MEDICINE

## 2023-03-02 PROCEDURE — 3077F SYST BP >= 140 MM HG: CPT | Performed by: INTERNAL MEDICINE

## 2023-03-02 PROCEDURE — 99213 OFFICE O/P EST LOW 20 MIN: CPT | Performed by: INTERNAL MEDICINE

## 2023-03-02 PROCEDURE — G8427 DOCREV CUR MEDS BY ELIG CLIN: HCPCS | Performed by: INTERNAL MEDICINE

## 2023-03-02 PROCEDURE — G8399 PT W/DXA RESULTS DOCUMENT: HCPCS | Performed by: INTERNAL MEDICINE

## 2023-03-02 PROCEDURE — 3017F COLORECTAL CA SCREEN DOC REV: CPT | Performed by: INTERNAL MEDICINE

## 2023-03-02 PROCEDURE — 1101F PT FALLS ASSESS-DOCD LE1/YR: CPT | Performed by: INTERNAL MEDICINE

## 2023-03-02 PROCEDURE — G9899 SCRN MAM PERF RSLTS DOC: HCPCS | Performed by: INTERNAL MEDICINE

## 2023-03-02 PROCEDURE — 3046F HEMOGLOBIN A1C LEVEL >9.0%: CPT | Performed by: INTERNAL MEDICINE

## 2023-03-02 PROCEDURE — 1123F ACP DISCUSS/DSCN MKR DOCD: CPT | Performed by: INTERNAL MEDICINE

## 2023-03-02 PROCEDURE — 1090F PRES/ABSN URINE INCON ASSESS: CPT | Performed by: INTERNAL MEDICINE

## 2023-03-02 PROCEDURE — G8536 NO DOC ELDER MAL SCRN: HCPCS | Performed by: INTERNAL MEDICINE

## 2023-03-02 PROCEDURE — 2022F DILAT RTA XM EVC RTNOPTHY: CPT | Performed by: INTERNAL MEDICINE

## 2023-03-02 PROCEDURE — G8417 CALC BMI ABV UP PARAM F/U: HCPCS | Performed by: INTERNAL MEDICINE

## 2023-03-02 PROCEDURE — G9717 DOC PT DX DEP/BP F/U NT REQ: HCPCS | Performed by: INTERNAL MEDICINE

## 2023-03-02 NOTE — PROGRESS NOTES
217 [facility] [place]. [place], [address]  Tel.  [phone]  Fax. 100 [facility] 60  [place], [address]  Tel.  [phone]  Fax. [phone] [address]   Tel.  [phone]  Fax. [phone]     S>Makayla Burns is a 81 y.o. female seen for a positive airway pressure follow-up. She reports no problems using the device. The following problems are identified:    Drowsiness no Problems exhaling no   Snoring no Forget to put on no   Mask Comfortable yes Can't fall asleep no   Dry Mouth Yes, improved Mask falls off no   Air Leaking no Frequent awakenings no     Download reviewed. She admits that her sleep has improved. Therapy Apnea Index averaged over PAP use: 9 /hr which reflects improved sleep breathing condition. Allergies   Allergen Reactions    Latex Itching    Codeine Itching and Other (comments)     hallucinate    Contrast Dye [Iodine] Rash and Itching     Given pre-cardiac cath    Seafood [Shellfish Containing Products] Swelling       She has a current medication list which includes the following prescription(s): cephalexin, fluticasone propion-salmeterol, hydralazine, gabapentin, bd ultra-fine short pen needle, tizanidine, pantoprazole, triamterene-hydrochlorothiazide, albuterol, potassium chloride, rosuvastatin, losartan, albuterol, humalog kwikpen insulin, levothyroxine, carvedilol, true metrix glucose test strip, freestyle eleni 2 sensor, freestyle eleni 2 reader, toujeo solostar u-300 insulin, and aspirin delayed-release. .      She  has a past medical history of Asthma, CAD (coronary artery disease), Diabetes ([address] Hyperlipidemia, Hypertension, Long term current use of anticoagulant therapy, Nausea & vomiting, Pulmonary embolism ([address] Screening for colon cancer (02/16/2005), Stroke ([address] (2004), Thromboembolus ([address] (1976), Thyroid disease, and Unspecified sleep apnea.     She has no past medical history of Atrial fibrillation ([facility] Cancer ([facility] Carotid artery disease ([facility] Chronic kidney disease, Chronic obstructive pulmonary disease ([facility] Clotting disorder ([facility] Congestive heart failure ([facility] Glaucoma, ICD (implantable cardioverter-defibrillator) in place, Murmur, Myocardial infarction [facility]), Pacemaker, Rheumatic fever, Syncope, or Valvular heart disease. Boston Sleepiness Score: (P) 4   and Modified F.O.S.Q. Score Total / 2: (P) 19.5   which reflect improved sleep quality over therapy time. O>    Visit Vitals  BP (!) 165/75 (BP 1 Location: Left upper arm)   Pulse 89   Temp 98 °F (36.7 °C) (Oral)   Ht 5' 3\" (1.6 m)   Wt 213 lb (96.6 kg)   SpO2 96%   BMI 37.73 kg/m²           General:   Alert, oriented, not in distress   Neck:   No JVD    Chest/Lungs:  symetrical lung expansion , no accessory muscle use    Extremities:  no obvious rashes , negative edema    Neuro:  No focal deficits ; No obvious tremor    Psych:  Normal affect ,  Normal countenance ;         A>    ICD-10-CM ICD-9-CM    1. Obstructive sleep apnea (adult) (pediatric)  G47.33 327.23 AMB SUPPLY ORDER      2. Severe obesity (BMI 35.0-39. 9) with comorbidity ([facility]  E66.01 278.01       3. Essential hypertension  I10 401.9       4. Type 2 diabetes mellitus with complication, with long-term current use of insulin ([facility])  E11.8 250.90     Z79.4 V58.67         AHI = 5(2017). On CPAP, Respironics, DS2  :  8-10 cmH2O. Compliant:      yes    Therapeutic Response:  Positive    P>      *   Follow-up and Dispositions    Return in about 2 months (around 5/2/2023). she is compliant with PAP therapy and PAP continues to benefit patient and remains necessary for control of her sleep apnea. Change setting to 9-13 cmh20  Return in 2 months and if still elevated, will need titration    * She was asked to contact our office for any problems regarding PAP therapy.     * Counseling was provided regarding the importance of regular PAP use and on proper sleep hygiene and safe driving. * Re-enforced proper and regular cleaning for the device. 2. Obesity - weight has been starting to go down. I have discussed the relationship of weight to obstructive sleep apnea. I have advised her to continue efforts at a weight loss plan. We discussed reducing portions and avoiding beverages with calories  she understands that weight loss can reduce severity of sleep apnea and snoring. 3. Hypertension - not well controlled. She does not think she took her medicine today. She will take it when she gets home and recheck. . she will continue her current regimen. she will continue to monitor at home and with her PMD for further adjustments as needed. I have reviewed the relationship between hypertension as it relates to sleep-disordered breathing. 4. Type 2 diabetes - improved but still running high . she continues her current regimen. She will see her endocrinologist tomorrow. . she will continue to monitor at home and with her PMD.  I have reviewed the relationship between sleep disordered breathing as it relates to diabetes.    Electronically signed by    Cyndee Garcia MD  Diplomate in Sleep Medicine  [facility]   3/2/2023

## 2023-03-03 ENCOUNTER — OFFICE VISIT (OUTPATIENT)
Dept: ENDOCRINOLOGY | Age: 73
End: 2023-03-03

## 2023-03-03 VITALS
SYSTOLIC BLOOD PRESSURE: 150 MMHG | HEIGHT: 63 IN | RESPIRATION RATE: 18 BRPM | DIASTOLIC BLOOD PRESSURE: 73 MMHG | WEIGHT: 212 LBS | BODY MASS INDEX: 37.56 KG/M2 | HEART RATE: 86 BPM

## 2023-03-03 DIAGNOSIS — E11.8 TYPE 2 DIABETES MELLITUS WITH COMPLICATION, WITH LONG-TERM CURRENT USE OF INSULIN (HCC): Primary | ICD-10-CM

## 2023-03-03 DIAGNOSIS — Z79.4 TYPE 2 DIABETES MELLITUS WITH COMPLICATION, WITH LONG-TERM CURRENT USE OF INSULIN (HCC): Primary | ICD-10-CM

## 2023-03-03 LAB — HBA1C MFR BLD HPLC: 7.9 %

## 2023-03-03 RX ORDER — INSULIN GLARGINE 300 U/ML
30 INJECTION, SOLUTION SUBCUTANEOUS
Qty: 6 ADJUSTABLE DOSE PRE-FILLED PEN SYRINGE | Refills: 4
Start: 2023-03-03

## 2023-03-03 NOTE — PROGRESS NOTES
Ms. Kamilla Gastelum returns for follow-up of her type 2 diabetes mellitus x 14 years with poor blood sugar control. Her A1c was 9.7% in September. Her A1c was  8.9% in March 2021 which is the lowest we have ever had.,   Her A1c in October was 9.3%. Her A1c today is 7.9%. Recent laboratory tests also remarkable for creatinine of 1.43 with a GFR of 44. LFTs normal.     Current medications  Toujeo insulin 30 units in AM   Humalog insulin 20 units before each meal (misses often)  Freestyle Jack 2         This woman is seen every 3-4 months, monitors her blood sugars 3-4 times daily, administers insulin 3-4 times daily and adjusts her insulin dosage based on her readings. She is taking the Toujeo consistently. Download of the device indicates that her blood sugar overnight can be very well controlled. Last, she had 6 significant elevations in blood sugar with her meals (breakfast and dinner). However since that time, her blood sugars are much better even with the meals. In fact 20 units appears to be too much for some of the meals. Breakfast this morning was waffles eggs and juan. Dinner last night was a barbecue with [employer] fries. The night before that she had chicken corn and broccoli. We are finally making progress on her average blood sugar control and this is in large part due to the freestyle jack. Examination  Blood pressure 150/73  Pulse 80 and regular  Weight 212  BMI 37.5    Impression  1. Type 2 diabetes mellitus with improving glucose control on basal bolus insulin  2. Obesity    Plan:  1. I have continued the Toujeo at 30 units  2. I decreased the Humalog to 15 units with meals  3. I will see her back in 3 months.

## 2023-03-03 NOTE — PROGRESS NOTES
Identified pt with two pt identifiers(name and ). Reviewed record in preparation for visit and have obtained necessary documentation. Chief Complaint   Patient presents with    Diabetes        Vitals:    23 1340   BP: (!) 150/73   Pulse: 86   Resp: 18   Weight: 212 lb (96.2 kg)   Height: 5' 3\" (1.6 m)   PainSc:   0 - No pain       Health Maintenance Review: Patient reminded of \"due or due soon\" health maintenance. I have asked the patient to contact his/her primary care provider (PCP) for follow-up on his/her health maintenance.       Immunization History   Administered Date(s) Administered    COVID-19, MODERNA BLUE border, Primary or Immunocompromised, (age 18y+), IM, 100 mcg/0.5mL 2021, 2021, 2021    Influenza High Dose Vaccine PF 2018    Influenza Vaccine 10/01/2015, 2021    Influenza Vaccine (Tri) Adjuvanted (>65 Yrs FLUAD TRI 04559) 10/31/2019    Influenza Vaccine PF 2013    Influenza Vaccine Split 10/15/2010, 2011    Influenza, FLUAD, (age 72 y+), Adjuvanted 2021, 12/15/2022    Pneumococcal Conjugate (PCV-13) 2018    Pneumococcal Polysaccharide (PPSV-23) 04/10/2014, 10/31/2019       Current Outpatient Medications   Medication Instructions    albuterol (ACCUNEB) 1.25 mg, Nebulization, EVERY 4 HOURS AS NEEDED    albuterol (PROVENTIL HFA, VENTOLIN HFA, PROAIR HFA) 90 mcg/actuation inhaler USE 1 INHALATION EVERY 4 HOURS AS NEEDED FOR WHEEZING OR SHORTNESS OF BREATH    aspirin delayed-release 81 mg, Oral, DAILY    BD Ultra-Fine Short Pen Needle 31 gauge x 16\" ndle Use qid E11    carvediloL (COREG) 12.5 mg, Oral, 2 TIMES DAILY WITH MEALS    cephALEXin (KEFLEX) 500 mg, Oral, 3 TIMES DAILY    flash glucose scanning reader (FreeStyle Jack 2 Oxford) misc 1 Each, Does Not Apply, DAILY    flash glucose sensor (FreeStyle Jack 2 Sensor) kit 1 Each, Does Not Apply, EVERY 14 DAYS    fluticasone propion-salmeteroL (Advair Diskus) 100-50 mcg/dose diskus inhaler USE 1 INHALATION TWICE A DAY    gabapentin (NEURONTIN) 100 mg capsule TAKE 1 CAPSULE THREE TIMES A DAY    glucose blood VI test strips (True Metrix Glucose Test Strip) strip Monitor blood sugar 3 times daily    HumaLOG KwikPen Insulin 100 unit/mL kwikpen 30 units for breakfast, 24 units for lunch and 42 units for dinner    hydrALAZINE (APRESOLINE) 50 mg tablet TAKE 1 TABLET EVERY 8 HOURS    levothyroxine (SYNTHROID) 137 mcg, Oral, DAILY BEFORE BREAKFAST    losartan (COZAAR) 100 mg tablet TAKE 1 TABLET DAILY    pantoprazole (PROTONIX) 40 mg, Oral, DAILY    potassium chloride (K-DUR, KLOR-CON M20) 20 mEq tablet 20 mEq, Oral, DAILY    rosuvastatin (CRESTOR) 20 mg tablet TAKE 1 TABLET DAILY    tiZANidine (ZANAFLEX) 2 mg, Oral, DAILY AS NEEDED    Toujeo SoloStar U-300 Insulin 60 Units, SubCUTAneous, EVERY BEDTIME    triamterene-hydroCHLOROthiazide (DYAZIDE) 37.5-25 mg per capsule TAKE 1 CAPSULE DAILY       Allergies   Allergen Reactions    Latex Itching    Codeine Itching and Other (comments)     hallucinate    Contrast Dye [Iodine] Rash and Itching     Given pre-cardiac cath    Seafood [Shellfish Containing Products] Swelling       Immunization History   Administered Date(s) Administered    COVID-19, MODERNA BLUE border, Primary or Immunocompromised, (age 18y+), IM, 100 mcg/0.5mL 03/02/2021, 03/30/2021, 11/08/2021    Influenza High Dose Vaccine PF 11/01/2018    Influenza Vaccine 10/01/2015, 11/02/2021    Influenza Vaccine (Tri) Adjuvanted (>65 Yrs FLUAD TRI 79067) 10/31/2019    Influenza Vaccine PF 01/14/2013    Influenza Vaccine Split 10/15/2010, 11/01/2011    Influenza, FLUAD, (age 72 y+), Adjuvanted 11/02/2021, 12/15/2022    Pneumococcal Conjugate (PCV-13) 06/21/2018    Pneumococcal Polysaccharide (PPSV-23) 04/10/2014, 10/31/2019       Past Medical History:   Diagnosis Date    Asthma     CAD (coronary artery disease)     \"mild\" per Dr Guevara Martin note    Diabetes [facility])     Dr Sadie Alejandro     Hyperlipidemia     Hypertension Long term current use of anticoagulant therapy     Nausea & vomiting     Pulmonary embolism ([facility]     Screening for colon cancer 02/16/2005    Dr Wang Sánchez in 10 years    Stroke [facility]) 2004    Rt side weaker than left, uses a cane: no longer followed by neuro    Thromboembolus ([facility] 1976    Rt leg moved to lung     Thyroid disease     Unspecified sleep apnea     uses CPAP           1. \"Have you been to the ER, urgent care clinic since your last visit? Hospitalized since your last visit? \" No    2. \"Have you seen or consulted any other health care providers outside of the [address] [place] [address] since your last visit? \" No

## 2023-03-10 ENCOUNTER — PATIENT MESSAGE (OUTPATIENT)
Dept: INTERNAL MEDICINE CLINIC | Age: 73
End: 2023-03-10

## 2023-03-10 DIAGNOSIS — Z79.4 TYPE 2 DIABETES MELLITUS WITH COMPLICATION, WITH LONG-TERM CURRENT USE OF INSULIN (HCC): ICD-10-CM

## 2023-03-10 DIAGNOSIS — E11.8 TYPE 2 DIABETES MELLITUS WITH COMPLICATION, WITH LONG-TERM CURRENT USE OF INSULIN (HCC): ICD-10-CM

## 2023-03-10 RX ORDER — FLASH GLUCOSE SENSOR
1 KIT MISCELLANEOUS
Qty: 6 KIT | Refills: 3 | Status: SHIPPED | OUTPATIENT
Start: 2023-03-10

## 2023-03-10 RX ORDER — FLASH GLUCOSE SCANNING READER
1 EACH MISCELLANEOUS DAILY
Qty: 1 EACH | Refills: 0 | Status: SHIPPED | OUTPATIENT
Start: 2023-03-10

## 2023-03-10 NOTE — TELEPHONE ENCOUNTER
From: Mary Kay Weller  To: Shahbaz Salomon MD  Sent: 3/10/2023 12:03 PM EST  Subject: refill    Could a refill sbe sent out for Free Style Libre2 .  Thank

## 2023-03-13 DIAGNOSIS — Z79.4 TYPE 2 DIABETES MELLITUS WITH COMPLICATION, WITH LONG-TERM CURRENT USE OF INSULIN (HCC): ICD-10-CM

## 2023-03-13 DIAGNOSIS — E11.8 TYPE 2 DIABETES MELLITUS WITH COMPLICATION, WITH LONG-TERM CURRENT USE OF INSULIN (HCC): ICD-10-CM

## 2023-04-16 ENCOUNTER — HOSPITAL ENCOUNTER (EMERGENCY)
Age: 73
Discharge: HOME | End: 2023-04-16
Attending: EMERGENCY MEDICINE
Payer: MEDICARE

## 2023-04-16 VITALS
RESPIRATION RATE: 18 BRPM | OXYGEN SATURATION: 96 % | WEIGHT: 210 LBS | HEART RATE: 71 BPM | SYSTOLIC BLOOD PRESSURE: 136 MMHG | DIASTOLIC BLOOD PRESSURE: 67 MMHG | HEIGHT: 63 IN | BODY MASS INDEX: 37.21 KG/M2 | TEMPERATURE: 97.7 F

## 2023-04-16 DIAGNOSIS — M54.42 ACUTE BACK PAIN WITH SCIATICA, LEFT: Primary | ICD-10-CM

## 2023-04-16 DIAGNOSIS — B02.7 DISSEMINATED HERPES ZOSTER: ICD-10-CM

## 2023-04-16 PROCEDURE — 96372 THER/PROPH/DIAG INJ SC/IM: CPT

## 2023-04-16 PROCEDURE — 74011250636 HC RX REV CODE- 250/636: Performed by: EMERGENCY MEDICINE

## 2023-04-16 PROCEDURE — 74011636637 HC RX REV CODE- 636/637: Performed by: EMERGENCY MEDICINE

## 2023-04-16 PROCEDURE — 99284 EMERGENCY DEPT VISIT MOD MDM: CPT

## 2023-04-16 PROCEDURE — A9270 NON-COVERED ITEM OR SERVICE: HCPCS | Performed by: EMERGENCY MEDICINE

## 2023-04-16 RX ORDER — KETOROLAC TROMETHAMINE 30 MG/ML
30 INJECTION, SOLUTION INTRAMUSCULAR; INTRAVENOUS
Status: COMPLETED | OUTPATIENT
Start: 2023-04-16 | End: 2023-04-16

## 2023-04-16 RX ORDER — HYDROMORPHONE HYDROCHLORIDE 1 MG/ML
1 INJECTION, SOLUTION INTRAMUSCULAR; INTRAVENOUS; SUBCUTANEOUS ONCE
Status: COMPLETED | OUTPATIENT
Start: 2023-04-16 | End: 2023-04-16

## 2023-04-16 RX ORDER — PREDNISONE 20 MG/1
60 TABLET ORAL
Status: COMPLETED | OUTPATIENT
Start: 2023-04-16 | End: 2023-04-16

## 2023-04-16 RX ORDER — OXYCODONE AND ACETAMINOPHEN 5; 325 MG/1; MG/1
1 TABLET ORAL
Qty: 15 TABLET | Refills: 0 | Status: SHIPPED | OUTPATIENT
Start: 2023-04-16 | End: 2023-04-23

## 2023-04-16 RX ORDER — VALACYCLOVIR HYDROCHLORIDE 1 G/1
1000 TABLET, FILM COATED ORAL 3 TIMES DAILY
Qty: 21 TABLET | Refills: 0 | Status: SHIPPED | OUTPATIENT
Start: 2023-04-16 | End: 2023-04-23

## 2023-04-16 RX ADMIN — HYDROMORPHONE HYDROCHLORIDE 1 MG: 1 INJECTION, SOLUTION INTRAMUSCULAR; INTRAVENOUS; SUBCUTANEOUS at 09:29

## 2023-04-16 RX ADMIN — KETOROLAC TROMETHAMINE 30 MG: 30 INJECTION, SOLUTION INTRAMUSCULAR at 09:28

## 2023-04-16 RX ADMIN — PREDNISONE 60 MG: 20 TABLET ORAL at 09:29

## 2023-04-22 DIAGNOSIS — E11.8 TYPE 2 DIABETES MELLITUS WITH COMPLICATION, WITH LONG-TERM CURRENT USE OF INSULIN (HCC): ICD-10-CM

## 2023-04-22 DIAGNOSIS — Z79.4 TYPE 2 DIABETES MELLITUS WITH COMPLICATION, WITH LONG-TERM CURRENT USE OF INSULIN (HCC): ICD-10-CM

## 2023-04-22 RX ORDER — GABAPENTIN 100 MG/1
CAPSULE ORAL
Qty: 90 CAPSULE | Refills: 3 | Status: SHIPPED | OUTPATIENT
Start: 2023-04-22

## 2023-05-06 ENCOUNTER — HOSPITAL ENCOUNTER (EMERGENCY)
Facility: HOSPITAL | Age: 73
Discharge: HOME | End: 2023-05-06
Attending: EMERGENCY MEDICINE
Payer: MEDICARE

## 2023-05-06 VITALS
TEMPERATURE: 97.7 F | SYSTOLIC BLOOD PRESSURE: 135 MMHG | HEART RATE: 62 BPM | HEIGHT: 63 IN | RESPIRATION RATE: 14 BRPM | DIASTOLIC BLOOD PRESSURE: 61 MMHG | BODY MASS INDEX: 37.56 KG/M2 | WEIGHT: 212 LBS | OXYGEN SATURATION: 97 %

## 2023-05-06 DIAGNOSIS — M54.32 SCIATICA OF LEFT SIDE: Primary | ICD-10-CM

## 2023-05-06 PROCEDURE — 99283 EMERGENCY DEPT VISIT LOW MDM: CPT

## 2023-05-06 RX ORDER — TRAMADOL HYDROCHLORIDE 50 MG/1
50 TABLET ORAL EVERY 6 HOURS PRN
Qty: 12 TABLET | Refills: 0 | Status: SHIPPED | OUTPATIENT
Start: 2023-05-06 | End: 2023-05-06 | Stop reason: SDUPTHER

## 2023-05-06 RX ORDER — TRAMADOL HYDROCHLORIDE 50 MG/1
50 TABLET ORAL EVERY 6 HOURS PRN
Qty: 12 TABLET | Refills: 0 | Status: SHIPPED | OUTPATIENT
Start: 2023-05-06 | End: 2023-05-09

## 2023-05-06 ASSESSMENT — PAIN SCALES - GENERAL: PAINLEVEL_OUTOF10: 7

## 2023-05-06 NOTE — ED NOTES
c/o left leg pain x 1 month- states she has not been to see her primary MD, at times she cant stand for a long time, both legs hurt at times. States her neuropathy is getting worse.       Annamarie Rubio RN  05/06/23 [id number]

## 2023-05-06 NOTE — ED PROVIDER NOTES
[facility] EMERGENCY DEPT  EMERGENCY DEPARTMENT ENCOUNTER       Pt Name: Jose Santana  MRN: [id number]  [place] 1950  Date of evaluation: 5/6/2023  Provider: RYNE Archer   PCP: Nick Salazar MD  Note Started: 4:18 PM 5/6/23     CHIEF COMPLAINT       Chief Complaint   Patient presents with    Leg Pain     Left leg sciatic pain for a week; she has it in both legs but hurts running down the left leg this week. No falls        HISTORY OF PRESENT ILLNESS: 1 or more elements      History From: Patient  None     Jose Santana is a 81 y.o. female who presents ambulatory with about a week of moderately severe and essentially constant pain in her left leg. She tells me \"my neuropathy\" has been acting. She tells me it has been off and on for about the past month however has worsened and become more constant over the past week. She does describe numbness and tingling of her left leg. She tells me she does have an appointment with Dr. Carol Cohen at [facility] on May 15 with neurosurgery. She tells me she does have a primary care, Dr. Nick Salazar, who she has not consulted regarding this problem. She denies any fevers lately. She denies any chest pain or shortness of breath. She tells me she was seen for this problem in this facility a few weeks ago and prescribed oral steroids which made her sugars \"go crazy\". Nursing Notes were all reviewed and agreed with or any disagreements were addressed in the HPI. REVIEW OF SYSTEMS      Review of Systems   Constitutional:  Negative for fever. Musculoskeletal:         Pain in the left leg      Positives and Pertinent negatives as per HPI.     PAST HISTORY     Past Medical History:  Past Medical History:   Diagnosis Date    Asthma     CAD (coronary artery disease)     \"mild\" per Dr Barnes Person note    Diabetes [facility])     Dr Mortimer Factor     Hyperlipidemia     Hypertension     Long term current use of anticoagulant therapy     Nausea & vomiting     Pulmonary

## 2023-05-08 ENCOUNTER — TRANSCRIBE ORDERS (OUTPATIENT)
Facility: HOSPITAL | Age: 73
End: 2023-05-08

## 2023-05-08 DIAGNOSIS — Z12.31 SCREENING MAMMOGRAM FOR HIGH-RISK PATIENT: Primary | ICD-10-CM

## 2023-05-10 RX ORDER — PANTOPRAZOLE SODIUM 40 MG/1
40 TABLET, DELAYED RELEASE ORAL DAILY
Qty: 90 TABLET | Refills: 3 | Status: SHIPPED | OUTPATIENT
Start: 2023-05-10

## 2023-05-25 ENCOUNTER — CLINICAL DOCUMENTATION (OUTPATIENT)
Age: 73
End: 2023-05-25

## 2023-05-25 ENCOUNTER — TELEPHONE (OUTPATIENT)
Age: 73
End: 2023-05-25

## 2023-05-25 DIAGNOSIS — M54.32 LEFT SIDED SCIATICA: Primary | ICD-10-CM

## 2023-05-25 RX ORDER — LEVOTHYROXINE SODIUM 137 UG/1
TABLET ORAL
Qty: 90 TABLET | Refills: 3 | Status: SHIPPED | OUTPATIENT
Start: 2023-05-25

## 2023-05-25 RX ORDER — TRAMADOL HYDROCHLORIDE 50 MG/1
50 TABLET ORAL EVERY 8 HOURS PRN
Qty: 30 TABLET | Refills: 0 | Status: SHIPPED | OUTPATIENT
Start: 2023-05-25 | End: 2023-06-04

## 2023-05-25 NOTE — PROGRESS NOTES
Called pt back for persistent left leg pain from thigh to foot. Ordered lumbar MRI and refilled tramadol. She cancelled her appt with ortho MD earlier this month. Consent (Near Eyelid Margin)/Introductory Paragraph: EYELID REGION: The rationale for Mohs was explained to the patient and consent was obtained. The risks and benefits of Mohs surgery were discussed in detail. Specifically, the risks of swelling/bruising, scar, infection, bleeding, cutting through eyelid margin, possible damage to eye/eyelid/surrounding structures, risks of ectropion and/or eyelid deformity, possible damage to lacrimal (drainage) system, prolonged wound healing, incomplete removal/recurrence, allergy to anesthesia, nerve injury, numbness, contour/shape change, black/swollen eye, preplanned repair later today with Dr. Beau Neff at different location, discussed cosmesis but also function of eyelid hence repair set up with Dr. Neff in order to optimize preservation of both function and cosmesis. Prior to the procedure, the treatment site was clearly identified and confirmed by the patient with either a mirror or photograph. All questions answered. All components of Universal Protocol/PAUSE Rule completed. Consent (Near Eyelid Margin)/Introductory Paragraph: EYELID REGION: The rationale for Mohs was explained to the patient and consent was obtained. The risks and benefits of Mohs surgery were discussed in detail. Specifically, the risks of swelling/bruising, scar, infection, bleeding, cutting through eyelid margin, possible damage to eye/eyelid/surrounding structures, risks of ectropion and/or eyelid deformity, possible damage to lacrimal (drainage) system, prolonged wound healing, incomplete removal/recurrence, allergy to anesthesia, nerve injury, numbness, contour/shape change, black/swollen eye, preplanned repair later today with Dr. Beau Neff at different location, discussed cosmesis but also function of eyelid hence repair set up with Dr. eNff in order to optimize preservation of both function and cosmesis. Prior to the procedure, the treatment site was clearly identified and confirmed by the patient with either a mirror or photograph. All questions answered. All components of Universal Protocol/PAUSE Rule completed.

## 2023-06-01 ENCOUNTER — HOSPITAL ENCOUNTER (OUTPATIENT)
Facility: HOSPITAL | Age: 73
Discharge: HOME | End: 2023-06-01
Payer: MEDICARE

## 2023-06-01 DIAGNOSIS — Z12.31 SCREENING MAMMOGRAM FOR HIGH-RISK PATIENT: ICD-10-CM

## 2023-06-01 PROCEDURE — 77063 BREAST TOMOSYNTHESIS BI: CPT

## 2023-06-09 ENCOUNTER — OFFICE VISIT (OUTPATIENT)
Age: 73
End: 2023-06-09
Payer: MEDICARE

## 2023-06-09 VITALS
HEART RATE: 69 BPM | TEMPERATURE: 98.4 F | OXYGEN SATURATION: 97 % | RESPIRATION RATE: 17 BRPM | HEIGHT: 63 IN | BODY MASS INDEX: 36.59 KG/M2 | WEIGHT: 206.5 LBS | SYSTOLIC BLOOD PRESSURE: 156 MMHG | DIASTOLIC BLOOD PRESSURE: 65 MMHG

## 2023-06-09 DIAGNOSIS — E11.8 TYPE 2 DIABETES MELLITUS WITH UNSPECIFIED COMPLICATIONS (HCC): Primary | ICD-10-CM

## 2023-06-09 LAB — HBA1C MFR BLD: 8.4 %

## 2023-06-09 PROCEDURE — 1123F ACP DISCUSS/DSCN MKR DOCD: CPT | Performed by: INTERNAL MEDICINE

## 2023-06-09 PROCEDURE — 2022F DILAT RTA XM EVC RTNOPTHY: CPT | Performed by: INTERNAL MEDICINE

## 2023-06-09 PROCEDURE — 83036 HEMOGLOBIN GLYCOSYLATED A1C: CPT | Performed by: INTERNAL MEDICINE

## 2023-06-09 PROCEDURE — G8399 PT W/DXA RESULTS DOCUMENT: HCPCS | Performed by: INTERNAL MEDICINE

## 2023-06-09 PROCEDURE — 3046F HEMOGLOBIN A1C LEVEL >9.0%: CPT | Performed by: INTERNAL MEDICINE

## 2023-06-09 PROCEDURE — 3017F COLORECTAL CA SCREEN DOC REV: CPT | Performed by: INTERNAL MEDICINE

## 2023-06-09 PROCEDURE — 99214 OFFICE O/P EST MOD 30 MIN: CPT | Performed by: INTERNAL MEDICINE

## 2023-06-09 PROCEDURE — 1036F TOBACCO NON-USER: CPT | Performed by: INTERNAL MEDICINE

## 2023-06-09 PROCEDURE — G8417 CALC BMI ABV UP PARAM F/U: HCPCS | Performed by: INTERNAL MEDICINE

## 2023-06-09 PROCEDURE — 3078F DIAST BP <80 MM HG: CPT | Performed by: INTERNAL MEDICINE

## 2023-06-09 PROCEDURE — 1090F PRES/ABSN URINE INCON ASSESS: CPT | Performed by: INTERNAL MEDICINE

## 2023-06-09 PROCEDURE — 3077F SYST BP >= 140 MM HG: CPT | Performed by: INTERNAL MEDICINE

## 2023-06-09 PROCEDURE — G8427 DOCREV CUR MEDS BY ELIG CLIN: HCPCS | Performed by: INTERNAL MEDICINE

## 2023-06-09 RX ORDER — METHYLPREDNISOLONE 4 MG/1
TABLET ORAL
COMMUNITY
Start: 2023-04-12

## 2023-06-09 RX ORDER — MONTELUKAST SODIUM 10 MG/1
TABLET ORAL
COMMUNITY
Start: 2020-08-11

## 2023-06-09 RX ORDER — LATANOPROST 50 UG/ML
SOLUTION/ DROPS OPHTHALMIC
COMMUNITY
Start: 2023-04-25

## 2023-06-09 RX ORDER — LATANOPROST 50 UG/ML
SOLUTION/ DROPS OPHTHALMIC
COMMUNITY

## 2023-06-09 RX ORDER — DULAGLUTIDE 0.75 MG/.5ML
INJECTION, SOLUTION SUBCUTANEOUS
COMMUNITY
Start: 2020-09-06

## 2023-06-09 RX ORDER — KETOROLAC TROMETHAMINE 5 MG/ML
SOLUTION OPHTHALMIC
COMMUNITY

## 2023-06-09 RX ORDER — AMLODIPINE BESYLATE 10 MG/1
TABLET ORAL
COMMUNITY
Start: 2020-08-13

## 2023-06-09 RX ORDER — HYDROXYZINE HYDROCHLORIDE 25 MG/1
TABLET, FILM COATED ORAL
COMMUNITY
Start: 2023-04-15

## 2023-06-09 RX ORDER — METOPROLOL TARTRATE 50 MG/1
TABLET, FILM COATED ORAL
COMMUNITY
Start: 2020-10-19

## 2023-06-09 RX ORDER — CALCITRIOL 0.25 UG/1
CAPSULE, LIQUID FILLED ORAL
COMMUNITY

## 2023-06-09 ASSESSMENT — ANXIETY QUESTIONNAIRES
5. BEING SO RESTLESS THAT IT IS HARD TO SIT STILL: 0
6. BECOMING EASILY ANNOYED OR IRRITABLE: 0
1. FEELING NERVOUS, ANXIOUS, OR ON EDGE: 0
2. NOT BEING ABLE TO STOP OR CONTROL WORRYING: 0
4. TROUBLE RELAXING: 0
GAD7 TOTAL SCORE: 0
IF YOU CHECKED OFF ANY PROBLEMS ON THIS QUESTIONNAIRE, HOW DIFFICULT HAVE THESE PROBLEMS MADE IT FOR YOU TO DO YOUR WORK, TAKE CARE OF THINGS AT HOME, OR GET ALONG WITH OTHER PEOPLE: NOT DIFFICULT AT ALL
7. FEELING AFRAID AS IF SOMETHING AWFUL MIGHT HAPPEN: 0
3. WORRYING TOO MUCH ABOUT DIFFERENT THINGS: 0

## 2023-06-09 ASSESSMENT — PATIENT HEALTH QUESTIONNAIRE - PHQ9
1. LITTLE INTEREST OR PLEASURE IN DOING THINGS: 0
SUM OF ALL RESPONSES TO PHQ QUESTIONS 1-9: 0
SUM OF ALL RESPONSES TO PHQ9 QUESTIONS 1 & 2: 0
SUM OF ALL RESPONSES TO PHQ QUESTIONS 1-9: 0
2. FEELING DOWN, DEPRESSED OR HOPELESS: 0

## 2023-06-09 NOTE — PROGRESS NOTES
Ms. Gopal Fitch returns for follow-up of her type 2 diabetes mellitus x 14 years with poor blood sugar control. Her A1c was 9.7% in September. Her A1c was  8.9%  in March 2021 which is the lowest we have ever had.,   Her A1c in October was 9.3%. Her A1c today is 8.4%. Recent laboratory tests also remarkable for creatinine of 1.43 with a GFR of 44. LFTs normal.       Current medications   Toujeo insulin 30 units in AM    Humalog insulin 20 units before each meal (misses often)   Freestyle Freedom 2      Since her last, she had a steroid injection that was positive. Downloaded her freestyle freedom for the last several weeks demonstrated estimated A1c of 7.2 but her A1c is 8.4%. As demonstrated, blood sugars during the evening and overnight are excellent but her blood sugars after she eats climbed considerably into the 200-250 range. She claims adherence to the mealtime insulin. She continues to complain of neuropathic pain. She is currently going to the neuropathy clinic and thinks it helps. Examination  Blood pressure 156/65  Pulse 80  126  BMI 36.6  HEENT unremarkable  Lungs clear  Heart is regular rate and rhythm  Abdomen obese  Extremities unremarkable  Diabetic foot exam:   Left Foot:   Visual Exam: normal   Pulse DP: 2+ (normal)   Filament test: absent sensation   Vibratory Sensation: absent  Right Foot:   Visual Exam: normal   Pulse DP: 2+ (normal)   Filament test: absent sensation   Vibratory Sensation: absent     Impression  1. Type 2 diabetes mellitus with deteriorating glucose control  2. Diabetic peripheral neuropathy with pain    Plan:  1. I have strongly encouraged her to be more aggressive about taking her insulin prior to eating the blood sugar from rising. 2.  Did not change her basal insulin  3. I will see her back in 3 to 4 months.

## 2023-06-09 NOTE — PROGRESS NOTES
Rm    Chief Complaint   Patient presents with    Diabetes     Follow up        BP (!) 156/65 (Site: Left Upper Arm, Position: Sitting, Cuff Size: Medium Adult)   Pulse 69   Temp 98.4 °F (36.9 °C) (Temporal)   Resp 17   Ht 5' 3\" (1.6 m)   Wt 206 lb 8 oz (93.7 kg)   SpO2 97%   BMI 36.58 kg/m²      1. Have you been to the ER, urgent care clinic since your last visit? Hospitalized since your last visit? [facility] [id number]    2. Have you seen or consulted any other health care providers outside of the [address] [place] [address] since your last visit? Include any pap smears or colon screening. no    Health Maintenance Due   Topic Date Due    DTaP/Tdap/Td vaccine (1 - Tdap) Never done    Shingles vaccine (1 of 2) Never done    Diabetic retinal exam  11/25/2020    COVID-19 Vaccine (5 - Booster for Beckie Arsh series) 01/26/2022    Diabetic Alb to Cr ratio (uACR) test  12/10/2022    Annual Wellness Visit (AWV)  06/11/2023        No flowsheet data found. No flowsheet data found. No flowsheet data found.

## 2023-06-15 ENCOUNTER — TELEPHONE (OUTPATIENT)
Age: 73
End: 2023-06-15

## 2023-06-15 DIAGNOSIS — M54.30 SCIATICA, UNSPECIFIED LATERALITY: Primary | ICD-10-CM

## 2023-06-15 RX ORDER — GABAPENTIN 100 MG/1
200 CAPSULE ORAL 3 TIMES DAILY
Qty: 180 CAPSULE | Refills: 5 | Status: SHIPPED | OUTPATIENT
Start: 2023-06-15 | End: 2023-12-12

## 2023-06-15 RX ORDER — TRAMADOL HYDROCHLORIDE 50 MG/1
50 TABLET ORAL EVERY 8 HOURS PRN
Qty: 30 TABLET | Refills: 0 | Status: SHIPPED | OUTPATIENT
Start: 2023-06-15 | End: 2023-06-25

## 2023-06-30 ASSESSMENT — SLEEP AND FATIGUE QUESTIONNAIRES
HOW LIKELY ARE YOU TO NOD OFF OR FALL ASLEEP IN A CAR, WHILE STOPPED FOR A FEW MINUTES IN TRAFFIC: 0
HOW LIKELY ARE YOU TO NOD OFF OR FALL ASLEEP WHILE SITTING QUIETLY AFTER LUNCH WITHOUT ALCOHOL: 0
ESS TOTAL SCORE: 7
HOW LIKELY ARE YOU TO NOD OFF OR FALL ASLEEP WHILE SITTING AND TALKING TO SOMEONE: 0
HOW LIKELY ARE YOU TO NOD OFF OR FALL ASLEEP WHEN YOU ARE A PASSENGER IN A CAR FOR AN HOUR WITHOUT A BREAK: 1
HOW LIKELY ARE YOU TO NOD OFF OR FALL ASLEEP WHILE WATCHING TV: 1
HOW LIKELY ARE YOU TO NOD OFF OR FALL ASLEEP WHILE SITTING INACTIVE IN A PUBLIC PLACE: 1
HOW LIKELY ARE YOU TO NOD OFF OR FALL ASLEEP WHILE LYING DOWN TO REST IN THE AFTERNOON WHEN CIRCUMSTANCES PERMIT: 2
HOW LIKELY ARE YOU TO NOD OFF OR FALL ASLEEP WHILE SITTING AND READING: 2

## 2023-07-03 ENCOUNTER — TELEMEDICINE (OUTPATIENT)
Age: 73
End: 2023-07-03
Payer: MEDICARE

## 2023-07-03 DIAGNOSIS — I10 ESSENTIAL (PRIMARY) HYPERTENSION: ICD-10-CM

## 2023-07-03 DIAGNOSIS — G47.33 OBSTRUCTIVE SLEEP APNEA (ADULT) (PEDIATRIC): Primary | ICD-10-CM

## 2023-07-03 PROCEDURE — 3017F COLORECTAL CA SCREEN DOC REV: CPT | Performed by: INTERNAL MEDICINE

## 2023-07-03 PROCEDURE — G8399 PT W/DXA RESULTS DOCUMENT: HCPCS | Performed by: INTERNAL MEDICINE

## 2023-07-03 PROCEDURE — 99213 OFFICE O/P EST LOW 20 MIN: CPT | Performed by: INTERNAL MEDICINE

## 2023-07-03 PROCEDURE — G8427 DOCREV CUR MEDS BY ELIG CLIN: HCPCS | Performed by: INTERNAL MEDICINE

## 2023-07-03 PROCEDURE — 1123F ACP DISCUSS/DSCN MKR DOCD: CPT | Performed by: INTERNAL MEDICINE

## 2023-07-03 PROCEDURE — 1090F PRES/ABSN URINE INCON ASSESS: CPT | Performed by: INTERNAL MEDICINE

## 2023-07-03 ASSESSMENT — SLEEP AND FATIGUE QUESTIONNAIRES
HOW LIKELY ARE YOU TO NOD OFF OR FALL ASLEEP WHILE SITTING AND TALKING TO SOMEONE: 0
HOW LIKELY ARE YOU TO NOD OFF OR FALL ASLEEP WHILE LYING DOWN TO REST IN THE AFTERNOON WHEN CIRCUMSTANCES PERMIT: 2
DO YOU HAVE DIFFICULTY BEING AS ACTIVE AS YOU WANT TO BE IN THE MORNING BECAUSE YOU ARE SLEEPY OR TIRED: NO
HOW LIKELY ARE YOU TO NOD OFF OR FALL ASLEEP IN A CAR, WHILE STOPPED FOR A FEW MINUTES IN TRAFFIC: WOULD NEVER DOZE
HOW LIKELY ARE YOU TO NOD OFF OR FALL ASLEEP WHILE WATCHING TV: SLIGHT CHANCE OF DOZING
DO YOU HAVE DIFFICULTY OPERATING A MOTOR VEHICLE FOR LONG DISTANCES (GREATER THAN 100 MILES) BECAUSE YOU BECOME SLEEPY: NO
HOW LIKELY ARE YOU TO NOD OFF OR FALL ASLEEP WHILE SITTING INACTIVE IN A PUBLIC PLACE: 1
HAS YOUR MOOD BEEN AFFECTED BECAUSE YOU ARE SLEEPY OR TIRED: NO
HOW LIKELY ARE YOU TO NOD OFF OR FALL ASLEEP WHILE LYING DOWN TO REST IN THE AFTERNOON WHEN CIRCUMSTANCES PERMIT: MODERATE CHANCE OF DOZING
HOW LIKELY ARE YOU TO NOD OFF OR FALL ASLEEP WHILE SITTING AND READING: MODERATE CHANCE OF DOZING
DO YOU HAVE DIFFICULTY WATCHING A MOVIE OR VIDEO BECAUSE YOU BECOME SLEEPY OR TIRED: NO
HOW LIKELY ARE YOU TO NOD OFF OR FALL ASLEEP WHEN YOU ARE A PASSENGER IN A CAR FOR AN HOUR WITHOUT A BREAK: 1
HOW LIKELY ARE YOU TO NOD OFF OR FALL ASLEEP WHILE SITTING QUIETLY AFTER LUNCH WITHOUT ALCOHOL: 0
HOW LIKELY ARE YOU TO NOD OFF OR FALL ASLEEP WHILE SITTING AND READING: 2
ESS TOTAL SCORE: 7
DO YOU HAVE DIFFICULTY BEING AS ACTIVE AS YOU WANT TO BE IN THE EVENING BECAUSE YOU ARE SLEEPY OR TIRED: NO
FOSQ SCORE: 19.5
DO YOU HAVE DIFFICULTY VISITING YOUR FAMILY OR FRIENDS IN THEIR HOME BECAUSE YOU BECOME SLEEPY OR TIRED: NO
HOW LIKELY ARE YOU TO NOD OFF OR FALL ASLEEP WHEN YOU ARE A PASSENGER IN A CAR FOR AN HOUR WITHOUT A BREAK: SLIGHT CHANCE OF DOZING
HAS YOUR RELATIONSHIP WITH FAMILY, FRIENDS OR WORK COLLEAGUES BEEN AFFECTED BECAUSE YOU ARE SLEEPY OR TIRED: YES, A LITTLE
HOW LIKELY ARE YOU TO NOD OFF OR FALL ASLEEP IN A CAR, WHILE STOPPED FOR A FEW MINUTES IN TRAFFIC: 0
HOW LIKELY ARE YOU TO NOD OFF OR FALL ASLEEP WHILE SITTING AND TALKING TO SOMEONE: WOULD NEVER DOZE
HOW LIKELY ARE YOU TO NOD OFF OR FALL ASLEEP WHILE WATCHING TV: 1
DO YOU HAVE DIFFICULTY OPERATING A MOTOR VEHICLE FOR SHORT DISTANCES (LESS THAN 100 MILES) BECAUSE YOU BECOME SLEEPY: NO
HOW LIKELY ARE YOU TO NOD OFF OR FALL ASLEEP WHILE SITTING INACTIVE IN A PUBLIC PLACE: SLIGHT CHANCE OF DOZING
DO YOU HAVE DIFFICULTY CONCENTRATING ON THE THINGS YOU DO BECAUSE YOU ARE SLEEPY OR TIRED: NO
HOW LIKELY ARE YOU TO NOD OFF OR FALL ASLEEP WHILE SITTING QUIETLY AFTER LUNCH WITHOUT ALCOHOL: WOULD NEVER DOZE
DO YOU GENERALLY HAVE DIFFICULTY REMEMBERING THINGS BECAUSE YOU ARE SLEEPY OR TIRED: NO

## 2023-07-03 NOTE — PROGRESS NOTES
and on proper sleep hygiene and safe driving. * Re-enforced proper and regular cleaning for the device. she was advised to follow 's recommendations regarding cleaning of PAP device and PAP supplies. 2. Hypertension - now controlled on current regimen. she will continue her current regimen. she will continue to monitor at home and with her PMD for further adjustments as needed. I have reviewed the relationship between hypertension as it relates to sleep-disordered breathing. All of her questions were addressed. Pursuant to the emergency declaration under the [place] [address] waiver authority and the [employer] and [employer] Act, this Virtual  Visit was conducted, with patient's consent, to reduce the patient's risk of exposure to COVID-19 and provide continuity of care for an established patient. Services were provided through a video synchronous discussion virtually to substitute for in-person clinic visit.     Fatuma Tian MD    Electronically signed by    Elton Rowley MD  Diplomate in Sleep Medicine  [facility]

## 2023-07-07 ENCOUNTER — OFFICE VISIT (OUTPATIENT)
Age: 73
End: 2023-07-07

## 2023-07-07 VITALS
HEIGHT: 63 IN | RESPIRATION RATE: 18 BRPM | BODY MASS INDEX: 36.93 KG/M2 | TEMPERATURE: 97.4 F | SYSTOLIC BLOOD PRESSURE: 134 MMHG | HEART RATE: 71 BPM | WEIGHT: 208.4 LBS | OXYGEN SATURATION: 97 % | DIASTOLIC BLOOD PRESSURE: 60 MMHG

## 2023-07-07 DIAGNOSIS — Z79.4 TYPE 2 DIABETES MELLITUS WITH DIABETIC NEUROPATHY, WITH LONG-TERM CURRENT USE OF INSULIN (HCC): ICD-10-CM

## 2023-07-07 DIAGNOSIS — N18.31 STAGE 3A CHRONIC KIDNEY DISEASE (HCC): Primary | ICD-10-CM

## 2023-07-07 DIAGNOSIS — E78.5 HYPERLIPIDEMIA, UNSPECIFIED HYPERLIPIDEMIA TYPE: ICD-10-CM

## 2023-07-07 DIAGNOSIS — I10 HYPERTENSION, UNSPECIFIED TYPE: ICD-10-CM

## 2023-07-07 DIAGNOSIS — E11.40 TYPE 2 DIABETES MELLITUS WITH DIABETIC NEUROPATHY, WITH LONG-TERM CURRENT USE OF INSULIN (HCC): ICD-10-CM

## 2023-07-07 DIAGNOSIS — L84 HEEL CALLUS: ICD-10-CM

## 2023-07-07 SDOH — ECONOMIC STABILITY: FOOD INSECURITY: WITHIN THE PAST 12 MONTHS, YOU WORRIED THAT YOUR FOOD WOULD RUN OUT BEFORE YOU GOT MONEY TO BUY MORE.: NEVER TRUE

## 2023-07-07 SDOH — ECONOMIC STABILITY: HOUSING INSECURITY
IN THE LAST 12 MONTHS, WAS THERE A TIME WHEN YOU DID NOT HAVE A STEADY PLACE TO SLEEP OR SLEPT IN A SHELTER (INCLUDING NOW)?: NO

## 2023-07-07 SDOH — ECONOMIC STABILITY: INCOME INSECURITY: HOW HARD IS IT FOR YOU TO PAY FOR THE VERY BASICS LIKE FOOD, HOUSING, MEDICAL CARE, AND HEATING?: NOT HARD AT ALL

## 2023-07-07 SDOH — ECONOMIC STABILITY: FOOD INSECURITY: WITHIN THE PAST 12 MONTHS, THE FOOD YOU BOUGHT JUST DIDN'T LAST AND YOU DIDN'T HAVE MONEY TO GET MORE.: NEVER TRUE

## 2023-07-07 ASSESSMENT — PATIENT HEALTH QUESTIONNAIRE - PHQ9
2. FEELING DOWN, DEPRESSED OR HOPELESS: 0
SUM OF ALL RESPONSES TO PHQ QUESTIONS 1-9: 0
SUM OF ALL RESPONSES TO PHQ9 QUESTIONS 1 & 2: 0
1. LITTLE INTEREST OR PLEASURE IN DOING THINGS: 0

## 2023-07-07 ASSESSMENT — LIFESTYLE VARIABLES
HOW OFTEN DO YOU HAVE A DRINK CONTAINING ALCOHOL: NEVER
HOW MANY STANDARD DRINKS CONTAINING ALCOHOL DO YOU HAVE ON A TYPICAL DAY: PATIENT DOES NOT DRINK

## 2023-07-07 NOTE — PROGRESS NOTES
1. \"Have you been to the ER, urgent care clinic since your last visit? Hospitalized since your last visit? \" No    2. \"Have you seen or consulted any other health care providers outside of the [address] [place] [address] Avenue since your last visit? \"         [facility] // [place]     3. For patients aged 43-73: Has the patient had a colonoscopy / FIT/ Cologuard? 2022 anthony      If the patient is female:    4. For patients aged 43-66: Has the patient had a mammogram within the past 2 years? 6/1/2023 [facility]      5. For patients aged 21-65: Has the patient had a pap smear?   No
units for dinner      losartan (COZAAR) 100 MG tablet TAKE 1 TABLET DAILY      potassium chloride (KLOR-CON M) 20 MEQ extended release tablet Take by mouth daily      rosuvastatin (CRESTOR) 20 MG tablet TAKE 1 TABLET DAILY      tiZANidine (ZANAFLEX) 2 MG tablet Take by mouth daily as needed      triamterene-hydroCHLOROthiazide (DYAZIDE) 37.5-25 MG per capsule TAKE 1 CAPSULE DAILY      methylPREDNISolone (MEDROL DOSEPACK) 4 MG tablet FOLLOW PACKAGE DIRECTIONS (Patient not taking: Reported on 7/7/2023)       No current facility-administered medications for this visit.      Allergies   Allergen Reactions    Latex Itching    Codeine Itching and Other (See Comments)     hallucinate    Shellfish Allergy Swelling    Iodine Itching and Rash     Given pre-cardiac cath        BMP:   Lab Results   Component Value Date/Time     11/07/2022 09:18 AM    K 3.1 11/07/2022 09:18 AM     11/07/2022 09:18 AM    CO2 37 11/07/2022 09:18 AM    BUN 26 11/07/2022 09:18 AM    CREATININE 1.31 11/07/2022 09:18 AM    GLUCOSE 259 11/07/2022 09:18 AM    CALCIUM 10.3 11/07/2022 09:18 AM      CBC:   Lab Results   Component Value Date/Time    WBC 7.0 11/07/2022 09:18 AM    RBC 4.72 11/07/2022 09:18 AM    HGB 10.9 11/07/2022 09:18 AM    HCT 37.3 11/07/2022 09:18 AM    MCV 79.0 11/07/2022 09:18 AM    MCH 23.1 11/07/2022 09:18 AM    MCHC 29.2 11/07/2022 09:18 AM    RDW 17.0 11/07/2022 09:18 AM     11/07/2022 09:18 AM    MPV 10.7 11/07/2022 09:18 AM      Lipids   Lab Results   Component Value Date/Time    CHOL 163 12/15/2022 03:37 PM    TRIG 144 12/15/2022 03:37 PM    LDLCALC 76.2 12/15/2022 03:37 PM    LABVLDL 28.8 12/15/2022 03:37 PM    CHOLHDLRATIO 2.8 12/15/2022 03:37 PM     Hemoglobin A1C:   Hemoglobin A1C   Date Value Ref Range Status   03/17/2021 8.9 (H) 4.8 - 5.6 % Final     Comment:              Prediabetes: 5.7 - 6.4           Diabetes: >6.4           Glycemic control for adults with diabetes: <7.0       Hemoglobin A1C, POC

## 2023-07-08 LAB
ANION GAP SERPL CALC-SCNC: 2 MMOL/L (ref 5–15)
BUN SERPL-MCNC: 24 MG/DL (ref 6–20)
BUN/CREAT SERPL: 14 (ref 12–20)
CALCIUM SERPL-MCNC: 10.1 MG/DL (ref 8.5–10.1)
CALCIUM SERPL-MCNC: 10.1 MG/DL (ref 8.5–10.1)
CHLORIDE SERPL-SCNC: 104 MMOL/L (ref 97–108)
CO2 SERPL-SCNC: 36 MMOL/L (ref 21–32)
COMMENT:: NORMAL
CREAT SERPL-MCNC: 1.69 MG/DL (ref 0.55–1.02)
GLUCOSE SERPL-MCNC: 209 MG/DL (ref 65–100)
PHOSPHATE SERPL-MCNC: 2.7 MG/DL (ref 2.6–4.7)
POTASSIUM SERPL-SCNC: 3.6 MMOL/L (ref 3.5–5.1)
PTH-INTACT SERPL-MCNC: 219.5 PG/ML (ref 18.4–88)
SODIUM SERPL-SCNC: 142 MMOL/L (ref 136–145)
SPECIMEN HOLD: NORMAL

## 2023-08-14 RX ORDER — ALBUTEROL SULFATE 90 UG/1
AEROSOL, METERED RESPIRATORY (INHALATION)
Qty: 25.5 G | Refills: 2 | Status: SHIPPED | OUTPATIENT
Start: 2023-08-14

## 2023-08-17 ENCOUNTER — TELEPHONE (OUTPATIENT)
Age: 73
End: 2023-08-17

## 2023-08-17 DIAGNOSIS — R26.2 DIFFICULTY WALKING: ICD-10-CM

## 2023-08-17 DIAGNOSIS — Z86.73 HISTORY OF CVA (CEREBROVASCULAR ACCIDENT): ICD-10-CM

## 2023-08-17 DIAGNOSIS — M17.9 OSTEOARTHRITIS OF KNEE, UNSPECIFIED LATERALITY, UNSPECIFIED OSTEOARTHRITIS TYPE: Primary | ICD-10-CM

## 2023-08-17 DIAGNOSIS — E11.65 TYPE 2 DIABETES MELLITUS WITH HYPERGLYCEMIA, UNSPECIFIED WHETHER LONG TERM INSULIN USE (HCC): ICD-10-CM

## 2023-08-17 DIAGNOSIS — E11.40 TYPE 2 DIABETES MELLITUS WITH DIABETIC NEUROPATHY, WITH LONG-TERM CURRENT USE OF INSULIN (HCC): ICD-10-CM

## 2023-08-17 DIAGNOSIS — Z79.4 TYPE 2 DIABETES MELLITUS WITH DIABETIC NEUROPATHY, WITH LONG-TERM CURRENT USE OF INSULIN (HCC): ICD-10-CM

## 2023-08-17 NOTE — TELEPHONE ENCOUNTER
Patient states she needs to get an Order for a [employer]. Please call if any questions or to advise when done.  Thank you

## 2023-08-17 NOTE — TELEPHONE ENCOUNTER
[address] [facility]   Fax [phone]      Needs four wheels and seat    Order pended to provider

## 2023-08-29 ENCOUNTER — TELEPHONE (OUTPATIENT)
Age: 73
End: 2023-08-29

## 2023-08-29 NOTE — TELEPHONE ENCOUNTER
----- Message from Kelley Meyer sent at 8/29/2023 10:23 AM EDT -----  Subject: Message to Provider    QUESTIONS  Information for Provider? pt asking for a update [employer] - said   she's not able to get through to '[address]\" - asking for a callback   ---------------------------------------------------------------------------  --------------  [address]  [phone]; OK to leave message on voicemail,OK to respond with electronic   message via [employer] portal (only for patients who have registered [employer]   account)  ---------------------------------------------------------------------------  --------------  SCRIPT ANSWERS  Relationship to Patient?  Self

## 2023-08-30 NOTE — TELEPHONE ENCOUNTER
Previous order appears to have been rejected by company. New order placed with [employer]. Paperwork placed for MD signature.

## 2023-08-31 ENCOUNTER — TELEPHONE (OUTPATIENT)
Age: 73
End: 2023-08-31

## 2023-08-31 NOTE — TELEPHONE ENCOUNTER
Pt states that she has been trying to get a Rolator. Pt states that we told her we sent the order on 8-21-23. She needs to know where this order was sent? What company? Please call pt.

## 2023-09-04 ENCOUNTER — PATIENT MESSAGE (OUTPATIENT)
Age: 73
End: 2023-09-04

## 2023-09-05 RX ORDER — LOSARTAN POTASSIUM 100 MG/1
100 TABLET ORAL DAILY
Qty: 90 TABLET | Refills: 3 | Status: SHIPPED | OUTPATIENT
Start: 2023-09-05

## 2023-09-05 NOTE — TELEPHONE ENCOUNTER
From: Maira Hansen  To: Dr. Lerma Bi: 9/4/2023 12:34 PM EDT  Subject: re-fill     need refill onLosartan. I received my walker on Saturday, in the mail.  Thank you

## 2023-09-05 NOTE — TELEPHONE ENCOUNTER
PCP: Manisha Campoverde MD    Last appt: 7/7/2023  Future Appointments   Date Time Provider [address]   9/22/2023  2:10 PM Allie Gambino MD [facility] [address] BS AMB   1/4/2024 11:20 AM Jessica Gray MD [facility] BS AMB   1/9/2024 11:15 AM Katie Brown MD [facility] BS AMB   6/14/2024  2:20 PM Delaney Perez MD [facility] BS AMB       Requested Prescriptions     Pending Prescriptions Disp Refills    losartan (COZAAR) 100 MG tablet 90 tablet 3     Sig: Take 1 tablet by mouth daily

## 2023-09-22 ENCOUNTER — OFFICE VISIT (OUTPATIENT)
Age: 73
End: 2023-09-22
Payer: MEDICARE

## 2023-09-22 VITALS
DIASTOLIC BLOOD PRESSURE: 67 MMHG | WEIGHT: 209 LBS | HEART RATE: 69 BPM | SYSTOLIC BLOOD PRESSURE: 175 MMHG | HEIGHT: 63 IN | BODY MASS INDEX: 37.03 KG/M2

## 2023-09-22 DIAGNOSIS — Z79.4 LONG TERM (CURRENT) USE OF INSULIN (HCC): ICD-10-CM

## 2023-09-22 DIAGNOSIS — E66.01 MORBID (SEVERE) OBESITY DUE TO EXCESS CALORIES (HCC): ICD-10-CM

## 2023-09-22 DIAGNOSIS — E11.8 TYPE 2 DIABETES MELLITUS WITH UNSPECIFIED COMPLICATIONS (HCC): Primary | ICD-10-CM

## 2023-09-22 DIAGNOSIS — N18.31 STAGE 3A CHRONIC KIDNEY DISEASE (HCC): ICD-10-CM

## 2023-09-22 LAB — HBA1C MFR BLD: 8.6 %

## 2023-09-22 PROCEDURE — 1036F TOBACCO NON-USER: CPT | Performed by: INTERNAL MEDICINE

## 2023-09-22 PROCEDURE — 1090F PRES/ABSN URINE INCON ASSESS: CPT | Performed by: INTERNAL MEDICINE

## 2023-09-22 PROCEDURE — 3078F DIAST BP <80 MM HG: CPT | Performed by: INTERNAL MEDICINE

## 2023-09-22 PROCEDURE — 3046F HEMOGLOBIN A1C LEVEL >9.0%: CPT | Performed by: INTERNAL MEDICINE

## 2023-09-22 PROCEDURE — 3017F COLORECTAL CA SCREEN DOC REV: CPT | Performed by: INTERNAL MEDICINE

## 2023-09-22 PROCEDURE — 3077F SYST BP >= 140 MM HG: CPT | Performed by: INTERNAL MEDICINE

## 2023-09-22 PROCEDURE — 99214 OFFICE O/P EST MOD 30 MIN: CPT | Performed by: INTERNAL MEDICINE

## 2023-09-22 PROCEDURE — 1123F ACP DISCUSS/DSCN MKR DOCD: CPT | Performed by: INTERNAL MEDICINE

## 2023-09-22 PROCEDURE — G8399 PT W/DXA RESULTS DOCUMENT: HCPCS | Performed by: INTERNAL MEDICINE

## 2023-09-22 PROCEDURE — G8417 CALC BMI ABV UP PARAM F/U: HCPCS | Performed by: INTERNAL MEDICINE

## 2023-09-22 PROCEDURE — 2022F DILAT RTA XM EVC RTNOPTHY: CPT | Performed by: INTERNAL MEDICINE

## 2023-09-22 PROCEDURE — 83036 HEMOGLOBIN GLYCOSYLATED A1C: CPT | Performed by: INTERNAL MEDICINE

## 2023-09-22 PROCEDURE — G8428 CUR MEDS NOT DOCUMENT: HCPCS | Performed by: INTERNAL MEDICINE

## 2023-09-22 NOTE — PATIENT INSTRUCTIONS
Please help Ms Joby Bangura to remember to take her insulin    Please dial up the 20 units Humalog before she eats. She will give the insulin and if she gives the insulin the pen will go back to zero so we all know that she took the insulin.     She will continue to take 30 units of Toujeo every morning as well

## 2023-09-22 NOTE — PROGRESS NOTES
Ms. Webb returns for follow-up of her type 2 diabetes mellitus x 14 years with poor blood sugar control. Her A1c was 9.7% in September. Her A1c was  8.9%  in March 2021 which is the lowest we have ever had.,   Her A1c in October was 9.3%. When I saw her last her A1c was 8.4%. Her A1c today is 8.6%. Recent laboratory tests also remarkable for creatinine of 1.43 with a GFR of 44. LFTs normal.       Current medications  Toujeo insulin 30 units in AM    Humalog insulin 20 units before each meal (misses often)   Freestyle Freedom 2        We reviewed her freestyle data today. It is very clear that if she takes her Humalog, her blood sugars are excellent. However there are many days as noted above where she does not take the Humalog with her meals and her blood sugar climbs. Breakfast is oatmeal or cereal. Lunch can be a salad. Dinner last night was a barbecue sandwich and french fries. She does not remember if she took her Humalog or not. Examination  Blood pressure 175/67  Pulse 80  Weight 209  BMI 37.0  HEENT unremarkable  Lungs clear  Heart is regular rate rhythm  Abdomen obese  Extremities unremarkable    Impression  1. Type 2 diabetes mellitus with an A1c of 8.6%  2.  Obesity    Plan:  1. I wrote a note to all of the family members to assist in making sure that she takes her mealtime insulin. The family was asked to dial up the insulin before her meals and she would give it. If the pen goes back to zero everyone will know that she took it. 2.  I did not change the doses of any medication  3. I will see her back in 3 months    Please note that this dictation was completed with [employer], the [employer] voice recognition software. Quite often unanticipated grammatical, syntax, homophones, and other interpretive errors are inadvertently transcribed by the computer software. Please disregard these errors. Please excuse any errors that have escaped final proofreading.

## 2023-10-04 ENCOUNTER — HOME CARE VISIT (OUTPATIENT)
Dept: HOME HEALTH SERVICES | Facility: HOME HEALTH | Age: 73
End: 2023-10-04

## 2023-10-05 ENCOUNTER — HOSPITAL ENCOUNTER (INPATIENT)
Facility: HOSPITAL | Age: 73
LOS: 13 days | Discharge: HOME HEALTH | DRG: 189 | End: 2023-10-18
Attending: INTERNAL MEDICINE | Admitting: INTERNAL MEDICINE
Payer: MEDICARE

## 2023-10-05 ENCOUNTER — APPOINTMENT (OUTPATIENT)
Facility: HOSPITAL | Age: 73
DRG: 189 | End: 2023-10-05
Payer: MEDICARE

## 2023-10-05 DIAGNOSIS — I10 ESSENTIAL HYPERTENSION: ICD-10-CM

## 2023-10-05 DIAGNOSIS — R06.02 SHORTNESS OF BREATH: ICD-10-CM

## 2023-10-05 DIAGNOSIS — N18.9 CHRONIC KIDNEY DISEASE, UNSPECIFIED CKD STAGE: ICD-10-CM

## 2023-10-05 DIAGNOSIS — M54.30 SCIATICA, UNSPECIFIED LATERALITY: ICD-10-CM

## 2023-10-05 DIAGNOSIS — J96.01 ACUTE HYPOXIC RESPIRATORY FAILURE (HCC): ICD-10-CM

## 2023-10-05 DIAGNOSIS — R09.02 HYPOXIA: Primary | ICD-10-CM

## 2023-10-05 DIAGNOSIS — J45.909 ASTHMA, UNSPECIFIED ASTHMA SEVERITY, UNSPECIFIED WHETHER COMPLICATED, UNSPECIFIED WHETHER PERSISTENT: ICD-10-CM

## 2023-10-05 LAB
ALBUMIN SERPL-MCNC: 2.8 G/DL (ref 3.5–5)
ALBUMIN/GLOB SERPL: 0.7 (ref 1.1–2.2)
ALP SERPL-CCNC: 103 U/L (ref 45–117)
ALT SERPL-CCNC: 12 U/L (ref 12–78)
ANION GAP SERPL CALC-SCNC: 1 MMOL/L (ref 5–15)
APPEARANCE UR: ABNORMAL
AST SERPL-CCNC: 8 U/L (ref 15–37)
BACTERIA URNS QL MICRO: ABNORMAL /HPF
BASOPHILS # BLD: 0 K/UL (ref 0–0.1)
BASOPHILS NFR BLD: 1 % (ref 0–1)
BILIRUB SERPL-MCNC: 0.6 MG/DL (ref 0.2–1)
BILIRUB UR QL: NEGATIVE
BUN SERPL-MCNC: 21 MG/DL (ref 6–20)
BUN/CREAT SERPL: 14 (ref 12–20)
CALCIUM SERPL-MCNC: 9.6 MG/DL (ref 8.5–10.1)
CHLORIDE SERPL-SCNC: 103 MMOL/L (ref 97–108)
CO2 SERPL-SCNC: 38 MMOL/L (ref 21–32)
COLOR UR: ABNORMAL
COMMENT:: NORMAL
CREAT SERPL-MCNC: 1.55 MG/DL (ref 0.55–1.02)
DIFFERENTIAL METHOD BLD: ABNORMAL
EOSINOPHIL # BLD: 0.3 K/UL (ref 0–0.4)
EOSINOPHIL NFR BLD: 5 % (ref 0–7)
EPITH CASTS URNS QL MICRO: ABNORMAL /LPF
ERYTHROCYTE [DISTWIDTH] IN BLOOD BY AUTOMATED COUNT: 15.2 % (ref 11.5–14.5)
FLUAV AG NPH QL IA: NEGATIVE
FLUBV AG NOSE QL IA: NEGATIVE
GLOBULIN SER CALC-MCNC: 3.9 G/DL (ref 2–4)
GLUCOSE BLD STRIP.AUTO-MCNC: 317 MG/DL (ref 65–117)
GLUCOSE SERPL-MCNC: 197 MG/DL (ref 65–100)
GLUCOSE UR STRIP.AUTO-MCNC: NEGATIVE MG/DL
HCT VFR BLD AUTO: 35.9 % (ref 35–47)
HGB BLD-MCNC: 10.4 G/DL (ref 11.5–16)
HGB UR QL STRIP: NEGATIVE
HYALINE CASTS URNS QL MICRO: ABNORMAL /LPF (ref 0–2)
IMM GRANULOCYTES # BLD AUTO: 0 K/UL (ref 0–0.04)
IMM GRANULOCYTES NFR BLD AUTO: 0 % (ref 0–0.5)
KETONES UR QL STRIP.AUTO: NEGATIVE MG/DL
LEUKOCYTE ESTERASE UR QL STRIP.AUTO: NEGATIVE
LYMPHOCYTES # BLD: 1.1 K/UL (ref 0.8–3.5)
LYMPHOCYTES NFR BLD: 19 % (ref 12–49)
MAGNESIUM SERPL-MCNC: 1.9 MG/DL (ref 1.6–2.4)
MCH RBC QN AUTO: 23.6 PG (ref 26–34)
MCHC RBC AUTO-ENTMCNC: 29 G/DL (ref 30–36.5)
MCV RBC AUTO: 81.4 FL (ref 80–99)
MONOCYTES # BLD: 0.4 K/UL (ref 0–1)
MONOCYTES NFR BLD: 7 % (ref 5–13)
NEUTS SEG # BLD: 4 K/UL (ref 1.8–8)
NEUTS SEG NFR BLD: 68 % (ref 32–75)
NITRITE UR QL STRIP.AUTO: NEGATIVE
NRBC # BLD: 0 K/UL (ref 0–0.01)
NRBC BLD-RTO: 0 PER 100 WBC
NT PRO BNP: 562 PG/ML
PH UR STRIP: 7 (ref 5–8)
PLATELET # BLD AUTO: 178 K/UL (ref 150–400)
PMV BLD AUTO: 10.6 FL (ref 8.9–12.9)
POTASSIUM SERPL-SCNC: 3.1 MMOL/L (ref 3.5–5.1)
PROT SERPL-MCNC: 6.7 G/DL (ref 6.4–8.2)
PROT UR STRIP-MCNC: NEGATIVE MG/DL
RBC # BLD AUTO: 4.41 M/UL (ref 3.8–5.2)
RBC #/AREA URNS HPF: ABNORMAL /HPF (ref 0–5)
SARS-COV-2 RDRP RESP QL NAA+PROBE: NOT DETECTED
SERVICE CMNT-IMP: ABNORMAL
SODIUM SERPL-SCNC: 142 MMOL/L (ref 136–145)
SOURCE: NORMAL
SP GR UR REFRACTOMETRY: 1.01
SPECIMEN HOLD: NORMAL
TROPONIN I SERPL HS-MCNC: 41 NG/L (ref 0–51)
URINE CULTURE IF INDICATED: ABNORMAL
UROBILINOGEN UR QL STRIP.AUTO: 1 EU/DL (ref 0.2–1)
WBC # BLD AUTO: 5.8 K/UL (ref 3.6–11)
WBC URNS QL MICRO: ABNORMAL /HPF (ref 0–4)

## 2023-10-05 PROCEDURE — 84484 ASSAY OF TROPONIN QUANT: CPT

## 2023-10-05 PROCEDURE — 81001 URINALYSIS AUTO W/SCOPE: CPT

## 2023-10-05 PROCEDURE — 6370000000 HC RX 637 (ALT 250 FOR IP): Performed by: INTERNAL MEDICINE

## 2023-10-05 PROCEDURE — 6360000002 HC RX W HCPCS: Performed by: INTERNAL MEDICINE

## 2023-10-05 PROCEDURE — 94640 AIRWAY INHALATION TREATMENT: CPT

## 2023-10-05 PROCEDURE — 99285 EMERGENCY DEPT VISIT HI MDM: CPT

## 2023-10-05 PROCEDURE — 83880 ASSAY OF NATRIURETIC PEPTIDE: CPT

## 2023-10-05 PROCEDURE — 2580000003 HC RX 258: Performed by: INTERNAL MEDICINE

## 2023-10-05 PROCEDURE — 94762 N-INVAS EAR/PLS OXIMTRY CONT: CPT

## 2023-10-05 PROCEDURE — 80053 COMPREHEN METABOLIC PANEL: CPT

## 2023-10-05 PROCEDURE — 96374 THER/PROPH/DIAG INJ IV PUSH: CPT

## 2023-10-05 PROCEDURE — 83735 ASSAY OF MAGNESIUM: CPT

## 2023-10-05 PROCEDURE — 36415 COLL VENOUS BLD VENIPUNCTURE: CPT

## 2023-10-05 PROCEDURE — 93005 ELECTROCARDIOGRAM TRACING: CPT | Performed by: INTERNAL MEDICINE

## 2023-10-05 PROCEDURE — 71045 X-RAY EXAM CHEST 1 VIEW: CPT

## 2023-10-05 PROCEDURE — 82962 GLUCOSE BLOOD TEST: CPT

## 2023-10-05 PROCEDURE — 87635 SARS-COV-2 COVID-19 AMP PRB: CPT

## 2023-10-05 PROCEDURE — 1100000003 HC PRIVATE W/ TELEMETRY

## 2023-10-05 PROCEDURE — 6370000000 HC RX 637 (ALT 250 FOR IP): Performed by: PHYSICIAN ASSISTANT

## 2023-10-05 PROCEDURE — 85025 COMPLETE CBC W/AUTO DIFF WBC: CPT

## 2023-10-05 PROCEDURE — 6360000002 HC RX W HCPCS: Performed by: PHYSICIAN ASSISTANT

## 2023-10-05 PROCEDURE — 87804 INFLUENZA ASSAY W/OPTIC: CPT

## 2023-10-05 RX ORDER — POLYETHYLENE GLYCOL 3350 17 G/17G
17 POWDER, FOR SOLUTION ORAL DAILY PRN
Status: DISCONTINUED | OUTPATIENT
Start: 2023-10-05 | End: 2023-10-18 | Stop reason: HOSPADM

## 2023-10-05 RX ORDER — ROSUVASTATIN CALCIUM 20 MG/1
20 TABLET, COATED ORAL DAILY
Status: DISCONTINUED | OUTPATIENT
Start: 2023-10-05 | End: 2023-10-18 | Stop reason: HOSPADM

## 2023-10-05 RX ORDER — ASPIRIN 81 MG/1
81 TABLET ORAL DAILY
Status: DISCONTINUED | OUTPATIENT
Start: 2023-10-05 | End: 2023-10-18 | Stop reason: HOSPADM

## 2023-10-05 RX ORDER — INSULIN GLARGINE 100 [IU]/ML
24 INJECTION, SOLUTION SUBCUTANEOUS DAILY
Status: DISCONTINUED | OUTPATIENT
Start: 2023-10-05 | End: 2023-10-18 | Stop reason: HOSPADM

## 2023-10-05 RX ORDER — GABAPENTIN 100 MG/1
200 CAPSULE ORAL 3 TIMES DAILY
Status: DISCONTINUED | OUTPATIENT
Start: 2023-10-05 | End: 2023-10-09

## 2023-10-05 RX ORDER — ACETAMINOPHEN 650 MG/1
650 SUPPOSITORY RECTAL EVERY 6 HOURS PRN
Status: DISCONTINUED | OUTPATIENT
Start: 2023-10-05 | End: 2023-10-18 | Stop reason: HOSPADM

## 2023-10-05 RX ORDER — ENOXAPARIN SODIUM 100 MG/ML
40 INJECTION SUBCUTANEOUS DAILY
Status: DISCONTINUED | OUTPATIENT
Start: 2023-10-05 | End: 2023-10-18 | Stop reason: HOSPADM

## 2023-10-05 RX ORDER — CARVEDILOL 12.5 MG/1
12.5 TABLET ORAL 2 TIMES DAILY WITH MEALS
Status: DISCONTINUED | OUTPATIENT
Start: 2023-10-05 | End: 2023-10-18 | Stop reason: HOSPADM

## 2023-10-05 RX ORDER — LATANOPROST 50 UG/ML
1 SOLUTION/ DROPS OPHTHALMIC DAILY
Status: DISCONTINUED | OUTPATIENT
Start: 2023-10-05 | End: 2023-10-18 | Stop reason: HOSPADM

## 2023-10-05 RX ORDER — SODIUM CHLORIDE 0.9 % (FLUSH) 0.9 %
5-40 SYRINGE (ML) INJECTION EVERY 12 HOURS SCHEDULED
Status: DISCONTINUED | OUTPATIENT
Start: 2023-10-05 | End: 2023-10-18 | Stop reason: HOSPADM

## 2023-10-05 RX ORDER — IPRATROPIUM BROMIDE AND ALBUTEROL SULFATE 2.5; .5 MG/3ML; MG/3ML
1 SOLUTION RESPIRATORY (INHALATION)
Status: DISCONTINUED | OUTPATIENT
Start: 2023-10-05 | End: 2023-10-06

## 2023-10-05 RX ORDER — ACETAMINOPHEN 325 MG/1
650 TABLET ORAL EVERY 6 HOURS PRN
Status: DISCONTINUED | OUTPATIENT
Start: 2023-10-05 | End: 2023-10-18 | Stop reason: HOSPADM

## 2023-10-05 RX ORDER — PANTOPRAZOLE SODIUM 40 MG/1
40 TABLET, DELAYED RELEASE ORAL DAILY
Status: DISCONTINUED | OUTPATIENT
Start: 2023-10-06 | End: 2023-10-18 | Stop reason: HOSPADM

## 2023-10-05 RX ORDER — HYDRALAZINE HYDROCHLORIDE 50 MG/1
50 TABLET, FILM COATED ORAL EVERY 8 HOURS SCHEDULED
Status: DISCONTINUED | OUTPATIENT
Start: 2023-10-05 | End: 2023-10-18 | Stop reason: HOSPADM

## 2023-10-05 RX ORDER — DEXTROSE MONOHYDRATE 100 MG/ML
INJECTION, SOLUTION INTRAVENOUS CONTINUOUS PRN
Status: DISCONTINUED | OUTPATIENT
Start: 2023-10-05 | End: 2023-10-18 | Stop reason: HOSPADM

## 2023-10-05 RX ORDER — POTASSIUM CHLORIDE 750 MG/1
40 TABLET, FILM COATED, EXTENDED RELEASE ORAL ONCE
Status: COMPLETED | OUTPATIENT
Start: 2023-10-05 | End: 2023-10-06

## 2023-10-05 RX ORDER — SODIUM CHLORIDE 0.9 % (FLUSH) 0.9 %
5-40 SYRINGE (ML) INJECTION PRN
Status: DISCONTINUED | OUTPATIENT
Start: 2023-10-05 | End: 2023-10-18 | Stop reason: HOSPADM

## 2023-10-05 RX ORDER — AMLODIPINE BESYLATE 5 MG/1
10 TABLET ORAL DAILY
Status: DISCONTINUED | OUTPATIENT
Start: 2023-10-05 | End: 2023-10-18 | Stop reason: HOSPADM

## 2023-10-05 RX ORDER — MONTELUKAST SODIUM 10 MG/1
10 TABLET ORAL NIGHTLY
Status: DISCONTINUED | OUTPATIENT
Start: 2023-10-05 | End: 2023-10-18 | Stop reason: HOSPADM

## 2023-10-05 RX ORDER — ONDANSETRON 4 MG/1
4 TABLET, ORALLY DISINTEGRATING ORAL EVERY 8 HOURS PRN
Status: DISCONTINUED | OUTPATIENT
Start: 2023-10-05 | End: 2023-10-18 | Stop reason: HOSPADM

## 2023-10-05 RX ORDER — IPRATROPIUM BROMIDE AND ALBUTEROL SULFATE 2.5; .5 MG/3ML; MG/3ML
1 SOLUTION RESPIRATORY (INHALATION)
Status: COMPLETED | OUTPATIENT
Start: 2023-10-05 | End: 2023-10-05

## 2023-10-05 RX ORDER — ONDANSETRON 2 MG/ML
4 INJECTION INTRAMUSCULAR; INTRAVENOUS EVERY 6 HOURS PRN
Status: DISCONTINUED | OUTPATIENT
Start: 2023-10-05 | End: 2023-10-18 | Stop reason: HOSPADM

## 2023-10-05 RX ORDER — HYDRALAZINE HYDROCHLORIDE 20 MG/ML
10 INJECTION INTRAMUSCULAR; INTRAVENOUS EVERY 6 HOURS PRN
Status: DISCONTINUED | OUTPATIENT
Start: 2023-10-05 | End: 2023-10-18 | Stop reason: HOSPADM

## 2023-10-05 RX ORDER — INSULIN LISPRO 100 [IU]/ML
30 INJECTION, SOLUTION INTRAVENOUS; SUBCUTANEOUS
Status: DISCONTINUED | OUTPATIENT
Start: 2023-10-05 | End: 2023-10-06

## 2023-10-05 RX ORDER — SODIUM CHLORIDE 9 MG/ML
INJECTION, SOLUTION INTRAVENOUS PRN
Status: DISCONTINUED | OUTPATIENT
Start: 2023-10-05 | End: 2023-10-18 | Stop reason: HOSPADM

## 2023-10-05 RX ORDER — CALCITRIOL 0.25 UG/1
0.25 CAPSULE, LIQUID FILLED ORAL DAILY
Status: DISCONTINUED | OUTPATIENT
Start: 2023-10-06 | End: 2023-10-18 | Stop reason: HOSPADM

## 2023-10-05 RX ORDER — POTASSIUM CHLORIDE 20 MEQ/1
20 TABLET, EXTENDED RELEASE ORAL
Status: DISCONTINUED | OUTPATIENT
Start: 2023-10-06 | End: 2023-10-18 | Stop reason: HOSPADM

## 2023-10-05 RX ADMIN — MONTELUKAST 10 MG: 10 TABLET, FILM COATED ORAL at 21:29

## 2023-10-05 RX ADMIN — GABAPENTIN 200 MG: 100 CAPSULE ORAL at 21:29

## 2023-10-05 RX ADMIN — IPRATROPIUM BROMIDE AND ALBUTEROL SULFATE 1 DOSE: .5; 3 SOLUTION RESPIRATORY (INHALATION) at 15:26

## 2023-10-05 RX ADMIN — METHYLPREDNISOLONE SODIUM SUCCINATE 40 MG: 40 INJECTION, POWDER, FOR SOLUTION INTRAMUSCULAR; INTRAVENOUS at 21:33

## 2023-10-05 RX ADMIN — ARFORMOTEROL TARTRATE: 15 SOLUTION RESPIRATORY (INHALATION) at 20:38

## 2023-10-05 RX ADMIN — METHYLPREDNISOLONE SODIUM SUCCINATE 125 MG: 125 INJECTION, POWDER, FOR SOLUTION INTRAMUSCULAR; INTRAVENOUS at 15:32

## 2023-10-05 RX ADMIN — IPRATROPIUM BROMIDE AND ALBUTEROL SULFATE 1 DOSE: .5; 3 SOLUTION RESPIRATORY (INHALATION) at 20:43

## 2023-10-05 RX ADMIN — HYDRALAZINE HYDROCHLORIDE 50 MG: 50 TABLET, FILM COATED ORAL at 21:30

## 2023-10-05 RX ADMIN — SODIUM CHLORIDE, PRESERVATIVE FREE 10 ML: 5 INJECTION INTRAVENOUS at 21:33

## 2023-10-05 ASSESSMENT — PAIN SCALES - GENERAL
PAINLEVEL_OUTOF10: 10
PAINLEVEL_OUTOF10: 0
PAINLEVEL_OUTOF10: 0

## 2023-10-05 ASSESSMENT — PAIN - FUNCTIONAL ASSESSMENT: PAIN_FUNCTIONAL_ASSESSMENT: 0-10

## 2023-10-05 NOTE — ED NOTES
Attempted to give report, patient's room was being moved and that room is dirty, they state they will call back     Glenna Romano  10/05/23 4892

## 2023-10-05 NOTE — PROGRESS NOTES
1900 Bedside and Verbal shift change report given to Kana Ramirez RN (oncoming nurse) by Gregorio Sevilla (offgoing nurse). Report included the following information Nurse Handoff Report, MAR, Recent Results, and Cardiac Rhythm NSR .     0030 Confirmed with BERNADINE Lance that insulin Lispro 30 units to be given along with Humalog 24 units. Also confirmed that medicine from ER that was not given at 1800 to be given to patient. 0578 Patient latest potassium is 3.2 and latest BUN is 27. BERNADINE Lance informed. End of Shift Note    Bedside shift change report given to [address] (oncoming nurse) by Klaudia Christiansen RN (offgoing nurse). Report included the following information SBAR, MAR, Recent Results, and Cardiac Rhythm NSR    Shift worked:  7p-7a     Shift summary and any significant changes:    Morning bloods collected and sent to lab       Concerns for physician to address:        [facility]           Activity:     Number times ambulated in hallways past shift: 0  Number of times OOB to chair past shift: 0    Cardiac:   Cardiac Monitoring: Yes           Access:  Current line(s): PIV     Genitourinary:   Urinary status: voiding and external catheter    Respiratory:      Chronic home O2 use?: NO  Incentive spirometer at bedside: NO       GI:     Current diet:  ADULT DIET; Regular; 4 carb choices (60 gm/meal)  Passing flatus: YES  Tolerating current diet: YES       Pain Management:   Patient states pain is manageable on current regimen: YES    Skin:     Interventions: specialty bed, float heels, increase time out of bed, foam dressing, PT/OT consult, limit briefs, internal/external urinary devices, and nutritional support    Patient Safety:  Fall Score:    Interventions: bed/chair alarm, assistive device (walker, cane.  etc), gripper socks, pt to call before getting OOB, stay with me (per policy), and gait belt       Length of Stay:  Expected LOS: 2  Actual LOS: 0      Klaudia Christiansen RN

## 2023-10-05 NOTE — PROGRESS NOTES
Pt arrived on unit at 1850, vital signs obtained and Pt settled into room. [place] sandwich and becky provided. Dual skin completed during shift report.
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
## 2023-10-05 NOTE — ED NOTES
Responded to call bell. Pt wanted to be connected to the monitor after using the bathroom. On reattaching the pulse ox, pt was 85% on room air with dyspnea. Pt placed back on her nasal O2. Primary RN informed.        Twyla Campos RN  10/05/23 0200

## 2023-10-05 NOTE — H&P
Hospitalist Admission Note    NAME:   Meño Skinner   : 1950   MRN: [id number]     Date/Time: 10/5/2023 6:09 PM    Patient PCP: Akil Hinojosa MD    ______________________________________________________________________  Given the patient's current clinical presentation, I have a high level of concern for decompensation if discharged from the emergency department. Complex decision making was performed, which includes reviewing the patient's available past medical records, laboratory results, and x-ray films. My assessment of this patient's clinical condition and my plan of care is as follows. Assessment / Plan:    Acute asthma exacerbation  -Chest x-ray shows no acute process. proBNP is mildly elevated at 562  -She does have a previous history of provoked DVT and she does have CKD and also allergy to contrast so CT angiogram of the chest was not done  -VQ scan was ordered and is currently pending  -Start Solu-Medrol, DuoNeb  -Follow-up on results of V/Q  -Continue oxygen by nasal cannula, currently on 2 L and saturating at about 95%    Hypokalemia  -KCl replaced, recheck in a.m. CKD stage III  -Creatinine is close to baseline of around 1.5    Diabetic neuropathy  Hypertension  Hypothyroidism  GERD  Dyslipidemia  -Continue PTA gabapentin, Coreg, levothyroxine, PPI, Crestor. Medical Decision Making:   I personally reviewed labs: CBC, BMP  I personally reviewed imaging: Chest x-ray  I personally reviewed EKG:  Toxic drug monitoring: Monitor blood sugars while on high-dose Solu-Medrol given history of diabetes mellitus  Discussed case with: ED provider. After discussion I am in agreement that acuity of patient's medical condition necessitates hospital stay.       Code Status: Full code  DVT Prophylaxis: Lovenox  GI Prophylaxis:  Baseline: From home, independent of ADLs    Subjective:   CHIEF COMPLAINT: Shortness of breath and wheezing    HISTORY OF PRESENT ILLNESS: Car Tolliver is a 81 y.o.  female with PMHx significant for multiple medical problems including asthma, hypertension, diabetes mellitus, dyslipidemia is coming the hospital chief complaint of shortness of breath, cough and wheezing. Patient reports that she was in her pain doctor's clinic yesterday and noted to have oxygen saturations in the range of around 88% and was advised to follow-up with her PCP. She reports having shortness of breath since the last 2 days along with some wheezing and also cough and small amount of whitish phlegm. Does not report any chest pain. She also reports some increased swelling of the legs. She does report a history of previously provoked DVT. Does not report any abdominal pain, nausea or vomiting. On arrival to ED, noted to have blood pressure of 171/67, she was hypoxic on room air to 86% and started on 2 L nasal cannula. On labs noted to have a hemoglobin of 10.4, platelet count of 760. BMP shows a potassium of 3.1, creatinine 1.55, glucose of 197, proBNP of 562, troponin of 41 and normal LFTs. She had a chest x-ray which shows no acute process. We were asked to admit for work up and evaluation of the above problems.      Past Medical History:   Diagnosis Date    Asthma     CAD (coronary artery disease)     \"mild\" per Dr Eder Pederson note    Diabetes [facility])     Dr Britton Anton     Hyperlipidemia     Hypertension     Long term current use of anticoagulant therapy     Nausea & vomiting     Pulmonary embolism ([address])     Screening for colon cancer 2005    Dr Willie Torres in 10 years    Stroke [facility])     Rt side weaker than left, uses a cane: no longer followed by neuro    Thromboembolus ([address])     Rt leg moved to lung     Thyroid disease     Unspecified sleep apnea     uses CPAP        Past Surgical History:   Procedure Laterality Date    CARDIAC CATHETERIZATION  2012         CARDIAC CATHETERIZATION      COLONOSCOPY N/A 10/24/2022    COLONOSCOPY performed

## 2023-10-06 ENCOUNTER — APPOINTMENT (OUTPATIENT)
Facility: HOSPITAL | Age: 73
DRG: 189 | End: 2023-10-06
Payer: MEDICARE

## 2023-10-06 LAB
ALBUMIN SERPL-MCNC: 2.7 G/DL (ref 3.5–5)
ALBUMIN/GLOB SERPL: 0.6 (ref 1.1–2.2)
ALP SERPL-CCNC: 105 U/L (ref 45–117)
ALT SERPL-CCNC: 15 U/L (ref 12–78)
ANION GAP SERPL CALC-SCNC: 2 MMOL/L (ref 5–15)
AST SERPL-CCNC: 4 U/L (ref 15–37)
BASOPHILS # BLD: 0 K/UL (ref 0–0.1)
BASOPHILS NFR BLD: 0 % (ref 0–1)
BILIRUB SERPL-MCNC: 0.5 MG/DL (ref 0.2–1)
BUN SERPL-MCNC: 27 MG/DL (ref 6–20)
BUN/CREAT SERPL: 17 (ref 12–20)
CALCIUM SERPL-MCNC: 9.6 MG/DL (ref 8.5–10.1)
CHLORIDE SERPL-SCNC: 103 MMOL/L (ref 97–108)
CO2 SERPL-SCNC: 36 MMOL/L (ref 21–32)
CREAT SERPL-MCNC: 1.57 MG/DL (ref 0.55–1.02)
DIFFERENTIAL METHOD BLD: ABNORMAL
EKG ATRIAL RATE: 71 BPM
EKG DIAGNOSIS: NORMAL
EKG P AXIS: 82 DEGREES
EKG P-R INTERVAL: 178 MS
EKG Q-T INTERVAL: 412 MS
EKG QRS DURATION: 84 MS
EKG QTC CALCULATION (BAZETT): 447 MS
EKG R AXIS: -20 DEGREES
EKG T AXIS: 30 DEGREES
EKG VENTRICULAR RATE: 71 BPM
EOSINOPHIL # BLD: 0 K/UL (ref 0–0.4)
EOSINOPHIL NFR BLD: 0 % (ref 0–7)
ERYTHROCYTE [DISTWIDTH] IN BLOOD BY AUTOMATED COUNT: 15.4 % (ref 11.5–14.5)
GLOBULIN SER CALC-MCNC: 4.8 G/DL (ref 2–4)
GLUCOSE BLD STRIP.AUTO-MCNC: 196 MG/DL (ref 65–117)
GLUCOSE BLD STRIP.AUTO-MCNC: 228 MG/DL (ref 65–117)
GLUCOSE BLD STRIP.AUTO-MCNC: 298 MG/DL (ref 65–117)
GLUCOSE BLD STRIP.AUTO-MCNC: 368 MG/DL (ref 65–117)
GLUCOSE SERPL-MCNC: 294 MG/DL (ref 65–100)
HCT VFR BLD AUTO: 36.8 % (ref 35–47)
HGB BLD-MCNC: 10.8 G/DL (ref 11.5–16)
IMM GRANULOCYTES # BLD AUTO: 0.1 K/UL (ref 0–0.04)
IMM GRANULOCYTES NFR BLD AUTO: 1 % (ref 0–0.5)
LYMPHOCYTES # BLD: 0.6 K/UL (ref 0.8–3.5)
LYMPHOCYTES NFR BLD: 8 % (ref 12–49)
MAGNESIUM SERPL-MCNC: 2 MG/DL (ref 1.6–2.4)
MCH RBC QN AUTO: 23.6 PG (ref 26–34)
MCHC RBC AUTO-ENTMCNC: 29.3 G/DL (ref 30–36.5)
MCV RBC AUTO: 80.3 FL (ref 80–99)
MONOCYTES # BLD: 0 K/UL (ref 0–1)
MONOCYTES NFR BLD: 1 % (ref 5–13)
NEUTS SEG # BLD: 6.2 K/UL (ref 1.8–8)
NEUTS SEG NFR BLD: 90 % (ref 32–75)
NRBC # BLD: 0 K/UL (ref 0–0.01)
NRBC BLD-RTO: 0 PER 100 WBC
PLATELET # BLD AUTO: 184 K/UL (ref 150–400)
PMV BLD AUTO: 11.1 FL (ref 8.9–12.9)
POTASSIUM SERPL-SCNC: 3.2 MMOL/L (ref 3.5–5.1)
PROT SERPL-MCNC: 7.5 G/DL (ref 6.4–8.2)
RBC # BLD AUTO: 4.58 M/UL (ref 3.8–5.2)
SERVICE CMNT-IMP: ABNORMAL
SODIUM SERPL-SCNC: 141 MMOL/L (ref 136–145)
WBC # BLD AUTO: 6.9 K/UL (ref 3.6–11)

## 2023-10-06 PROCEDURE — 6360000002 HC RX W HCPCS: Performed by: INTERNAL MEDICINE

## 2023-10-06 PROCEDURE — 6370000000 HC RX 637 (ALT 250 FOR IP): Performed by: INTERNAL MEDICINE

## 2023-10-06 PROCEDURE — 2580000003 HC RX 258: Performed by: INTERNAL MEDICINE

## 2023-10-06 PROCEDURE — 94640 AIRWAY INHALATION TREATMENT: CPT

## 2023-10-06 PROCEDURE — 6370000000 HC RX 637 (ALT 250 FOR IP): Performed by: GENERAL ACUTE CARE HOSPITAL

## 2023-10-06 PROCEDURE — 94761 N-INVAS EAR/PLS OXIMETRY MLT: CPT

## 2023-10-06 PROCEDURE — 2700000000 HC OXYGEN THERAPY PER DAY

## 2023-10-06 PROCEDURE — 6370000000 HC RX 637 (ALT 250 FOR IP): Performed by: NURSE PRACTITIONER

## 2023-10-06 PROCEDURE — 36415 COLL VENOUS BLD VENIPUNCTURE: CPT

## 2023-10-06 PROCEDURE — 6360000002 HC RX W HCPCS: Performed by: NURSE PRACTITIONER

## 2023-10-06 PROCEDURE — 85025 COMPLETE CBC W/AUTO DIFF WBC: CPT

## 2023-10-06 PROCEDURE — 1100000003 HC PRIVATE W/ TELEMETRY

## 2023-10-06 PROCEDURE — A9540 TC99M MAA: HCPCS | Performed by: INTERNAL MEDICINE

## 2023-10-06 PROCEDURE — 83735 ASSAY OF MAGNESIUM: CPT

## 2023-10-06 PROCEDURE — 3430000000 HC RX DIAGNOSTIC RADIOPHARMACEUTICAL: Performed by: INTERNAL MEDICINE

## 2023-10-06 PROCEDURE — 80053 COMPREHEN METABOLIC PANEL: CPT

## 2023-10-06 PROCEDURE — 82962 GLUCOSE BLOOD TEST: CPT

## 2023-10-06 PROCEDURE — 78580 LUNG PERFUSION IMAGING: CPT

## 2023-10-06 RX ORDER — IPRATROPIUM BROMIDE AND ALBUTEROL SULFATE 2.5; .5 MG/3ML; MG/3ML
1 SOLUTION RESPIRATORY (INHALATION)
Status: DISCONTINUED | OUTPATIENT
Start: 2023-10-07 | End: 2023-10-09

## 2023-10-06 RX ORDER — POTASSIUM CHLORIDE 7.45 MG/ML
10 INJECTION INTRAVENOUS
Status: COMPLETED | OUTPATIENT
Start: 2023-10-06 | End: 2023-10-06

## 2023-10-06 RX ORDER — INSULIN LISPRO 100 [IU]/ML
20 INJECTION, SOLUTION INTRAVENOUS; SUBCUTANEOUS
Status: DISCONTINUED | OUTPATIENT
Start: 2023-10-06 | End: 2023-10-08

## 2023-10-06 RX ORDER — POTASSIUM CHLORIDE 750 MG/1
40 TABLET, FILM COATED, EXTENDED RELEASE ORAL ONCE
Status: DISCONTINUED | OUTPATIENT
Start: 2023-10-06 | End: 2023-10-06

## 2023-10-06 RX ADMIN — CARVEDILOL 12.5 MG: 12.5 TABLET, FILM COATED ORAL at 17:41

## 2023-10-06 RX ADMIN — CARVEDILOL 12.5 MG: 12.5 TABLET, FILM COATED ORAL at 00:28

## 2023-10-06 RX ADMIN — POTASSIUM BICARBONATE 20 MEQ: 782 TABLET, EFFERVESCENT ORAL at 20:54

## 2023-10-06 RX ADMIN — INSULIN LISPRO 30 UNITS: 100 INJECTION, SOLUTION INTRAVENOUS; SUBCUTANEOUS at 00:45

## 2023-10-06 RX ADMIN — PANTOPRAZOLE SODIUM 40 MG: 40 TABLET, DELAYED RELEASE ORAL at 08:54

## 2023-10-06 RX ADMIN — CARVEDILOL 12.5 MG: 12.5 TABLET, FILM COATED ORAL at 08:13

## 2023-10-06 RX ADMIN — ARFORMOTEROL TARTRATE: 15 SOLUTION RESPIRATORY (INHALATION) at 08:43

## 2023-10-06 RX ADMIN — MONTELUKAST 10 MG: 10 TABLET, FILM COATED ORAL at 20:52

## 2023-10-06 RX ADMIN — POTASSIUM CHLORIDE 40 MEQ: 750 TABLET, FILM COATED, EXTENDED RELEASE ORAL at 00:28

## 2023-10-06 RX ADMIN — AMLODIPINE BESYLATE 10 MG: 5 TABLET ORAL at 08:56

## 2023-10-06 RX ADMIN — INSULIN GLARGINE 24 UNITS: 100 INJECTION, SOLUTION SUBCUTANEOUS at 08:57

## 2023-10-06 RX ADMIN — LATANOPROST 1 DROP: 50 SOLUTION OPHTHALMIC at 00:39

## 2023-10-06 RX ADMIN — METHYLPREDNISOLONE SODIUM SUCCINATE 40 MG: 40 INJECTION, POWDER, FOR SOLUTION INTRAMUSCULAR; INTRAVENOUS at 16:11

## 2023-10-06 RX ADMIN — METHYLPREDNISOLONE SODIUM SUCCINATE 40 MG: 40 INJECTION, POWDER, FOR SOLUTION INTRAMUSCULAR; INTRAVENOUS at 20:58

## 2023-10-06 RX ADMIN — IPRATROPIUM BROMIDE AND ALBUTEROL SULFATE 1 DOSE: .5; 3 SOLUTION RESPIRATORY (INHALATION) at 15:28

## 2023-10-06 RX ADMIN — METHYLPREDNISOLONE SODIUM SUCCINATE 40 MG: 40 INJECTION, POWDER, FOR SOLUTION INTRAMUSCULAR; INTRAVENOUS at 03:59

## 2023-10-06 RX ADMIN — ROSUVASTATIN 20 MG: 20 TABLET, FILM COATED ORAL at 00:27

## 2023-10-06 RX ADMIN — METHYLPREDNISOLONE SODIUM SUCCINATE 40 MG: 40 INJECTION, POWDER, FOR SOLUTION INTRAMUSCULAR; INTRAVENOUS at 08:58

## 2023-10-06 RX ADMIN — ASPIRIN 81 MG: 81 TABLET, COATED ORAL at 08:55

## 2023-10-06 RX ADMIN — GABAPENTIN 200 MG: 100 CAPSULE ORAL at 20:52

## 2023-10-06 RX ADMIN — HYDRALAZINE HYDROCHLORIDE 50 MG: 50 TABLET, FILM COATED ORAL at 20:52

## 2023-10-06 RX ADMIN — ROSUVASTATIN 20 MG: 20 TABLET, FILM COATED ORAL at 08:51

## 2023-10-06 RX ADMIN — LEVOTHYROXINE SODIUM 137 MCG: 0.03 TABLET ORAL at 07:01

## 2023-10-06 RX ADMIN — INSULIN LISPRO 30 UNITS: 100 INJECTION, SOLUTION INTRAVENOUS; SUBCUTANEOUS at 12:05

## 2023-10-06 RX ADMIN — POTASSIUM CHLORIDE 10 MEQ: 7.46 INJECTION, SOLUTION INTRAVENOUS at 08:02

## 2023-10-06 RX ADMIN — ASPIRIN 81 MG: 81 TABLET, COATED ORAL at 00:28

## 2023-10-06 RX ADMIN — AMLODIPINE BESYLATE 10 MG: 5 TABLET ORAL at 00:28

## 2023-10-06 RX ADMIN — SODIUM CHLORIDE, PRESERVATIVE FREE 10 ML: 5 INJECTION INTRAVENOUS at 20:58

## 2023-10-06 RX ADMIN — SODIUM CHLORIDE, PRESERVATIVE FREE 10 ML: 5 INJECTION INTRAVENOUS at 08:58

## 2023-10-06 RX ADMIN — POTASSIUM CHLORIDE 10 MEQ: 7.46 INJECTION, SOLUTION INTRAVENOUS at 10:33

## 2023-10-06 RX ADMIN — CALCITRIOL CAPSULES 0.25 MCG 0.25 MCG: 0.25 CAPSULE ORAL at 08:54

## 2023-10-06 RX ADMIN — LATANOPROST 1 DROP: 50 SOLUTION OPHTHALMIC at 20:59

## 2023-10-06 RX ADMIN — POTASSIUM CHLORIDE 10 MEQ: 7.46 INJECTION, SOLUTION INTRAVENOUS at 06:56

## 2023-10-06 RX ADMIN — POTASSIUM BICARBONATE 20 MEQ: 782 TABLET, EFFERVESCENT ORAL at 07:01

## 2023-10-06 RX ADMIN — HYDRALAZINE HYDROCHLORIDE 50 MG: 50 TABLET, FILM COATED ORAL at 14:30

## 2023-10-06 RX ADMIN — KIT FOR THE PREPARATION OF TECHNETIUM TC 99M ALBUMIN AGGREGATED 4.3 MILLICURIE: 2.5 INJECTION, POWDER, FOR SOLUTION INTRAVENOUS at 09:30

## 2023-10-06 RX ADMIN — INSULIN GLARGINE 24 UNITS: 100 INJECTION, SOLUTION SUBCUTANEOUS at 00:43

## 2023-10-06 RX ADMIN — IPRATROPIUM BROMIDE AND ALBUTEROL SULFATE 1 DOSE: .5; 3 SOLUTION RESPIRATORY (INHALATION) at 08:38

## 2023-10-06 RX ADMIN — ARFORMOTEROL TARTRATE: 15 SOLUTION RESPIRATORY (INHALATION) at 19:20

## 2023-10-06 RX ADMIN — HYDRALAZINE HYDROCHLORIDE 50 MG: 50 TABLET, FILM COATED ORAL at 07:01

## 2023-10-06 RX ADMIN — GABAPENTIN 200 MG: 100 CAPSULE ORAL at 08:57

## 2023-10-06 RX ADMIN — ENOXAPARIN SODIUM 40 MG: 100 INJECTION SUBCUTANEOUS at 08:53

## 2023-10-06 RX ADMIN — IPRATROPIUM BROMIDE AND ALBUTEROL SULFATE 1 DOSE: .5; 3 SOLUTION RESPIRATORY (INHALATION) at 11:46

## 2023-10-06 RX ADMIN — POTASSIUM CHLORIDE 10 MEQ: 7.46 INJECTION, SOLUTION INTRAVENOUS at 09:04

## 2023-10-06 RX ADMIN — POTASSIUM CHLORIDE 20 MEQ: 20 TABLET, EXTENDED RELEASE ORAL at 08:05

## 2023-10-06 RX ADMIN — INSULIN LISPRO 30 UNITS: 100 INJECTION, SOLUTION INTRAVENOUS; SUBCUTANEOUS at 08:53

## 2023-10-06 RX ADMIN — INSULIN LISPRO 20 UNITS: 100 INJECTION, SOLUTION INTRAVENOUS; SUBCUTANEOUS at 20:00

## 2023-10-06 RX ADMIN — IPRATROPIUM BROMIDE AND ALBUTEROL SULFATE 1 DOSE: .5; 3 SOLUTION RESPIRATORY (INHALATION) at 19:15

## 2023-10-06 RX ADMIN — GABAPENTIN 200 MG: 100 CAPSULE ORAL at 14:30

## 2023-10-06 ASSESSMENT — PAIN SCALES - GENERAL
PAINLEVEL_OUTOF10: 0

## 2023-10-06 NOTE — PLAN OF CARE
Problem: Respiratory - Adult  Goal: Achieves optimal ventilation and oxygenation  10/6/2023 1512 by Britni Gutierrez RN  Outcome: Progressing  10/6/2023 0913 by Chana Patel RCP  Outcome: Progressing  Flowsheets (Taken 10/6/2023 0750 by Britni Gutierrez RN)  Achieves optimal ventilation and oxygenation:   Assess for changes in respiratory status   Assess for changes in mentation and behavior   Position to facilitate oxygenation and minimize respiratory effort  10/6/2023 0234 by Gisel Mejia RN  Outcome: Progressing     Problem: Discharge Planning  Goal: Discharge to home or other facility with appropriate resources  10/6/2023 1512 by Britni Gutierrez RN  Outcome: Progressing  Flowsheets (Taken 10/6/2023 0750)  Discharge to home or other facility with appropriate resources:   Identify barriers to discharge with patient and caregiver   Arrange for needed discharge resources and transportation as appropriate   Identify discharge learning needs (meds, wound care, etc)  10/6/2023 0234 by Gisel Mejia RN  Outcome: Progressing     Problem: Pain  Goal: Verbalizes/displays adequate comfort level or baseline comfort level  10/6/2023 1512 by Britni Gutierrez RN  Outcome: Progressing  10/6/2023 0234 by Gisel Mejia RN  Outcome: Progressing     Problem: Chronic Conditions and Co-morbidities  Goal: Patient's chronic conditions and co-morbidity symptoms are monitored and maintained or improved  10/6/2023 1512 by Britni Gutierrez RN  Outcome: Progressing  Flowsheets (Taken 10/6/2023 0750)  Care Plan - Patient's Chronic Conditions and Co-Morbidity Symptoms are Monitored and Maintained or Improved:   Monitor and assess patient's chronic conditions and comorbid symptoms for stability, deterioration, or improvement   Collaborate with multidisciplinary team to address chronic and comorbid conditions and prevent exacerbation or deterioration   Update acute care plan with appropriate goals if chronic or comorbid symptoms are exacerbated

## 2023-10-06 NOTE — ACP (ADVANCE CARE PLANNING)
Advance Care Planning Note      NAME: Reg Stein   :  1950   MRN:  [id number]         Active Diagnoses:    Acute asthma exacerbation  Hypokalemia  CKD stage 3  Diabetic neuropathy  Hypertension  Hypothyroidism  GERD      These active diagnoses are of sufficient risk that focused discussion on advance care planning is indicated in order to allow the patient to thoughtfully consider personal goals of care, and if situations arise that prevent the ability to personally give input, to ensure appropriate representation of their personal desires for different levels and aggressiveness of care. Discussion:   Code status addressed and wants to be a Full Code. Patient wants central line and vasopressors if needed. Patient  would like to assign Dtr Prince Tirado  as the surrogate decision maker. Persons present and participating in discussion: Reg Stein, Vi Rodríguez MD.       Time Spent:   Total time spent face-to-face in education and discussion:   16  minutes.          Vi Rodríguez MD   Hospitalist

## 2023-10-06 NOTE — PROGRESS NOTES
Patient refused to take the evening Humalog. Patient stated \"  my sugar will drop if she takes 30 units, it is too much\". Bedside nurse perfect served the provider. No response. Awaiting 's response.

## 2023-10-06 NOTE — PLAN OF CARE
Problem: Respiratory - Adult  Goal: Achieves optimal ventilation and oxygenation  10/6/2023 0234 by Joshua Nash RN  Outcome: Progressing  10/5/2023 2141 by Praveen Toure RCP  Outcome: Progressing  10/5/2023 1530 by Fitz Pinto RT  Outcome: Progressing     Problem: Discharge Planning  Goal: Discharge to home or other facility with appropriate resources  Outcome: Progressing     Problem: Pain  Goal: Verbalizes/displays adequate comfort level or baseline comfort level  Outcome: Progressing     Problem: Chronic Conditions and Co-morbidities  Goal: Patient's chronic conditions and co-morbidity symptoms are monitored and maintained or improved  Outcome: Progressing

## 2023-10-06 NOTE — PROGRESS NOTES
1900 Bedside and Verbal shift change report given to Akin Gómez RN (oncoming nurse) by Carmen Golden (offgoing nurse). Report included the following information Nurse Handoff Report, MAR, Recent Results, and Cardiac Rhythm NSR .     0058 No morning labs in system. Pamela Hernandez NP notified if labs is needed    End of Shift Note    Bedside shift change report given to Tuckerchantale Segura RN (oncoming nurse) by Donaldo Patiño RN (offgoing nurse). Report included the following information SBAR, MAR, Recent Results, and Cardiac Rhythm NSR    Shift worked:  7p-7a     Shift summary and any significant changes:     Morning bloods collected and sent to lab     Concerns for physician to address:        Zone phone for oncoming shift:           Activity:     Number times ambulated in hallways past shift: 0  Number of times OOB to chair past shift: 0    Cardiac:   Cardiac Monitoring: Yes           Access:  Current line(s): PIV     Genitourinary:   Urinary status: voiding    Respiratory:      Chronic home O2 use?: YES  Incentive spirometer at bedside: YES       GI:     Current diet:  ADULT DIET; Regular; 4 carb choices (60 gm/meal)  Passing flatus: YES  Tolerating current diet: YES       Pain Management:   Patient states pain is manageable on current regimen: YES    Skin:     Interventions: specialty bed, float heels, increase time out of bed, foam dressing, PT/OT consult, limit briefs, internal/external urinary devices, and nutritional support    Patient Safety:  Fall Score:    Interventions: bed/chair alarm, assistive device (walker, cane.  etc), gripper socks, pt to call before getting OOB, stay with me (per policy), and gait belt       Length of Stay:  Expected LOS: 2  Actual LOS: 2      Donaldo Patiño RN

## 2023-10-07 LAB
ANION GAP SERPL CALC-SCNC: 2 MMOL/L (ref 5–15)
BUN SERPL-MCNC: 37 MG/DL (ref 6–20)
BUN/CREAT SERPL: 24 (ref 12–20)
CALCIUM SERPL-MCNC: 10.2 MG/DL (ref 8.5–10.1)
CHLORIDE SERPL-SCNC: 104 MMOL/L (ref 97–108)
CO2 SERPL-SCNC: 36 MMOL/L (ref 21–32)
CREAT SERPL-MCNC: 1.56 MG/DL (ref 0.55–1.02)
ERYTHROCYTE [DISTWIDTH] IN BLOOD BY AUTOMATED COUNT: 15.7 % (ref 11.5–14.5)
EST. AVERAGE GLUCOSE BLD GHB EST-MCNC: 186 MG/DL
GLUCOSE BLD STRIP.AUTO-MCNC: 286 MG/DL (ref 65–117)
GLUCOSE BLD STRIP.AUTO-MCNC: 293 MG/DL (ref 65–117)
GLUCOSE BLD STRIP.AUTO-MCNC: 366 MG/DL (ref 65–117)
GLUCOSE BLD STRIP.AUTO-MCNC: 374 MG/DL (ref 65–117)
GLUCOSE SERPL-MCNC: 227 MG/DL (ref 65–100)
HBA1C MFR BLD: 8.1 % (ref 4–5.6)
HCT VFR BLD AUTO: 38.5 % (ref 35–47)
HGB BLD-MCNC: 10.9 G/DL (ref 11.5–16)
MCH RBC QN AUTO: 23.5 PG (ref 26–34)
MCHC RBC AUTO-ENTMCNC: 28.3 G/DL (ref 30–36.5)
MCV RBC AUTO: 83 FL (ref 80–99)
NRBC # BLD: 0 K/UL (ref 0–0.01)
NRBC BLD-RTO: 0 PER 100 WBC
PLATELET # BLD AUTO: 206 K/UL (ref 150–400)
PMV BLD AUTO: 11.4 FL (ref 8.9–12.9)
POTASSIUM SERPL-SCNC: 4 MMOL/L (ref 3.5–5.1)
RBC # BLD AUTO: 4.64 M/UL (ref 3.8–5.2)
SERVICE CMNT-IMP: ABNORMAL
SODIUM SERPL-SCNC: 142 MMOL/L (ref 136–145)
WBC # BLD AUTO: 10.7 K/UL (ref 3.6–11)

## 2023-10-07 PROCEDURE — 36415 COLL VENOUS BLD VENIPUNCTURE: CPT

## 2023-10-07 PROCEDURE — 2700000000 HC OXYGEN THERAPY PER DAY

## 2023-10-07 PROCEDURE — 80048 BASIC METABOLIC PNL TOTAL CA: CPT

## 2023-10-07 PROCEDURE — 85027 COMPLETE CBC AUTOMATED: CPT

## 2023-10-07 PROCEDURE — 6360000002 HC RX W HCPCS: Performed by: INTERNAL MEDICINE

## 2023-10-07 PROCEDURE — 6370000000 HC RX 637 (ALT 250 FOR IP): Performed by: GENERAL ACUTE CARE HOSPITAL

## 2023-10-07 PROCEDURE — 1100000003 HC PRIVATE W/ TELEMETRY

## 2023-10-07 PROCEDURE — 82962 GLUCOSE BLOOD TEST: CPT

## 2023-10-07 PROCEDURE — 83036 HEMOGLOBIN GLYCOSYLATED A1C: CPT

## 2023-10-07 PROCEDURE — 2580000003 HC RX 258: Performed by: INTERNAL MEDICINE

## 2023-10-07 PROCEDURE — 6370000000 HC RX 637 (ALT 250 FOR IP): Performed by: INTERNAL MEDICINE

## 2023-10-07 PROCEDURE — 94640 AIRWAY INHALATION TREATMENT: CPT

## 2023-10-07 RX ORDER — INSULIN LISPRO 100 [IU]/ML
0-4 INJECTION, SOLUTION INTRAVENOUS; SUBCUTANEOUS NIGHTLY
Status: DISCONTINUED | OUTPATIENT
Start: 2023-10-07 | End: 2023-10-18 | Stop reason: HOSPADM

## 2023-10-07 RX ORDER — INSULIN LISPRO 100 [IU]/ML
0-16 INJECTION, SOLUTION INTRAVENOUS; SUBCUTANEOUS
Status: DISCONTINUED | OUTPATIENT
Start: 2023-10-07 | End: 2023-10-18 | Stop reason: HOSPADM

## 2023-10-07 RX ADMIN — ROSUVASTATIN 20 MG: 20 TABLET, FILM COATED ORAL at 10:06

## 2023-10-07 RX ADMIN — PANTOPRAZOLE SODIUM 40 MG: 40 TABLET, DELAYED RELEASE ORAL at 10:05

## 2023-10-07 RX ADMIN — INSULIN LISPRO 20 UNITS: 100 INJECTION, SOLUTION INTRAVENOUS; SUBCUTANEOUS at 13:52

## 2023-10-07 RX ADMIN — GABAPENTIN 200 MG: 100 CAPSULE ORAL at 21:15

## 2023-10-07 RX ADMIN — POLYETHYLENE GLYCOL 3350 17 G: 17 POWDER, FOR SOLUTION ORAL at 11:06

## 2023-10-07 RX ADMIN — CALCITRIOL CAPSULES 0.25 MCG 0.25 MCG: 0.25 CAPSULE ORAL at 10:06

## 2023-10-07 RX ADMIN — INSULIN LISPRO 8 UNITS: 100 INJECTION, SOLUTION INTRAVENOUS; SUBCUTANEOUS at 17:20

## 2023-10-07 RX ADMIN — IPRATROPIUM BROMIDE AND ALBUTEROL SULFATE 1 DOSE: .5; 3 SOLUTION RESPIRATORY (INHALATION) at 19:49

## 2023-10-07 RX ADMIN — POTASSIUM CHLORIDE 20 MEQ: 20 TABLET, EXTENDED RELEASE ORAL at 10:05

## 2023-10-07 RX ADMIN — LEVOTHYROXINE SODIUM 137 MCG: 0.03 TABLET ORAL at 06:37

## 2023-10-07 RX ADMIN — LATANOPROST 1 DROP: 50 SOLUTION OPHTHALMIC at 21:15

## 2023-10-07 RX ADMIN — INSULIN LISPRO 20 UNITS: 100 INJECTION, SOLUTION INTRAVENOUS; SUBCUTANEOUS at 10:04

## 2023-10-07 RX ADMIN — CARVEDILOL 12.5 MG: 12.5 TABLET, FILM COATED ORAL at 10:06

## 2023-10-07 RX ADMIN — METHYLPREDNISOLONE SODIUM SUCCINATE 40 MG: 40 INJECTION, POWDER, FOR SOLUTION INTRAMUSCULAR; INTRAVENOUS at 16:38

## 2023-10-07 RX ADMIN — INSULIN LISPRO 4 UNITS: 100 INJECTION, SOLUTION INTRAVENOUS; SUBCUTANEOUS at 22:23

## 2023-10-07 RX ADMIN — MONTELUKAST 10 MG: 10 TABLET, FILM COATED ORAL at 21:15

## 2023-10-07 RX ADMIN — HYDRALAZINE HYDROCHLORIDE 50 MG: 50 TABLET, FILM COATED ORAL at 21:15

## 2023-10-07 RX ADMIN — ASPIRIN 81 MG: 81 TABLET, COATED ORAL at 10:04

## 2023-10-07 RX ADMIN — INSULIN LISPRO 20 UNITS: 100 INJECTION, SOLUTION INTRAVENOUS; SUBCUTANEOUS at 17:20

## 2023-10-07 RX ADMIN — CARVEDILOL 12.5 MG: 12.5 TABLET, FILM COATED ORAL at 16:38

## 2023-10-07 RX ADMIN — METHYLPREDNISOLONE SODIUM SUCCINATE 40 MG: 40 INJECTION, POWDER, FOR SOLUTION INTRAMUSCULAR; INTRAVENOUS at 10:04

## 2023-10-07 RX ADMIN — GABAPENTIN 200 MG: 100 CAPSULE ORAL at 13:52

## 2023-10-07 RX ADMIN — ARFORMOTEROL TARTRATE: 15 SOLUTION RESPIRATORY (INHALATION) at 19:54

## 2023-10-07 RX ADMIN — INSULIN GLARGINE 24 UNITS: 100 INJECTION, SOLUTION SUBCUTANEOUS at 10:04

## 2023-10-07 RX ADMIN — ARFORMOTEROL TARTRATE: 15 SOLUTION RESPIRATORY (INHALATION) at 10:00

## 2023-10-07 RX ADMIN — HYDRALAZINE HYDROCHLORIDE 50 MG: 50 TABLET, FILM COATED ORAL at 13:52

## 2023-10-07 RX ADMIN — GABAPENTIN 200 MG: 100 CAPSULE ORAL at 10:06

## 2023-10-07 RX ADMIN — METHYLPREDNISOLONE SODIUM SUCCINATE 40 MG: 40 INJECTION, POWDER, FOR SOLUTION INTRAMUSCULAR; INTRAVENOUS at 21:15

## 2023-10-07 RX ADMIN — HYDRALAZINE HYDROCHLORIDE 50 MG: 50 TABLET, FILM COATED ORAL at 06:37

## 2023-10-07 RX ADMIN — ENOXAPARIN SODIUM 40 MG: 100 INJECTION SUBCUTANEOUS at 10:05

## 2023-10-07 RX ADMIN — IPRATROPIUM BROMIDE AND ALBUTEROL SULFATE 1 DOSE: .5; 3 SOLUTION RESPIRATORY (INHALATION) at 10:05

## 2023-10-07 RX ADMIN — METHYLPREDNISOLONE SODIUM SUCCINATE 40 MG: 40 INJECTION, POWDER, FOR SOLUTION INTRAMUSCULAR; INTRAVENOUS at 04:12

## 2023-10-07 RX ADMIN — SODIUM CHLORIDE, PRESERVATIVE FREE 10 ML: 5 INJECTION INTRAVENOUS at 21:16

## 2023-10-07 RX ADMIN — SODIUM CHLORIDE, PRESERVATIVE FREE 10 ML: 5 INJECTION INTRAVENOUS at 10:09

## 2023-10-07 ASSESSMENT — PAIN SCALES - GENERAL
PAINLEVEL_OUTOF10: 0

## 2023-10-07 NOTE — PLAN OF CARE
Problem: Respiratory - Adult  Goal: Achieves optimal ventilation and oxygenation  10/7/2023 1749 by Benigno Ray RN  Outcome: Progressing  10/7/2023 1020 by Megha Abdi RCP  Outcome: Progressing     Problem: Discharge Planning  Goal: Discharge to home or other facility with appropriate resources  Outcome: Progressing     Problem: Chronic Conditions and Co-morbidities  Goal: Patient's chronic conditions and co-morbidity symptoms are monitored and maintained or improved  Outcome: Progressing     Problem: Safety - Adult  Goal: Free from fall injury  Outcome: Progressing

## 2023-10-07 NOTE — PLAN OF CARE
Problem: Respiratory - Adult  Goal: Achieves optimal ventilation and oxygenation  10/6/2023 2136 by Gloria Palomares RN  Outcome: Progressing  10/6/2023 1920 by RT Winifred  Outcome: Progressing  10/6/2023 1512 by Magda Goodman RN  Outcome: Progressing  10/6/2023 0913 by Shawn Duarte RCP  Outcome: Progressing  Flowsheets (Taken 10/6/2023 0750 by Magda Goodman RN)  Achieves optimal ventilation and oxygenation:   Assess for changes in respiratory status   Assess for changes in mentation and behavior   Position to facilitate oxygenation and minimize respiratory effort     Problem: Discharge Planning  Goal: Discharge to home or other facility with appropriate resources  10/6/2023 2136 by Gloria Palomares RN  Outcome: Progressing  10/6/2023 1512 by Magda Goodman RN  Outcome: Progressing  Flowsheets (Taken 10/6/2023 0750)  Discharge to home or other facility with appropriate resources:   Identify barriers to discharge with patient and caregiver   Arrange for needed discharge resources and transportation as appropriate   Identify discharge learning needs (meds, wound care, etc)     Problem: Pain  Goal: Verbalizes/displays adequate comfort level or baseline comfort level  10/6/2023 2136 by Gloria Palomares RN  Outcome: Progressing  Flowsheets (Taken 10/6/2023 1550 by Magda Goodman RN)  Verbalizes/displays adequate comfort level or baseline comfort level:   Encourage patient to monitor pain and request assistance   Assess pain using appropriate pain scale   Administer analgesics based on type and severity of pain and evaluate response   Implement non-pharmacological measures as appropriate and evaluate response  10/6/2023 1512 by Magda Goodman RN  Outcome: Progressing     Problem: Chronic Conditions and Co-morbidities  Goal: Patient's chronic conditions and co-morbidity symptoms are monitored and maintained or improved  10/6/2023 2136 by Gloria Palomares RN  Outcome: Progressing  10/6/2023 1512 by Magda Goodman

## 2023-10-07 NOTE — CARE COORDINATION
Care Management Initial Assessment       RUR: 16%  Readmission? No  1st IM letter given? Yes  1st  letter given: No         10/07/23 1046   Service Assessment   Patient Orientation Alert and Oriented   Cognition Alert   History Provided By Patient   Primary Caregiver Self   Accompanied By/Relationship [address] Children;Friends/Neighbors   PCP Verified by CM Yes  (Pt's PCP is Liliana Dennis)   Prior Functional Level Assistance with the following:;Cooking;Housework   Current Functional Level Assistance with the following:;Cooking;Housework   Can patient return to prior living arrangement Yes   Would you like for me to discuss the discharge plan with any other family members/significant others, and if so, who? Yes  (Pt's daughter Eric Guadalupe)   Financial Resources [employer] None   Social/Functional History   Lives With Family   Type of Home House  (Pt resides in a two level home with 2 CARL.)   Home Equipment Walker, rolling;Lift chair  (Nebulizer, chairlift, walker, shower bench)   Active  No   Discharge Planning   Type of Residence [employer]   Current Services Prior To Admission None   Patient expects to be discharged to: House  (Home vs home with home health)   Condition of Participation: Discharge Planning   Freedom of Choice list was provided with basic dialogue that supports the patient's individualized plan of care/goals, treatment preferences, and shares the quality data associated with the providers? Yes         Pt is 80 y/o [place] American female admitted to [facility] on 10/5/2023 for essential hypertension. Pt's PCP is Dr. Liliana Dennis. Pt uses [facility] on [address] for Rx. Pt resides in a two level home with 2 CARL. Pt does not drive. Pt has a nebulizer, chairlift, walker and shower bench. Pt needs assistance with ADL's and IADL's. No HH, SNF or IPR. No ACP on file. Pt's friend Amador Taylor or daughter will transport at discharge.     CM discussed with

## 2023-10-08 LAB
ALBUMIN SERPL-MCNC: 2.8 G/DL (ref 3.5–5)
ALBUMIN/GLOB SERPL: 0.7 (ref 1.1–2.2)
ALP SERPL-CCNC: 97 U/L (ref 45–117)
ALT SERPL-CCNC: 13 U/L (ref 12–78)
ANION GAP SERPL CALC-SCNC: 4 MMOL/L (ref 5–15)
AST SERPL-CCNC: 6 U/L (ref 15–37)
BILIRUB SERPL-MCNC: 0.3 MG/DL (ref 0.2–1)
BUN SERPL-MCNC: 49 MG/DL (ref 6–20)
BUN/CREAT SERPL: 26 (ref 12–20)
CALCIUM SERPL-MCNC: 9.9 MG/DL (ref 8.5–10.1)
CHLORIDE SERPL-SCNC: 104 MMOL/L (ref 97–108)
CO2 SERPL-SCNC: 33 MMOL/L (ref 21–32)
CREAT SERPL-MCNC: 1.91 MG/DL (ref 0.55–1.02)
EST. AVERAGE GLUCOSE BLD GHB EST-MCNC: 186 MG/DL
GLOBULIN SER CALC-MCNC: 4.3 G/DL (ref 2–4)
GLUCOSE BLD STRIP.AUTO-MCNC: 200 MG/DL (ref 65–117)
GLUCOSE BLD STRIP.AUTO-MCNC: 232 MG/DL (ref 65–117)
GLUCOSE BLD STRIP.AUTO-MCNC: 277 MG/DL (ref 65–117)
GLUCOSE BLD STRIP.AUTO-MCNC: 329 MG/DL (ref 65–117)
GLUCOSE SERPL-MCNC: 301 MG/DL (ref 65–100)
HBA1C MFR BLD: 8.1 % (ref 4–5.6)
POTASSIUM SERPL-SCNC: 4.1 MMOL/L (ref 3.5–5.1)
PROT SERPL-MCNC: 7.1 G/DL (ref 6.4–8.2)
SERVICE CMNT-IMP: ABNORMAL
SODIUM SERPL-SCNC: 141 MMOL/L (ref 136–145)

## 2023-10-08 PROCEDURE — 6370000000 HC RX 637 (ALT 250 FOR IP): Performed by: INTERNAL MEDICINE

## 2023-10-08 PROCEDURE — 83036 HEMOGLOBIN GLYCOSYLATED A1C: CPT

## 2023-10-08 PROCEDURE — 6370000000 HC RX 637 (ALT 250 FOR IP): Performed by: GENERAL ACUTE CARE HOSPITAL

## 2023-10-08 PROCEDURE — 82962 GLUCOSE BLOOD TEST: CPT

## 2023-10-08 PROCEDURE — 6360000002 HC RX W HCPCS: Performed by: INTERNAL MEDICINE

## 2023-10-08 PROCEDURE — 80053 COMPREHEN METABOLIC PANEL: CPT

## 2023-10-08 PROCEDURE — 1100000003 HC PRIVATE W/ TELEMETRY

## 2023-10-08 PROCEDURE — 2580000003 HC RX 258: Performed by: INTERNAL MEDICINE

## 2023-10-08 PROCEDURE — 2700000000 HC OXYGEN THERAPY PER DAY

## 2023-10-08 PROCEDURE — 36415 COLL VENOUS BLD VENIPUNCTURE: CPT

## 2023-10-08 PROCEDURE — 94640 AIRWAY INHALATION TREATMENT: CPT

## 2023-10-08 RX ORDER — INSULIN LISPRO 100 [IU]/ML
25 INJECTION, SOLUTION INTRAVENOUS; SUBCUTANEOUS
Status: DISCONTINUED | OUTPATIENT
Start: 2023-10-08 | End: 2023-10-18 | Stop reason: HOSPADM

## 2023-10-08 RX ADMIN — METHYLPREDNISOLONE SODIUM SUCCINATE 40 MG: 40 INJECTION, POWDER, FOR SOLUTION INTRAMUSCULAR; INTRAVENOUS at 09:23

## 2023-10-08 RX ADMIN — INSULIN LISPRO 12 UNITS: 100 INJECTION, SOLUTION INTRAVENOUS; SUBCUTANEOUS at 12:19

## 2023-10-08 RX ADMIN — ASPIRIN 81 MG: 81 TABLET, COATED ORAL at 09:23

## 2023-10-08 RX ADMIN — IPRATROPIUM BROMIDE AND ALBUTEROL SULFATE 1 DOSE: .5; 3 SOLUTION RESPIRATORY (INHALATION) at 08:40

## 2023-10-08 RX ADMIN — HYDRALAZINE HYDROCHLORIDE 50 MG: 50 TABLET, FILM COATED ORAL at 05:15

## 2023-10-08 RX ADMIN — SODIUM CHLORIDE, PRESERVATIVE FREE 10 ML: 5 INJECTION INTRAVENOUS at 09:23

## 2023-10-08 RX ADMIN — CARVEDILOL 12.5 MG: 12.5 TABLET, FILM COATED ORAL at 16:59

## 2023-10-08 RX ADMIN — INSULIN LISPRO 25 UNITS: 100 INJECTION, SOLUTION INTRAVENOUS; SUBCUTANEOUS at 16:59

## 2023-10-08 RX ADMIN — ROSUVASTATIN 20 MG: 20 TABLET, FILM COATED ORAL at 09:23

## 2023-10-08 RX ADMIN — LEVOTHYROXINE SODIUM 137 MCG: 0.03 TABLET ORAL at 05:14

## 2023-10-08 RX ADMIN — HYDRALAZINE HYDROCHLORIDE 50 MG: 50 TABLET, FILM COATED ORAL at 15:01

## 2023-10-08 RX ADMIN — INSULIN LISPRO 8 UNITS: 100 INJECTION, SOLUTION INTRAVENOUS; SUBCUTANEOUS at 09:25

## 2023-10-08 RX ADMIN — LATANOPROST 1 DROP: 50 SOLUTION OPHTHALMIC at 20:36

## 2023-10-08 RX ADMIN — METHYLPREDNISOLONE SODIUM SUCCINATE 40 MG: 40 INJECTION, POWDER, FOR SOLUTION INTRAMUSCULAR; INTRAVENOUS at 05:15

## 2023-10-08 RX ADMIN — INSULIN GLARGINE 24 UNITS: 100 INJECTION, SOLUTION SUBCUTANEOUS at 09:24

## 2023-10-08 RX ADMIN — ARFORMOTEROL TARTRATE: 15 SOLUTION RESPIRATORY (INHALATION) at 08:35

## 2023-10-08 RX ADMIN — HYDRALAZINE HYDROCHLORIDE 50 MG: 50 TABLET, FILM COATED ORAL at 20:34

## 2023-10-08 RX ADMIN — AMLODIPINE BESYLATE 10 MG: 5 TABLET ORAL at 09:23

## 2023-10-08 RX ADMIN — MONTELUKAST 10 MG: 10 TABLET, FILM COATED ORAL at 20:34

## 2023-10-08 RX ADMIN — METHYLPREDNISOLONE SODIUM SUCCINATE 40 MG: 40 INJECTION, POWDER, FOR SOLUTION INTRAMUSCULAR; INTRAVENOUS at 15:01

## 2023-10-08 RX ADMIN — METHYLPREDNISOLONE SODIUM SUCCINATE 40 MG: 40 INJECTION, POWDER, FOR SOLUTION INTRAMUSCULAR; INTRAVENOUS at 22:48

## 2023-10-08 RX ADMIN — PANTOPRAZOLE SODIUM 40 MG: 40 TABLET, DELAYED RELEASE ORAL at 09:23

## 2023-10-08 RX ADMIN — GABAPENTIN 200 MG: 100 CAPSULE ORAL at 09:23

## 2023-10-08 RX ADMIN — SODIUM CHLORIDE, PRESERVATIVE FREE 10 ML: 5 INJECTION INTRAVENOUS at 20:35

## 2023-10-08 RX ADMIN — GABAPENTIN 200 MG: 100 CAPSULE ORAL at 20:34

## 2023-10-08 RX ADMIN — INSULIN LISPRO 25 UNITS: 100 INJECTION, SOLUTION INTRAVENOUS; SUBCUTANEOUS at 12:19

## 2023-10-08 RX ADMIN — ENOXAPARIN SODIUM 40 MG: 100 INJECTION SUBCUTANEOUS at 09:23

## 2023-10-08 RX ADMIN — POTASSIUM CHLORIDE 20 MEQ: 20 TABLET, EXTENDED RELEASE ORAL at 09:23

## 2023-10-08 RX ADMIN — GABAPENTIN 200 MG: 100 CAPSULE ORAL at 15:00

## 2023-10-08 RX ADMIN — CARVEDILOL 12.5 MG: 12.5 TABLET, FILM COATED ORAL at 09:23

## 2023-10-08 RX ADMIN — IPRATROPIUM BROMIDE AND ALBUTEROL SULFATE 1 DOSE: .5; 3 SOLUTION RESPIRATORY (INHALATION) at 19:29

## 2023-10-08 RX ADMIN — ARFORMOTEROL TARTRATE: 15 SOLUTION RESPIRATORY (INHALATION) at 19:36

## 2023-10-08 RX ADMIN — INSULIN LISPRO 20 UNITS: 100 INJECTION, SOLUTION INTRAVENOUS; SUBCUTANEOUS at 09:24

## 2023-10-08 RX ADMIN — INSULIN LISPRO 4 UNITS: 100 INJECTION, SOLUTION INTRAVENOUS; SUBCUTANEOUS at 16:59

## 2023-10-08 RX ADMIN — CALCITRIOL CAPSULES 0.25 MCG 0.25 MCG: 0.25 CAPSULE ORAL at 09:23

## 2023-10-08 ASSESSMENT — PAIN SCALES - GENERAL: PAINLEVEL_OUTOF10: 0

## 2023-10-08 NOTE — PLAN OF CARE
Problem: Respiratory - Adult  Goal: Achieves optimal ventilation and oxygenation  10/8/2023 0748 by Shanda Graff RN  Outcome: Progressing  10/7/2023 1949 by RT Devonte  Outcome: Progressing  10/7/2023 1749 by Steve Venegas RN  Outcome: Progressing     Problem: Discharge Planning  Goal: Discharge to home or other facility with appropriate resources  10/8/2023 0748 by Shanda Graff RN  Outcome: Progressing  10/7/2023 1749 by Steve Venegas RN  Outcome: Progressing     Problem: Pain  Goal: Verbalizes/displays adequate comfort level or baseline comfort level  Outcome: Progressing  Flowsheets (Taken 10/8/2023 0735)  Verbalizes/displays adequate comfort level or baseline comfort level: Encourage patient to monitor pain and request assistance     Problem: Chronic Conditions and Co-morbidities  Goal: Patient's chronic conditions and co-morbidity symptoms are monitored and maintained or improved  10/7/2023 1749 by Steve Venegas RN  Outcome: Progressing     Problem: Safety - Adult  Goal: Free from fall injury  10/7/2023 1749 by Steve Venegas RN  Outcome: Progressing

## 2023-10-08 NOTE — PROGRESS NOTES
Bedside and Verbal shift change report given to Albertina Cortes RN (oncoming nurse) by Jeet Jacinto RN  (offgoing nurse). Report included the following information Nurse Handoff Report, Index, ED Encounter Summary, Intake/Output, MAR, Recent Results, and Cardiac Rhythm NSR . End of Shift Note    Bedside shift change report given to RN (oncoming nurse) by Albertina Cortes RN (offgoing nurse). Report included the following information SBAR, Kardex, ED Summary, Intake/Output, MAR, Recent Results, and Cardiac Rhythm NSR    Shift worked:  7794-6034     Shift summary and any significant changes:     No significant changes    New 22 G IV placed     Concerns for physician to address:       Zone phone for oncoming shift:          Activity:     Number times ambulated in hallways past shift: 0  Number of times OOB to chair past shift: 2    Cardiac:   Cardiac Monitoring: Yes           Access:  Current line(s): PIV     Genitourinary:   Urinary status: voiding    Respiratory:      Chronic home O2 use?: YES  Incentive spirometer at bedside: NO       GI:     Current diet:  ADULT DIET; Regular; 4 carb choices (60 gm/meal)  Passing flatus: YES  Tolerating current diet: YES       Pain Management:   Patient states pain is manageable on current regimen: YES    Skin:     Interventions: float heels, increase time out of bed, PT/OT consult, and limit briefs    Patient Safety:  Fall Score:    Interventions: bed/chair alarm, assistive device (walker, cane.  etc), gripper socks, pt to call before getting OOB, and stay with me (per policy)       Length of Stay:  Expected LOS: 2  Actual LOS: 3      Albertina Cortes RN

## 2023-10-09 ENCOUNTER — APPOINTMENT (OUTPATIENT)
Facility: HOSPITAL | Age: 73
DRG: 189 | End: 2023-10-09
Attending: GENERAL ACUTE CARE HOSPITAL
Payer: MEDICARE

## 2023-10-09 ENCOUNTER — APPOINTMENT (OUTPATIENT)
Facility: HOSPITAL | Age: 73
DRG: 189 | End: 2023-10-09
Payer: MEDICARE

## 2023-10-09 ENCOUNTER — HOME HEALTH ADMISSION (OUTPATIENT)
Dept: HOME HEALTH SERVICES | Facility: HOME HEALTH | Age: 73
End: 2023-10-09
Payer: MEDICARE

## 2023-10-09 LAB
GLUCOSE BLD STRIP.AUTO-MCNC: 102 MG/DL (ref 65–117)
GLUCOSE BLD STRIP.AUTO-MCNC: 204 MG/DL (ref 65–117)
GLUCOSE BLD STRIP.AUTO-MCNC: 251 MG/DL (ref 65–117)
GLUCOSE BLD STRIP.AUTO-MCNC: 276 MG/DL (ref 65–117)
NT PRO BNP: 1553 PG/ML
SERVICE CMNT-IMP: ABNORMAL
SERVICE CMNT-IMP: NORMAL

## 2023-10-09 PROCEDURE — 6360000002 HC RX W HCPCS: Performed by: INTERNAL MEDICINE

## 2023-10-09 PROCEDURE — 2580000003 HC RX 258: Performed by: NURSE PRACTITIONER

## 2023-10-09 PROCEDURE — 94640 AIRWAY INHALATION TREATMENT: CPT

## 2023-10-09 PROCEDURE — 83880 ASSAY OF NATRIURETIC PEPTIDE: CPT

## 2023-10-09 PROCEDURE — 6370000000 HC RX 637 (ALT 250 FOR IP): Performed by: GENERAL ACUTE CARE HOSPITAL

## 2023-10-09 PROCEDURE — 6360000002 HC RX W HCPCS: Performed by: GENERAL ACUTE CARE HOSPITAL

## 2023-10-09 PROCEDURE — 82962 GLUCOSE BLOOD TEST: CPT

## 2023-10-09 PROCEDURE — 71250 CT THORAX DX C-: CPT

## 2023-10-09 PROCEDURE — 71045 X-RAY EXAM CHEST 1 VIEW: CPT

## 2023-10-09 PROCEDURE — 2700000000 HC OXYGEN THERAPY PER DAY

## 2023-10-09 PROCEDURE — 1100000003 HC PRIVATE W/ TELEMETRY

## 2023-10-09 PROCEDURE — 6370000000 HC RX 637 (ALT 250 FOR IP): Performed by: INTERNAL MEDICINE

## 2023-10-09 PROCEDURE — 93306 TTE W/DOPPLER COMPLETE: CPT

## 2023-10-09 PROCEDURE — 2580000003 HC RX 258: Performed by: INTERNAL MEDICINE

## 2023-10-09 PROCEDURE — 6360000002 HC RX W HCPCS: Performed by: NURSE PRACTITIONER

## 2023-10-09 PROCEDURE — 6370000000 HC RX 637 (ALT 250 FOR IP): Performed by: NURSE PRACTITIONER

## 2023-10-09 PROCEDURE — 36415 COLL VENOUS BLD VENIPUNCTURE: CPT

## 2023-10-09 PROCEDURE — 6360000004 HC RX CONTRAST MEDICATION: Performed by: GENERAL ACUTE CARE HOSPITAL

## 2023-10-09 RX ORDER — GUAIFENESIN 600 MG/1
600 TABLET, EXTENDED RELEASE ORAL 2 TIMES DAILY
Status: DISCONTINUED | OUTPATIENT
Start: 2023-10-09 | End: 2023-10-18 | Stop reason: HOSPADM

## 2023-10-09 RX ORDER — GABAPENTIN 100 MG/1
100 CAPSULE ORAL 3 TIMES DAILY
Status: DISCONTINUED | OUTPATIENT
Start: 2023-10-09 | End: 2023-10-16

## 2023-10-09 RX ORDER — ACETYLCYSTEINE 200 MG/ML
600 SOLUTION ORAL; RESPIRATORY (INHALATION)
Status: DISCONTINUED | OUTPATIENT
Start: 2023-10-09 | End: 2023-10-16

## 2023-10-09 RX ORDER — IPRATROPIUM BROMIDE AND ALBUTEROL SULFATE 2.5; .5 MG/3ML; MG/3ML
1 SOLUTION RESPIRATORY (INHALATION)
Status: DISCONTINUED | OUTPATIENT
Start: 2023-10-09 | End: 2023-10-18 | Stop reason: HOSPADM

## 2023-10-09 RX ADMIN — INSULIN LISPRO 25 UNITS: 100 INJECTION, SOLUTION INTRAVENOUS; SUBCUTANEOUS at 16:11

## 2023-10-09 RX ADMIN — INSULIN LISPRO 25 UNITS: 100 INJECTION, SOLUTION INTRAVENOUS; SUBCUTANEOUS at 12:35

## 2023-10-09 RX ADMIN — ASPIRIN 81 MG: 81 TABLET, COATED ORAL at 08:55

## 2023-10-09 RX ADMIN — POTASSIUM CHLORIDE 20 MEQ: 20 TABLET, EXTENDED RELEASE ORAL at 08:55

## 2023-10-09 RX ADMIN — INSULIN LISPRO 4 UNITS: 100 INJECTION, SOLUTION INTRAVENOUS; SUBCUTANEOUS at 16:11

## 2023-10-09 RX ADMIN — LEVOTHYROXINE SODIUM 137 MCG: 0.03 TABLET ORAL at 05:29

## 2023-10-09 RX ADMIN — SODIUM CHLORIDE 1000 MG: 900 INJECTION INTRAVENOUS at 16:49

## 2023-10-09 RX ADMIN — GUAIFENESIN 600 MG: 600 TABLET, EXTENDED RELEASE ORAL at 20:08

## 2023-10-09 RX ADMIN — METHYLPREDNISOLONE SODIUM SUCCINATE 40 MG: 40 INJECTION, POWDER, FOR SOLUTION INTRAMUSCULAR; INTRAVENOUS at 04:14

## 2023-10-09 RX ADMIN — METHYLPREDNISOLONE SODIUM SUCCINATE 40 MG: 40 INJECTION, POWDER, FOR SOLUTION INTRAMUSCULAR; INTRAVENOUS at 16:12

## 2023-10-09 RX ADMIN — HYDRALAZINE HYDROCHLORIDE 50 MG: 50 TABLET, FILM COATED ORAL at 13:16

## 2023-10-09 RX ADMIN — HYDRALAZINE HYDROCHLORIDE 50 MG: 50 TABLET, FILM COATED ORAL at 21:53

## 2023-10-09 RX ADMIN — AMLODIPINE BESYLATE 10 MG: 5 TABLET ORAL at 08:55

## 2023-10-09 RX ADMIN — INSULIN GLARGINE 24 UNITS: 100 INJECTION, SOLUTION SUBCUTANEOUS at 08:55

## 2023-10-09 RX ADMIN — PERFLUTREN 1.5 ML: 6.52 INJECTION, SUSPENSION INTRAVENOUS at 10:20

## 2023-10-09 RX ADMIN — AZITHROMYCIN MONOHYDRATE 500 MG: 500 INJECTION, POWDER, LYOPHILIZED, FOR SOLUTION INTRAVENOUS at 17:25

## 2023-10-09 RX ADMIN — METHYLPREDNISOLONE SODIUM SUCCINATE 40 MG: 40 INJECTION, POWDER, FOR SOLUTION INTRAMUSCULAR; INTRAVENOUS at 08:56

## 2023-10-09 RX ADMIN — SODIUM CHLORIDE, PRESERVATIVE FREE 10 ML: 5 INJECTION INTRAVENOUS at 08:56

## 2023-10-09 RX ADMIN — MONTELUKAST 10 MG: 10 TABLET, FILM COATED ORAL at 20:07

## 2023-10-09 RX ADMIN — IPRATROPIUM BROMIDE AND ALBUTEROL SULFATE 1 DOSE: 2.5; .5 SOLUTION RESPIRATORY (INHALATION) at 21:34

## 2023-10-09 RX ADMIN — ROSUVASTATIN 20 MG: 20 TABLET, FILM COATED ORAL at 08:55

## 2023-10-09 RX ADMIN — CARVEDILOL 12.5 MG: 12.5 TABLET, FILM COATED ORAL at 16:12

## 2023-10-09 RX ADMIN — ARFORMOTEROL TARTRATE: 15 SOLUTION RESPIRATORY (INHALATION) at 08:48

## 2023-10-09 RX ADMIN — PANTOPRAZOLE SODIUM 40 MG: 40 TABLET, DELAYED RELEASE ORAL at 08:55

## 2023-10-09 RX ADMIN — ACETYLCYSTEINE 600 MG: 200 SOLUTION ORAL; RESPIRATORY (INHALATION) at 21:34

## 2023-10-09 RX ADMIN — INSULIN LISPRO 25 UNITS: 100 INJECTION, SOLUTION INTRAVENOUS; SUBCUTANEOUS at 08:54

## 2023-10-09 RX ADMIN — GABAPENTIN 100 MG: 100 CAPSULE ORAL at 20:07

## 2023-10-09 RX ADMIN — ARFORMOTEROL TARTRATE: 15 SOLUTION RESPIRATORY (INHALATION) at 21:34

## 2023-10-09 RX ADMIN — CARVEDILOL 12.5 MG: 12.5 TABLET, FILM COATED ORAL at 08:55

## 2023-10-09 RX ADMIN — HYDRALAZINE HYDROCHLORIDE 50 MG: 50 TABLET, FILM COATED ORAL at 05:29

## 2023-10-09 RX ADMIN — GABAPENTIN 100 MG: 100 CAPSULE ORAL at 13:16

## 2023-10-09 RX ADMIN — LATANOPROST 1 DROP: 50 SOLUTION OPHTHALMIC at 20:08

## 2023-10-09 RX ADMIN — METHYLPREDNISOLONE SODIUM SUCCINATE 40 MG: 40 INJECTION, POWDER, FOR SOLUTION INTRAMUSCULAR; INTRAVENOUS at 21:53

## 2023-10-09 RX ADMIN — IPRATROPIUM BROMIDE AND ALBUTEROL SULFATE 1 DOSE: .5; 3 SOLUTION RESPIRATORY (INHALATION) at 08:42

## 2023-10-09 RX ADMIN — INSULIN LISPRO 8 UNITS: 100 INJECTION, SOLUTION INTRAVENOUS; SUBCUTANEOUS at 08:54

## 2023-10-09 RX ADMIN — SODIUM CHLORIDE, PRESERVATIVE FREE 10 ML: 5 INJECTION INTRAVENOUS at 20:08

## 2023-10-09 RX ADMIN — GABAPENTIN 200 MG: 100 CAPSULE ORAL at 08:55

## 2023-10-09 RX ADMIN — CALCITRIOL CAPSULES 0.25 MCG 0.25 MCG: 0.25 CAPSULE ORAL at 08:55

## 2023-10-09 RX ADMIN — ENOXAPARIN SODIUM 40 MG: 100 INJECTION SUBCUTANEOUS at 08:55

## 2023-10-09 RX ADMIN — INSULIN LISPRO 8 UNITS: 100 INJECTION, SOLUTION INTRAVENOUS; SUBCUTANEOUS at 12:35

## 2023-10-09 ASSESSMENT — PAIN SCALES - GENERAL
PAINLEVEL_OUTOF10: 0

## 2023-10-09 NOTE — PROGRESS NOTES
End of Shift Note    Bedside shift change report given to Skyler Brunson RN (oncoming nurse) by Trina Dupree RN (offgoing nurse). Report included the following information Nurse Handoff Report, Intake/Output, MAR, and Recent Results      Shift worked:  7a-7p   Shift summary and any significant changes:    Pt educated to turn Q2H as pt able to turn self.       Concerns for physician to address:  none   Zone phone for oncoming shift:  [phone]     Patient Information  Olivia Concepcion  81 y.o.  10/5/2023  2:47 PM by Juan George MD. Olivia Concepcion was admitted from Home    Problem List  Patient Active Problem List    Diagnosis Date Noted    Acute hypoxic respiratory failure ([address]) 10/05/2023    Type 2 diabetes mellitus with chronic kidney disease ([address]) 12/15/2022    CKD (chronic kidney disease) stage 3, GFR 30-59 ml/min ([address]) 03/10/2022    Major depressive disorder, recurrent, moderate ([address]) 12/10/2021    Major depressive disorder, recurrent, unspecified ([address]) 12/10/2021    Major depressive disorder, recurrent, mild ([address]) 12/10/2021    Type 2 diabetes mellitus with diabetic neuropathy ([address]) 04/29/2019    Type 2 diabetes with nephropathy ([address]) 12/14/2018    History of CVA (cerebrovascular accident) 07/13/2018    Obesity, morbid ([address]) 01/25/2018    Warthin's tumor 07/01/2016    HTN (hypertension) 09/13/2013    Axillary hidradenitis suppurativa 08/07/2013    Esophageal reflux 01/15/2013    Renal failure, acute ([address]) 01/13/2013    Asthma 12/14/2012    Coronary artery disease 06/06/2012    Shortness of breath 06/06/2012    Myocardial ischemia 06/06/2012    PVC's (premature ventricular contractions) 06/01/2012    DM type 2 (diabetes mellitus, type 2) ([address]) 04/23/2012    Diverticulosis 10/20/2009    Osteopenia 10/20/2009    DJD (degenerative joint disease) of knee 10/20/2009    CTS (carpal tunnel syndrome) 10/20/2009     Past Medical History:   Diagnosis Date    Asthma     CAD (coronary artery disease)     \"mild\" per

## 2023-10-09 NOTE — CARE COORDINATION
Transition of Care Plan:    RUR: 18%   Prior Level of Functioning: assistance with IADLs  Disposition: home with HH  If SNF or IPR: Date FOC offered:   Date FOC received:   Accepting facility:   Date authorization started with reference number:   Date authorization received and expires: Follow up appointments: PCP, Specialists  DME needed: Pt has a RW, lift chair, nebulizer, & shower bench. Transportation at discharge: friend or daughter  IM/IMM Medicare/ letter given: needed at d/c  Is patient a [place] and connected with [facility]? If yes, was [employer] transfer form completed and [facility] notified? Caregiver Contact: Corry Bland (Child)   [phone] (Mobile)  Discharge Caregiver contacted prior to discharge? Care Conference needed? Barriers to discharge: [facility] accepted pt. CM will place on AVS.  CM will continue to follow.      Yajaira aMbry LMSW  Supervisee in [facility]

## 2023-10-09 NOTE — PLAN OF CARE
Problem: Respiratory - Adult  Goal: Achieves optimal ventilation and oxygenation  10/9/2023 1054 by Gabriela Ellington RN  Outcome: Progressing  10/9/2023 0852 by Benigno Hines, RT  Outcome: Progressing  10/9/2023 0851 by Benigno Hines, RT  Outcome: Progressing     Problem: Discharge Planning  Goal: Discharge to home or other facility with appropriate resources  Outcome: Progressing     Problem: Pain  Goal: Verbalizes/displays adequate comfort level or baseline comfort level  Outcome: Progressing     Problem: Chronic Conditions and Co-morbidities  Goal: Patient's chronic conditions and co-morbidity symptoms are monitored and maintained or improved  Outcome: Progressing     Problem: Safety - Adult  Goal: Free from fall injury  Outcome: Progressing

## 2023-10-10 LAB
ANION GAP SERPL CALC-SCNC: 3 MMOL/L (ref 5–15)
BUN SERPL-MCNC: 58 MG/DL (ref 6–20)
BUN/CREAT SERPL: 34 (ref 12–20)
CALCIUM SERPL-MCNC: 9.6 MG/DL (ref 8.5–10.1)
CHLORIDE SERPL-SCNC: 111 MMOL/L (ref 97–108)
CO2 SERPL-SCNC: 30 MMOL/L (ref 21–32)
CREAT SERPL-MCNC: 1.7 MG/DL (ref 0.55–1.02)
ECHO AO ASC DIAM: 3.7 CM
ECHO AO ASCENDING AORTA INDEX: 1.89 CM/M2
ECHO AO ROOT DIAM: 3.1 CM
ECHO AO ROOT INDEX: 1.58 CM/M2
ECHO AV MEAN GRADIENT: 16 MMHG
ECHO AV MEAN VELOCITY: 1.9 M/S
ECHO AV PEAK GRADIENT: 24 MMHG
ECHO AV PEAK GRADIENT: 32 MMHG
ECHO AV PEAK VELOCITY: 2.3 M/S
ECHO AV PEAK VELOCITY: 2.8 M/S
ECHO AV VTI: 56.6 CM
ECHO BSA: 2.05 M2
ECHO LV FRACTIONAL SHORTENING: 37 % (ref 28–44)
ECHO LV INTERNAL DIMENSION DIASTOLE INDEX: 2.19 CM/M2
ECHO LV INTERNAL DIMENSION DIASTOLIC: 4.3 CM (ref 3.9–5.3)
ECHO LV INTERNAL DIMENSION SYSTOLIC INDEX: 1.38 CM/M2
ECHO LV INTERNAL DIMENSION SYSTOLIC: 2.7 CM
ECHO LV IVSD: 1.4 CM (ref 0.6–0.9)
ECHO LV MASS 2D: 232.2 G (ref 67–162)
ECHO LV MASS INDEX 2D: 118.5 G/M2 (ref 43–95)
ECHO LV POSTERIOR WALL DIASTOLIC: 1.4 CM (ref 0.6–0.9)
ECHO LV RELATIVE WALL THICKNESS RATIO: 0.65
ECHO LVOT AREA: 3.1 CM2
ECHO LVOT DIAM: 2 CM
ECHO MV AREA PHT: 2.3 CM2
ECHO MV MAX VELOCITY: 1.9 M/S
ECHO MV MEAN GRADIENT: 5 MMHG
ECHO MV MEAN VELOCITY: 1.1 M/S
ECHO MV PEAK GRADIENT: 15 MMHG
ECHO MV PRESSURE HALF TIME (PHT): 94.2 MS
ECHO MV VTI: 54.6 CM
ECHO RV FREE WALL PEAK S': 18 CM/S
ECHO RV TAPSE: 2.9 CM (ref 1.7–?)
ERYTHROCYTE [DISTWIDTH] IN BLOOD BY AUTOMATED COUNT: 15.8 % (ref 11.5–14.5)
GLUCOSE BLD STRIP.AUTO-MCNC: 100 MG/DL (ref 65–117)
GLUCOSE BLD STRIP.AUTO-MCNC: 105 MG/DL (ref 65–117)
GLUCOSE BLD STRIP.AUTO-MCNC: 111 MG/DL (ref 65–117)
GLUCOSE BLD STRIP.AUTO-MCNC: 113 MG/DL (ref 65–117)
GLUCOSE SERPL-MCNC: 79 MG/DL (ref 65–100)
HCT VFR BLD AUTO: 40.2 % (ref 35–47)
HGB BLD-MCNC: 11.5 G/DL (ref 11.5–16)
MCH RBC QN AUTO: 23.9 PG (ref 26–34)
MCHC RBC AUTO-ENTMCNC: 28.6 G/DL (ref 30–36.5)
MCV RBC AUTO: 83.4 FL (ref 80–99)
NRBC # BLD: 0 K/UL (ref 0–0.01)
NRBC BLD-RTO: 0 PER 100 WBC
PLATELET # BLD AUTO: 219 K/UL (ref 150–400)
PMV BLD AUTO: 12 FL (ref 8.9–12.9)
POTASSIUM SERPL-SCNC: 4.6 MMOL/L (ref 3.5–5.1)
RBC # BLD AUTO: 4.82 M/UL (ref 3.8–5.2)
SERVICE CMNT-IMP: NORMAL
SODIUM SERPL-SCNC: 144 MMOL/L (ref 136–145)
WBC # BLD AUTO: 10.3 K/UL (ref 3.6–11)

## 2023-10-10 PROCEDURE — 6370000000 HC RX 637 (ALT 250 FOR IP): Performed by: NURSE PRACTITIONER

## 2023-10-10 PROCEDURE — 6360000002 HC RX W HCPCS: Performed by: GENERAL ACUTE CARE HOSPITAL

## 2023-10-10 PROCEDURE — 2580000003 HC RX 258: Performed by: INTERNAL MEDICINE

## 2023-10-10 PROCEDURE — 36415 COLL VENOUS BLD VENIPUNCTURE: CPT

## 2023-10-10 PROCEDURE — 80048 BASIC METABOLIC PNL TOTAL CA: CPT

## 2023-10-10 PROCEDURE — 94664 DEMO&/EVAL PT USE INHALER: CPT

## 2023-10-10 PROCEDURE — 85027 COMPLETE CBC AUTOMATED: CPT

## 2023-10-10 PROCEDURE — 82962 GLUCOSE BLOOD TEST: CPT

## 2023-10-10 PROCEDURE — 94669 MECHANICAL CHEST WALL OSCILL: CPT

## 2023-10-10 PROCEDURE — 6360000002 HC RX W HCPCS: Performed by: NURSE PRACTITIONER

## 2023-10-10 PROCEDURE — 2580000003 HC RX 258: Performed by: NURSE PRACTITIONER

## 2023-10-10 PROCEDURE — 94660 CPAP INITIATION&MGMT: CPT

## 2023-10-10 PROCEDURE — 6370000000 HC RX 637 (ALT 250 FOR IP): Performed by: GENERAL ACUTE CARE HOSPITAL

## 2023-10-10 PROCEDURE — 6360000002 HC RX W HCPCS: Performed by: INTERNAL MEDICINE

## 2023-10-10 PROCEDURE — 1100000003 HC PRIVATE W/ TELEMETRY

## 2023-10-10 PROCEDURE — 2700000000 HC OXYGEN THERAPY PER DAY

## 2023-10-10 PROCEDURE — 6370000000 HC RX 637 (ALT 250 FOR IP): Performed by: INTERNAL MEDICINE

## 2023-10-10 PROCEDURE — 94640 AIRWAY INHALATION TREATMENT: CPT

## 2023-10-10 RX ORDER — AZITHROMYCIN 250 MG/1
500 TABLET, FILM COATED ORAL DAILY
Status: COMPLETED | OUTPATIENT
Start: 2023-10-10 | End: 2023-10-13

## 2023-10-10 RX ADMIN — GUAIFENESIN 600 MG: 600 TABLET, EXTENDED RELEASE ORAL at 09:04

## 2023-10-10 RX ADMIN — CARVEDILOL 12.5 MG: 12.5 TABLET, FILM COATED ORAL at 09:05

## 2023-10-10 RX ADMIN — GABAPENTIN 100 MG: 100 CAPSULE ORAL at 09:04

## 2023-10-10 RX ADMIN — PANTOPRAZOLE SODIUM 40 MG: 40 TABLET, DELAYED RELEASE ORAL at 09:04

## 2023-10-10 RX ADMIN — INSULIN LISPRO 25 UNITS: 100 INJECTION, SOLUTION INTRAVENOUS; SUBCUTANEOUS at 16:53

## 2023-10-10 RX ADMIN — LEVOTHYROXINE SODIUM 137 MCG: 0.03 TABLET ORAL at 06:38

## 2023-10-10 RX ADMIN — INSULIN GLARGINE 24 UNITS: 100 INJECTION, SOLUTION SUBCUTANEOUS at 09:05

## 2023-10-10 RX ADMIN — METHYLPREDNISOLONE SODIUM SUCCINATE 40 MG: 40 INJECTION, POWDER, FOR SOLUTION INTRAMUSCULAR; INTRAVENOUS at 03:54

## 2023-10-10 RX ADMIN — AZITHROMYCIN 500 MG: 250 TABLET, FILM COATED ORAL at 17:30

## 2023-10-10 RX ADMIN — IPRATROPIUM BROMIDE AND ALBUTEROL SULFATE 1 DOSE: 2.5; .5 SOLUTION RESPIRATORY (INHALATION) at 16:51

## 2023-10-10 RX ADMIN — ACETYLCYSTEINE 600 MG: 200 SOLUTION ORAL; RESPIRATORY (INHALATION) at 22:18

## 2023-10-10 RX ADMIN — GABAPENTIN 100 MG: 100 CAPSULE ORAL at 14:39

## 2023-10-10 RX ADMIN — METHYLPREDNISOLONE SODIUM SUCCINATE 40 MG: 40 INJECTION, POWDER, FOR SOLUTION INTRAMUSCULAR; INTRAVENOUS at 09:05

## 2023-10-10 RX ADMIN — SODIUM CHLORIDE, PRESERVATIVE FREE 10 ML: 5 INJECTION INTRAVENOUS at 09:11

## 2023-10-10 RX ADMIN — SODIUM CHLORIDE, PRESERVATIVE FREE 10 ML: 5 INJECTION INTRAVENOUS at 21:41

## 2023-10-10 RX ADMIN — LATANOPROST 1 DROP: 50 SOLUTION OPHTHALMIC at 21:42

## 2023-10-10 RX ADMIN — HYDRALAZINE HYDROCHLORIDE 50 MG: 50 TABLET, FILM COATED ORAL at 21:41

## 2023-10-10 RX ADMIN — INSULIN LISPRO 25 UNITS: 100 INJECTION, SOLUTION INTRAVENOUS; SUBCUTANEOUS at 09:05

## 2023-10-10 RX ADMIN — CALCITRIOL CAPSULES 0.25 MCG 0.25 MCG: 0.25 CAPSULE ORAL at 09:05

## 2023-10-10 RX ADMIN — IPRATROPIUM BROMIDE AND ALBUTEROL SULFATE 1 DOSE: 2.5; .5 SOLUTION RESPIRATORY (INHALATION) at 07:23

## 2023-10-10 RX ADMIN — ARFORMOTEROL TARTRATE: 15 SOLUTION RESPIRATORY (INHALATION) at 07:30

## 2023-10-10 RX ADMIN — METHYLPREDNISOLONE SODIUM SUCCINATE 40 MG: 40 INJECTION, POWDER, FOR SOLUTION INTRAMUSCULAR; INTRAVENOUS at 21:41

## 2023-10-10 RX ADMIN — GABAPENTIN 100 MG: 100 CAPSULE ORAL at 21:41

## 2023-10-10 RX ADMIN — ROSUVASTATIN 20 MG: 20 TABLET, FILM COATED ORAL at 09:04

## 2023-10-10 RX ADMIN — ASPIRIN 81 MG: 81 TABLET, COATED ORAL at 09:04

## 2023-10-10 RX ADMIN — MONTELUKAST 10 MG: 10 TABLET, FILM COATED ORAL at 21:41

## 2023-10-10 RX ADMIN — INSULIN LISPRO 25 UNITS: 100 INJECTION, SOLUTION INTRAVENOUS; SUBCUTANEOUS at 12:13

## 2023-10-10 RX ADMIN — POTASSIUM CHLORIDE 20 MEQ: 20 TABLET, EXTENDED RELEASE ORAL at 09:04

## 2023-10-10 RX ADMIN — ACETYLCYSTEINE 600 MG: 200 SOLUTION ORAL; RESPIRATORY (INHALATION) at 07:23

## 2023-10-10 RX ADMIN — HYDRALAZINE HYDROCHLORIDE 50 MG: 50 TABLET, FILM COATED ORAL at 06:38

## 2023-10-10 RX ADMIN — GUAIFENESIN 600 MG: 600 TABLET, EXTENDED RELEASE ORAL at 21:41

## 2023-10-10 RX ADMIN — CARVEDILOL 12.5 MG: 12.5 TABLET, FILM COATED ORAL at 16:51

## 2023-10-10 RX ADMIN — SODIUM CHLORIDE 1000 MG: 900 INJECTION INTRAVENOUS at 16:52

## 2023-10-10 RX ADMIN — ENOXAPARIN SODIUM 40 MG: 100 INJECTION SUBCUTANEOUS at 09:04

## 2023-10-10 RX ADMIN — AMLODIPINE BESYLATE 10 MG: 5 TABLET ORAL at 09:04

## 2023-10-10 RX ADMIN — METHYLPREDNISOLONE SODIUM SUCCINATE 40 MG: 40 INJECTION, POWDER, FOR SOLUTION INTRAMUSCULAR; INTRAVENOUS at 16:52

## 2023-10-10 RX ADMIN — ARFORMOTEROL TARTRATE: 15 SOLUTION RESPIRATORY (INHALATION) at 22:18

## 2023-10-10 RX ADMIN — IPRATROPIUM BROMIDE AND ALBUTEROL SULFATE 1 DOSE: 2.5; .5 SOLUTION RESPIRATORY (INHALATION) at 22:19

## 2023-10-10 RX ADMIN — IPRATROPIUM BROMIDE AND ALBUTEROL SULFATE 1 DOSE: 2.5; .5 SOLUTION RESPIRATORY (INHALATION) at 11:05

## 2023-10-10 RX ADMIN — HYDRALAZINE HYDROCHLORIDE 50 MG: 50 TABLET, FILM COATED ORAL at 14:39

## 2023-10-10 ASSESSMENT — PAIN SCALES - GENERAL: PAINLEVEL_OUTOF10: 0

## 2023-10-10 NOTE — PROGRESS NOTES
End of Shift Note    Bedside shift change report given to Mabelene Buerger, RN (oncoming nurse) by Burgess Stoney RN (offgoing nurse). Report included the following information SBAR, Kardex, MAR, Cardiac Rhythm NSR, and Quality Measures    Shift worked:  1900-0730     Shift summary and any significant changes:     0430: Patient's labs from 5804 resulted and showed Glucose of 79. This RN brought a ginger ale and yazmin crackers to room and asked patient to have some. Patients family member at bedside checked patient's personal glucometer and it was 80. This RN still asked patient to have some ginger ale and she was able to drink approximately 110mL. Concerns for physician to address:  N/a   Zone phone for oncoming shift:     [phone]       Activity:     Number times ambulated in hallways past shift: 0  Number of times OOB to chair past shift: 2    Cardiac:   Cardiac Monitoring: Yes           Access:  Current line(s): PIV     Genitourinary:   Urinary status: Voiding    Respiratory:      Chronic home O2 use?: NO  Incentive spirometer at bedside: N/A       GI:     Current diet:  ADULT DIET; Regular; 4 carb choices (60 gm/meal)  Passing flatus: YES  Tolerating current diet: YES       Pain Management:   Patient states pain is manageable on current regimen: N/A    Skin:     Interventions: specialty bed, float heels, increase time out of bed, PT/OT consult, limit briefs, internal/external urinary devices, and nutritional support    Patient Safety:  Fall Score:    Interventions: bed/chair alarm, assistive device (walker, cane.  etc), gripper socks, pt to call before getting OOB, and stay with me (per policy)       Length of Stay:  Expected LOS: 2  Actual LOS: [address], RN

## 2023-10-10 NOTE — PROGRESS NOTES
Spiritual Care Assessment/Progress Note  Alejandrina    Name: Riki Garcia MRN: [id number]    Age: 81 y.o. Sex: female   Language: English     Date: 10/10/2023            Total Time Calculated: 18 min              Spiritual Assessment begun in [facility] [address] DOWN  Service Provided For[de-identified] Patient, Friend  Referral/Consult From[de-identified] Rounding  Encounter Overview/Reason : Initial Encounter    Spiritual beliefs:      [] Involved in a moody tradition/spiritual practice:      [] Supported by a moody community:      [] Claims no spiritual orientation:      [] Seeking spiritual identity:           [] Adheres to an individual form of spirituality:      [x] Not able to assess:     did not indicate           Identified resources for coping and support system:   Support System: Friends/neighbors, Family members       [x] Prayer                  [] Devotional reading               [] Music                  [] Guided Imagery     [] Pet visits                                        [] Other: (COMMENT)     Specific area/focus of visit   Encounter:    Crisis:    Spiritual/Emotional needs: Type: Spiritual Support  Ritual, Rites and Sacraments:    Grief, Loss, and Adjustments:    Ethics/Mediation:    Behavioral Health:    Palliative Care: Advance Care Planning:      Plan/Referrals: Continue Support (comment)    Narrative:    Reviewed chart prior to visit on [facility] unit for spiritual assessment. Patient's friend was visiting. Ms Molly Rashid was seated in recliner appearing to be in fair spirits. She responded to inquiries but did not initiate conversation. She acknowledges having good support and a [employer] home. No spiritual needs or concerns were expressed. She was receptive to assurance of prayer. Advised her of ongoing availability of pastoral support.    available upon referral by staff or by patient/family request.    SHERMAN Krishna, [facility], Staff [facility]

## 2023-10-10 NOTE — PLAN OF CARE
Problem: Respiratory - Adult  Goal: Achieves optimal ventilation and oxygenation  10/10/2023 0220 by Amber Farooq RN  Outcome: Progressing  10/9/2023 2138 by Seth Porter RCP  Outcome: Madhuri Ratliff (Taken 10/9/2023 2014 by Amber Farooq RN)  Achieves optimal ventilation and oxygenation:   Assess for changes in respiratory status   Assess for changes in mentation and behavior   Position to facilitate oxygenation and minimize respiratory effort   Oxygen supplementation based on oxygen saturation or arterial blood gases     Problem: Discharge Planning  Goal: Discharge to home or other facility with appropriate resources  Outcome: Progressing  Flowsheets (Taken 10/9/2023 2014)  Discharge to home or other facility with appropriate resources:   Identify barriers to discharge with patient and caregiver   Arrange for needed discharge resources and transportation as appropriate     Problem: Pain  Goal: Verbalizes/displays adequate comfort level or baseline comfort level  Outcome: Progressing  Flowsheets  Taken 10/9/2023 2335  Verbalizes/displays adequate comfort level or baseline comfort level:   Assess pain using appropriate pain scale   Encourage patient to monitor pain and request assistance  Taken 10/9/2023 2014  Verbalizes/displays adequate comfort level or baseline comfort level:   Encourage patient to monitor pain and request assistance   Assess pain using appropriate pain scale     Problem: Chronic Conditions and Co-morbidities  Goal: Patient's chronic conditions and co-morbidity symptoms are monitored and maintained or improved  Outcome: Progressing  Flowsheets (Taken 10/9/2023 2014)  Care Plan - Patient's Chronic Conditions and Co-Morbidity Symptoms are Monitored and Maintained or Improved: Monitor and assess patient's chronic conditions and comorbid symptoms for stability, deterioration, or improvement     Problem: Safety - Adult  Goal: Free from fall injury  Outcome: Progressing

## 2023-10-10 NOTE — PROGRESS NOTES
End of Shift Note    Bedside shift change report given to [address] (oncoming nurse) by Adin Meza RN (offgoing nurse). Report included the following information Nurse Handoff Report, Intake/Output, MAR, Recent Results, and Cardiac Rhythm NSR      Shift worked:  7a-7p   Shift summary and any significant changes:    Pt educated to turn Q2H as pt able to turn self. Pt up in chair for dinner.       Concerns for physician to address:  none   Zone phone for oncoming shift:  [phone]      Patient Information  Benjy Perkins  81 y.o.  10/5/2023  2:47 PM by Ritesh Dickerson MD. Benjy Perkins was admitted from Home    Problem List  Patient Active Problem List    Diagnosis Date Noted    Acute hypoxic respiratory failure ([address]) 10/05/2023    Type 2 diabetes mellitus with chronic kidney disease ([address]) 12/15/2022    CKD (chronic kidney disease) stage 3, GFR 30-59 ml/min ([address]) 03/10/2022    Major depressive disorder, recurrent, moderate ([address]) 12/10/2021    Major depressive disorder, recurrent, unspecified ([address]) 12/10/2021    Major depressive disorder, recurrent, mild ([address]) 12/10/2021    Type 2 diabetes mellitus with diabetic neuropathy ([address]) 04/29/2019    Type 2 diabetes with nephropathy ([address]) 12/14/2018    History of CVA (cerebrovascular accident) 07/13/2018    Obesity, morbid ([address]) 01/25/2018    Warthin's tumor 07/01/2016    HTN (hypertension) 09/13/2013    Axillary hidradenitis suppurativa 08/07/2013    Esophageal reflux 01/15/2013    Renal failure, acute ([address]) 01/13/2013    Asthma 12/14/2012    Coronary artery disease 06/06/2012    Shortness of breath 06/06/2012    Myocardial ischemia 06/06/2012    PVC's (premature ventricular contractions) 06/01/2012    DM type 2 (diabetes mellitus, type 2) ([address]) 04/23/2012    Diverticulosis 10/20/2009    Osteopenia 10/20/2009    DJD (degenerative joint disease) of knee 10/20/2009    CTS (carpal tunnel syndrome) 10/20/2009     Past Medical History:   Diagnosis Date    Asthma

## 2023-10-10 NOTE — PROGRESS NOTES
Pulmonary, Critical Care, and Sleep Medicine~Consult Note    Name: Riki Garcia MRN: [id number]   : 1950 Hospital: [facility]   Date: 10/10/2023 8:56 AM Admission: 10/5/2023     IMPRESSION:   Acute hypoxic respiratory failure - improving   Abnormal chest imaging - CXR c/w mucous plugging, CT with improvement in mucous plugging, bilateral subsegmental atx, LLL atx vs asdz  Asthma w/ exacerbation - persistent bronchospasm  BRENT on CPAP  A/CKD  DM      PLAN:   Supplemental o2, sats >90%  Aggressive pulmonary toilet - nebs adjusted, added Aerobika  Continue IVCS  Empiric abx  Continue CPAP at night - home settings - w/ o2     Daily Progression:    10/10/23 : CXR done yesterday afternoon w/ nearly complete opacification left lung. Bronchodilators adjusted, empiric abx added. Subsequent CT w/ improvement - bilateral subsegmental atx vs asdz. Echo EF 75-80%. pBNP 1,553. Tmax 99. Improved oxygenation today on 2LPM. Moderate diffuse expiratory wheeze on exam.     69yo obese female w/ asthma, BRENT, HTN, DM, CKD. Admitted 10/5/23 w/ dyspnea, cough, wheezing, hypoxia. States symptoms worse about a week. No fevers. Some edema, no change from baseline. Cough nonproductive. Some wheezing. History of pulmonary embolism 20-30 years ago. Did not require o2 long term. On daily ASA. History of asthma as a child. Now intermittent albuterol use, using about twice daily. Has nebulizer at home. No o2 outpt. Uses CPAP at night. Followed by Dr. Murali Boyd yearly. On CPAP for over 10 years. Smoking history - Smoked starting age 18. Smoked off and on. Quit smoking around age 50. Less than 1/2ppd. Sitting up in chair. Family at bedside. Sats 88% on 2LPM, increased to 3LPM. Afebrile.    Cr 1.9, pBNP 562, WBC 10.7, HGB 10.9,   Flu/COVID negative  CXR 10/5/23 : no acute process - upon my review some left lower lobe atx vs infiltrate  V/Q scan 10/5/23 : intermediate/low probability pe, MD Brian   hydrOXYzine HCl (ATARAX) 25 MG tablet TAKE 1 TABLET BY MOUTH FOUR TIMES DAILY AS NEEDED FOR ITCHING 4/15/23   Brian Danielson MD   ketorolac (ACULAR) 0.5 % ophthalmic solution ketorolac 0.5 % eye drops   INSTILL 1 DROP IN LEFT EYE FOUR TIMES DAILY    Brian Danielson MD   latanoprost (XALATAN) 0.005 % ophthalmic solution latanoprost 0.005 % eye drops    Brian Danielson MD   latanoprost (XALATAN) 0.005 % ophthalmic solution INSTILL 1 DROP IN BOTH EYES EVERY NIGHT 4/25/23   Brian Danielson MD   metoprolol tartrate (LOPRESSOR) 50 MG tablet  10/19/20   Brian Danielson MD   montelukast (SINGULAIR) 10 MG tablet  8/11/20   Brian Danielson MD   levothyroxine (SYNTHROID) 137 MCG tablet TAKE 1 TABLET DAILY BEFORE BREAKFAST 5/25/23   Peggy Fernandes MD   pantoprazole (PROTONIX) 40 MG tablet Take 1 tablet by mouth daily 5/10/23   Delilah Corrales MD   albuterol (ACCUNEB) 1.25 MG/3ML nebulizer solution Inhale into the lungs every 4 hours as needed 5/11/22   Automatic Reconciliation, Ar   aspirin 81 MG EC tablet Take by mouth daily    Automatic Reconciliation, Ar   carvedilol (COREG) 12.5 MG tablet Take by mouth 2 times daily (with meals)    Automatic Reconciliation, Ar   fluticasone-salmeterol (ADVAIR) 100-50 MCG/ACT AEPB diskus inhaler USE 1 INHALATION TWICE A DAY 12/15/22   Automatic Reconciliation, Ar   hydrALAZINE (APRESOLINE) 50 MG tablet TAKE 1 TABLET EVERY 8 HOURS 11/29/22   Automatic Reconciliation, Ar   insulin glargine, 1 unit dial, (TOUJEO SOLOSTAR) 300 UNIT/ML concentrated injection pen Inject 30 Units into the skin daily 3/3/23   Automatic Reconciliation, Ar   insulin lispro, 1 Unit Dial, (HUMALOG/ADMELOG) 100 UNIT/ML SOPN 30 units for breakfast, 24 units for lunch and 42 units for dinner 4/29/22   Automatic Reconciliation, Ar   potassium chloride (KLOR-CON M) 20 MEQ extended release tablet Take by mouth daily 8/2/22   Automatic Reconciliation, Ar   rosuvastatin

## 2023-10-10 NOTE — PLAN OF CARE
Problem: Respiratory - Adult  Goal: Achieves optimal ventilation and oxygenation  10/10/2023 1024 by Natalie Pozo RN  Outcome: Progressing  10/10/2023 0220 by Brit Akhtar RN  Outcome: Progressing  10/9/2023 2138 by Adriana Leon RCP  Outcome: Progressing  Flowsheets (Taken 10/9/2023 2014 by Brit Akhtar RN)  Achieves optimal ventilation and oxygenation:   Assess for changes in respiratory status   Assess for changes in mentation and behavior   Position to facilitate oxygenation and minimize respiratory effort   Oxygen supplementation based on oxygen saturation or arterial blood gases     Problem: Discharge Planning  Goal: Discharge to home or other facility with appropriate resources  10/10/2023 1024 by Natalie Pozo RN  Outcome: Progressing  10/10/2023 0220 by Brit Akhtar RN  Outcome: [address] (Taken 10/9/2023 2014)  Discharge to home or other facility with appropriate resources:   Identify barriers to discharge with patient and caregiver   Arrange for needed discharge resources and transportation as appropriate     Problem: Pain  Goal: Verbalizes/displays adequate comfort level or baseline comfort level  10/10/2023 1024 by Natalie Pozo RN  Outcome: Progressing  Flowsheets (Taken 10/10/2023 0310 by Brit Akhtar RN)  Verbalizes/displays adequate comfort level or baseline comfort level:   Assess pain using appropriate pain scale   Encourage patient to monitor pain and request assistance  10/10/2023 0220 by Brit Akhtar RN  Outcome: Progressing  Flowsheets  Taken 10/9/2023 2335  Verbalizes/displays adequate comfort level or baseline comfort level:   Assess pain using appropriate pain scale   Encourage patient to monitor pain and request assistance  Taken 10/9/2023 2014  Verbalizes/displays adequate comfort level or baseline comfort level:   Encourage patient to monitor pain and request assistance   Assess pain using appropriate pain scale     Problem: Chronic Conditions and Co-morbidities  Goal:

## 2023-10-11 LAB
ARTERIAL PATENCY WRIST A: YES
BASE EXCESS BLDA CALC-SCNC: 4.6 MMOL/L
BDY SITE: ABNORMAL
GLUCOSE BLD STRIP.AUTO-MCNC: 101 MG/DL (ref 65–117)
GLUCOSE BLD STRIP.AUTO-MCNC: 110 MG/DL (ref 65–117)
GLUCOSE BLD STRIP.AUTO-MCNC: 144 MG/DL (ref 65–117)
GLUCOSE BLD STRIP.AUTO-MCNC: 162 MG/DL (ref 65–117)
GLUCOSE BLD STRIP.AUTO-MCNC: 53 MG/DL (ref 65–117)
GLUCOSE BLD STRIP.AUTO-MCNC: 58 MG/DL (ref 65–117)
GLUCOSE BLD STRIP.AUTO-MCNC: 70 MG/DL (ref 65–117)
HCO3 BLDA-SCNC: 35 MMOL/L (ref 22–26)
PCO2 BLDA: 79 MMHG (ref 35–45)
PH BLDA: 7.26 (ref 7.35–7.45)
PO2 BLDA: 251 MMHG (ref 80–100)
SAO2 % BLD: 99 % (ref 92–97)
SAO2% DEVICE SAO2% SENSOR NAME: ABNORMAL
SERVICE CMNT-IMP: ABNORMAL
SERVICE CMNT-IMP: NORMAL
SPECIMEN SITE: ABNORMAL

## 2023-10-11 PROCEDURE — 2700000000 HC OXYGEN THERAPY PER DAY

## 2023-10-11 PROCEDURE — 82962 GLUCOSE BLOOD TEST: CPT

## 2023-10-11 PROCEDURE — 6370000000 HC RX 637 (ALT 250 FOR IP): Performed by: GENERAL ACUTE CARE HOSPITAL

## 2023-10-11 PROCEDURE — 6360000002 HC RX W HCPCS: Performed by: NURSE PRACTITIONER

## 2023-10-11 PROCEDURE — 36600 WITHDRAWAL OF ARTERIAL BLOOD: CPT

## 2023-10-11 PROCEDURE — 6360000002 HC RX W HCPCS: Performed by: GENERAL ACUTE CARE HOSPITAL

## 2023-10-11 PROCEDURE — 6370000000 HC RX 637 (ALT 250 FOR IP): Performed by: INTERNAL MEDICINE

## 2023-10-11 PROCEDURE — 6370000000 HC RX 637 (ALT 250 FOR IP): Performed by: NURSE PRACTITIONER

## 2023-10-11 PROCEDURE — 94640 AIRWAY INHALATION TREATMENT: CPT

## 2023-10-11 PROCEDURE — 82803 BLOOD GASES ANY COMBINATION: CPT

## 2023-10-11 PROCEDURE — 6360000002 HC RX W HCPCS: Performed by: INTERNAL MEDICINE

## 2023-10-11 PROCEDURE — 2580000003 HC RX 258: Performed by: NURSE PRACTITIONER

## 2023-10-11 PROCEDURE — 94669 MECHANICAL CHEST WALL OSCILL: CPT

## 2023-10-11 PROCEDURE — 2060000000 HC ICU INTERMEDIATE R&B

## 2023-10-11 PROCEDURE — 5A09457 ASSISTANCE WITH RESPIRATORY VENTILATION, 24-96 CONSECUTIVE HOURS, CONTINUOUS POSITIVE AIRWAY PRESSURE: ICD-10-PCS | Performed by: INTERNAL MEDICINE

## 2023-10-11 PROCEDURE — 94660 CPAP INITIATION&MGMT: CPT

## 2023-10-11 RX ADMIN — HYDRALAZINE HYDROCHLORIDE 50 MG: 50 TABLET, FILM COATED ORAL at 21:57

## 2023-10-11 RX ADMIN — CARVEDILOL 12.5 MG: 12.5 TABLET, FILM COATED ORAL at 18:06

## 2023-10-11 RX ADMIN — ROSUVASTATIN 20 MG: 20 TABLET, FILM COATED ORAL at 10:09

## 2023-10-11 RX ADMIN — METHYLPREDNISOLONE SODIUM SUCCINATE 40 MG: 40 INJECTION, POWDER, FOR SOLUTION INTRAMUSCULAR; INTRAVENOUS at 10:07

## 2023-10-11 RX ADMIN — AMLODIPINE BESYLATE 10 MG: 5 TABLET ORAL at 10:09

## 2023-10-11 RX ADMIN — AZITHROMYCIN 500 MG: 250 TABLET, FILM COATED ORAL at 10:08

## 2023-10-11 RX ADMIN — IPRATROPIUM BROMIDE AND ALBUTEROL SULFATE 1 DOSE: 2.5; .5 SOLUTION RESPIRATORY (INHALATION) at 16:16

## 2023-10-11 RX ADMIN — GABAPENTIN 100 MG: 100 CAPSULE ORAL at 10:08

## 2023-10-11 RX ADMIN — IPRATROPIUM BROMIDE AND ALBUTEROL SULFATE 1 DOSE: 2.5; .5 SOLUTION RESPIRATORY (INHALATION) at 12:17

## 2023-10-11 RX ADMIN — HYDRALAZINE HYDROCHLORIDE 50 MG: 50 TABLET, FILM COATED ORAL at 06:40

## 2023-10-11 RX ADMIN — GABAPENTIN 100 MG: 100 CAPSULE ORAL at 21:57

## 2023-10-11 RX ADMIN — HYDRALAZINE HYDROCHLORIDE 50 MG: 50 TABLET, FILM COATED ORAL at 13:23

## 2023-10-11 RX ADMIN — INSULIN LISPRO 25 UNITS: 100 INJECTION, SOLUTION INTRAVENOUS; SUBCUTANEOUS at 13:23

## 2023-10-11 RX ADMIN — ACETYLCYSTEINE 600 MG: 200 SOLUTION ORAL; RESPIRATORY (INHALATION) at 19:23

## 2023-10-11 RX ADMIN — ACETYLCYSTEINE 600 MG: 200 SOLUTION ORAL; RESPIRATORY (INHALATION) at 08:49

## 2023-10-11 RX ADMIN — PANTOPRAZOLE SODIUM 40 MG: 40 TABLET, DELAYED RELEASE ORAL at 10:09

## 2023-10-11 RX ADMIN — GUAIFENESIN 600 MG: 600 TABLET, EXTENDED RELEASE ORAL at 21:57

## 2023-10-11 RX ADMIN — IPRATROPIUM BROMIDE AND ALBUTEROL SULFATE 1 DOSE: 2.5; .5 SOLUTION RESPIRATORY (INHALATION) at 08:59

## 2023-10-11 RX ADMIN — GUAIFENESIN 600 MG: 600 TABLET, EXTENDED RELEASE ORAL at 10:09

## 2023-10-11 RX ADMIN — METHYLPREDNISOLONE SODIUM SUCCINATE 40 MG: 40 INJECTION, POWDER, FOR SOLUTION INTRAMUSCULAR; INTRAVENOUS at 18:05

## 2023-10-11 RX ADMIN — LEVOTHYROXINE SODIUM 137 MCG: 0.03 TABLET ORAL at 06:40

## 2023-10-11 RX ADMIN — IPRATROPIUM BROMIDE AND ALBUTEROL SULFATE 1 DOSE: 2.5; .5 SOLUTION RESPIRATORY (INHALATION) at 19:23

## 2023-10-11 RX ADMIN — METHYLPREDNISOLONE SODIUM SUCCINATE 40 MG: 40 INJECTION, POWDER, FOR SOLUTION INTRAMUSCULAR; INTRAVENOUS at 04:13

## 2023-10-11 RX ADMIN — LATANOPROST 1 DROP: 50 SOLUTION OPHTHALMIC at 21:59

## 2023-10-11 RX ADMIN — SODIUM CHLORIDE 1000 MG: 900 INJECTION INTRAVENOUS at 18:06

## 2023-10-11 RX ADMIN — MONTELUKAST 10 MG: 10 TABLET, FILM COATED ORAL at 21:57

## 2023-10-11 RX ADMIN — CARVEDILOL 12.5 MG: 12.5 TABLET, FILM COATED ORAL at 10:09

## 2023-10-11 RX ADMIN — CALCITRIOL CAPSULES 0.25 MCG 0.25 MCG: 0.25 CAPSULE ORAL at 10:09

## 2023-10-11 RX ADMIN — ASPIRIN 81 MG: 81 TABLET, COATED ORAL at 10:08

## 2023-10-11 RX ADMIN — POTASSIUM CHLORIDE 20 MEQ: 20 TABLET, EXTENDED RELEASE ORAL at 10:08

## 2023-10-11 RX ADMIN — ARFORMOTEROL TARTRATE: 15 SOLUTION RESPIRATORY (INHALATION) at 08:52

## 2023-10-11 RX ADMIN — GABAPENTIN 100 MG: 100 CAPSULE ORAL at 13:23

## 2023-10-11 RX ADMIN — Medication 16 G: at 21:36

## 2023-10-11 RX ADMIN — ARFORMOTEROL TARTRATE: 15 SOLUTION RESPIRATORY (INHALATION) at 19:23

## 2023-10-11 RX ADMIN — INSULIN GLARGINE 24 UNITS: 100 INJECTION, SOLUTION SUBCUTANEOUS at 10:07

## 2023-10-11 RX ADMIN — ENOXAPARIN SODIUM 40 MG: 100 INJECTION SUBCUTANEOUS at 10:09

## 2023-10-11 NOTE — PLAN OF CARE
Problem: Discharge Planning  Goal: Discharge to home or other facility with appropriate resources  10/10/2023 2334 by Mian Hess RN  Outcome: Progressing  10/10/2023 1024 by Julianne Burnett RN  Outcome: Progressing     Problem: Pain  Goal: Verbalizes/displays adequate comfort level or baseline comfort level  10/10/2023 2334 by Mian Hess RN  Outcome: Progressing  10/10/2023 1024 by Julianne Burnett RN  Outcome: Progressing  Flowsheets (Taken 10/10/2023 0310 by Gail Shaikh RN)  Verbalizes/displays adequate comfort level or baseline comfort level:   Assess pain using appropriate pain scale   Encourage patient to monitor pain and request assistance     Problem: Chronic Conditions and Co-morbidities  Goal: Patient's chronic conditions and co-morbidity symptoms are monitored and maintained or improved  10/10/2023 2334 by Mian Hess RN  Outcome: Progressing  10/10/2023 1024 by Julianne Burnett RN  Outcome: Progressing     Problem: Safety - Adult  Goal: Free from fall injury  10/10/2023 2334 by Mian Hess RN  Outcome: Progressing  10/10/2023 1024 by Julianne Burnett RN  Outcome: Progressing

## 2023-10-11 NOTE — PROGRESS NOTES
Hospitalist Progress Note    NAME:   Vargas Magdaleno   : 1950   MRN: [id number]     Patient PCP: Jinny Bustamante MD    Estimated discharge date: 2 days  Barriers: clinical improvement , wean off O2    Assessment / Plan:      Acute asthma exacerbation  ? Obesity hypoventilation syndrome   BRENT, on CPAP  CO2 retention  -Chest x-ray shows no acute process. proBNP is mildly elevated at 562  -She does have a previous history of provoked DVT and she does have CKD and also allergy to contrast so CT angiogram of the chest was not done  -Start Solu-Medrol,   -start pulmicort/arformoterol and ipratropium nebs. Cont prn DuoNeb  -V/Q intermediate to low probability   -ECHO to r.o Pulm HTN  -Continue oxygen by nasal cannula, currently on 2 L and saturating at about 95%  -Patient was more lethargic today and slightly confused. ABG showed CO2 retention. Started on BiPAP. Patient advised to use CPAP at bedtime and when napping during daytime     Hypokalemia  -KCl replaced      CKD stage III  -Creatinine is close to baseline of around 1.5     Diabetic neuropathy  IDDM  Gabapentin  Cont lantus 24 units   Increase Pre meal insulin from 20 to 25 units, monitor while on steroids     Hypertension  Hypothyroidism  GERD  Dyslipidemia  -Continue PTA Coreg, levothyroxine, PPI, Crestor. Medical Decision Making:   I personally reviewed labs: yes, as below   I personally reviewed imaging: yes, as below   Toxic drug monitoring:   Discussed case with: Pt, RN, , respiratory, daughter  Code Status: Full Code   DVT Prophylaxis: lovenox  GI Prophylaxis:     Subjective:  Assessment / Plan:      SOB   On 1 L/M  Cough  More sleepy and lethargic today  No CP  Afebrile  No leg swelling   Discussed with RN events overnight. Objective:      VITALS:   Last 24hrs VS reviewed since prior progress note.  Most recent are:  Patient Vitals for the past 24 hrs:   BP Temp Temp src Pulse Resp SpO2   10/11/23 0223 (!) 142/71

## 2023-10-11 NOTE — PROGRESS NOTES
Bedside and Verbal shift change report given to [facility] (oncoming nurse) by Salas Dumont (offgoing nurse). Report included the following information Nurse Handoff Report, Index, Intake/Output, MAR, Recent Results, and Cardiac Rhythm NSR . End of Shift Note    Bedside shift change report given to  (oncoming nurse) by Ravi No RN (offgoing nurse). Report included the following information SBAR, Kardex, MAR, Recent Results, and Cardiac Rhythm NSR    Shift worked:  7p-7a     Shift summary and any significant changes:    Per daughter, patient seems slightly confused as compared to baseline and is occasionally repeating words.      No AM Labs   Concerns for physician to address:      Zone phone for oncoming shift:            Ravi No RN

## 2023-10-11 NOTE — PROGRESS NOTES
Physician Progress Note      PATIENT:               Gab Ramos  CSN #:                  [id number]  :                       1950  ADMIT DATE:       10/5/2023 2:47 PM  [address] [facility] DATE:  RESPONDING  PROVIDER #:        Christina Clarke MD          QUERY TEXT:    Patient admitted with Acute asthma exacerbation. Noted documentation of acute   respiratory failure in 10/10 pulmonary note. In order to support the diagnosis of acute respiratory failure, please include   additional clinical indicators in your documentation. Or please document if   the diagnosis of acute respiratory failure has been ruled out after further   study. The medical record reflects the following:  Risk Factors: asthma, BRENT on CPAP  Clinical Indicators: ; Patient ambulated to the bathroom and 02 sat dropped to   83% in the ED, AHRF noted by pulmonary, Abnormal chest imaging - CXR c/w   mucous plugging, CT with improvement in mucous plugging, bilateral   subsegmental atx, LLL atx vs asdz  Treatment: titrate oxygen as needed, Nebs, solu-Medrol              Acute Respiratory Failure Clinical Indicators per [employer] MS-DRG Training Guide and   Quick Reference Guide:  pO2 < 60 mmHg or SpO2 (pulse oximetry) < 91% breathing room air  pCO2 > 50 and pH < 7.35  P/F ratio (pO2 / FIO2) < 300  pO2 decrease or pCO2 increase by 10 mmHg from baseline (if known)  Supplemental oxygen of 40% or more  Presence of respiratory distress, tachypnea, dyspnea, shortness of breath,   wheezing  Unable to speak in complete sentences  Use of accessory muscles to breathe  Extreme anxiety and feeling of impending doom  Tripod position  Confusion/altered mental status/obtunded  Options provided:  -- Acute Respiratory Failure as evidenced by, Please document evidence.   -- Acute Respiratory Failure ruled out after study  -- Acute Respiratory Failure ruled out after study and Chronic Respiratory   Failure confirmed  -- Other - I will add my own diagnosis  -- Disagree -

## 2023-10-11 NOTE — PROGRESS NOTES
Physical Therapy:  Orders received, chart reviewed, attempted to complete evaluation. Patient currently on BiPap, increasing confusion and weakness noted this AM. ABG showing low pH and elevated CO2. Will defer evaluation and follow-up tomorrow.     April Danuta Paul PT, DPT

## 2023-10-11 NOTE — PROGRESS NOTES
Pulmonary, Critical Care, and Sleep Medicine~Consult Note    Name: Riki Garcia MRN: [id number]   : 1950 [facility]   Date: 10/11/2023 9:08 AM Admission: 10/5/2023     IMPRESSION:   Acute hypoxic respiratory failure - improving   Abnormal chest imaging - CXR c/w mucous plugging, CT with improvement in mucous plugging, bilateral subsegmental atx, LLL atx vs asdz  Asthma w/ exacerbation - improving  BRENT on CPAP  A/CKD  DM      PLAN:   Supplemental o2, sats >90%  Aggressive pulmonary toilet - nebs, Aerobika  IVCS - wean  Empiric abx  Continue CPAP at night - home settings - w/ o2      Daily Progression:    10/11/23 : Sitting up in chair. No wheeze on exam today. Cough less congested, she has not looked at color. No edema. No c/o pain. Afebrile. Further chart review looks like she was prescribed Advair 100/50 by PCP. Walks w/ Rolator. Seen by Dr. Murali Boyd 2023, setting were adjusted to 10-13cm. Followed by cardiology, Dr. Shruthi Loera. 10/10/23 : CXR done yesterday afternoon w/ nearly complete opacification left lung. Bronchodilators adjusted, empiric abx added. Subsequent CT w/ improvement - bilateral subsegmental atx vs asdz. Echo EF 75-80%. pBNP 1,553. Tmax 99. Improved oxygenation today on 2LPM. Moderate diffuse expiratory wheeze on exam.     69yo obese female w/ asthma, BRENT, HTN, DM, CKD. Admitted 10/5/23 w/ dyspnea, cough, wheezing, hypoxia. States symptoms worse about a week. No fevers. Some edema, no change from baseline. Cough nonproductive. Some wheezing. History of pulmonary embolism 20-30 years ago. Did not require o2 long term. On daily ASA. History of asthma as a child. Now intermittent albuterol use, using about twice daily. Has nebulizer at home. No o2 outpt. Uses CPAP at night. Followed by Dr. Murlai Boyd yearly. On CPAP for over 10 years. Smoking history - Smoked starting age 18. Smoked off and on. Quit smoking around age 50.  Less (SINGULAIR) tablet 10 mg  10 mg Oral Nightly    pantoprazole (PROTONIX) tablet 40 mg  40 mg Oral Daily    potassium chloride (KLOR-CON M) extended release tablet 20 mEq  20 mEq Oral Daily with breakfast    rosuvastatin (CRESTOR) tablet 20 mg  20 mg Oral Daily    sodium chloride flush 0.9 % injection 5-40 mL  5-40 mL IntraVENous 2 times per day    sodium chloride flush 0.9 % injection 5-40 mL  5-40 mL IntraVENous PRN    0.9 % sodium chloride infusion   IntraVENous PRN    enoxaparin (LOVENOX) injection 40 mg  40 mg SubCUTAneous Daily    ondansetron (ZOFRAN-ODT) disintegrating tablet 4 mg  4 mg Oral Q8H PRN    Or    ondansetron (ZOFRAN) injection 4 mg  4 mg IntraVENous Q6H PRN    polyethylene glycol (GLYCOLAX) packet 17 g  17 g Oral Daily PRN    acetaminophen (TYLENOL) tablet 650 mg  650 mg Oral Q6H PRN    Or    acetaminophen (TYLENOL) suppository 650 mg  650 mg Rectal Q6H PRN    methylPREDNISolone sodium (PF) (SOLU-MEDROL PF) injection 40 mg  40 mg IntraVENous Q6H    hydrALAZINE (APRESOLINE) injection 10 mg  10 mg IntraVENous Q6H PRN    glucose chewable tablet 16 g  4 tablet Oral PRN    dextrose bolus 10% 125 mL  125 mL IntraVENous PRN    Or    dextrose bolus 10% 250 mL  250 mL IntraVENous PRN    glucagon injection 1 mg  1 mg SubCUTAneous PRN    dextrose 10 % infusion   IntraVENous Continuous PRN       Labs:  ABG Invalid input(s): \"PHI\", \"PCO2I\", \"PO2I\", \"HCO3I\", \"SO2I\", \"FIO2I\"     CBC Recent Labs     10/10/23  0307   WBC 10.3   HGB 11.5   HCT 40.2      MCV 83.4   MCH 23.9*          Metabolic  Panel Recent Labs     10/10/23  0307      K 4.6   *   CO2 30   BUN 58*          Pertinent Labs                VIOLETTA Carlisle - BERNADINE  10/11/2023

## 2023-10-12 ENCOUNTER — APPOINTMENT (OUTPATIENT)
Facility: HOSPITAL | Age: 73
DRG: 189 | End: 2023-10-12
Payer: MEDICARE

## 2023-10-12 LAB
ALBUMIN SERPL-MCNC: 2.3 G/DL (ref 3.5–5)
ALBUMIN/GLOB SERPL: 0.6 (ref 1.1–2.2)
ALP SERPL-CCNC: 71 U/L (ref 45–117)
ALT SERPL-CCNC: 31 U/L (ref 12–78)
ANION GAP SERPL CALC-SCNC: 2 MMOL/L (ref 5–15)
ARTERIAL PATENCY WRIST A: YES
AST SERPL-CCNC: 22 U/L (ref 15–37)
BASE EXCESS BLDA CALC-SCNC: 6.9 MMOL/L
BDY SITE: ABNORMAL
BILIRUB SERPL-MCNC: 0.5 MG/DL (ref 0.2–1)
BUN SERPL-MCNC: 58 MG/DL (ref 6–20)
BUN/CREAT SERPL: 40 (ref 12–20)
CALCIUM SERPL-MCNC: 9.3 MG/DL (ref 8.5–10.1)
CHLORIDE SERPL-SCNC: 106 MMOL/L (ref 97–108)
CO2 SERPL-SCNC: 31 MMOL/L (ref 21–32)
COMMENT:: NORMAL
CREAT SERPL-MCNC: 1.46 MG/DL (ref 0.55–1.02)
ECHO BSA: 2.05 M2
ERYTHROCYTE [DISTWIDTH] IN BLOOD BY AUTOMATED COUNT: 15.8 % (ref 11.5–14.5)
GAS FLOW.O2 O2 DELIVERY SYS: 2 L/MIN
GLOBULIN SER CALC-MCNC: 3.6 G/DL (ref 2–4)
GLUCOSE BLD STRIP.AUTO-MCNC: 149 MG/DL (ref 65–117)
GLUCOSE BLD STRIP.AUTO-MCNC: 207 MG/DL (ref 65–117)
GLUCOSE BLD STRIP.AUTO-MCNC: 310 MG/DL (ref 65–117)
GLUCOSE BLD STRIP.AUTO-MCNC: 329 MG/DL (ref 65–117)
GLUCOSE SERPL-MCNC: 300 MG/DL (ref 65–100)
HCO3 BLDA-SCNC: 31 MMOL/L (ref 22–26)
HCT VFR BLD AUTO: 37.3 % (ref 35–47)
HGB BLD-MCNC: 11 G/DL (ref 11.5–16)
MCH RBC QN AUTO: 24 PG (ref 26–34)
MCHC RBC AUTO-ENTMCNC: 29.5 G/DL (ref 30–36.5)
MCV RBC AUTO: 81.4 FL (ref 80–99)
NRBC # BLD: 0 K/UL (ref 0–0.01)
NRBC BLD-RTO: 0 PER 100 WBC
NT PRO BNP: 906 PG/ML
PCO2 BLDA: 44 MMHG (ref 35–45)
PH BLDA: 7.48 (ref 7.35–7.45)
PLATELET # BLD AUTO: 168 K/UL (ref 150–400)
PMV BLD AUTO: 11.7 FL (ref 8.9–12.9)
PO2 BLDA: 121 MMHG (ref 80–100)
POTASSIUM SERPL-SCNC: 5.4 MMOL/L (ref 3.5–5.1)
PROT SERPL-MCNC: 5.9 G/DL (ref 6.4–8.2)
RBC # BLD AUTO: 4.58 M/UL (ref 3.8–5.2)
SAO2 % BLD: 99 % (ref 92–97)
SAO2% DEVICE SAO2% SENSOR NAME: ABNORMAL
SERVICE CMNT-IMP: ABNORMAL
SODIUM SERPL-SCNC: 139 MMOL/L (ref 136–145)
SPECIMEN HOLD: NORMAL
SPECIMEN SITE: ABNORMAL
WBC # BLD AUTO: 8.8 K/UL (ref 3.6–11)

## 2023-10-12 PROCEDURE — 6360000002 HC RX W HCPCS: Performed by: INTERNAL MEDICINE

## 2023-10-12 PROCEDURE — 80053 COMPREHEN METABOLIC PANEL: CPT

## 2023-10-12 PROCEDURE — 82962 GLUCOSE BLOOD TEST: CPT

## 2023-10-12 PROCEDURE — 71045 X-RAY EXAM CHEST 1 VIEW: CPT

## 2023-10-12 PROCEDURE — 97530 THERAPEUTIC ACTIVITIES: CPT

## 2023-10-12 PROCEDURE — 83880 ASSAY OF NATRIURETIC PEPTIDE: CPT

## 2023-10-12 PROCEDURE — 2580000003 HC RX 258: Performed by: INTERNAL MEDICINE

## 2023-10-12 PROCEDURE — 93970 EXTREMITY STUDY: CPT

## 2023-10-12 PROCEDURE — 85027 COMPLETE CBC AUTOMATED: CPT

## 2023-10-12 PROCEDURE — 6360000002 HC RX W HCPCS: Performed by: GENERAL ACUTE CARE HOSPITAL

## 2023-10-12 PROCEDURE — 97116 GAIT TRAINING THERAPY: CPT

## 2023-10-12 PROCEDURE — 6360000002 HC RX W HCPCS: Performed by: NURSE PRACTITIONER

## 2023-10-12 PROCEDURE — 94660 CPAP INITIATION&MGMT: CPT

## 2023-10-12 PROCEDURE — 6370000000 HC RX 637 (ALT 250 FOR IP): Performed by: GENERAL ACUTE CARE HOSPITAL

## 2023-10-12 PROCEDURE — 97161 PT EVAL LOW COMPLEX 20 MIN: CPT

## 2023-10-12 PROCEDURE — 6370000000 HC RX 637 (ALT 250 FOR IP): Performed by: NURSE PRACTITIONER

## 2023-10-12 PROCEDURE — 36415 COLL VENOUS BLD VENIPUNCTURE: CPT

## 2023-10-12 PROCEDURE — 2580000003 HC RX 258: Performed by: NURSE PRACTITIONER

## 2023-10-12 PROCEDURE — 2060000000 HC ICU INTERMEDIATE R&B

## 2023-10-12 PROCEDURE — 2700000000 HC OXYGEN THERAPY PER DAY

## 2023-10-12 PROCEDURE — 82803 BLOOD GASES ANY COMBINATION: CPT

## 2023-10-12 PROCEDURE — 94640 AIRWAY INHALATION TREATMENT: CPT

## 2023-10-12 PROCEDURE — 36600 WITHDRAWAL OF ARTERIAL BLOOD: CPT

## 2023-10-12 PROCEDURE — 6370000000 HC RX 637 (ALT 250 FOR IP): Performed by: INTERNAL MEDICINE

## 2023-10-12 RX ADMIN — IPRATROPIUM BROMIDE AND ALBUTEROL SULFATE 1 DOSE: 2.5; .5 SOLUTION RESPIRATORY (INHALATION) at 08:10

## 2023-10-12 RX ADMIN — CARVEDILOL 12.5 MG: 12.5 TABLET, FILM COATED ORAL at 17:11

## 2023-10-12 RX ADMIN — INSULIN LISPRO 12 UNITS: 100 INJECTION, SOLUTION INTRAVENOUS; SUBCUTANEOUS at 17:10

## 2023-10-12 RX ADMIN — ASPIRIN 81 MG: 81 TABLET, COATED ORAL at 10:00

## 2023-10-12 RX ADMIN — ENOXAPARIN SODIUM 40 MG: 100 INJECTION SUBCUTANEOUS at 09:00

## 2023-10-12 RX ADMIN — INSULIN LISPRO 4 UNITS: 100 INJECTION, SOLUTION INTRAVENOUS; SUBCUTANEOUS at 21:27

## 2023-10-12 RX ADMIN — GABAPENTIN 100 MG: 100 CAPSULE ORAL at 20:11

## 2023-10-12 RX ADMIN — PANTOPRAZOLE SODIUM 40 MG: 40 TABLET, DELAYED RELEASE ORAL at 10:28

## 2023-10-12 RX ADMIN — METHYLPREDNISOLONE SODIUM SUCCINATE 40 MG: 40 INJECTION, POWDER, FOR SOLUTION INTRAMUSCULAR; INTRAVENOUS at 10:00

## 2023-10-12 RX ADMIN — ACETYLCYSTEINE 600 MG: 200 SOLUTION ORAL; RESPIRATORY (INHALATION) at 21:04

## 2023-10-12 RX ADMIN — POTASSIUM CHLORIDE 20 MEQ: 20 TABLET, EXTENDED RELEASE ORAL at 09:00

## 2023-10-12 RX ADMIN — SODIUM CHLORIDE, PRESERVATIVE FREE 10 ML: 5 INJECTION INTRAVENOUS at 20:11

## 2023-10-12 RX ADMIN — METHYLPREDNISOLONE SODIUM SUCCINATE 40 MG: 40 INJECTION, POWDER, FOR SOLUTION INTRAMUSCULAR; INTRAVENOUS at 17:11

## 2023-10-12 RX ADMIN — HYDRALAZINE HYDROCHLORIDE 50 MG: 50 TABLET, FILM COATED ORAL at 14:21

## 2023-10-12 RX ADMIN — SODIUM CHLORIDE 1000 MG: 900 INJECTION INTRAVENOUS at 17:11

## 2023-10-12 RX ADMIN — INSULIN GLARGINE 24 UNITS: 100 INJECTION, SOLUTION SUBCUTANEOUS at 10:30

## 2023-10-12 RX ADMIN — ARFORMOTEROL TARTRATE: 15 SOLUTION RESPIRATORY (INHALATION) at 08:15

## 2023-10-12 RX ADMIN — CALCITRIOL CAPSULES 0.25 MCG 0.25 MCG: 0.25 CAPSULE ORAL at 10:00

## 2023-10-12 RX ADMIN — AMLODIPINE BESYLATE 10 MG: 5 TABLET ORAL at 10:00

## 2023-10-12 RX ADMIN — IPRATROPIUM BROMIDE AND ALBUTEROL SULFATE 1 DOSE: 2.5; .5 SOLUTION RESPIRATORY (INHALATION) at 16:03

## 2023-10-12 RX ADMIN — METHYLPREDNISOLONE SODIUM SUCCINATE 40 MG: 40 INJECTION, POWDER, FOR SOLUTION INTRAMUSCULAR; INTRAVENOUS at 03:17

## 2023-10-12 RX ADMIN — GUAIFENESIN 600 MG: 600 TABLET, EXTENDED RELEASE ORAL at 10:00

## 2023-10-12 RX ADMIN — GABAPENTIN 100 MG: 100 CAPSULE ORAL at 09:30

## 2023-10-12 RX ADMIN — ARFORMOTEROL TARTRATE: 15 SOLUTION RESPIRATORY (INHALATION) at 21:21

## 2023-10-12 RX ADMIN — ROSUVASTATIN 20 MG: 20 TABLET, FILM COATED ORAL at 10:00

## 2023-10-12 RX ADMIN — GUAIFENESIN 600 MG: 600 TABLET, EXTENDED RELEASE ORAL at 20:10

## 2023-10-12 RX ADMIN — AZITHROMYCIN 500 MG: 250 TABLET, FILM COATED ORAL at 09:00

## 2023-10-12 RX ADMIN — ACETYLCYSTEINE 600 MG: 200 SOLUTION ORAL; RESPIRATORY (INHALATION) at 08:10

## 2023-10-12 RX ADMIN — IPRATROPIUM BROMIDE AND ALBUTEROL SULFATE 1 DOSE: 2.5; .5 SOLUTION RESPIRATORY (INHALATION) at 21:04

## 2023-10-12 RX ADMIN — CARVEDILOL 12.5 MG: 12.5 TABLET, FILM COATED ORAL at 09:30

## 2023-10-12 RX ADMIN — SODIUM CHLORIDE, PRESERVATIVE FREE 10 ML: 5 INJECTION INTRAVENOUS at 10:30

## 2023-10-12 RX ADMIN — MONTELUKAST 10 MG: 10 TABLET, FILM COATED ORAL at 20:11

## 2023-10-12 RX ADMIN — LATANOPROST 1 DROP: 50 SOLUTION OPHTHALMIC at 20:11

## 2023-10-12 RX ADMIN — HYDRALAZINE HYDROCHLORIDE 50 MG: 50 TABLET, FILM COATED ORAL at 20:10

## 2023-10-12 RX ADMIN — GABAPENTIN 100 MG: 100 CAPSULE ORAL at 14:21

## 2023-10-12 ASSESSMENT — PAIN SCALES - GENERAL
PAINLEVEL_OUTOF10: 0

## 2023-10-12 NOTE — CARE COORDINATION
Transition of Care Plan:     RUR: 18%   Prior Level of Functioning: assistance with IADLs  Disposition: home with HH  If SNF or IPR: Date FOC offered:   Date FOC received:   Accepting facility:   Date authorization started with reference number:   Date authorization received and expires: Follow up appointments: PCP, Specialists  DME needed: Pt has the potential of needing home oxygen. Pt has a RW, lift chair, nebulizer, & shower bench. Transportation at discharge: friend or daughter  IM/IMM Medicare/[employer] letter given: needed at d/c  Is patient a [place] and connected with [facility]? If yes, was [employer] transfer form completed and [facility] notified? Caregiver Contact: Corry Bland (Child)   [phone] (Mobile)  Discharge Caregiver contacted prior to discharge? Care Conference needed? Barriers to discharge: clinical improvement, wean off O2    CM reviewed pt's chart and pt is not medically stable for d/c. Pt has the potential of being home with home oxygen. CM sent referral to [place]. CM will follow and assist with d/c planning.     Lennette Boxer

## 2023-10-12 NOTE — PROGRESS NOTES
Hospitalist Progress Note    NAME:   Lesia Franklin   : 1950   MRN: [id number]     Patient PCP: Dianna Martinez MD    Estimated discharge date: 10/14  Barriers: clinical improvement , wean off O2    Assessment / Plan:      Acute asthma exacerbation  ? Obesity hypoventilation syndrome   BRENT, on CPAP  Acute hypercarbia  -Chest x-ray shows no acute process. proBNP is mildly elevated at 562  -She does have a previous history of provoked DVT and she does have CKD and also allergy to contrast so CT angiogram of the chest was not done  -VQ scan shows low probability for pulmonary embolism  -We will continue Solu-Medrol, DuoNeb  -Continue Zithromax. Ceftriaxone was added on 10/9. She does not have any acute infiltrate. Consider stopping.  -She is currently off of BiPAP. She required BiPAP yesterday due to hypercarbia. Continue her CPAP at night.  -Continue oxygen by nasal cannula, currently on 2 L  -2D echocardiogram shows ejection fraction of around 75 to 80%. -Continue Mucomyst inhalation  -Continue home inhalers  -Repeat blood plus if patient becomes drowsy     Hypokalemia  -Potassium is normal today     CKD stage III  -Creatinine is close to baseline of around 1.5     Diabetic neuropathy  IDDM  -Blood sugars are low due to poor oral intake yesterday due to confusion  -We will hold Premeal insulin 25 units 3 times daily  -Continue Lantus 24 units daily. Continue sliding scale insulin  -Continue PTA gabapentin      Hypertension  Hypothyroidism  GERD  Dyslipidemia  -Continue PTA Coreg, levothyroxine, PPI, Crestor.     Medical Decision Making:   I personally reviewed labs: CBC, BMP  I personally reviewed imaging: Chest x-ray  Toxic drug monitoring:   Discussed case with: Pharmacy,  and nursing  Code Status: Full Code   DVT Prophylaxis: lovenox  GI Prophylaxis:     Subjective:  Assessment / Plan:    She is currently off of BiPAP and is on 2 L nasal cannula  She is more alert and awake TIME:  Minutes      Total CRITICAL CARE TIME Spent: 54  Minutes non procedure based          Comments   >50% of visit spent in counseling and coordination of care x    ________________________________________________________________________        Signed: Angella Parker MD

## 2023-10-12 NOTE — PLAN OF CARE
ADULT PROTOCOL: JET AEROSOL ASSESSMENT    Patient  Manoj Mooney     81 y.o.   female     10/12/2023  11:10 AM    Breath Sounds Pre Procedure: Breath Sounds Pre-Tx NEIL: Rhonchi                                  Breath Sounds Pre-Tx LLL: Rhonchi        Breath Sounds Pre-Tx RUL: Rhonchi        Breath Sounds Pre-Tx RML: Rhonchi        Breath Sounds Pre-Tx RLL: Rhonchi  Breath Sounds Post Procedure: Breath Sounds Post-Tx NEIL: Rhonchi          Breath Sounds Post-Tx LLL: Rhonchi          Breath Sounds Post-Tx RUL: Rhonchi          Breath Sounds Post-Tx RML: Rhonchi          Breath Sounds Post-Tx RLL: Rhonchi                                   Heart Rate: Pre procedure Pre-Tx Pulse: 72           Post procedure Post-Tx Pulse: 70    Resp Rate: Pre procedure Pre-Tx Resps: 18           Post procedure Post-Tx Resps: 17    SpO2:  SpO2: 94 %   with Oxygen                Nebulizer Therapy: Current medications Medications: Arformoterol, Budesonide      Changed: No    Problem List:   Patient Active Problem List   Diagnosis    Diverticulosis    Major depressive disorder, recurrent, moderate (HCC)    Major depressive disorder, recurrent, unspecified (HCC)    Obesity, morbid (HCC)    Osteopenia    History of CVA (cerebrovascular accident)    CKD (chronic kidney disease) stage 3, GFR 30-59 ml/min (HCC)    DM type 2 (diabetes mellitus, type 2) (HCC)    Asthma    Esophageal reflux    DJD (degenerative joint disease) of knee    Type 2 diabetes mellitus with diabetic neuropathy (HCC)    HTN (hypertension)    Axillary hidradenitis suppurativa    Warthin's tumor    Coronary artery disease    Shortness of breath    Major depressive disorder, recurrent, mild (HCC)    CTS (carpal tunnel syndrome)    PVC's (premature ventricular contractions)    Type 2 diabetes with nephropathy (HCC)    Renal failure, acute (HCC)    Myocardial ischemia    Type 2 diabetes mellitus with chronic kidney disease ([address])    Acute hypoxic respiratory failure (HCC)

## 2023-10-12 NOTE — PROGRESS NOTES
RT attempted ABG pt became upset and starting yelling it hurt before I stuck her. I attempted one time refused. Recommend VBG as an alternate.

## 2023-10-12 NOTE — PROGRESS NOTES
Weight: 207 lb 10.8 oz (94.2 kg), 180.6 % IBW. Weight Source: Not Specified  Current BMI (kg/m2): 36.8        Weight Adjustment For: No Adjustment                 BMI Categories: Obese Class 2 (BMI 35.0 -39.9)    Estimated Daily Nutrient Needs:  Energy Requirements Based On: Formula  Weight Used for Energy Requirements: Current  Energy (kcal/day): 1460 kcals (BMR x 1. 3AF - 400 for wt loss)  Weight Used for Protein Requirements: Ideal  Protein (g/day): 78-94g (1.5-1.8g/kg IBW)  Method Used for Fluid Requirements: 1 ml/kcal  Fluid (ml/day): 1460mL or per MD    Nutrition Diagnosis:   Inadequate protein-energy intake related to  (decreased appetite) as evidenced by intake 26-50%    Nutrition Interventions:   Food and/or Nutrient Delivery: Continue Current Diet, Start Oral Nutrition Supplement  Nutrition Education/Counseling: No recommendation at this time  Coordination of Nutrition Care: Continue to monitor while inpatient       Goals:     Goals: PO intake 75% or greater, by next RD assessment       Nutrition Monitoring and Evaluation:   Behavioral-Environmental Outcomes: None Identified  Food/Nutrient Intake Outcomes: Food and Nutrient Intake, Supplement Intake  Physical Signs/Symptoms Outcomes: Biochemical Data, GI Status, Constipation, Fluid Status or Edema, Weight, Nutrition Focused Physical Findings    Discharge Planning:    Continue current diet     Fabiola Guo RD  Contact:

## 2023-10-12 NOTE — PROGRESS NOTES
SubCUTAneous TID WC    insulin lispro (HUMALOG) injection vial 0-4 Units  0-4 Units SubCUTAneous Nightly    amLODIPine (NORVASC) tablet 10 mg  10 mg Oral Daily    aspirin EC tablet 81 mg  81 mg Oral Daily    calcitRIOL (ROCALTROL) capsule 0.25 mcg  0.25 mcg Oral Daily    carvedilol (COREG) tablet 12.5 mg  12.5 mg Oral BID WC    hydrALAZINE (APRESOLINE) tablet 50 mg  50 mg Oral 3 times per day    insulin glargine (LANTUS) injection vial 24 Units  24 Units SubCUTAneous Daily    latanoprost (XALATAN) 0.005 % ophthalmic solution 1 drop  1 drop Both Eyes Daily    levothyroxine (SYNTHROID) tablet 137 mcg  137 mcg Oral Daily    montelukast (SINGULAIR) tablet 10 mg  10 mg Oral Nightly    pantoprazole (PROTONIX) tablet 40 mg  40 mg Oral Daily    potassium chloride (KLOR-CON M) extended release tablet 20 mEq  20 mEq Oral Daily with breakfast    rosuvastatin (CRESTOR) tablet 20 mg  20 mg Oral Daily    sodium chloride flush 0.9 % injection 5-40 mL  5-40 mL IntraVENous 2 times per day    sodium chloride flush 0.9 % injection 5-40 mL  5-40 mL IntraVENous PRN    0.9 % sodium chloride infusion   IntraVENous PRN    enoxaparin (LOVENOX) injection 40 mg  40 mg SubCUTAneous Daily    ondansetron (ZOFRAN-ODT) disintegrating tablet 4 mg  4 mg Oral Q8H PRN    Or    ondansetron (ZOFRAN) injection 4 mg  4 mg IntraVENous Q6H PRN    polyethylene glycol (GLYCOLAX) packet 17 g  17 g Oral Daily PRN    acetaminophen (TYLENOL) tablet 650 mg  650 mg Oral Q6H PRN    Or    acetaminophen (TYLENOL) suppository 650 mg  650 mg Rectal Q6H PRN    hydrALAZINE (APRESOLINE) injection 10 mg  10 mg IntraVENous Q6H PRN    glucose chewable tablet 16 g  4 tablet Oral PRN    dextrose bolus 10% 125 mL  125 mL IntraVENous PRN    Or    dextrose bolus 10% 250 mL  250 mL IntraVENous PRN    glucagon injection 1 mg  1 mg SubCUTAneous PRN    dextrose 10 % infusion   IntraVENous Continuous PRN       Labs:  ABG Invalid input(s): \"PHI\", \"PCO2I\", \"PO2I\", \"HCO3I\", \"SO2I\", \"FIO2I\"     CBC Recent Labs     10/10/23  0307   WBC 10.3   HGB 11.5   HCT 40.2      MCV 83.4   MCH 23.9*          Metabolic  Panel Recent Labs     10/10/23  0307      K 4.6   *   CO2 30   BUN 58*          Pertinent Labs                Pj Akhtar, APRN - NP  10/12/2023

## 2023-10-12 NOTE — PROGRESS NOTES
Bedside and Verbal shift change report given to Ayo Shetty (oncoming nurse) by Ngoc Soto (offgoing nurse). Report included the following information Nurse Handoff Report, Index, MAR, Recent Results, and Cardiac Rhythm NSR .       2123: PCT checked blood sugar and it came back at 58     2129: Told PCT to recheck blood sugar 53.     2136: Gave patient 4 glucose tablets and some apple juice. 2153: Patient finished glucose tablets. Blood sugar to be rechecked in 15 mins. 2218: PCT rechecked blood sugar resulted: 110    2220: Called RT to place pt back on BIPAP. End of Shift Note    Bedside shift change report given to  (oncoming nurse) by Teresita Regan RN (offgoing nurse).   Report included the following information SBAR, Kardex, Intake/Output, MAR, Recent Results, and Cardiac Rhythm NSR    Shift worked:  7p-7a     Shift summary and any significant changes:     See above    No AM labs   Concerns for physician to address:       Zone phone for oncoming shift:              Teresita Regan RN

## 2023-10-13 LAB
ANION GAP SERPL CALC-SCNC: 2 MMOL/L (ref 5–15)
BASOPHILS # BLD: 0 K/UL (ref 0–0.1)
BASOPHILS NFR BLD: 0 % (ref 0–1)
BUN SERPL-MCNC: 62 MG/DL (ref 6–20)
BUN/CREAT SERPL: 46 (ref 12–20)
CALCIUM SERPL-MCNC: 9.7 MG/DL (ref 8.5–10.1)
CHLORIDE SERPL-SCNC: 108 MMOL/L (ref 97–108)
CO2 SERPL-SCNC: 32 MMOL/L (ref 21–32)
CREAT SERPL-MCNC: 1.36 MG/DL (ref 0.55–1.02)
DIFFERENTIAL METHOD BLD: ABNORMAL
EOSINOPHIL # BLD: 0 K/UL (ref 0–0.4)
EOSINOPHIL NFR BLD: 0 % (ref 0–7)
ERYTHROCYTE [DISTWIDTH] IN BLOOD BY AUTOMATED COUNT: 15.7 % (ref 11.5–14.5)
GLUCOSE BLD STRIP.AUTO-MCNC: 194 MG/DL (ref 65–117)
GLUCOSE BLD STRIP.AUTO-MCNC: 282 MG/DL (ref 65–117)
GLUCOSE BLD STRIP.AUTO-MCNC: 290 MG/DL (ref 65–117)
GLUCOSE BLD STRIP.AUTO-MCNC: 347 MG/DL (ref 65–117)
GLUCOSE SERPL-MCNC: 231 MG/DL (ref 65–100)
HCT VFR BLD AUTO: 37.7 % (ref 35–47)
HGB BLD-MCNC: 10.8 G/DL (ref 11.5–16)
IMM GRANULOCYTES # BLD AUTO: 0.1 K/UL (ref 0–0.04)
IMM GRANULOCYTES NFR BLD AUTO: 1 % (ref 0–0.5)
LYMPHOCYTES # BLD: 0.9 K/UL (ref 0.8–3.5)
LYMPHOCYTES NFR BLD: 12 % (ref 12–49)
MCH RBC QN AUTO: 23.1 PG (ref 26–34)
MCHC RBC AUTO-ENTMCNC: 28.6 G/DL (ref 30–36.5)
MCV RBC AUTO: 80.6 FL (ref 80–99)
MONOCYTES # BLD: 0.4 K/UL (ref 0–1)
MONOCYTES NFR BLD: 5 % (ref 5–13)
NEUTS SEG # BLD: 5.7 K/UL (ref 1.8–8)
NEUTS SEG NFR BLD: 82 % (ref 32–75)
NRBC # BLD: 0 K/UL (ref 0–0.01)
NRBC BLD-RTO: 0 PER 100 WBC
PLATELET # BLD AUTO: 156 K/UL (ref 150–400)
PMV BLD AUTO: 12.2 FL (ref 8.9–12.9)
POTASSIUM SERPL-SCNC: 5.1 MMOL/L (ref 3.5–5.1)
RBC # BLD AUTO: 4.68 M/UL (ref 3.8–5.2)
RBC MORPH BLD: ABNORMAL
SERVICE CMNT-IMP: ABNORMAL
SODIUM SERPL-SCNC: 142 MMOL/L (ref 136–145)
WBC # BLD AUTO: 7.1 K/UL (ref 3.6–11)

## 2023-10-13 PROCEDURE — 85025 COMPLETE CBC W/AUTO DIFF WBC: CPT

## 2023-10-13 PROCEDURE — 2580000003 HC RX 258: Performed by: NURSE PRACTITIONER

## 2023-10-13 PROCEDURE — 2580000003 HC RX 258: Performed by: INTERNAL MEDICINE

## 2023-10-13 PROCEDURE — 2060000000 HC ICU INTERMEDIATE R&B

## 2023-10-13 PROCEDURE — 6370000000 HC RX 637 (ALT 250 FOR IP): Performed by: NURSE PRACTITIONER

## 2023-10-13 PROCEDURE — 6360000002 HC RX W HCPCS: Performed by: NURSE PRACTITIONER

## 2023-10-13 PROCEDURE — 82962 GLUCOSE BLOOD TEST: CPT

## 2023-10-13 PROCEDURE — 94660 CPAP INITIATION&MGMT: CPT

## 2023-10-13 PROCEDURE — 6370000000 HC RX 637 (ALT 250 FOR IP): Performed by: INTERNAL MEDICINE

## 2023-10-13 PROCEDURE — 2700000000 HC OXYGEN THERAPY PER DAY

## 2023-10-13 PROCEDURE — 36415 COLL VENOUS BLD VENIPUNCTURE: CPT

## 2023-10-13 PROCEDURE — 6370000000 HC RX 637 (ALT 250 FOR IP): Performed by: GENERAL ACUTE CARE HOSPITAL

## 2023-10-13 PROCEDURE — 6360000002 HC RX W HCPCS: Performed by: GENERAL ACUTE CARE HOSPITAL

## 2023-10-13 PROCEDURE — 94667 MNPJ CHEST WALL 1ST: CPT

## 2023-10-13 PROCEDURE — 6360000002 HC RX W HCPCS: Performed by: INTERNAL MEDICINE

## 2023-10-13 PROCEDURE — 80048 BASIC METABOLIC PNL TOTAL CA: CPT

## 2023-10-13 PROCEDURE — 94640 AIRWAY INHALATION TREATMENT: CPT

## 2023-10-13 RX ADMIN — METHYLPREDNISOLONE SODIUM SUCCINATE 40 MG: 40 INJECTION, POWDER, FOR SOLUTION INTRAMUSCULAR; INTRAVENOUS at 17:08

## 2023-10-13 RX ADMIN — MONTELUKAST 10 MG: 10 TABLET, FILM COATED ORAL at 21:28

## 2023-10-13 RX ADMIN — INSULIN LISPRO 8 UNITS: 100 INJECTION, SOLUTION INTRAVENOUS; SUBCUTANEOUS at 17:08

## 2023-10-13 RX ADMIN — ACETYLCYSTEINE 600 MG: 200 SOLUTION ORAL; RESPIRATORY (INHALATION) at 07:38

## 2023-10-13 RX ADMIN — SODIUM CHLORIDE, PRESERVATIVE FREE 10 ML: 5 INJECTION INTRAVENOUS at 08:09

## 2023-10-13 RX ADMIN — IPRATROPIUM BROMIDE AND ALBUTEROL SULFATE 1 DOSE: 2.5; .5 SOLUTION RESPIRATORY (INHALATION) at 15:39

## 2023-10-13 RX ADMIN — IPRATROPIUM BROMIDE AND ALBUTEROL SULFATE 1 DOSE: 2.5; .5 SOLUTION RESPIRATORY (INHALATION) at 20:07

## 2023-10-13 RX ADMIN — GABAPENTIN 100 MG: 100 CAPSULE ORAL at 08:09

## 2023-10-13 RX ADMIN — SODIUM CHLORIDE 1000 MG: 900 INJECTION INTRAVENOUS at 16:18

## 2023-10-13 RX ADMIN — ARFORMOTEROL TARTRATE: 15 SOLUTION RESPIRATORY (INHALATION) at 20:13

## 2023-10-13 RX ADMIN — INSULIN LISPRO 25 UNITS: 100 INJECTION, SOLUTION INTRAVENOUS; SUBCUTANEOUS at 09:30

## 2023-10-13 RX ADMIN — GABAPENTIN 100 MG: 100 CAPSULE ORAL at 21:28

## 2023-10-13 RX ADMIN — POTASSIUM CHLORIDE 20 MEQ: 20 TABLET, EXTENDED RELEASE ORAL at 08:09

## 2023-10-13 RX ADMIN — LATANOPROST 1 DROP: 50 SOLUTION OPHTHALMIC at 21:36

## 2023-10-13 RX ADMIN — CALCITRIOL CAPSULES 0.25 MCG 0.25 MCG: 0.25 CAPSULE ORAL at 08:08

## 2023-10-13 RX ADMIN — INSULIN GLARGINE 24 UNITS: 100 INJECTION, SOLUTION SUBCUTANEOUS at 09:00

## 2023-10-13 RX ADMIN — METHYLPREDNISOLONE SODIUM SUCCINATE 40 MG: 40 INJECTION, POWDER, FOR SOLUTION INTRAMUSCULAR; INTRAVENOUS at 10:28

## 2023-10-13 RX ADMIN — ACETYLCYSTEINE 600 MG: 200 SOLUTION ORAL; RESPIRATORY (INHALATION) at 20:07

## 2023-10-13 RX ADMIN — INSULIN LISPRO 4 UNITS: 100 INJECTION, SOLUTION INTRAVENOUS; SUBCUTANEOUS at 21:28

## 2023-10-13 RX ADMIN — GUAIFENESIN 600 MG: 600 TABLET, EXTENDED RELEASE ORAL at 08:12

## 2023-10-13 RX ADMIN — METHYLPREDNISOLONE SODIUM SUCCINATE 40 MG: 40 INJECTION, POWDER, FOR SOLUTION INTRAMUSCULAR; INTRAVENOUS at 03:11

## 2023-10-13 RX ADMIN — IPRATROPIUM BROMIDE AND ALBUTEROL SULFATE 1 DOSE: 2.5; .5 SOLUTION RESPIRATORY (INHALATION) at 11:30

## 2023-10-13 RX ADMIN — HYDRALAZINE HYDROCHLORIDE 50 MG: 50 TABLET, FILM COATED ORAL at 21:30

## 2023-10-13 RX ADMIN — AMLODIPINE BESYLATE 10 MG: 5 TABLET ORAL at 08:09

## 2023-10-13 RX ADMIN — PANTOPRAZOLE SODIUM 40 MG: 40 TABLET, DELAYED RELEASE ORAL at 08:08

## 2023-10-13 RX ADMIN — IPRATROPIUM BROMIDE AND ALBUTEROL SULFATE 1 DOSE: 2.5; .5 SOLUTION RESPIRATORY (INHALATION) at 07:38

## 2023-10-13 RX ADMIN — HYDRALAZINE HYDROCHLORIDE 50 MG: 50 TABLET, FILM COATED ORAL at 13:17

## 2023-10-13 RX ADMIN — AZITHROMYCIN 500 MG: 250 TABLET, FILM COATED ORAL at 08:09

## 2023-10-13 RX ADMIN — ENOXAPARIN SODIUM 40 MG: 100 INJECTION SUBCUTANEOUS at 08:08

## 2023-10-13 RX ADMIN — ROSUVASTATIN 20 MG: 20 TABLET, FILM COATED ORAL at 08:08

## 2023-10-13 RX ADMIN — INSULIN LISPRO 25 UNITS: 100 INJECTION, SOLUTION INTRAVENOUS; SUBCUTANEOUS at 17:07

## 2023-10-13 RX ADMIN — LEVOTHYROXINE SODIUM 137 MCG: 0.03 TABLET ORAL at 05:47

## 2023-10-13 RX ADMIN — HYDRALAZINE HYDROCHLORIDE 50 MG: 50 TABLET, FILM COATED ORAL at 05:47

## 2023-10-13 RX ADMIN — GUAIFENESIN 600 MG: 600 TABLET, EXTENDED RELEASE ORAL at 21:28

## 2023-10-13 RX ADMIN — CARVEDILOL 12.5 MG: 12.5 TABLET, FILM COATED ORAL at 16:17

## 2023-10-13 RX ADMIN — SODIUM CHLORIDE, PRESERVATIVE FREE 10 ML: 5 INJECTION INTRAVENOUS at 21:28

## 2023-10-13 RX ADMIN — ASPIRIN 81 MG: 81 TABLET, COATED ORAL at 08:08

## 2023-10-13 RX ADMIN — INSULIN LISPRO 8 UNITS: 100 INJECTION, SOLUTION INTRAVENOUS; SUBCUTANEOUS at 13:00

## 2023-10-13 RX ADMIN — ARFORMOTEROL TARTRATE: 15 SOLUTION RESPIRATORY (INHALATION) at 07:44

## 2023-10-13 RX ADMIN — GABAPENTIN 100 MG: 100 CAPSULE ORAL at 13:17

## 2023-10-13 RX ADMIN — CARVEDILOL 12.5 MG: 12.5 TABLET, FILM COATED ORAL at 08:08

## 2023-10-13 ASSESSMENT — PAIN SCALES - GENERAL
PAINLEVEL_OUTOF10: 0

## 2023-10-13 NOTE — PLAN OF CARE
Problem: Respiratory - Adult  Goal: Achieves optimal ventilation and oxygenation  10/13/2023 0743 by Kp Javier RN  Outcome: Progressing  10/12/2023 2111 by RT Rehana  Outcome: Progressing  10/12/2023 2100 by Rob Ortega RN  Outcome: Progressing  Flowsheets (Taken 10/12/2023 2050)  Achieves optimal ventilation and oxygenation:   Assess for changes in respiratory status   Assess for changes in mentation and behavior   Position to facilitate oxygenation and minimize respiratory effort   Oxygen supplementation based on oxygen saturation or arterial blood gases     Problem: Discharge Planning  Goal: Discharge to home or other facility with appropriate resources  10/13/2023 0743 by Kp Javier RN  Outcome: Progressing  10/12/2023 2100 by Rob Ortega RN  Outcome: Progressing  Flowsheets  Taken 10/12/2023 2050 by Rob Ortega RN  Discharge to home or other facility with appropriate resources:   Identify barriers to discharge with patient and caregiver   Arrange for needed discharge resources and transportation as appropriate  Taken 10/12/2023 0725 by Kp Javier RN  Discharge to home or other facility with appropriate resources:   Identify barriers to discharge with patient and caregiver   Arrange for needed discharge resources and transportation as appropriate   Identify discharge learning needs (meds, wound care, etc)     Problem: Pain  Goal: Verbalizes/displays adequate comfort level or baseline comfort level  10/13/2023 0743 by Kp Javier RN  Outcome: Progressing  Flowsheets  Taken 10/13/2023 0315 by Rob Ortega RN  Verbalizes/displays adequate comfort level or baseline comfort level:   Assess pain using appropriate pain scale   Encourage patient to monitor pain and request assistance  Taken 10/12/2023 2322 by Rob Ortega RN  Verbalizes/displays adequate comfort level or baseline comfort level:   Assess pain using appropriate pain scale   Encourage patient to monitor pain and request

## 2023-10-13 NOTE — RT PROTOCOL NOTE
ADULT PROTOCOL: JET AEROSOL ASSESSMENT    Patient  Mary Bolton     81 y.o.   female     10/13/2023  5:16 PM    Breath Sounds Pre Procedure: Breath Sounds Pre-Tx NEIL: Crackles, Expiratory wheezes, Diminished                                  Breath Sounds Pre-Tx LLL: Diminished, Expiratory wheezes, Crackles        Breath Sounds Pre-Tx RUL: Expiratory wheezes, Diminished, Crackles        Breath Sounds Pre-Tx RML: Expiratory wheezes, Diminished        Breath Sounds Pre-Tx RLL: Expiratory wheezes, Diminished  Breath Sounds Post Procedure: Breath Sounds Post-Tx NEIL: Crackles          Breath Sounds Post-Tx LLL: Diminished          Breath Sounds Post-Tx RUL: Clear          Breath Sounds Post-Tx RML: Clear          Breath Sounds Post-Tx RLL: Diminished                                     Heart Rate: Pre procedure Pre-Tx Pulse: 60           Post procedure Post-Tx Pulse: 64    Resp Rate: Pre procedure Pre-Tx Resps: 16           Post procedure Post-Tx Resps: 18      Oxygen: O2 Therapy: Oxygen   nasal cannula     Changed: Yes    SpO2:  SpO2: 97 %   with Oxygen                Nebulizer Therapy: Current medications Medications: Albuterol/Ipratropium      Changed: No        Problem List:   Patient Active Problem List   Diagnosis    Diverticulosis    Major depressive disorder, recurrent, moderate ([facility])    Major depressive disorder, recurrent, unspecified ([facility])    Obesity, morbid ([facility])    Osteopenia    History of CVA (cerebrovascular accident)    CKD (chronic kidney disease) stage 3, GFR 30-59 ml/min ([facility])    DM type 2 (diabetes mellitus, type 2) ([facility])    Asthma    Esophageal reflux    DJD (degenerative joint disease) of knee    Type 2 diabetes mellitus with diabetic neuropathy ([facility])    HTN (hypertension)    Axillary hidradenitis suppurativa    Warthin's tumor    Coronary artery disease    Shortness of breath    Major depressive disorder, recurrent, mild (HCC)    CTS (carpal tunnel syndrome)    PVC's (premature ventricular contractions)    Type 2 diabetes with nephropathy (HCC)    Renal failure, acute (HCC)    Myocardial ischemia    Type 2 diabetes mellitus with chronic kidney disease ([address])    Acute hypoxic respiratory failure ([address])       Respiratory Therapist: Rilye Ibrahim RCP

## 2023-10-13 NOTE — PLAN OF CARE
Problem: Respiratory - Adult  Goal: Achieves optimal ventilation and oxygenation  10/12/2023 2100 by Zo Adamson RN  Outcome: Progressing  Flowsheets (Taken 10/12/2023 2050)  Achieves optimal ventilation and oxygenation:   Assess for changes in respiratory status   Assess for changes in mentation and behavior   Position to facilitate oxygenation and minimize respiratory effort   Oxygen supplementation based on oxygen saturation or arterial blood gases  10/12/2023 1110 by John Leiva RCP  Outcome: Progressing     Problem: Discharge Planning  Goal: Discharge to home or other facility with appropriate resources  Outcome: Progressing  Flowsheets  Taken 10/12/2023 2050 by Zo Adamson RN  Discharge to home or other facility with appropriate resources:   Identify barriers to discharge with patient and caregiver   Arrange for needed discharge resources and transportation as appropriate  Taken 10/12/2023 0725 by José Miguel Walden RN  Discharge to home or other facility with appropriate resources:   Identify barriers to discharge with patient and caregiver   Arrange for needed discharge resources and transportation as appropriate   Identify discharge learning needs (meds, wound care, etc)     Problem: Pain  Goal: Verbalizes/displays adequate comfort level or baseline comfort level  Outcome: Progressing  Flowsheets  Taken 10/12/2023 2050 by Zo Adamson RN  Verbalizes/displays adequate comfort level or baseline comfort level:   Encourage patient to monitor pain and request assistance   Assess pain using appropriate pain scale  Taken 10/12/2023 1125 by José Miguel Walden RN  Verbalizes/displays adequate comfort level or baseline comfort level:   Encourage patient to monitor pain and request assistance   Assess pain using appropriate pain scale   Administer analgesics based on type and severity of pain and evaluate response   Implement non-pharmacological measures as appropriate and evaluate response     Problem: Chronic Conditions and Co-morbidities  Goal: Patient's chronic conditions and co-morbidity symptoms are monitored and maintained or improved  Outcome: Progressing  Flowsheets  Taken 10/12/2023 2050 by Mandeep Ugarte [facility] - Patient's Chronic Conditions and Co-Morbidity Symptoms are Monitored and Maintained or Improved: Monitor and assess patient's chronic conditions and comorbid symptoms for stability, deterioration, or improvement  Taken 10/12/2023 0725 by Mandeep Brown [facility] - Patient's Chronic Conditions and Co-Morbidity Symptoms are Monitored and Maintained or Improved:   Monitor and assess patient's chronic conditions and comorbid symptoms for stability, deterioration, or improvement   Collaborate with multidisciplinary team to address chronic and comorbid conditions and prevent exacerbation or deterioration     Problem: Safety - Adult  Goal: Free from fall injury  Outcome: Progressing     Problem: Physical Therapy - Adult  Goal: By Discharge: Performs mobility at highest level of function for planned discharge setting. See evaluation for individualized goals. Description: FUNCTIONAL STATUS PRIOR TO ADMISSION: Patient was modified independent using a rollator for functional mobility. HOME SUPPORT PRIOR TO ADMISSION: The patient lived alone with  and daughter to provide assistance if needed. Physical Therapy Goals  Initiated 10/12/2023  1. Patient will move from supine to sit and sit to supine in bed with modified independence within 7 day(s). 2.  Patient will perform sit to stand with modified independence within 7 day(s). 3.  Patient will transfer from bed to chair and chair to bed with modified independence using the least restrictive device within 7 day(s). 4.  Patient will ambulate with modified independence for 50 feet with the least restrictive device within 7 day(s). 5.  Patient will ascend/descend 2 stairs with walker with contact guard assist within 7 day(s).    10/12/2023 1553 by

## 2023-10-14 LAB
ANION GAP SERPL CALC-SCNC: 0 MMOL/L (ref 5–15)
BASOPHILS # BLD: 0 K/UL (ref 0–0.1)
BASOPHILS NFR BLD: 0 % (ref 0–1)
BUN SERPL-MCNC: 58 MG/DL (ref 6–20)
BUN/CREAT SERPL: 41 (ref 12–20)
CALCIUM SERPL-MCNC: 9.8 MG/DL (ref 8.5–10.1)
CHLORIDE SERPL-SCNC: 108 MMOL/L (ref 97–108)
CO2 SERPL-SCNC: 32 MMOL/L (ref 21–32)
CREAT SERPL-MCNC: 1.42 MG/DL (ref 0.55–1.02)
DIFFERENTIAL METHOD BLD: ABNORMAL
EOSINOPHIL # BLD: 0 K/UL (ref 0–0.4)
EOSINOPHIL NFR BLD: 0 % (ref 0–7)
ERYTHROCYTE [DISTWIDTH] IN BLOOD BY AUTOMATED COUNT: 15.4 % (ref 11.5–14.5)
GLUCOSE BLD STRIP.AUTO-MCNC: 305 MG/DL (ref 65–117)
GLUCOSE BLD STRIP.AUTO-MCNC: 309 MG/DL (ref 65–117)
GLUCOSE BLD STRIP.AUTO-MCNC: 352 MG/DL (ref 65–117)
GLUCOSE BLD STRIP.AUTO-MCNC: 365 MG/DL (ref 65–117)
GLUCOSE SERPL-MCNC: 319 MG/DL (ref 65–100)
HCT VFR BLD AUTO: 35 % (ref 35–47)
HGB BLD-MCNC: 10.1 G/DL (ref 11.5–16)
IMM GRANULOCYTES # BLD AUTO: 0.1 K/UL (ref 0–0.04)
IMM GRANULOCYTES NFR BLD AUTO: 1 % (ref 0–0.5)
LYMPHOCYTES # BLD: 0.6 K/UL (ref 0.8–3.5)
LYMPHOCYTES NFR BLD: 9 % (ref 12–49)
MCH RBC QN AUTO: 22.9 PG (ref 26–34)
MCHC RBC AUTO-ENTMCNC: 28.9 G/DL (ref 30–36.5)
MCV RBC AUTO: 79.2 FL (ref 80–99)
MONOCYTES # BLD: 0.3 K/UL (ref 0–1)
MONOCYTES NFR BLD: 5 % (ref 5–13)
NEUTS SEG # BLD: 5.9 K/UL (ref 1.8–8)
NEUTS SEG NFR BLD: 85 % (ref 32–75)
NRBC # BLD: 0 K/UL (ref 0–0.01)
NRBC BLD-RTO: 0 PER 100 WBC
PLATELET # BLD AUTO: 131 K/UL (ref 150–400)
PMV BLD AUTO: 12.3 FL (ref 8.9–12.9)
POTASSIUM SERPL-SCNC: 4.7 MMOL/L (ref 3.5–5.1)
RBC # BLD AUTO: 4.42 M/UL (ref 3.8–5.2)
RBC MORPH BLD: ABNORMAL
SERVICE CMNT-IMP: ABNORMAL
SODIUM SERPL-SCNC: 140 MMOL/L (ref 136–145)
WBC # BLD AUTO: 6.9 K/UL (ref 3.6–11)

## 2023-10-14 PROCEDURE — 80048 BASIC METABOLIC PNL TOTAL CA: CPT

## 2023-10-14 PROCEDURE — 6370000000 HC RX 637 (ALT 250 FOR IP): Performed by: INTERNAL MEDICINE

## 2023-10-14 PROCEDURE — 2700000000 HC OXYGEN THERAPY PER DAY

## 2023-10-14 PROCEDURE — 6360000002 HC RX W HCPCS: Performed by: NURSE PRACTITIONER

## 2023-10-14 PROCEDURE — 94660 CPAP INITIATION&MGMT: CPT

## 2023-10-14 PROCEDURE — 82962 GLUCOSE BLOOD TEST: CPT

## 2023-10-14 PROCEDURE — 6370000000 HC RX 637 (ALT 250 FOR IP): Performed by: GENERAL ACUTE CARE HOSPITAL

## 2023-10-14 PROCEDURE — 6360000002 HC RX W HCPCS: Performed by: INTERNAL MEDICINE

## 2023-10-14 PROCEDURE — 36415 COLL VENOUS BLD VENIPUNCTURE: CPT

## 2023-10-14 PROCEDURE — 6360000002 HC RX W HCPCS: Performed by: GENERAL ACUTE CARE HOSPITAL

## 2023-10-14 PROCEDURE — 2060000000 HC ICU INTERMEDIATE R&B

## 2023-10-14 PROCEDURE — 2580000003 HC RX 258: Performed by: INTERNAL MEDICINE

## 2023-10-14 PROCEDURE — 94640 AIRWAY INHALATION TREATMENT: CPT

## 2023-10-14 PROCEDURE — 2580000003 HC RX 258: Performed by: NURSE PRACTITIONER

## 2023-10-14 PROCEDURE — 85025 COMPLETE CBC W/AUTO DIFF WBC: CPT

## 2023-10-14 PROCEDURE — 6370000000 HC RX 637 (ALT 250 FOR IP): Performed by: NURSE PRACTITIONER

## 2023-10-14 RX ORDER — PREDNISONE 20 MG/1
40 TABLET ORAL DAILY
Status: DISCONTINUED | OUTPATIENT
Start: 2023-10-15 | End: 2023-10-18 | Stop reason: HOSPADM

## 2023-10-14 RX ADMIN — PANTOPRAZOLE SODIUM 40 MG: 40 TABLET, DELAYED RELEASE ORAL at 08:10

## 2023-10-14 RX ADMIN — IPRATROPIUM BROMIDE AND ALBUTEROL SULFATE 1 DOSE: 2.5; .5 SOLUTION RESPIRATORY (INHALATION) at 11:55

## 2023-10-14 RX ADMIN — INSULIN LISPRO 16 UNITS: 100 INJECTION, SOLUTION INTRAVENOUS; SUBCUTANEOUS at 12:55

## 2023-10-14 RX ADMIN — LEVOTHYROXINE SODIUM 137 MCG: 0.03 TABLET ORAL at 06:51

## 2023-10-14 RX ADMIN — INSULIN LISPRO 25 UNITS: 100 INJECTION, SOLUTION INTRAVENOUS; SUBCUTANEOUS at 08:09

## 2023-10-14 RX ADMIN — SODIUM CHLORIDE 1000 MG: 900 INJECTION INTRAVENOUS at 16:33

## 2023-10-14 RX ADMIN — INSULIN LISPRO 25 UNITS: 100 INJECTION, SOLUTION INTRAVENOUS; SUBCUTANEOUS at 18:30

## 2023-10-14 RX ADMIN — INSULIN LISPRO 4 UNITS: 100 INJECTION, SOLUTION INTRAVENOUS; SUBCUTANEOUS at 23:47

## 2023-10-14 RX ADMIN — INSULIN LISPRO 25 UNITS: 100 INJECTION, SOLUTION INTRAVENOUS; SUBCUTANEOUS at 12:50

## 2023-10-14 RX ADMIN — POTASSIUM CHLORIDE 20 MEQ: 20 TABLET, EXTENDED RELEASE ORAL at 08:11

## 2023-10-14 RX ADMIN — HYDRALAZINE HYDROCHLORIDE 50 MG: 50 TABLET, FILM COATED ORAL at 06:51

## 2023-10-14 RX ADMIN — IPRATROPIUM BROMIDE AND ALBUTEROL SULFATE 1 DOSE: 2.5; .5 SOLUTION RESPIRATORY (INHALATION) at 07:28

## 2023-10-14 RX ADMIN — INSULIN LISPRO 12 UNITS: 100 INJECTION, SOLUTION INTRAVENOUS; SUBCUTANEOUS at 08:12

## 2023-10-14 RX ADMIN — ROSUVASTATIN 20 MG: 20 TABLET, FILM COATED ORAL at 08:11

## 2023-10-14 RX ADMIN — GABAPENTIN 100 MG: 100 CAPSULE ORAL at 22:25

## 2023-10-14 RX ADMIN — IPRATROPIUM BROMIDE AND ALBUTEROL SULFATE 1 DOSE: 2.5; .5 SOLUTION RESPIRATORY (INHALATION) at 21:27

## 2023-10-14 RX ADMIN — GABAPENTIN 100 MG: 100 CAPSULE ORAL at 08:11

## 2023-10-14 RX ADMIN — ACETYLCYSTEINE 600 MG: 200 SOLUTION ORAL; RESPIRATORY (INHALATION) at 21:27

## 2023-10-14 RX ADMIN — ARFORMOTEROL TARTRATE: 15 SOLUTION RESPIRATORY (INHALATION) at 07:33

## 2023-10-14 RX ADMIN — ARFORMOTEROL TARTRATE: 15 SOLUTION RESPIRATORY (INHALATION) at 21:36

## 2023-10-14 RX ADMIN — CALCITRIOL CAPSULES 0.25 MCG 0.25 MCG: 0.25 CAPSULE ORAL at 08:11

## 2023-10-14 RX ADMIN — GABAPENTIN 100 MG: 100 CAPSULE ORAL at 13:00

## 2023-10-14 RX ADMIN — GUAIFENESIN 600 MG: 600 TABLET, EXTENDED RELEASE ORAL at 08:11

## 2023-10-14 RX ADMIN — IPRATROPIUM BROMIDE AND ALBUTEROL SULFATE 1 DOSE: 2.5; .5 SOLUTION RESPIRATORY (INHALATION) at 15:43

## 2023-10-14 RX ADMIN — SODIUM CHLORIDE, PRESERVATIVE FREE 10 ML: 5 INJECTION INTRAVENOUS at 21:00

## 2023-10-14 RX ADMIN — INSULIN GLARGINE 24 UNITS: 100 INJECTION, SOLUTION SUBCUTANEOUS at 08:18

## 2023-10-14 RX ADMIN — HYDRALAZINE HYDROCHLORIDE 50 MG: 50 TABLET, FILM COATED ORAL at 13:00

## 2023-10-14 RX ADMIN — SODIUM CHLORIDE, PRESERVATIVE FREE 10 ML: 5 INJECTION INTRAVENOUS at 08:17

## 2023-10-14 RX ADMIN — METHYLPREDNISOLONE SODIUM SUCCINATE 40 MG: 40 INJECTION, POWDER, FOR SOLUTION INTRAMUSCULAR; INTRAVENOUS at 09:49

## 2023-10-14 RX ADMIN — ENOXAPARIN SODIUM 40 MG: 100 INJECTION SUBCUTANEOUS at 08:08

## 2023-10-14 RX ADMIN — METHYLPREDNISOLONE SODIUM SUCCINATE 40 MG: 40 INJECTION, POWDER, FOR SOLUTION INTRAMUSCULAR; INTRAVENOUS at 02:51

## 2023-10-14 RX ADMIN — CARVEDILOL 12.5 MG: 12.5 TABLET, FILM COATED ORAL at 08:11

## 2023-10-14 RX ADMIN — HYDRALAZINE HYDROCHLORIDE 50 MG: 50 TABLET, FILM COATED ORAL at 22:26

## 2023-10-14 RX ADMIN — INSULIN LISPRO 16 UNITS: 100 INJECTION, SOLUTION INTRAVENOUS; SUBCUTANEOUS at 18:57

## 2023-10-14 RX ADMIN — ASPIRIN 81 MG: 81 TABLET, COATED ORAL at 08:08

## 2023-10-14 RX ADMIN — LATANOPROST 1 DROP: 50 SOLUTION OPHTHALMIC at 22:26

## 2023-10-14 RX ADMIN — MONTELUKAST 10 MG: 10 TABLET, FILM COATED ORAL at 22:26

## 2023-10-14 RX ADMIN — ACETYLCYSTEINE 600 MG: 200 SOLUTION ORAL; RESPIRATORY (INHALATION) at 07:28

## 2023-10-14 RX ADMIN — GUAIFENESIN 600 MG: 600 TABLET, EXTENDED RELEASE ORAL at 22:26

## 2023-10-14 RX ADMIN — AMLODIPINE BESYLATE 10 MG: 5 TABLET ORAL at 08:10

## 2023-10-14 RX ADMIN — CARVEDILOL 12.5 MG: 12.5 TABLET, FILM COATED ORAL at 18:56

## 2023-10-14 ASSESSMENT — PAIN SCALES - GENERAL
PAINLEVEL_OUTOF10: 0

## 2023-10-14 NOTE — PROGRESS NOTES
ADULT PROTOCOL: JET AEROSOL ASSESSMENT    Patient  Robert Bass     81 y.o.   female     10/14/2023  10:15 AM    Breath Sounds Pre Procedure: Breath Sounds Pre-Tx NEIL: Diminished, Expiratory wheezes                                  Breath Sounds Pre-Tx LLL: Diminished, Expiratory wheezes        Breath Sounds Pre-Tx RUL: Diminished, Expiratory wheezes        Breath Sounds Pre-Tx RML: Diminished, Expiratory wheezes        Breath Sounds Pre-Tx RLL: Diminished, Expiratory wheezes  Breath Sounds Post Procedure: Breath Sounds Post-Tx NEIL: Diminished, Expiratory wheezes          Breath Sounds Post-Tx LLL: Diminished, Expiratory wheezes          Breath Sounds Post-Tx RUL: Diminished, Expiratory wheezes          Breath Sounds Post-Tx RML: Diminished, Expiratory wheezes          Breath Sounds Post-Tx RLL: Diminished, Expiratory wheezes                                   Heart Rate: Pre procedure Pre-Tx Pulse: 70           Post procedure Post-Tx Pulse: 67    Resp Rate: Pre procedure Pre-Tx Resps: 18           Post procedure Post-Tx Resps: 18    SpO2:  SpO2: 98 %   with Oxygen                Nebulizer Therapy: Current medications Medications: Arformoterol, Budesonide, Duoneb, Mucomyst        Changed: No    Problem List:   Patient Active Problem List   Diagnosis    Diverticulosis    Major depressive disorder, recurrent, moderate (HCC)    Major depressive disorder, recurrent, unspecified (HCC)    Obesity, morbid (HCC)    Osteopenia    History of CVA (cerebrovascular accident)    CKD (chronic kidney disease) stage 3, GFR 30-59 ml/min ([facility])    DM type 2 (diabetes mellitus, type 2) ([facility])    Asthma    Esophageal reflux    DJD (degenerative joint disease) of knee    Type 2 diabetes mellitus with diabetic neuropathy ([facility])    HTN (hypertension)    Axillary hidradenitis suppurativa    Warthin's tumor    Coronary artery disease    Shortness of breath    Major depressive disorder, recurrent, mild (HCC)    CTS (carpal tunnel syndrome)    PVC's (premature ventricular contractions)    Type 2 diabetes with nephropathy (HCC)    Renal failure, acute (HCC)    Myocardial ischemia    Type 2 diabetes mellitus with chronic kidney disease ([address])    Acute hypoxic respiratory failure ([address])       Respiratory Therapist: Franklin Nazario, RT

## 2023-10-14 NOTE — PLAN OF CARE
Problem: Respiratory - Adult  Goal: Achieves optimal ventilation and oxygenation  10/14/2023 1641 by Daniel Valdivia RN  Outcome: Progressing  10/14/2023 1015 by Mario Yanes, RT  Outcome: Progressing  Flowsheets (Taken 10/14/2023 1015)  Achieves optimal ventilation and oxygenation:   Assess for changes in respiratory status   Position to facilitate oxygenation and minimize respiratory effort   Respiratory therapy support as indicated   Oxygen supplementation based on oxygen saturation or arterial blood gases   Assess and instruct to report shortness of breath or any respiratory difficulty     Problem: Discharge Planning  Goal: Discharge to home or other facility with appropriate resources  Outcome: Progressing     Problem: Pain  Goal: Verbalizes/displays adequate comfort level or baseline comfort level  Outcome: Progressing  Flowsheets (Taken 10/14/2023 0718)  Verbalizes/displays adequate comfort level or baseline comfort level:   Encourage patient to monitor pain and request assistance   Assess pain using appropriate pain scale   Administer analgesics based on type and severity of pain and evaluate response     Problem: Chronic Conditions and Co-morbidities  Goal: Patient's chronic conditions and co-morbidity symptoms are monitored and maintained or improved  Outcome: Progressing     Problem: Safety - Adult  Goal: Free from fall injury  Outcome: Progressing

## 2023-10-14 NOTE — PROGRESS NOTES
mL IVPB (mini-bag), 1,000 mg, IntraVENous, Q24H, Yoanna Carlene, APRN - NP, Stopped at 10/13/23 1708    ipratropium 0.5 mg-albuterol 2.5 mg (DUONEB) nebulizer solution 1 Dose, 1 Dose, Inhalation, Q4H WA RT, Yoanna Concepcion APRN - NP, 1 Dose at 10/13/23 2007    acetylcysteine (MUCOMYST) 20 % solution 600 mg, 600 mg, Inhalation, BID RT, Diogo Espinoza MD, 600 mg at 10/13/23 2009    guaiFENesin (MUCINEX) extended release tablet 600 mg, 600 mg, Oral, BID, Diogo Espinoza MD, 600 mg at 10/13/23 2128    insulin lispro (HUMALOG) injection vial 25 Units, 25 Units, SubCUTAneous, TID , Diogo Espinoza MD, 25 Units at 10/13/23 1707    insulin lispro (HUMALOG) injection vial 0-16 Units, 0-16 Units, SubCUTAneous, TID WC, Sadie Espinoza MD, 8 Units at 10/13/23 1708    insulin lispro (HUMALOG) injection vial 0-4 Units, 0-4 Units, SubCUTAneous, Nightly, Sadie Espinoza MD, 4 Units at 10/13/23 2128    amLODIPine (NORVASC) tablet 10 mg, 10 mg, Oral, Daily, Keeley BARONE MD, 10 mg at 10/13/23 0809    aspirin EC tablet 81 mg, 81 mg, Oral, Daily, Elio Wilson MD, 81 mg at 10/13/23 9924    calcitRIOL (ROCALTROL) capsule 0.25 mcg, 0.25 mcg, Oral, Daily, Kin Hightower MD, 0.25 mcg at 10/13/23 0808    carvedilol (COREG) tablet 12.5 mg, 12.5 mg, Oral, BID , Kin Lanza MD, 12.5 mg at 10/13/23 1617    hydrALAZINE (APRESOLINE) tablet 50 mg, 50 mg, Oral, 3 times per day, Elio Wilson MD, 50 mg at 10/13/23 1317    insulin glargine (LANTUS) injection vial 24 Units, 24 Units, SubCUTAneous, Daily, Keeley BARONE MD, 24 Units at 10/13/23 0900    latanoprost (XALATAN) 0.005 % ophthalmic solution 1 drop, 1 drop, Both Eyes, Daily, Kin Hightower MD, 1 drop at 10/12/23 2011    levothyroxine (SYNTHROID) tablet 137 mcg, 137 mcg, Oral, Daily, Elio Wilson MD, 137 mcg at 10/13/23 0592    montelukast (SINGULAIR) tablet 10 mg, 10 mg, Oral, Nightly, Elio Wilson MD, 10 mg at 10/13/23 2076

## 2023-10-15 LAB
ANION GAP SERPL CALC-SCNC: ABNORMAL MMOL/L (ref 5–15)
BASOPHILS # BLD: 0 K/UL (ref 0–0.1)
BASOPHILS NFR BLD: 0 % (ref 0–1)
BUN SERPL-MCNC: 48 MG/DL (ref 6–20)
BUN/CREAT SERPL: 35 (ref 12–20)
CALCIUM SERPL-MCNC: 9.6 MG/DL (ref 8.5–10.1)
CHLORIDE SERPL-SCNC: 108 MMOL/L (ref 97–108)
CO2 SERPL-SCNC: 37 MMOL/L (ref 21–32)
CREAT SERPL-MCNC: 1.36 MG/DL (ref 0.55–1.02)
DIFFERENTIAL METHOD BLD: ABNORMAL
EOSINOPHIL # BLD: 0 K/UL (ref 0–0.4)
EOSINOPHIL NFR BLD: 0 % (ref 0–7)
ERYTHROCYTE [DISTWIDTH] IN BLOOD BY AUTOMATED COUNT: 15.6 % (ref 11.5–14.5)
GLUCOSE BLD STRIP.AUTO-MCNC: 137 MG/DL (ref 65–117)
GLUCOSE BLD STRIP.AUTO-MCNC: 243 MG/DL (ref 65–117)
GLUCOSE BLD STRIP.AUTO-MCNC: 291 MG/DL (ref 65–117)
GLUCOSE BLD STRIP.AUTO-MCNC: 317 MG/DL (ref 65–117)
GLUCOSE SERPL-MCNC: 166 MG/DL (ref 65–100)
HCT VFR BLD AUTO: 37.2 % (ref 35–47)
HGB BLD-MCNC: 10.7 G/DL (ref 11.5–16)
IMM GRANULOCYTES # BLD AUTO: 0.1 K/UL (ref 0–0.04)
IMM GRANULOCYTES NFR BLD AUTO: 1 % (ref 0–0.5)
LYMPHOCYTES # BLD: 1.6 K/UL (ref 0.8–3.5)
LYMPHOCYTES NFR BLD: 18 % (ref 12–49)
MCH RBC QN AUTO: 23.4 PG (ref 26–34)
MCHC RBC AUTO-ENTMCNC: 28.8 G/DL (ref 30–36.5)
MCV RBC AUTO: 81.2 FL (ref 80–99)
MONOCYTES # BLD: 0.5 K/UL (ref 0–1)
MONOCYTES NFR BLD: 6 % (ref 5–13)
NEUTS SEG # BLD: 6.9 K/UL (ref 1.8–8)
NEUTS SEG NFR BLD: 75 % (ref 32–75)
NRBC # BLD: 0 K/UL (ref 0–0.01)
NRBC BLD-RTO: 0 PER 100 WBC
PLATELET # BLD AUTO: 138 K/UL (ref 150–400)
PMV BLD AUTO: 12.3 FL (ref 8.9–12.9)
POTASSIUM SERPL-SCNC: 4.2 MMOL/L (ref 3.5–5.1)
RBC # BLD AUTO: 4.58 M/UL (ref 3.8–5.2)
RBC MORPH BLD: ABNORMAL
SERVICE CMNT-IMP: ABNORMAL
SODIUM SERPL-SCNC: 144 MMOL/L (ref 136–145)
WBC # BLD AUTO: 9.1 K/UL (ref 3.6–11)

## 2023-10-15 PROCEDURE — 6370000000 HC RX 637 (ALT 250 FOR IP): Performed by: NURSE PRACTITIONER

## 2023-10-15 PROCEDURE — 6360000002 HC RX W HCPCS: Performed by: NURSE PRACTITIONER

## 2023-10-15 PROCEDURE — 2580000003 HC RX 258: Performed by: NURSE PRACTITIONER

## 2023-10-15 PROCEDURE — 6370000000 HC RX 637 (ALT 250 FOR IP): Performed by: INTERNAL MEDICINE

## 2023-10-15 PROCEDURE — 6360000002 HC RX W HCPCS: Performed by: GENERAL ACUTE CARE HOSPITAL

## 2023-10-15 PROCEDURE — 2580000003 HC RX 258: Performed by: INTERNAL MEDICINE

## 2023-10-15 PROCEDURE — 94669 MECHANICAL CHEST WALL OSCILL: CPT

## 2023-10-15 PROCEDURE — 2700000000 HC OXYGEN THERAPY PER DAY

## 2023-10-15 PROCEDURE — 6370000000 HC RX 637 (ALT 250 FOR IP): Performed by: GENERAL ACUTE CARE HOSPITAL

## 2023-10-15 PROCEDURE — 2060000000 HC ICU INTERMEDIATE R&B

## 2023-10-15 PROCEDURE — 85025 COMPLETE CBC W/AUTO DIFF WBC: CPT

## 2023-10-15 PROCEDURE — 94640 AIRWAY INHALATION TREATMENT: CPT

## 2023-10-15 PROCEDURE — 80048 BASIC METABOLIC PNL TOTAL CA: CPT

## 2023-10-15 PROCEDURE — 36415 COLL VENOUS BLD VENIPUNCTURE: CPT

## 2023-10-15 PROCEDURE — 82962 GLUCOSE BLOOD TEST: CPT

## 2023-10-15 PROCEDURE — 6360000002 HC RX W HCPCS: Performed by: INTERNAL MEDICINE

## 2023-10-15 RX ORDER — GUAIFENESIN 200 MG/10ML
200 LIQUID ORAL EVERY 4 HOURS PRN
Status: DISCONTINUED | OUTPATIENT
Start: 2023-10-15 | End: 2023-10-18 | Stop reason: HOSPADM

## 2023-10-15 RX ADMIN — IPRATROPIUM BROMIDE AND ALBUTEROL SULFATE 1 DOSE: 2.5; .5 SOLUTION RESPIRATORY (INHALATION) at 19:49

## 2023-10-15 RX ADMIN — CALCITRIOL CAPSULES 0.25 MCG 0.25 MCG: 0.25 CAPSULE ORAL at 08:23

## 2023-10-15 RX ADMIN — HYDRALAZINE HYDROCHLORIDE 50 MG: 50 TABLET, FILM COATED ORAL at 21:53

## 2023-10-15 RX ADMIN — GUAIFENESIN 600 MG: 600 TABLET, EXTENDED RELEASE ORAL at 08:23

## 2023-10-15 RX ADMIN — IPRATROPIUM BROMIDE AND ALBUTEROL SULFATE 1 DOSE: 2.5; .5 SOLUTION RESPIRATORY (INHALATION) at 16:09

## 2023-10-15 RX ADMIN — ROSUVASTATIN 20 MG: 20 TABLET, FILM COATED ORAL at 08:23

## 2023-10-15 RX ADMIN — INSULIN LISPRO 25 UNITS: 100 INJECTION, SOLUTION INTRAVENOUS; SUBCUTANEOUS at 17:48

## 2023-10-15 RX ADMIN — ACETYLCYSTEINE 600 MG: 200 SOLUTION ORAL; RESPIRATORY (INHALATION) at 08:14

## 2023-10-15 RX ADMIN — GUAIFENESIN 600 MG: 600 TABLET, EXTENDED RELEASE ORAL at 21:52

## 2023-10-15 RX ADMIN — IPRATROPIUM BROMIDE AND ALBUTEROL SULFATE 1 DOSE: 2.5; .5 SOLUTION RESPIRATORY (INHALATION) at 11:12

## 2023-10-15 RX ADMIN — ARFORMOTEROL TARTRATE: 15 SOLUTION RESPIRATORY (INHALATION) at 07:50

## 2023-10-15 RX ADMIN — INSULIN LISPRO 25 UNITS: 100 INJECTION, SOLUTION INTRAVENOUS; SUBCUTANEOUS at 12:31

## 2023-10-15 RX ADMIN — INSULIN LISPRO 25 UNITS: 100 INJECTION, SOLUTION INTRAVENOUS; SUBCUTANEOUS at 09:20

## 2023-10-15 RX ADMIN — GABAPENTIN 100 MG: 100 CAPSULE ORAL at 21:53

## 2023-10-15 RX ADMIN — SODIUM CHLORIDE, PRESERVATIVE FREE 10 ML: 5 INJECTION INTRAVENOUS at 21:54

## 2023-10-15 RX ADMIN — PREDNISONE 40 MG: 20 TABLET ORAL at 08:23

## 2023-10-15 RX ADMIN — LEVOTHYROXINE SODIUM 137 MCG: 0.03 TABLET ORAL at 06:18

## 2023-10-15 RX ADMIN — CARVEDILOL 12.5 MG: 12.5 TABLET, FILM COATED ORAL at 08:23

## 2023-10-15 RX ADMIN — ASPIRIN 81 MG: 81 TABLET, COATED ORAL at 08:23

## 2023-10-15 RX ADMIN — INSULIN LISPRO 12 UNITS: 100 INJECTION, SOLUTION INTRAVENOUS; SUBCUTANEOUS at 12:33

## 2023-10-15 RX ADMIN — POLYETHYLENE GLYCOL 3350 17 G: 17 POWDER, FOR SOLUTION ORAL at 09:59

## 2023-10-15 RX ADMIN — ENOXAPARIN SODIUM 40 MG: 100 INJECTION SUBCUTANEOUS at 08:22

## 2023-10-15 RX ADMIN — INSULIN GLARGINE 24 UNITS: 100 INJECTION, SOLUTION SUBCUTANEOUS at 08:34

## 2023-10-15 RX ADMIN — IPRATROPIUM BROMIDE AND ALBUTEROL SULFATE 1 DOSE: 2.5; .5 SOLUTION RESPIRATORY (INHALATION) at 08:14

## 2023-10-15 RX ADMIN — GABAPENTIN 100 MG: 100 CAPSULE ORAL at 14:10

## 2023-10-15 RX ADMIN — SODIUM CHLORIDE 1000 MG: 900 INJECTION INTRAVENOUS at 15:47

## 2023-10-15 RX ADMIN — SODIUM CHLORIDE, PRESERVATIVE FREE 10 ML: 5 INJECTION INTRAVENOUS at 08:35

## 2023-10-15 RX ADMIN — ACETYLCYSTEINE 600 MG: 200 SOLUTION ORAL; RESPIRATORY (INHALATION) at 19:49

## 2023-10-15 RX ADMIN — LATANOPROST 1 DROP: 50 SOLUTION OPHTHALMIC at 21:53

## 2023-10-15 RX ADMIN — AMLODIPINE BESYLATE 10 MG: 5 TABLET ORAL at 08:23

## 2023-10-15 RX ADMIN — GABAPENTIN 100 MG: 100 CAPSULE ORAL at 08:34

## 2023-10-15 RX ADMIN — HYDRALAZINE HYDROCHLORIDE 50 MG: 50 TABLET, FILM COATED ORAL at 06:18

## 2023-10-15 RX ADMIN — PANTOPRAZOLE SODIUM 40 MG: 40 TABLET, DELAYED RELEASE ORAL at 08:22

## 2023-10-15 RX ADMIN — ARFORMOTEROL TARTRATE: 15 SOLUTION RESPIRATORY (INHALATION) at 20:02

## 2023-10-15 RX ADMIN — MONTELUKAST 10 MG: 10 TABLET, FILM COATED ORAL at 21:53

## 2023-10-15 RX ADMIN — CARVEDILOL 12.5 MG: 12.5 TABLET, FILM COATED ORAL at 17:48

## 2023-10-15 RX ADMIN — HYDRALAZINE HYDROCHLORIDE 50 MG: 50 TABLET, FILM COATED ORAL at 14:10

## 2023-10-15 RX ADMIN — INSULIN LISPRO 8 UNITS: 100 INJECTION, SOLUTION INTRAVENOUS; SUBCUTANEOUS at 17:48

## 2023-10-15 RX ADMIN — POTASSIUM CHLORIDE 20 MEQ: 20 TABLET, EXTENDED RELEASE ORAL at 08:23

## 2023-10-15 ASSESSMENT — PAIN SCALES - GENERAL
PAINLEVEL_OUTOF10: 0

## 2023-10-15 NOTE — PROGRESS NOTES
Bedside and Verbal shift change report given to [address] [place] [address] (oncoming nurse) by Colleen Wu (offgoing nurse). Report included the following information Nurse Handoff Report, Adult Overview, Intake/Output, Recent Results, Cardiac Rhythm NSR with occasional PVC, Quality Measures, and Neuro Assessment. Patient is resting calmly in bed, family at bedside.

## 2023-10-15 NOTE — PROGRESS NOTES
Assessment not done appropriately due to being off unit with another patient for different procedures.

## 2023-10-15 NOTE — PROGRESS NOTES
ADULT PROTOCOL: JET AEROSOL ASSESSMENT    Patient  Maira Hansen     81 y.o.   female     10/15/2023  9:17 AM    Breath Sounds Pre Procedure: Breath Sounds Pre-Tx NEIL: Diminished                                  Breath Sounds Pre-Tx LLL: Diminished        Breath Sounds Pre-Tx RUL: Diminished        Breath Sounds Pre-Tx RML: Diminished        Breath Sounds Pre-Tx RLL: Diminished  Breath Sounds Post Procedure: Breath Sounds Post-Tx NEIL: Diminished          Breath Sounds Post-Tx LLL: Diminished          Breath Sounds Post-Tx RUL: Diminished          Breath Sounds Post-Tx RML: Diminished          Breath Sounds Post-Tx RLL: Diminished                                     Heart Rate: Pre procedure Pre-Tx Pulse: 62           Post procedure Post-Tx Pulse: 68    Resp Rate: Pre procedure Pre-Tx Resps: 18           Post procedure Post-Tx Resps: 18    Oxygen: O2 Therapy: Oxygen   nasal cannula     Changed: No    SpO2:  SpO2: 99 %   with Oxygen                Nebulizer Therapy: Current medications Medications: Albuterol/Ipratropium, N-Acetylcysteine      Changed: No    Comments:Patient uses Advair inhaler at home. Smoking History:  Former smoker    Problem List:   Patient Active Problem List   Diagnosis    Diverticulosis    Major depressive disorder, recurrent, moderate ([facility])    Major depressive disorder, recurrent, unspecified ([address])    Obesity, morbid ([address])    Osteopenia    History of CVA (cerebrovascular accident)    CKD (chronic kidney disease) stage 3, GFR 30-59 ml/min ([address])    DM type 2 (diabetes mellitus, type 2) ([facility])    Asthma    Esophageal reflux    DJD (degenerative joint disease) of knee    Type 2 diabetes mellitus with diabetic neuropathy ([facility])    HTN (hypertension)    Axillary hidradenitis suppurativa    Warthin's tumor    Coronary artery disease    Shortness of breath    Major depressive disorder, recurrent, mild (HCC)    CTS (carpal tunnel syndrome)    PVC's (premature ventricular contractions)    Type 2

## 2023-10-15 NOTE — PLAN OF CARE
Problem: Respiratory - Adult  Goal: Achieves optimal ventilation and oxygenation  10/15/2023 0755 by Amber Woods RT  Outcome: Progressing  10/14/2023 2349 by Arcelia Ling RT  Outcome: Progressing  10/14/2023 2138 by Melodie Morrissey RCP  Outcome: Progressing     Problem: Discharge Planning  Goal: Discharge to home or other facility with appropriate resources  Recent Flowsheet Documentation  Taken 10/15/2023 0735 by Darinel Peters RN  Discharge to home or other facility with appropriate resources:   Identify barriers to discharge with patient and caregiver   Arrange for needed discharge resources and transportation as appropriate   Identify discharge learning needs (meds, wound care, etc)     Problem: Pain  Goal: Verbalizes/displays adequate comfort level or baseline comfort level  Recent Flowsheet Documentation  Taken 10/15/2023 0735 by Darinel Peters RN  Verbalizes/displays adequate comfort level or baseline comfort level:   Encourage patient to monitor pain and request assistance   Assess pain using appropriate pain scale   Administer analgesics based on type and severity of pain and evaluate response   Implement non-pharmacological measures as appropriate and evaluate response     Problem: Chronic Conditions and Co-morbidities  Goal: Patient's chronic conditions and co-morbidity symptoms are monitored and maintained or improved  Recent Flowsheet Documentation  Taken 10/15/2023 0735 by Darinel Peters [facility] - Patient's Chronic Conditions and Co-Morbidity Symptoms are Monitored and Maintained or Improved:   Monitor and assess patient's chronic conditions and comorbid symptoms for stability, deterioration, or improvement   Collaborate with multidisciplinary team to address chronic and comorbid conditions and prevent exacerbation or deterioration

## 2023-10-15 NOTE — PROGRESS NOTES
(SINGULAIR) tablet 10 mg, 10 mg, Oral, Nightly, Kin Lanza MD, 10 mg at 10/14/23 2226    pantoprazole (PROTONIX) tablet 40 mg, 40 mg, Oral, Daily, Jody Tsang MD, 40 mg at 10/14/23 0810    potassium chloride (KLOR-CON M) extended release tablet 20 mEq, 20 mEq, Oral, Daily with breakfast, Jody Tsang MD, 20 mEq at 10/14/23 0811    rosuvastatin (CRESTOR) tablet 20 mg, 20 mg, Oral, Daily, Jody Tsang MD, 20 mg at 10/14/23 0811    sodium chloride flush 0.9 % injection 5-40 mL, 5-40 mL, IntraVENous, 2 times per day, Jody Tsang MD, 10 mL at 10/14/23 0817    sodium chloride flush 0.9 % injection 5-40 mL, 5-40 mL, IntraVENous, PRN, Kin Lanza MD    0.9 % sodium chloride infusion, , IntraVENous, PRN, Martine Scott MD    enoxaparin (LOVENOX) injection 40 mg, 40 mg, SubCUTAneous, Daily, Kin Scott MD, 40 mg at 10/14/23 0808    ondansetron (ZOFRAN-ODT) disintegrating tablet 4 mg, 4 mg, Oral, Q8H PRN **OR** ondansetron (ZOFRAN) injection 4 mg, 4 mg, IntraVENous, Q6H PRN, Martine Scott MD    polyethylene glycol (GLYCOLAX) packet 17 g, 17 g, Oral, Daily PRN, Jody Tsang MD, 17 g at 10/07/23 1106    acetaminophen (TYLENOL) tablet 650 mg, 650 mg, Oral, Q6H PRN **OR** acetaminophen (TYLENOL) suppository 650 mg, 650 mg, Rectal, Q6H PRN, Kin Scott MD    hydrALAZINE (APRESOLINE) injection 10 mg, 10 mg, IntraVENous, Q6H PRN, Kin Lanza MD    glucose chewable tablet 16 g, 4 tablet, Oral, PRN, Demi Blood, APRDOROTHY - NP, 16 g at 10/11/23 2130    dextrose bolus 10% 125 mL, 125 mL, IntraVENous, PRN **OR** dextrose bolus 10% 250 mL, 250 mL, IntraVENous, PRN, Demi Blood APRN - NP    glucagon injection 1 mg, 1 mg, SubCUTAneous, PRN, Demi Blood APRN - NP    dextrose 10 % infusion, , IntraVENous, Continuous PRN, Demi Blood APRN - NP       LABS:    I reviewed today's most current labs and imaging studies.     Pertinent change compared to H&P    ________________________________________________________________________      Total NON critical care TIME:  Minutes      Total CRITICAL CARE TIME Spent: 54  Minutes non procedure based          Comments   >50% of visit spent in counseling and coordination of care x    ________________________________________________________________________        Signed: Sotero Camacho MD

## 2023-10-16 LAB
ANION GAP SERPL CALC-SCNC: 1 MMOL/L (ref 5–15)
BASOPHILS # BLD: 0 K/UL (ref 0–0.1)
BASOPHILS NFR BLD: 0 % (ref 0–1)
BUN SERPL-MCNC: 45 MG/DL (ref 6–20)
BUN/CREAT SERPL: 33 (ref 12–20)
CALCIUM SERPL-MCNC: 9.7 MG/DL (ref 8.5–10.1)
CHLORIDE SERPL-SCNC: 108 MMOL/L (ref 97–108)
CO2 SERPL-SCNC: 35 MMOL/L (ref 21–32)
CREAT SERPL-MCNC: 1.37 MG/DL (ref 0.55–1.02)
DIFFERENTIAL METHOD BLD: ABNORMAL
EOSINOPHIL # BLD: 0 K/UL (ref 0–0.4)
EOSINOPHIL NFR BLD: 0 % (ref 0–7)
ERYTHROCYTE [DISTWIDTH] IN BLOOD BY AUTOMATED COUNT: 15.8 % (ref 11.5–14.5)
GLUCOSE BLD STRIP.AUTO-MCNC: 149 MG/DL (ref 65–117)
GLUCOSE BLD STRIP.AUTO-MCNC: 154 MG/DL (ref 65–117)
GLUCOSE BLD STRIP.AUTO-MCNC: 164 MG/DL (ref 65–117)
GLUCOSE BLD STRIP.AUTO-MCNC: 181 MG/DL (ref 65–117)
GLUCOSE BLD STRIP.AUTO-MCNC: 184 MG/DL (ref 65–117)
GLUCOSE BLD STRIP.AUTO-MCNC: 202 MG/DL (ref 65–117)
GLUCOSE SERPL-MCNC: 160 MG/DL (ref 65–100)
HCT VFR BLD AUTO: 40.2 % (ref 35–47)
HGB BLD-MCNC: 11.4 G/DL (ref 11.5–16)
IMM GRANULOCYTES # BLD AUTO: 0.1 K/UL (ref 0–0.04)
IMM GRANULOCYTES NFR BLD AUTO: 1 % (ref 0–0.5)
LYMPHOCYTES # BLD: 1.6 K/UL (ref 0.8–3.5)
LYMPHOCYTES NFR BLD: 13 % (ref 12–49)
MCH RBC QN AUTO: 23.3 PG (ref 26–34)
MCHC RBC AUTO-ENTMCNC: 28.4 G/DL (ref 30–36.5)
MCV RBC AUTO: 82 FL (ref 80–99)
MONOCYTES # BLD: 0.7 K/UL (ref 0–1)
MONOCYTES NFR BLD: 6 % (ref 5–13)
NEUTS SEG # BLD: 10 K/UL (ref 1.8–8)
NEUTS SEG NFR BLD: 80 % (ref 32–75)
NRBC # BLD: 0 K/UL (ref 0–0.01)
NRBC BLD-RTO: 0 PER 100 WBC
PLATELET # BLD AUTO: 164 K/UL (ref 150–400)
PMV BLD AUTO: 12.9 FL (ref 8.9–12.9)
POTASSIUM SERPL-SCNC: 4.3 MMOL/L (ref 3.5–5.1)
RBC # BLD AUTO: 4.9 M/UL (ref 3.8–5.2)
RBC MORPH BLD: ABNORMAL
RBC MORPH BLD: ABNORMAL
SERVICE CMNT-IMP: ABNORMAL
SODIUM SERPL-SCNC: 144 MMOL/L (ref 136–145)
WBC # BLD AUTO: 12.4 K/UL (ref 3.6–11)

## 2023-10-16 PROCEDURE — 6370000000 HC RX 637 (ALT 250 FOR IP): Performed by: INTERNAL MEDICINE

## 2023-10-16 PROCEDURE — 6360000002 HC RX W HCPCS: Performed by: GENERAL ACUTE CARE HOSPITAL

## 2023-10-16 PROCEDURE — 2060000000 HC ICU INTERMEDIATE R&B

## 2023-10-16 PROCEDURE — 85025 COMPLETE CBC W/AUTO DIFF WBC: CPT

## 2023-10-16 PROCEDURE — 6360000002 HC RX W HCPCS: Performed by: INTERNAL MEDICINE

## 2023-10-16 PROCEDURE — 82962 GLUCOSE BLOOD TEST: CPT

## 2023-10-16 PROCEDURE — 80048 BASIC METABOLIC PNL TOTAL CA: CPT

## 2023-10-16 PROCEDURE — 6370000000 HC RX 637 (ALT 250 FOR IP): Performed by: GENERAL ACUTE CARE HOSPITAL

## 2023-10-16 PROCEDURE — 94640 AIRWAY INHALATION TREATMENT: CPT

## 2023-10-16 PROCEDURE — 2580000003 HC RX 258: Performed by: INTERNAL MEDICINE

## 2023-10-16 PROCEDURE — 6370000000 HC RX 637 (ALT 250 FOR IP): Performed by: NURSE PRACTITIONER

## 2023-10-16 PROCEDURE — 2700000000 HC OXYGEN THERAPY PER DAY

## 2023-10-16 PROCEDURE — 36415 COLL VENOUS BLD VENIPUNCTURE: CPT

## 2023-10-16 PROCEDURE — 6360000002 HC RX W HCPCS: Performed by: NURSE PRACTITIONER

## 2023-10-16 RX ORDER — ACETYLCYSTEINE 200 MG/ML
600 SOLUTION ORAL; RESPIRATORY (INHALATION) 2 TIMES DAILY PRN
Status: DISCONTINUED | OUTPATIENT
Start: 2023-10-16 | End: 2023-10-18 | Stop reason: HOSPADM

## 2023-10-16 RX ORDER — GABAPENTIN 100 MG/1
100 CAPSULE ORAL NIGHTLY
Status: DISCONTINUED | OUTPATIENT
Start: 2023-10-17 | End: 2023-10-17

## 2023-10-16 RX ADMIN — ROSUVASTATIN 20 MG: 20 TABLET, FILM COATED ORAL at 08:25

## 2023-10-16 RX ADMIN — POTASSIUM CHLORIDE 20 MEQ: 20 TABLET, EXTENDED RELEASE ORAL at 08:25

## 2023-10-16 RX ADMIN — HYDRALAZINE HYDROCHLORIDE 50 MG: 50 TABLET, FILM COATED ORAL at 14:45

## 2023-10-16 RX ADMIN — LATANOPROST 1 DROP: 50 SOLUTION OPHTHALMIC at 20:44

## 2023-10-16 RX ADMIN — POLYETHYLENE GLYCOL 3350 17 G: 17 POWDER, FOR SOLUTION ORAL at 12:25

## 2023-10-16 RX ADMIN — GUAIFENESIN 600 MG: 600 TABLET, EXTENDED RELEASE ORAL at 20:43

## 2023-10-16 RX ADMIN — GABAPENTIN 100 MG: 100 CAPSULE ORAL at 08:25

## 2023-10-16 RX ADMIN — ENOXAPARIN SODIUM 40 MG: 100 INJECTION SUBCUTANEOUS at 08:24

## 2023-10-16 RX ADMIN — MONTELUKAST 10 MG: 10 TABLET, FILM COATED ORAL at 20:43

## 2023-10-16 RX ADMIN — AMLODIPINE BESYLATE 10 MG: 5 TABLET ORAL at 08:25

## 2023-10-16 RX ADMIN — ACETYLCYSTEINE 600 MG: 200 SOLUTION ORAL; RESPIRATORY (INHALATION) at 07:46

## 2023-10-16 RX ADMIN — INSULIN LISPRO 25 UNITS: 100 INJECTION, SOLUTION INTRAVENOUS; SUBCUTANEOUS at 12:25

## 2023-10-16 RX ADMIN — HYDRALAZINE HYDROCHLORIDE 50 MG: 50 TABLET, FILM COATED ORAL at 05:46

## 2023-10-16 RX ADMIN — IPRATROPIUM BROMIDE AND ALBUTEROL SULFATE 1 DOSE: 2.5; .5 SOLUTION RESPIRATORY (INHALATION) at 19:48

## 2023-10-16 RX ADMIN — SODIUM CHLORIDE, PRESERVATIVE FREE 10 ML: 5 INJECTION INTRAVENOUS at 20:45

## 2023-10-16 RX ADMIN — IPRATROPIUM BROMIDE AND ALBUTEROL SULFATE 1 DOSE: 2.5; .5 SOLUTION RESPIRATORY (INHALATION) at 12:55

## 2023-10-16 RX ADMIN — CALCITRIOL CAPSULES 0.25 MCG 0.25 MCG: 0.25 CAPSULE ORAL at 08:26

## 2023-10-16 RX ADMIN — INSULIN GLARGINE 24 UNITS: 100 INJECTION, SOLUTION SUBCUTANEOUS at 08:24

## 2023-10-16 RX ADMIN — INSULIN LISPRO 25 UNITS: 100 INJECTION, SOLUTION INTRAVENOUS; SUBCUTANEOUS at 16:51

## 2023-10-16 RX ADMIN — PREDNISONE 40 MG: 20 TABLET ORAL at 08:25

## 2023-10-16 RX ADMIN — ARFORMOTEROL TARTRATE: 15 SOLUTION RESPIRATORY (INHALATION) at 07:51

## 2023-10-16 RX ADMIN — LEVOTHYROXINE SODIUM 137 MCG: 0.03 TABLET ORAL at 05:46

## 2023-10-16 RX ADMIN — INSULIN LISPRO 25 UNITS: 100 INJECTION, SOLUTION INTRAVENOUS; SUBCUTANEOUS at 08:31

## 2023-10-16 RX ADMIN — ARFORMOTEROL TARTRATE: 15 SOLUTION RESPIRATORY (INHALATION) at 19:59

## 2023-10-16 RX ADMIN — IPRATROPIUM BROMIDE AND ALBUTEROL SULFATE 1 DOSE: 2.5; .5 SOLUTION RESPIRATORY (INHALATION) at 16:54

## 2023-10-16 RX ADMIN — HYDRALAZINE HYDROCHLORIDE 50 MG: 50 TABLET, FILM COATED ORAL at 20:43

## 2023-10-16 RX ADMIN — CARVEDILOL 12.5 MG: 12.5 TABLET, FILM COATED ORAL at 08:25

## 2023-10-16 RX ADMIN — GABAPENTIN 100 MG: 100 CAPSULE ORAL at 14:45

## 2023-10-16 RX ADMIN — ASPIRIN 81 MG: 81 TABLET, COATED ORAL at 08:25

## 2023-10-16 RX ADMIN — IPRATROPIUM BROMIDE AND ALBUTEROL SULFATE 1 DOSE: 2.5; .5 SOLUTION RESPIRATORY (INHALATION) at 07:46

## 2023-10-16 RX ADMIN — GUAIFENESIN 600 MG: 600 TABLET, EXTENDED RELEASE ORAL at 08:25

## 2023-10-16 RX ADMIN — PANTOPRAZOLE SODIUM 40 MG: 40 TABLET, DELAYED RELEASE ORAL at 08:25

## 2023-10-16 RX ADMIN — SODIUM CHLORIDE, PRESERVATIVE FREE 10 ML: 5 INJECTION INTRAVENOUS at 08:26

## 2023-10-16 RX ADMIN — CARVEDILOL 12.5 MG: 12.5 TABLET, FILM COATED ORAL at 16:52

## 2023-10-16 ASSESSMENT — PAIN SCALES - GENERAL
PAINLEVEL_OUTOF10: 0

## 2023-10-16 NOTE — PROGRESS NOTES
Pulmonary, Critical Care, and Sleep Medicine~Consult Note    Name: Maira Hansen MRN: [id number]   : 1950 Hospital: [facility]   Date: 10/16/2023 8:51 AM Admission: 10/5/2023     IMPRESSION:   Acute respiratory failure w/ hypercapnea - acute worsening 10/11 - ? Mucous plugging, underlying BRENT and likely some component of OHS - improved   Acute hypoxic respiratory failure  - improving  Abnormal chest imaging - CXR c/w mucous plugging, CT with improvement in mucous plugging, bilateral subsegmental atx, LLL atx vs asdz  Asthma w/ exacerbation - improving  BRENT on CPAP - d/w family recommend outpt follow up with sleep provider  A/CKD  DM      PLAN:   Supplemental o2, sats >90%  Aggressive pulmonary toilet - nebs, Aerobika - switched mucomyst to prn  Prednisone - taper at d/c  Completed course of empiric abx - azithromycin/CTX  Will  plan to check OOX on home CPAP and am ABG - discussed plans w/ RT  D/W Hospitalist     Daily Progression:    10/14/23 : Weaned to RA. Sats 92% at rest. Cough much less congested. Wheezing improved. Did not wear CPAP or BiPAP last PM.     10/13/23 : Alert, oriented today sitting up in chair. Significant other and son at bedside. Much less wheeze on exam. No edema. 10/12/23 : Yesterday's events noted. Increased lethargy yesterday afternoon requiring BiPAP. Used BiPAP last PM. D/W respiratory at bedside. Will check labs, ABG. Continue BiPAP at night and prn daytime for now. Check lower extremity dopplers. Afebrile. *Checked in later in the afternoon, d/w daughter at bedside. Lower extremity dopplers negative. CXR unchanged. More alert. Mild edema. BNP, BMP pending. D/W Dr. Dariusz Haskins, reviewed imaging. Agrees w/ plans. 10/11/23 : Sitting up in chair. No wheeze on exam today. Cough less congested, she has not looked at color. No edema. No c/o pain. Afebrile.  Further chart review looks like she was prescribed Advair 100/50 arformoterol 15 mcg-budesonide 0.5 mg neb solution   Nebulization BID RT    ipratropium 0.5 mg-albuterol 2.5 mg (DUONEB) nebulizer solution 1 Dose  1 Dose Inhalation Q4H WA RT    acetylcysteine (MUCOMYST) 20 % solution 600 mg  600 mg Inhalation BID RT    guaiFENesin (MUCINEX) extended release tablet 600 mg  600 mg Oral BID    insulin lispro (HUMALOG) injection vial 25 Units  25 Units SubCUTAneous TID WC    insulin lispro (HUMALOG) injection vial 0-16 Units  0-16 Units SubCUTAneous TID WC    insulin lispro (HUMALOG) injection vial 0-4 Units  0-4 Units SubCUTAneous Nightly    amLODIPine (NORVASC) tablet 10 mg  10 mg Oral Daily    aspirin EC tablet 81 mg  81 mg Oral Daily    calcitRIOL (ROCALTROL) capsule 0.25 mcg  0.25 mcg Oral Daily    carvedilol (COREG) tablet 12.5 mg  12.5 mg Oral BID WC    hydrALAZINE (APRESOLINE) tablet 50 mg  50 mg Oral 3 times per day    insulin glargine (LANTUS) injection vial 24 Units  24 Units SubCUTAneous Daily    latanoprost (XALATAN) 0.005 % ophthalmic solution 1 drop  1 drop Both Eyes Daily    levothyroxine (SYNTHROID) tablet 137 mcg  137 mcg Oral Daily    montelukast (SINGULAIR) tablet 10 mg  10 mg Oral Nightly    pantoprazole (PROTONIX) tablet 40 mg  40 mg Oral Daily    potassium chloride (KLOR-CON M) extended release tablet 20 mEq  20 mEq Oral Daily with breakfast    rosuvastatin (CRESTOR) tablet 20 mg  20 mg Oral Daily    sodium chloride flush 0.9 % injection 5-40 mL  5-40 mL IntraVENous 2 times per day    sodium chloride flush 0.9 % injection 5-40 mL  5-40 mL IntraVENous PRN    0.9 % sodium chloride infusion   IntraVENous PRN    enoxaparin (LOVENOX) injection 40 mg  40 mg SubCUTAneous Daily    ondansetron (ZOFRAN-ODT) disintegrating tablet 4 mg  4 mg Oral Q8H PRN    Or    ondansetron (ZOFRAN) injection 4 mg  4 mg IntraVENous Q6H PRN    polyethylene glycol (GLYCOLAX) packet 17 g  17 g Oral Daily PRN    acetaminophen (TYLENOL) tablet 650 mg  650 mg Oral Q6H PRN    Or    acetaminophen

## 2023-10-16 NOTE — PROGRESS NOTES
1314 - PCP hospital follow-up transitional care appointment has been scheduled with Dr. Paulo Daily on 10/18/23 at [phone]. Pending patient discharge. Madelin Panda Care Management Assistant     Attempted to schedule hospital follow up for PCP appointment. Sent a message to PCP office to find patient an appointment. Awaiting callback from PCP office.  Reena Moore

## 2023-10-16 NOTE — PROGRESS NOTES
Hospitalist Progress Note    NAME:   Lesia Franklin   : 1950   MRN: [id number]     Patient PCP: Dianna Martinez MD    Estimated discharge date: 10/17  Barriers: Overnight oximetry, pulmonary clearance    Assessment / Plan:      Acute asthma exacerbation  ? Obesity hypoventilation syndrome   BRENT, on CPAP  Acute hypercarbia  -Chest x-ray shows no acute process. proBNP is mildly elevated at 562  -She does have a previous history of provoked DVT and she does have CKD and also allergy to contrast so CT angiogram of the chest was not done  -VQ scan shows low probability for pulmonary embolism  -We will continue prednisone DuoNeb  -S/p ceftriaxone and Zithromax.  -She she did require BiPAP during hospitalization and is currently on her nocturnal CPAP  -Pulmonary want to check a overnight oximetry to see if she will need BiPAP at home  -She is currently on 1 L nasal cannula and is being weaned off  -Continue home inhalers  -Continue PT OT       Hypokalemia  -Potassium is normal today     CKD stage III  -Creatinine is close to baseline of around 1.5     Diabetic neuropathy  IDDM  -Continue Lantus 24 units daily. Continue insulin sliding scale  -Continue pta gabapentin    Leukocytosis likely reactive due to steroids  -WBC is 12.4. Check CBC in a.m. Hypertension  Hypothyroidism  GERD  Dyslipidemia  -Continue PTA Coreg, levothyroxine, PPI, Crestor. Medical Decision Making:   I personally reviewed labs: CBC, BMP  I personally reviewed imaging: Chest x-ray  Toxic drug monitoring:   Discussed case with: Pulmonary  Code Status: Full Code   DVT Prophylaxis: lovenox  GI Prophylaxis:     Subjective:  Assessment / Plan:    She is currently on 1 L nasal cannula this morning  She wakes up appropriately to commands  Reports that shortness of breath is better. Minimal cough. No phlegm. No chest pain      Objective:      VITALS:   Last 24hrs VS reviewed since prior progress note.  Most recent are:  Patient

## 2023-10-16 NOTE — PROGRESS NOTES
1558 Patient shows signs of drowsiness after giving her the second dose of her gabapentin. Contacted MD Larry Offer and informed MD patient doesn't take her gabapentin 3x a day at home like she should and only takes it once a day most  of the time. MD gave verbal or\ders to change the frequency of administration to once a day at night.

## 2023-10-16 NOTE — PLAN OF CARE
Problem: Respiratory - Adult  Goal: Achieves optimal ventilation and oxygenation  10/16/2023 1837 by Shelley Kiser RN  Outcome: Progressing  10/16/2023 1329 by Tara Sandoval RCP  Outcome: Progressing  Flowsheets (Taken 10/16/2023 0800 by Shelley Kiser RN)  Achieves optimal ventilation and oxygenation: Assess for changes in respiratory status     Problem: Discharge Planning  Goal: Discharge to home or other facility with appropriate resources  Outcome: Progressing  Flowsheets (Taken 10/16/2023 0800)  Discharge to home or other facility with appropriate resources: Identify barriers to discharge with patient and caregiver     Problem: Pain  Goal: Verbalizes/displays adequate comfort level or baseline comfort level  Outcome: Progressing  Flowsheets  Taken 10/16/2023 1545  Verbalizes/displays adequate comfort level or baseline comfort level: Assess pain using appropriate pain scale  Taken 10/16/2023 1134  Verbalizes/displays adequate comfort level or baseline comfort level: Assess pain using appropriate pain scale  Taken 10/16/2023 0757  Verbalizes/displays adequate comfort level or baseline comfort level: Assess pain using appropriate pain scale     Problem: Chronic Conditions and Co-morbidities  Goal: Patient's chronic conditions and co-morbidity symptoms are monitored and maintained or improved  Outcome: Progressing  Flowsheets (Taken 10/16/2023 0800)  Care Plan - Patient's Chronic Conditions and Co-Morbidity Symptoms are Monitored and Maintained or Improved: Monitor and assess patient's chronic conditions and comorbid symptoms for stability, deterioration, or improvement     Problem: Safety - Adult  Goal: Free from fall injury  Outcome: Progressing

## 2023-10-16 NOTE — CARE COORDINATION
Transition of Care Plan:     RUR: 16% (moderate RUR)  Prior Level of Functioning: Needing assistance with IADLs  Disposition: Home with [facility]  (accepted)  If SNF or IPR: Date FOC offered: N/A  Date FOC received: N/A  Accepting facility: N/A  Date authorization started with reference number: N/A  Date authorization received and expires: N/A  Follow up appointments: PCP/specialists if needed. DME needed: TBD. Pt has the potential of needing home oxygen. Pt has a RW, lift chair, nebulizer, & shower bench. Transportation at discharge: Friend or Daughter  IM/IMM Medicare/ letter given: 2nd IM needed prior to discharge. Is patient a  and connected with VA? No.              If yes, was [employer] transfer form completed and VA notified? N/A  Caregiver Contact: Ayo campoverde - [phone]  Discharge Caregiver contacted prior to discharge? Patient to contact  Care Conference needed?  No.  Barriers to discharge: wean 1L O2/eval for overnight O2 needs, pulm clearance      LAWRENCE Bishop, RN    Care Management  [phone]

## 2023-10-17 ENCOUNTER — APPOINTMENT (OUTPATIENT)
Facility: HOSPITAL | Age: 73
DRG: 189 | End: 2023-10-17
Payer: MEDICARE

## 2023-10-17 LAB
ANION GAP SERPL CALC-SCNC: ABNORMAL MMOL/L (ref 5–15)
ARTERIAL PATENCY WRIST A: POSITIVE
BASE EXCESS BLD CALC-SCNC: 8.9 MMOL/L
BASOPHILS # BLD: 0 K/UL (ref 0–0.1)
BASOPHILS NFR BLD: 0 % (ref 0–1)
BDY SITE: ABNORMAL
BUN SERPL-MCNC: 37 MG/DL (ref 6–20)
BUN/CREAT SERPL: 31 (ref 12–20)
CALCIUM SERPL-MCNC: 9.4 MG/DL (ref 8.5–10.1)
CHLORIDE SERPL-SCNC: 108 MMOL/L (ref 97–108)
CO2 SERPL-SCNC: 36 MMOL/L (ref 21–32)
CREAT SERPL-MCNC: 1.21 MG/DL (ref 0.55–1.02)
DIFFERENTIAL METHOD BLD: ABNORMAL
EOSINOPHIL # BLD: 0 K/UL (ref 0–0.4)
EOSINOPHIL NFR BLD: 0 % (ref 0–7)
ERYTHROCYTE [DISTWIDTH] IN BLOOD BY AUTOMATED COUNT: 15.6 % (ref 11.5–14.5)
GAS FLOW.O2 O2 DELIVERY SYS: ABNORMAL
GLUCOSE BLD STRIP.AUTO-MCNC: 126 MG/DL (ref 65–117)
GLUCOSE BLD STRIP.AUTO-MCNC: 134 MG/DL (ref 65–117)
GLUCOSE BLD STRIP.AUTO-MCNC: 206 MG/DL (ref 65–117)
GLUCOSE BLD STRIP.AUTO-MCNC: 214 MG/DL (ref 65–117)
GLUCOSE SERPL-MCNC: 147 MG/DL (ref 65–100)
HCO3 BLD-SCNC: 35.4 MMOL/L (ref 22–26)
HCT VFR BLD AUTO: 33.8 % (ref 35–47)
HGB BLD-MCNC: 9.7 G/DL (ref 11.5–16)
IMM GRANULOCYTES # BLD AUTO: 0.1 K/UL (ref 0–0.04)
IMM GRANULOCYTES NFR BLD AUTO: 1 % (ref 0–0.5)
LYMPHOCYTES # BLD: 1.4 K/UL (ref 0.8–3.5)
LYMPHOCYTES NFR BLD: 15 % (ref 12–49)
MCH RBC QN AUTO: 23.2 PG (ref 26–34)
MCHC RBC AUTO-ENTMCNC: 28.7 G/DL (ref 30–36.5)
MCV RBC AUTO: 80.9 FL (ref 80–99)
MONOCYTES # BLD: 0.4 K/UL (ref 0–1)
MONOCYTES NFR BLD: 4 % (ref 5–13)
NEUTS SEG # BLD: 7.7 K/UL (ref 1.8–8)
NEUTS SEG NFR BLD: 80 % (ref 32–75)
NRBC # BLD: 0 K/UL (ref 0–0.01)
NRBC BLD-RTO: 0 PER 100 WBC
O2/TOTAL GAS SETTING VFR VENT: 2 %
PCO2 BLD: 58.2 MMHG (ref 35–45)
PEEP RESPIRATORY: 8 CMH2O
PH BLD: 7.39 (ref 7.35–7.45)
PLATELET # BLD AUTO: 116 K/UL (ref 150–400)
PMV BLD AUTO: 11.9 FL (ref 8.9–12.9)
PO2 BLD: 73 MMHG (ref 80–100)
POTASSIUM SERPL-SCNC: 4.6 MMOL/L (ref 3.5–5.1)
RBC # BLD AUTO: 4.18 M/UL (ref 3.8–5.2)
RBC MORPH BLD: ABNORMAL
RBC MORPH BLD: ABNORMAL
SAO2 % BLD: 93.7 % (ref 92–97)
SERVICE CMNT-IMP: ABNORMAL
SODIUM SERPL-SCNC: 143 MMOL/L (ref 136–145)
SPECIMEN TYPE: ABNORMAL
VENTILATION MODE VENT: ABNORMAL
WBC # BLD AUTO: 9.6 K/UL (ref 3.6–11)

## 2023-10-17 PROCEDURE — 2060000000 HC ICU INTERMEDIATE R&B

## 2023-10-17 PROCEDURE — 82962 GLUCOSE BLOOD TEST: CPT

## 2023-10-17 PROCEDURE — 82803 BLOOD GASES ANY COMBINATION: CPT

## 2023-10-17 PROCEDURE — 94762 N-INVAS EAR/PLS OXIMTRY CONT: CPT

## 2023-10-17 PROCEDURE — 6360000002 HC RX W HCPCS: Performed by: INTERNAL MEDICINE

## 2023-10-17 PROCEDURE — 2580000003 HC RX 258: Performed by: INTERNAL MEDICINE

## 2023-10-17 PROCEDURE — 6370000000 HC RX 637 (ALT 250 FOR IP): Performed by: NURSE PRACTITIONER

## 2023-10-17 PROCEDURE — 80048 BASIC METABOLIC PNL TOTAL CA: CPT

## 2023-10-17 PROCEDURE — 94669 MECHANICAL CHEST WALL OSCILL: CPT

## 2023-10-17 PROCEDURE — 6360000002 HC RX W HCPCS: Performed by: NURSE PRACTITIONER

## 2023-10-17 PROCEDURE — 36600 WITHDRAWAL OF ARTERIAL BLOOD: CPT

## 2023-10-17 PROCEDURE — 71045 X-RAY EXAM CHEST 1 VIEW: CPT

## 2023-10-17 PROCEDURE — 6370000000 HC RX 637 (ALT 250 FOR IP): Performed by: GENERAL ACUTE CARE HOSPITAL

## 2023-10-17 PROCEDURE — 36415 COLL VENOUS BLD VENIPUNCTURE: CPT

## 2023-10-17 PROCEDURE — 2700000000 HC OXYGEN THERAPY PER DAY

## 2023-10-17 PROCEDURE — 97530 THERAPEUTIC ACTIVITIES: CPT

## 2023-10-17 PROCEDURE — 94640 AIRWAY INHALATION TREATMENT: CPT

## 2023-10-17 PROCEDURE — 6370000000 HC RX 637 (ALT 250 FOR IP): Performed by: INTERNAL MEDICINE

## 2023-10-17 PROCEDURE — 85025 COMPLETE CBC W/AUTO DIFF WBC: CPT

## 2023-10-17 PROCEDURE — 97116 GAIT TRAINING THERAPY: CPT

## 2023-10-17 RX ADMIN — HYDRALAZINE HYDROCHLORIDE 50 MG: 50 TABLET, FILM COATED ORAL at 21:47

## 2023-10-17 RX ADMIN — INSULIN LISPRO 4 UNITS: 100 INJECTION, SOLUTION INTRAVENOUS; SUBCUTANEOUS at 16:54

## 2023-10-17 RX ADMIN — IPRATROPIUM BROMIDE AND ALBUTEROL SULFATE 1 DOSE: 2.5; .5 SOLUTION RESPIRATORY (INHALATION) at 07:52

## 2023-10-17 RX ADMIN — CARVEDILOL 12.5 MG: 12.5 TABLET, FILM COATED ORAL at 16:54

## 2023-10-17 RX ADMIN — ARFORMOTEROL TARTRATE: 15 SOLUTION RESPIRATORY (INHALATION) at 19:58

## 2023-10-17 RX ADMIN — GUAIFENESIN 600 MG: 600 TABLET, EXTENDED RELEASE ORAL at 21:47

## 2023-10-17 RX ADMIN — MONTELUKAST 10 MG: 10 TABLET, FILM COATED ORAL at 21:47

## 2023-10-17 RX ADMIN — CALCITRIOL CAPSULES 0.25 MCG 0.25 MCG: 0.25 CAPSULE ORAL at 08:49

## 2023-10-17 RX ADMIN — POTASSIUM CHLORIDE 20 MEQ: 20 TABLET, EXTENDED RELEASE ORAL at 08:48

## 2023-10-17 RX ADMIN — IPRATROPIUM BROMIDE AND ALBUTEROL SULFATE 1 DOSE: 2.5; .5 SOLUTION RESPIRATORY (INHALATION) at 12:20

## 2023-10-17 RX ADMIN — CARVEDILOL 12.5 MG: 12.5 TABLET, FILM COATED ORAL at 08:49

## 2023-10-17 RX ADMIN — ARFORMOTEROL TARTRATE: 15 SOLUTION RESPIRATORY (INHALATION) at 07:58

## 2023-10-17 RX ADMIN — SODIUM CHLORIDE, PRESERVATIVE FREE 10 ML: 5 INJECTION INTRAVENOUS at 08:48

## 2023-10-17 RX ADMIN — LATANOPROST 1 DROP: 50 SOLUTION OPHTHALMIC at 21:47

## 2023-10-17 RX ADMIN — LEVOTHYROXINE SODIUM 137 MCG: 0.03 TABLET ORAL at 06:20

## 2023-10-17 RX ADMIN — PREDNISONE 40 MG: 20 TABLET ORAL at 08:48

## 2023-10-17 RX ADMIN — GUAIFENESIN 600 MG: 600 TABLET, EXTENDED RELEASE ORAL at 08:49

## 2023-10-17 RX ADMIN — INSULIN GLARGINE 24 UNITS: 100 INJECTION, SOLUTION SUBCUTANEOUS at 08:47

## 2023-10-17 RX ADMIN — HYDRALAZINE HYDROCHLORIDE 50 MG: 50 TABLET, FILM COATED ORAL at 12:43

## 2023-10-17 RX ADMIN — ENOXAPARIN SODIUM 40 MG: 100 INJECTION SUBCUTANEOUS at 08:47

## 2023-10-17 RX ADMIN — PANTOPRAZOLE SODIUM 40 MG: 40 TABLET, DELAYED RELEASE ORAL at 08:48

## 2023-10-17 RX ADMIN — HYDRALAZINE HYDROCHLORIDE 50 MG: 50 TABLET, FILM COATED ORAL at 06:21

## 2023-10-17 RX ADMIN — AMLODIPINE BESYLATE 10 MG: 5 TABLET ORAL at 08:50

## 2023-10-17 RX ADMIN — ASPIRIN 81 MG: 81 TABLET, COATED ORAL at 08:49

## 2023-10-17 RX ADMIN — INSULIN LISPRO 25 UNITS: 100 INJECTION, SOLUTION INTRAVENOUS; SUBCUTANEOUS at 12:43

## 2023-10-17 RX ADMIN — INSULIN LISPRO 25 UNITS: 100 INJECTION, SOLUTION INTRAVENOUS; SUBCUTANEOUS at 16:53

## 2023-10-17 RX ADMIN — POLYETHYLENE GLYCOL 3350 17 G: 17 POWDER, FOR SOLUTION ORAL at 12:44

## 2023-10-17 RX ADMIN — ROSUVASTATIN 20 MG: 20 TABLET, FILM COATED ORAL at 08:48

## 2023-10-17 RX ADMIN — SODIUM CHLORIDE, PRESERVATIVE FREE 10 ML: 5 INJECTION INTRAVENOUS at 21:47

## 2023-10-17 RX ADMIN — IPRATROPIUM BROMIDE AND ALBUTEROL SULFATE 1 DOSE: 2.5; .5 SOLUTION RESPIRATORY (INHALATION) at 19:52

## 2023-10-17 RX ADMIN — INSULIN LISPRO 25 UNITS: 100 INJECTION, SOLUTION INTRAVENOUS; SUBCUTANEOUS at 08:47

## 2023-10-17 RX ADMIN — IPRATROPIUM BROMIDE AND ALBUTEROL SULFATE 1 DOSE: 2.5; .5 SOLUTION RESPIRATORY (INHALATION) at 16:45

## 2023-10-17 ASSESSMENT — PAIN SCALES - GENERAL
PAINLEVEL_OUTOF10: 0
PAINLEVEL_OUTOF10: 0

## 2023-10-17 NOTE — PROGRESS NOTES
PT shared that patient underwent O2 challenge during morning PT and the patient desaturated to 91% on room O2. With 1.5L her O2 saturation was 92-94.

## 2023-10-17 NOTE — CARE COORDINATION
Transition of Care Plan:    RUR: 16% Moderate  Prior Level of Functioning: Needing assistance with IADLs  Disposition: Home with [facility]  If SNF or IPR: Date FOC offered: N/A  Date FOC received: N/A  Accepting facility: N/A  Date authorization started with reference number: N/A  Date authorization received and expires: N/A  Follow up appointments: PCP/Specialists if needed  DME needed: Possible need for BIPAP  Transportation at discharge: Daughter or Friend to transport  IM/IMM Medicare/ letter given: 10/17/2023  Caregiver Contact: Avery Pittmanleatha - child - [phone]  Discharge Caregiver contacted prior to discharge? Patient to contact  Care Conference needed? No  Barriers to discharge: Oxygen challenge, Pulmonary clearance    CM spoke with patient and family about discharge planning. Pt chose [facility] (referral sent and pt accepted) and pt given 2nd IM letter, with signed copy in chart. CM awaiting 2nd visit to pt from pulmonology about whether or not pt will require BIPAP at home and order to receive oxygen and BIPAP. CM will continue to follow.     LAWRENCE Delgado, RN, [address] [facility]  X [phone]

## 2023-10-17 NOTE — PROGRESS NOTES
1049 - PCP hospital follow-up transitional care appointment has been scheduled with Dr. Lion Sen on 10/18/23 1215. This is a previously scheduled appt. Pending patient discharge. Sarah Saha, Care Management Assistant     Attempted to schedule hospital follow up for PCP appointment. Sent a message to PCP office to find patient an appointment. Awaiting callback from PCP office.  [address] [place] [address]

## 2023-10-17 NOTE — PROGRESS NOTES
Hyperlipidemia     Hypertension     Long term current use of anticoagulant therapy     Nausea & vomiting     Pulmonary embolism (HCC)     Screening for colon cancer 02/16/2005    Dr Gracie Obregon in 10 years    Stroke [facility]) 2004    Rt side weaker than left, uses a cane: no longer followed by neuro    Thromboembolus ([address]) 1976    Rt leg moved to lung     Thyroid disease     Unspecified sleep apnea     uses CPAP      Past Surgical History:   Procedure Laterality Date    CARDIAC CATHETERIZATION  6/6/2012         CARDIAC CATHETERIZATION      COLONOSCOPY N/A 10/24/2022    COLONOSCOPY performed by Benigno Hines MD at [address]  10/24/2022    HEENT      tonsillectomy    HEMORRHOID SURGERY      HYSTERECTOMY (CERVIX STATUS UNKNOWN)      ORTHOPEDIC SURGERY  8/2011    left shoulder    FL UNLISTED PROCEDURE CARDIAC SURGERY      heart surgery      Prior to Admission medications    Medication Sig Start Date End Date Taking? Authorizing Provider   losartan (COZAAR) 100 MG tablet Take 1 tablet by mouth daily 9/5/23   Mejia Finnegan MD   albuterol sulfate HFA (PROVENTIL;VENTOLIN;PROAIR) 108 (90 Base) MCG/ACT inhaler USE 1 INHALATION EVERY 4 HOURS AS NEEDED FOR WHEEZING OR SHORTNESS OF BREATH 8/14/23   Mejia Finnegan MD   gabapentin (NEURONTIN) 100 MG capsule Take 2 capsules by mouth 3 times daily for 180 days.  Max Daily Amount: 600 mg 6/15/23 12/12/23  Mejia Finnegan MD   amLODIPine (NORVASC) 10 MG tablet  8/13/20   ProviderBrian MD   calcitRIOL (ROCALTROL) 0.25 MCG capsule calcitriol 0.25 mcg capsule   TAKE 1 CAPSULE DAILY    ProviderBrian MD   Dulaglutide (TRULICITY) 2.62 WM/9.2JK SOPN  9/6/20   ProviderBrian MD   hydrOXYzine HCl (ATARAX) 25 MG tablet TAKE 1 TABLET BY MOUTH FOUR TIMES DAILY AS NEEDED FOR ITCHING 4/15/23   Brian Danielson MD   ketorolac (ACULAR) 0.5 % ophthalmic solution ketorolac 0.5 % eye drops   INSTILL 1 DROP IN LEFT EYE FOUR TIMES DAILY ondansetron (ZOFRAN) injection 4 mg  4 mg IntraVENous Q6H PRN    polyethylene glycol (GLYCOLAX) packet 17 g  17 g Oral Daily PRN    acetaminophen (TYLENOL) tablet 650 mg  650 mg Oral Q6H PRN    Or    acetaminophen (TYLENOL) suppository 650 mg  650 mg Rectal Q6H PRN    hydrALAZINE (APRESOLINE) injection 10 mg  10 mg IntraVENous Q6H PRN    glucose chewable tablet 16 g  4 tablet Oral PRN    dextrose bolus 10% 125 mL  125 mL IntraVENous PRN    Or    dextrose bolus 10% 250 mL  250 mL IntraVENous PRN    glucagon injection 1 mg  1 mg SubCUTAneous PRN    dextrose 10 % infusion   IntraVENous Continuous PRN       Labs:  ABG Invalid input(s): \"PHI\", \"PCO2I\", \"PO2I\", \"HCO3I\", \"SO2I\", \"FIO2I\"     CBC Recent Labs     10/15/23  0841 10/16/23  0637 10/17/23  0506   WBC 9.1 12.4* 9.6   HGB 10.7* 11.4* 9.7*   HCT 37.2 40.2 33.8*   * 164 116*   MCV 81.2 82.0 80.9   MCH 23.4* 23.3* 23.2*          Metabolic  Panel Recent Labs     10/15/23  0841 10/16/23  0637 10/17/23  0506    144 143   K 4.2 4.3 4.6    108 108   CO2 37* 35* 36*   BUN 48* 45* 37*          Pertinent Labs                VIOLETTA Ash - NP  10/17/2023

## 2023-10-17 NOTE — PROGRESS NOTES
Comprehensive Nutrition Assessment    Type and Reason for Visit:  Reassess    Nutrition Recommendations/Plan:   Continue current diet and supplements  Please document % meals and supplements consumed in flowsheet I/O's under intake   Increase daily bowel regimen. Last BM unknown, maybe 10/7? Malnutrition Assessment:  Malnutrition Status: At risk for malnutrition (Comment) (10/12/23 1324)    Context:  Chronic Illness     Findings of the 6 clinical characteristics of malnutrition:  Energy Intake:  Mild decrease in energy intake (Comment)  Weight Loss:  Mild weight loss (specify amount and time period)     Body Fat Loss:  Unable to assess     Muscle Mass Loss:  Unable to assess    Fluid Accumulation:  Mild Extremities   Strength:  Not Performed    Nutrition Assessment:     Chart reviewed and case discussed during IDR. Anticipated discharge extended to tomorrow to consider BIPAP needs. Good PO intake documented yesterday and overall trending up. Pt unavailable at time of assessment today. Continue to encourage intake of meals and supplements. Patient Vitals for the past 120 hrs:   PO Meals Eaten (%)   10/16/23 0800 76 - 100%   10/15/23 1748 51 - 75%   10/15/23 1300 26 - 50%   10/15/23 0823 51 - 75%   10/14/23 1830 51 - 75%   10/14/23 1230 51 - 75%   10/14/23 0830 26 - 50%   10/13/23 1708 26 - 50%   10/13/23 1230 26 - 50%   10/13/23 0830 26 - 50%   10/12/23 1711 26 - 50%       Nutrition Related Findings:    Labs: Cr 1.21, -134-126. Meds: calcitriol, lantus, humalog, synthroid, protonix, klorcon, prednisone, crestor, miralax. Edema: trace BLE. BM unknown. Wound Type: None       Current Nutrition Intake & Therapies:    Average Meal Intake: %  Average Supplements Intake: Unable to assess  ADULT DIET; Regular; 4 carb choices (60 gm/meal)  ADULT ORAL NUTRITION SUPPLEMENT; Breakfast, Dinner;  Low Calorie/High Protein Oral Supplement    Anthropometric Measures:  Height: 160 cm (5' 2.99\")  Ideal

## 2023-10-17 NOTE — PROGRESS NOTES
During shift change report patient expressed concerns with being discharged home \"to die. \" Patient further stated she, \"Does not want to die at home. \"

## 2023-10-17 NOTE — PLAN OF CARE
Problem: Physical Therapy - Adult  Goal: By Discharge: Performs mobility at highest level of function for planned discharge setting. See evaluation for individualized goals. Description: FUNCTIONAL STATUS PRIOR TO ADMISSION: Patient was modified independent using a rollator for functional mobility. HOME SUPPORT PRIOR TO ADMISSION: The patient lived alone with  and daughter to provide assistance if needed. Physical Therapy Goals  Initiated 10/12/2023  1. Patient will move from supine to sit and sit to supine in bed with modified independence within 7 day(s). 2.  Patient will perform sit to stand with modified independence within 7 day(s). 3.  Patient will transfer from bed to chair and chair to bed with modified independence using the least restrictive device within 7 day(s). 4.  Patient will ambulate with modified independence for 50 feet with the least restrictive device within 7 day(s). 5.  Patient will ascend/descend 2 stairs with walker with contact guard assist within 7 day(s). Outcome: Progressing   PHYSICAL THERAPY TREATMENT    Patient: Prashant Parker (40 y.o. female)  Date: 10/17/2023  Diagnosis: Essential hypertension [I10]  Hypoxia [R09.02]  Chronic kidney disease, unspecified CKD stage [N18.9]  Asthma, unspecified asthma severity, unspecified whether complicated, unspecified whether persistent [J45.909]  Acute hypoxic respiratory failure ([address]) [J96.01] Acute hypoxic respiratory failure ([address])      Precautions:                    ASSESSMENT:  Patient continues to benefit from skilled PT services and is progressing towards goals. Patient received sitting on rollator, she had just ambulated with family in hallway and is agreeable to ambulate again. Demonstrates intermittent impaired STM requiring frequent reminders as to therapists role. Completes transfers with SBA with cuing for hand placement. Ambulation x100ft with rollator with SBA, no unsteadiness or LOB noted.  Patient

## 2023-10-17 NOTE — PROGRESS NOTES
ADULT PROTOCOL: JET AEROSOL ASSESSMENT    Patient  Marj Harding     81 y.o.   female     10/17/2023  12:00 PM    Breath Sounds Pre Procedure: Breath Sounds Pre-Tx NEIL: Diminished                                  Breath Sounds Pre-Tx LLL: Diminished        Breath Sounds Pre-Tx RUL: Wheezing        Breath Sounds Pre-Tx RML: Wheezes        Breath Sounds Pre-Tx RLL: Wheezes  Breath Sounds Post Procedure: Breath Sounds Post-Tx NEIL: Diminished          Breath Sounds Post-Tx LLL: Diminished          Breath Sounds Post-Tx RUL: Diminished          Breath Sounds Post-Tx RML: Diminished          Breath Sounds Post-Tx RLL: Diminished                                       Heart Rate: Pre procedure Pre-Tx Pulse: 80           Post procedure Post-Tx Pulse: 71    Resp Rate: Pre procedure Pre-Tx Resps: 18           Post procedure Post-Tx Resps: 18      Oxygen: O2 Therapy: Oxygen   nasal cannula     Changed: Yes    SpO2:  SpO2: 98 %   with Oxygen                Nebulizer Therapy: Current medications Medications: Arformoterol, Budesonide      Changed: NO        Problem List:   Patient Active Problem List   Diagnosis    Diverticulosis    Major depressive disorder, recurrent, moderate ([facility])    Major depressive disorder, recurrent, unspecified ([facility])    Obesity, morbid (HCC)    Osteopenia    History of CVA (cerebrovascular accident)    CKD (chronic kidney disease) stage 3, GFR 30-59 ml/min ([facility])    DM type 2 (diabetes mellitus, type 2) ([facility])    Asthma    Esophageal reflux    DJD (degenerative joint disease) of knee    Type 2 diabetes mellitus with diabetic neuropathy ([facility])    HTN (hypertension)    Axillary hidradenitis suppurativa    Warthin's tumor    Coronary artery disease    Shortness of breath    Major depressive disorder, recurrent, mild (HCC)    CTS (carpal tunnel syndrome)    PVC's (premature ventricular contractions)    Type 2 diabetes with nephropathy ([facility])    Renal failure, acute (HCC)    Myocardial ischemia    Type 2

## 2023-10-17 NOTE — PROGRESS NOTES
Patient is currently participating in sleep study. She is on CPAP and 02 was in the 75-80's. Respiratory was called. They placed patient on 3L of 02 while on CPAP. Patient 02 sats reached 98 percent. Oxygen was weaned down to 2L and patient remains at 95 percent.

## 2023-10-18 VITALS
RESPIRATION RATE: 16 BRPM | TEMPERATURE: 98.2 F | HEIGHT: 63 IN | HEART RATE: 68 BPM | WEIGHT: 207.67 LBS | SYSTOLIC BLOOD PRESSURE: 136 MMHG | OXYGEN SATURATION: 95 % | DIASTOLIC BLOOD PRESSURE: 83 MMHG | BODY MASS INDEX: 36.8 KG/M2

## 2023-10-18 LAB
ANION GAP SERPL CALC-SCNC: 0 MMOL/L (ref 5–15)
BASOPHILS # BLD: 0 K/UL (ref 0–0.1)
BASOPHILS NFR BLD: 0 % (ref 0–1)
BUN SERPL-MCNC: 36 MG/DL (ref 6–20)
BUN/CREAT SERPL: 30 (ref 12–20)
CALCIUM SERPL-MCNC: 9.6 MG/DL (ref 8.5–10.1)
CHLORIDE SERPL-SCNC: 108 MMOL/L (ref 97–108)
CO2 SERPL-SCNC: 37 MMOL/L (ref 21–32)
CREAT SERPL-MCNC: 1.22 MG/DL (ref 0.55–1.02)
DIFFERENTIAL METHOD BLD: ABNORMAL
EOSINOPHIL # BLD: 0 K/UL (ref 0–0.4)
EOSINOPHIL NFR BLD: 0 % (ref 0–7)
ERYTHROCYTE [DISTWIDTH] IN BLOOD BY AUTOMATED COUNT: 15.8 % (ref 11.5–14.5)
GLUCOSE BLD STRIP.AUTO-MCNC: 108 MG/DL (ref 65–117)
GLUCOSE BLD STRIP.AUTO-MCNC: 253 MG/DL (ref 65–117)
GLUCOSE SERPL-MCNC: 104 MG/DL (ref 65–100)
HCT VFR BLD AUTO: 34.6 % (ref 35–47)
HGB BLD-MCNC: 10 G/DL (ref 11.5–16)
IMM GRANULOCYTES # BLD AUTO: 0.1 K/UL (ref 0–0.04)
IMM GRANULOCYTES NFR BLD AUTO: 1 % (ref 0–0.5)
LYMPHOCYTES # BLD: 1.7 K/UL (ref 0.8–3.5)
LYMPHOCYTES NFR BLD: 16 % (ref 12–49)
MCH RBC QN AUTO: 23.1 PG (ref 26–34)
MCHC RBC AUTO-ENTMCNC: 28.9 G/DL (ref 30–36.5)
MCV RBC AUTO: 79.9 FL (ref 80–99)
MONOCYTES # BLD: 0.5 K/UL (ref 0–1)
MONOCYTES NFR BLD: 5 % (ref 5–13)
NEUTS SEG # BLD: 8.5 K/UL (ref 1.8–8)
NEUTS SEG NFR BLD: 78 % (ref 32–75)
NRBC # BLD: 0 K/UL (ref 0–0.01)
NRBC BLD-RTO: 0 PER 100 WBC
PLATELET # BLD AUTO: 115 K/UL (ref 150–400)
POTASSIUM SERPL-SCNC: 4.3 MMOL/L (ref 3.5–5.1)
RBC # BLD AUTO: 4.33 M/UL (ref 3.8–5.2)
RBC MORPH BLD: ABNORMAL
RBC MORPH BLD: ABNORMAL
SERVICE CMNT-IMP: ABNORMAL
SERVICE CMNT-IMP: NORMAL
SODIUM SERPL-SCNC: 145 MMOL/L (ref 136–145)
WBC # BLD AUTO: 10.8 K/UL (ref 3.6–11)

## 2023-10-18 PROCEDURE — 80048 BASIC METABOLIC PNL TOTAL CA: CPT

## 2023-10-18 PROCEDURE — 85025 COMPLETE CBC W/AUTO DIFF WBC: CPT

## 2023-10-18 PROCEDURE — 6370000000 HC RX 637 (ALT 250 FOR IP): Performed by: INTERNAL MEDICINE

## 2023-10-18 PROCEDURE — 82962 GLUCOSE BLOOD TEST: CPT

## 2023-10-18 PROCEDURE — 6360000002 HC RX W HCPCS: Performed by: NURSE PRACTITIONER

## 2023-10-18 PROCEDURE — 36415 COLL VENOUS BLD VENIPUNCTURE: CPT

## 2023-10-18 PROCEDURE — 6360000002 HC RX W HCPCS: Performed by: INTERNAL MEDICINE

## 2023-10-18 PROCEDURE — 6370000000 HC RX 637 (ALT 250 FOR IP): Performed by: GENERAL ACUTE CARE HOSPITAL

## 2023-10-18 PROCEDURE — 94640 AIRWAY INHALATION TREATMENT: CPT

## 2023-10-18 PROCEDURE — 6370000000 HC RX 637 (ALT 250 FOR IP): Performed by: NURSE PRACTITIONER

## 2023-10-18 PROCEDURE — 2580000003 HC RX 258: Performed by: INTERNAL MEDICINE

## 2023-10-18 RX ORDER — IPRATROPIUM BROMIDE AND ALBUTEROL SULFATE 2.5; .5 MG/3ML; MG/3ML
3 SOLUTION RESPIRATORY (INHALATION)
Qty: 360 ML | Refills: 0 | Status: SHIPPED | OUTPATIENT
Start: 2023-10-18 | End: 2023-11-17

## 2023-10-18 RX ORDER — GABAPENTIN 100 MG/1
100 CAPSULE ORAL
Qty: 30 CAPSULE | Refills: 0 | Status: SHIPPED | OUTPATIENT
Start: 2023-10-18 | End: 2023-11-17

## 2023-10-18 RX ADMIN — PANTOPRAZOLE SODIUM 40 MG: 40 TABLET, DELAYED RELEASE ORAL at 09:07

## 2023-10-18 RX ADMIN — GUAIFENESIN 600 MG: 600 TABLET, EXTENDED RELEASE ORAL at 09:07

## 2023-10-18 RX ADMIN — LEVOTHYROXINE SODIUM 137 MCG: 0.03 TABLET ORAL at 05:05

## 2023-10-18 RX ADMIN — CALCITRIOL CAPSULES 0.25 MCG 0.25 MCG: 0.25 CAPSULE ORAL at 09:09

## 2023-10-18 RX ADMIN — IPRATROPIUM BROMIDE AND ALBUTEROL SULFATE 1 DOSE: 2.5; .5 SOLUTION RESPIRATORY (INHALATION) at 09:10

## 2023-10-18 RX ADMIN — ENOXAPARIN SODIUM 40 MG: 100 INJECTION SUBCUTANEOUS at 09:07

## 2023-10-18 RX ADMIN — INSULIN LISPRO 25 UNITS: 100 INJECTION, SOLUTION INTRAVENOUS; SUBCUTANEOUS at 12:41

## 2023-10-18 RX ADMIN — ASPIRIN 81 MG: 81 TABLET, COATED ORAL at 09:08

## 2023-10-18 RX ADMIN — CARVEDILOL 12.5 MG: 12.5 TABLET, FILM COATED ORAL at 09:08

## 2023-10-18 RX ADMIN — INSULIN GLARGINE 24 UNITS: 100 INJECTION, SOLUTION SUBCUTANEOUS at 09:38

## 2023-10-18 RX ADMIN — AMLODIPINE BESYLATE 10 MG: 5 TABLET ORAL at 09:08

## 2023-10-18 RX ADMIN — SODIUM CHLORIDE, PRESERVATIVE FREE 10 ML: 5 INJECTION INTRAVENOUS at 09:10

## 2023-10-18 RX ADMIN — HYDRALAZINE HYDROCHLORIDE 50 MG: 50 TABLET, FILM COATED ORAL at 05:05

## 2023-10-18 RX ADMIN — INSULIN LISPRO 8 UNITS: 100 INJECTION, SOLUTION INTRAVENOUS; SUBCUTANEOUS at 12:40

## 2023-10-18 RX ADMIN — ARFORMOTEROL TARTRATE: 15 SOLUTION RESPIRATORY (INHALATION) at 09:15

## 2023-10-18 RX ADMIN — IPRATROPIUM BROMIDE AND ALBUTEROL SULFATE 1 DOSE: 2.5; .5 SOLUTION RESPIRATORY (INHALATION) at 13:12

## 2023-10-18 RX ADMIN — PREDNISONE 40 MG: 20 TABLET ORAL at 09:09

## 2023-10-18 RX ADMIN — POTASSIUM CHLORIDE 20 MEQ: 20 TABLET, EXTENDED RELEASE ORAL at 09:09

## 2023-10-18 RX ADMIN — ROSUVASTATIN 20 MG: 20 TABLET, FILM COATED ORAL at 09:08

## 2023-10-18 NOTE — DISCHARGE SUMMARY
Not in distress  Chest: Clear lungs  CVS:   Regular rhythm. No edema  Abd:  Soft, not distended, not tender  Neuro: awake, moving all exts    Discharge/Recent Laboratory Results:  Recent Labs     10/18/23  0459      K 4.3      CO2 37*   BUN 36*   CREATININE 1.22*   GLUCOSE 104*   CALCIUM 9.6     Recent Labs     10/18/23  0459   HGB 10.0*   HCT 34.6*   WBC 10.8   *       Discharge Medications:     Medication List        START taking these medications      ipratropium 0.5 mg-albuterol 2.5 mg 0.5-2.5 (3) MG/3ML Soln nebulizer solution  Commonly known as: DUONEB  Inhale 3 mLs into the lungs every 4 hours (while awake)            CHANGE how you take these medications      albuterol sulfate  (90 Base) MCG/ACT inhaler  Commonly known as: PROVENTIL;VENTOLIN;PROAIR  USE 1 INHALATION EVERY 4 HOURS AS NEEDED FOR WHEEZING OR SHORTNESS OF BREATH  What changed: Another medication with the same name was removed. Continue taking this medication, and follow the directions you see here.     gabapentin 100 MG capsule  Commonly known as: NEURONTIN  Take 1 capsule by mouth nightly for 30 days. Max Daily Amount: 100 mg  What changed:   how much to take  when to take this     latanoprost 0.005 % ophthalmic solution  Commonly known as: XALATAN  What changed: Another medication with the same name was removed. Continue taking this medication, and follow the directions you see here.             CONTINUE taking these medications      amLODIPine 10 MG tablet  Commonly known as: NORVASC     aspirin 81 MG EC tablet     calcitRIOL 0.25 MCG capsule  Commonly known as: ROCALTROL     carvedilol 12.5 MG tablet  Commonly known as: COREG     fluticasone-salmeterol 100-50 MCG/ACT Aepb diskus inhaler  Commonly known as: ADVAIR     hydrALAZINE 50 MG tablet  Commonly known as: APRESOLINE     insulin lispro (1 Unit Dial) 100 UNIT/ML Sopn  Commonly known as: HUMALOG/ADMELOG     ketorolac 0.5 % ophthalmic solution  Commonly known as: ACULAR     levothyroxine 137 MCG tablet  Commonly known as: SYNTHROID  TAKE 1 TABLET DAILY BEFORE BREAKFAST     losartan 100 MG tablet  Commonly known as: COZAAR  Take 1 tablet by mouth daily     montelukast 10 MG tablet  Commonly known as: SINGULAIR     pantoprazole 40 MG tablet  Commonly known as: PROTONIX  Take 1 tablet by mouth daily     potassium chloride 20 MEQ extended release tablet  Commonly known as: KLOR-CON M     rosuvastatin 20 MG tablet  Commonly known as: CRESTOR     tiZANidine 2 MG tablet  Commonly known as: ZANAFLEX     Toujeo SoloStar 300 UNIT/ML concentrated injection pen  Generic drug: insulin glargine (1 unit dial)     Trulicity 7.39 OI/4.7JR Sopn  Generic drug: Dulaglutide            STOP taking these medications      hydrOXYzine HCl 25 MG tablet  Commonly known as: ATARAX     metoprolol tartrate 50 MG tablet  Commonly known as: LOPRESSOR     triamterene-hydroCHLOROthiazide 37.5-25 MG per capsule  Commonly known as: DYAZIDE               Where to Get Your Medications        These medications were sent to [address] [place] [address] [address] [place] [address] [phone] [facility] [phone]  [id number] [place], [place] [address]      Phone: [phone]   gabapentin 100 MG capsule  ipratropium 0.5 mg-albuterol 2.5 mg 0.5-2.5 (3) MG/3ML Soln nebulizer solution             DISPOSITION:    Home with Family: y      Home with HH/PT/OT/RN:    SNF/LTC:    DARWIN:    OTHER:            Code status: Full    1. Recommended diet: Regular     2. Recommended activity: activity as tolerated    3. If you experience any of the following symptoms then please call your primary care physician or return to the emergency room if you cannot get hold of your doctor:    4. Wound Care: None     5. Lab work: Cbc and Bmp in  1 week     6. Continue to wear cpap with o2 at night     7. Bring these papers with you to your follow up appointments.  The papers will help your doctors be sure to continue the care plan

## 2023-10-18 NOTE — CARE COORDINATION
Patient is clear from a CM standpoint. Transition of Care Plan: Home with [facility] (accepted) and [employer] to provider noctural O2 equipment. RUR: 16% (moderate RUR)  Prior Level of Functioning: Needing assistance with IADLs  Disposition: Home with [facility] (accepted) and [employer] to provider noctural O2 equipment. If SNF or IPR: Date FOC offered: N/A  Date FOC received: N/A  Accepting facility: N/A  Date authorization started with reference number: N/A  Date authorization received and expires: N/A  Follow up appointments: PCP/specialists if needed. DME needed: Nocturnal O2 through [employer]  Pt has a RW, lift chair, nebulizer, & shower bench. Transportation at discharge: Family at bedside to transport home. IM/IMM Medicare/ letter given: 2nd IM received 10/18/2023. Is patient a [place] and connected with [facility]? No.              If yes, was [employer] transfer form completed and [facility] notified? N/A  Caregiver Contact: Karina Carpio - child - [phone]  Discharge Caregiver contacted prior to discharge? Patient to contact  Care Conference needed? No.  Barriers to discharge: None. 9140 - Referral sent for nocturnal oxygen through CPAP to [employer] for equipment to be delivered to patient's home address; pending approval.  CM noted consults stating patient already has nebulizer at home. Completed SMAART tool outside door. 1057 - No response yet from [employer]; referral sent to [address] [place] [address] as well to see if they can accommodate. [facility] can accept and provide O2 for patient. Pulm note sent to [address] [place] [address] as requested and CM asked if there was anything else needed to accept the patient. 1147 - CM reached out to [address] [place] [address] to determine if anything else is needed to accept patient; VM left but [employer] has already confirmed that they can provide O2.    [address] [facility] [address] confirmed they have everything they need and CM provided them with contact information for patient's DTR as requested.   Patient and DTR

## 2023-10-18 NOTE — PROGRESS NOTES
Pulmonary, Critical Care, and Sleep Medicine~Consult Note    Name: Wendi Barrios MRN: [id number]   : 1950 Hospital: [facility]   Date: 10/18/2023 8:47 AM Admission: 10/5/2023     IMPRESSION:   Acute respiratory failure w/ hypercapnea - acute worsening 10/11 - ? Mucous plugging, underlying BRENT and likely some component of OHS - ?exacerbated by sedation from gabapentin  Acute hypoxic respiratory failure  - improving  Abnormal chest imaging - CXR c/w mucous plugging, CT with improvement in mucous plugging, bilateral subsegmental atx, LLL atx vs asdz  Asthma w/ exacerbation - improving  BRENT on CPAP - d/w family recommend outpt follow up with sleep provider  A/CKD  DM      PLAN:   Supplemental o2, sats >90% - would retest w/ exertion today - requires 3LPM with CPAP at night  Continue nebs - she does have a nebulizer  Prednisone - taper at d/c  Will need rx for Advair 250, DuoNebs, prednisone taper  Completed course of empiric abx - azithromycin/CTX  Mental status much improved with discontinuation of gabapentin - recommend she follow up outpt with sleep provider to consider in lab titration study - d/w dr. Destinee Ann  D/C recs : Advair 250, prn DuoNebs, nocturnal CPAP w/ o2 3LPM, will f/u in office in 1 week  D/W Family at bedside     Daily Progression:    10/18/23 : Much improved. Alert/oriented. On RA. We discussed plans. 10/17/23 : Required 3LPM w/ CPAP last PM. Was more lethargic yesterday afternoon. Family noticed she was taking gabapentin 100mg TID, which is what was prescribed, but she was not taking at home. ABGs 7.39/58/73/35    10/16/23 : Weaned to RA. Sats 92% at rest. Cough much less congested. Wheezing improved. Did not wear CPAP or BiPAP last PM.     10/13/23 : Alert, oriented today sitting up in chair. Significant other and son at bedside. Much less wheeze on exam. No edema. 10/12/23 : Yesterday's events noted.  Increased lethargy yesterday cervical LAD    Chest: No pectus deformity, normal chest rise b/l    HEART:  RRR, no murmurs/rubs/gallops    Lungs:  Mild exp wheeze right, sig diminished bs left    ABD: Soft/NT, non rigid mildly distended    EXT: No cyanosis/clubbing/edema, normal peripheral pulses    Skin: No rashes or ulcers, no mottling    Neuro: A/O x 3        Medications:  Current Facility-Administered Medications   Medication Dose Route Frequency    acetylcysteine (MUCOMYST) 20 % solution 600 mg  600 mg Inhalation BID PRN    guaiFENesin (ROBITUSSIN) 100 MG/5ML liquid 200 mg  200 mg Oral Q4H PRN    predniSONE (DELTASONE) tablet 40 mg  40 mg Oral Daily    arformoterol 15 mcg-budesonide 0.5 mg neb solution   Nebulization BID RT    ipratropium 0.5 mg-albuterol 2.5 mg (DUONEB) nebulizer solution 1 Dose  1 Dose Inhalation Q4H WA RT    guaiFENesin (MUCINEX) extended release tablet 600 mg  600 mg Oral BID    insulin lispro (HUMALOG) injection vial 25 Units  25 Units SubCUTAneous TID WC    insulin lispro (HUMALOG) injection vial 0-16 Units  0-16 Units SubCUTAneous TID WC    insulin lispro (HUMALOG) injection vial 0-4 Units  0-4 Units SubCUTAneous Nightly    amLODIPine (NORVASC) tablet 10 mg  10 mg Oral Daily    aspirin EC tablet 81 mg  81 mg Oral Daily    calcitRIOL (ROCALTROL) capsule 0.25 mcg  0.25 mcg Oral Daily    carvedilol (COREG) tablet 12.5 mg  12.5 mg Oral BID WC    hydrALAZINE (APRESOLINE) tablet 50 mg  50 mg Oral 3 times per day    insulin glargine (LANTUS) injection vial 24 Units  24 Units SubCUTAneous Daily    latanoprost (XALATAN) 0.005 % ophthalmic solution 1 drop  1 drop Both Eyes Daily    levothyroxine (SYNTHROID) tablet 137 mcg  137 mcg Oral Daily    montelukast (SINGULAIR) tablet 10 mg  10 mg Oral Nightly    pantoprazole (PROTONIX) tablet 40 mg  40 mg Oral Daily    potassium chloride (KLOR-CON M) extended release tablet 20 mEq  20 mEq Oral Daily with breakfast    rosuvastatin (CRESTOR) tablet 20 mg  20 mg Oral Daily    sodium

## 2023-10-18 NOTE — DISCHARGE INSTRUCTIONS
with o2 at night     7. Bring these papers with you to your follow up appointments. The papers will help your doctors be sure to continue the care plan from the hospital.      If you have questions regarding the hospital related prescriptions or hospital related issues please call [facility] at [phone]. You can always direct your questions to your primary care doctor if you are unable to reach your hospital physician; your PCP works as an extension of your hospital doctor just like your hospital doctor is an extension of your PCP for your time at the hospital [facility], [facility])      Follow-up with:   PCP: @PCP@  [facility]  [address] [place], [address] [facility]  [phone]  Follow up  This is your home health company. Brian Andres MD  [address] [facility]  [phone]    Go on 10/18/2023  at 12:15 p.m. for your PCP hospital follow up. [address], [place]  Phone: [phone] [address], [address] [facility]  Follow up  This is your oxygen provider. Please contact to ensure oxygen equipment is delivered. Lauren Tellez MD  [address] [facility]  [address]  [facility]  [phone]    Schedule an appointment as soon as possible for a visit in 1 week(s)         Please call for your own appointment        Information obtained by :  I understand that if any problems occur once I am at home I am to contact my physician. I understand and acknowledge receipt of the instructions indicated above.                                                                                                                                            Physician's or R.N.'s Signature                                                                  Date/Time                                                                                                                                              Patient or Representative Signature

## 2023-10-18 NOTE — PLAN OF CARE
Problem: Respiratory - Adult  Goal: Achieves optimal ventilation and oxygenation  Outcome: Progressing     Problem: Discharge Planning  Goal: Discharge to home or other facility with appropriate resources  Outcome: Progressing     Problem: Pain  Goal: Verbalizes/displays adequate comfort level or baseline comfort level  Outcome: Progressing     Problem: Chronic Conditions and Co-morbidities  Goal: Patient's chronic conditions and co-morbidity symptoms are monitored and maintained or improved  Outcome: Progressing     Problem: Safety - Adult  Goal: Free from fall injury  Outcome: Progressing

## 2023-10-18 NOTE — PROGRESS NOTES
Eleanor Shaw DISCHARGE NOTE FROM [facility] NURSE    Patient determined to be stable for discharge by attending provider. I have reviewed the discharge instructions and follow-up appointments with the Patient. They verbalized understanding and all questions were answered to their satisfaction. No complaints or further questions were expressed. Medications sent to pharmacy Appropriate educational materials and medication side effect teaching were provided. PIV were removed prior to discharge. Patient did not discharge with any line, kelly, or drain. All personal items collected during admission were returned to the patient prior to discharge.     Post-op patient: No      Lorie Bolus, RN

## 2023-10-19 ENCOUNTER — TELEPHONE (OUTPATIENT)
Age: 73
End: 2023-10-19

## 2023-10-19 ENCOUNTER — HOME CARE VISIT (OUTPATIENT)
Facility: HOME HEALTH | Age: 73
End: 2023-10-19

## 2023-10-19 VITALS
DIASTOLIC BLOOD PRESSURE: 58 MMHG | TEMPERATURE: 97.1 F | OXYGEN SATURATION: 94 % | SYSTOLIC BLOOD PRESSURE: 142 MMHG | RESPIRATION RATE: 18 BRPM | HEART RATE: 65 BPM

## 2023-10-19 DIAGNOSIS — J96.22 ACUTE ON CHRONIC RESPIRATORY FAILURE WITH HYPERCAPNIA (HCC): ICD-10-CM

## 2023-10-19 DIAGNOSIS — G47.33 OBSTRUCTIVE SLEEP APNEA (ADULT) (PEDIATRIC): Primary | ICD-10-CM

## 2023-10-19 PROCEDURE — G0299 HHS/HOSPICE OF RN EA 15 MIN: HCPCS

## 2023-10-19 PROCEDURE — 0221000100 HH NO PAY CLAIM PROCEDURE

## 2023-10-19 RX ORDER — PANTOPRAZOLE SODIUM 40 MG/1
40 TABLET, DELAYED RELEASE ORAL DAILY
Qty: 90 TABLET | Refills: 3 | Status: SHIPPED | OUTPATIENT
Start: 2023-10-19

## 2023-10-19 RX ORDER — ALBUTEROL SULFATE 2.5 MG/3ML
2.5 SOLUTION RESPIRATORY (INHALATION)
Qty: 120 EACH | Refills: 11 | Status: SHIPPED | OUTPATIENT
Start: 2023-10-19

## 2023-10-19 RX ORDER — ROSUVASTATIN CALCIUM 20 MG/1
TABLET, COATED ORAL
Qty: 90 TABLET | Refills: 3 | Status: SHIPPED | OUTPATIENT
Start: 2023-10-19

## 2023-10-19 RX ORDER — AMLODIPINE BESYLATE 10 MG/1
10 TABLET ORAL DAILY
Qty: 90 TABLET | Refills: 3 | Status: SHIPPED | OUTPATIENT
Start: 2023-10-19

## 2023-10-19 RX ORDER — MONTELUKAST SODIUM 10 MG/1
10 TABLET ORAL NIGHTLY
Qty: 90 TABLET | Refills: 3 | Status: SHIPPED | OUTPATIENT
Start: 2023-10-19

## 2023-10-19 ASSESSMENT — ENCOUNTER SYMPTOMS
CONSTIPATION: 1
PAIN LOCATION - PAIN QUALITY: NEUROPATHY
DYSPNEA ACTIVITY LEVEL: AT REST
STOOL DESCRIPTION: FORMED

## 2023-10-19 NOTE — TELEPHONE ENCOUNTER
Just discharged from hospital on home oxygen. Schedule follow up for next week. Can be virtual if she cannot come to office. Will need bipap titration. Spoke to pulmonaryNEELIMA,NP   Will need BIPAP titration. Inform patient and get on schedule casa.

## 2023-10-19 NOTE — HOME HEALTH
Reason for referral, [facility] SUMMARY of clinical condition: asthma exacerbation admitted to home care for SN. Nursing needed for med management, cp assessment and teaching. Clinical Assessment/Skilled reason for admission to home health (What this means for the patient overall and need for ongoing skilled care):patient will continue to benefit from skilled nursing services for med management, cp assessment and teaching. Diagnosis: asthma exacerbation    Subjective (statement from pt/cg that is relative to why you are there): patient reports neuropathy pain to ble    Caregiver: relative/spouse. Caregiver assists with: Medications, Meals, Bathing, ADL, IADL, Wound Care, Transportation and Housekeeping Caregiver unable to assist with: na. Caregiver is available 24 hours/day Caregiver is  present at this visit and did participate with clinician. Medications reconciled and all medications are available in the home this visit. The following education was provided regarding medications: medication interactions and look alike medications: interaction between aspirin and ketorolac. Patient/CG able to demonstrate knowledge through teach back with 50 percent accuracy. MD notified of any discrepancies/medication interactions ketorolac and aspirin. A list of reconciled medications has been given to the patient/caregiver  and uploaded to media.     High risk med teaching was performed on High alert medication teaching on toujeo and humalog, Hypoglycemic medication education purpose, dose, and frequency, appearance and preparation of insulin and side effects such as upset stomach, diarrhea, metallic taste in mouth, headaches, feeling tired or weak High alert medication teaching on aspirin, antiplatelet therapy education;purpose, dose, and frequency, bleeding prevention strategies such as the use of a soft toothbrush, gentle flossing, use of electric razor and signs and symptoms of abnormal bleeding such as

## 2023-10-19 NOTE — TELEPHONE ENCOUNTER
Pt discharged from [facility] on 10-18-23 and needs a hospital follow up as soon as possible 24-48 hrs per the hospital     Pt did not get discharged in time to keep yesterday's appt she states. Please call to schedule.

## 2023-10-20 ENCOUNTER — TELEPHONE (OUTPATIENT)
Age: 73
End: 2023-10-20

## 2023-10-20 ENCOUNTER — HOME CARE VISIT (OUTPATIENT)
Facility: HOME HEALTH | Age: 73
End: 2023-10-20

## 2023-10-20 VITALS
DIASTOLIC BLOOD PRESSURE: 60 MMHG | TEMPERATURE: 98.1 F | OXYGEN SATURATION: 95 % | SYSTOLIC BLOOD PRESSURE: 110 MMHG | HEART RATE: 69 BPM | RESPIRATION RATE: 16 BRPM

## 2023-10-20 PROCEDURE — G0151 HHCP-SERV OF PT,EA 15 MIN: HCPCS

## 2023-10-20 NOTE — TELEPHONE ENCOUNTER
Patient was offered [facility] appt for Monday and declined and has accepted an appointment for Tuesday, 10/24/23.

## 2023-10-20 NOTE — HOME HEALTH
Reason for referral, [facility] SUMMARY of clinical condition: Chanell Camacho referred to [facility] after recent recent hospitalization for unspecified asthma, with acute exacerbation. Today she is reporting increased swelling in her legs and she does not understand why. She has not gone upstairs to shower, but she had hoped to do so last night. Prior to her hospitalization she walked around her house mostly with a Rollator, but also used a cane at times. patient has been sleeping downstairs in her house since she got home from the hospital because she is now using oxygen at night when she is sleeping. Clinical Assessment/Skilled reason for admission to home health (What this means for the patient overall and need for ongoing skilled care):patient is a pleasant 82-year-old female, who presents with deficits and bilateral lower extremity strength, balance and proprioception, and decline in overall activity tolerance. At baseline she ambulated with assisted devices and used a stair lift for stairs. She now crier is more assistance with sit to stay in transfers, and is more easily fatigued. patient is a high risk of falling with a Tinetti score of 13/28. Patient is a good physical therapy candidate and has a good prognosis for returning to her prior level of function. Diagnosis: unspecified asthma with acute exacerbation. Past medical history: obesity, hyperventilation syndrome, BRENT on CPAP, CKD, stage three, diabetic neuropathy, insulin dependent diabetes mellitus, hypertension, hypothyroidism, GERD, dyslipidemia      Subjective (statement from pt/cg that is relative to why you are there): I was going to go upstairs to shower last night but I just didn't feel like it. Caregiver: domestic partner. Caregiver assists with: Medications, Meals, Bathing, ADL, IADL, Wound Care, Transportation and Housekeeping Caregiver unable to assist with: n/a.  Caregiver is available 24 hours/day Caregiver is  present at this

## 2023-10-20 NOTE — TELEPHONE ENCOUNTER
PT reportsswelling to legs than greater than 3+ left being worse than the right, states this happens periodically but today is worse    /60, POX 95% on room air, pulse 69  No shortness of breath or chest pain    Temp 98.1 now with nasal congestion     States her nebulizer machine is not functioning properly and she has had it for years  Ordered for delivery, patient advised it may not come today and if she needs to she can purchase one at any pharmacy     States understanding

## 2023-10-22 NOTE — PROGRESS NOTES
HISTORY OF PRESENT ILLNESS   Elvia Jolly   is a 81 y.o.  female. hx HTN hld asthma hx CVA-DM-2 osteopenia , hx PE morbid obesity ckd 3 depression   Here for LOUIE--admitted for asthma exacerbation and hypoxia  Cxr clear  Treated with nebs and steroids-duo neb on d/c, not on steroid now  Had advair and albuterol inhalers  PULM added o2 to cpap at night for obesity hypoventilation=had telemed fu Dr Misbah Calix yesterday-will get bipap and 02 as needed    Some nocturnal confusion on neontin tid -lowered to qhs with improvement    toprol and dyazide were stopped due to low BP    No wheezing even PTA per pt  Still weak but feeling better  [facility] Pt[facility] with pt and nursing  Developed increased leg edema since return to home and large right LE bullae--has been off dyazide    BS controlled per pt  Date of Admission: 10/5/2023  Date of Discharge: 10/18/2023  Attending Physician: Cookie Green MD     Discharge Diagnosis:      Acute asthma exacerbation  Obesity hypoventilation syndrome   BRENT, on CPAP  Acute hypercarbia  Hypokalemia  CKD stage III  Diabetic neuropathy  IDDM  Leukocytosis likely reactive due to steroids  Hypertension  Hypothyroidism  GERD  Dyslipidemia     Consultations:  IP CONSULT TO HOSPITALIST  IP CONSULT TO CASE MANAGEMENT  IP CONSULT TO PULMONOLOGY  IP CONSULT TO PHYSICAL THERAPY  IP CONSULT TO CASE MANAGEMENT  IP CONSULT TO CASE MANAGEMENT  IP CONSULT TO CASE MANAGEMENT        Brief Admission History/Reason for Admission Per Cookie Green MD:   Carla Francois is a 81 y.o.  female with PMHx significant for multiple medical problems including asthma, hypertension, diabetes mellitus, dyslipidemia is coming the hospital chief complaint of shortness of breath, cough and wheezing. Patient reports that she was in her pain doctor's clinic yesterday and noted to have oxygen saturations in the range of around 88% and was advised to follow-up with her PCP.   She reports having shortness of breath since

## 2023-10-23 ENCOUNTER — TELEPHONE (OUTPATIENT)
Age: 73
End: 2023-10-23

## 2023-10-23 ENCOUNTER — HOME CARE VISIT (OUTPATIENT)
Facility: HOME HEALTH | Age: 73
End: 2023-10-23
Payer: MEDICARE

## 2023-10-23 ENCOUNTER — TELEMEDICINE (OUTPATIENT)
Age: 73
End: 2023-10-23
Payer: MEDICARE

## 2023-10-23 DIAGNOSIS — J96.22 ACUTE ON CHRONIC RESPIRATORY FAILURE WITH HYPERCAPNIA (HCC): ICD-10-CM

## 2023-10-23 DIAGNOSIS — G47.33 OBSTRUCTIVE SLEEP APNEA (ADULT) (PEDIATRIC): Primary | ICD-10-CM

## 2023-10-23 PROCEDURE — 1090F PRES/ABSN URINE INCON ASSESS: CPT | Performed by: INTERNAL MEDICINE

## 2023-10-23 PROCEDURE — G0300 HHS/HOSPICE OF LPN EA 15 MIN: HCPCS

## 2023-10-23 PROCEDURE — 1111F DSCHRG MED/CURRENT MED MERGE: CPT | Performed by: INTERNAL MEDICINE

## 2023-10-23 PROCEDURE — G8399 PT W/DXA RESULTS DOCUMENT: HCPCS | Performed by: INTERNAL MEDICINE

## 2023-10-23 PROCEDURE — 1123F ACP DISCUSS/DSCN MKR DOCD: CPT | Performed by: INTERNAL MEDICINE

## 2023-10-23 PROCEDURE — G8427 DOCREV CUR MEDS BY ELIG CLIN: HCPCS | Performed by: INTERNAL MEDICINE

## 2023-10-23 PROCEDURE — 3017F COLORECTAL CA SCREEN DOC REV: CPT | Performed by: INTERNAL MEDICINE

## 2023-10-23 PROCEDURE — 99213 OFFICE O/P EST LOW 20 MIN: CPT | Performed by: INTERNAL MEDICINE

## 2023-10-23 PROCEDURE — G0152 HHCP-SERV OF OT,EA 15 MIN: HCPCS

## 2023-10-23 ASSESSMENT — SLEEP AND FATIGUE QUESTIONNAIRES
HOW LIKELY ARE YOU TO NOD OFF OR FALL ASLEEP IN A CAR, WHILE STOPPED FOR A FEW MINUTES IN TRAFFIC: 0
HOW LIKELY ARE YOU TO NOD OFF OR FALL ASLEEP WHILE SITTING QUIETLY AFTER LUNCH WITHOUT ALCOHOL: 1
HOW LIKELY ARE YOU TO NOD OFF OR FALL ASLEEP WHILE SITTING AND TALKING TO SOMEONE: 0
HOW LIKELY ARE YOU TO NOD OFF OR FALL ASLEEP WHEN YOU ARE A PASSENGER IN A CAR FOR AN HOUR WITHOUT A BREAK: 2
HOW LIKELY ARE YOU TO NOD OFF OR FALL ASLEEP WHILE SITTING INACTIVE IN A PUBLIC PLACE: 0
HOW LIKELY ARE YOU TO NOD OFF OR FALL ASLEEP WHILE SITTING AND READING: 3
HOW LIKELY ARE YOU TO NOD OFF OR FALL ASLEEP WHILE LYING DOWN TO REST IN THE AFTERNOON WHEN CIRCUMSTANCES PERMIT: 2
HOW LIKELY ARE YOU TO NOD OFF OR FALL ASLEEP WHILE WATCHING TV: 2
ESS TOTAL SCORE: 10

## 2023-10-23 NOTE — TELEPHONE ENCOUNTER
----- Message from Kenzie Kimberly sent at 10/23/2023  1:45 PM EDT -----  Subject: Message to Provider    QUESTIONS  Information for Provider? Reporting patient has fluid based blister on   right leg and also has not been prescribed fluid meds since was in   hospital   ---------------------------------------------------------------------------  --------------  [address]  [phone]; OK to leave message on voicemail  ---------------------------------------------------------------------------  --------------  SCRIPT ANSWERS  Relationship to Patient? Covered Entity  Covered Entity Type? Home Health Care?   Representative Name? [facility]

## 2023-10-23 NOTE — PATIENT INSTRUCTIONS
[address] [facility] [place], [address]  Tel.  [phone]  Fax. [address]  [address], [address]  Tel.  [phone]  Fax. [phone] [facility], [address] [place] [address]  Tel.  [phone]  Fax. [phone]     PROPER SLEEP HYGIENE    What to avoid  Do not have drinks with caffeine, such as coffee or black tea, for 8 hours before bed. Do not smoke or use other types of tobacco near bedtime. Nicotine is a stimulant and can keep you awake. Avoid drinking alcohol late in the evening, because it can cause you to wake in the middle of the night. Do not eat a big meal close to bedtime. If you are hungry, eat a light snack. Do not drink a lot of water close to bedtime, because the need to urinate may wake you up during the night. Do not read or watch TV in bed. Use the bed only for sleeping and sexual activity. What to try  Go to bed at the same time every night, and wake up at the same time every morning. Do not take naps during the day. Keep your bedroom quiet, dark, and cool. Get regular exercise, but not within 3 to 4 hours of your bedtime. .  Sleep on a comfortable pillow and mattress. If watching the clock makes you anxious, turn it facing away from you so you cannot see the time. If you worry when you lie down, start a worry book. Well before bedtime, write down your worries, and then set the book and your concerns aside. Try meditation or other relaxation techniques before you go to bed. If you cannot fall asleep, get up and go to another room until you feel sleepy. Do something relaxing. Repeat your bedtime routine before you go to bed again. Make your house quiet and calm about an hour before bedtime. Turn down the lights, turn off the TV, log off the computer, and turn down the volume on music. This can help you relax after a busy day.     Drowsy Driving  The [address] [place] [address] Traffic Safety Administration cites drowsiness as a

## 2023-10-23 NOTE — PROGRESS NOTES
0 0 0   Vandemere Sleepiness Score 10 7 7    7 4 7     O>      Weight 207 pounds  110/60    Vital Signs: (As obtained by patient/caregiver at home)        Constitutional: [x] Appears well-developed and well-nourished [x] No apparent distress      [] Abnormal -     Mental status: [x] Alert and awake  [x] Oriented to person/place/time [x] Able to follow commands    [] Abnormal -     Eyes:   EOM    [x]  Normal    [] Abnormal -   Sclera  [x]  Normal    [] Abnormal -          Discharge [x]  None visible   [] Abnormal -     HENT: [x] Normocephalic, atraumatic      External Ears [x] Normal  [] Abnormal -    Neck: [x] No visualized mass [] Abnormal -     Pulmonary/Chest: [x] Respiratory effort normal   [x] No visualized signs of difficulty breathing or respiratory distress        [] Abnormal -       Neurological:        [x] No Facial Asymmetry (Cranial nerve 7 motor function) (limited exam due to video visit)          [x] No gaze palsy        [] Abnormal -          Skin:        [x] No significant exanthematous lesions or discoloration noted on facial skin         [] Abnormal -            Psychiatric:       [x] Normal Affect [] Abnormal -        Other pertinent observable physical exam findings:-            A>   Diagnosis Orders   1. Obstructive sleep apnea (adult) (pediatric)  SLEEP LAB (PAP TITRATION)      2. Acute on chronic respiratory failure with hypercapnia (HCC)          AHI = 5(2017). On CPAP, Respironics, DS2  :  10-13 cmH2O. Compliant:      yes    Therapeutic Response:  Positive    P>    BiPAP titration ordered with PCO2 monitoring. Once results available I will order a BiPAP for home use. We will start it at room air she is comfortable on room air now, optimize settings and add oxygen per Protocol as needed. In the meantime she will continue on her current settings with oxygen  She will inquire with her primary care doctor to see if gabapentin can be discontinued.     * We have recommended a dedicated

## 2023-10-24 ENCOUNTER — OFFICE VISIT (OUTPATIENT)
Age: 73
End: 2023-10-24

## 2023-10-24 ENCOUNTER — HOME CARE VISIT (OUTPATIENT)
Facility: HOME HEALTH | Age: 73
End: 2023-10-24
Payer: MEDICARE

## 2023-10-24 VITALS
SYSTOLIC BLOOD PRESSURE: 125 MMHG | HEART RATE: 80 BPM | RESPIRATION RATE: 18 BRPM | DIASTOLIC BLOOD PRESSURE: 70 MMHG | TEMPERATURE: 98.3 F | OXYGEN SATURATION: 98 %

## 2023-10-24 VITALS
WEIGHT: 218.6 LBS | SYSTOLIC BLOOD PRESSURE: 106 MMHG | DIASTOLIC BLOOD PRESSURE: 60 MMHG | RESPIRATION RATE: 16 BRPM | BODY MASS INDEX: 38.73 KG/M2 | HEIGHT: 63 IN | TEMPERATURE: 97 F | HEART RATE: 60 BPM | OXYGEN SATURATION: 97 %

## 2023-10-24 VITALS
HEART RATE: 55 BPM | SYSTOLIC BLOOD PRESSURE: 130 MMHG | TEMPERATURE: 97.7 F | OXYGEN SATURATION: 95 % | DIASTOLIC BLOOD PRESSURE: 65 MMHG | RESPIRATION RATE: 16 BRPM

## 2023-10-24 VITALS
HEART RATE: 65 BPM | TEMPERATURE: 97.4 F | SYSTOLIC BLOOD PRESSURE: 120 MMHG | BODY MASS INDEX: 39.34 KG/M2 | WEIGHT: 222 LBS | OXYGEN SATURATION: 95 % | DIASTOLIC BLOOD PRESSURE: 60 MMHG

## 2023-10-24 DIAGNOSIS — J96.01 ACUTE RESPIRATORY FAILURE WITH HYPOXIA (HCC): ICD-10-CM

## 2023-10-24 DIAGNOSIS — G47.33 OSA TREATED WITH BIPAP: Primary | ICD-10-CM

## 2023-10-24 DIAGNOSIS — I10 HYPERTENSION, UNSPECIFIED TYPE: ICD-10-CM

## 2023-10-24 DIAGNOSIS — E66.2 OBESITY HYPOVENTILATION SYNDROME (HCC): ICD-10-CM

## 2023-10-24 DIAGNOSIS — J45.901 EXACERBATION OF ASTHMA, UNSPECIFIED ASTHMA SEVERITY, UNSPECIFIED WHETHER PERSISTENT: ICD-10-CM

## 2023-10-24 DIAGNOSIS — R60.0 BILATERAL LEG EDEMA: ICD-10-CM

## 2023-10-24 DIAGNOSIS — Z23 ENCOUNTER FOR IMMUNIZATION: ICD-10-CM

## 2023-10-24 PROCEDURE — G0151 HHCP-SERV OF PT,EA 15 MIN: HCPCS

## 2023-10-24 RX ORDER — FUROSEMIDE 20 MG/1
20 TABLET ORAL DAILY
Qty: 30 TABLET | Refills: 0 | Status: SHIPPED | OUTPATIENT
Start: 2023-10-24

## 2023-10-24 RX ORDER — PEN NEEDLE, DIABETIC 31 GX5/16"
NEEDLE, DISPOSABLE MISCELLANEOUS
COMMUNITY
Start: 2023-09-06

## 2023-10-24 RX ORDER — MONTELUKAST SODIUM 10 MG/1
10 TABLET ORAL DAILY
Qty: 90 TABLET | Refills: 3 | Status: SHIPPED | OUTPATIENT
Start: 2023-10-24

## 2023-10-24 ASSESSMENT — ENCOUNTER SYMPTOMS
SKIN LESIONS: 1
DYSPNEA ACTIVITY LEVEL: AFTER AMBULATING 10 - 20 FT

## 2023-10-24 NOTE — PROGRESS NOTES
1. \"Have you been to the ER, urgent care clinic since your last visit? Hospitalized since your last visit? \"         10/5 hypoxia    2. \"Have you seen or consulted any other health care providers outside of the [address] [place] [address] since your last visit? \" No     3. For patients aged 43-73: Has the patient had a colonoscopy / FIT/ Cologuard? 2022 anthony      If the patient is female:    4. For patients aged 43-66: Has the patient had a mammogram within the past 2 years? 6/2023 [facility]      5. For patients aged 21-65: Has the patient had a pap smear?   No

## 2023-10-24 NOTE — HOME HEALTH
Reason for referral, [facility] SUMMARY of clinical condition:  Pt. hospitalized 10/05/23-10/18/23 secondary to acute hypoxic respiratory failure due to acute asthma exacerbation, acute hypercarbia,  leukocytes most likely reactive due to steroids. Pt. is now using oxygen at night through her CPap. Clinical Assessment/Skilled reason for admission to home health (What this means for the patient overall and need for ongoing skilled care):   Pt. presents with dyspnea on exertion, mild LE edema and new fluid filled blister on her R shin. Pt. reports she is concerned since was taking dyazide prior to her hospitalization and was told to discontinue it. Pt. has an appt. with her PCP tomorrow and will address these concerns then. Pt. resides in a 2 story home with her partner of many years, adult daughter and 2 grandchildren. Pt. has a chair lift to access the second floor and 3 rollators--one for the lower level, one for the upper level and one in the master bathroom since the rollator doesn't fit through the doorway. Pt. is currently requiring assistance for bed transfers and ADL tasks. Pt. was previously managing her own medications and preparing simple meals for herself. Pt. is below her baseline level of functioning and will beneft from O.T. intervention to maximzie overall safety and independence. Pt. has excellent caregiver support. See Interventions / Goals for additional details. Diagnosis: asthma, obesity hypoventilation syndrome, BRENT on CPap, IDDM, diabetic neuropathy, HTN, hypothyroidism, GERD, dyslipidemai, CKD Stage 3, hypokalemia, h/o CVA 2004. Subjective (statement from pt/cg that is relative to why you are there):  \"Look at this thing on my leg. \"    Caregiver: life partner, sister, daughter. Caregiver assists with: Medications, Meals, Bathing, ADL, IADL and Transportation Caregiver unable to assist with: nothing. Caregiver is available Regularly.  Caregiver is  present at this visit

## 2023-10-24 NOTE — HOME HEALTH
Subjective: Patient notes that the blister is still intact on her right leg. She sees her PCP today after PT. Falls since last visit No(if yes complete the Fall Tracking Form and include bsrifallreport):   Caregiver involvement changes: no change  Home health supplies by type and quantity ordered/delivered this visit include: n/a    Clinician asked if patient has had any physician contact since last home care visit and patient states: NO  Clinician asked if patient has any new or changed medications and patient states:  NO    If Yes, were medications reconciled? N/A    Was the certifying physician notified of changes in medications? N/A      Clinical assessment (what this visit means for the patient overall and need for ongoing skilled care) and progress or lack of progress towards SPECIFIC goals: Patient had greater fatigue today compared to last session. Her house was very warm and she was educated that keeping it cooler might make her breathing easier. Further progression of strengthening and gait needed to improve endurance for ADLs and household activities    Written Teaching Material Utilized: written HEP inside [facility] booklet    Interdisciplinary communication with: N/A     Discharge planning as follows:  Will discharge when the patient has reached their maximum functional potential and maximum safety in their home and When goals are met    Specific plan for next visit: progress instruction on balance as tolerated

## 2023-10-24 NOTE — HOME HEALTH
Subjective: wants to know why she was take off fluid pills when she came out the hospital  Falls since last visit No(if yes complete the Fall Tracking Form and include bsrifallreport):   Caregiver involvement changes: no  Home health supplies by type and quantity ordered/delivered this visit include: na    Clinician asked if patient has had any physician contact since last home care visit and patient states: NO  Clinician asked if patient has any new or changed medications and patient states:  N/A   If Yes, were medications reconciled? NO   Was the certifying physician notified of changes in medications? N/A     Clinical assessment (what this visit means for the patient overall and need for ongoing skilled care) and progress or lack of progress towards SPECIFIC goals: noted a fluid based blister on right lower leg. no drainage at this time. called dr Dariel Lowe to report blister and tht she is not on fluid pills at this time,has a appt tomorrow,advised to take med list from hosptal to dr khan. left message with Leeann. educated on diabetic diet,needs jose funes    Written Teaching Material Utilized: verbal    Interdisciplinary communication with: md    Discharge planning as follows:  When goals are met    Specific plan for next visit: assessment

## 2023-10-25 ASSESSMENT — ENCOUNTER SYMPTOMS: SKIN LESIONS: 1

## 2023-10-26 ENCOUNTER — HOME CARE VISIT (OUTPATIENT)
Facility: HOME HEALTH | Age: 73
End: 2023-10-26
Payer: MEDICARE

## 2023-10-26 VITALS
OXYGEN SATURATION: 91 % | DIASTOLIC BLOOD PRESSURE: 51 MMHG | HEART RATE: 57 BPM | TEMPERATURE: 97 F | WEIGHT: 220.2 LBS | RESPIRATION RATE: 16 BRPM | BODY MASS INDEX: 39.01 KG/M2 | SYSTOLIC BLOOD PRESSURE: 119 MMHG

## 2023-10-26 VITALS
HEART RATE: 56 BPM | SYSTOLIC BLOOD PRESSURE: 120 MMHG | RESPIRATION RATE: 20 BRPM | TEMPERATURE: 98.2 F | DIASTOLIC BLOOD PRESSURE: 50 MMHG | OXYGEN SATURATION: 92 %

## 2023-10-26 PROCEDURE — G0299 HHS/HOSPICE OF RN EA 15 MIN: HCPCS

## 2023-10-26 PROCEDURE — G0151 HHCP-SERV OF PT,EA 15 MIN: HCPCS

## 2023-10-26 NOTE — HOME HEALTH
Subjective: \"I have a blister on my right leg. \"  Falls since last visit No(if yes complete the Fall Tracking Form and include bsrifallreport):   Caregiver involvement changes: NA  Home health supplies by type and quantity ordered/delivered this visit include: Pt needs an oxygen sign for the door. Clinician asked if patient has had any physician contact since last home care visit and patient states: YES  Clinician asked if patient has any new or changed medications and patient states:  YES MAR updated  If Yes, were medications reconciled? YES Furosemide and Montelukast  Was the certifying physician notified of changes in medications? NO Prescribing doctor is attending doctor, Dr. Thierno Lockett (what this visit means for the patient overall and need for ongoing skilled care) and progress or lack of progress towards SPECIFIC goals: SN educated pt on Lasix and Monteluklast. Patient has a large blister on right calf, DR. Meka Chowdhury is aware of blister and has ordered pt to perform wound care and to call him if the blister bursts. No wound care provided by SN. SN evaluated blister and noted that it is closed and dressing is dry and clean, no signs of infection. Pt would benefit from future [address] visits to educate pt and to monitor blister for s/s of infection to prevent rehospitalization. Written Teaching Material Utilized: N/A    Interdisciplinary communication with: N/A     Discharge planning as follows: When wound is 90% healed and Per physician order    Specific plan for next visit: Educate on infection medication and Educate in s/s of Montelukast and when to call [facility] [address], MD or 911. Pt needs oxygen sign. Monitor blister on leg, no wound care orders.

## 2023-10-26 NOTE — HOME HEALTH
Subjective: Patient states that the nurse was just there. She notes that the blister is about to pop. Falls since last visit No(if yes complete the Fall Tracking Form and include bsrifallreport):   Caregiver involvement changes: no change  Home health supplies by type and quantity ordered/delivered this visit include: n/a    Clinician asked if patient has had any physician contact since last home care visit and patient states: YES  Clinician asked if patient has any new or changed medications and patient states:  yes  If Yes, were medications reconciled? YES    Was the certifying physician notified of changes in medications? N/A      Clinical assessment (what this visit means for the patient overall and need for ongoing skilled care) and progress or lack of progress towards SPECIFIC goals: Patient demonstrates improved oxygen levels with ambulation and exercise. She was instructed on oxygen safety today and given packet with signs for house and equipment. pt provided verbal teachback of oxygen safety training. Further PT needed to improve activity tolerance. Written Teaching Material Utilized: N/A    Interdisciplinary communication with: N/A    Discharge planning as follows: Will discharge when the patient has reached their maximum functional potential and maximum safety in their home    Specific plan for next visit: progress gait and LE exercsies to improve endurance in the home.

## 2023-10-27 ENCOUNTER — HOME CARE VISIT (OUTPATIENT)
Facility: HOME HEALTH | Age: 73
End: 2023-10-27
Payer: MEDICARE

## 2023-10-27 PROCEDURE — G0152 HHCP-SERV OF OT,EA 15 MIN: HCPCS

## 2023-10-28 ENCOUNTER — HOSPITAL ENCOUNTER (EMERGENCY)
Facility: HOSPITAL | Age: 73
Discharge: HOME | End: 2023-10-28
Attending: STUDENT IN AN ORGANIZED HEALTH CARE EDUCATION/TRAINING PROGRAM
Payer: MEDICARE

## 2023-10-28 VITALS
OXYGEN SATURATION: 91 % | SYSTOLIC BLOOD PRESSURE: 158 MMHG | TEMPERATURE: 98.2 F | HEART RATE: 57 BPM | DIASTOLIC BLOOD PRESSURE: 52 MMHG | RESPIRATION RATE: 20 BRPM

## 2023-10-28 DIAGNOSIS — T14.8XXA BLISTER: Primary | ICD-10-CM

## 2023-10-28 DIAGNOSIS — L03.115 CELLULITIS OF RIGHT LOWER EXTREMITY: ICD-10-CM

## 2023-10-28 PROCEDURE — 6370000000 HC RX 637 (ALT 250 FOR IP): Performed by: STUDENT IN AN ORGANIZED HEALTH CARE EDUCATION/TRAINING PROGRAM

## 2023-10-28 PROCEDURE — 10060 I&D ABSCESS SIMPLE/SINGLE: CPT

## 2023-10-28 PROCEDURE — 99283 EMERGENCY DEPT VISIT LOW MDM: CPT

## 2023-10-28 RX ORDER — CEPHALEXIN 500 MG/1
500 CAPSULE ORAL 4 TIMES DAILY
Qty: 28 CAPSULE | Refills: 0 | Status: SHIPPED | OUTPATIENT
Start: 2023-10-28 | End: 2023-11-04

## 2023-10-28 RX ORDER — IPRATROPIUM BROMIDE AND ALBUTEROL SULFATE 2.5; .5 MG/3ML; MG/3ML
1 SOLUTION RESPIRATORY (INHALATION)
Status: COMPLETED | OUTPATIENT
Start: 2023-10-28 | End: 2023-10-28

## 2023-10-28 RX ORDER — CEPHALEXIN 250 MG/1
500 CAPSULE ORAL
Status: COMPLETED | OUTPATIENT
Start: 2023-10-28 | End: 2023-10-28

## 2023-10-28 RX ADMIN — CEPHALEXIN 500 MG: 250 CAPSULE ORAL at 18:28

## 2023-10-28 RX ADMIN — IPRATROPIUM BROMIDE AND ALBUTEROL SULFATE 1 DOSE: .5; 3 SOLUTION RESPIRATORY (INHALATION) at 18:05

## 2023-10-28 ASSESSMENT — PAIN SCALES - GENERAL: PAINLEVEL_OUTOF10: 7

## 2023-10-28 NOTE — ED PROVIDER NOTES
[facility] EMERGENCY DEPT  EMERGENCY DEPARTMENT ENCOUNTER       Pt Name: Dinora Mckenzie  MRN: [id number]  [address] [facility] 1950  Date of evaluation: 10/28/2023  Provider: Meeta Avalos MD   PCP: Manish Holman MD  Note Started: 5:37 PM EDT 10/28/23     CHIEF COMPLAINT       Chief Complaint   Patient presents with    Blister     Patient reports that a large blister came up on her leg on Thursday. She reports that she was recently treated for fluid overload. HISTORY OF PRESENT ILLNESS: 1 or more elements      History From: patient and daughter, History limited by: none     Dinora Mckenzie is a 81 y.o. female with right leg pain. Getting out of bed told to come to ED for further getting worse. She has PMI only she stands on it but denies any pain when she is resting. No chest pain, shortness of breath or fevers. Please See MDM for Additional Details of the HPI/PMH  Nursing Notes were all reviewed and agreed with or any disagreements were addressed in the HPI. REVIEW OF SYSTEMS        Positives and Pertinent negatives as per HPI.     PAST HISTORY     Past Medical History:  Past Medical History:   Diagnosis Date    Asthma     CAD (coronary artery disease)     \"mild\" per Dr Fidel Lozano note    Diabetes [facility])     Dr Pao Lane     Hyperlipidemia     Hypertension     Long term current use of anticoagulant therapy     Nausea & vomiting     Pulmonary embolism ([address])     Screening for colon cancer 02/16/2005    Dr Juan Osgood in 10 years    Stroke [facility]) 2004    Rt side weaker than left, uses a cane: no longer followed by neuro    Thromboembolus ([address]) 1976    Rt leg moved to lung     Thyroid disease     Unspecified sleep apnea     uses CPAP       Past Surgical History:  Past Surgical History:   Procedure Laterality Date    CARDIAC CATHETERIZATION  6/6/2012         CARDIAC CATHETERIZATION      COLONOSCOPY N/A 10/24/2022    COLONOSCOPY performed by Scott Fowler MD at [address] [place] [address] ophthalmic solution  Commonly known as: XALATAN     levothyroxine 137 MCG tablet  Commonly known as: SYNTHROID  TAKE 1 TABLET DAILY BEFORE BREAKFAST     losartan 100 MG tablet  Commonly known as: COZAAR  Take 1 tablet by mouth daily     * montelukast 10 MG tablet  Commonly known as: SINGULAIR  Take 1 tablet by mouth nightly     * montelukast 10 MG tablet  Commonly known as: SINGULAIR  Take 1 tablet by mouth daily     pantoprazole 40 MG tablet  Commonly known as: PROTONIX  Take 1 tablet by mouth daily     potassium chloride 20 MEQ extended release tablet  Commonly known as: KLOR-CON M     rosuvastatin 20 MG tablet  Commonly known as: CRESTOR  TAKE 1 TABLET oral DAILY     tiZANidine 2 MG tablet  Commonly known as: ZANAFLEX     Toujeo SoloStar 300 UNIT/ML concentrated injection pen  Generic drug: insulin glargine (1 unit dial)           * This list has 4 medication(s) that are the same as other medications prescribed for you. Read the directions carefully, and ask your doctor or other care provider to review them with you. Where to Get Your Medications        These medications were sent to [address] [place] [address], [facility] [phone] -  [phone]  [address] [place], [place] [address]      Phone: [phone]   cephALEXin 500 MG capsule           DISCONTINUED MEDICATIONS:  Current Discharge Medication List          I am the Primary Clinician of Record. Edilia Colón MD (electronically signed)    (Please note that parts of this dictation were completed with voice recognition software. Quite often unanticipated grammatical, syntax, homophones, and other interpretive errors are inadvertently transcribed by the computer software. Please disregards these errors.  Please excuse any errors that have escaped final proofreading.)         Edilia Colón MD  10/28/23 6720

## 2023-10-29 ENCOUNTER — HOME CARE VISIT (OUTPATIENT)
Dept: HOME HEALTH SERVICES | Facility: HOME HEALTH | Age: 73
End: 2023-10-29
Payer: MEDICARE

## 2023-10-30 ENCOUNTER — NURSE TRIAGE (OUTPATIENT)
Dept: OTHER | Facility: CLINIC | Age: 73
End: 2023-10-30

## 2023-10-30 ENCOUNTER — HOME CARE VISIT (OUTPATIENT)
Facility: HOME HEALTH | Age: 73
End: 2023-10-30
Payer: MEDICARE

## 2023-10-30 ENCOUNTER — TELEPHONE (OUTPATIENT)
Age: 73
End: 2023-10-30

## 2023-10-30 VITALS
RESPIRATION RATE: 16 BRPM | SYSTOLIC BLOOD PRESSURE: 132 MMHG | DIASTOLIC BLOOD PRESSURE: 70 MMHG | HEART RATE: 78 BPM | OXYGEN SATURATION: 96 % | TEMPERATURE: 98.1 F

## 2023-10-30 PROCEDURE — G0300 HHS/HOSPICE OF LPN EA 15 MIN: HCPCS

## 2023-10-30 ASSESSMENT — ENCOUNTER SYMPTOMS: SHORTNESS OF BREATH: 0

## 2023-10-30 NOTE — TELEPHONE ENCOUNTER
[facility]    Received call from Autumn at [employer] with [employer] Complaint. Subjective: Caller states \"Seen I ER for blister last week. Today level of pain is 7 and redness has increased\"     Current Symptoms:   Redness surrounding blister   serosanguineous   Swelling to mid calf    Onset: Thursday last week     Pain Severity: 7/10; Temperature: denies     What has been tried: antibiotic     Recommended disposition: See PCP within 24 Hours    Care advice provided, patient verbalizes understanding; denies any other questions or concerns; instructed to call back for any new or worsening symptoms. Patient/Caller agrees with recommended disposition; writer provided warm transfer to [facility] at [employer] for appointment scheduling    Attention Provider: Thank you for allowing me to participate in the care of your patient. The patient was connected to triage in response to information provided to the ECC/PSC. Please do not respond through this encounter as the response is not directed to a shared pool. Reason for Disposition   [1] Taking antibiotic > 24 hours AND [2] wound infection symptoms are WORSE (e.g., draining pus, pain, red streak, spreading redness, swelling)    Protocols used:  Wound Infection on Antibiotic Follow-up Call-ADULT-

## 2023-10-30 NOTE — HOME HEALTH
Subjective: pain level of 7 in right blister area  Falls since last visit {YES/NO:19726}(if yes complete the Fall Tracking Form and include bsrifallreport):   Caregiver involvement changes: ***  Home health supplies by type and quantity ordered/delivered this visit include: ***    Clinician asked if patient has had any physician contact since last home care visit and patient states: {YES/NO/NA:14650}  Clinician asked if patient has any  new or changed medications and patient states:  {YES/NO/NA:84638} ***  If Yes, were medications reconciled? {XBQ/VB/HV:22130} ***  Was the certifying physician notified of changes in medications? {YES/NO/NA:18385} ***    Clinical assessment (what this visit means for the patient overall and need for ongoing skilled care) and progress or lack of progress towards SPECIFIC goals: called dr Ackerman [place] office and spoke with Sharin Dancer ,md made appt forti gamez. instructed to call office back if symptoms worsens    Written Teaching Material Utilized: {BSRITEACHINGMATERIAL:60841}    Interdisciplinary communication with: {BSRHHDISCIPLINE:47688} for the purpose of {BSRHHCOLLABORATIONREASON:32033}    Discharge planning as follows: {[facility] DISCHARGE ASMDXARX:11271}    Specific plan for next visit: {[facility] PLAN FOR NEXT VWZLA:54177}

## 2023-10-30 NOTE — TELEPHONE ENCOUNTER
Fiorella MALDONADO  reporting blister on leg has now popped. Needs wound care orders.    Verbal is ok or portal.

## 2023-10-31 ENCOUNTER — OFFICE VISIT (OUTPATIENT)
Age: 73
End: 2023-10-31
Payer: MEDICARE

## 2023-10-31 ENCOUNTER — HOME CARE VISIT (OUTPATIENT)
Facility: HOME HEALTH | Age: 73
End: 2023-10-31
Payer: MEDICARE

## 2023-10-31 VITALS
HEART RATE: 67 BPM | OXYGEN SATURATION: 94 % | BODY MASS INDEX: 39.01 KG/M2 | DIASTOLIC BLOOD PRESSURE: 65 MMHG | TEMPERATURE: 98.2 F | RESPIRATION RATE: 24 BRPM | HEIGHT: 63 IN | SYSTOLIC BLOOD PRESSURE: 152 MMHG

## 2023-10-31 VITALS
SYSTOLIC BLOOD PRESSURE: 121 MMHG | BODY MASS INDEX: 37.73 KG/M2 | WEIGHT: 213 LBS | DIASTOLIC BLOOD PRESSURE: 51 MMHG | OXYGEN SATURATION: 93 % | TEMPERATURE: 98.1 F | HEART RATE: 62 BPM

## 2023-10-31 VITALS
DIASTOLIC BLOOD PRESSURE: 70 MMHG | OXYGEN SATURATION: 90 % | HEART RATE: 57 BPM | BODY MASS INDEX: 38.88 KG/M2 | WEIGHT: 219.5 LBS | SYSTOLIC BLOOD PRESSURE: 140 MMHG | TEMPERATURE: 98.5 F

## 2023-10-31 VITALS
RESPIRATION RATE: 16 BRPM | SYSTOLIC BLOOD PRESSURE: 130 MMHG | TEMPERATURE: 98.1 F | HEART RATE: 60 BPM | DIASTOLIC BLOOD PRESSURE: 60 MMHG | OXYGEN SATURATION: 89 %

## 2023-10-31 DIAGNOSIS — S81.801D WOUND OF RIGHT LOWER EXTREMITY, SUBSEQUENT ENCOUNTER: Primary | ICD-10-CM

## 2023-10-31 DIAGNOSIS — J96.01 ACUTE HYPOXIC RESPIRATORY FAILURE (HCC): ICD-10-CM

## 2023-10-31 DIAGNOSIS — E11.40 TYPE 2 DIABETES MELLITUS WITH DIABETIC NEUROPATHY, WITH LONG-TERM CURRENT USE OF INSULIN (HCC): ICD-10-CM

## 2023-10-31 DIAGNOSIS — N18.31 STAGE 3A CHRONIC KIDNEY DISEASE (HCC): ICD-10-CM

## 2023-10-31 DIAGNOSIS — Z79.4 TYPE 2 DIABETES MELLITUS WITH DIABETIC NEUROPATHY, WITH LONG-TERM CURRENT USE OF INSULIN (HCC): ICD-10-CM

## 2023-10-31 DIAGNOSIS — I10 PRIMARY HYPERTENSION: ICD-10-CM

## 2023-10-31 DIAGNOSIS — R60.0 BILATERAL LEG EDEMA: ICD-10-CM

## 2023-10-31 PROCEDURE — 1123F ACP DISCUSS/DSCN MKR DOCD: CPT | Performed by: INTERNAL MEDICINE

## 2023-10-31 PROCEDURE — 2022F DILAT RTA XM EVC RTNOPTHY: CPT | Performed by: INTERNAL MEDICINE

## 2023-10-31 PROCEDURE — G0158 HHC OT ASSISTANT EA 15: HCPCS

## 2023-10-31 PROCEDURE — 1036F TOBACCO NON-USER: CPT | Performed by: INTERNAL MEDICINE

## 2023-10-31 PROCEDURE — 1111F DSCHRG MED/CURRENT MED MERGE: CPT | Performed by: INTERNAL MEDICINE

## 2023-10-31 PROCEDURE — 1090F PRES/ABSN URINE INCON ASSESS: CPT | Performed by: INTERNAL MEDICINE

## 2023-10-31 PROCEDURE — 99214 OFFICE O/P EST MOD 30 MIN: CPT | Performed by: INTERNAL MEDICINE

## 2023-10-31 PROCEDURE — G0151 HHCP-SERV OF PT,EA 15 MIN: HCPCS

## 2023-10-31 PROCEDURE — G8399 PT W/DXA RESULTS DOCUMENT: HCPCS | Performed by: INTERNAL MEDICINE

## 2023-10-31 PROCEDURE — 3052F HG A1C>EQUAL 8.0%<EQUAL 9.0%: CPT | Performed by: INTERNAL MEDICINE

## 2023-10-31 PROCEDURE — G8417 CALC BMI ABV UP PARAM F/U: HCPCS | Performed by: INTERNAL MEDICINE

## 2023-10-31 PROCEDURE — 3017F COLORECTAL CA SCREEN DOC REV: CPT | Performed by: INTERNAL MEDICINE

## 2023-10-31 PROCEDURE — 3078F DIAST BP <80 MM HG: CPT | Performed by: INTERNAL MEDICINE

## 2023-10-31 PROCEDURE — 3077F SYST BP >= 140 MM HG: CPT | Performed by: INTERNAL MEDICINE

## 2023-10-31 PROCEDURE — G8484 FLU IMMUNIZE NO ADMIN: HCPCS | Performed by: INTERNAL MEDICINE

## 2023-10-31 PROCEDURE — G8427 DOCREV CUR MEDS BY ELIG CLIN: HCPCS | Performed by: INTERNAL MEDICINE

## 2023-10-31 RX ORDER — DOXYCYCLINE HYCLATE 100 MG
100 TABLET ORAL 2 TIMES DAILY
Qty: 14 TABLET | Refills: 0 | Status: SHIPPED | OUTPATIENT
Start: 2023-10-31 | End: 2023-11-07

## 2023-10-31 SDOH — ECONOMIC STABILITY: FOOD INSECURITY: WITHIN THE PAST 12 MONTHS, YOU WORRIED THAT YOUR FOOD WOULD RUN OUT BEFORE YOU GOT MONEY TO BUY MORE.: NEVER TRUE

## 2023-10-31 SDOH — ECONOMIC STABILITY: FOOD INSECURITY: WITHIN THE PAST 12 MONTHS, THE FOOD YOU BOUGHT JUST DIDN'T LAST AND YOU DIDN'T HAVE MONEY TO GET MORE.: NEVER TRUE

## 2023-10-31 SDOH — ECONOMIC STABILITY: INCOME INSECURITY: HOW HARD IS IT FOR YOU TO PAY FOR THE VERY BASICS LIKE FOOD, HOUSING, MEDICAL CARE, AND HEATING?: NOT HARD AT ALL

## 2023-10-31 ASSESSMENT — PATIENT HEALTH QUESTIONNAIRE - PHQ9
6. FEELING BAD ABOUT YOURSELF - OR THAT YOU ARE A FAILURE OR HAVE LET YOURSELF OR YOUR FAMILY DOWN: 0
9. THOUGHTS THAT YOU WOULD BE BETTER OFF DEAD, OR OF HURTING YOURSELF: 0
3. TROUBLE FALLING OR STAYING ASLEEP: 0
10. IF YOU CHECKED OFF ANY PROBLEMS, HOW DIFFICULT HAVE THESE PROBLEMS MADE IT FOR YOU TO DO YOUR WORK, TAKE CARE OF THINGS AT HOME, OR GET ALONG WITH OTHER PEOPLE: 0
1. LITTLE INTEREST OR PLEASURE IN DOING THINGS: 0
2. FEELING DOWN, DEPRESSED OR HOPELESS: 0
4. FEELING TIRED OR HAVING LITTLE ENERGY: 0
7. TROUBLE CONCENTRATING ON THINGS, SUCH AS READING THE NEWSPAPER OR WATCHING TELEVISION: 0
SUM OF ALL RESPONSES TO PHQ QUESTIONS 1-9: 0
SUM OF ALL RESPONSES TO PHQ QUESTIONS 1-9: 0
5. POOR APPETITE OR OVEREATING: 0
8. MOVING OR SPEAKING SO SLOWLY THAT OTHER PEOPLE COULD HAVE NOTICED. OR THE OPPOSITE, BEING SO FIGETY OR RESTLESS THAT YOU HAVE BEEN MOVING AROUND A LOT MORE THAN USUAL: 0
SUM OF ALL RESPONSES TO PHQ QUESTIONS 1-9: 0
SUM OF ALL RESPONSES TO PHQ QUESTIONS 1-9: 0
SUM OF ALL RESPONSES TO PHQ9 QUESTIONS 1 & 2: 0

## 2023-10-31 ASSESSMENT — ENCOUNTER SYMPTOMS: SKIN LESIONS: 1

## 2023-10-31 NOTE — HOME HEALTH
Subjective: Pt states she is in a lot of pain due to blister on leg  Falls since last visit (if yes complete the Fall Tracking Form and include bsrifallreport): No  Caregiver involvement changes: No  Home health supplies by type and quantity ordered/delivered this visit include: Pt has ordered bed rail and is expecting it to arrive sometime this week. Clinician asked if patient has had any physician contact since last home care visit and patient states: No  Clinician asked if patient has any new or changed medications and patient states: no  If Yes, were medications reconciled? no  Was the certifying physician notified of changes in medications? n/a    Clinical assessment (what this visit means for the patient overall and need for ongoing skilled care) and progress or lack of progress towards SPECIFIC goals: Progressing towards bed mobility goal by beggining HEP to improve BUE strength in preparation for obtaining bed rail. Pain/O2 levels were a limiting factor throughout this session, pt would benefit from continued skilled OT to address ADL/IADL goals once leg pain is addressed. Pts daughter reports that the pt has experienced heightened anxiety today which may be a factor impacting O2 levels in todays session. Pt reported not having any portable oxygen tanks, ALYSSA called Arnold Christiansen MD that pt will be seeing today and requested that an order be placed during her visit today. Written Teaching Material Utilized: N/A    Interdisciplinary communication with: Arnold Christiansen MD for the purpose of obtaining portable O2 tanks while at MD appt today. Discharge planning as follows: Per physician order, Will discharge when the patient has reached their maximum functional potential and maximum safety in their home and When goals are met    Specific plan for next visit: Instruct caregiver/patient in safe bed mobility and use of AE.

## 2023-10-31 NOTE — PROGRESS NOTES
1. \"Have you been to the ER, urgent care clinic since your last visit? Hospitalized since your last visit? \" Yes [facility]    2. \"Have you seen or consulted any other health care providers outside of the [address] since your last visit? \" {Yes when where and reason for visit:98512}     3. For patients aged 43-73: Has the patient had a colonoscopy / FIT/ Cologuard? {Colon Cancer Care Gap present?:23558}      If the patient is female:    4. For patients aged 43-66: Has the patient had a mammogram within the past 2 years? {Cancer Care Gap present?:17438}      5. For patients aged 21-65: Has the patient had a pap smear?  {Cancer Care Gap present?:19778}
Comprehensive Metabolic Panel   On insulin had been on steroids while hospitalized steroids are tapered off no additional invention today Comprehensive Metabolic Panel    CBC      4. Stage 3a chronic kidney disease (HCC)  CBC    Comprehensive Metabolic Panel    Comprehensive Metabolic Panel   Patient was recently admitted and discharged October 18 needs a repeat metabolic panel to ensure kidney function stable has not placed on Lasix since then need to check electrolytes as well CBC      5. Primary hypertension     Mildly elevated today but anxious about her leg    Generally pressure has been well controlled on Coreg losartan and Norvasc continue to change the dose   6. Acute hypoxic respiratory failure ([address])     Recently admitted with respiratory failure    Has oxygen at home    O2 levels are stable today        Depression screen reviewed and negative. Scribed by Alexandre Aguero of [address] [place] [address], as dictated by Dr. Carranza Creálvaro. Current diagnosis and concerns discussed with pt at length. Pt understands risks and benefits or current treatment plan and medications, and accepts the treatment and medication with any possible risks. Pt asks appropriate questions, which were answered. Pt was instructed to call with any concerns or problems. I have reviewed the note documented by the scribe. The services provided are my own. The documentation is accurate.

## 2023-10-31 NOTE — HOME HEALTH
Subjective: Patient states her leg is hurting so much she can't walk and she has been pushed to the bathroom in her rollator. Falls since last visit No(if yes complete the Fall Tracking Form and include bsrifallreport):   Caregiver involvement changes: no change  Home health supplies by type and quantity ordered/delivered this visit include: n/a    Clinician asked if patient has had any physician contact since last home care visit and patient states: YES  Clinician asked if patient has any new or changed medications and patient states:  YES cephalexin  If Yes, were medications reconciled? YES    Was the certifying physician notified of changes in medications? YES      Clinical assessment (what this visit means for the patient overall and need for ongoing skilled care) and progress or lack of progress towards SPECIFIC goals: Patient's resting oxygen was 89% today so she put on her oxygen at 2L/min. oxygen saturation went up to 98%. Patient was not able to participate in any standing activity today due to pain in the right leg. Kapil PT needed to improve endurance for walking in patient's house    Written Teaching Material Utilized: N/A    Interdisciplinary communication with: Alexandre Weiner LPN and Magda Cruz OT for the purpose of POC collaboration    Discharge planning as follows: Will discharge when the patient has reached their maximum functional potential and maximum safety in their home    Specific plan for next visit: progress gait training if pain is improved.

## 2023-10-31 NOTE — HOME HEALTH
Subjective:  \"I'm just waiting for it to burst.\"   Pt. has guaze wrapped around the blister on her RLE. Falls since last visit:   None.  (if yes complete the Fall Tracking Form and include bsrifallreport):   Caregiver involvement changes:  None. Home health supplies by type and quantity ordered/delivered this visit include:  N/A. Clinician asked if patient has had any physician contact since last home care visit and patient states:  Yes. Clinician asked if patient has any new or changed medications and patient states:  No.  If Yes, were medications reconciled? N/A. Was the certifying physician notified of changes in medications? N/A. Clinical assessment (what this visit means for the patient overall and need for ongoing skilled care) and progress or lack of progress towards SPECIFIC goals:   Blister on RLE is larger than previously seen by O.T. Photo taken and uploaded into Media section of the chart. Pt. has lost 2.5 lbs. since last O.T. visit and reports and demonstrates decreased dyspnea with exertion. O.T. instructed pt. / caregiver on use of platfrom step near the side of the bed for bed transfers. Pt. demonstrated increased independence with transfers using step platform. O.T. instructed pt. / caregiver on shower seat options. Pt. demonstrates overall progress with funcitonal mobility, transfers, and decreased weight and decreased dyspnea with exertion. Pt. will continue to benefit from O.T. intervention to maximize overall safety and independence. See Interventions / Goals for additional details. Written Teaching Material Utilized: DME recommendations. Interdisciplinary communication with: None this visit. Discharge planning as follows: When goals met or max benefit achieved. Specific plan for next visit:  f/u on bed transfers and bed mobility, f/u on shower seat, functional mobility training.

## 2023-11-01 LAB
ALBUMIN SERPL-MCNC: 2.8 G/DL (ref 3.5–5)
ALBUMIN/GLOB SERPL: 0.9 (ref 1.1–2.2)
ALP SERPL-CCNC: 83 U/L (ref 45–117)
ALT SERPL-CCNC: 18 U/L (ref 12–78)
ANION GAP SERPL CALC-SCNC: 4 MMOL/L (ref 5–15)
AST SERPL-CCNC: 6 U/L (ref 15–37)
BILIRUB SERPL-MCNC: 0.8 MG/DL (ref 0.2–1)
BUN SERPL-MCNC: 15 MG/DL (ref 6–20)
BUN/CREAT SERPL: 10 (ref 12–20)
CALCIUM SERPL-MCNC: 10.1 MG/DL (ref 8.5–10.1)
CHLORIDE SERPL-SCNC: 108 MMOL/L (ref 97–108)
CO2 SERPL-SCNC: 37 MMOL/L (ref 21–32)
CREAT SERPL-MCNC: 1.48 MG/DL (ref 0.55–1.02)
ERYTHROCYTE [DISTWIDTH] IN BLOOD BY AUTOMATED COUNT: 17.1 % (ref 11.5–14.5)
GLOBULIN SER CALC-MCNC: 3.2 G/DL (ref 2–4)
GLUCOSE SERPL-MCNC: 78 MG/DL (ref 65–100)
HCT VFR BLD AUTO: 34.6 % (ref 35–47)
HGB BLD-MCNC: 9.8 G/DL (ref 11.5–16)
MCH RBC QN AUTO: 23.3 PG (ref 26–34)
MCHC RBC AUTO-ENTMCNC: 28.3 G/DL (ref 30–36.5)
MCV RBC AUTO: 82.4 FL (ref 80–99)
NRBC # BLD: 0 K/UL (ref 0–0.01)
NRBC BLD-RTO: 0 PER 100 WBC
PLATELET # BLD AUTO: 130 K/UL (ref 150–400)
PMV BLD AUTO: 12.2 FL (ref 8.9–12.9)
POTASSIUM SERPL-SCNC: 3.7 MMOL/L (ref 3.5–5.1)
PROT SERPL-MCNC: 6 G/DL (ref 6.4–8.2)
RBC # BLD AUTO: 4.2 M/UL (ref 3.8–5.2)
SODIUM SERPL-SCNC: 149 MMOL/L (ref 136–145)
WBC # BLD AUTO: 4.1 K/UL (ref 3.6–11)

## 2023-11-01 NOTE — TELEPHONE ENCOUNTER
Spoke with [facility] from [facility]    She is requesting order for Xeroform guaze and ABD pad to open blister    VORB per Dawson Chun MD     States understanding

## 2023-11-02 ENCOUNTER — HOME CARE VISIT (OUTPATIENT)
Facility: HOME HEALTH | Age: 73
End: 2023-11-02
Payer: MEDICARE

## 2023-11-02 VITALS
BODY MASS INDEX: 38.14 KG/M2 | SYSTOLIC BLOOD PRESSURE: 121 MMHG | HEART RATE: 55 BPM | DIASTOLIC BLOOD PRESSURE: 50 MMHG | OXYGEN SATURATION: 92 % | TEMPERATURE: 98.1 F | WEIGHT: 215.3 LBS

## 2023-11-02 VITALS
TEMPERATURE: 98.1 F | SYSTOLIC BLOOD PRESSURE: 121 MMHG | HEART RATE: 55 BPM | OXYGEN SATURATION: 92 % | DIASTOLIC BLOOD PRESSURE: 50 MMHG | RESPIRATION RATE: 18 BRPM

## 2023-11-02 DIAGNOSIS — D69.6 ANEMIA WITH LOW PLATELET COUNT (HCC): Primary | ICD-10-CM

## 2023-11-02 PROCEDURE — G0158 HHC OT ASSISTANT EA 15: HCPCS

## 2023-11-02 PROCEDURE — G0300 HHS/HOSPICE OF LPN EA 15 MIN: HCPCS

## 2023-11-02 PROCEDURE — G0151 HHCP-SERV OF PT,EA 15 MIN: HCPCS

## 2023-11-02 ASSESSMENT — ENCOUNTER SYMPTOMS
PAIN LOCATION - PAIN QUALITY: THROBBING
PAIN LOCATION - PAIN QUALITY: THROB

## 2023-11-02 NOTE — HOME HEALTH
Subjective: Patient states that her right leg was so swollen last night she was wondering if she needed to go to the hospital.  Her oxygen yesterday morning when she woke up was 78% and its been running around 90% during the day. Falls since last visit No(if yes complete the Fall Tracking Form and include bsrifallreport):   Caregiver involvement changes: no change  Home health supplies by type and quantity ordered/delivered this visit include: n/a    Clinician asked if patient has had any physician contact since last home care visit and patient states: YES  Clinician asked if patient has any new or changed medications and patient states:  NO  scheduled for the wound clinic now  If Yes, were medications reconciled? NO    Was the certifying physician notified of changes in medications? N/A      Clinical assessment (what this visit means for the patient overall and need for ongoing skilled care) and progress or lack of progress towards SPECIFIC goals: Patient continues to be very limited due to pain in her right LE. Her oxygen saturation is better when she performs exercises and sits upright instead of leaning back in her recliner. Patient was instructed to sit upright and exercise every hour at minimum if she is not able to get up and walk due to pain. Written Teaching Material Utilized: N/A    Interdisciplinary communication with: message for pulmonologist Madai Alfonso for the purpose of daytime oxygen saturation levels     Discharge planning as follows: Will discharge when the patient has reached their maximum functional potential and maximum safety in their home    Specific plan for next visit: progress standing balance training next visit if right leg pain is less and progress sitting core and LE strengthening to help improve activity tolerance.

## 2023-11-02 NOTE — HOME HEALTH
Subjective: Pt states she is still in pain  Falls since last visit (if yes complete the Fall Tracking Form and include bsrifallreport): No  Caregiver involvement changes: No  Home health supplies by type and quantity ordered/delivered this visit include: No    Clinician asked if patient has had any physician contact since last home care visit and patient states: Yes, PCP for leg wound  Clinician asked if patient has any new or changed medications and patient states: Antibiotic change but do not have the meds yet  If Yes, were medications reconciled? Yes  Was the certifying physician notified of changes in medications? Yes    Clinical assessment (what this visit means for the patient overall and need for ongoing skilled care) and progress or lack of progress towards SPECIFIC goals: Progression towards all goals is limited due to pain levels and pt refusal of standing tasks. O2 levels remain unstable at rest and during activity, PT called pulmonologist regarding concerns of pts O2 dropping to 76% while sleeping the night before last which was reported by caregiver. Pulmonologist may want to see pt sooner than currently scheduled appt. ALYSSA contacted portable O2 company ([employer])regarding O2 tanks, they advised pt would need referral from pulmonologist. Pt requires portable O2 tanks due to safety concerns of power going out, MD appts, and due to oxygen dropping w/ activity. Pt would continue to benefit from skilled OT to address concerns listed above. Written Teaching Material Utilized: N/A    Interdisciplinary communication with: N/A    Discharge planning as follows: Per physician order, Will discharge when the patient has reached their maximum functional potential and maximum safety in their home and When goals are met    Specific plan for next visit: Instruct caregiver/patient in safe toilet transfers.

## 2023-11-03 VITALS
HEART RATE: 87 BPM | SYSTOLIC BLOOD PRESSURE: 120 MMHG | RESPIRATION RATE: 16 BRPM | DIASTOLIC BLOOD PRESSURE: 60 MMHG | OXYGEN SATURATION: 97 % | TEMPERATURE: 98.5 F

## 2023-11-04 ENCOUNTER — HOME CARE VISIT (OUTPATIENT)
Facility: HOME HEALTH | Age: 73
End: 2023-11-04
Payer: MEDICARE

## 2023-11-04 PROCEDURE — G0300 HHS/HOSPICE OF LPN EA 15 MIN: HCPCS

## 2023-11-05 VITALS
OXYGEN SATURATION: 95 % | BODY MASS INDEX: 37.73 KG/M2 | DIASTOLIC BLOOD PRESSURE: 62 MMHG | WEIGHT: 213 LBS | RESPIRATION RATE: 20 BRPM | TEMPERATURE: 98.6 F | HEART RATE: 67 BPM | SYSTOLIC BLOOD PRESSURE: 130 MMHG

## 2023-11-05 ASSESSMENT — ENCOUNTER SYMPTOMS: SKIN LESIONS: 1

## 2023-11-06 ENCOUNTER — HOME CARE VISIT (OUTPATIENT)
Facility: HOME HEALTH | Age: 73
End: 2023-11-06
Payer: MEDICARE

## 2023-11-06 ENCOUNTER — TELEPHONE (OUTPATIENT)
Age: 73
End: 2023-11-06

## 2023-11-06 PROCEDURE — G0300 HHS/HOSPICE OF LPN EA 15 MIN: HCPCS

## 2023-11-06 NOTE — HOME HEALTH
Subjective: No changes  Falls since last visit NO(if yes complete the Fall Tracking Form and include bsrifallreport):   Caregiver involvement changes: no  Home health supplies by type and quantity ordered/delivered this visit include: wc supplies    Clinician asked if patient has had any physician contact since last home care visit and patient states: NO  Clinician asked if patient has any new or changed medications and patient states:  NO   If Yes, were medications reconciled? NO   Was the certifying physician notified of changes in medications? NO     Clinical assessment (what this visit means for the patient overall and need for ongoing skilled care) and progress or lack of progress towards SPECIFIC goals: pt in need of continued sn for wound care    Written Teaching Material Utilized: N/A    Interdisciplinary communication with: N/A for the purpose of na    Discharge planning as follows:  When wound is 100% healed    Specific plan for next visit: wound care

## 2023-11-07 ENCOUNTER — HOME CARE VISIT (OUTPATIENT)
Facility: HOME HEALTH | Age: 73
End: 2023-11-07
Payer: MEDICARE

## 2023-11-07 ENCOUNTER — HOME CARE VISIT (OUTPATIENT)
Dept: HOME HEALTH SERVICES | Facility: HOME HEALTH | Age: 73
End: 2023-11-07
Payer: MEDICARE

## 2023-11-07 PROCEDURE — G0157 HHC PT ASSISTANT EA 15: HCPCS

## 2023-11-08 ENCOUNTER — HOME CARE VISIT (OUTPATIENT)
Facility: HOME HEALTH | Age: 73
End: 2023-11-08
Payer: MEDICARE

## 2023-11-08 VITALS
HEART RATE: 64 BPM | SYSTOLIC BLOOD PRESSURE: 130 MMHG | WEIGHT: 214 LBS | RESPIRATION RATE: 16 BRPM | DIASTOLIC BLOOD PRESSURE: 62 MMHG | OXYGEN SATURATION: 92 % | TEMPERATURE: 97.8 F | BODY MASS INDEX: 37.91 KG/M2

## 2023-11-08 VITALS
HEART RATE: 62 BPM | DIASTOLIC BLOOD PRESSURE: 52 MMHG | TEMPERATURE: 98.4 F | WEIGHT: 214 LBS | SYSTOLIC BLOOD PRESSURE: 106 MMHG | BODY MASS INDEX: 37.91 KG/M2 | OXYGEN SATURATION: 94 %

## 2023-11-08 VITALS
HEART RATE: 82 BPM | TEMPERATURE: 98.6 F | OXYGEN SATURATION: 97 % | RESPIRATION RATE: 16 BRPM | DIASTOLIC BLOOD PRESSURE: 60 MMHG | SYSTOLIC BLOOD PRESSURE: 128 MMHG

## 2023-11-08 VITALS
SYSTOLIC BLOOD PRESSURE: 110 MMHG | HEART RATE: 64 BPM | RESPIRATION RATE: 18 BRPM | TEMPERATURE: 99 F | DIASTOLIC BLOOD PRESSURE: 65 MMHG | OXYGEN SATURATION: 96 %

## 2023-11-08 PROCEDURE — G0299 HHS/HOSPICE OF RN EA 15 MIN: HCPCS

## 2023-11-08 PROCEDURE — G0158 HHC OT ASSISTANT EA 15: HCPCS

## 2023-11-08 ASSESSMENT — ENCOUNTER SYMPTOMS
PAIN LOCATION - PAIN QUALITY: ACHING
PAIN LOCATION - PAIN QUALITY: THROBBING
PAIN LOCATION - PAIN QUALITY: BURNING

## 2023-11-08 NOTE — HOME HEALTH
Subjective: Pt reported increased endurance since last week. Falls since last visit No(if yes complete the Fall Tracking Form and include bsrifallreport):   Caregiver involvement changes: none. Home health supplies by type and quantity ordered/delivered this visit include: none. Clinician asked if patient has had any physician contact since last home care visit and patient states: NO  Clinician asked if patient has any new or changed medications and patient states:  NO   If Yes, were medications reconciled? NO   Was the certifying physician notified of changes in medications? N/A     Clinical assessment (what this visit means for the patient overall and need for ongoing skilled care) and progress or lack of progress towards SPECIFIC goals: Todays visit allowed pt to receive HEP std training which will in turn decreased fall risk. B LE strenth training goals have not been met. Written Teaching Material Utilized: PTA educated pt and caretaker : upgraded HEP B LE in std: TR/HR's, marching, mini squatd, SLR ABD, Hamstring curls with B UE support to increase B LE strength and std balance with use of handout with instructions and pics. Interdisciplinary communication with:  PT for the purpose of POC collaboration    Discharge planning as follows: When goals are met    Specific plan for next visit: Instruct caregiver/patient in std B LE ex to increase strength.

## 2023-11-08 NOTE — HOME HEALTH
Subjective: Pt reports being in less pain compared to previous sessions and shared that she is able to walk   Falls since last visit (if yes complete the Fall Tracking Form and include bsrifallreport): No  Caregiver involvement changes: No  Home health supplies by type and quantity ordered/delivered this visit include: No    Clinician asked if patient has had any physician contact since last home care visit and patient states: No  Clinician asked if patient has any new or changed medications and patient states: No  If Yes, were medications reconciled? N/a  Was the certifying physician notified of changes in medications? n/a    Clinical assessment (what this visit means for the patient overall and need for ongoing skilled care) and progress or lack of progress towards SPECIFIC goals: Pt progressing towards ADL goal of toilet transfers and bathing. Pt has made limited progress due to refusal to participate in therapy due to pain levels. ALYSSA educated pt on upcoming re-assessment and informed pt that she has the opportunity to be discharged from services temporarily if she feels as though pain from wound will continue to limit her participation and progress. Written Teaching Material Utilized: N/A    Interdisciplinary communication with: N/A    Discharge planning as follows: Per physician order, Will discharge when the patient has reached their maximum functional potential and maximum safety in their home and When goals are met    Specific plan for next visit: Instruct caregiver/patient in safe completion of shower routine.

## 2023-11-08 NOTE — HOME HEALTH
Subjective: \"The doctor put me on a new antibiotic\"  Falls since last visit: No (if yes complete the Fall Tracking Form and include bsrifallreport):   Caregiver involvement changes: n/a  Home health supplies by type and quantity ordered/delivered this visit include: n/a    Clinician asked if patient has had any physician contact since last home care visit and patient states: NO  Clinician asked if patient has any new or changed medications and patient states:  YES Doxycycline added  If Yes, were medications reconciled? YES see med list  Was the certifying physician notified of changes in medications? YES MD aware    Clinical assessment (what this visit means for the patient overall and need for ongoing skilled care) and progress or lack of progress towards SPECIFIC goals: Patient with wound on RLE reqiuring SN services to reduce risk for infection and rehospitalization. Patient progressing towards educational and wound care goals but not yet met    Written Teaching Material Utilized: N/A    Interdisciplinary communication with: N/A     Discharge planning as follows:  When goals are met    Specific plan for next visit: Assessment, wound care

## 2023-11-09 ENCOUNTER — HOME CARE VISIT (OUTPATIENT)
Facility: HOME HEALTH | Age: 73
End: 2023-11-09
Payer: MEDICARE

## 2023-11-09 VITALS
RESPIRATION RATE: 16 BRPM | SYSTOLIC BLOOD PRESSURE: 118 MMHG | DIASTOLIC BLOOD PRESSURE: 60 MMHG | OXYGEN SATURATION: 93 % | TEMPERATURE: 98.3 F | HEART RATE: 58 BPM

## 2023-11-09 PROCEDURE — G0151 HHCP-SERV OF PT,EA 15 MIN: HCPCS

## 2023-11-09 ASSESSMENT — ENCOUNTER SYMPTOMS
PAIN LOCATION - PAIN QUALITY: ACHE
SKIN LESIONS: 1

## 2023-11-09 NOTE — HOME HEALTH
Subjective: Patient states that her leg feels a lot better and she's been trying to be more active throughout the day. Falls since last visit No(if yes complete the Fall Tracking Form and include bsrifallreport):   Caregiver involvement changes: no change    Clinician asked if patient has had any physician contact since last home care visit and patient states: NO  Clinician asked if patient has any new or changed medications and patient states:  NO    If Yes, were medications reconciled? YES    Was the certifying physician notified of changes in medications? N/A          Clinical assessment (what this visit means for the patient overall and need for ongoing skilled care) and progress or lack of progress towards SPECIFIC goals: Patient has been seen for 3 weeks PT with focus on strengthening, gait training, and instruction in HEP. Patient has made significant improvement in gait and now ambulates with longer step length, improved foot clearance, and no SOB. Tinetti has improved from 7/28 at [facility] to 21/28 today. All therapy goals have been achieved and she is not back to her baseline in regards to mobility and independence in the home. Patient has been instructed to continue her home exercises on a daily basis. All therapy goals have been achieved and patient will be discharged from PT today. Discharge Instructions:    Written Teaching Material Utilized: discharge instructions    Instructed patient/caregiver on the following: Take all medications exactly as prescribed by physician.  Updated medication list present in the home at discharge, Keep all scheduled medical appointments, Wash hands frequently to control the spread of infection, Follow safety and fall prevention education to prevent falls and Continue home exercises as instructed by your therapist    Call physician for: continuous pain, abnormal bleeding, high fever over 100.4 degrees, increased pain, new problem and frequent falls    The

## 2023-11-10 ENCOUNTER — HOME CARE VISIT (OUTPATIENT)
Facility: HOME HEALTH | Age: 73
End: 2023-11-10
Payer: MEDICARE

## 2023-11-10 PROCEDURE — G0300 HHS/HOSPICE OF LPN EA 15 MIN: HCPCS

## 2023-11-13 ENCOUNTER — OFFICE VISIT (OUTPATIENT)
Age: 73
End: 2023-11-13
Payer: MEDICARE

## 2023-11-13 ENCOUNTER — HOME CARE VISIT (OUTPATIENT)
Dept: HOME HEALTH SERVICES | Facility: HOME HEALTH | Age: 73
End: 2023-11-13
Payer: MEDICARE

## 2023-11-13 VITALS
TEMPERATURE: 97.3 F | RESPIRATION RATE: 18 BRPM | SYSTOLIC BLOOD PRESSURE: 138 MMHG | WEIGHT: 215.8 LBS | BODY MASS INDEX: 38.24 KG/M2 | HEIGHT: 63 IN | DIASTOLIC BLOOD PRESSURE: 68 MMHG | HEART RATE: 66 BPM | OXYGEN SATURATION: 92 %

## 2023-11-13 DIAGNOSIS — I10 HYPERTENSION, UNSPECIFIED TYPE: ICD-10-CM

## 2023-11-13 DIAGNOSIS — N18.30 STAGE 3 CHRONIC KIDNEY DISEASE, UNSPECIFIED WHETHER STAGE 3A OR 3B CKD (HCC): ICD-10-CM

## 2023-11-13 DIAGNOSIS — N18.30 STAGE 3 CHRONIC KIDNEY DISEASE, UNSPECIFIED WHETHER STAGE 3A OR 3B CKD (HCC): Primary | ICD-10-CM

## 2023-11-13 DIAGNOSIS — D63.8 ANEMIA, CHRONIC DISEASE: ICD-10-CM

## 2023-11-13 DIAGNOSIS — D69.6 THROMBOCYTOPENIA (HCC): ICD-10-CM

## 2023-11-13 DIAGNOSIS — R60.0 LEG EDEMA, RIGHT: Primary | ICD-10-CM

## 2023-11-13 DIAGNOSIS — R60.0 LEG EDEMA, LEFT: ICD-10-CM

## 2023-11-13 DIAGNOSIS — H66.002 NON-RECURRENT ACUTE SUPPURATIVE OTITIS MEDIA OF LEFT EAR WITHOUT SPONTANEOUS RUPTURE OF TYMPANIC MEMBRANE: ICD-10-CM

## 2023-11-13 DIAGNOSIS — J45.901 EXACERBATION OF ASTHMA, UNSPECIFIED ASTHMA SEVERITY, UNSPECIFIED WHETHER PERSISTENT: ICD-10-CM

## 2023-11-13 PROCEDURE — 3017F COLORECTAL CA SCREEN DOC REV: CPT | Performed by: INTERNAL MEDICINE

## 2023-11-13 PROCEDURE — G8417 CALC BMI ABV UP PARAM F/U: HCPCS | Performed by: INTERNAL MEDICINE

## 2023-11-13 PROCEDURE — 1111F DSCHRG MED/CURRENT MED MERGE: CPT | Performed by: INTERNAL MEDICINE

## 2023-11-13 PROCEDURE — 3075F SYST BP GE 130 - 139MM HG: CPT | Performed by: INTERNAL MEDICINE

## 2023-11-13 PROCEDURE — 1090F PRES/ABSN URINE INCON ASSESS: CPT | Performed by: INTERNAL MEDICINE

## 2023-11-13 PROCEDURE — 3078F DIAST BP <80 MM HG: CPT | Performed by: INTERNAL MEDICINE

## 2023-11-13 PROCEDURE — G8427 DOCREV CUR MEDS BY ELIG CLIN: HCPCS | Performed by: INTERNAL MEDICINE

## 2023-11-13 PROCEDURE — 99214 OFFICE O/P EST MOD 30 MIN: CPT | Performed by: INTERNAL MEDICINE

## 2023-11-13 PROCEDURE — G8399 PT W/DXA RESULTS DOCUMENT: HCPCS | Performed by: INTERNAL MEDICINE

## 2023-11-13 PROCEDURE — 1036F TOBACCO NON-USER: CPT | Performed by: INTERNAL MEDICINE

## 2023-11-13 PROCEDURE — G8484 FLU IMMUNIZE NO ADMIN: HCPCS | Performed by: INTERNAL MEDICINE

## 2023-11-13 PROCEDURE — 1123F ACP DISCUSS/DSCN MKR DOCD: CPT | Performed by: INTERNAL MEDICINE

## 2023-11-13 RX ORDER — HYDRALAZINE HYDROCHLORIDE 50 MG/1
TABLET, FILM COATED ORAL
Qty: 90 TABLET | Refills: 5 | Status: CANCELLED | OUTPATIENT
Start: 2023-11-13

## 2023-11-13 RX ORDER — AMOXICILLIN 500 MG/1
500 CAPSULE ORAL 3 TIMES DAILY
Qty: 21 CAPSULE | Refills: 0 | Status: SHIPPED | OUTPATIENT
Start: 2023-11-13 | End: 2023-11-20

## 2023-11-14 ENCOUNTER — HOME CARE VISIT (OUTPATIENT)
Facility: HOME HEALTH | Age: 73
End: 2023-11-14
Payer: MEDICARE

## 2023-11-14 VITALS
DIASTOLIC BLOOD PRESSURE: 51 MMHG | BODY MASS INDEX: 38.09 KG/M2 | OXYGEN SATURATION: 95 % | SYSTOLIC BLOOD PRESSURE: 131 MMHG | HEART RATE: 65 BPM | WEIGHT: 215 LBS | TEMPERATURE: 98.1 F

## 2023-11-14 PROCEDURE — G0158 HHC OT ASSISTANT EA 15: HCPCS

## 2023-11-14 ASSESSMENT — ENCOUNTER SYMPTOMS: PAIN LOCATION - PAIN QUALITY: SHOOTING PAIN

## 2023-11-14 NOTE — HOME HEALTH
Subjective: pain has lessened in right leg. Falls since last visit No(if yes complete the Fall Tracking Form and include bsrifallreport):   Caregiver involvement changes:   Home health supplies by type and quantity ordered/delivered this visit include: supplies in home    Clinician asked if patient has had any physician contact since last home care visit and patient states: NO  Clinician asked if patient has any new or changed medications and patient states:  N/A   If Yes, were medications reconciled? N/A   Was the certifying physician notified of changes in medications? N/A     Clinical assessment (what this visit means for the patient overall and need for ongoing skilled care) and progress or lack of progress towards SPECIFIC goals: current wound treatment is healing wound.having yellow drainage form blister fluid.educated on signs of infection    Written Teaching Material Utilized: verbal    Interdisciplinary communication with: rod    Discharge planning as follows:  When goals are met    Specific plan for next visit:assessment wound care med regime

## 2023-11-14 NOTE — HOME HEALTH
Subjective: Pt reports forgetting about OT appt- reports they have swtiched so many appts around and cancelled again her wound clinic appt until 11/28- 2 weeks. Falls since last visit (if yes complete the Fall Tracking Form and include bsrifallreport): No  Caregiver involvement changes: No  Home health supplies by type and quantity ordered/delivered this visit include: Yes, bed rail. Clinician asked if patient has had any physician contact since last home care visit and patient states: Yes, PCP  Clinician asked if patient has any new or changed medications and patient states: Yes, PCP prescribed penicillin for ear infection. Meds have not been picked up yet. If Yes, were medications reconciled? Yes  Was the certifying physician notified of changes in medications? Yes    Clinical assessment (what this visit means for the patient overall and need for ongoing skilled care) and progress or lack of progress towards SPECIFIC goals: Pt is making progress towards ADL and bed mobility goals at this time. Decrease in pain levels has increased pts participation, however, endurance appears limited. Pt would benefit from additional skilled OT to address bathing when new AE is obtained. Pt requested OT continue due to noticable decreased UE strength which has a direct effect on her ability to participate in ADL/IADL activities. Written Teaching Material Utilized: N/A    Interdisciplinary communication with: Chica Frost for the purpose of re-assessment.      Discharge planning as follows: Per physician order, Will discharge when the patient has reached their maximum functional potential and maximum safety in their home and When goals are met    Specific plan for next visit: OTR re-assessment

## 2023-11-15 ENCOUNTER — PATIENT MESSAGE (OUTPATIENT)
Age: 73
End: 2023-11-15

## 2023-11-15 ENCOUNTER — HOME CARE VISIT (OUTPATIENT)
Facility: HOME HEALTH | Age: 73
End: 2023-11-15
Payer: MEDICARE

## 2023-11-15 PROCEDURE — G0300 HHS/HOSPICE OF LPN EA 15 MIN: HCPCS

## 2023-11-15 RX ORDER — FUROSEMIDE 20 MG/1
20 TABLET ORAL DAILY
Qty: 90 TABLET | Refills: 0 | Status: SHIPPED | OUTPATIENT
Start: 2023-11-15

## 2023-11-15 NOTE — TELEPHONE ENCOUNTER
From: Kylah Maxwell  To: Dr. Dolly Lacy: 11/15/2023 3:44 PM EST  Subject: prescription    please refull furosemide

## 2023-11-15 NOTE — TELEPHONE ENCOUNTER
PCP: Ghazala Vásquez MD    Last appt: 11/13/2023   Future Appointments   Date Time Provider Department Center   11/16/2023 To Be Determined Selwyn Esparza, [address]   11/17/2023 To Be Determined [address] [place] [address]   11/20/2023 To Be [address], [facility] Carlton Fofana [address] [place] [address]   11/20/2023  8:30 PM [facility] [address] [facility]   11/22/2023 To Be Determined Oneal [address] [place] [address]   11/24/2023 To Be Determined Pierce Valencia RN [address] [place] [address]   11/27/2023 To Be Determined Oneal [address] [place] [address]   11/28/2023 10:15 PM Reji Butler MD [facility]   11/29/2023 To Be Determined Anali Holbrook LPN [address] [place] [address]   12/1/2023 To Be Determined Oneal [address] Dr[address] [place] [address]   12/4/2023 To Be Determined Oneal [address]agan Dr[address] [place] [address]   12/6/2023 To Be Determined Carolyn Betancourt RN [address] [place] [address]   12/8/2023 To Be 2030 [address], [facility] Carlton Fofana [address] [place] [address]   12/11/2023 11:40 AM Shann [place], MD [facility] BS AMB   12/14/2023  3:30 PM Ghazala Vásquez MD [facility] BS AMB   1/4/2024 11:20 AM Shann [place], MD [facility] AM[facility]   1/8/2024 12:10 PM Kalia Beatty MD [facility] BS AMB   1/9/2024 11:15 AM Ghazala Vásquez MD [facility] BS AMB   6/14/2024  2:20 PM Jethro Tamayo MD [facility] BS AMB       Requested Prescriptions     Pending Prescriptions Disp Refills    furosemide (LASIX) 20 MG tablet 90 tablet 0     Sig: Take 1 tablet by mouth daily

## 2023-11-16 ENCOUNTER — HOME CARE VISIT (OUTPATIENT)
Facility: HOME HEALTH | Age: 73
End: 2023-11-16
Payer: MEDICARE

## 2023-11-16 VITALS
SYSTOLIC BLOOD PRESSURE: 138 MMHG | HEART RATE: 80 BPM | DIASTOLIC BLOOD PRESSURE: 80 MMHG | TEMPERATURE: 98 F | OXYGEN SATURATION: 97 % | RESPIRATION RATE: 16 BRPM

## 2023-11-16 PROCEDURE — G0152 HHCP-SERV OF OT,EA 15 MIN: HCPCS

## 2023-11-17 ENCOUNTER — HOME CARE VISIT (OUTPATIENT)
Facility: HOME HEALTH | Age: 73
End: 2023-11-17
Payer: MEDICARE

## 2023-11-17 PROCEDURE — G0300 HHS/HOSPICE OF LPN EA 15 MIN: HCPCS

## 2023-11-19 VITALS
HEART RATE: 55 BPM | TEMPERATURE: 98.2 F | WEIGHT: 210 LBS | OXYGEN SATURATION: 92 % | DIASTOLIC BLOOD PRESSURE: 60 MMHG | BODY MASS INDEX: 37.2 KG/M2 | SYSTOLIC BLOOD PRESSURE: 140 MMHG

## 2023-11-19 ASSESSMENT — ENCOUNTER SYMPTOMS
SKIN LESIONS: 1
PAIN LOCATION - PAIN QUALITY: SORE

## 2023-11-20 ENCOUNTER — HOME CARE VISIT (OUTPATIENT)
Facility: HOME HEALTH | Age: 73
End: 2023-11-20
Payer: MEDICARE

## 2023-11-20 ENCOUNTER — HOSPITAL ENCOUNTER (OUTPATIENT)
Facility: HOSPITAL | Age: 73
Discharge: HOME | End: 2023-11-23
Attending: INTERNAL MEDICINE
Payer: MEDICARE

## 2023-11-20 ENCOUNTER — TELEPHONE (OUTPATIENT)
Age: 73
End: 2023-11-20

## 2023-11-20 VITALS
TEMPERATURE: 98.5 F | DIASTOLIC BLOOD PRESSURE: 53 MMHG | WEIGHT: 211.6 LBS | SYSTOLIC BLOOD PRESSURE: 141 MMHG | OXYGEN SATURATION: 95 % | HEART RATE: 62 BPM | BODY MASS INDEX: 37.48 KG/M2

## 2023-11-20 VITALS
OXYGEN SATURATION: 90 % | TEMPERATURE: 96.8 F | BODY MASS INDEX: 37.21 KG/M2 | HEART RATE: 73 BPM | HEIGHT: 63 IN | DIASTOLIC BLOOD PRESSURE: 61 MMHG | WEIGHT: 210 LBS | SYSTOLIC BLOOD PRESSURE: 145 MMHG

## 2023-11-20 DIAGNOSIS — G47.33 OBSTRUCTIVE SLEEP APNEA (ADULT) (PEDIATRIC): ICD-10-CM

## 2023-11-20 DIAGNOSIS — J96.22 ACUTE ON CHRONIC RESPIRATORY FAILURE WITH HYPERCAPNIA (HCC): ICD-10-CM

## 2023-11-20 PROCEDURE — G0158 HHC OT ASSISTANT EA 15: HCPCS

## 2023-11-20 PROCEDURE — 95811 POLYSOM 6/>YRS CPAP 4/> PARM: CPT | Performed by: INTERNAL MEDICINE

## 2023-11-20 PROCEDURE — G0300 HHS/HOSPICE OF LPN EA 15 MIN: HCPCS

## 2023-11-20 ASSESSMENT — ENCOUNTER SYMPTOMS: PAIN LOCATION - PAIN QUALITY: SHOOTING PAIN

## 2023-11-20 NOTE — TELEPHONE ENCOUNTER
Pt states she must get a call back about her fluid pill as it is a different one. She needs to hear from you right away.

## 2023-11-20 NOTE — TELEPHONE ENCOUNTER
Spoke with patient. Advised to call pharmacy as refill was sent on 11/15/2023. Verbalized understanding. Advised patient needs to call Dr. Lamon Riedel office for appt in reference to last visit with Dr. Yi Crenshaw. Verbalized understanding.

## 2023-11-20 NOTE — HOME HEALTH
Subjective: denies pain stated wound starting to itch  Falls since last visit No(if yes complete the Fall Tracking Form and include bsrifallreport):   Caregiver involvement changes: na  Home health supplies by type and quantity ordered/delivered this visit include: supplies in home    Clinician asked if patient has had any physician contact since last home care visit and patient states: NO  Clinician asked if patient has any new or changed medications and patient states:  N/A   If Yes, were medications reconciled? NO   Was the certifying physician notified of changes in medications? N/A     Clinical assessment (what this visit means for the patient overall and need for ongoing skilled care) and progress or lack of progress towards SPECIFIC goals: wound healing with current wound treatment,goals not met educated to elevate legs for edema. instructed to eat a snack before bedtime to prevent low blood sugars in am    Written Teaching Material Utilized:verbal    Interdisciplinary communication with: rod    Discharge planning as follows:  When goals are met    Specific plan for next visit: assessment wound care

## 2023-11-20 NOTE — HOME HEALTH
Subjective: Pt reports not feeling well due to inner ear infection. Falls since last visit (if yes complete the Fall Tracking Form and include bsrifallreport): No  Caregiver involvement changes: No  Home health supplies by type and quantity ordered/delivered this visit include: Yes, shower chair but chair is damaged and will need to be returned. Clinician asked if patient has had any physician contact since last home care visit and patient states: No  Clinician asked if patient has any new or changed medications and patient states: No  If Yes, were medications reconciled? n/a  Was the certifying physician notified of changes in medications? n/a    Clinical assessment (what this visit means for the patient overall and need for ongoing skilled care) and progress or lack of progress towards SPECIFIC goals: Pt is progressing towards IADL goal of simple meal prep, however, would benefit from additional intervention to incorporate breathing strategies for EC to reduce O2 levels dropping during activity and ensure carryover of placement of rollator. Pt would benefit from additional skilled OT to address IADL, ADL, and bed mobility goals. Written Teaching Material Utilized: N/A    Interdisciplinary communication with: N/A     Discharge planning as follows: Per physician order, Will discharge when the patient has reached their maximum functional potential and maximum safety in their home and When goals are met    Specific plan for next visit: Re-instruct patient on simple meal prep w/ use of breathing strategies and EC.

## 2023-11-21 NOTE — PROGRESS NOTES
Disclaimer:  all notes have not been confirmed by the interpreting physician    Acquisition Notes:  Lights off: 2214    Respiratory events: Y  A---Y  H---Y  C---Y  M---Y  ECG: SR   Arrhythmias: N  Snoring: Soft  Sleep Stages: 1, 2, 3  REM: Y  PAP titration: BiPAP   Eliminated events: Y  Reduced events: Y  Events at final pressure: N  Leak: 0

## 2023-11-21 NOTE — HOME HEALTH
Visit missed due to patient had a md appt and wound care was performed at md office,no sn visit needed today.

## 2023-11-23 ENCOUNTER — TELEPHONE (OUTPATIENT)
Facility: HOSPITAL | Age: 73
End: 2023-11-23

## 2023-11-23 DIAGNOSIS — E66.2 OBESITY HYPOVENTILATION SYNDROME (HCC): ICD-10-CM

## 2023-11-23 DIAGNOSIS — G47.33 OBSTRUCTIVE SLEEP APNEA (ADULT) (PEDIATRIC): Primary | ICD-10-CM

## 2023-11-23 DIAGNOSIS — R09.02 HYPOXEMIA: ICD-10-CM

## 2023-11-24 ENCOUNTER — HOME CARE VISIT (OUTPATIENT)
Facility: HOME HEALTH | Age: 73
End: 2023-11-24
Payer: MEDICARE

## 2023-11-24 VITALS
SYSTOLIC BLOOD PRESSURE: 128 MMHG | TEMPERATURE: 97.9 F | HEART RATE: 60 BPM | OXYGEN SATURATION: 92 % | DIASTOLIC BLOOD PRESSURE: 78 MMHG | RESPIRATION RATE: 18 BRPM

## 2023-11-24 PROCEDURE — G0152 HHCP-SERV OF OT,EA 15 MIN: HCPCS

## 2023-11-24 PROCEDURE — G0299 HHS/HOSPICE OF RN EA 15 MIN: HCPCS

## 2023-11-24 NOTE — HOME HEALTH
Subjective: Pt states she has not been experiencing any pain today from her wound  Falls since last visit No(if yes complete the Fall Tracking Form and include bsrifallreport):   Caregiver involvement changes: NA  Home health supplies by type and quantity ordered/delivered this visit include: wound care supplies ordered    Clinician asked if patient has had any physician contact since last home care visit and patient states: NO  Clinician asked if patient has any new or changed medications and patient states:  N/A   If Yes, were medications reconciled? N/A   Was the certifying physician notified of changes in medications? N/A     Clinical assessment (what this visit means for the patient overall and need for ongoing skilled care) and progress or lack of progress towards SPECIFIC goals: Pt seen by SN for wound care and education. Pt needs continued wound care until healed to prevent infection. Written Teaching Material Utilized: N/A    Interdisciplinary communication with: N/A for the purpose of NA    Discharge planning as follows:  When goals are met    Specific plan for next visit: wound care, medication education on anticoagulants

## 2023-11-25 VITALS
HEART RATE: 60 BPM | TEMPERATURE: 98.5 F | OXYGEN SATURATION: 93 % | WEIGHT: 212.9 LBS | DIASTOLIC BLOOD PRESSURE: 80 MMHG | SYSTOLIC BLOOD PRESSURE: 120 MMHG | BODY MASS INDEX: 37.71 KG/M2

## 2023-11-26 ASSESSMENT — ENCOUNTER SYMPTOMS: SKIN LESIONS: 1

## 2023-11-27 ENCOUNTER — HOME CARE VISIT (OUTPATIENT)
Facility: HOME HEALTH | Age: 73
End: 2023-11-27
Payer: MEDICARE

## 2023-11-27 ENCOUNTER — TELEPHONE (OUTPATIENT)
Age: 73
End: 2023-11-27

## 2023-11-27 VITALS
OXYGEN SATURATION: 98 % | WEIGHT: 216.7 LBS | SYSTOLIC BLOOD PRESSURE: 119 MMHG | DIASTOLIC BLOOD PRESSURE: 61 MMHG | BODY MASS INDEX: 38.39 KG/M2 | TEMPERATURE: 98.1 F | HEART RATE: 77 BPM

## 2023-11-27 VITALS
DIASTOLIC BLOOD PRESSURE: 60 MMHG | SYSTOLIC BLOOD PRESSURE: 128 MMHG | TEMPERATURE: 98.6 F | RESPIRATION RATE: 17 BRPM | OXYGEN SATURATION: 99 % | HEART RATE: 60 BPM

## 2023-11-27 PROCEDURE — G0299 HHS/HOSPICE OF RN EA 15 MIN: HCPCS

## 2023-11-27 PROCEDURE — G0158 HHC OT ASSISTANT EA 15: HCPCS

## 2023-11-27 ASSESSMENT — ENCOUNTER SYMPTOMS
COUGH CHARACTERISTICS: NON-PRODUCTIVE
COUGH: 1

## 2023-11-27 NOTE — TELEPHONE ENCOUNTER
Called pt-having some low glucose readings early Am taking toujeo only 20 units qd due to low glucose. Advisd late evening snack. Call endocrine MD tomorrow for advice on insulin. Some 02 sats after walking to bathroom down to 85 but up to 97 with nasal 02. Use 02 prn and hs. Danielle Chicas MD this week.

## 2023-11-27 NOTE — HOME HEALTH
Subjective: Pt states her o2 level after waking up was in the 70's, after having o2 on during the night  Falls since last visit No(if yes complete the Fall Tracking Form and include bsrifallreport):   Caregiver involvement changes: NA  Home health supplies by type and quantity ordered/delivered this visit include: NA    Clinician asked if patient has had any physician contact since last home care visit and patient states: NO  Clinician asked if patient has any new or changed medications and patient states:  NO   If Yes, were medications reconciled? N/A   Was the certifying physician notified of changes in medications? N/A     Clinical assessment (what this visit means for the patient overall and need for ongoing skilled care) and progress or lack of progress towards SPECIFIC goals: Pt seen by SN today for wound care and assessment. Pt needs continued wound care until healed to prevent infection. Wound showed no signs of progress from previous SN visit. Pt stated o2 stats reaching 70's without oxygen, having to wear o2 during the day now. Pt has appt with pulmonologist this week. Written Teaching Material Utilized: N/A    Interdisciplinary communication with: MD Physician for the purpose of O2 concerns    Discharge planning as follows:  When goals are met    Specific plan for next visit: wound care, reassess energy conservation education

## 2023-11-27 NOTE — HOME HEALTH
Subjective: Pt reports O2 dropping when concentrator is not in use  Falls since last visit (if yes complete the Fall Tracking Form and include bsrifallreport): No  Caregiver involvement changes: No  Home health supplies by type and quantity ordered/delivered this visit include: No    Clinician asked if patient has had any physician contact since last home care visit and patient states: No  Clinician asked if patient has any new or changed medications and patient states: No  If Yes, were medications reconciled? n/a  Was the certifying physician notified of changes in medications? n/a    Clinical assessment (what this visit means for the patient overall and need for ongoing skilled care) and progress or lack of progress towards SPECIFIC goals: Pt progressing towards increased participation in ADL/IADL goals by participating in HEP to increase UB strength and endurance. Progress this session was limited due to pt reporting o2 levels dropping when oxygen is not in use and pt declining to participate in functional tasks. Pt has pulmonogist appt on Wednesday to address oxygen concerns. Written Teaching Material Utilized: N/A    Interdisciplinary communication with: MD in regards to blood glucose levels    Discharge planning as follows: Per physician order, Will discharge when the patient has reached their maximum functional potential and maximum safety in their home and When goals are met    Specific plan for next visit: Instruct caregiver/patient in shower transfer using new shower chair.

## 2023-11-27 NOTE — HOME HEALTH
Subjective: been feeling depressed today and dont know why,will talk to md about it at next appt,denies any suicidal thoughts  Falls since last visit No(if yes complete the Fall Tracking Form and include bsrifallreport):   Caregiver involvement changes: no  Home health supplies by type and quantity ordered/delivered this visit include: supplies in home    Clinician asked if patient has had any physician contact since last home care visit and patient states: N/A  Clinician asked if patient has any new or changed medications and patient states:  N/A   If Yes, were medications reconciled? N/A   Was the certifying physician notified of changes in medications? N/A     Clinical assessment (what this visit means for the patient overall and need for ongoing skilled care) and progress or lack of progress towards SPECIFIC goals: blister wound healing with current treatment ,wound hasnt met goals. instructed to elevate legs for edema,weight remaining stable. instructed to reachout if persistent depresssion occurs, states she gets this way around holidays    Written Teaching Material Utilized: verbal    Interdisciplinary communication with: rod    Discharge planning as follows:  When goals are met    Specific plan for next visit:ssessment weight monitoring diabetic management

## 2023-11-28 ENCOUNTER — HOSPITAL ENCOUNTER (OUTPATIENT)
Facility: HOSPITAL | Age: 73
Discharge: HOME | End: 2023-11-28
Attending: SURGERY
Payer: MEDICARE

## 2023-11-28 VITALS
DIASTOLIC BLOOD PRESSURE: 61 MMHG | HEART RATE: 70 BPM | RESPIRATION RATE: 18 BRPM | SYSTOLIC BLOOD PRESSURE: 119 MMHG | TEMPERATURE: 97 F

## 2023-11-28 DIAGNOSIS — R60.0 BILATERAL LEG EDEMA: ICD-10-CM

## 2023-11-28 DIAGNOSIS — L97.911 NON-PRESSURE CHRONIC ULCER OF RIGHT LOWER LEG, LIMITED TO BREAKDOWN OF SKIN (HCC): ICD-10-CM

## 2023-11-28 PROCEDURE — 99203 OFFICE O/P NEW LOW 30 MIN: CPT | Performed by: SURGERY

## 2023-11-28 PROCEDURE — 99203 OFFICE O/P NEW LOW 30 MIN: CPT

## 2023-11-28 RX ORDER — CLOBETASOL PROPIONATE 0.5 MG/G
OINTMENT TOPICAL ONCE
OUTPATIENT
Start: 2023-11-28 | End: 2023-11-28

## 2023-11-28 RX ORDER — LIDOCAINE HYDROCHLORIDE 20 MG/ML
JELLY TOPICAL ONCE
OUTPATIENT
Start: 2023-11-28 | End: 2023-11-28

## 2023-11-28 RX ORDER — GENTAMICIN SULFATE 1 MG/G
OINTMENT TOPICAL ONCE
OUTPATIENT
Start: 2023-11-28 | End: 2023-11-28

## 2023-11-28 RX ORDER — GINSENG 100 MG
CAPSULE ORAL ONCE
OUTPATIENT
Start: 2023-11-28 | End: 2023-11-28

## 2023-11-28 RX ORDER — LIDOCAINE 40 MG/G
CREAM TOPICAL ONCE
OUTPATIENT
Start: 2023-11-28 | End: 2023-11-28

## 2023-11-28 RX ORDER — BETAMETHASONE DIPROPIONATE 0.5 MG/G
CREAM TOPICAL ONCE
OUTPATIENT
Start: 2023-11-28 | End: 2023-11-28

## 2023-11-28 RX ORDER — LIDOCAINE HYDROCHLORIDE 40 MG/ML
SOLUTION TOPICAL ONCE
OUTPATIENT
Start: 2023-11-28 | End: 2023-11-28

## 2023-11-28 RX ORDER — BACITRACIN ZINC AND POLYMYXIN B SULFATE 500; 1000 [USP'U]/G; [USP'U]/G
OINTMENT TOPICAL ONCE
OUTPATIENT
Start: 2023-11-28 | End: 2023-11-28

## 2023-11-28 RX ORDER — LIDOCAINE 50 MG/G
OINTMENT TOPICAL ONCE
OUTPATIENT
Start: 2023-11-28 | End: 2023-11-28

## 2023-11-28 RX ORDER — SODIUM CHLOR/HYPOCHLOROUS ACID 0.033 %
SOLUTION, IRRIGATION IRRIGATION ONCE
OUTPATIENT
Start: 2023-11-28 | End: 2023-11-28

## 2023-11-28 RX ORDER — TRIAMCINOLONE ACETONIDE 1 MG/G
OINTMENT TOPICAL ONCE
OUTPATIENT
Start: 2023-11-28 | End: 2023-11-28

## 2023-11-28 RX ORDER — IBUPROFEN 200 MG
TABLET ORAL ONCE
OUTPATIENT
Start: 2023-11-28 | End: 2023-11-28

## 2023-11-28 NOTE — DISCHARGE INSTRUCTIONS
Discharge Instructions for          [address]   Phone: [phone] Fax: [phone]    NAME:  Marilee Saeed  YOB: 1950  MEDICAL RECORD NUMBER:  [id number]  DATE:  November 28, 2023  WOUND CARE ORDERS:  Right lower leg: Cleanse with baby shampoo, rinse and pat dry. -- Or patient may take shower prior to dressing change/home health visit. Apply xeroform to blister sites/devitalized tissue. Cover with gaze. Secure with roll gauze and tape. Apply single layer tubi  size F. Change dressing twice weekly per home health. (Home health may change once weekly on weeks of clinic appointments.)    Limit all fluid intake to 2 quarts per day. Limit sodium intake to 2,000 mg per day. Activity:  [x] Elevate leg(s) above the level of the heart when sitting. [x] Avoid prolonged standing in one place. [x] Do no get dressing/wrap wet. Dietary:  [] Diet as tolerated      [] Diabetic Diet            [] Increase Protein: examples (Meat, cheese, eggs, greek yogurt, fish, nuts)          [] [facility] [employer] Nutrition Powder  [x] Other: see above  [] Dial a Dietician : Call Action at [phone] enter code ([phone]) when prompted. M-F 9am-5pm EST. Return Appointment:  [] Return Appointment: With {[facility] [[email or web address]} in *** Week(s)  [] Nurse Visit : *** days  [] Ordered tests:    Electronically signed Merary Trevino RN on 11/28/2023 at [address]: Should you experience any significant changes in your wound(s) or have questions about your wound care, please contact the [facility] at [address] 8:00 am - 4:30. If you need help with your wound outside these hours and cannot wait until we are again available, contact your PCP or go to the hospital emergency room.      PLEASE NOTE:

## 2023-11-28 NOTE — PROGRESS NOTES
Wound care    The patient is a 74-year-old woman who was referred to the wound care center regarding ulceration on the right lower leg. The patient was first seen at the wound care center on 11/28/2023. The patient reports history of chronic swelling of her legs. She developed a large blister on the right lower leg and was seen in an emergency room recently. The bulla was drained. The patient's medications include furosemide 20 mg daily. She reports this does produce a diuresis. She reports drinking relatively large amounts of fluid over the course of each day. The patient has history of diabetes mellitus. She reports fairly good blood sugar control. She denies history of active cardiac symptoms. Her medical history includes mild nonobstructive coronary artery disease by catheterization in 2012. She has history of asthma and has been hospitalized for treatment of asthma. She recently started nocturnal home oxygen. The patient reports that over the last few days prior to her 11/28/2023 clinic visit she has had increasing shortness of breath with exertion. She reports her O2 saturation at home on room air was less than 90%. It asya to greater than 90% on her home oxygen. She ambulates short distances with a rollator at home but does have dyspnea with exertion. Past medical history includes depression, morbid obesity, CKD 3, gastroesophageal reflux disease. Reported weight 216 pounds height 5 feet 3 inches    Physical examination    Vital signs blood pressure 119/61 pulse 70 respirations 18 temperature 97 degrees    The patient was noted to have O2 saturation 85% on room air when she came to the clinic. On 2 L supplemental oxygen O2 saturation was 100%. The patient is an alert overweight woman not in acute distress. She was noted to have mild tachypnea after talking. Head and neck examination showed no jaundice. Lungs were clear but with globally reduced sound.   There were

## 2023-11-29 ENCOUNTER — HOME CARE VISIT (OUTPATIENT)
Dept: HOME HEALTH SERVICES | Facility: HOME HEALTH | Age: 73
End: 2023-11-29
Payer: MEDICARE

## 2023-11-29 NOTE — TELEPHONE ENCOUNTER
Results of sleep study in [facility]  Lead tech to convey results to patient    PAP titration was performed to optimize airflow settings to control her apnea/respiratory events. It was found that a setting of BiPAP 21/15 cmH20 adequately controlled her respiratory events. However she still had significant oxygen desaturations so oxygen was added. With oxygen her oxygen levels on BiPAP stayed above 90% at final setting     As discussed, I will order a PAP device for her home use. It will start a little lower than the final pressure setting in the lab ,for her comfort,and will adjust up during the night. she should be seen in the sleep center after she has been on PAP for 4-6 weeks. We will assess how she is doing on BIPAP therapy and make any further adjustments (if needed). Order PAP and call patient and let them know which DME company they should be hearing from. Schedule for first adherence visit in 6 weeks.    .   Staff to confirm she is using 1 dme company (for oxygen and PAP) -she was discharged from hospital on oxygen    (patient will need a first adherence visit after 4-6 weeks on therapy)

## 2023-11-30 ENCOUNTER — HOME CARE VISIT (OUTPATIENT)
Facility: HOME HEALTH | Age: 73
End: 2023-11-30
Payer: MEDICARE

## 2023-11-30 VITALS
DIASTOLIC BLOOD PRESSURE: 50 MMHG | HEART RATE: 77 BPM | OXYGEN SATURATION: 97 % | SYSTOLIC BLOOD PRESSURE: 122 MMHG | TEMPERATURE: 97.7 F

## 2023-11-30 PROCEDURE — G0158 HHC OT ASSISTANT EA 15: HCPCS

## 2023-11-30 ASSESSMENT — ENCOUNTER SYMPTOMS: PAIN LOCATION - PAIN QUALITY: ACHE

## 2023-11-30 NOTE — HOME HEALTH
communication with: OTR/SN on pt oxygen levels and MD Dr Maurer Doctor office but no returned call.   Discharge planning as follows: Per physician order, Will discharge when the patient has reached their maximum functional potential and maximum safety in their home and When goals are met   Specific plan for next visit: Instruct patient in ADL/IADL training

## 2023-12-03 ENCOUNTER — APPOINTMENT (OUTPATIENT)
Facility: HOSPITAL | Age: 73
DRG: 189 | End: 2023-12-03
Payer: MEDICARE

## 2023-12-03 ENCOUNTER — HOSPITAL ENCOUNTER (INPATIENT)
Facility: HOSPITAL | Age: 73
LOS: 4 days | Discharge: HOME | DRG: 189 | End: 2023-12-07
Attending: STUDENT IN AN ORGANIZED HEALTH CARE EDUCATION/TRAINING PROGRAM | Admitting: STUDENT IN AN ORGANIZED HEALTH CARE EDUCATION/TRAINING PROGRAM
Payer: MEDICARE

## 2023-12-03 DIAGNOSIS — R06.89 HYPERCARBIA: ICD-10-CM

## 2023-12-03 DIAGNOSIS — E87.6 HYPOKALEMIA: ICD-10-CM

## 2023-12-03 DIAGNOSIS — R79.89 ELEVATED D-DIMER: ICD-10-CM

## 2023-12-03 DIAGNOSIS — R06.02 SHORTNESS OF BREATH: Primary | ICD-10-CM

## 2023-12-03 DIAGNOSIS — I10 HYPERTENSION, UNSPECIFIED TYPE: ICD-10-CM

## 2023-12-03 DIAGNOSIS — J81.0 ACUTE PULMONARY EDEMA (HCC): ICD-10-CM

## 2023-12-03 DIAGNOSIS — J96.01 ACUTE RESPIRATORY FAILURE WITH HYPOXIA (HCC): ICD-10-CM

## 2023-12-03 LAB
ALBUMIN SERPL-MCNC: 3.1 G/DL (ref 3.5–5)
ALBUMIN/GLOB SERPL: 0.7 (ref 1.1–2.2)
ALP SERPL-CCNC: 92 U/L (ref 45–117)
ALT SERPL-CCNC: 11 U/L (ref 12–78)
ANION GAP SERPL CALC-SCNC: 0 MMOL/L (ref 5–15)
ARTERIAL PATENCY WRIST A: ABNORMAL
AST SERPL-CCNC: 11 U/L (ref 15–37)
BASE EXCESS BLDA CALC-SCNC: 10 MMOL/L
BASOPHILS # BLD: 0.1 K/UL (ref 0–0.1)
BASOPHILS NFR BLD: 1 % (ref 0–1)
BDY SITE: ABNORMAL
BILIRUB SERPL-MCNC: 1.3 MG/DL (ref 0.2–1)
BUN SERPL-MCNC: 18 MG/DL (ref 6–20)
BUN/CREAT SERPL: 17 (ref 12–20)
CALCIUM SERPL-MCNC: 10.3 MG/DL (ref 8.5–10.1)
CHLORIDE SERPL-SCNC: 103 MMOL/L (ref 97–108)
CO2 SERPL-SCNC: 41 MMOL/L (ref 21–32)
CREAT SERPL-MCNC: 1.09 MG/DL (ref 0.55–1.02)
D DIMER PPP FEU-MCNC: 3.68 MG/L FEU (ref 0–0.65)
DIFFERENTIAL METHOD BLD: ABNORMAL
EKG ATRIAL RATE: 75 BPM
EKG DIAGNOSIS: NORMAL
EKG P AXIS: 87 DEGREES
EKG P-R INTERVAL: 158 MS
EKG Q-T INTERVAL: 390 MS
EKG QRS DURATION: 94 MS
EKG QTC CALCULATION (BAZETT): 435 MS
EKG R AXIS: -19 DEGREES
EKG T AXIS: 50 DEGREES
EKG VENTRICULAR RATE: 75 BPM
EOSINOPHIL # BLD: 0.1 K/UL (ref 0–0.4)
EOSINOPHIL NFR BLD: 1 % (ref 0–7)
ERYTHROCYTE [DISTWIDTH] IN BLOOD BY AUTOMATED COUNT: 17.9 % (ref 11.5–14.5)
EST. AVERAGE GLUCOSE BLD GHB EST-MCNC: 169 MG/DL
FLUAV AG NPH QL IA: NEGATIVE
FLUBV AG NOSE QL IA: NEGATIVE
GAS FLOW.O2 O2 DELIVERY SYS: 3 L/MIN
GLOBULIN SER CALC-MCNC: 4.3 G/DL (ref 2–4)
GLUCOSE BLD STRIP.AUTO-MCNC: 128 MG/DL (ref 65–117)
GLUCOSE SERPL-MCNC: 116 MG/DL (ref 65–100)
HBA1C MFR BLD: 7.5 % (ref 4–5.6)
HCO3 BLDA-SCNC: 39 MMOL/L (ref 22–26)
HCT VFR BLD AUTO: 31.3 % (ref 35–47)
HGB BLD-MCNC: 9 G/DL (ref 11.5–16)
IMM GRANULOCYTES # BLD AUTO: 0 K/UL (ref 0–0.04)
IMM GRANULOCYTES NFR BLD AUTO: 0 % (ref 0–0.5)
LACTATE BLD-SCNC: 0.6 MMOL/L (ref 0.4–2)
LYMPHOCYTES # BLD: 1.2 K/UL (ref 0.8–3.5)
LYMPHOCYTES NFR BLD: 15 % (ref 12–49)
MAGNESIUM SERPL-MCNC: 2.1 MG/DL (ref 1.6–2.4)
MCH RBC QN AUTO: 23.7 PG (ref 26–34)
MCHC RBC AUTO-ENTMCNC: 28.8 G/DL (ref 30–36.5)
MCV RBC AUTO: 82.6 FL (ref 80–99)
MONOCYTES # BLD: 0.5 K/UL (ref 0–1)
MONOCYTES NFR BLD: 6 % (ref 5–13)
NEUTS SEG # BLD: 6.4 K/UL (ref 1.8–8)
NEUTS SEG NFR BLD: 77 % (ref 32–75)
NRBC # BLD: 0 K/UL (ref 0–0.01)
NRBC BLD-RTO: 0 PER 100 WBC
NT PRO BNP: 1903 PG/ML
PCO2 BLDA: 72 MMHG (ref 35–45)
PH BLDA: 7.35 (ref 7.35–7.45)
PLATELET # BLD AUTO: 169 K/UL (ref 150–400)
PMV BLD AUTO: 11.5 FL (ref 8.9–12.9)
PO2 BLDA: 75 MMHG (ref 80–100)
POTASSIUM SERPL-SCNC: 3.2 MMOL/L (ref 3.5–5.1)
PROT SERPL-MCNC: 7.4 G/DL (ref 6.4–8.2)
RBC # BLD AUTO: 3.79 M/UL (ref 3.8–5.2)
RBC MORPH BLD: ABNORMAL
SAO2 % BLD: 94 % (ref 92–97)
SAO2% DEVICE SAO2% SENSOR NAME: ABNORMAL
SARS-COV-2 RDRP RESP QL NAA+PROBE: NOT DETECTED
SERVICE CMNT-IMP: ABNORMAL
SODIUM SERPL-SCNC: 144 MMOL/L (ref 136–145)
SOURCE: NORMAL
SPECIMEN SITE: ABNORMAL
TROPONIN I SERPL HS-MCNC: 52 NG/L (ref 0–51)
TROPONIN I SERPL HS-MCNC: 58 NG/L (ref 0–51)
WBC # BLD AUTO: 8.3 K/UL (ref 3.6–11)

## 2023-12-03 PROCEDURE — 94640 AIRWAY INHALATION TREATMENT: CPT

## 2023-12-03 PROCEDURE — 6370000000 HC RX 637 (ALT 250 FOR IP): Performed by: STUDENT IN AN ORGANIZED HEALTH CARE EDUCATION/TRAINING PROGRAM

## 2023-12-03 PROCEDURE — 2580000003 HC RX 258: Performed by: STUDENT IN AN ORGANIZED HEALTH CARE EDUCATION/TRAINING PROGRAM

## 2023-12-03 PROCEDURE — 87804 INFLUENZA ASSAY W/OPTIC: CPT

## 2023-12-03 PROCEDURE — 85025 COMPLETE CBC W/AUTO DIFF WBC: CPT

## 2023-12-03 PROCEDURE — 87635 SARS-COV-2 COVID-19 AMP PRB: CPT

## 2023-12-03 PROCEDURE — 6360000002 HC RX W HCPCS: Performed by: STUDENT IN AN ORGANIZED HEALTH CARE EDUCATION/TRAINING PROGRAM

## 2023-12-03 PROCEDURE — 83605 ASSAY OF LACTIC ACID: CPT

## 2023-12-03 PROCEDURE — 99285 EMERGENCY DEPT VISIT HI MDM: CPT

## 2023-12-03 PROCEDURE — 36600 WITHDRAWAL OF ARTERIAL BLOOD: CPT

## 2023-12-03 PROCEDURE — 2700000000 HC OXYGEN THERAPY PER DAY

## 2023-12-03 PROCEDURE — 84484 ASSAY OF TROPONIN QUANT: CPT

## 2023-12-03 PROCEDURE — 36415 COLL VENOUS BLD VENIPUNCTURE: CPT

## 2023-12-03 PROCEDURE — 96374 THER/PROPH/DIAG INJ IV PUSH: CPT

## 2023-12-03 PROCEDURE — 83036 HEMOGLOBIN GLYCOSYLATED A1C: CPT

## 2023-12-03 PROCEDURE — 82803 BLOOD GASES ANY COMBINATION: CPT

## 2023-12-03 PROCEDURE — 94660 CPAP INITIATION&MGMT: CPT

## 2023-12-03 PROCEDURE — 6360000002 HC RX W HCPCS: Performed by: PHYSICIAN ASSISTANT

## 2023-12-03 PROCEDURE — 85379 FIBRIN DEGRADATION QUANT: CPT

## 2023-12-03 PROCEDURE — 96372 THER/PROPH/DIAG INJ SC/IM: CPT

## 2023-12-03 PROCEDURE — 71045 X-RAY EXAM CHEST 1 VIEW: CPT

## 2023-12-03 PROCEDURE — 83735 ASSAY OF MAGNESIUM: CPT

## 2023-12-03 PROCEDURE — 5A09457 ASSISTANCE WITH RESPIRATORY VENTILATION, 24-96 CONSECUTIVE HOURS, CONTINUOUS POSITIVE AIRWAY PRESSURE: ICD-10-PCS | Performed by: STUDENT IN AN ORGANIZED HEALTH CARE EDUCATION/TRAINING PROGRAM

## 2023-12-03 PROCEDURE — 2060000000 HC ICU INTERMEDIATE R&B

## 2023-12-03 PROCEDURE — 83880 ASSAY OF NATRIURETIC PEPTIDE: CPT

## 2023-12-03 PROCEDURE — 6370000000 HC RX 637 (ALT 250 FOR IP): Performed by: PHYSICIAN ASSISTANT

## 2023-12-03 PROCEDURE — 82962 GLUCOSE BLOOD TEST: CPT

## 2023-12-03 PROCEDURE — 93005 ELECTROCARDIOGRAM TRACING: CPT | Performed by: PHYSICIAN ASSISTANT

## 2023-12-03 PROCEDURE — 80053 COMPREHEN METABOLIC PANEL: CPT

## 2023-12-03 RX ORDER — ACETAMINOPHEN 650 MG/1
650 SUPPOSITORY RECTAL EVERY 6 HOURS PRN
Status: DISCONTINUED | OUTPATIENT
Start: 2023-12-03 | End: 2023-12-07 | Stop reason: HOSPADM

## 2023-12-03 RX ORDER — HYDRALAZINE HYDROCHLORIDE 50 MG/1
50 TABLET, FILM COATED ORAL EVERY 8 HOURS SCHEDULED
Status: DISCONTINUED | OUTPATIENT
Start: 2023-12-03 | End: 2023-12-07 | Stop reason: HOSPADM

## 2023-12-03 RX ORDER — IPRATROPIUM BROMIDE AND ALBUTEROL SULFATE 2.5; .5 MG/3ML; MG/3ML
1 SOLUTION RESPIRATORY (INHALATION)
Status: COMPLETED | OUTPATIENT
Start: 2023-12-03 | End: 2023-12-03

## 2023-12-03 RX ORDER — POTASSIUM CHLORIDE 20 MEQ/1
40 TABLET, EXTENDED RELEASE ORAL ONCE
Status: COMPLETED | OUTPATIENT
Start: 2023-12-03 | End: 2023-12-03

## 2023-12-03 RX ORDER — FUROSEMIDE 10 MG/ML
40 INJECTION INTRAMUSCULAR; INTRAVENOUS ONCE
Status: COMPLETED | OUTPATIENT
Start: 2023-12-03 | End: 2023-12-03

## 2023-12-03 RX ORDER — FUROSEMIDE 10 MG/ML
40 INJECTION INTRAMUSCULAR; INTRAVENOUS 2 TIMES DAILY
Status: DISCONTINUED | OUTPATIENT
Start: 2023-12-03 | End: 2023-12-06

## 2023-12-03 RX ORDER — ONDANSETRON 4 MG/1
4 TABLET, ORALLY DISINTEGRATING ORAL EVERY 8 HOURS PRN
Status: DISCONTINUED | OUTPATIENT
Start: 2023-12-03 | End: 2023-12-07 | Stop reason: HOSPADM

## 2023-12-03 RX ORDER — CARVEDILOL 12.5 MG/1
12.5 TABLET ORAL
Status: DISCONTINUED | OUTPATIENT
Start: 2023-12-03 | End: 2023-12-07 | Stop reason: HOSPADM

## 2023-12-03 RX ORDER — INSULIN GLARGINE 100 [IU]/ML
18 INJECTION, SOLUTION SUBCUTANEOUS NIGHTLY
Status: DISCONTINUED | OUTPATIENT
Start: 2023-12-03 | End: 2023-12-04

## 2023-12-03 RX ORDER — ONDANSETRON 2 MG/ML
4 INJECTION INTRAMUSCULAR; INTRAVENOUS EVERY 6 HOURS PRN
Status: DISCONTINUED | OUTPATIENT
Start: 2023-12-03 | End: 2023-12-07 | Stop reason: HOSPADM

## 2023-12-03 RX ORDER — SODIUM CHLORIDE 9 MG/ML
INJECTION, SOLUTION INTRAVENOUS PRN
Status: DISCONTINUED | OUTPATIENT
Start: 2023-12-03 | End: 2023-12-07 | Stop reason: HOSPADM

## 2023-12-03 RX ORDER — ASPIRIN 81 MG/1
81 TABLET ORAL DAILY
Status: DISCONTINUED | OUTPATIENT
Start: 2023-12-03 | End: 2023-12-07 | Stop reason: HOSPADM

## 2023-12-03 RX ORDER — INSULIN LISPRO 100 [IU]/ML
0-4 INJECTION, SOLUTION INTRAVENOUS; SUBCUTANEOUS NIGHTLY
Status: DISCONTINUED | OUTPATIENT
Start: 2023-12-03 | End: 2023-12-07 | Stop reason: HOSPADM

## 2023-12-03 RX ORDER — GABAPENTIN 100 MG/1
200 CAPSULE ORAL 3 TIMES DAILY
COMMUNITY
End: 2023-12-03

## 2023-12-03 RX ORDER — SODIUM CHLORIDE 0.9 % (FLUSH) 0.9 %
5-40 SYRINGE (ML) INJECTION EVERY 12 HOURS SCHEDULED
Status: DISCONTINUED | OUTPATIENT
Start: 2023-12-03 | End: 2023-12-07 | Stop reason: HOSPADM

## 2023-12-03 RX ORDER — ENOXAPARIN SODIUM 100 MG/ML
40 INJECTION SUBCUTANEOUS DAILY
Status: DISCONTINUED | OUTPATIENT
Start: 2023-12-04 | End: 2023-12-07 | Stop reason: HOSPADM

## 2023-12-03 RX ORDER — ACETAMINOPHEN 325 MG/1
650 TABLET ORAL EVERY 6 HOURS PRN
Status: DISCONTINUED | OUTPATIENT
Start: 2023-12-03 | End: 2023-12-07 | Stop reason: HOSPADM

## 2023-12-03 RX ORDER — ALBUTEROL SULFATE 2.5 MG/3ML
2.5 SOLUTION RESPIRATORY (INHALATION)
Status: DISCONTINUED | OUTPATIENT
Start: 2023-12-03 | End: 2023-12-07 | Stop reason: HOSPADM

## 2023-12-03 RX ORDER — HYDRALAZINE HYDROCHLORIDE 20 MG/ML
20 INJECTION INTRAMUSCULAR; INTRAVENOUS EVERY 6 HOURS PRN
Status: DISCONTINUED | OUTPATIENT
Start: 2023-12-03 | End: 2023-12-07 | Stop reason: HOSPADM

## 2023-12-03 RX ORDER — CALCITRIOL 0.25 UG/1
0.25 CAPSULE, LIQUID FILLED ORAL DAILY
Status: DISCONTINUED | OUTPATIENT
Start: 2023-12-03 | End: 2023-12-07 | Stop reason: HOSPADM

## 2023-12-03 RX ORDER — CARVEDILOL 12.5 MG/1
25 TABLET ORAL
Status: DISCONTINUED | OUTPATIENT
Start: 2023-12-04 | End: 2023-12-07 | Stop reason: HOSPADM

## 2023-12-03 RX ORDER — ENOXAPARIN SODIUM 100 MG/ML
1 INJECTION SUBCUTANEOUS
Status: COMPLETED | OUTPATIENT
Start: 2023-12-03 | End: 2023-12-03

## 2023-12-03 RX ORDER — DEXTROSE MONOHYDRATE 100 MG/ML
INJECTION, SOLUTION INTRAVENOUS CONTINUOUS PRN
Status: DISCONTINUED | OUTPATIENT
Start: 2023-12-03 | End: 2023-12-07 | Stop reason: HOSPADM

## 2023-12-03 RX ORDER — POTASSIUM CHLORIDE 20 MEQ/1
40 TABLET, EXTENDED RELEASE ORAL PRN
Status: DISCONTINUED | OUTPATIENT
Start: 2023-12-03 | End: 2023-12-06

## 2023-12-03 RX ORDER — GABAPENTIN 100 MG/1
100 CAPSULE ORAL
Status: DISCONTINUED | OUTPATIENT
Start: 2023-12-03 | End: 2023-12-07 | Stop reason: HOSPADM

## 2023-12-03 RX ORDER — LATANOPROST 50 UG/ML
1 SOLUTION/ DROPS OPHTHALMIC NIGHTLY
Status: DISCONTINUED | OUTPATIENT
Start: 2023-12-03 | End: 2023-12-07 | Stop reason: HOSPADM

## 2023-12-03 RX ORDER — IPRATROPIUM BROMIDE AND ALBUTEROL SULFATE 2.5; .5 MG/3ML; MG/3ML
1 SOLUTION RESPIRATORY (INHALATION)
Status: DISCONTINUED | OUTPATIENT
Start: 2023-12-03 | End: 2023-12-05

## 2023-12-03 RX ORDER — ENOXAPARIN SODIUM 100 MG/ML
40 INJECTION SUBCUTANEOUS DAILY
Status: DISCONTINUED | OUTPATIENT
Start: 2023-12-03 | End: 2023-12-03

## 2023-12-03 RX ORDER — MONTELUKAST SODIUM 10 MG/1
10 TABLET ORAL NIGHTLY
Status: DISCONTINUED | OUTPATIENT
Start: 2023-12-03 | End: 2023-12-07 | Stop reason: HOSPADM

## 2023-12-03 RX ORDER — INSULIN LISPRO 100 [IU]/ML
15 INJECTION, SOLUTION INTRAVENOUS; SUBCUTANEOUS
Status: DISCONTINUED | OUTPATIENT
Start: 2023-12-03 | End: 2023-12-07

## 2023-12-03 RX ORDER — MAGNESIUM SULFATE IN WATER 40 MG/ML
2000 INJECTION, SOLUTION INTRAVENOUS PRN
Status: DISCONTINUED | OUTPATIENT
Start: 2023-12-03 | End: 2023-12-06

## 2023-12-03 RX ORDER — SODIUM CHLORIDE 0.9 % (FLUSH) 0.9 %
5-40 SYRINGE (ML) INJECTION PRN
Status: DISCONTINUED | OUTPATIENT
Start: 2023-12-03 | End: 2023-12-07 | Stop reason: HOSPADM

## 2023-12-03 RX ORDER — ROSUVASTATIN CALCIUM 20 MG/1
20 TABLET, COATED ORAL NIGHTLY
Status: DISCONTINUED | OUTPATIENT
Start: 2023-12-03 | End: 2023-12-07 | Stop reason: HOSPADM

## 2023-12-03 RX ORDER — TIZANIDINE 4 MG/1
2 TABLET ORAL DAILY PRN
Status: DISCONTINUED | OUTPATIENT
Start: 2023-12-03 | End: 2023-12-07 | Stop reason: HOSPADM

## 2023-12-03 RX ORDER — POLYETHYLENE GLYCOL 3350 17 G/17G
17 POWDER, FOR SOLUTION ORAL DAILY PRN
Status: DISCONTINUED | OUTPATIENT
Start: 2023-12-03 | End: 2023-12-07 | Stop reason: HOSPADM

## 2023-12-03 RX ORDER — PREDNISONE 20 MG/1
40 TABLET ORAL DAILY
Status: DISCONTINUED | OUTPATIENT
Start: 2023-12-03 | End: 2023-12-04

## 2023-12-03 RX ORDER — INSULIN LISPRO 100 [IU]/ML
0-16 INJECTION, SOLUTION INTRAVENOUS; SUBCUTANEOUS
Status: DISCONTINUED | OUTPATIENT
Start: 2023-12-03 | End: 2023-12-07 | Stop reason: HOSPADM

## 2023-12-03 RX ORDER — LOSARTAN POTASSIUM 100 MG/1
100 TABLET ORAL DAILY
Status: DISCONTINUED | OUTPATIENT
Start: 2023-12-03 | End: 2023-12-07 | Stop reason: HOSPADM

## 2023-12-03 RX ORDER — POTASSIUM CHLORIDE 7.45 MG/ML
10 INJECTION INTRAVENOUS PRN
Status: DISCONTINUED | OUTPATIENT
Start: 2023-12-03 | End: 2023-12-06

## 2023-12-03 RX ADMIN — SODIUM CHLORIDE, PRESERVATIVE FREE 10 ML: 5 INJECTION INTRAVENOUS at 23:22

## 2023-12-03 RX ADMIN — PREDNISONE 40 MG: 20 TABLET ORAL at 18:41

## 2023-12-03 RX ADMIN — GABAPENTIN 100 MG: 100 CAPSULE ORAL at 23:20

## 2023-12-03 RX ADMIN — HYDRALAZINE HYDROCHLORIDE 50 MG: 50 TABLET, FILM COATED ORAL at 18:40

## 2023-12-03 RX ADMIN — CALCITRIOL CAPSULES 0.25 MCG 0.25 MCG: 0.25 CAPSULE ORAL at 18:41

## 2023-12-03 RX ADMIN — IPRATROPIUM BROMIDE AND ALBUTEROL SULFATE 1 DOSE: .5; 3 SOLUTION RESPIRATORY (INHALATION) at 10:31

## 2023-12-03 RX ADMIN — FUROSEMIDE 40 MG: 10 INJECTION, SOLUTION INTRAMUSCULAR; INTRAVENOUS at 13:09

## 2023-12-03 RX ADMIN — ASPIRIN 81 MG: 81 TABLET, COATED ORAL at 18:40

## 2023-12-03 RX ADMIN — CARVEDILOL 12.5 MG: 12.5 TABLET, FILM COATED ORAL at 18:43

## 2023-12-03 RX ADMIN — LEVOTHYROXINE SODIUM 137 MCG: 0.03 TABLET ORAL at 18:40

## 2023-12-03 RX ADMIN — HYDRALAZINE HYDROCHLORIDE 50 MG: 50 TABLET, FILM COATED ORAL at 23:21

## 2023-12-03 RX ADMIN — POTASSIUM CHLORIDE 40 MEQ: 1500 TABLET, EXTENDED RELEASE ORAL at 13:19

## 2023-12-03 RX ADMIN — ROSUVASTATIN 20 MG: 20 TABLET, FILM COATED ORAL at 23:21

## 2023-12-03 RX ADMIN — ARFORMOTEROL TARTRATE: 15 SOLUTION RESPIRATORY (INHALATION) at 21:00

## 2023-12-03 RX ADMIN — IPRATROPIUM BROMIDE AND ALBUTEROL SULFATE 1 DOSE: .5; 3 SOLUTION RESPIRATORY (INHALATION) at 21:01

## 2023-12-03 RX ADMIN — MONTELUKAST 10 MG: 10 TABLET, FILM COATED ORAL at 23:25

## 2023-12-03 RX ADMIN — ENOXAPARIN SODIUM 90 MG: 100 INJECTION SUBCUTANEOUS at 13:10

## 2023-12-03 RX ADMIN — LOSARTAN POTASSIUM 100 MG: 100 TABLET, FILM COATED ORAL at 18:40

## 2023-12-03 RX ADMIN — FUROSEMIDE 40 MG: 10 INJECTION, SOLUTION INTRAMUSCULAR; INTRAVENOUS at 23:20

## 2023-12-03 ASSESSMENT — PAIN SCALES - GENERAL
PAINLEVEL_OUTOF10: 0
PAINLEVEL_OUTOF10: 5

## 2023-12-03 NOTE — H&P
Hospitalist Admission Note    NAME:   Marj Harding   : 1950   MRN: [id number]     Date/Time: 12/3/2023 3:52 PM    Patient PCP: Katie Brown MD    ______________________________________________________________________  Given the patient's current clinical presentation, I have a high level of concern for decompensation if discharged from the emergency department. Complex decision making was performed, which includes reviewing the patient's available past medical records, laboratory results, and x-ray films. My assessment of this patient's clinical condition and my plan of care is as follows. Assessment / Plan:    Acute on chronic hypoxic and hypercarbic respiratory failure  Possible occult diastolic heart failure vs flash pulm edema  Asthma  Obesity hypoventilation syndrome   Hx of PE  HTN  BNP elevated at 1900, CXR with cardiomegaly and pulm edema. Renal function at baseline and normal LFTs. Recent echo in early October showed hyperdynamic EF, normal wall motion. Was hypertensive to 220s on admission, may have had flash pulm edema. Minimal wheezing on exam though is hypercarbic, may have possible superimposed asthma exacerbation. No pleurisy, PE lower on ddx. Wears 3L at home, requiring bipap on admission. COVID/flu neg.  D dimer 3.6.  - IV lasix BID  - pred 40 with duonebs  - VQ scan  - consulted Cardiology with concern for occult HF, appreciate assistance  - ordered home bipap for nighttime per chart review, 21/15 cm H20   - home advair, coreg, hydral, losartan, singulair  - repeat ABG tomorrow  - trop 58 with EKG NSR, repeating for trend  - wound care for lower extremities    CVA  DM2  Hypothryoidism  - home statin, ASA, gabapentin  - converted and reduced long acting to 18U nightly  - reduced mealtime to 18/15/26 U for meals + SSI  - consulted DM given complexity of regimen  - home synthroid    CKD   Follows with Dr. Haja Agee of [address] [place] [address] Decision Making:   I personally reviewed    Basophils % 1 0 - 1 %    Immature Granulocytes 0 0.0 - 0.5 %    Neutrophils Absolute 6.4 1.8 - 8.0 K/UL    Lymphocytes Absolute 1.2 0.8 - 3.5 K/UL    Monocytes Absolute 0.5 0.0 - 1.0 K/UL    Eosinophils Absolute 0.1 0.0 - 0.4 K/UL    Basophils Absolute 0.1 0.0 - 0.1 K/UL    Absolute Immature Granulocyte 0.0 0.00 - 0.04 K/UL    Differential Type SMEAR SCANNED      RBC Comment ANISOCYTOSIS  PRESENT       CMP    Collection Time: 12/03/23 10:13 AM   Result Value Ref Range    Sodium 144 136 - 145 mmol/L    Potassium 3.2 (L) 3.5 - 5.1 mmol/L    Chloride 103 97 - 108 mmol/L    CO2 41 (HH) 21 - 32 mmol/L    Anion Gap 0 (L) 5 - 15 mmol/L    Glucose 116 (H) 65 - 100 mg/dL    BUN 18 6 - 20 MG/DL    Creatinine 1.09 (H) 0.55 - 1.02 MG/DL    Bun/Cre Ratio 17 12 - 20      Est, Glom Filt Rate 55 (L) >60 ml/min/1.73m2    Calcium 10.3 (H) 8.5 - 10.1 MG/DL    Total Bilirubin 1.3 (H) 0.2 - 1.0 MG/DL    ALT 11 (L) 12 - 78 U/L    AST 11 (L) 15 - 37 U/L    Alk Phosphatase 92 45 - 117 U/L    Total Protein 7.4 6.4 - 8.2 g/dL    Albumin 3.1 (L) 3.5 - 5.0 g/dL    Globulin 4.3 (H) 2.0 - 4.0 g/dL    Albumin/Globulin Ratio 0.7 (L) 1.1 - 2.2     D-Dimer, Quantitative    Collection Time: 12/03/23 10:13 AM   Result Value Ref Range    D-Dimer, Quant 3.68 (H) 0.00 - 0.65 mg/L FEU   Troponin    Collection Time: 12/03/23 10:13 AM   Result Value Ref Range    Troponin, High Sensitivity 58 (H) 0 - 51 ng/L   Brain Natriuretic Peptide    Collection Time: 12/03/23 10:13 AM   Result Value Ref Range    NT Pro-BNP 1,903 (H) <125 PG/ML   Magnesium    Collection Time: 12/03/23 10:13 AM   Result Value Ref Range    Magnesium 2.1 1.6 - 2.4 mg/dL   COVID-19, Rapid    Collection Time: 12/03/23 10:25 AM    Specimen: Nares   Result Value Ref Range    Source Nasopharyngeal      SARS-CoV-2, Rapid Not detected NOTD     Rapid influenza A/B antigens    Collection Time: 12/03/23 10:25 AM    Specimen: Nares   Result Value Ref Range    Influenza A Ag Negative NEG

## 2023-12-03 NOTE — ED NOTES
Placed on 4 L NC; will monitor oxygen sats. Intensivist bedside at this time.      Juana Hood RN  12/03/23 1403       Juana Hood RN  12/03/23 9826

## 2023-12-03 NOTE — ED NOTES
1323-patient desaturated to low 80s on RA. Placed back on BiPAP for hypoxia. Will notify provider when bedside.      Rosamaria Montano RN  12/03/23 2914

## 2023-12-03 NOTE — ED NOTES
BiPap removed for reassessment by hospitalist per request of hospitalist.     Chi Olivares, RN  12/03/23 1107

## 2023-12-03 NOTE — ED NOTES
PA aware of htn, no orders received at this time.  PA notified of critical lab result and respiratory called for ABG      Kaila Ventura RN  12/03/23 6579

## 2023-12-03 NOTE — ED NOTES
RT at bedside at this time. Pt placed on Bipap.      Norbert Chaidez, [address]  23/97/84 2584

## 2023-12-03 NOTE — ED PROVIDER NOTES
[facility] EMERGENCY DEPT  EMERGENCY DEPARTMENT ENCOUNTER       Pt Name: Melanie Garzon  MRN: [id number]  [address] 1950  Date of Evaluation: 12/3/2023  Provider: Tray Stover PA-C   PCP: Jaspreet Wright MD  Note Started: 2:43 PM 12/3/23     CHIEF COMPLAINT       Chief Complaint   Patient presents with    Nausea     Arrives by rescue from home for nausea in past 12-24 hours with a bp 275W/ systolic-the light \"is bothering my eyes\" -pt is usually on 3lpm nasal cannula all the time. Pt reports she is short of breath some, she was tired off and on yesterday and breathing hard. Pt has had dry heaves-    Shortness of Breath    Otalgia     Her left ear feels clogged up, her primary said she had a ear infection-she  was on antibiotic. HISTORY OF PRESENT ILLNESS: 1 or more elements      History From: Patient and Patient's Daughter  None     Melanie Garzon is a 81 y.o. female who presents to the ED today with shortness of breath. The patient states that she wears O2 at night however here lately has had to increase and wear her oxygen at all times. Reports that she been having difficulties breathing. No chest pain. Has experiencing dry heaves. Patient states she does not know if she has had a fever. Does report that her left ear has felt clogged. No other constitutional symptoms reported     Nursing Notes were all reviewed and agreed with or any disagreements were addressed in the HPI. REVIEW OF SYSTEMS      Review of Systems     Positives and Pertinent negatives as per HPI.     PAST HISTORY     Past Medical History:  Past Medical History:   Diagnosis Date    Asthma     CAD (coronary artery disease)     \"mild\" per Dr Oriana Covarrubias note    Diabetes [facility])     Dr Janeth Xavier     Hx of blood clots     Hyperlipidemia     Hypertension     Long term current use of anticoagulant therapy     Nausea & vomiting     Pulmonary embolism ([address])     Screening for colon cancer 02/16/2005    Dr Nano Rodriguez in 10 years    Stroke [facility])     Rt side weaker than left, uses a cane: no longer followed by neuro    Thromboembolus ([address])     Rt leg moved to lung     Thyroid disease     Unspecified sleep apnea     uses CPAP       Past Surgical History:  Past Surgical History:   Procedure Laterality Date    CARDIAC CATHETERIZATION  2012         CARDIAC CATHETERIZATION      COLONOSCOPY N/A 10/24/2022    COLONOSCOPY performed by Naomy Braden MD at [address]  10/24/2022    HEENT      tonsillectomy    HEMORRHOID SURGERY      HYSTERECTOMY (CERVIX STATUS UNKNOWN)      ORTHOPEDIC SURGERY  2011    left shoulder    [place] UNLISTED PROCEDURE CARDIAC SURGERY      heart surgery       Family History:  Family History   Problem Relation Age of Onset    Stroke Father     Hypertension Father     Heart Disease Father     Breast Cancer Mother 79    Cancer Mother     Diabetes Mother     High Blood Pressure Mother     Coronary Art Dis Brother 54       Social History:  Social History     Tobacco Use    Smoking status: Former     Packs/day: 1.00     Years: 10.00     Additional pack years: 0.00     Total pack years: 10.00     Types: Cigarettes     Quit date: 2004     Years since quittin.2    Smokeless tobacco: Never   Vaping Use    Vaping Use: Never used   Substance Use Topics    Alcohol use: No    Drug use: Not Currently     Types: Marijuana [facility])       Allergies:   Allergies   Allergen Reactions    Latex Itching    Codeine Itching and Other (See Comments)     hallucinate    Shellfish Allergy Swelling    Iodine Itching and Rash     Given pre-cardiac cath       CURRENT MEDICATIONS      Previous Medications    ALBUTEROL (PROVENTIL) (2.5 MG/3ML) 0.083% NEBULIZER SOLUTION    Take 3 mLs by nebulization every 4 hours use until duoneb comes in    ALBUTEROL SULFATE HFA (PROVENTIL;VENTOLIN;PROAIR) 108 (90 BASE) MCG/ACT INHALER    USE 1 INHALATION EVERY 4 HOURS AS NEEDED FOR WHEEZING OR SHORTNESS OF BREATH

## 2023-12-04 ENCOUNTER — APPOINTMENT (OUTPATIENT)
Facility: HOSPITAL | Age: 73
DRG: 189 | End: 2023-12-04
Payer: MEDICARE

## 2023-12-04 ENCOUNTER — HOME CARE VISIT (OUTPATIENT)
Dept: HOME HEALTH SERVICES | Facility: HOME HEALTH | Age: 73
End: 2023-12-04
Payer: MEDICARE

## 2023-12-04 PROBLEM — T38.0X5A STEROID-INDUCED HYPERGLYCEMIA: Status: ACTIVE | Noted: 2023-12-04

## 2023-12-04 PROBLEM — R73.9 STEROID-INDUCED HYPERGLYCEMIA: Status: ACTIVE | Noted: 2023-12-04

## 2023-12-04 LAB
BASOPHILS # BLD: 0 K/UL (ref 0–0.1)
BASOPHILS NFR BLD: 0 % (ref 0–1)
DIFFERENTIAL METHOD BLD: ABNORMAL
EOSINOPHIL # BLD: 0 K/UL (ref 0–0.4)
EOSINOPHIL NFR BLD: 0 % (ref 0–7)
ERYTHROCYTE [DISTWIDTH] IN BLOOD BY AUTOMATED COUNT: 18.1 % (ref 11.5–14.5)
GLUCOSE BLD STRIP.AUTO-MCNC: 176 MG/DL (ref 65–117)
GLUCOSE BLD STRIP.AUTO-MCNC: 306 MG/DL (ref 65–117)
GLUCOSE BLD STRIP.AUTO-MCNC: 319 MG/DL (ref 65–117)
GLUCOSE BLD STRIP.AUTO-MCNC: 342 MG/DL (ref 65–117)
HCT VFR BLD AUTO: 29.1 % (ref 35–47)
HGB BLD-MCNC: 8.4 G/DL (ref 11.5–16)
IMM GRANULOCYTES # BLD AUTO: 0.1 K/UL (ref 0–0.04)
IMM GRANULOCYTES NFR BLD AUTO: 1 % (ref 0–0.5)
LYMPHOCYTES # BLD: 0.6 K/UL (ref 0.8–3.5)
LYMPHOCYTES NFR BLD: 9 % (ref 12–49)
MCH RBC QN AUTO: 23 PG (ref 26–34)
MCHC RBC AUTO-ENTMCNC: 28.9 G/DL (ref 30–36.5)
MCV RBC AUTO: 79.7 FL (ref 80–99)
MONOCYTES # BLD: 0.1 K/UL (ref 0–1)
MONOCYTES NFR BLD: 1 % (ref 5–13)
NEUTS SEG # BLD: 5.9 K/UL (ref 1.8–8)
NEUTS SEG NFR BLD: 89 % (ref 32–75)
NRBC # BLD: 0 K/UL (ref 0–0.01)
NRBC BLD-RTO: 0 PER 100 WBC
PLATELET # BLD AUTO: 234 K/UL (ref 150–400)
PLATELET COMMENT: ABNORMAL
PMV BLD AUTO: 12.1 FL (ref 8.9–12.9)
RBC # BLD AUTO: 3.65 M/UL (ref 3.8–5.2)
RBC MORPH BLD: ABNORMAL
SERVICE CMNT-IMP: ABNORMAL
WBC # BLD AUTO: 6.7 K/UL (ref 3.6–11)

## 2023-12-04 PROCEDURE — 6360000002 HC RX W HCPCS: Performed by: STUDENT IN AN ORGANIZED HEALTH CARE EDUCATION/TRAINING PROGRAM

## 2023-12-04 PROCEDURE — 82962 GLUCOSE BLOOD TEST: CPT

## 2023-12-04 PROCEDURE — 94660 CPAP INITIATION&MGMT: CPT

## 2023-12-04 PROCEDURE — 78580 LUNG PERFUSION IMAGING: CPT

## 2023-12-04 PROCEDURE — 3430000000 HC RX DIAGNOSTIC RADIOPHARMACEUTICAL: Performed by: STUDENT IN AN ORGANIZED HEALTH CARE EDUCATION/TRAINING PROGRAM

## 2023-12-04 PROCEDURE — 99221 1ST HOSP IP/OBS SF/LOW 40: CPT | Performed by: CLINICAL NURSE SPECIALIST

## 2023-12-04 PROCEDURE — 36415 COLL VENOUS BLD VENIPUNCTURE: CPT

## 2023-12-04 PROCEDURE — 2580000003 HC RX 258: Performed by: STUDENT IN AN ORGANIZED HEALTH CARE EDUCATION/TRAINING PROGRAM

## 2023-12-04 PROCEDURE — 6370000000 HC RX 637 (ALT 250 FOR IP): Performed by: STUDENT IN AN ORGANIZED HEALTH CARE EDUCATION/TRAINING PROGRAM

## 2023-12-04 PROCEDURE — 94640 AIRWAY INHALATION TREATMENT: CPT

## 2023-12-04 PROCEDURE — 2700000000 HC OXYGEN THERAPY PER DAY

## 2023-12-04 PROCEDURE — 6370000000 HC RX 637 (ALT 250 FOR IP): Performed by: CLINICAL NURSE SPECIALIST

## 2023-12-04 PROCEDURE — 2060000000 HC ICU INTERMEDIATE R&B

## 2023-12-04 PROCEDURE — A9540 TC99M MAA: HCPCS | Performed by: STUDENT IN AN ORGANIZED HEALTH CARE EDUCATION/TRAINING PROGRAM

## 2023-12-04 PROCEDURE — 85025 COMPLETE CBC W/AUTO DIFF WBC: CPT

## 2023-12-04 RX ORDER — INSULIN GLARGINE 100 [IU]/ML
0.25 INJECTION, SOLUTION SUBCUTANEOUS NIGHTLY
Status: DISCONTINUED | OUTPATIENT
Start: 2023-12-04 | End: 2023-12-07 | Stop reason: HOSPADM

## 2023-12-04 RX ORDER — PREDNISONE 20 MG/1
40 TABLET ORAL DAILY
Status: COMPLETED | OUTPATIENT
Start: 2023-12-05 | End: 2023-12-07

## 2023-12-04 RX ADMIN — CARVEDILOL 25 MG: 12.5 TABLET, FILM COATED ORAL at 09:10

## 2023-12-04 RX ADMIN — Medication 12 UNITS: at 18:15

## 2023-12-04 RX ADMIN — ROSUVASTATIN 20 MG: 20 TABLET, FILM COATED ORAL at 20:51

## 2023-12-04 RX ADMIN — SODIUM CHLORIDE, PRESERVATIVE FREE 10 ML: 5 INJECTION INTRAVENOUS at 09:12

## 2023-12-04 RX ADMIN — MONTELUKAST 10 MG: 10 TABLET, FILM COATED ORAL at 20:51

## 2023-12-04 RX ADMIN — ASPIRIN 81 MG: 81 TABLET, COATED ORAL at 09:10

## 2023-12-04 RX ADMIN — SODIUM CHLORIDE, PRESERVATIVE FREE 10 ML: 5 INJECTION INTRAVENOUS at 20:52

## 2023-12-04 RX ADMIN — Medication 12 UNITS: at 14:06

## 2023-12-04 RX ADMIN — HYDRALAZINE HYDROCHLORIDE 50 MG: 50 TABLET, FILM COATED ORAL at 14:07

## 2023-12-04 RX ADMIN — ARFORMOTEROL TARTRATE: 15 SOLUTION RESPIRATORY (INHALATION) at 19:44

## 2023-12-04 RX ADMIN — IPRATROPIUM BROMIDE AND ALBUTEROL SULFATE 1 DOSE: .5; 3 SOLUTION RESPIRATORY (INHALATION) at 08:13

## 2023-12-04 RX ADMIN — LOSARTAN POTASSIUM 100 MG: 100 TABLET, FILM COATED ORAL at 09:10

## 2023-12-04 RX ADMIN — ARFORMOTEROL TARTRATE: 15 SOLUTION RESPIRATORY (INHALATION) at 08:14

## 2023-12-04 RX ADMIN — INSULIN LISPRO 15 UNITS: 100 INJECTION, SOLUTION INTRAVENOUS; SUBCUTANEOUS at 18:15

## 2023-12-04 RX ADMIN — CALCITRIOL CAPSULES 0.25 MCG 0.25 MCG: 0.25 CAPSULE ORAL at 09:09

## 2023-12-04 RX ADMIN — INSULIN GLARGINE 24 UNITS: 100 INJECTION, SOLUTION SUBCUTANEOUS at 20:53

## 2023-12-04 RX ADMIN — GABAPENTIN 100 MG: 100 CAPSULE ORAL at 20:51

## 2023-12-04 RX ADMIN — LEVOTHYROXINE SODIUM 137 MCG: 0.03 TABLET ORAL at 06:46

## 2023-12-04 RX ADMIN — HYDRALAZINE HYDROCHLORIDE 50 MG: 50 TABLET, FILM COATED ORAL at 06:46

## 2023-12-04 RX ADMIN — KIT FOR THE PREPARATION OF TECHNETIUM TC 99M ALBUMIN AGGREGATED 4.2 MILLICURIE: 2.5 INJECTION, POWDER, FOR SOLUTION INTRAVENOUS at 10:50

## 2023-12-04 RX ADMIN — INSULIN LISPRO 15 UNITS: 100 INJECTION, SOLUTION INTRAVENOUS; SUBCUTANEOUS at 14:06

## 2023-12-04 RX ADMIN — PREDNISONE 40 MG: 20 TABLET ORAL at 09:09

## 2023-12-04 RX ADMIN — INSULIN LISPRO 4 UNITS: 100 INJECTION, SOLUTION INTRAVENOUS; SUBCUTANEOUS at 20:53

## 2023-12-04 RX ADMIN — ENOXAPARIN SODIUM 40 MG: 100 INJECTION SUBCUTANEOUS at 09:09

## 2023-12-04 RX ADMIN — IPRATROPIUM BROMIDE AND ALBUTEROL SULFATE 1 DOSE: .5; 3 SOLUTION RESPIRATORY (INHALATION) at 15:06

## 2023-12-04 RX ADMIN — HYDRALAZINE HYDROCHLORIDE 50 MG: 50 TABLET, FILM COATED ORAL at 20:52

## 2023-12-04 RX ADMIN — FUROSEMIDE 40 MG: 10 INJECTION, SOLUTION INTRAMUSCULAR; INTRAVENOUS at 09:10

## 2023-12-04 RX ADMIN — CARVEDILOL 12.5 MG: 12.5 TABLET, FILM COATED ORAL at 18:14

## 2023-12-04 RX ADMIN — IPRATROPIUM BROMIDE AND ALBUTEROL SULFATE 1 DOSE: .5; 3 SOLUTION RESPIRATORY (INHALATION) at 19:38

## 2023-12-04 NOTE — WOUND CARE
Wound Care consult for the Right lower leg wound that was present on admission. Chart reviewed and patient assessed. Pt. Has been going to the outpatient wound center near [facility] close to where she lives. She was about to go to every 3 days wound care with her next visit. Past Medical History:   Diagnosis Date    Asthma     CAD (coronary artery disease)     \"mild\" per Dr Darian Neal note    Diabetes [facility])     Dr Naheed Hernadez     Hx of blood clots     Hyperlipidemia     Hypertension     Long term current use of anticoagulant therapy     Nausea & vomiting     Pulmonary embolism ([address])     Screening for colon cancer 02/16/2005    Dr Leonel Jiménez in 10 years    Stroke [facility]) 2004    Rt side weaker than left, uses a cane: no longer followed by neuro    Thromboembolus ([address]) 1976    Rt leg moved to lung     Thyroid disease     Unspecified sleep apnea     uses CPAP     Past Surgical History:   Procedure Laterality Date    CARDIAC CATHETERIZATION  6/6/2012         CARDIAC CATHETERIZATION      COLONOSCOPY N/A 10/24/2022    COLONOSCOPY performed by Meghan Breen MD at [address]  10/24/2022    HEENT      tonsillectomy    HEMORRHOID SURGERY      HYSTERECTOMY (CERVIX STATUS UNKNOWN)      ORTHOPEDIC SURGERY  8/2011    left shoulder    MD UNLISTED PROCEDURE CARDIAC SURGERY      heart surgery     Assessment: pt. Has a large healing bulla wound on the right lower leg and it is currently healing well with some edema and maceration still present. There was a dry scab that came off during cleaning today but there was completely healed skin under it. Treatment: the wound was cleansed with Vashe solution and 4x4's and then dressed with Therahoney sheets and covered with ABD and wrapped with nick. Taped and dated. Plan:  Pt.'s wound care will need to be done every other day on Even days. Float the heels. Pt. Has a Lamont score of 14.  She needs to be mobilized / turned well to off load the sacrum and buttocks or sit in the chair with a Bariatric cushion under her buttocks. Pt. To resume the outpatient wound care plan of care when discharged from [facility].    Elliott Bravo RN, BSN, [employer]

## 2023-12-04 NOTE — PLAN OF CARE
End of Shift Note    Bedside shift change report given to Alyssa HUGO (oncoming nurse) by Kin Bennett RN (offgoing nurse).  Report included the following information SBAR, Intake/Output, and Cardiac Rhythm NSR    Shift worked:  7P-7A     Shift summary and any significant changes:     Pt voided overnight and tolerated the bipap well overnight.  Daughter at bedside.      Concerns for physician to address:       Zone phone for oncoming shift:          Activity:     Number times ambulated in hallways past shift: 0  Number of times OOB to chair past shift: 0    Cardiac:   Cardiac Monitoring: Yes           Access:  Current line(s): PIV     Genitourinary:   Urinary status: voiding    Respiratory:      Chronic home O2 use?: YES  Incentive spirometer at bedside: N/A       GI:     Current diet:  ADULT DIET; Regular; 3 carb choices (45 gm/meal); Low Fat/Low Chol/High Fiber/2 gm Na  Passing flatus: NO  Tolerating current diet: YES       Pain Management:   Patient states pain is manageable on current regimen: YES    Skin:     Interventions: turn team    Patient Safety:  Fall Score:    Interventions: bed/chair alarm and assistive device (walker, cane. etc)       Length of Stay:  Expected LOS: 5  Actual LOS: 1      Kin Bennett RN      Problem: Respiratory - Adult  Goal: Achieves optimal ventilation and oxygenation  12/4/2023 0819 by Kin Bennett RN  Outcome: Progressing  Flowsheets (Taken 12/3/2023 2300)  Achieves optimal ventilation and oxygenation: Assess for changes in respiratory status  12/3/2023 2108 by Keila Aguiar, RT  Outcome: Progressing     Problem: Discharge Planning  Goal: Discharge to home or other facility with appropriate resources  Outcome: Progressing     Problem: Pain  Goal: Verbalizes/displays adequate comfort level or baseline comfort level  Outcome: Progressing  Flowsheets (Taken 12/3/2023 2245)  Verbalizes/displays adequate comfort level or baseline comfort level:   Encourage patient to monitor  pain and request assistance   Assess pain using appropriate pain scale     Problem: Skin/Tissue Integrity  Goal: Absence of new skin breakdown  Description: 1. Monitor for areas of redness and/or skin breakdown  2. Assess vascular access sites hourly  3. Every 4-6 hours minimum:  Change oxygen saturation probe site  4. Every 4-6 hours:  If on nasal continuous positive airway pressure, respiratory therapy assess nares and determine need for appliance change or resting period.   Outcome: Progressing     Problem: Safety - Adult  Goal: Free from fall injury  Outcome: Progressing

## 2023-12-04 NOTE — PROGRESS NOTES
Hospitalist Progress Note    NAME:   Edin Naik   : 1950   MRN: [id number]     Date/Time: 2023 3:42 PM  Patient PCP: Luciana Greco MD    Estimated discharge date: ?-7  Barriers: Resp status stable, off BIPAP, Pulm/Cardio clearance      Assessment / Plan:    Acute on chronic hypoxic and hypercarbic respiratory failure POA- off BIPAP during day time now, on 4L/m oxygen with  BIPAP Q HS per pulm- awaiting new machine at home  Possible occult diastolic heart failure vs flash pulm edema  Asthma  Obesity hypoventilation syndrome   Hx of PE  HTN  BNP elevated at 1900, CXR with cardiomegaly and pulm edema. Renal function at baseline and normal LFTs. Recent echo in early October showed hyperdynamic EF, normal wall motion. Was hypertensive to 220s on admission, may have had flash pulm edema. Minimal wheezing on exam though is hypercarbic, may have possible superimposed asthma exacerbation. No pleurisy, PE lower on ddx. Wears 3L at home, requiring bipap on admission. COVID/flu neg. D dimer 3.6.     IP Pulm consulted- following  Cont IV lasix BID, check BMP today as ordered  Cont pred 40 with duonebs  - VQ scan noted for low probability for PE  IP consulted Cardiology - following  - home advair, coreg, hydral, losartan, singulair  - trop 58 with EKG NSR, repeating for trend  - wound care for lower extremities     CVA  DM2  Hypothryoidism  - home statin, ASA, gabapentin  - converted and reduced long acting to 18U nightly  - reduced mealtime to 18/15/26 U for meals + SSI  - consulted DM given complexity of regimen  - home synthroid     CKD   Follows with Dr[facility] Darwin Cai of [address] [facility]. Decision Making:   I personally reviewed labs: cbc bmp bnp trop  I personally reviewed imaging: cxr  I personally reviewed EKG: NSR, nonspecific ST changes  Toxic drug monitoring: monitor bmp for hypokalemia from lasix toxicity  Discussed case with: Pt, family at bedside, RN, Pulm NP        Code Status: full  DVT

## 2023-12-05 ENCOUNTER — APPOINTMENT (OUTPATIENT)
Facility: HOSPITAL | Age: 73
DRG: 189 | End: 2023-12-05
Payer: MEDICARE

## 2023-12-05 ENCOUNTER — TELEPHONE (OUTPATIENT)
Age: 73
End: 2023-12-05

## 2023-12-05 ENCOUNTER — CLINICAL DOCUMENTATION (OUTPATIENT)
Age: 73
End: 2023-12-05

## 2023-12-05 LAB
ANION GAP SERPL CALC-SCNC: 4 MMOL/L (ref 5–15)
BASOPHILS # BLD: 0 K/UL (ref 0–0.1)
BASOPHILS NFR BLD: 0 % (ref 0–1)
BUN SERPL-MCNC: 36 MG/DL (ref 6–20)
BUN/CREAT SERPL: 26 (ref 12–20)
CALCIUM SERPL-MCNC: 9.9 MG/DL (ref 8.5–10.1)
CHLORIDE SERPL-SCNC: 95 MMOL/L (ref 97–108)
CO2 SERPL-SCNC: 41 MMOL/L (ref 21–32)
CREAT SERPL-MCNC: 1.36 MG/DL (ref 0.55–1.02)
DIFFERENTIAL METHOD BLD: ABNORMAL
EOSINOPHIL # BLD: 0 K/UL (ref 0–0.4)
EOSINOPHIL NFR BLD: 0 % (ref 0–7)
ERYTHROCYTE [DISTWIDTH] IN BLOOD BY AUTOMATED COUNT: 18.1 % (ref 11.5–14.5)
GLUCOSE BLD STRIP.AUTO-MCNC: 140 MG/DL (ref 65–117)
GLUCOSE BLD STRIP.AUTO-MCNC: 175 MG/DL (ref 65–117)
GLUCOSE BLD STRIP.AUTO-MCNC: 260 MG/DL (ref 65–117)
GLUCOSE BLD STRIP.AUTO-MCNC: 266 MG/DL (ref 65–117)
GLUCOSE BLD STRIP.AUTO-MCNC: 306 MG/DL (ref 65–117)
GLUCOSE SERPL-MCNC: 169 MG/DL (ref 65–100)
HCT VFR BLD AUTO: 30.6 % (ref 35–47)
HGB BLD-MCNC: 8.9 G/DL (ref 11.5–16)
IMM GRANULOCYTES # BLD AUTO: 0.1 K/UL (ref 0–0.04)
IMM GRANULOCYTES NFR BLD AUTO: 1 % (ref 0–0.5)
LYMPHOCYTES # BLD: 1.3 K/UL (ref 0.8–3.5)
LYMPHOCYTES NFR BLD: 17 % (ref 12–49)
MAGNESIUM SERPL-MCNC: 2.1 MG/DL (ref 1.6–2.4)
MCH RBC QN AUTO: 23.6 PG (ref 26–34)
MCHC RBC AUTO-ENTMCNC: 29.1 G/DL (ref 30–36.5)
MCV RBC AUTO: 81.2 FL (ref 80–99)
MONOCYTES # BLD: 0.5 K/UL (ref 0–1)
MONOCYTES NFR BLD: 7 % (ref 5–13)
NEUTS SEG # BLD: 6 K/UL (ref 1.8–8)
NEUTS SEG NFR BLD: 75 % (ref 32–75)
NRBC # BLD: 0 K/UL (ref 0–0.01)
NRBC BLD-RTO: 0 PER 100 WBC
PHOSPHATE SERPL-MCNC: 2.9 MG/DL (ref 2.6–4.7)
PLATELET # BLD AUTO: 170 K/UL (ref 150–400)
PMV BLD AUTO: 10.5 FL (ref 8.9–12.9)
POTASSIUM SERPL-SCNC: 4 MMOL/L (ref 3.5–5.1)
RBC # BLD AUTO: 3.77 M/UL (ref 3.8–5.2)
SERVICE CMNT-IMP: ABNORMAL
SODIUM SERPL-SCNC: 140 MMOL/L (ref 136–145)
WBC # BLD AUTO: 7.9 K/UL (ref 3.6–11)

## 2023-12-05 PROCEDURE — 6370000000 HC RX 637 (ALT 250 FOR IP): Performed by: STUDENT IN AN ORGANIZED HEALTH CARE EDUCATION/TRAINING PROGRAM

## 2023-12-05 PROCEDURE — 99231 SBSQ HOSP IP/OBS SF/LOW 25: CPT | Performed by: INTERNAL MEDICINE

## 2023-12-05 PROCEDURE — 2700000000 HC OXYGEN THERAPY PER DAY

## 2023-12-05 PROCEDURE — 36415 COLL VENOUS BLD VENIPUNCTURE: CPT

## 2023-12-05 PROCEDURE — 6360000002 HC RX W HCPCS: Performed by: STUDENT IN AN ORGANIZED HEALTH CARE EDUCATION/TRAINING PROGRAM

## 2023-12-05 PROCEDURE — 2060000000 HC ICU INTERMEDIATE R&B

## 2023-12-05 PROCEDURE — 82962 GLUCOSE BLOOD TEST: CPT

## 2023-12-05 PROCEDURE — 84100 ASSAY OF PHOSPHORUS: CPT

## 2023-12-05 PROCEDURE — 83735 ASSAY OF MAGNESIUM: CPT

## 2023-12-05 PROCEDURE — 80048 BASIC METABOLIC PNL TOTAL CA: CPT

## 2023-12-05 PROCEDURE — 94660 CPAP INITIATION&MGMT: CPT

## 2023-12-05 PROCEDURE — 71045 X-RAY EXAM CHEST 1 VIEW: CPT

## 2023-12-05 PROCEDURE — 2580000003 HC RX 258: Performed by: STUDENT IN AN ORGANIZED HEALTH CARE EDUCATION/TRAINING PROGRAM

## 2023-12-05 PROCEDURE — 85025 COMPLETE CBC W/AUTO DIFF WBC: CPT

## 2023-12-05 PROCEDURE — 6370000000 HC RX 637 (ALT 250 FOR IP): Performed by: INTERNAL MEDICINE

## 2023-12-05 PROCEDURE — 94640 AIRWAY INHALATION TREATMENT: CPT

## 2023-12-05 PROCEDURE — 6370000000 HC RX 637 (ALT 250 FOR IP): Performed by: CLINICAL NURSE SPECIALIST

## 2023-12-05 RX ORDER — IPRATROPIUM BROMIDE AND ALBUTEROL SULFATE 2.5; .5 MG/3ML; MG/3ML
1 SOLUTION RESPIRATORY (INHALATION)
Status: DISCONTINUED | OUTPATIENT
Start: 2023-12-05 | End: 2023-12-07 | Stop reason: HOSPADM

## 2023-12-05 RX ADMIN — LOSARTAN POTASSIUM 100 MG: 100 TABLET, FILM COATED ORAL at 10:46

## 2023-12-05 RX ADMIN — CALCITRIOL CAPSULES 0.25 MCG 0.25 MCG: 0.25 CAPSULE ORAL at 10:47

## 2023-12-05 RX ADMIN — INSULIN GLARGINE 24 UNITS: 100 INJECTION, SOLUTION SUBCUTANEOUS at 21:03

## 2023-12-05 RX ADMIN — SODIUM CHLORIDE, PRESERVATIVE FREE 10 ML: 5 INJECTION INTRAVENOUS at 10:48

## 2023-12-05 RX ADMIN — HYDRALAZINE HYDROCHLORIDE 50 MG: 50 TABLET, FILM COATED ORAL at 07:40

## 2023-12-05 RX ADMIN — GABAPENTIN 100 MG: 100 CAPSULE ORAL at 20:39

## 2023-12-05 RX ADMIN — IPRATROPIUM BROMIDE AND ALBUTEROL SULFATE 1 DOSE: .5; 3 SOLUTION RESPIRATORY (INHALATION) at 12:10

## 2023-12-05 RX ADMIN — SODIUM CHLORIDE, PRESERVATIVE FREE 10 ML: 5 INJECTION INTRAVENOUS at 20:41

## 2023-12-05 RX ADMIN — INSULIN HUMAN 20 UNITS: 100 INJECTION, SUSPENSION SUBCUTANEOUS at 10:46

## 2023-12-05 RX ADMIN — INSULIN LISPRO 15 UNITS: 100 INJECTION, SOLUTION INTRAVENOUS; SUBCUTANEOUS at 11:01

## 2023-12-05 RX ADMIN — LATANOPROST 1 DROP: 50 SOLUTION/ DROPS OPHTHALMIC at 22:58

## 2023-12-05 RX ADMIN — LEVOTHYROXINE SODIUM 137 MCG: 0.03 TABLET ORAL at 07:40

## 2023-12-05 RX ADMIN — ENOXAPARIN SODIUM 40 MG: 100 INJECTION SUBCUTANEOUS at 10:47

## 2023-12-05 RX ADMIN — IPRATROPIUM BROMIDE AND ALBUTEROL SULFATE 1 DOSE: .5; 3 SOLUTION RESPIRATORY (INHALATION) at 07:29

## 2023-12-05 RX ADMIN — CARVEDILOL 12.5 MG: 12.5 TABLET, FILM COATED ORAL at 18:10

## 2023-12-05 RX ADMIN — HYDRALAZINE HYDROCHLORIDE 50 MG: 50 TABLET, FILM COATED ORAL at 22:58

## 2023-12-05 RX ADMIN — ASPIRIN 81 MG: 81 TABLET, COATED ORAL at 10:46

## 2023-12-05 RX ADMIN — HYDRALAZINE HYDROCHLORIDE 50 MG: 50 TABLET, FILM COATED ORAL at 14:04

## 2023-12-05 RX ADMIN — ROSUVASTATIN 20 MG: 20 TABLET, FILM COATED ORAL at 20:39

## 2023-12-05 RX ADMIN — ARFORMOTEROL TARTRATE: 15 SOLUTION RESPIRATORY (INHALATION) at 20:43

## 2023-12-05 RX ADMIN — CARVEDILOL 25 MG: 12.5 TABLET, FILM COATED ORAL at 11:01

## 2023-12-05 RX ADMIN — INSULIN LISPRO 15 UNITS: 100 INJECTION, SOLUTION INTRAVENOUS; SUBCUTANEOUS at 13:04

## 2023-12-05 RX ADMIN — MONTELUKAST 10 MG: 10 TABLET, FILM COATED ORAL at 20:39

## 2023-12-05 RX ADMIN — ARFORMOTEROL TARTRATE: 15 SOLUTION RESPIRATORY (INHALATION) at 07:33

## 2023-12-05 RX ADMIN — Medication 8 UNITS: at 13:04

## 2023-12-05 RX ADMIN — PREDNISONE 40 MG: 20 TABLET ORAL at 10:47

## 2023-12-05 RX ADMIN — INSULIN LISPRO 15 UNITS: 100 INJECTION, SOLUTION INTRAVENOUS; SUBCUTANEOUS at 18:10

## 2023-12-05 RX ADMIN — IPRATROPIUM BROMIDE AND ALBUTEROL SULFATE 1 DOSE: 2.5; .5 SOLUTION RESPIRATORY (INHALATION) at 20:43

## 2023-12-05 NOTE — PLAN OF CARE
Problem: Respiratory - Adult  Goal: Achieves optimal ventilation and oxygenation  12/4/2023 2216 by Carolyn Colon RN  Outcome: Progressing  12/4/2023 1209 by RT Aleksandra  Outcome: Progressing  12/4/2023 0819 by Matilda Oliver RN  Outcome: Progressing  Flowsheets (Taken 12/3/2023 2300)  Achieves optimal ventilation and oxygenation: Assess for changes in respiratory status     Problem: Skin/Tissue Integrity  Goal: Absence of new skin breakdown  Description: 1. Monitor for areas of redness and/or skin breakdown  2. Assess vascular access sites hourly  3. Every 4-6 hours minimum:  Change oxygen saturation probe site  4. Every 4-6 hours:  If on nasal continuous positive airway pressure, respiratory therapy assess nares and determine need for appliance change or resting period.   12/4/2023 2216 by Carolyn Colon RN  Outcome: Progressing  12/4/2023 0819 by Matilda Oliver RN  Outcome: Progressing     Problem: Safety - Adult  Goal: Free from fall injury  12/4/2023 2216 by Carolyn Colon RN  Outcome: Progressing  12/4/2023 0819 by Matilda Oliver RN  Outcome: Progressing

## 2023-12-05 NOTE — PROGRESS NOTES
Jonathan Morales is a 81year old female with a 20+ history of Type 2 Diabetes, ASCVD and thyroid disease, who is admitted with acute hypoxic respiratory distress s/t volume overload vs asthma exacerbation. Initial labs are significant for CO2 41, BNP 1903, Trop 58. Chest Xray with cardiomegaly with pulmonary edema and bilateral pleural effusions. She was started on supplemental O2 and prednisone. The [facility] has been consulted to assist in glycemic management and advanced diabetes management assessment this admission. Ms Amelia Cheatham tells me that she takes 30 units glargine every bedtime and 24 units lispro at [facility] and 30-40 units lispro with dinner. Her A1C is improving, her range has been 8.1-10.3% in the last 4 years, now 7.5%. She tells me she does eat about 2 meals daily- a late breakfast and early dinner. She tells me that she wears a les CGM and her glucose is  fasting and below 180 all other times of the day. She does not experience low sugar. This admission, prednisone 40mg daily has started (Day 2/5). 15 units lispro with correctional insulin has started. Basal insulin to resume this evening. As prednisone will raise glucose for 12hrs, will add NPH to be given with each dose. This morning Ms. Amelia Cheatham is lying comfortably in bed without evidence of shortness of breath  Blood pressure 114/48  Pulse 66  Afebrile  96% on 4 L  Recent laboratory data  Glucose 306 (range 1 69-3 42)  Creatinine 1.36    Impression  1. COPD with 2 hospitalizations for shortness of breath. The patient has now been found to be a candidate for BiPAP instead of CPAP  2. Type 2 diabetes mellitus with significantly improved glucose control. Her family has been very supportive and making sure that she takes her insulin consistently and this appears to be working well for her. No changes in the regimen were made  3. Hyperglycemia secondary to glucocorticoids.   She is now receiving a

## 2023-12-05 NOTE — PROGRESS NOTES
1915: Bedside shift change report given to Tyra Cherry Alarcon RN (oncoming nurse) by Shelli Mcwilliams RN (offgoing nurse). Report included the following information Nurse Handoff Report, Intake/Output, MAR, Recent Results, and Cardiac Rhythm NSR . '    End of Shift Note    Bedside shift change report given to Simin Tanner RN (oncoming nurse) by Georgina Travis RN (offgoing nurse). Report included the following information SBAR, Intake/Output, MAR, Recent Results, and Cardiac Rhythm NSR    Shift worked:  1900-700     Shift summary and any significant changes:          Concerns for physician to address:      Zone phone for oncoming shift:          Activity:     Number times ambulated in hallways past shift: 0  Number of times OOB to chair past shift: 0    Cardiac:   Cardiac Monitoring: Yes           Access:  Current line(s): PIV     Genitourinary:   Urinary status: voiding and external catheter    Respiratory:      Chronic home O2 use?: YES  Incentive spirometer at bedside: YES       GI:     Current diet:  ADULT DIET; Regular; 3 carb choices (45 gm/meal); Low Fat/Low Chol/High Fiber/2 gm Na  Passing flatus: YES  Tolerating current diet: YES       Pain Management:   Patient states pain is manageable on current regimen: YES    Skin:     Interventions: float heels, increase time out of bed, foam dressing, PT/OT consult, limit briefs, internal/external urinary devices, and nutritional support    Patient Safety:  Fall Score:    Interventions: bed/chair alarm, assistive device (walker, cane.  etc), gripper socks, pt to call before getting OOB, and stay with me (per policy)       Length of Stay:  Expected LOS: 5  Actual LOS: 2      Georgina Travis RN

## 2023-12-05 NOTE — PROGRESS NOTES
Pulmonary, Critical Care, and Sleep Medicine~Consult Note    Name: Elvia Clifford MRN: [id number]   : 1950 Hospital: [facility]   Date: 2023 9:30 AM Admission: 12/3/2023     IMPRESSION:   Acute/chronic hypercapnic/hypoxic respiratory failure, asthma exacerbation + pulmonary edema  BRENT/OHS  Moderate persistent asthma +/- exacerbation, no wheeze on exam today  HTN  Obesity      PLAN:   Supplemental o2 - at baseline  Nocturnal BiPAP, home settings - awaiting new machine at home   Agree w/ burst dose prednisone - 5D  On Advair outpt, continue budesonide/arformoterol here  Montelukast  BP control, diuresis, cardiology following  Mobilize   Thank you for the consultation - has f/u scheduled 23 w/ Dr. Gómez      Daily Progression:    23 : Wore BiPAP over night. Weaned to baseline o2 3LPM. Dyspnea improving.     73yo obese female w/ asthma, BRENT, HTN, DM, CKD. Admitted 12/3/23 w/ complaints of worsening dyspnea, hypertension, nausea. Required BiPAP support initially. Followed by PARDr. Gómez after recent hospitalization. Asthma controlled on Advair. Last seen in office 23, per notes was having chills and started on doxycycline. POC 3LPM ordered, has not yet received. Followed by Dr. Carr for BRENT. Recently had sleep study and plans to start BiPAP 21/15 w/ o2. She has not yet received BiPAP. Has CPAP at home but reportedly it is not working. History of pulmonary embolism 20-30 years ago. Did not require o2 long term. On daily ASA. History of asthma as a child. Has nebulizer at home. On CPAP for over 10 years. Smoking history - Smoked starting age 19. Smoked off and on. Quit smoking around age 52. Less than 1/2ppd.     Resting in bed. Family at bedside. Alert and conversant. Now on 4LPM.   Cr. 1.09. pBNP 1,903, HGB 8.4  COVID, flu negative.   V/Q low probability.     I have reviewed the labs and previous day’s notes.    Pertinent items are  [Urine:1200]        Intake/Output Summary (Last 24 hours) at 12/5/2023 0930  Last data filed at 12/5/2023 0740  Gross per 24 hour   Intake 100 ml   Output 1500 ml   Net -1400 ml          Imaging:  I have personally reviewed these radiographic films and reports:     I have personally reviewed PFTs:            Physical Exam:                                        Exam Findings Other   General: No resp distress noted, appears stated age    HEENT:  No ulcers, JVD not elevated, no cervical LAD    Chest: No pectus deformity, normal chest rise b/l    HEART:  RRR, no murmurs/rubs/gallops    Lungs:  diminished BS     ABD: Soft/NT, non rigid mildly distended    EXT: No cyanosis/clubbing/edema, normal peripheral pulses    Skin: No rashes or ulcers, no mottling    Neuro: A/O x 3        Medications:  Current Facility-Administered Medications   Medication Dose Route Frequency    insulin glargine (LANTUS) injection vial 24 Units  0.25 Units/kg SubCUTAneous Nightly    insulin NPH (HumuLIN N;NovoLIN N) injection vial 20 Units  20 Units SubCUTAneous QAM    And    predniSONE (DELTASONE) tablet 40 mg  40 mg Oral Daily    ipratropium 0.5 mg-albuterol 2.5 mg (DUONEB) nebulizer solution 1 Dose  1 Dose Inhalation Q4H WA RT    albuterol (PROVENTIL) (2.5 MG/3ML) 0.083% nebulizer solution 2.5 mg  2.5 mg Nebulization Q2H PRN    aspirin EC tablet 81 mg  81 mg Oral Daily    calcitRIOL (ROCALTROL) capsule 0.25 mcg  0.25 mcg Oral Daily    carvedilol (COREG) tablet 12.5 mg  12.5 mg Oral Dinner    carvedilol (COREG) tablet 25 mg  25 mg Oral Daily with breakfast    [Held by provider] furosemide (LASIX) injection 40 mg  40 mg IntraVENous BID    gabapentin (NEURONTIN) capsule 100 mg  100 mg Oral QHS    hydrALAZINE (APRESOLINE) tablet 50 mg  50 mg Oral 3 times per day    latanoprost (XALATAN) 0.005 % ophthalmic solution 1 drop  1 drop Both Eyes Nightly    levothyroxine (SYNTHROID) tablet 137 mcg  137 mcg Oral Daily    losartan (COZAAR) tablet 100 mg 100 mg Oral Daily    montelukast (SINGULAIR) tablet 10 mg  10 mg Oral Nightly    rosuvastatin (CRESTOR) tablet 20 mg  20 mg Oral Nightly    tiZANidine (ZANAFLEX) tablet 2 mg  2 mg Oral Daily PRN    glucose chewable tablet 16 g  4 tablet Oral PRN    dextrose bolus 10% 125 mL  125 mL IntraVENous PRN    Or    dextrose bolus 10% 250 mL  250 mL IntraVENous PRN    glucagon injection 1 mg  1 mg SubCUTAneous PRN    dextrose 10 % infusion   IntraVENous Continuous PRN    sodium chloride flush 0.9 % injection 5-40 mL  5-40 mL IntraVENous 2 times per day    sodium chloride flush 0.9 % injection 5-40 mL  5-40 mL IntraVENous PRN    0.9 % sodium chloride infusion   IntraVENous PRN    potassium chloride (KLOR-CON M) extended release tablet 40 mEq  40 mEq Oral PRN    Or    potassium bicarb-citric acid (EFFER-K) effervescent tablet 40 mEq  40 mEq Oral PRN    Or    potassium chloride 10 mEq/100 mL IVPB (Peripheral Line)  10 mEq IntraVENous PRN    magnesium sulfate 2000 mg in 50 mL IVPB premix  2,000 mg IntraVENous PRN    ondansetron (ZOFRAN-ODT) disintegrating tablet 4 mg  4 mg Oral Q8H PRN    Or    ondansetron (ZOFRAN) injection 4 mg  4 mg IntraVENous Q6H PRN    polyethylene glycol (GLYCOLAX) packet 17 g  17 g Oral Daily PRN    acetaminophen (TYLENOL) tablet 650 mg  650 mg Oral Q6H PRN    Or    acetaminophen (TYLENOL) suppository 650 mg  650 mg Rectal Q6H PRN    insulin lispro (HUMALOG) injection vial 15 Units  15 Units SubCUTAneous TID WC    insulin lispro (HUMALOG) injection vial 0-16 Units  0-16 Units SubCUTAneous TID WC    insulin lispro (HUMALOG) injection vial 0-4 Units  0-4 Units SubCUTAneous Nightly    enoxaparin (LOVENOX) injection 40 mg  40 mg SubCUTAneous Daily    arformoterol 15 mcg-budesonide 0.25 mg neb solution   Nebulization BID RT    hydrALAZINE (APRESOLINE) injection 20 mg  20 mg IntraVENous Q6H PRN       Labs:  ABG Invalid input(s): \"PHI\", \"PCO2I\", \"PO2I\", \"HCO3I\", \"SO2I\", \"FIO2I\"     CBC Recent Labs

## 2023-12-05 NOTE — TELEPHONE ENCOUNTER
Reviewed sleep study results with patient. She expressed understanding and is willing to proceed with a trial of APAP. Front staff to forward DME order. Patient stated that she is currently in the hospital and may be discharged with O2. Patient is concerned about her nocturnal O2 levels. Patient instructed to start therapy once setup and to contact lab. Thank you.

## 2023-12-05 NOTE — PROGRESS NOTES
1935: Bedside shift change report given to Nelda Siemens, RN (oncoming nurse) by Grace Norman RN (offgoing nurse). Report included the following information Nurse Handoff Report, Intake/Output, MAR, Recent Results, and Cardiac Rhythm NSR .     2230: RN called RT to place pt on BiPAP.     0532: Critical lab result: CO2 41. Critical was reported to RN during Epic downtime. RN was able to call RRT RN Kerry Candelaria who was able to communicate to Ayan Prado NP. RN followed up with provider face-to-face. System Downtime Recovery  The EMR experienced a system downtime. This downtime occurred on December 5 at 0030 AM for a duration of 6 hour(s). During this downtime paper charting was completed by me. The following was documented on paper during the downtime and is now being back-entered: . The following is remaining on paper and will be scanned into Epic: . End of Shift Note    Bedside shift change report given to Simin Tanner RN (oncoming nurse) by Elbert Grant RN (offgoing nurse). Report included the following information SBAR, Intake/Output, MAR, Recent Results, and Cardiac Rhythm NSR    Shift worked:  [id number]     Shift summary and any significant changes:     See above     Concerns for physician to address:       Zone phone for oncoming shift:          Activity:     Number times ambulated in hallways past shift: 0  Number of times OOB to chair past shift: 0    Cardiac:   Cardiac Monitoring: Yes           Access:  Current line(s): PIV     Genitourinary:   Urinary status: voiding and external catheter    Respiratory:      Chronic home O2 use?: YES  Incentive spirometer at bedside: YES       GI:     Current diet:  ADULT DIET; Regular; 3 carb choices (45 gm/meal);  Low Fat/Low Chol/High Fiber/2 gm Na  Passing flatus: YES  Tolerating current diet: YES       Pain Management:   Patient states pain is manageable on current regimen: YES    Skin:     Interventions: float heels, increase time out of bed, foam dressing, PT/OT

## 2023-12-06 LAB
ANION GAP SERPL CALC-SCNC: ABNORMAL MMOL/L (ref 5–15)
BASOPHILS # BLD: 0 K/UL (ref 0–0.1)
BASOPHILS NFR BLD: 0 % (ref 0–1)
BUN SERPL-MCNC: 41 MG/DL (ref 6–20)
BUN/CREAT SERPL: 30 (ref 12–20)
CALCIUM SERPL-MCNC: 10.1 MG/DL (ref 8.5–10.1)
CHLORIDE SERPL-SCNC: 95 MMOL/L (ref 97–108)
CO2 SERPL-SCNC: >45 MMOL/L (ref 21–32)
CREAT SERPL-MCNC: 1.38 MG/DL (ref 0.55–1.02)
DIFFERENTIAL METHOD BLD: ABNORMAL
EOSINOPHIL # BLD: 0 K/UL (ref 0–0.4)
EOSINOPHIL NFR BLD: 0 % (ref 0–7)
ERYTHROCYTE [DISTWIDTH] IN BLOOD BY AUTOMATED COUNT: 18.2 % (ref 11.5–14.5)
GLUCOSE BLD STRIP.AUTO-MCNC: 186 MG/DL (ref 65–117)
GLUCOSE BLD STRIP.AUTO-MCNC: 214 MG/DL (ref 65–117)
GLUCOSE BLD STRIP.AUTO-MCNC: 306 MG/DL (ref 65–117)
GLUCOSE BLD STRIP.AUTO-MCNC: 377 MG/DL (ref 65–117)
GLUCOSE SERPL-MCNC: 206 MG/DL (ref 65–100)
HCT VFR BLD AUTO: 29.3 % (ref 35–47)
HGB BLD-MCNC: 8.4 G/DL (ref 11.5–16)
IMM GRANULOCYTES # BLD AUTO: 0.1 K/UL (ref 0–0.04)
IMM GRANULOCYTES NFR BLD AUTO: 1 % (ref 0–0.5)
LYMPHOCYTES # BLD: 1.3 K/UL (ref 0.8–3.5)
LYMPHOCYTES NFR BLD: 15 % (ref 12–49)
MAGNESIUM SERPL-MCNC: 2.4 MG/DL (ref 1.6–2.4)
MCH RBC QN AUTO: 23.1 PG (ref 26–34)
MCHC RBC AUTO-ENTMCNC: 28.7 G/DL (ref 30–36.5)
MCV RBC AUTO: 80.5 FL (ref 80–99)
MONOCYTES # BLD: 0.5 K/UL (ref 0–1)
MONOCYTES NFR BLD: 6 % (ref 5–13)
NEUTS SEG # BLD: 6.6 K/UL (ref 1.8–8)
NEUTS SEG NFR BLD: 78 % (ref 32–75)
NRBC # BLD: 0 K/UL (ref 0–0.01)
NRBC BLD-RTO: 0 PER 100 WBC
PHOSPHATE SERPL-MCNC: 2.6 MG/DL (ref 2.6–4.7)
PLATELET # BLD AUTO: 167 K/UL (ref 150–400)
PMV BLD AUTO: 11.4 FL (ref 8.9–12.9)
POTASSIUM SERPL-SCNC: 3 MMOL/L (ref 3.5–5.1)
RBC # BLD AUTO: 3.64 M/UL (ref 3.8–5.2)
RBC MORPH BLD: ABNORMAL
RBC MORPH BLD: ABNORMAL
SERVICE CMNT-IMP: ABNORMAL
SODIUM SERPL-SCNC: 141 MMOL/L (ref 136–145)
WBC # BLD AUTO: 8.5 K/UL (ref 3.6–11)

## 2023-12-06 PROCEDURE — 97162 PT EVAL MOD COMPLEX 30 MIN: CPT

## 2023-12-06 PROCEDURE — 2580000003 HC RX 258: Performed by: STUDENT IN AN ORGANIZED HEALTH CARE EDUCATION/TRAINING PROGRAM

## 2023-12-06 PROCEDURE — 6370000000 HC RX 637 (ALT 250 FOR IP): Performed by: CLINICAL NURSE SPECIALIST

## 2023-12-06 PROCEDURE — 6370000000 HC RX 637 (ALT 250 FOR IP): Performed by: STUDENT IN AN ORGANIZED HEALTH CARE EDUCATION/TRAINING PROGRAM

## 2023-12-06 PROCEDURE — 80048 BASIC METABOLIC PNL TOTAL CA: CPT

## 2023-12-06 PROCEDURE — 2060000000 HC ICU INTERMEDIATE R&B

## 2023-12-06 PROCEDURE — 6370000000 HC RX 637 (ALT 250 FOR IP): Performed by: NURSE PRACTITIONER

## 2023-12-06 PROCEDURE — 6370000000 HC RX 637 (ALT 250 FOR IP): Performed by: INTERNAL MEDICINE

## 2023-12-06 PROCEDURE — 97116 GAIT TRAINING THERAPY: CPT

## 2023-12-06 PROCEDURE — 51798 US URINE CAPACITY MEASURE: CPT

## 2023-12-06 PROCEDURE — 84100 ASSAY OF PHOSPHORUS: CPT

## 2023-12-06 PROCEDURE — 2700000000 HC OXYGEN THERAPY PER DAY

## 2023-12-06 PROCEDURE — 94640 AIRWAY INHALATION TREATMENT: CPT

## 2023-12-06 PROCEDURE — 6360000002 HC RX W HCPCS: Performed by: STUDENT IN AN ORGANIZED HEALTH CARE EDUCATION/TRAINING PROGRAM

## 2023-12-06 PROCEDURE — 85025 COMPLETE CBC W/AUTO DIFF WBC: CPT

## 2023-12-06 PROCEDURE — 94660 CPAP INITIATION&MGMT: CPT

## 2023-12-06 PROCEDURE — 36415 COLL VENOUS BLD VENIPUNCTURE: CPT

## 2023-12-06 PROCEDURE — 82962 GLUCOSE BLOOD TEST: CPT

## 2023-12-06 PROCEDURE — 6360000002 HC RX W HCPCS: Performed by: INTERNAL MEDICINE

## 2023-12-06 PROCEDURE — 83735 ASSAY OF MAGNESIUM: CPT

## 2023-12-06 RX ORDER — FUROSEMIDE 10 MG/ML
40 INJECTION INTRAMUSCULAR; INTRAVENOUS 2 TIMES DAILY
Status: DISCONTINUED | OUTPATIENT
Start: 2023-12-06 | End: 2023-12-07 | Stop reason: HOSPADM

## 2023-12-06 RX ORDER — POTASSIUM CHLORIDE 20 MEQ/1
40 TABLET, EXTENDED RELEASE ORAL 2 TIMES DAILY
Status: COMPLETED | OUTPATIENT
Start: 2023-12-06 | End: 2023-12-06

## 2023-12-06 RX ORDER — INSULIN LISPRO 100 [IU]/ML
5 INJECTION, SOLUTION INTRAVENOUS; SUBCUTANEOUS ONCE
Status: COMPLETED | OUTPATIENT
Start: 2023-12-06 | End: 2023-12-06

## 2023-12-06 RX ADMIN — ENOXAPARIN SODIUM 40 MG: 100 INJECTION SUBCUTANEOUS at 09:18

## 2023-12-06 RX ADMIN — INSULIN HUMAN 20 UNITS: 100 INJECTION, SUSPENSION SUBCUTANEOUS at 09:18

## 2023-12-06 RX ADMIN — HYDRALAZINE HYDROCHLORIDE 50 MG: 50 TABLET, FILM COATED ORAL at 14:35

## 2023-12-06 RX ADMIN — SODIUM CHLORIDE, PRESERVATIVE FREE 10 ML: 5 INJECTION INTRAVENOUS at 09:30

## 2023-12-06 RX ADMIN — INSULIN LISPRO 15 UNITS: 100 INJECTION, SOLUTION INTRAVENOUS; SUBCUTANEOUS at 09:19

## 2023-12-06 RX ADMIN — POTASSIUM CHLORIDE 40 MEQ: 1500 TABLET, EXTENDED RELEASE ORAL at 11:40

## 2023-12-06 RX ADMIN — LATANOPROST 1 DROP: 50 SOLUTION/ DROPS OPHTHALMIC at 21:42

## 2023-12-06 RX ADMIN — FUROSEMIDE 40 MG: 10 INJECTION, SOLUTION INTRAMUSCULAR; INTRAVENOUS at 18:05

## 2023-12-06 RX ADMIN — Medication 4 UNITS: at 12:57

## 2023-12-06 RX ADMIN — IPRATROPIUM BROMIDE AND ALBUTEROL SULFATE 1 DOSE: 2.5; .5 SOLUTION RESPIRATORY (INHALATION) at 08:34

## 2023-12-06 RX ADMIN — GABAPENTIN 100 MG: 100 CAPSULE ORAL at 19:51

## 2023-12-06 RX ADMIN — CARVEDILOL 12.5 MG: 12.5 TABLET, FILM COATED ORAL at 18:05

## 2023-12-06 RX ADMIN — MONTELUKAST 10 MG: 10 TABLET, FILM COATED ORAL at 19:51

## 2023-12-06 RX ADMIN — HYDRALAZINE HYDROCHLORIDE 50 MG: 50 TABLET, FILM COATED ORAL at 21:44

## 2023-12-06 RX ADMIN — INSULIN GLARGINE 24 UNITS: 100 INJECTION, SOLUTION SUBCUTANEOUS at 21:42

## 2023-12-06 RX ADMIN — INSULIN LISPRO 15 UNITS: 100 INJECTION, SOLUTION INTRAVENOUS; SUBCUTANEOUS at 18:04

## 2023-12-06 RX ADMIN — LOSARTAN POTASSIUM 100 MG: 100 TABLET, FILM COATED ORAL at 09:18

## 2023-12-06 RX ADMIN — INSULIN LISPRO 15 UNITS: 100 INJECTION, SOLUTION INTRAVENOUS; SUBCUTANEOUS at 12:57

## 2023-12-06 RX ADMIN — Medication 14 UNITS: at 18:05

## 2023-12-06 RX ADMIN — INSULIN LISPRO 5 UNITS: 100 INJECTION, SOLUTION INTRAVENOUS; SUBCUTANEOUS at 21:41

## 2023-12-06 RX ADMIN — ASPIRIN 81 MG: 81 TABLET, COATED ORAL at 09:18

## 2023-12-06 RX ADMIN — LEVOTHYROXINE SODIUM 137 MCG: 0.03 TABLET ORAL at 06:23

## 2023-12-06 RX ADMIN — HYDRALAZINE HYDROCHLORIDE 50 MG: 50 TABLET, FILM COATED ORAL at 06:23

## 2023-12-06 RX ADMIN — ARFORMOTEROL TARTRATE: 15 SOLUTION RESPIRATORY (INHALATION) at 08:39

## 2023-12-06 RX ADMIN — CARVEDILOL 25 MG: 12.5 TABLET, FILM COATED ORAL at 09:18

## 2023-12-06 RX ADMIN — IPRATROPIUM BROMIDE AND ALBUTEROL SULFATE 1 DOSE: 2.5; .5 SOLUTION RESPIRATORY (INHALATION) at 13:15

## 2023-12-06 RX ADMIN — SODIUM CHLORIDE, PRESERVATIVE FREE 10 ML: 5 INJECTION INTRAVENOUS at 19:53

## 2023-12-06 RX ADMIN — FUROSEMIDE 40 MG: 10 INJECTION, SOLUTION INTRAMUSCULAR; INTRAVENOUS at 11:40

## 2023-12-06 RX ADMIN — IPRATROPIUM BROMIDE AND ALBUTEROL SULFATE 1 DOSE: 2.5; .5 SOLUTION RESPIRATORY (INHALATION) at 22:01

## 2023-12-06 RX ADMIN — ROSUVASTATIN 20 MG: 20 TABLET, FILM COATED ORAL at 19:51

## 2023-12-06 RX ADMIN — PREDNISONE 40 MG: 20 TABLET ORAL at 09:18

## 2023-12-06 RX ADMIN — ARFORMOTEROL TARTRATE: 15 SOLUTION RESPIRATORY (INHALATION) at 22:01

## 2023-12-06 RX ADMIN — POTASSIUM CHLORIDE 40 MEQ: 1500 TABLET, EXTENDED RELEASE ORAL at 19:51

## 2023-12-06 RX ADMIN — CALCITRIOL CAPSULES 0.25 MCG 0.25 MCG: 0.25 CAPSULE ORAL at 09:18

## 2023-12-06 ASSESSMENT — PAIN SCALES - GENERAL
PAINLEVEL_OUTOF10: 0
PAINLEVEL_OUTOF10: 0

## 2023-12-06 NOTE — PLAN OF CARE
Problem: Physical Therapy - Adult  Goal: By Discharge: Performs mobility at highest level of function for planned discharge setting. See evaluation for individualized goals. Description: FUNCTIONAL STATUS PRIOR TO ADMISSION: Patient was modified independent using a rollator for functional mobility. HOME SUPPORT PRIOR TO ADMISSION: The patient lived with her spouse but did not require assistance. Physical Therapy Goals  Initiated 12/6/2023  1. Patient will move from supine to sit and sit to supine in bed with supervision/set-up within 7 day(s). 2.  Patient will perform sit to stand with supervision/set-up within 7 day(s). 3.  Patient will transfer from bed to chair and chair to bed with supervision/set-up using the least restrictive device within 7 day(s). 4.  Patient will ambulate with supervision/set-up for 150 feet with the least restrictive device within 7 day(s). 5.  Patient will ascend/descend 2-3 stairs with R/L handrail(s) with contact guard assist within 7 day(s). Outcome: Progressing   PHYSICAL THERAPY EVALUATION    Patient: Dhaval Alva (31 y.o. female)  Date: 12/6/2023  Primary Diagnosis: Shortness of breath [R06.02]  Hypokalemia [E87.6]  Hypercarbia [R06.89]  Acute pulmonary edema (HCC) [J81.0]  Acute respiratory failure with hypoxia (HCC) [J96.01]  Elevated d-dimer [R79.89]  Hypertension, unspecified type [I10]       Precautions:  (falls, h/o PE, hypercarbic, osteopenia, light sensivity, monitor BP)                    ASSESSMENT :   DEFICITS/IMPAIRMENTS:   Pt tolerated PT services well with most tasks performed with cga/light Fl[place]. Pt was received in bed. Initially hesitant for attempted activity, but amenable to the same with increased encouragement and MD Team arrival. Pt confirms Derrick at baseline with DME. Pt resides in a multi level home with 0-2 CARL and stair glide to reach upper levels. Pt's spouse very supportive and available to help upon discharge, prn.  When ready, Unspecified sleep apnea     uses CPAP     Past Surgical History:   Procedure Laterality Date    CARDIAC CATHETERIZATION  6/6/2012         CARDIAC CATHETERIZATION      COLONOSCOPY N/A 10/24/2022    COLONOSCOPY performed by Frank Morrow MD at [address]  10/24/2022    HEENT      tonsillectomy    HEMORRHOID SURGERY      HYSTERECTOMY (CERVIX STATUS UNKNOWN)      ORTHOPEDIC SURGERY  8/2011    left shoulder    [place] UNLISTED PROCEDURE CARDIAC SURGERY      heart surgery       Home Situation:  Social/Functional History  Lives With: Spouse  Type of Home: House  Home Layout: Two level, Able to Live on Main level with bedroom/bathroom (stair glide)  Home Access: Stairs to enter without rails  Entrance Stairs - Number of Steps: 0-2 CARL (Pt repors, can pull car up to porch, if needed)  Bathroom Toilet: Standard  Home Equipment: Lift chair, Walker, rolling, Walker, 4 wheeled, Hospital bed (adjustable bed)  Has the patient had two or more falls in the past year or any fall with injury in the past year?: No  Receives Help From: Family (Derrick with rollator at baseline; spouse very supportive and able to provide assist, prn)  ADL Assistance: Independent  Ambulation Assistance: Independent  Transfer Assistance: Independent  Active : No  Patient's  Info: Chely Torres [phone] and Ramona Centeno, significant other    Cognitive/Behavioral Status:  Orientation  Overall Orientation Status: Within Normal Limits  Orientation Level: Oriented X4 (increased time to answer, but AO x 4)  Cognition  Overall Cognitive Status: WFL    Skin: intact    Edema: not appreciable    Hearing: intact       Vision/Perceptual:  intact; Pt reports light sensitivity.                  Strength:    Strength: Generally decreased, functional    Tone & Sensation:   Tone: Normal  Sensation: Intact    Coordination:  Coordination: Within functional limits    Range Of Motion:  AROM: Within functional limits  PROM: Within

## 2023-12-06 NOTE — PLAN OF CARE
Problem: Respiratory - Adult  Goal: Achieves optimal ventilation and oxygenation  Outcome: Progressing     Problem: Skin/Tissue Integrity  Goal: Absence of new skin breakdown  Description: 1. Monitor for areas of redness and/or skin breakdown  2. Assess vascular access sites hourly  3. Every 4-6 hours minimum:  Change oxygen saturation probe site  4. Every 4-6 hours:  If on nasal continuous positive airway pressure, respiratory therapy assess nares and determine need for appliance change or resting period.   Outcome: Progressing     Problem: Safety - Adult  Goal: Free from fall injury  Outcome: Progressing Dysphagia, pharyngeal

## 2023-12-06 NOTE — PROGRESS NOTES
0820 Pt did not have much output last night, no urge to void. Bladder scan this am; revealed 463ml. She would like to try and pee before the straight cath - will let OANH Carrion know.

## 2023-12-06 NOTE — PROGRESS NOTES
Hospitalist Progress Note    NAME:   Larisa Solorzano   : 1950   MRN: [id number]     Date/Time: 2023 10:17 AM  Patient PCP: Zoey Chaparro MD    Estimated discharge date:   Barriers: Resp status stable, off BIPAP during day time (only nightime use), Electrolytes stable      Assessment / Plan:    Acute on chronic hypoxic and hypercarbic respiratory failure POA- off BIPAP during day time now, on 3-4 L/m oxygen with  BIPAP Q HS per pulm- awaiting new machine at home  Possible occult diastolic heart failure vs flash pulm edema  Asthma  Obesity hypoventilation syndrome   Hx of PE  HTN  Hypokalemia- 3.0 today  BNP elevated at 1900, CXR with cardiomegaly and pulm edema. Renal function at baseline and normal LFTs. Recent echo in early October showed hyperdynamic EF, normal wall motion. Was hypertensive to 220s on admission, may have had flash pulm edema. Minimal wheezing on exam though is hypercarbic, may have possible superimposed asthma exacerbation. No pleurisy, PE lower on ddx. Wears 3L at home, requiring bipap on admission. COVID/flu neg. D dimer 3.6.     IP Pulm consulted- signed off-- Dr Leeann Sanchez arranging pt's home BIPAP machine (dont need to wait for it for DC as per pulmonary)- explained to the pt and family at bedside today  Cont IV lasix BID- change to PO in 24 hrs  Replenish K+ PO with diuresis to help with cramps in legs today complained by pt  Cr stable at 1.38  Cont pred 40 with duonebs  - VQ scan noted for low probability for PE  IP consulted Cardiology - following  - home advair, coreg, hydral, losartan, singulair  - trop 58 with EKG NSR, repeating for trend  - wound care for lower extremities     CVA  DM2  Hypothryoidism  - home statin, ASA, gabapentin  -Inpatient endocrinology consult appreciated-NPH 20 units every morning added along with steroids, continue Lantus 24 units nightly and scheduled lispro 15 units with meals plus sliding scale  - home synthroid     CKD   Follows with +  EENT:  EOMI. Anicteric sclerae. Resp:  CTA bilaterally, no wheezing or rales. No accessory muscle use  CV:  Regular  rhythm,  No edema  GI:  Soft, Non distended, Non tender. +Bowel sounds  Neurologic:  Alert and oriented X 3, normal speech,   Psych:   Good insight. Not anxious nor agitated  Skin:  No rashes. No jaundice    Reviewed most current lab test results and cultures  YES  Reviewed most current radiology test results   YES  Review and summation of old records today    NO  Reviewed patient's current orders and MAR    YES  PMH/SH reviewed - no change compared to H&P    Procedures: see electronic medical records for all procedures/Xrays and details which were not copied into this note but were reviewed prior to creation of Plan. LABS:  I reviewed today's most current labs and imaging studies.   Pertinent labs include:  Recent Labs     12/04/23 0344 12/05/23 0319 12/06/23 0332   WBC 6.7 7.9 8.5   HGB 8.4* 8.9* 8.4*   HCT 29.1* 30.6* 29.3*    170 167       Recent Labs     12/05/23 0319 12/06/23  0332    141   K 4.0 3.0*   CL 95* 95*   CO2 41* >45*   GLUCOSE 169* 206*   BUN 36* 41*   CREATININE 1.36* 1.38*   CALCIUM 9.9 10.1   MG 2.1 2.4   PHOS 2.9 2.6         Signed: Pao Farrell MD    Total time: 33 mins

## 2023-12-06 NOTE — PROGRESS NOTES
End of Shift Note    Bedside shift change report given to Marisol(oncoming nurse) by William Ramon RN (offgoing nurse). Report included the following information SBAR, Kardex, MAR, and Cardiac Rhythm Sinus rhythm, sinus yessy      Shift worked:  7p-7a     Shift summary and any significant changes:     Had bipap at night. Waiting bi pap machine  setup to take at home for discharge      Concerns for physician to address:       Zone phone for oncoming shift:          Activity:     Number times ambulated in hallways past shift: 0  Number of times OOB to chair past shift: 0    Cardiac:   Cardiac Monitoring: Yes           Access:  Current line(s): PIV     Genitourinary:   Urinary status: external catheter    Respiratory:      Chronic home O2 use?: YES  Incentive spirometer at bedside: NO       GI:     Current diet:  ADULT DIET; Regular; 3 carb choices (45 gm/meal);  Low Fat/Low Chol/High Fiber/2 gm Na  Passing flatus: YES  Tolerating current diet: YES       Pain Management:   Patient states pain is manageable on current regimen: NO    Skin:     Interventions: float heels and internal/external urinary devices    Patient Safety:  Fall Score:    Interventions: bed/chair alarm and gripper socks       Length of Stay:  Expected LOS: 5  Actual LOS: 3      William Ramon RN

## 2023-12-07 VITALS
TEMPERATURE: 98.3 F | SYSTOLIC BLOOD PRESSURE: 124 MMHG | DIASTOLIC BLOOD PRESSURE: 57 MMHG | HEIGHT: 63 IN | OXYGEN SATURATION: 95 % | WEIGHT: 209.4 LBS | BODY MASS INDEX: 37.1 KG/M2 | HEART RATE: 68 BPM | RESPIRATION RATE: 15 BRPM

## 2023-12-07 LAB
ANION GAP SERPL CALC-SCNC: ABNORMAL MMOL/L (ref 5–15)
BASOPHILS # BLD: 0 K/UL (ref 0–0.1)
BASOPHILS NFR BLD: 0 % (ref 0–1)
BUN SERPL-MCNC: 44 MG/DL (ref 6–20)
BUN/CREAT SERPL: 34 (ref 12–20)
CALCIUM SERPL-MCNC: 9.9 MG/DL (ref 8.5–10.1)
CHLORIDE SERPL-SCNC: 97 MMOL/L (ref 97–108)
CO2 SERPL-SCNC: >45 MMOL/L (ref 21–32)
CREAT SERPL-MCNC: 1.3 MG/DL (ref 0.55–1.02)
DIFFERENTIAL METHOD BLD: ABNORMAL
EOSINOPHIL # BLD: 0 K/UL (ref 0–0.4)
EOSINOPHIL NFR BLD: 0 % (ref 0–7)
ERYTHROCYTE [DISTWIDTH] IN BLOOD BY AUTOMATED COUNT: 17.7 % (ref 11.5–14.5)
GLUCOSE BLD STRIP.AUTO-MCNC: 142 MG/DL (ref 65–117)
GLUCOSE BLD STRIP.AUTO-MCNC: 252 MG/DL (ref 65–117)
GLUCOSE BLD STRIP.AUTO-MCNC: 265 MG/DL (ref 65–117)
GLUCOSE SERPL-MCNC: 203 MG/DL (ref 65–100)
HCT VFR BLD AUTO: 29.6 % (ref 35–47)
HGB BLD-MCNC: 8.4 G/DL (ref 11.5–16)
IMM GRANULOCYTES # BLD AUTO: 0.1 K/UL (ref 0–0.04)
IMM GRANULOCYTES NFR BLD AUTO: 1 % (ref 0–0.5)
LYMPHOCYTES # BLD: 1.3 K/UL (ref 0.8–3.5)
LYMPHOCYTES NFR BLD: 17 % (ref 12–49)
MAGNESIUM SERPL-MCNC: 2.3 MG/DL (ref 1.6–2.4)
MCH RBC QN AUTO: 23 PG (ref 26–34)
MCHC RBC AUTO-ENTMCNC: 28.4 G/DL (ref 30–36.5)
MCV RBC AUTO: 81.1 FL (ref 80–99)
MONOCYTES # BLD: 0.6 K/UL (ref 0–1)
MONOCYTES NFR BLD: 8 % (ref 5–13)
NEUTS SEG # BLD: 5.6 K/UL (ref 1.8–8)
NEUTS SEG NFR BLD: 74 % (ref 32–75)
NRBC # BLD: 0 K/UL (ref 0–0.01)
NRBC BLD-RTO: 0 PER 100 WBC
PHOSPHATE SERPL-MCNC: 2.1 MG/DL (ref 2.6–4.7)
PLATELET # BLD AUTO: 160 K/UL (ref 150–400)
PMV BLD AUTO: 11.8 FL (ref 8.9–12.9)
POTASSIUM SERPL-SCNC: 3.3 MMOL/L (ref 3.5–5.1)
RBC # BLD AUTO: 3.65 M/UL (ref 3.8–5.2)
RBC MORPH BLD: ABNORMAL
SERVICE CMNT-IMP: ABNORMAL
SODIUM SERPL-SCNC: 141 MMOL/L (ref 136–145)
WBC # BLD AUTO: 7.6 K/UL (ref 3.6–11)

## 2023-12-07 PROCEDURE — 85025 COMPLETE CBC W/AUTO DIFF WBC: CPT

## 2023-12-07 PROCEDURE — 84100 ASSAY OF PHOSPHORUS: CPT

## 2023-12-07 PROCEDURE — 6370000000 HC RX 637 (ALT 250 FOR IP): Performed by: CLINICAL NURSE SPECIALIST

## 2023-12-07 PROCEDURE — 6360000002 HC RX W HCPCS: Performed by: INTERNAL MEDICINE

## 2023-12-07 PROCEDURE — 83735 ASSAY OF MAGNESIUM: CPT

## 2023-12-07 PROCEDURE — 6370000000 HC RX 637 (ALT 250 FOR IP)

## 2023-12-07 PROCEDURE — 6370000000 HC RX 637 (ALT 250 FOR IP): Performed by: STUDENT IN AN ORGANIZED HEALTH CARE EDUCATION/TRAINING PROGRAM

## 2023-12-07 PROCEDURE — 6360000002 HC RX W HCPCS: Performed by: STUDENT IN AN ORGANIZED HEALTH CARE EDUCATION/TRAINING PROGRAM

## 2023-12-07 PROCEDURE — 94660 CPAP INITIATION&MGMT: CPT

## 2023-12-07 PROCEDURE — 6370000000 HC RX 637 (ALT 250 FOR IP): Performed by: INTERNAL MEDICINE

## 2023-12-07 PROCEDURE — 2580000003 HC RX 258: Performed by: STUDENT IN AN ORGANIZED HEALTH CARE EDUCATION/TRAINING PROGRAM

## 2023-12-07 PROCEDURE — 36415 COLL VENOUS BLD VENIPUNCTURE: CPT

## 2023-12-07 PROCEDURE — 94640 AIRWAY INHALATION TREATMENT: CPT

## 2023-12-07 PROCEDURE — 82962 GLUCOSE BLOOD TEST: CPT

## 2023-12-07 PROCEDURE — 80048 BASIC METABOLIC PNL TOTAL CA: CPT

## 2023-12-07 RX ORDER — FUROSEMIDE 20 MG/1
40 TABLET ORAL DAILY
Qty: 90 TABLET | Refills: 0 | Status: SHIPPED | OUTPATIENT
Start: 2023-12-07

## 2023-12-07 RX ORDER — INSULIN LISPRO 100 [IU]/ML
20 INJECTION, SOLUTION INTRAVENOUS; SUBCUTANEOUS
Status: DISCONTINUED | OUTPATIENT
Start: 2023-12-07 | End: 2023-12-07 | Stop reason: HOSPADM

## 2023-12-07 RX ADMIN — Medication 8 UNITS: at 17:09

## 2023-12-07 RX ADMIN — INSULIN LISPRO 15 UNITS: 100 INJECTION, SOLUTION INTRAVENOUS; SUBCUTANEOUS at 09:39

## 2023-12-07 RX ADMIN — CALCITRIOL CAPSULES 0.25 MCG 0.25 MCG: 0.25 CAPSULE ORAL at 08:39

## 2023-12-07 RX ADMIN — CARVEDILOL 12.5 MG: 12.5 TABLET, FILM COATED ORAL at 17:08

## 2023-12-07 RX ADMIN — IPRATROPIUM BROMIDE AND ALBUTEROL SULFATE 1 DOSE: 2.5; .5 SOLUTION RESPIRATORY (INHALATION) at 14:03

## 2023-12-07 RX ADMIN — LEVOTHYROXINE SODIUM 137 MCG: 0.03 TABLET ORAL at 05:53

## 2023-12-07 RX ADMIN — HYDRALAZINE HYDROCHLORIDE 50 MG: 50 TABLET, FILM COATED ORAL at 15:08

## 2023-12-07 RX ADMIN — IPRATROPIUM BROMIDE AND ALBUTEROL SULFATE 1 DOSE: 2.5; .5 SOLUTION RESPIRATORY (INHALATION) at 08:29

## 2023-12-07 RX ADMIN — LOSARTAN POTASSIUM 100 MG: 100 TABLET, FILM COATED ORAL at 08:39

## 2023-12-07 RX ADMIN — FUROSEMIDE 40 MG: 10 INJECTION, SOLUTION INTRAMUSCULAR; INTRAVENOUS at 17:07

## 2023-12-07 RX ADMIN — ENOXAPARIN SODIUM 40 MG: 100 INJECTION SUBCUTANEOUS at 08:39

## 2023-12-07 RX ADMIN — ARFORMOTEROL TARTRATE: 15 SOLUTION RESPIRATORY (INHALATION) at 08:33

## 2023-12-07 RX ADMIN — CARVEDILOL 25 MG: 12.5 TABLET, FILM COATED ORAL at 08:38

## 2023-12-07 RX ADMIN — PREDNISONE 40 MG: 20 TABLET ORAL at 08:38

## 2023-12-07 RX ADMIN — INSULIN HUMAN 20 UNITS: 100 INJECTION, SUSPENSION SUBCUTANEOUS at 08:38

## 2023-12-07 RX ADMIN — FUROSEMIDE 40 MG: 10 INJECTION, SOLUTION INTRAMUSCULAR; INTRAVENOUS at 08:38

## 2023-12-07 RX ADMIN — INSULIN LISPRO 20 UNITS: 100 INJECTION, SOLUTION INTRAVENOUS; SUBCUTANEOUS at 17:08

## 2023-12-07 RX ADMIN — HYDRALAZINE HYDROCHLORIDE 50 MG: 50 TABLET, FILM COATED ORAL at 05:53

## 2023-12-07 RX ADMIN — Medication 8 UNITS: at 12:00

## 2023-12-07 RX ADMIN — INSULIN LISPRO 20 UNITS: 100 INJECTION, SOLUTION INTRAVENOUS; SUBCUTANEOUS at 12:00

## 2023-12-07 RX ADMIN — SODIUM CHLORIDE, PRESERVATIVE FREE 10 ML: 5 INJECTION INTRAVENOUS at 08:48

## 2023-12-07 RX ADMIN — ASPIRIN 81 MG: 81 TABLET, COATED ORAL at 08:38

## 2023-12-07 NOTE — PLAN OF CARE
Problem: Respiratory - Adult  Goal: Achieves optimal ventilation and oxygenation  12/6/2023 2220 by Moe Newman RN  Outcome: Progressing  12/6/2023 2203 by Adriana Leon RCP  Outcome: Progressing  12/6/2023 0841 by Marissa Fowler RT  Outcome: Progressing     Problem: Discharge Planning  Goal: Discharge to home or other facility with appropriate resources  Outcome: Progressing     Problem: Skin/Tissue Integrity  Goal: Absence of new skin breakdown  Description: 1. Monitor for areas of redness and/or skin breakdown  2. Assess vascular access sites hourly  3. Every 4-6 hours minimum:  Change oxygen saturation probe site  4. Every 4-6 hours:  If on nasal continuous positive airway pressure, respiratory therapy assess nares and determine need for appliance change or resting period. Outcome: Progressing     Problem: Chronic Conditions and Co-morbidities  Goal: Patient's chronic conditions and co-morbidity symptoms are monitored and maintained or improved  Outcome: Progressing     Problem: Physical Therapy - Adult  Goal: By Discharge: Performs mobility at highest level of function for planned discharge setting. See evaluation for individualized goals. Description: FUNCTIONAL STATUS PRIOR TO ADMISSION: Patient was modified independent using a rollator for functional mobility. HOME SUPPORT PRIOR TO ADMISSION: The patient lived with her spouse but did not require assistance. Physical Therapy Goals  Initiated 12/6/2023  1. Patient will move from supine to sit and sit to supine in bed with supervision/set-up within 7 day(s). 2.  Patient will perform sit to stand with supervision/set-up within 7 day(s). 3.  Patient will transfer from bed to chair and chair to bed with supervision/set-up using the least restrictive device within 7 day(s). 4.  Patient will ambulate with supervision/set-up for 150 feet with the least restrictive device within 7 day(s).    5.  Patient will ascend/descend 2-3 stairs with R/L handrail(s) with contact guard assist within 7 day(s).     12/6/2023 1016 by Gabriela Score, PT  Outcome: Progressing

## 2023-12-07 NOTE — PROGRESS NOTES
End of Shift Note    Bedside shift change report given to [facility] (oncoming nurse) by Maryjane Kumar RN (offgoing nurse). Report included the following information SBAR, ED Summary, Procedure Summary, Intake/Output, MAR, Med Rec Status, Cardiac Rhythm  , and Alarm Parameters     Shift worked:  7a-7p     Shift summary and any significant changes:     Up to chair, down to home O2,      Concerns for physician to address:  none     Zone phone for oncoming shift:   [phone]       Activity:     Number times ambulated in hallways past shift: 0  Number of times OOB to chair past shift: 1    Cardiac:   Cardiac Monitoring: Yes           Access:  Current line(s): PIV     Genitourinary:   Urinary status: voiding and external catheter    Respiratory:      Chronic home O2 use?: YES  Incentive spirometer at bedside: NO       GI:     Current diet:  ADULT DIET; Regular; 3 carb choices (45 gm/meal); Low Fat/Low Chol/High Fiber/2 gm Na  Passing flatus: YES  Tolerating current diet: YES       Pain Management:   Patient states pain is manageable on current regimen: N/A    Skin:     Interventions: float heels, increase time out of bed, PT/OT consult, limit briefs, and internal/external urinary devices    Patient Safety:  Fall Score:    Interventions: bed/chair alarm, assistive device (walker, cane.  etc), gripper socks, pt to call before getting OOB, and stay with me (per policy)       Length of Stay:  Expected LOS: 5  Actual LOS: [address], RN

## 2023-12-07 NOTE — DIABETES MGMT
[facility]  DIABETES MANAGEMENT CONSULT    Consulted by Provider for advanced nursing evaluation and care for inpatient blood glucose management. Evaluation and Action Plan   Chanda Cee is a 81year old female with a 20+ history of Type 2 Diabetes, ASCVD and thyroid disease, who is admitted with acute hypoxic respiratory distress s/t volume overload vs asthma exacerbation. She was started on supplemental O2 and prednisone. Ms Yared Eid tells me that she takes 30 units glargine every bedtime and 24 units lispro at brunch and 30-40 units lispro with dinner. She wears a les CGM and her glucose is  fasting and below 180 all other times of the day. She does not experience low sugar. As prednisone will raise glucose for 12hrs, will add NPH to be given with each dose. Bgs have stabilized on the current insulin dosing. However the patient is using additional corrective insulin with each meal. The patient uses significantly more meal time insulin at home, so I will increase the dosing. The prednisone dosing has completed and therefore the NPH may be continued as well. In anticipation of discharge I recommend resumed PTA insulin dosing. Diabetes Discharge Plan   Medication  Resume 30 units Lantus every bedtime  Resume 24 units Humalog with brunch and 30-40 units with dinner  Monitor Bg's   Follow-up endocrinology for future diabetes management. Referral  []        Outpatient diabetes education   Additional orders            Initial Presentation   Mary Bolton is a 81 y.o. female admitted from  after experiencing acute hypoxic respiratory distress s/t volume overload vs asthma exacerbation. Initial labs are significant for CO2 41, BNP 1903, Trop 58. Chest Xray with cardiomegaly with pulmonary edema and bilateral pleural effusions.        HX:   Past Medical History:   Diagnosis Date    Asthma     CAD (coronary artery disease)     \"mild\" per  pattern              Uses a rollator  Monitoring pattern              Wearing a CGM. [place] not at bedside.               Fasting , under 180 rest of the day. No lows.  Taking medications pattern  [x]        Consistent administration  [x]        Affordable  Monitoring pattern   [x] Testing BGs sufficiently to inform self-management adjustments    Taking medications pattern  [] Consistent administration  [] Affordable  Reducing risks  [] Influenza:   10/24/2023   [] Pneumonia: 10/31/2019          Overall evaluation:    [x] Achieving A1c target with drug therapy & self-care practices    Subjective   ”I see Dr. Ambriz.”     Objective   Physical exam  General Obese female in no acute distress. Conversant and cooperative  Neuro  Alert, oriented   Vital Signs   Vitals:    12/07/23 0838   BP: (!) 126/54   Pulse: 63   Resp:    Temp:    SpO2:        Laboratory  Recent Labs     12/05/23 0319 12/06/23 0332 12/07/23 0323   WBC 7.9 8.5 7.6   HGB 8.9* 8.4* 8.4*   HCT 30.6* 29.3* 29.6*   MCV 81.2 80.5 81.1    167 160     Recent Labs     12/05/23 0319 12/06/23 0332 12/07/23 0323    141 141   K 4.0 3.0* 3.3*   CL 95* 95* 97   CO2 41* >45* >45*   PHOS 2.9 2.6 2.1*   BUN 36* 41* 44*   CREATININE 1.36* 1.38* 1.30*     Lab Results   Component Value Date    ALT 11 (L) 12/03/2023    AST 11 (L) 12/03/2023    ALKPHOS 92 12/03/2023    BILITOT 1.3 (H) 12/03/2023     Lab Results   Component Value Date    TSH 1.30 12/10/2021     Lab Results   Component Value Date    LABA1C 7.5 (H) 12/03/2023    LABA1C 8.1 (H) 10/08/2023    LABA1C 8.1 (H) 10/07/2023       Factors impacting BG management  Factor Dose Comments   Nutrition:  Standard meals     60 grams/meal      Drugs:    Steroids       prednisone       Impairs insulin action     Pain PRN acetaminophen    Other:   Kidney function     CKD          Blood glucose pattern      Assessment and Nursing Intervention   Nursing Diagnosis Risk for unstable blood glucose pattern

## 2023-12-07 NOTE — PROGRESS NOTES
2161 Lab called for a critical lab: CO2>45. BERNADINE Guadarrama notified. 0600 /53. Pt has a due hydralazine at 0600. Flip Shaw NP notified. She said to give as scheduled. End of Shift Note    Bedside shift change report given to Simin aTnner (oncoming nurse) by Priscila Palacios RN (offgoing nurse). Report included the following information SBAR, MAR, and Cardiac Rhythm Sinus rhythm, Sinus bradycardia    Shift worked:  7p-7a     Shift summary and any significant changes:     ?discharge today     Concerns for physician to address:  HR at 55-60 when asleep   CO2>45     Zone phone for oncoming shift:          Activity:     Number times ambulated in hallways past shift: 0  Number of times OOB to chair past shift: 0    Cardiac:   Cardiac Monitoring: Yes           Access:  Current line(s): PIV     Genitourinary:   Urinary status: voiding and external catheter    Respiratory:      Chronic home O2 use?: YES  Incentive spirometer at bedside: YES       GI:     Current diet:  ADULT DIET; Regular; 3 carb choices (45 gm/meal); Low Fat/Low Chol/High Fiber/2 gm Na  Passing flatus: YES  Tolerating current diet: YES       Pain Management:   Patient states pain is manageable on current regimen: YES    Skin:     Interventions: specialty bed, float heels, increase time out of bed, foam dressing, PT/OT consult, limit briefs, internal/external urinary devices, and nutritional support    Patient Safety:  Fall Score:    Interventions: bed/chair alarm, assistive device (walker, cane.  etc), gripper socks, and pt to call before getting OOB       Length of Stay:  Expected LOS: 5  Actual LOS: 425  [facility], RN

## 2023-12-07 NOTE — PROGRESS NOTES
Attempted to schedule hospital follow up for PCP appointment. Sent a message to PCP office to find patient an appointment. Awaiting callback from PCP office.  Yamel Hickman, Care Management Assistant

## 2023-12-07 NOTE — CARE COORDINATION
Care Management Initial Assessment       RUR: 28% (High RUR)  Readmission? No  1st IM letter given? Yes - 12/03  1st  letter given: No     12/04/23 0814   Service Assessment   Patient Orientation Alert and Oriented   Cognition Alert   History Provided By Patient   Primary Caregiver Self   Accompanied By/Relationship Jackie Adame [phone] and Jenna Officer, significant other   Support Systems Spouse/Significant Other;Children  Brianda Perez Child [phone] and Jenna Officer, significant other)   [address] is: Legal Next of Kin   PCP Verified by CM Yes   Last Visit to PCP Within last 3 months   Prior Functional Level Assistance with the following:;Cooking;Housework; Shopping;Mobility   Current Functional Level Assistance with the following:;Cooking;Housework; Shopping;Mobility   Can patient return to prior living arrangement Yes   Ability to make needs known: Good   Family able to assist with home care needs: Yes   Would you like for me to discuss the discharge plan with any other family members/significant others, and if so, who?  Yes  Brianda Perez Child [phone] and Jenna Officer, significant other)   Financial Resources Gov[employer]ivery None   Social/Functional History   Lives With Significant other;Daughter  Brianda Perez Child [phone] and Jenna Officer, significant other)   Type of [address] [place] [address] level   [address] to enter without rails   Entrance Stairs - Number of Steps 2   Home Equipment Oxygen;Rollator;Cane  (Med in at 3 LPM via [address]; [address] chair)   Active  No   Patient's [facility] [address] [phone] and Jenna Officer, significant other   Discharge Planning   Type of [address] Prior To Admission C-pap;Home Care  (On service with [employer])   [address]   Patient expects to be discharged to: House   Condition of Participation: Discharge Planning   The Plan for Transition of Care
Consult (CM Consult) Home Health  ([address]; Phone: [phone]) [address] Discharge DME; Home Health  ([address]; Phone: [phone], [place] [phone])   Mode of Transport at Discharge Other (see comment)  Ayo Cope [phone])   Confirm Follow Up Transport Family  Ayo Cope [phone])   Condition of Participation: Discharge Planning   The Plan for Transition of Care is related to the following treatment goals: Home health and DME   The Patient and/or Patient Representative was provided with a Choice of Provider? Patient;Patient Representative   Name of the Patient Representative who was provided with the Choice of Provider and agrees with the Discharge Plan? Ayo Cope [phone]   The Patient and/Or Patient Representative agree with the Discharge Plan? Yes   Freedom of Choice list was provided with basic dialogue that supports the patient's individualized plan of care/goals, treatment preferences, and shares the quality data associated with the providers?   Yes         Segundo Vsos RN  Case Management  [phone]

## 2023-12-07 NOTE — DISCHARGE SUMMARY
Hospitalist Discharge Summary     Patient ID:  Jodi Bower  [id number]  81 y.o.  1950  12/3/2023    PCP on record: Graham Fu MD    Admit date: 12/3/2023  Discharge date and time: 12/7/2023    DISCHARGE DIAGNOSIS:    Acute on chronic hypoxic and hypercarbic respiratory failure POA- off BIPAP during day time now, on 3-4 L/m oxygen with  BIPAP Q HS per pulm- awaiting new machine at home  Possible occult diastolic heart failure vs flash pulm edema  Asthma  Obesity hypoventilation syndrome   Hx of PE  HTN  Hypokalemia- 3.0 today  BNP elevated at 1900, CXR with cardiomegaly and pulm edema. Renal function at baseline and normal LFTs. Recent echo in early October showed hyperdynamic EF, normal wall motion. Was hypertensive to 220s on admission, may have had flash pulm edema. Minimal wheezing on exam though is hypercarbic, may have possible superimposed asthma exacerbation. No pleurisy, PE lower on ddx. Wears 3L at home, requiring bipap on admission. COVID/flu neg. D dimer 3.6. Dr Ellyn Ross arranging pt's home BIPAP machine (dont need to wait for it for DC as per pulmonary)- explained to the pt and family at bedside today  Lasix 40 mg daily on discharge     CVA  DM2  Hypothryoidism  -home statin, ASA, gabapentin  -Resume PTA admission insulin regimen on discharge  -Completed steroids  -Outpatient followup with endocrinology     CKD   Follows with Dr. Fontana Number of [address]:  IP CONSULT TO HOSPITALIST  IP CONSULT TO DIABETES MANAGEMENT  IP WOUND CARE NURSE CONSULT TO EVAL  IP CONSULT TO CARDIOLOGY  IP CONSULT TO PULMONOLOGY  IP CONSULT TO CASE MANAGEMENT    Excerpted HPI from H&P of Chemo Escudero MD:  Jodi Bower is a 81 y.o.  female with PMHx significant for asthma, OHS, DM, HTN, hx of PE, presents with 2 days of worsening SOB with orthopnea and RHODES. Productive cough with clear sputum. No chest pain, no dysuria.  No hematuria, no bloody/black bowel movements, no stomach doctor about these medications      hydrALAZINE 50 MG tablet  Commonly known as: APRESOLINE     pantoprazole 40 MG tablet  Commonly known as: PROTONIX  Take 1 tablet by mouth daily               Where to Get Your Medications        These medications were sent to [facility] [place], [place] - [address] [facility] - P [phone] - F [phone] [address] [facility] [place] [address]      Phone: [phone]   furosemide 20 MG tablet           Patient Follow Up Instructions:   Activity: activity as tolerated  Diet: cardiac diet and diabetic diet  Wound Care: none needed                          Follow-up with PCP, Cardiology, Pulmonology, Npehrology, Endocrinology in 4 weeks.  Follow-up tests/labs none    Follow-up Information       Follow up With Specialties Details Why Contact Info    Robby Antoine MD Internal Medicine   [address] [facility] [address]  [place] [id number] [phone]      Abdiaziz Vazquez MD Cardiology Schedule an appointment as soon as possible for a visit in 2 week(s)  [address] [place] [address]  [phone]      Antonio Gómez MD Pulmonary Disease Schedule an appointment as soon as possible for a visit in 2 week(s)  [address] [facility]  [address]  [place] [address]  [phone]      Pepito Ambriz MD Endocrinology Schedule an appointment as soon as possible for a visit in 2 week(s)  [address] [facility] [address]  [place] [id number] [phone]            ________________________________________________________________    Risk of deterioration: Low    Condition at Discharge:  Stable  __________________________________________________________________    Disposition  Home with family, no needs    ____________________________________________________________________    Code Status: Full Code  ___________________________________________________________________      Total time in minutes spent coordinating this discharge (includes going over  instructions, follow-up, prescriptions, and preparing report for sign off to her PCP) :  35 minutes    Signed:  Francisco Sanchez MD

## 2023-12-07 NOTE — PROGRESS NOTES
Received notification from bedside RN about patient with regards to: , needs order for Lispro coverage    Intervention given:   - Lispro 5 units SQ x 1 dose

## 2023-12-08 NOTE — PROGRESS NOTES
PCP hospital follow-up transitional care appointment has been scheduled with Dr. Minerva Tijerina on 12/14/23 at 1530. Pending patient discharge.  Yfn Bains, Care Management Assistant

## 2023-12-09 ENCOUNTER — HOME CARE VISIT (OUTPATIENT)
Facility: HOME HEALTH | Age: 73
End: 2023-12-09
Payer: MEDICARE

## 2023-12-09 VITALS
WEIGHT: 205 LBS | OXYGEN SATURATION: 98 % | TEMPERATURE: 98 F | SYSTOLIC BLOOD PRESSURE: 108 MMHG | HEART RATE: 62 BPM | DIASTOLIC BLOOD PRESSURE: 62 MMHG | RESPIRATION RATE: 18 BRPM | BODY MASS INDEX: 36.32 KG/M2

## 2023-12-09 PROCEDURE — G0299 HHS/HOSPICE OF RN EA 15 MIN: HCPCS

## 2023-12-09 ASSESSMENT — SLEEP AND FATIGUE QUESTIONNAIRES
DO YOU HAVE DIFFICULTY BEING AS ACTIVE AS YOU WANT TO BE IN THE MORNING BECAUSE YOU ARE SLEEPY OR TIRED: YES, LITTLE
ESS TOTAL SCORE: 14
HOW LIKELY ARE YOU TO NOD OFF OR FALL ASLEEP WHEN YOU ARE A PASSENGER IN A CAR FOR AN HOUR WITHOUT A BREAK: 2
HOW LIKELY ARE YOU TO NOD OFF OR FALL ASLEEP WHILE SITTING QUIETLY AFTER LUNCH WITHOUT ALCOHOL: SLIGHT CHANCE OF DOZING
HOW LIKELY ARE YOU TO NOD OFF OR FALL ASLEEP WHILE SITTING AND TALKING TO SOMEONE: SLIGHT CHANCE OF DOZING
HOW LIKELY ARE YOU TO NOD OFF OR FALL ASLEEP WHILE SITTING QUIETLY AFTER LUNCH WITHOUT ALCOHOL: 1
DO YOU HAVE DIFFICULTY OPERATING A MOTOR VEHICLE FOR SHORT DISTANCES (LESS THAN 100 MILES) BECAUSE YOU BECOME SLEEPY: NO
FOSQ SCORE: 18
DO YOU HAVE DIFFICULTY BEING AS ACTIVE AS YOU WANT TO BE IN THE EVENING BECAUSE YOU ARE SLEEPY OR TIRED: NO
HOW LIKELY ARE YOU TO NOD OFF OR FALL ASLEEP WHILE WATCHING TV: MODERATE CHANCE OF DOZING
DO YOU HAVE DIFFICULTY VISITING YOUR FAMILY OR FRIENDS IN THEIR HOME BECAUSE YOU BECOME SLEEPY OR TIRED: NO
HOW LIKELY ARE YOU TO NOD OFF OR FALL ASLEEP WHILE SITTING INACTIVE IN A PUBLIC PLACE: MODERATE CHANCE OF DOZING
HAS YOUR RELATIONSHIP WITH FAMILY, FRIENDS OR WORK COLLEAGUES BEEN AFFECTED BECAUSE YOU ARE SLEEPY OR TIRED: NO
HOW LIKELY ARE YOU TO NOD OFF OR FALL ASLEEP WHILE LYING DOWN TO REST IN THE AFTERNOON WHEN CIRCUMSTANCES PERMIT: MODERATE CHANCE OF DOZING
DO YOU HAVE DIFFICULTY CONCENTRATING ON THE THINGS YOU DO BECAUSE YOU ARE SLEEPY OR TIRED: YES, A LITTLE
HOW LIKELY ARE YOU TO NOD OFF OR FALL ASLEEP WHILE SITTING INACTIVE IN A PUBLIC PLACE: 2
DO YOU HAVE DIFFICULTY OPERATING A MOTOR VEHICLE FOR LONG DISTANCES (GREATER THAN 100 MILES) BECAUSE YOU BECOME SLEEPY: NO
DO YOU GENERALLY HAVE DIFFICULTY REMEMBERING THINGS BECAUSE YOU ARE SLEEPY OR TIRED: YES, A LITTLE
HAS YOUR MOOD BEEN AFFECTED BECAUSE YOU ARE SLEEPY OR TIRED: YES, LITTLE
DO YOU HAVE DIFFICULTY WATCHING A MOVIE OR VIDEO BECAUSE YOU BECOME SLEEPY OR TIRED: NO
HOW LIKELY ARE YOU TO NOD OFF OR FALL ASLEEP WHILE SITTING AND READING: MODERATE CHANCE OF DOZING
HOW LIKELY ARE YOU TO NOD OFF OR FALL ASLEEP WHILE LYING DOWN TO REST IN THE AFTERNOON WHEN CIRCUMSTANCES PERMIT: 2
HOW LIKELY ARE YOU TO NOD OFF OR FALL ASLEEP IN A CAR, WHILE STOPPED FOR A FEW MINUTES IN TRAFFIC: 2
HOW LIKELY ARE YOU TO NOD OFF OR FALL ASLEEP WHILE WATCHING TV: 2
HOW LIKELY ARE YOU TO NOD OFF OR FALL ASLEEP WHEN YOU ARE A PASSENGER IN A CAR FOR AN HOUR WITHOUT A BREAK: MODERATE CHANCE OF DOZING
HOW LIKELY ARE YOU TO NOD OFF OR FALL ASLEEP WHILE SITTING AND TALKING TO SOMEONE: 1
HOW LIKELY ARE YOU TO NOD OFF OR FALL ASLEEP IN A CAR, WHILE STOPPED FOR A FEW MINUTES IN TRAFFIC: MODERATE CHANCE OF DOZING
HOW LIKELY ARE YOU TO NOD OFF OR FALL ASLEEP WHILE SITTING AND READING: 2

## 2023-12-11 ENCOUNTER — OFFICE VISIT (OUTPATIENT)
Age: 73
End: 2023-12-11
Payer: MEDICARE

## 2023-12-11 ENCOUNTER — HOME CARE VISIT (OUTPATIENT)
Dept: HOME HEALTH SERVICES | Facility: HOME HEALTH | Age: 73
End: 2023-12-11
Payer: MEDICARE

## 2023-12-11 VITALS
HEART RATE: 58 BPM | DIASTOLIC BLOOD PRESSURE: 55 MMHG | BODY MASS INDEX: 37.73 KG/M2 | SYSTOLIC BLOOD PRESSURE: 118 MMHG | TEMPERATURE: 97 F | OXYGEN SATURATION: 95 % | WEIGHT: 205 LBS | HEIGHT: 62 IN

## 2023-12-11 DIAGNOSIS — I10 ESSENTIAL (PRIMARY) HYPERTENSION: ICD-10-CM

## 2023-12-11 DIAGNOSIS — G47.33 OBSTRUCTIVE SLEEP APNEA (ADULT) (PEDIATRIC): Primary | ICD-10-CM

## 2023-12-11 PROCEDURE — 1123F ACP DISCUSS/DSCN MKR DOCD: CPT | Performed by: INTERNAL MEDICINE

## 2023-12-11 PROCEDURE — 99213 OFFICE O/P EST LOW 20 MIN: CPT | Performed by: INTERNAL MEDICINE

## 2023-12-11 PROCEDURE — 3017F COLORECTAL CA SCREEN DOC REV: CPT | Performed by: INTERNAL MEDICINE

## 2023-12-11 PROCEDURE — G8427 DOCREV CUR MEDS BY ELIG CLIN: HCPCS | Performed by: INTERNAL MEDICINE

## 2023-12-11 PROCEDURE — 3074F SYST BP LT 130 MM HG: CPT | Performed by: INTERNAL MEDICINE

## 2023-12-11 PROCEDURE — G8484 FLU IMMUNIZE NO ADMIN: HCPCS | Performed by: INTERNAL MEDICINE

## 2023-12-11 PROCEDURE — G8417 CALC BMI ABV UP PARAM F/U: HCPCS | Performed by: INTERNAL MEDICINE

## 2023-12-11 PROCEDURE — 1090F PRES/ABSN URINE INCON ASSESS: CPT | Performed by: INTERNAL MEDICINE

## 2023-12-11 PROCEDURE — 1111F DSCHRG MED/CURRENT MED MERGE: CPT | Performed by: INTERNAL MEDICINE

## 2023-12-11 PROCEDURE — 1036F TOBACCO NON-USER: CPT | Performed by: INTERNAL MEDICINE

## 2023-12-11 PROCEDURE — 3078F DIAST BP <80 MM HG: CPT | Performed by: INTERNAL MEDICINE

## 2023-12-11 PROCEDURE — G8399 PT W/DXA RESULTS DOCUMENT: HCPCS | Performed by: INTERNAL MEDICINE

## 2023-12-11 ASSESSMENT — ENCOUNTER SYMPTOMS: DYSPNEA ACTIVITY LEVEL: AT REST

## 2023-12-11 NOTE — HOME HEALTH
Reason for referral, [facility] SUMMARY of clinical condition: Unspecified asthma with (acute) exacerbation [J45.901]  resuming home care following hospital stay with skilled nursing needed for disease process and medication education and wound care. Clinical Assessment/Skilled reason for admission to home health (What this means for the patient overall and need for ongoing skilled care: Patient returned home 12/07/2023 following hospital stay. Patient alert and oriented x4, caregiver is spouse. Patient is on oxygen 3L nasal cannula with lungs clear all fields, sob at rest. Patient has wound on right lower extremity with would care provided as ordered, patient tolerated well with no complaints of pain. Patient has fluid retention in BLE, not new finding for this patient, pedal pulses bilaterally equal. SN educated on keeping legs elevated while resting and continue to adhere to a low sodium diet. Mahc- 10 score 8/10. Patient refusd to ambulate for walkthrough, SN scored 's based on patient and caregiver report and previous clinician documentation. Patient needed help taking on/off shoes. Patient in need of skilled nursing for continued disease process and medication education and wound care. Diagnosis: Unspecified asthma with (acute) exacerbation [J45.901]    Subjective (statement from pt/cg that is relative to why you are there): \"I was just in the hospital and now I'm home but I have a lot of doctors appointments next week too. \"    Caregiver: spouse. Caregiver assists with: Medications, Meals, Bathing, ADL, Transportation and Housekeeping Caregiver unable to assist with: Wound care. Caregiver is available Rarely Caregiver is  present at this visit and did participate with clinician. Medications reconciled and all medications are available in the home this visit.  The following education was provided regarding medications: medication interactions and look alike medications: insulin lispro and insulin

## 2023-12-11 NOTE — PATIENT INSTRUCTIONS
causing factor in more than 248,243 police reported crashes annually, resulting in 76,000 injuries and 1,500 deaths. Other surveys suggest 55% of people polled have driven while drowsy in the past year, 23% had fallen asleep but not crashed, 3% crashed, and 2% had and accident due to drowsy driving. Who is at risk? Young Drivers: One study of drowsy driving accidents states that 55% of the drivers were under 25 years. Of those, 75% were male. Shift Workers and Travelers: People who work overnight or travel across time zones frequently are at higher risk of experiencing Circadian Rhythm Disorders. They are trying to work and function when their body is programed to sleep. Sleep Deprived: Lack of sleep has a serious impact on your ability to pay attention or focus on a task. Consistently getting less than the average of 8 hours your body needs creates partial or cumulative sleep deprivation. Untreated Sleep Disorders: Sleep Apnea, Narcolepsy, R.L.S., and other sleep disorders (untreated) prevent a person from getting enough restful sleep. This leads to excessive daytime sleepiness and increases the risk for drowsy driving accidents by up to 7 times. Medications / Alcohol: Even over the counter medications can cause drowsiness. Medications that impair a drivers attention should have a warning label. Alcohol naturally makes you sleepy and on its own can cause accidents. Combined with excessive drowsiness its effects are amplified. Signs of Drowsy Driving:   * You don't remember driving the last few miles   * You may drift out of your carolyne   * You are unable to focus and your thoughts wander   * You may yawn more often than normal   * You have difficulty keeping your eyes open / nodding off   * Missing traffic signs, speeding, or tailgating  Prevention-   Good sleep hygiene, lifestyle and behavioral choices have the most impact on drowsy driving.  There is no substitute for sleep and the average person

## 2023-12-12 ENCOUNTER — HOSPITAL ENCOUNTER (OUTPATIENT)
Facility: HOSPITAL | Age: 73
Discharge: HOME | End: 2023-12-12
Attending: SURGERY
Payer: MEDICARE

## 2023-12-12 VITALS
TEMPERATURE: 97.2 F | SYSTOLIC BLOOD PRESSURE: 117 MMHG | RESPIRATION RATE: 18 BRPM | HEART RATE: 75 BPM | DIASTOLIC BLOOD PRESSURE: 57 MMHG

## 2023-12-12 DIAGNOSIS — L97.911 NON-PRESSURE CHRONIC ULCER OF RIGHT LOWER LEG, LIMITED TO BREAKDOWN OF SKIN (HCC): Primary | ICD-10-CM

## 2023-12-12 PROCEDURE — 99213 OFFICE O/P EST LOW 20 MIN: CPT

## 2023-12-12 PROCEDURE — 99213 OFFICE O/P EST LOW 20 MIN: CPT | Performed by: SURGERY

## 2023-12-12 RX ORDER — CLOBETASOL PROPIONATE 0.5 MG/G
OINTMENT TOPICAL ONCE
Status: CANCELLED | OUTPATIENT
Start: 2023-12-12 | End: 2023-12-12

## 2023-12-12 RX ORDER — GENTAMICIN SULFATE 1 MG/G
OINTMENT TOPICAL ONCE
Status: CANCELLED | OUTPATIENT
Start: 2023-12-12 | End: 2023-12-12

## 2023-12-12 RX ORDER — LIDOCAINE 40 MG/G
CREAM TOPICAL ONCE
Status: CANCELLED | OUTPATIENT
Start: 2023-12-12 | End: 2023-12-12

## 2023-12-12 RX ORDER — GINSENG 100 MG
CAPSULE ORAL ONCE
Status: CANCELLED | OUTPATIENT
Start: 2023-12-12 | End: 2023-12-12

## 2023-12-12 RX ORDER — BACITRACIN ZINC AND POLYMYXIN B SULFATE 500; 1000 [USP'U]/G; [USP'U]/G
OINTMENT TOPICAL ONCE
Status: CANCELLED | OUTPATIENT
Start: 2023-12-12 | End: 2023-12-12

## 2023-12-12 RX ORDER — LIDOCAINE HYDROCHLORIDE 40 MG/ML
SOLUTION TOPICAL ONCE
Status: CANCELLED | OUTPATIENT
Start: 2023-12-12 | End: 2023-12-12

## 2023-12-12 RX ORDER — BETAMETHASONE DIPROPIONATE 0.5 MG/G
CREAM TOPICAL ONCE
Status: CANCELLED | OUTPATIENT
Start: 2023-12-12 | End: 2023-12-12

## 2023-12-12 RX ORDER — SODIUM CHLOR/HYPOCHLOROUS ACID 0.033 %
SOLUTION, IRRIGATION IRRIGATION ONCE
Status: CANCELLED | OUTPATIENT
Start: 2023-12-12 | End: 2023-12-12

## 2023-12-12 RX ORDER — TRIAMCINOLONE ACETONIDE 1 MG/G
OINTMENT TOPICAL ONCE
Status: CANCELLED | OUTPATIENT
Start: 2023-12-12 | End: 2023-12-12

## 2023-12-12 RX ORDER — IBUPROFEN 200 MG
TABLET ORAL ONCE
Status: CANCELLED | OUTPATIENT
Start: 2023-12-12 | End: 2023-12-12

## 2023-12-12 RX ORDER — LIDOCAINE 50 MG/G
OINTMENT TOPICAL ONCE
Status: CANCELLED | OUTPATIENT
Start: 2023-12-12 | End: 2023-12-12

## 2023-12-12 RX ORDER — LIDOCAINE HYDROCHLORIDE 20 MG/ML
JELLY TOPICAL ONCE
Status: CANCELLED | OUTPATIENT
Start: 2023-12-12 | End: 2023-12-12

## 2023-12-12 NOTE — PATIENT INSTRUCTIONS
Discharge Instructions for  [address]  Phone: [phone] Fax: [phone]    NAME:  Benjy Perkins  YOB: 1950  MEDICAL RECORD NUMBER:  [id number]  DATE:  December 12, 2023    WOUND CARE ORDERS:  In clinic today, place single tubigrip size F to bilateral lower legs. Remove at night while in bed. Limit fluid intake to 2 quarts per day. Avoid food that is high in sodium or salt. Activity:  [x] Elevate leg(s) above the level of the heart when sitting. [x] Avoid prolonged standing in one place. [] Do no get dressing/wrap wet. Dietary:  [] Diet as tolerated      [x] Diabetic Diet            [] Increase Protein: examples (Meat, cheese, eggs, greek yogurt, fish, nuts)          [] Tyler Therapeutic Nutrition Powder    Return Appointment:  [x] Return Appointment: With Dr. Go Jara as needed. [] Nurse Visit :   [] Ordered tests:      Electronically signed Erin Falk RN on 12/12/2023 at 10:36 AM     [address] [place] [address] Information: Should you experience any significant changes in your wound(s) or have questions about your wound care, please contact the [address] [place] [address] at [address] [facility] 8:00 am - 4:30. If you need help with your wound outside these hours and cannot wait until we are again available, contact your PCP or go to the hospital emergency room. PLEASE NOTE: IF YOU ARE UNABLE TO OBTAIN WOUND SUPPLIES, CONTINUE TO USE THE SUPPLIES YOU HAVE AVAILABLE UNTIL YOU ARE ABLE TO REACH US. IT IS MOST IMPORTANT TO KEEP THE WOUND COVERED AT ALL TIMES.      Physician Signature:_______________________    Date: ___________ Time:  ____________

## 2023-12-12 NOTE — PROGRESS NOTES
pounds height 5 feet 2 inches     Physical examination     The patient is an alert overweight woman not in acute distress. Examination of the left lower extremity revealed 2+ dorsalis pedis pulse. There was no edema in the left thigh; there was trace edema in the left lower leg, and foot. There were no ulcers on the left. Examination of the right lower extremity revealed 2+ dorsalis pedis pulse. There was no edema in the right thigh; there was no edema in the right lower leg, and foot. There was minimal scaly desquamation of old bullous skin. The entire previously ulcerated area was healed. The patient's previous severe leg edema has resolved following hospitalization and diuresis. The patient's right lower leg bulla related ulceration has healed. I recommend the patient continue her current diuretic, which is now furosemide 40 mg daily. I have recommended the patient limit fluid intake to 2 quarts per day. I have recommended the patient limit sodium intake to 2000 mg/day. Examples of foods containing high levels of salt were reviewed with the patient. No dressing is needed for the right lower leg. Single Tubigrip's were placed from base of toes to upper calf on right and left lower legs to help minimize edema. These can be used during the day. No follow-up appointment is needed at the wound care center. Diagnosis: Nonpressure ulcer right lower leg limited to skin breakdown (healed), bilateral leg edema (resolved)      L97.911, R60.0        MINA Bella MD

## 2023-12-12 NOTE — FLOWSHEET NOTE
12/12/23 1050   Right Leg Edema Point of Measurement   Compression Therapy Compression stockings   Left Leg Edema Point of Measurement   Compression Therapy Compression stockings   [REMOVED] Wound 11/04/23 Leg Anterior; Lower;Right fluid filled blister   Final Assessment Date/Final Assessment Time: 12/12/23 1043  Date First Assessed: 11/04/23   Primary Wound Type: (c) Other (comment)  Location: Leg  Wound Location Orientation: Anterior; Lower;Right  Wound Description (Comments): fluid filled blister  W...    Dressing/Treatment   (tubi )

## 2023-12-13 ENCOUNTER — HOME CARE VISIT (OUTPATIENT)
Facility: HOME HEALTH | Age: 73
End: 2023-12-13
Payer: MEDICARE

## 2023-12-14 ENCOUNTER — OFFICE VISIT (OUTPATIENT)
Age: 73
End: 2023-12-14

## 2023-12-14 VITALS
HEIGHT: 62 IN | DIASTOLIC BLOOD PRESSURE: 68 MMHG | RESPIRATION RATE: 16 BRPM | TEMPERATURE: 97.2 F | SYSTOLIC BLOOD PRESSURE: 130 MMHG | BODY MASS INDEX: 37.49 KG/M2 | OXYGEN SATURATION: 99 % | HEART RATE: 70 BPM

## 2023-12-14 DIAGNOSIS — G47.33 OSA TREATED WITH BIPAP: ICD-10-CM

## 2023-12-14 DIAGNOSIS — I10 HYPERTENSION, UNSPECIFIED TYPE: ICD-10-CM

## 2023-12-14 DIAGNOSIS — N18.30 STAGE 3 CHRONIC KIDNEY DISEASE, UNSPECIFIED WHETHER STAGE 3A OR 3B CKD (HCC): ICD-10-CM

## 2023-12-14 DIAGNOSIS — H65.22 LEFT CHRONIC SEROUS OTITIS MEDIA: ICD-10-CM

## 2023-12-14 DIAGNOSIS — I50.30 DIASTOLIC CONGESTIVE HEART FAILURE, UNSPECIFIED HF CHRONICITY (HCC): ICD-10-CM

## 2023-12-14 DIAGNOSIS — E66.2 OBESITY HYPOVENTILATION SYNDROME (HCC): ICD-10-CM

## 2023-12-14 DIAGNOSIS — R79.89 ELEVATED TROPONIN: Primary | ICD-10-CM

## 2023-12-14 DIAGNOSIS — J45.909 ASTHMA, UNSPECIFIED ASTHMA SEVERITY, UNSPECIFIED WHETHER COMPLICATED, UNSPECIFIED WHETHER PERSISTENT: ICD-10-CM

## 2023-12-14 RX ORDER — LOSARTAN POTASSIUM 100 MG/1
100 TABLET ORAL DAILY
Qty: 90 TABLET | Refills: 3 | Status: SHIPPED | OUTPATIENT
Start: 2023-12-14

## 2023-12-14 RX ORDER — POTASSIUM CHLORIDE 20 MEQ/1
20 TABLET, EXTENDED RELEASE ORAL DAILY
Qty: 90 TABLET | Refills: 3 | Status: SHIPPED | OUTPATIENT
Start: 2023-12-14

## 2023-12-14 NOTE — PROGRESS NOTES
HISTORY OF PRESENT ILLNESS   Elvia Molina   is a 72 y.o.  female. hx HTN hld asthma hx CVA-DM-2 osteopenia , hx PE morbid obesity, BRENT, obesity hypoventilation ckd 3 Dr Margo Garzon depression  chronic anemia due to ckd-here with her male friend       Here for 1 month fu asthma exacerbation, low bp and right leg blister and LOUIE-admitted for acute on chronic hypoxic respiratory failure due to diastolic chf/flash pulm edema and asthma exacerbation-treated with 02, lasix iv and steroids and bipap weaned to 3lpm 02  On 03 3lpm at home--bipap arranged  Has cpap at home not working and bipap was ordered by Dr Connie Dinero 12/13/2023-advised to continue 02 3lpm and fu next month  Had elevated troponin in hospital. Recent echo ef 75-80. Seen by Dr Babak Dobson NST 2020    OT seeing pt this week  [facility] PT signed off-able to walk  [facility] nurse signed off    Released from [facility] wound care for right leg blister-healed  Wearing support hose to control edema and remains on lasix 20 mg 2 qd    Fsbs around 150 or less-A1c 7.5 in hospital    Admit date: 12/3/2023  Discharge date and time: 12/7/2023     DISCHARGE DIAGNOSIS:     Acute on chronic hypoxic and hypercarbic respiratory failure POA- off BIPAP during day time now, on 3-4 L/m oxygen with  BIPAP Q HS per pulm- awaiting new machine at home  Possible occult diastolic heart failure vs flash pulm edema  Asthma  Obesity hypoventilation syndrome   Hx of PE  HTN  Hypokalemia- 3.0 today  BNP elevated at 1900, CXR with cardiomegaly and pulm edema. Renal function at baseline and normal LFTs. Recent echo in early October showed hyperdynamic EF, normal wall motion. Was hypertensive to 220s on admission, may have had flash pulm edema. Minimal wheezing on exam though is hypercarbic, may have possible superimposed asthma exacerbation. No pleurisy, PE lower on ddx. Wears 3L at home, requiring bipap on admission. COVID/flu neg.  D

## 2023-12-14 NOTE — PROGRESS NOTES
1. \"Have you been to the ER, urgent care clinic since your last visit? Hospitalized since your last visit? \"         Discharged 12/7/ - SOB    2. \"Have you seen or consulted any other health care providers outside of the [address] [place] [address] since your last visit? \"       Dr. Horner Deep // wound

## 2023-12-15 ENCOUNTER — HOME CARE VISIT (OUTPATIENT)
Facility: HOME HEALTH | Age: 73
End: 2023-12-15
Payer: MEDICARE

## 2023-12-15 PROCEDURE — G0152 HHCP-SERV OF OT,EA 15 MIN: HCPCS

## 2023-12-16 VITALS
HEART RATE: 68 BPM | OXYGEN SATURATION: 99 % | TEMPERATURE: 97.3 F | SYSTOLIC BLOOD PRESSURE: 126 MMHG | DIASTOLIC BLOOD PRESSURE: 66 MMHG

## 2023-12-16 VITALS
HEART RATE: 68 BPM | OXYGEN SATURATION: 99 % | RESPIRATION RATE: 17 BRPM | DIASTOLIC BLOOD PRESSURE: 66 MMHG | SYSTOLIC BLOOD PRESSURE: 126 MMHG | TEMPERATURE: 97.3 F

## 2023-12-16 NOTE — HOME HEALTH
Reason for referral, [facility] SUMMARY of clinical condition:   Pt. followed by O.TBijan 10/23/23-11/30/23 for a total of 11 home visits with 1 missed visit. Pt. then hospitalized for acute respiratory failure, \"possible occult diastolic HE vs. flash pulmonary edema\", asthma exacerbation. Pt. resumed to home health on 12/09/23. Clinical Assessment/Skilled reason for admission to home health (What this means for the patient overall and need for ongoing skilled care):  Pt. seen for post-hospitalization O.T. eval. and pt. states she feels better than she has in a long time. Pt. is now using continous oxgyen with SpO2 at 99%. Pt. demonstrated househole ambulation without evidence of dyspnea. O.T. instructed on potential use of adaptive devices for lower body dressing, however pt. declined and states she will continue to receive assistance. Pt. does not yet have a shower seat, but declines the need for O.T. for additional transfer training / ADL training. O.T. instructed on oxygen safety and that pt. cannot be in the kitchen when the gas stove is on. O.T. instructed pt. on ways to increase activity tolerence. O.T. also instructed on energy conservation and recommendation for a transport w/c. Pt. declines the need for further O.T. intervention at this time. See Interventions / Goals for additional details. Diagnosis:  acute respiratory failure, asthma exacebation, obesity hypoventilation syndrome, IDDM, HTN, BRENT on Cpap with Bipap on order, diabetic neuropathy, CKD Stage 3, h/o CVA 2004. Subjective (statement from pt/cg that is relative to why you are there):  Pt. states she feels better than she has in a long time. Caregiver: lief partner and other family members. Caregiver assists with: Medications, Meals, Bathing, ADL, IADL and Transportation. Caregiver unable to assist with: nothing of note. Caregiver is available 24 hours/day.  Caregiver is  present at this visit and did participate with

## 2023-12-28 NOTE — PROGRESS NOTES
Physician Progress Note      PATIENT:               ABENA SINGH  CSN #:                  [id number]  :                       1950  ADMIT DATE:       12/3/2023 9:22 AM  DISCH DATE:        2023 7:07 PM  RESPONDING  PROVIDER #:        Dony Ventura MD          QUERY TEXT:    72yoF pt admitted with acute on chronic hypoxic and hypercarbic respiratory   failure with COPD and asthma exacerbation and has 'possible occult diastolic   heart failure vs flash pulm edema' documented.  Cardiology consult noted   'given CXR findings continue cautious diuretics...'   If possible, please   document in progress notes and discharge summary further specificity regarding   the acuity of CHF:    The medical record reflects the following:  Risk Factors: age, flash pulmonary edema  Clinical Indicators:  SOB, RHODES, CXR showed pulmonary edema and pleural   effusion and cardiomegaly; proBNP 190.    per Cardiology consult note 'Patient's current presentation is likely   secondary to exacerbation of her pulmonary disease.  However, given chest   x-ray findings, continue cautious diuretics as tolerated.'  Echo as of 10/10/2023:  Normal left ventricular systolic function with a   visually estimated EF of 75 - 80%. Left ventricle size is normal. Mildly   increased wall thickness. Normal wall motion (only images provided were in   parasternal short axis).  Treatment: IV lasix transitioned to po Lasix, CXR, Cardiology consult.    Thank you,  Grace Elizabeth RN, CDI  Options provided:  -- Acute Diastolic CHF/HFpEF  -- Chronic Diastolic CHF/HFpEF  -- Other - I will add my own diagnosis  -- Disagree - Not applicable / Not valid  -- Disagree - Clinically unable to determine / Unknown  -- Refer to Clinical Documentation Reviewer    PROVIDER RESPONSE TEXT:    This patient is in acute on chronic diastolic CHF/HFpEF.    Query created by: Grace Elizabeth on 2023 9:37 AM      Electronically signed by:  Dony Ventura MD 2023

## 2024-01-03 ASSESSMENT — SLEEP AND FATIGUE QUESTIONNAIRES
HOW LIKELY ARE YOU TO NOD OFF OR FALL ASLEEP WHILE LYING DOWN TO REST IN THE AFTERNOON WHEN CIRCUMSTANCES PERMIT: 1
ESS TOTAL SCORE: 4
HOW LIKELY ARE YOU TO NOD OFF OR FALL ASLEEP WHILE WATCHING TV: 0
HOW LIKELY ARE YOU TO NOD OFF OR FALL ASLEEP WHILE SITTING QUIETLY AFTER LUNCH WITHOUT ALCOHOL: 1
HOW LIKELY ARE YOU TO NOD OFF OR FALL ASLEEP WHEN YOU ARE A PASSENGER IN A CAR FOR AN HOUR WITHOUT A BREAK: 0
HOW LIKELY ARE YOU TO NOD OFF OR FALL ASLEEP WHILE SITTING AND TALKING TO SOMEONE: 1
HOW LIKELY ARE YOU TO NOD OFF OR FALL ASLEEP WHILE SITTING AND READING: 1
HOW LIKELY ARE YOU TO NOD OFF OR FALL ASLEEP WHILE SITTING INACTIVE IN A PUBLIC PLACE: 0
HOW LIKELY ARE YOU TO NOD OFF OR FALL ASLEEP IN A CAR, WHILE STOPPED FOR A FEW MINUTES IN TRAFFIC: 0

## 2024-01-04 ENCOUNTER — TELEMEDICINE (OUTPATIENT)
Age: 74
End: 2024-01-04
Payer: MEDICARE

## 2024-01-04 DIAGNOSIS — G47.33 OBSTRUCTIVE SLEEP APNEA (ADULT) (PEDIATRIC): Primary | ICD-10-CM

## 2024-01-04 DIAGNOSIS — I10 ESSENTIAL (PRIMARY) HYPERTENSION: ICD-10-CM

## 2024-01-04 PROCEDURE — 1090F PRES/ABSN URINE INCON ASSESS: CPT | Performed by: INTERNAL MEDICINE

## 2024-01-04 PROCEDURE — G8399 PT W/DXA RESULTS DOCUMENT: HCPCS | Performed by: INTERNAL MEDICINE

## 2024-01-04 PROCEDURE — 1111F DSCHRG MED/CURRENT MED MERGE: CPT | Performed by: INTERNAL MEDICINE

## 2024-01-04 PROCEDURE — 3017F COLORECTAL CA SCREEN DOC REV: CPT | Performed by: INTERNAL MEDICINE

## 2024-01-04 PROCEDURE — 99214 OFFICE O/P EST MOD 30 MIN: CPT | Performed by: INTERNAL MEDICINE

## 2024-01-04 PROCEDURE — G8427 DOCREV CUR MEDS BY ELIG CLIN: HCPCS | Performed by: INTERNAL MEDICINE

## 2024-01-04 PROCEDURE — 1123F ACP DISCUSS/DSCN MKR DOCD: CPT | Performed by: INTERNAL MEDICINE

## 2024-01-04 NOTE — PROGRESS NOTES
that her sleep has improved on PAP therapy using full mask   Allergies   Allergen Reactions    Latex Itching    Codeine Itching and Other (See Comments)     hallucinate    Shellfish Allergy Swelling    Iodine Itching and Rash     Given pre-cardiac cath       She has a current medication list which includes the following prescription(s): nystatin, losartan, potassium chloride, furosemide, b-d ultrafine iii short pen, compressor/nebulizer, oxygen concentrator, montelukast, rosuvastatin, albuterol, albuterol sulfate hfa, calcitriol, ketorolac, latanoprost, levothyroxine, aspirin, carvedilol, fluticasone-salmeterol, toujeo solostar, insulin lispro (1 unit dial), tizanidine, gabapentin, and ipratropium 0.5 mg-albuterol 2.5 mg..      She  has a past medical history of Asthma, CAD (coronary artery disease), CKD (chronic kidney disease) stage 3, GFR 30-59 ml/min (HCC), Diabetes (HCC), Hx of blood clots, Hyperlipidemia, Hypertension, Long term current use of anticoagulant therapy, Nausea & vomiting, Pulmonary embolism ([facility]), Screening for colon cancer, Stroke ([facility]), Thromboembolus (HCC), Thyroid disease, and Unspecified sleep apnea.        1/3/2024     3:31 PM 12/9/2023    11:13 AM 10/23/2023    12:35 PM 7/3/2023     1:48 PM 6/30/2023     3:08 PM 2/27/2023    10:15 AM 2/1/2022     9:00 AM   Sleep Medicine   Sitting and reading 1 2 3 2 2 1 2   Watching TV 0 2 2 1 1 1 0   Sitting, inactive in a public place (e.g. a theatre or a meeting) 0 2 0 1 1 0 0   As a passenger in a car for an hour without a break 0 2 2 1 1 1 2   Lying down to rest in the afternoon when circumstances permit 1 2 2 2 2 1 3   Sitting and talking to someone 1 1 0 0 0 0 0   Sitting quietly after a lunch without alcohol 1 1 1 0 0 0 0   In a car, while stopped for a few minutes in traffic 0 2 0 0 0 0 0   [place] Sleepiness Score 4 14 10 7 7    7 4 7        which reflect improved sleep quality over therapy time.    O>      210 lb  126/68  HR 80bpm  Oximetry 98%

## 2024-01-04 NOTE — PATIENT INSTRUCTIONS
requires 8 hours nightly. If you find yourself driving drowsy, stop and sleep. Consider the sleep hygiene tips provided during your visit as well.     Medication Refill Policy: Refills for all medications require 1 week advance notice. Please have your pharmacy fax a refill request. We are unable to fax, or call in \"controled substance\" medications and you will need to pick these prescriptions up from our office.

## 2024-01-08 ENCOUNTER — OFFICE VISIT (OUTPATIENT)
Age: 74
End: 2024-01-08
Payer: MEDICARE

## 2024-01-08 VITALS
HEIGHT: 62 IN | SYSTOLIC BLOOD PRESSURE: 159 MMHG | WEIGHT: 209 LBS | BODY MASS INDEX: 38.46 KG/M2 | HEART RATE: 73 BPM | DIASTOLIC BLOOD PRESSURE: 57 MMHG

## 2024-01-08 DIAGNOSIS — N18.30 STAGE 3 CHRONIC KIDNEY DISEASE, UNSPECIFIED WHETHER STAGE 3A OR 3B CKD (HCC): ICD-10-CM

## 2024-01-08 DIAGNOSIS — Z79.4 TYPE 2 DIABETES MELLITUS WITH DIABETIC NEPHROPATHY, WITH LONG-TERM CURRENT USE OF INSULIN (HCC): ICD-10-CM

## 2024-01-08 DIAGNOSIS — E11.21 TYPE 2 DIABETES MELLITUS WITH DIABETIC NEPHROPATHY, WITH LONG-TERM CURRENT USE OF INSULIN (HCC): ICD-10-CM

## 2024-01-08 DIAGNOSIS — E66.01 SEVERE OBESITY (BMI 35.0-39.9) WITH COMORBIDITY (HCC): ICD-10-CM

## 2024-01-08 DIAGNOSIS — E11.8 TYPE 2 DIABETES MELLITUS WITH UNSPECIFIED COMPLICATIONS (HCC): Primary | ICD-10-CM

## 2024-01-08 PROCEDURE — 1036F TOBACCO NON-USER: CPT | Performed by: INTERNAL MEDICINE

## 2024-01-08 PROCEDURE — G8417 CALC BMI ABV UP PARAM F/U: HCPCS | Performed by: INTERNAL MEDICINE

## 2024-01-08 PROCEDURE — 1123F ACP DISCUSS/DSCN MKR DOCD: CPT | Performed by: INTERNAL MEDICINE

## 2024-01-08 PROCEDURE — 3078F DIAST BP <80 MM HG: CPT | Performed by: INTERNAL MEDICINE

## 2024-01-08 PROCEDURE — 3077F SYST BP >= 140 MM HG: CPT | Performed by: INTERNAL MEDICINE

## 2024-01-08 PROCEDURE — 99214 OFFICE O/P EST MOD 30 MIN: CPT | Performed by: INTERNAL MEDICINE

## 2024-01-08 PROCEDURE — G8428 CUR MEDS NOT DOCUMENT: HCPCS | Performed by: INTERNAL MEDICINE

## 2024-01-08 PROCEDURE — 3046F HEMOGLOBIN A1C LEVEL >9.0%: CPT | Performed by: INTERNAL MEDICINE

## 2024-01-08 PROCEDURE — G8484 FLU IMMUNIZE NO ADMIN: HCPCS | Performed by: INTERNAL MEDICINE

## 2024-01-08 PROCEDURE — 3017F COLORECTAL CA SCREEN DOC REV: CPT | Performed by: INTERNAL MEDICINE

## 2024-01-08 PROCEDURE — 2022F DILAT RTA XM EVC RTNOPTHY: CPT | Performed by: INTERNAL MEDICINE

## 2024-01-08 PROCEDURE — 1090F PRES/ABSN URINE INCON ASSESS: CPT | Performed by: INTERNAL MEDICINE

## 2024-01-08 PROCEDURE — G8399 PT W/DXA RESULTS DOCUMENT: HCPCS | Performed by: INTERNAL MEDICINE

## 2024-01-08 RX ORDER — AZELASTINE 1 MG/ML
2 SPRAY, METERED NASAL DAILY
COMMUNITY
Start: 2024-01-04

## 2024-01-08 RX ORDER — FLUTICASONE PROPIONATE 50 MCG
2 SPRAY, SUSPENSION (ML) NASAL DAILY
COMMUNITY
Start: 2024-01-04

## 2024-01-08 NOTE — PROGRESS NOTES
Ms. Clifford returns for follow-up of her type 2 diabetes mellitus x 14 years with poor blood sugar control.    Her A1c was 9.7% in September. Her A1c was  8.9%  in March 2021 which is the lowest we have ever had.,   Her A1c in October was 9.3%. When I saw her last her A1c was 8.4%.  Her A1c today is 7.5%.         Recent laboratory tests also remarkable for creatinine of 1.30 with a GFR of 44.  LFTs normal.  She was also found to have CO2 of >45.    She was hospitalized December 2023 for shortness of breath (hypoxia and hypercapnia) and was started on BiPAP which she continues to use.       Current medications  Toujeo insulin 30 units in AM    Humalog insulin 20 units before each meal   Freestyle Freedom 2      She presents today looking much more comfortable than when I saw her in the hospital in December.  She is breathing more comfortably on chronic O2 therapy.  We downloaded her freestyle freedom.  Her time in range is 65% and her average blood sugar is 159.  She has been taking her insulin more consistently which explains her improved A1c of 7.5%.    Examination  Blood pressure 159/57  Pulse 73  Weight 209  BMI 38.2  HEENT unremarkable  Lungs clear  Heart reveals a regular rate and rhythm  Abdomen obese  Extremities there is a large healing area from a previous blister in the right lower extremity but there is no obvious exudate or infection    Impression  1.  Type 2 diabetes mellitus with improving glucose control on basal bolus insulin  2.  Obesity hypoventilation syndrome with hypercapnia now on BiPAP  3.  Obesity  4.  Chronic kidney disease stage IIIb    Plan:  1.  We have continue the regimen.  Her  is very much helping now to manage her diabetes and to ensure insulin adherence  2.  I will see her back in 3 to 4 months    Please note that this dictation was completed with [employer], the [employer] voice recognition software.  Quite often unanticipated grammatical, syntax, homophones, and other interpretive

## 2024-01-10 ENCOUNTER — CARE COORDINATION (OUTPATIENT)
Facility: CLINIC | Age: 74
End: 2024-01-10

## 2024-01-10 DIAGNOSIS — E11.22 TYPE 2 DIABETES MELLITUS WITH CHRONIC KIDNEY DISEASE, WITH LONG-TERM CURRENT USE OF INSULIN, UNSPECIFIED CKD STAGE (HCC): ICD-10-CM

## 2024-01-10 DIAGNOSIS — J44.9 CHRONIC OBSTRUCTIVE PULMONARY DISEASE, UNSPECIFIED COPD TYPE (HCC): ICD-10-CM

## 2024-01-10 DIAGNOSIS — I10 PRIMARY HYPERTENSION: Primary | ICD-10-CM

## 2024-01-10 DIAGNOSIS — Z79.4 TYPE 2 DIABETES MELLITUS WITH CHRONIC KIDNEY DISEASE, WITH LONG-TERM CURRENT USE OF INSULIN, UNSPECIFIED CKD STAGE (HCC): ICD-10-CM

## 2024-01-10 NOTE — CARE COORDINATION
Remote Patient Kit Ordering Note      Date/Time:  1/10/2024 2:15 PM      [x] CCSS confirmed patient shipping address  [x] Patient will receive package over the next 1-3 business days. Someone 21 years or older must be present to sign for UPS delivery.  [x] HRS will contact patient within 24 hours, an HRS  will call the patient directly: If the patient does not answer, HRS will follow up with the clinical team notifying them about the unsuccessful attempt to contact the patient. HRS will make three call attempts to the patient.Provide patient with [facility] Virtual install number is: [phone].  [x] ACM will contact patient once equipment is active to welcome them to the program.                                                         [x] Hours of RPM monitoring - Monday-Friday 2597-7101; encourage patient to get vitals entered by Noon each day to have the alert addressed same day.  [x][facility].                     All questions answered at this time. ACM made aware the RPM kit has been ordered.      CCSS notified patient of RPM equipment order.

## 2024-01-10 NOTE — PROGRESS NOTES
Remote Patient Monitoring Treatment Plan    Received request from ACM/CTN Shelley Moreno RN  to order remote patient monitoring for in home monitoring of COPD, Diabetes, and HTN and order completed.     Patient will be monitoring blood pressure   glucose  pulse ox   weight  survey questions.      Pt has possible hx of HF vs flash pulmonary edema document in EMR from last hospital admission.  Please provide RPM weight monitoring as per our protocol for all patients with HF.     Patient will engage in Remote Patient Monitoring each day to develop the skills necessary for self management.       RPM Care Team Responsibilities:   Alerts will be reviewed daily and addressed within 2-4 hours during operational hours (Monday -Friday 9 am-4 pm)  Alert response and intervention documented in patient medical record  Alert response escalated to PCP per protocol and documented in patient medical record  Patient monitored over approximately  days  Discharge from program based on self-management readiness    See care coordination encounters for additional details.

## 2024-01-17 ENCOUNTER — CARE COORDINATION (OUTPATIENT)
Dept: CARE COORDINATION | Age: 74
End: 2024-01-17

## 2024-01-17 ENCOUNTER — TELEPHONE (OUTPATIENT)
Age: 74
End: 2024-01-17

## 2024-01-17 DIAGNOSIS — M54.30 SCIATICA, UNSPECIFIED LATERALITY: ICD-10-CM

## 2024-01-17 RX ORDER — GABAPENTIN 100 MG/1
100 CAPSULE ORAL
Qty: 30 CAPSULE | Refills: 0 | Status: CANCELLED | OUTPATIENT
Start: 2024-01-17 | End: 2024-02-16

## 2024-01-17 RX ORDER — FUROSEMIDE 20 MG/1
40 TABLET ORAL DAILY
Qty: 180 TABLET | Refills: 3 | Status: SHIPPED | OUTPATIENT
Start: 2024-01-17

## 2024-01-17 RX ORDER — FUROSEMIDE 20 MG/1
40 TABLET ORAL DAILY
Qty: 90 TABLET | Refills: 0 | Status: CANCELLED | OUTPATIENT
Start: 2024-01-17

## 2024-01-17 RX ORDER — LEVOTHYROXINE SODIUM 137 UG/1
TABLET ORAL
Qty: 90 TABLET | Refills: 3 | Status: CANCELLED | OUTPATIENT
Start: 2024-01-17

## 2024-01-17 RX ORDER — MONTELUKAST SODIUM 10 MG/1
10 TABLET ORAL NIGHTLY
Qty: 90 TABLET | Refills: 0 | Status: CANCELLED | OUTPATIENT
Start: 2024-01-17

## 2024-01-17 NOTE — TELEPHONE ENCOUNTER
Pt has a question on her fluid pill.  She would like to make sure she is taking this correctly.      Please call to advise.

## 2024-01-17 NOTE — TELEPHONE ENCOUNTER
Spoke with patient. Advised. Verbalized understanding.     Per Dr. Antoine  The lasix should be 20 mg 2  qd =40 mg qd

## 2024-01-17 NOTE — TELEPHONE ENCOUNTER
PCP: Robby Antoine MD    Last appt: 12/14/2023   Future Appointments   Date Time Provider Department Center   1/22/2024  1:20 PM Cornell Veliz MD [facility]   1/30/2024 10:10 AM [facility] Duque[facility]   3/14/2024 11:30 AM Robby Antoine MD [facility]   4/26/2024  2:30 PM Pepito Ambriz MD [facility]   6/14/2024  2:20 PM Cornell Veliz MD [facility]       Requested Prescriptions     Pending Prescriptions Disp Refills    gabapentin (NEURONTIN) 100 MG capsule 30 capsule 0     Sig: Take 1 capsule by mouth nightly for 30 days. Max Daily Amount: 100 mg    montelukast (SINGULAIR) 10 MG tablet 90 tablet 0     Sig: Take 1 tablet by mouth nightly    furosemide (LASIX) 20 MG tablet 90 tablet 0     Sig: Take 2 tablets by mouth daily    levothyroxine (SYNTHROID) 137 MCG tablet 90 tablet 3     Sig: TAKE 1 TABLET DAILY BEFORE BREAKFAST BY MOUTH EVERY DAY.     Spoke with patient. Patient requesting refills on medications. pended

## 2024-01-17 NOTE — CARE COORDINATION
Remote Alert Monitoring Note  Rpm alert to be reviewed by the provider   yellow alert   blood pressure reading (172/70)   Additional needs to be addressed by N/A: No      Date/Time:  2024 11:07 AM  [facility] Verified patients name and  as identifiers.  Background: Pt enrolled in RPM r/t COPD, HTN, DM.  Refer to 911 immediately if:  Patient unresponsive or unable to provide history  Change in cognition or sudden confusion  Patient unable to respond in complete sentences  Intense chest pain/tightness  Any concern for any clinical emergency  Red Alert: Provider response time of 1 hr required for any red alert requiring intervention  Yellow Alert: Provider response time of 3hr required for any escalated yellow alert  BP Triage  Are you having any Chest Pain? no   Are you having any Shortness of Breath? no   Do you have a headache or have any vision changes? no   Are you having any numbness or tingling? no   Are you having any other health concerns or issues? no   Clinical Interventions: Reviewed and followed up on alerts and treatments-Spoke with pt who is in no apparent distress.  Offers no complaints at this time.  Reviewed HTN medications and pt taking Losartan 100 mg daily, Carvedilol 12.5 mg 2 tabs in am and 1 tab in pm and has order for Lasix 40 mg daily.  Pt states she ran out of Lasix yesterday and does not have any refills.  She took Losartan an Carvedilol this morning around 8:30 am and is agreeable to recheck metric at this time with updated reading of 160/68.  BP now within RPM parameters.  Noted during call that Lasix refill called to pharmacy per Dr. Antoine.  Pt updated and will  medication.    Plan/Follow Up: Will continue to review, monitor and address alerts with follow up based on severity of symptoms and risk factors.

## 2024-01-19 ENCOUNTER — CARE COORDINATION (OUTPATIENT)
Dept: CASE MANAGEMENT | Age: 74
End: 2024-01-19

## 2024-01-19 NOTE — CARE COORDINATION
Remote Alert Monitoring Note  Rpm alert to be reviewed by the provider   red alert   weight (Gained 6# in 7 days. 208.3 to 214.7  )  /72  Yellow alert  Additional needs to be addressed by PCP: No                 Called and spoke with patient. Rechecked her BP at 167/77. No further action needed at this time.      Nayla Weber, ADRIANE    [phone]  [facility] Coordinator / RPM      
Remote Alert Monitoring Note  Rpm alert to be reviewed by the provider   red alert   weight (Gained 6# in 7 days. 208.3 to 214.7  )  /72  Yellow alert  Additional needs to be addressed by PCP: No                 Patient has elevated BP of 176/72. Patient denies CP, SOB, Stroke like symptoms, Swelling, increased NA intake. Patient has taken morning medications including Lasix 20 mg daily, Coreg 12.5 mg twice daily, Losartan 100 mg daily.  Patient will recheck BP shortly.    Instructed to place BP cuff on upper arm snuggly. Sit with feet flat on the floor. Sit quietly and relax for 5 minutes before taking BP.      Advised Patient to contact PCP 24/7 regarding CP, SOB, Swelling and any health concerns for early outpatient intervention in an effort to avoid hospitalization.  Report any worsening symptoms to PCP and/or Call 911 and/or GO TO  EMERGENCY ROOM if symptoms are severe or worsening.   Expresses understanding.     Nayla Weber LPN    [phone]  [facility] Coordinator / RPM      
self-management-Reviewed weight monitoring with patient.    Instructed patient on the importance of weighing daily, in the morning after urinating, Same Clothing on, NO shoes, Scale on Flat/hard surface, Stand straight on scale, no leaning, and that if the patient has weight gain of 3# over night or 5# weight gain in a week to call their physician immediately and report.      Plan/Follow Up: Will continue to review, monitor and address alerts with follow up based on severity of symptoms and risk factors.

## 2024-01-25 DIAGNOSIS — E03.8 HYPOTHYROIDISM DUE TO HASHIMOTO'S THYROIDITIS: Primary | ICD-10-CM

## 2024-01-25 DIAGNOSIS — E06.3 HYPOTHYROIDISM DUE TO HASHIMOTO'S THYROIDITIS: Primary | ICD-10-CM

## 2024-01-25 RX ORDER — LEVOTHYROXINE SODIUM 137 UG/1
137 TABLET ORAL DAILY
Qty: 90 TABLET | Refills: 3 | Status: SHIPPED | OUTPATIENT
Start: 2024-01-25

## 2024-01-29 ASSESSMENT — SLEEP AND FATIGUE QUESTIONNAIRES
HOW LIKELY ARE YOU TO NOD OFF OR FALL ASLEEP WHILE SITTING QUIETLY AFTER LUNCH WITHOUT ALCOHOL: 0
HOW LIKELY ARE YOU TO NOD OFF OR FALL ASLEEP WHILE SITTING AND READING: 0
HOW LIKELY ARE YOU TO NOD OFF OR FALL ASLEEP IN A CAR, WHILE STOPPED FOR A FEW MINUTES IN TRAFFIC: 0
HOW LIKELY ARE YOU TO NOD OFF OR FALL ASLEEP WHEN YOU ARE A PASSENGER IN A CAR FOR AN HOUR WITHOUT A BREAK: 1
HOW LIKELY ARE YOU TO NOD OFF OR FALL ASLEEP WHILE LYING DOWN TO REST IN THE AFTERNOON WHEN CIRCUMSTANCES PERMIT: 2
HOW LIKELY ARE YOU TO NOD OFF OR FALL ASLEEP WHILE SITTING INACTIVE IN A PUBLIC PLACE: 0
ESS TOTAL SCORE: 4
HOW LIKELY ARE YOU TO NOD OFF OR FALL ASLEEP WHILE SITTING AND TALKING TO SOMEONE: 0
HOW LIKELY ARE YOU TO NOD OFF OR FALL ASLEEP WHILE WATCHING TV: 1

## 2024-01-30 ENCOUNTER — CLINICAL DOCUMENTATION (OUTPATIENT)
Age: 74
End: 2024-01-30

## 2024-01-30 ENCOUNTER — TELEMEDICINE (OUTPATIENT)
Age: 74
End: 2024-01-30
Payer: MEDICARE

## 2024-01-30 DIAGNOSIS — G47.33 OSA (OBSTRUCTIVE SLEEP APNEA): Primary | ICD-10-CM

## 2024-01-30 DIAGNOSIS — I10 ESSENTIAL (PRIMARY) HYPERTENSION: ICD-10-CM

## 2024-01-30 DIAGNOSIS — J96.22 ACUTE ON CHRONIC RESPIRATORY FAILURE WITH HYPERCAPNIA (HCC): ICD-10-CM

## 2024-01-30 DIAGNOSIS — E11.8 TYPE 2 DIABETES MELLITUS WITH UNSPECIFIED COMPLICATIONS (HCC): ICD-10-CM

## 2024-01-30 PROCEDURE — 2022F DILAT RTA XM EVC RTNOPTHY: CPT | Performed by: NURSE PRACTITIONER

## 2024-01-30 PROCEDURE — 99214 OFFICE O/P EST MOD 30 MIN: CPT | Performed by: NURSE PRACTITIONER

## 2024-01-30 PROCEDURE — 1036F TOBACCO NON-USER: CPT | Performed by: NURSE PRACTITIONER

## 2024-01-30 PROCEDURE — G8427 DOCREV CUR MEDS BY ELIG CLIN: HCPCS | Performed by: NURSE PRACTITIONER

## 2024-01-30 PROCEDURE — 1123F ACP DISCUSS/DSCN MKR DOCD: CPT | Performed by: NURSE PRACTITIONER

## 2024-01-30 PROCEDURE — 3017F COLORECTAL CA SCREEN DOC REV: CPT | Performed by: NURSE PRACTITIONER

## 2024-01-30 PROCEDURE — G8417 CALC BMI ABV UP PARAM F/U: HCPCS | Performed by: NURSE PRACTITIONER

## 2024-01-30 PROCEDURE — G8399 PT W/DXA RESULTS DOCUMENT: HCPCS | Performed by: NURSE PRACTITIONER

## 2024-01-30 PROCEDURE — 3046F HEMOGLOBIN A1C LEVEL >9.0%: CPT | Performed by: NURSE PRACTITIONER

## 2024-01-30 PROCEDURE — 1090F PRES/ABSN URINE INCON ASSESS: CPT | Performed by: NURSE PRACTITIONER

## 2024-01-30 PROCEDURE — G8484 FLU IMMUNIZE NO ADMIN: HCPCS | Performed by: NURSE PRACTITIONER

## 2024-01-30 NOTE — PROGRESS NOTES
[address]   [place] [address]   Tel.  [phone]   Fax. [phone]  [address]   [place] [address]   Tel.  [phone]   Fax. [phone] [address] [place] [address]   Tel.  [phone]   Fax. [phone]     Elvia Clifford (: 1950) is a 73 y.o. female, established patient, seen for positive airway pressure follow-up, she was last seen by Dr. Carr on 2024, prior notes reviewed in detail.  Home sleep test  showed AHI of 5/hr. Titration study 2023 showed events responding to Bilevel therapy. She is seen today for follow up.     ASSESSMENT/PLAN:   Diagnosis Orders   1. BRENT (obstructive sleep apnea)  DME Order for (Specify) as OP      2. Acute on chronic respiratory failure with hypercapnia (HCC)        3. Type 2 diabetes mellitus with unspecified complications (HCC)        4. Essential (primary) hypertension        5. BMI 38.0-38.9,adult          AHI = 5 (2017).  On Bi - Level :  Max IPAP 22 cmH2O; Min EPAP 10 cmH2O; PS 6 cmH2O. Set up .    She is adherent with PAP therapy and PAP continues to benefit patient and remains necessary for control of her sleep apnea.     No follow-up provider specified.    Sleep Apnea -  She continues to do well on her device. She was issued the AirFit F30i FFM and notes some issues applying the mask at night. We virtually reviewed how to apply it properly. She will try it again tonight and if she has issues we will bring her in to the office to meet with our technician for proper mask fit.     Orders Placed This Encounter   Procedures    DME Order for (Specify) as OP     Diagnosis: (G47.33) BRENT (obstructive sleep apnea)  (primary encounter diagnosis)     Replacement Supplies for Positive Airway Pressure Therapy Device:   Duration of need: 99 months.        Full Face Mask 1 every 3 months.   Full Face Mask Cushion 1 per month.  AirFit F30I       Headgear 1 every

## 2024-01-30 NOTE — PATIENT INSTRUCTIONS
drowsiness. Medications that impair a drivers attention should have a warning label. Alcohol naturally makes you sleepy and on its own can cause accidents. Combined with excessive drowsiness its effects are amplified.   Signs of Drowsy Driving:   * You don't remember driving the last few miles   * You may drift out of your carolyne   * You are unable to focus and your thoughts wander   * You may yawn more often than normal   * You have difficulty keeping your eyes open / nodding off   * Missing traffic signs, speeding, or tailgating  Prevention-   Good sleep hygiene, lifestyle and behavioral choices have the most impact on drowsy driving. There is no substitute for sleep and the average person requires 8 hours nightly. If you find yourself driving drowsy, stop and sleep. Consider the sleep hygiene tips provided during your visit as well.     Medication Refill Policy: Refills for all medications require 1 week advance notice. Please have your pharmacy fax a refill request. We are unable to fax, or call in \"controled substance\" medications and you will need to pick these prescriptions up from our office.

## 2024-01-31 ENCOUNTER — OFFICE VISIT (OUTPATIENT)
Age: 74
End: 2024-01-31
Payer: MEDICARE

## 2024-01-31 VITALS
WEIGHT: 208 LBS | DIASTOLIC BLOOD PRESSURE: 70 MMHG | HEIGHT: 62 IN | HEART RATE: 61 BPM | BODY MASS INDEX: 38.28 KG/M2 | OXYGEN SATURATION: 98 % | SYSTOLIC BLOOD PRESSURE: 108 MMHG

## 2024-01-31 DIAGNOSIS — I50.30 DIASTOLIC CONGESTIVE HEART FAILURE, UNSPECIFIED HF CHRONICITY (HCC): ICD-10-CM

## 2024-01-31 DIAGNOSIS — J96.01 ACUTE HYPOXIC RESPIRATORY FAILURE (HCC): Primary | ICD-10-CM

## 2024-01-31 DIAGNOSIS — R06.02 SHORTNESS OF BREATH: ICD-10-CM

## 2024-01-31 DIAGNOSIS — Z09 HOSPITAL DISCHARGE FOLLOW-UP: ICD-10-CM

## 2024-01-31 PROCEDURE — 99204 OFFICE O/P NEW MOD 45 MIN: CPT | Performed by: INTERNAL MEDICINE

## 2024-01-31 RX ORDER — PANTOPRAZOLE SODIUM 40 MG/1
40 TABLET, DELAYED RELEASE ORAL DAILY
COMMUNITY
Start: 2024-01-17

## 2024-01-31 RX ORDER — AMLODIPINE BESYLATE 10 MG/1
10 TABLET ORAL DAILY
COMMUNITY
Start: 2024-01-18

## 2024-01-31 RX ORDER — HYDRALAZINE HYDROCHLORIDE 50 MG/1
TABLET, FILM COATED ORAL
COMMUNITY
Start: 2022-11-07

## 2024-01-31 ASSESSMENT — PATIENT HEALTH QUESTIONNAIRE - PHQ9
SUM OF ALL RESPONSES TO PHQ QUESTIONS 1-9: 0
SUM OF ALL RESPONSES TO PHQ QUESTIONS 1-9: 0
2. FEELING DOWN, DEPRESSED OR HOPELESS: 0
SUM OF ALL RESPONSES TO PHQ9 QUESTIONS 1 & 2: 0
SUM OF ALL RESPONSES TO PHQ QUESTIONS 1-9: 0
SUM OF ALL RESPONSES TO PHQ QUESTIONS 1-9: 0
1. LITTLE INTEREST OR PLEASURE IN DOING THINGS: 0

## 2024-01-31 NOTE — PROGRESS NOTES
Cardiology  Clinic -   New Patient  Visit   Date of Service : 1/31/2024    Name: Elvia Clifford  Patient ID: [id number]  Age: 73 y.o. Sex: female YOB: 1950    Author: JOSE MIGUEL NGUYEN MD    PCP: Robby Antoine MD    Referring Provider:Robby Antoine MD    Reason for Visit / CC: Recent hospitalization with acute on chronic respiratory failure with hypercapnia on home oxygen with chronic diastolic heart failure       ASSESSMENT       Shortness of breath multifactorial combination predominantly of acute on chronic hypoxic respiratory failure due to obesity hypoventilation syndrome versus COPD with mild diastolic heart failure  Acute on chronic diastolic heart failure now fairly well compensated  Nonobstructive coronary artery disease  Chronic kidney disease  Diabetes mellitus  Morbid obesity  History of stroke on aspirin  Hypertension fairly well-controlled  Hyperlipidemia fairly well-controlled  Obstructive sleep apnea on BiPAP   Hypothyroidism       PLAN       Patient presented to the clinic for follow-up appointment posthospital discharge for shortness of breath secondary to acute on chronic hypoxic respiratory failure with hypercapnia secondary to obesity hypoventilation syndrome and COPD with mild diastolic heart failure.  Since discharge shortness of breath is much better on home oxygen and BiPAP  Advised to follow-up with pulmonologist for further management  Regarding chronic diastolic heart failure which fairly appears to be well compensated advised the patient to start Lasix 20 mg p.o. twice daily.  Would also recommend starting Jardiance 10 mg once daily for diastolic heart failure and diabetes  Will get an echo to assess left-ventricular systolic and diastolic function and assess pulmonary pressures  Hypertension fairly well-controlled on hydralazine 50 mg 3 times daily along with Coreg 12.5 mg twice daily along with losartan 100 mg once daily  And amlodipine 10 mg once daily  Advised to

## 2024-02-02 ENCOUNTER — CARE COORDINATION (OUTPATIENT)
Dept: CARE COORDINATION | Age: 74
End: 2024-02-02

## 2024-02-02 NOTE — CARE COORDINATION
Remote Alert Monitoring Note  Rpm alert to be reviewed by the provider   red alert   glucose reading (69)   Additional needs to be addressed by N/A: No      Date/Time:  2024 8:41 AM  Patient Current Location: [facility] contacted patient by telephone. Verified patients name and  as identifiers.  Background: Pt enrolled in RPM r/t COPD, HTN, DM.  Refer to 911 immediately if:  Patient unresponsive or unable to provide history  Change in cognition or sudden confusion  Patient unable to respond in complete sentences  Intense chest pain/tightness  Any concern for any clinical emergency  Red Alert: Provider response time of 1 hr required for any red alert requiring intervention  Yellow Alert: Provider response time of 3hr required for any escalated yellow alert  Hypoglycemia Triage  Are you having any vision changes? no   Are you having any increased thirst? no   Do you have increased urination? no   Are you having increased fatigue? no   Clinical Interventions: Reviewed and followed up on alerts and treatments-Spoke with pt who is in no apparent distress.  States she uses a Freedom CGM and noted battery low.  She rechecked glucose at this time with updated reading of 96.  Reviewed insulin and pt taking Glarine 30 units daily and Lispro 22 units with breakfast, 24 units with lunch and 30 units with dinner.  She has not taken insulin yet today because she has not  eaten.      Plan/Follow Up: Will continue to review, monitor and address alerts with follow up based on severity of symptoms and risk factors.

## 2024-02-05 DIAGNOSIS — M54.30 SCIATICA, UNSPECIFIED LATERALITY: ICD-10-CM

## 2024-02-05 RX ORDER — GABAPENTIN 100 MG/1
100 CAPSULE ORAL
Qty: 30 CAPSULE | Refills: 5 | Status: SHIPPED | OUTPATIENT
Start: 2024-02-05 | End: 2024-08-03

## 2024-02-05 NOTE — TELEPHONE ENCOUNTER
PCP: Robby Antoine MD    Last appt: 12/14/2023   Future Appointments   Date Time Provider Department Center   2/22/2024  3:45 PM BS [facility] ECHO 1 [facility]   3/14/2024 11:30 AM Robby Antoine MD [facility]  AM[facility]   4/26/2024  2:30 PM Pepito Ambriz MD RDE [facility]   4/30/2024 10:40 AM [facility]   6/14/2024  2:20 PM Cornell Veliz MD [facility]   1/30/2025  9:30 AM Grace Duque, APRN - NP [facility]       Requested Prescriptions     Pending Prescriptions Disp Refills    gabapentin (NEURONTIN) 100 MG capsule 30 capsule 0     Sig: Take 1 capsule by mouth nightly for 30 days. Max Daily Amount: 100 mg

## 2024-02-05 NOTE — TELEPHONE ENCOUNTER
Caller requests Refill of:  gabapentin (NEURONTIN) 100 MG capsule        Please send to:    [facility] - [place], [place] - [address] P [phone] - F [phone]  [address] [facility] [address]  Phone: [phone] Fax: [phone]           Visit / Appointment History:  Future Appointment at [facility]:  3/14/2024       Last Appointment With PCP:  12/14/2023       Caller confirmed instructions and dosages as correct.    Caller was advised that Meds will be refilled as soon as possible, however there can be a 48-72 business hour turn around on refill requests.

## 2024-02-07 ENCOUNTER — CARE COORDINATION (OUTPATIENT)
Dept: CARE COORDINATION | Age: 74
End: 2024-02-07

## 2024-02-07 NOTE — CARE COORDINATION
Remote Alert Monitoring Note  Rpm alert to be reviewed by the provider   red alert   weight (increase of 3 lbs x 1 day)   Additional needs to be addressed by N/A: No      Date/Time:  2024 10:20 AM  Patient Current Location: [facility] contacted patient by telephone. Verified patients name and  as identifiers.  Background: Pt enrolled in RPM r/t COPD, HTN, DM.  Refer to 911 immediately if:  Patient unresponsive or unable to provide history  Change in cognition or sudden confusion  Patient unable to respond in complete sentences  Intense chest pain/tightness  Any concern for any clinical emergency  Red Alert: Provider response time of 1 hr required for any red alert requiring intervention  Yellow Alert: Provider response time of 3hr required for any escalated yellow alert  Weight Scale Triage  Was your weight obtained upon rising/waking today? yes   Was your weight obtained after voiding and/or use of the bathroom today? yes   Did you weigh yourself in the same amount of clothing today, compared to how you typically do? yes   Was the scale bumped or moved prior to today's weight? yes   Is your scale on a flat/hard surface? yes   Did you obtain your weight with shoes on? no   If yes, is this something you normally do during your daily weights? NA   Were you standing up straight on the scale today? yes   Were you leaning on anything while obtaining your weight today? no   Clinical Interventions: Reviewed and followed up on alerts and treatments-Spoke with pt who is in no apparent distress.  Denies any SOB/dyspnea, new/increasing edema.  Pt states when she went to use scale today, she noticed that it had been moved.  She is not sure of how it got moved, but tried to put it back where she normally keeps it.  Pt taking Lasix 40 mg daily and verbalizes good results.    Plan/Follow Up: Will continue to review, monitor and address alerts with follow up based on severity of symptoms and risk factors.

## 2024-02-12 ENCOUNTER — CARE COORDINATION (OUTPATIENT)
Dept: CASE MANAGEMENT | Age: 74
End: 2024-02-12

## 2024-02-12 NOTE — CARE COORDINATION
Remote Alert Monitoring Note  Rpm alert to be reviewed by the provider   red alert   weight (increase of 5 pounds in 7 days)   Additional needs to be addressed by N/A: No                    Date/Time:  2024 10:39 AM  Patient Current Location: Home: [address]  [facility] [address]  LPN contacted patient by telephone. Verified patients name and  as identifiers.  Background: enrolled in RPM for COPD HTN AND DM  Refer to 911 immediately if:  Patient unresponsive or unable to provide history  Change in cognition or sudden confusion  Patient unable to respond in complete sentences  Intense chest pain/tightness  Any concern for any clinical emergency  Red Alert: Provider response time of 1 hr required for any red alert requiring intervention  Yellow Alert: Provider response time of 3hr required for any escalated yellow alert    Weight Scale Triage  Was your weight obtained upon rising/waking today? yes   Was your weight obtained after voiding and/or use of the bathroom today? yes   Did you weigh yourself in the same amount of clothing today, compared to how you typically do? yes   Was the scale bumped or moved prior to today's weight? Yes  moved scale yesterday.    Is your scale on a flat/hard surface? yes   Did you obtain your weight with shoes on? no   If yes, is this something you normally do during your daily weights? yes   Were you standing up straight on the scale today? yes   Were you leaning on anything while obtaining your weight today? Yes  has a walker      Clinical Interventions: Reviewed and followed up on alerts and treatments-spoke to Elvia regarding RPM red alert for weight increase. Pt does not have CHF..Denies chest pain, dyspnea or increased edema in BLE.  She does report \" someone moved it yesterday\"  Educated on keeping scale in the same place daily-and do not move. Pt states she   \"bobbles\" when she gets on the scale. She holds on to walker briefly before letting to weight.  Pt

## 2024-02-16 VITALS
SYSTOLIC BLOOD PRESSURE: 115 MMHG | HEART RATE: 69 BPM | DIASTOLIC BLOOD PRESSURE: 62 MMHG | OXYGEN SATURATION: 98 % | BODY MASS INDEX: 39.27 KG/M2 | WEIGHT: 214.7 LBS

## 2024-02-23 RX ORDER — FUROSEMIDE 20 MG/1
40 TABLET ORAL DAILY
Qty: 180 TABLET | Refills: 3 | Status: SHIPPED | OUTPATIENT
Start: 2024-02-23

## 2024-02-23 NOTE — TELEPHONE ENCOUNTER
PCP: Robby Antoine MD    Last appt: 12/14/2023  Future Appointments   Date Time Provider Department Center   3/14/2024 11:30 AM Robby Antoine MD [facility]   3/22/2024  1:00 PM [facility] 2 [facility]  AMB   4/26/2024  2:30 PM Pepito Ambriz MD [facility] [address] [facility]   4/30/2024 10:40 AM Cornell Veliz MD [facility]   6/14/2024  2:20 PM Cornell Veliz MD [facility]   1/30/2025  9:30 AM Grace Duque, APRN - NP [facility]       Requested Prescriptions     Pending Prescriptions Disp Refills    furosemide (LASIX) 20 MG tablet 180 tablet 3     Sig: Take 2 tablets by mouth daily

## 2024-02-29 ENCOUNTER — CARE COORDINATION (OUTPATIENT)
Dept: CARE COORDINATION | Age: 74
End: 2024-02-29

## 2024-02-29 NOTE — CARE COORDINATION
Remote Alert Monitoring Note  Rpm alert to be reviewed by the provider   red alert   weight (increase of 3 lbs x 1 days)   Additional needs to be addressed by N/A: No      Date/Time:  2024 10:31 AM  [facility] Verified patients name and  as identifiers.  Background: Pt enrolled in RPM r/t COPD, HTN, DM.  Refer to 911 immediately if:  Patient unresponsive or unable to provide history  Change in cognition or sudden confusion  Patient unable to respond in complete sentences  Intense chest pain/tightness  Any concern for any clinical emergency  Red Alert: Provider response time of 1 hr required for any red alert requiring intervention  Yellow Alert: Provider response time of 3hr required for any escalated yellow alert  Weight Scale Triage  Was your weight obtained upon rising/waking today? yes   Was your weight obtained after voiding and/or use of the bathroom today? yes   Did you weigh yourself in the same amount of clothing today, compared to how you typically do? yes   Was the scale bumped or moved prior to today's weight? Pt thinks scale moved   Is your scale on a flat/hard surface? yes   Did you obtain your weight with shoes on? no   If yes, is this something you normally do during your daily weights? NA   Were you standing up straight on the scale today? yes   Were you leaning on anything while obtaining your weight today? no   Clinical Interventions: Reviewed and followed up on alerts and treatments-Spoke with pt who is in no apparent distress.  Denies any new/increasing edema at this time.  States she does have swelling to Lt foot daily which is relived with elevation.  She does wear compression socks, but states she does not have on at this time.  Pt verbalizes compliance with low sodium diet and does not add salt to food.  If weight remains elevated tomorrow, will route to ACM and Provider for review.  Pt education provided that if reading

## 2024-03-01 ENCOUNTER — CARE COORDINATION (OUTPATIENT)
Dept: CASE MANAGEMENT | Age: 74
End: 2024-03-01

## 2024-03-01 NOTE — CARE COORDINATION
Remote Alert Monitoring Note  Rpm alert to be reviewed by the provider   red alert   glucose reading (67)   Additional needs to be addressed by N/A: No                    Date/Time:  3/1/2024 10:27 AM  Patient Current Location: Home: [address] [facility] [address]  LPN contacted patient by telephone. Verified patients name and  as identifiers.  Background: enrolled in RPM for COPD HTN AND DM  Refer to 911 immediately if:  Patient unresponsive or unable to provide history  Change in cognition or sudden confusion  Patient unable to respond in complete sentences  Intense chest pain/tightness  Any concern for any clinical emergency  Red Alert: Provider response time of 1 hr required for any red alert requiring intervention  Yellow Alert: Provider response time of 3hr required for any escalated yellow alert    Hypoglycemia Triage  Are you having any vision changes? no   Are you having any increased thirst? no   Do you have increased urination? no   Are you having increased fatigue? no      Clinical Interventions: Reviewed and followed up on alerts and treatments-Spoke to Elvia tariq [facility] red alert for low glucose reading of 67. Pt states \" I feel good, I was surprised at that reading\". Pt states she has eaten breakfast.  Pt reports she is not wearing her Freedom monitor at this time. Requested pt recheck her glucose reading.   New reading is 190.  All metrics WNL  Plan/Follow Up: Will continue to review, monitor and address alerts with follow up based on severity of symptoms and risk factors.

## 2024-03-13 ENCOUNTER — HOSPITAL ENCOUNTER (OUTPATIENT)
Facility: HOSPITAL | Age: 74
Discharge: HOME | End: 2024-03-13
Attending: FAMILY MEDICINE
Payer: MEDICARE

## 2024-03-13 VITALS — SYSTOLIC BLOOD PRESSURE: 146 MMHG | DIASTOLIC BLOOD PRESSURE: 67 MMHG | HEART RATE: 70 BPM | TEMPERATURE: 97.6 F

## 2024-03-13 PROCEDURE — 99203 OFFICE O/P NEW LOW 30 MIN: CPT

## 2024-03-13 NOTE — PROGRESS NOTES
HISTORY OF PRESENT ILLNESS   Elvia Clifford   is a 73 y.o.  female.  FU HTN hld asthma hx CVA-DM-2 osteopenia , hx PE morbid obesity, BRENT, obesity hypoventilation ckd 3 Dr ZAYDA Barboza depression  chronic anemia due to ckd-here with her male friend   Nephro Dr ZAYDA Barboza   PULM Dr Gómez -appt soon  Referred to card Dr DOROTHY Barboza for outpt fu diastolic chf/flash pulm edema and elevated troponin  On lasix 40 mg qd and jardiance s-pt did not start jardiance yet-mail order  Outpt echo ordered  Still some leg edema -wears support hose  Asthma on 02 3lpm continuous and advair and nebs=fu     Her right leg wound has healed per wound care MD note     Last A1c 7.5   12/2/23  Sees Dr Ambriz next month fu DM02 in insulin and jardiance    RPM data reviewed today  most recent weight 216 lbs stable, bp 107/58 glucose 90's 190 but most around 100-130    Saw Hematologist recently for PAVEL and scheduled for 2 iron infusions per pt    Feels weak and wants to get  PT-uses walker at home  Last OV  Here for 1 month fu asthma exacerbation, low bp and right leg blister and LOUIE-admitted for acute on chronic hypoxic respiratory failure due to diastolic chf/flash pulm edema and asthma exacerbation-treated with 02, lasix iv and steroids and bipap weaned to 3lpm 02  On 03 3lpm at home--bipap arranged  Has cpap at home not working and bipap was ordered by Dr Carr [facility] PTA-     Had fu PULM Dr Gómez 12/13/2023-advised to continue 02 3lpm and fu next month  Had elevated troponin in hospital. Recent echo ef 75-80. Seen by Dr Vazquez-outpt cardiac [facility]--  CARD Dr Veliz--neg NST 2020     OT seeing pt this week   PT signed off-able to walk   nurse signed off     Released from  wound care for right leg blister-healed  Wearing support hose to control edema and remains on lasix 20 mg 2 qd     Fsbs around 150 or less-A1c 7.5 in hospital    Will get iron inf at [facility] soon foir anemia  Patient Active Problem List    Diagnosis Date Noted    Diastolic

## 2024-03-13 NOTE — PATIENT INSTRUCTIONS
Discharge Instructions for  [facility]  [address] [address]  Phone: [phone] Fax: [phone]    NAME:  Elvia Clifford  YOB: 1950  MEDICAL RECORD NUMBER:  [id number]  DATE:  March 13, 2024    WOUND CARE ORDERS:  Continue to wear your compression stockings daily.   Moisturize daily with a Vaseline or A&D ointment.     In clinic today, place single layer tubigrip size E for mild compression.     Activity:  [x] Elevate leg(s) above the level of the heart when sitting.  [x] Avoid prolonged standing in one place.   [] Do no get dressing/wrap wet.       Dietary:  [] Diet as tolerated      [x] Diabetic Diet            [] Increase Protein: examples (Meat, cheese, eggs, greek yogurt, fish, nuts)          [] Tyler Therapeutic Nutrition Powder     Return Appointment:  [x] Return Appointment: With Dr. Leni Rodríguez as needed if a wound is open.  [] Nurse Visit :   [] Ordered tests:      Electronically signed by REE NUNEZ RN on 3/13/2024 at 1:44 PM     Wound Care Center Information: Should you experience any significant changes in your wound(s) or have questions about your wound care, please contact the [facility] at MONDAY - FRIDAY 8:00 am - 4:30.  If you need help with your wound outside these hours and cannot wait until we are again available, contact your PCP or go to the hospital emergency room.     PLEASE NOTE: IF YOU ARE UNABLE TO OBTAIN WOUND SUPPLIES, CONTINUE TO USE THE SUPPLIES YOU HAVE AVAILABLE UNTIL YOU ARE ABLE TO REACH US. IT IS MOST IMPORTANT TO KEEP THE WOUND COVERED AT ALL TIMES.     Physician Signature:_______________________    Date: ___________ Time:  ____________

## 2024-03-13 NOTE — PROGRESS NOTES
Wound Center  Progress Note       Chief Complaint:  Elvia Clifford is a 73 y.o.  female  with leg swelling     Referred by:  Dr CADEN Antoine      Assessment/Plan     73 y.o. female with CHF    -no open wounds    Just chronic leg swelling  Sign of previously healed wound on right anterior leg    Chronic changes in right leg    Swelling management discussed including   Elevation  Compliance with diuretics  And mild compression  Moisturize with vaseline or A&D    Tubi x 1 to both sides applied today    Other questions addressed  F/u prn        Subjective:       HPI:     Had wound on right leg healed in December  Self-referred because was concerned about changes in color on right leg      History/Chart/Medications reviewed    Current wound care:See flowsheet    Elevating legs: sometimes  Compression compliance:one tubi on right    Wound associated pain: See flowsheet  Diabetic: yes  Smoker: no  ROS: no N/V/D, no T/chills;         Objective:     Physical Exam:   See flowsheet / nursing notes for vitals  Vitals:    03/13/24 1315   BP: (!) 146/67   Pulse: 70   Temp: 97.6 °F (36.4 °C)     General: NAD. Hygiene good  Psych: cooperative. No anxiety or depression. Normal mood and affect.  Neuro: alert and oriented to person/place/situation. Otherwise nonfocal.  Derm: Normal  turgor for age, dry skin  HEENT: Normocephalic, atraumatic. EOMI. Conjunctiva clear. No scleral icterus.  Neck: Normal range of motion.   Chest: Respirations nonlabored  Lower extremities: color normal; temperature normal. Hair growth is not present.   RIGHT lower extremity: moderate  edema, foot warm, chronic changes, hyperpigmentatin  DP pulse : 1+  PT pulse: 1+  LEFT lower extremity: moderate  pitting edema, foot warm,   DP pulse : 1+  PT pulse: 1+   Nails dystrophic    Ulcer Description:   See Flowsheet           Data Review:              Procedure:           -------          -------    Past Medical History:   Diagnosis Date    Asthma

## 2024-03-14 ENCOUNTER — OFFICE VISIT (OUTPATIENT)
Age: 74
End: 2024-03-14
Payer: MEDICARE

## 2024-03-14 ENCOUNTER — CARE COORDINATION (OUTPATIENT)
Dept: CARE COORDINATION | Age: 74
End: 2024-03-14

## 2024-03-14 VITALS
DIASTOLIC BLOOD PRESSURE: 58 MMHG | HEIGHT: 62 IN | RESPIRATION RATE: 18 BRPM | BODY MASS INDEX: 39.56 KG/M2 | WEIGHT: 215 LBS | SYSTOLIC BLOOD PRESSURE: 120 MMHG | OXYGEN SATURATION: 99 % | HEART RATE: 64 BPM | TEMPERATURE: 97 F

## 2024-03-14 DIAGNOSIS — D69.6 THROMBOCYTOPENIA (HCC): ICD-10-CM

## 2024-03-14 DIAGNOSIS — J96.01 ACUTE RESPIRATORY FAILURE WITH HYPOXIA (HCC): Primary | ICD-10-CM

## 2024-03-14 DIAGNOSIS — R53.1 WEAKNESS: ICD-10-CM

## 2024-03-14 DIAGNOSIS — F32.1 MAJOR DEPRESSIVE DISORDER, SINGLE EPISODE, MODERATE (HCC): ICD-10-CM

## 2024-03-14 DIAGNOSIS — Z79.4 TYPE 2 DIABETES MELLITUS WITHOUT COMPLICATION, WITH LONG-TERM CURRENT USE OF INSULIN (HCC): ICD-10-CM

## 2024-03-14 DIAGNOSIS — E11.9 TYPE 2 DIABETES MELLITUS WITHOUT COMPLICATION, WITH LONG-TERM CURRENT USE OF INSULIN (HCC): ICD-10-CM

## 2024-03-14 DIAGNOSIS — N18.30 STAGE 3 CHRONIC KIDNEY DISEASE, UNSPECIFIED WHETHER STAGE 3A OR 3B CKD (HCC): ICD-10-CM

## 2024-03-14 DIAGNOSIS — D50.9 IRON DEFICIENCY ANEMIA, UNSPECIFIED IRON DEFICIENCY ANEMIA TYPE: ICD-10-CM

## 2024-03-14 PROCEDURE — 3078F DIAST BP <80 MM HG: CPT | Performed by: INTERNAL MEDICINE

## 2024-03-14 PROCEDURE — 3074F SYST BP LT 130 MM HG: CPT | Performed by: INTERNAL MEDICINE

## 2024-03-14 PROCEDURE — 2022F DILAT RTA XM EVC RTNOPTHY: CPT | Performed by: INTERNAL MEDICINE

## 2024-03-14 PROCEDURE — 3046F HEMOGLOBIN A1C LEVEL >9.0%: CPT | Performed by: INTERNAL MEDICINE

## 2024-03-14 PROCEDURE — 1123F ACP DISCUSS/DSCN MKR DOCD: CPT | Performed by: INTERNAL MEDICINE

## 2024-03-14 PROCEDURE — 99214 OFFICE O/P EST MOD 30 MIN: CPT | Performed by: INTERNAL MEDICINE

## 2024-03-14 PROCEDURE — 1090F PRES/ABSN URINE INCON ASSESS: CPT | Performed by: INTERNAL MEDICINE

## 2024-03-14 PROCEDURE — G8399 PT W/DXA RESULTS DOCUMENT: HCPCS | Performed by: INTERNAL MEDICINE

## 2024-03-14 PROCEDURE — G8484 FLU IMMUNIZE NO ADMIN: HCPCS | Performed by: INTERNAL MEDICINE

## 2024-03-14 PROCEDURE — G8417 CALC BMI ABV UP PARAM F/U: HCPCS | Performed by: INTERNAL MEDICINE

## 2024-03-14 PROCEDURE — 3017F COLORECTAL CA SCREEN DOC REV: CPT | Performed by: INTERNAL MEDICINE

## 2024-03-14 PROCEDURE — G8427 DOCREV CUR MEDS BY ELIG CLIN: HCPCS | Performed by: INTERNAL MEDICINE

## 2024-03-14 PROCEDURE — 1036F TOBACCO NON-USER: CPT | Performed by: INTERNAL MEDICINE

## 2024-03-14 NOTE — CARE COORDINATION
03/14/24 10:34 AM. Patient is currently enrolled in Remote Patient Monitoring for COPD, Diabetes, and HTN.  Pt has a scheduled office visit today at 11:30 AM.  RPM metrics added for review.

## 2024-03-14 NOTE — PROGRESS NOTES
1. \"Have you been to the ER, urgent care clinic since your last visit?  Hospitalized since your last visit?\" No    2. \"Have you seen or consulted any other health care providers outside of the [facility] since your last visit?\" No

## 2024-03-15 ENCOUNTER — HOME HEALTH ADMISSION (OUTPATIENT)
Dept: HOME HEALTH SERVICES | Facility: HOME HEALTH | Age: 74
End: 2024-03-15
Payer: MEDICARE

## 2024-03-20 ENCOUNTER — CARE COORDINATION (OUTPATIENT)
Dept: CASE MANAGEMENT | Age: 74
End: 2024-03-20

## 2024-03-20 NOTE — CARE COORDINATION
Remote Alert Monitoring Note  Rpm alert to be reviewed by the provider   red alert   weight (increase of 5 pounds in 1 day)   Additional needs to be addressed by N/A: No                  Date/Time:  3/20/2024 9:05 AM  Patient Current Location: Home: [address]  [facility] [address]  LPN contacted patient by telephone. Verified patients name and  as identifiers.  Background: enrolled in RPM for COPD HTN AND DM  Refer to 911 immediately if:  Patient unresponsive or unable to provide history  Change in cognition or sudden confusion  Patient unable to respond in complete sentences  Intense chest pain/tightness  Any concern for any clinical emergency  Red Alert: Provider response time of 1 hr required for any red alert requiring intervention  Yellow Alert: Provider response time of 3hr required for any escalated yellow alert    Weight Scale Triage  Was your weight obtained upon rising/waking today? YES   Was your weight obtained after voiding and/or use of the bathroom today? yes   Did you weigh yourself in the same amount of clothing today, compared to how you typically do? yes   Was the scale bumped or moved prior to today's weight? yes   Is your scale on a flat/hard surface? yes   Did you obtain your weight with shoes on? no   If yes, is this something you normally do during your daily weights? yes   Were you standing up straight on the scale today? yes   Were you leaning on anything while obtaining your weight today? no      Clinical Interventions: Reviewed and followed up on alerts and treatments-Spoke to Elvia regarding RPM red alert for weight increase of 5pounds in 1 day. Pt states she has no chest pain, dyspnea or edema. Pt has CHF. She is taking all medications, including diuretics as directed. Pt state \" I think it's the scale\". Weight recorded 3 times today. Pt reports all weights were different and she had to move the scale away from the wall. The tablet is downstairs and she weighs herself

## 2024-03-21 ENCOUNTER — CARE COORDINATION (OUTPATIENT)
Dept: CASE MANAGEMENT | Age: 74
End: 2024-03-21

## 2024-03-21 NOTE — CARE COORDINATION
Remote Alert Monitoring Note  Rpm alert to be reviewed by the provider   red alert   weight (INCREASE of 5 pounds in 7 days)   Additional needs to be addressed by N/A: No                  Date/Time:  3/21/2024 8:16 AM  Patient Current Location: Home: [address] [facility] [address]  LPN contacted patient by telephone. Verified patients name and  as identifiers.  Background: enrolled in RPM FOR COPD HTN AND DM   Refer to 911 immediately if:  Patient unresponsive or unable to provide history  Change in cognition or sudden confusion  Patient unable to respond in complete sentences  Intense chest pain/tightness  Any concern for any clinical emergency  Red Alert: Provider response time of 1 hr required for any red alert requiring intervention  Yellow Alert: Provider response time of 3hr required for any escalated yellow alert    Weight Scale Triage  Was your weight obtained upon rising/waking today? YES   Was your weight obtained after voiding and/or use of the bathroom today? yes   Did you weigh yourself in the same amount of clothing today, compared to how you typically do? yes   Was the scale bumped or moved prior to today's weight? yes   Is your scale on a flat/hard surface? yes   Did you obtain your weight with shoes on? no   If yes, is this something you normally do during your daily weights? yes   Were you standing up straight on the scale today? yes   Were you leaning on anything while obtaining your weight today? no      Clinical Interventions: Reviewed and followed up on alerts and treatments-spoke to Elvia regarding RPM red alert for weight increase of 5 pounds in 7 days. Pt has CHF dx  . No chest pain, dyspnea, increased edema or cough,  Pt states [facility] [employer] assisted with calibrating her scale yesterday. Scale was moved to downstairs area of her home. Seen by Pcp on 3/14/24. Weight in office was 215#. Weight today 216.9#. Pt is taking her Lasix 20 mg - 2 tabs daily as ordered. She has an appt

## 2024-03-22 ENCOUNTER — APPOINTMENT (OUTPATIENT)
Facility: HOSPITAL | Age: 74
End: 2024-03-22
Payer: MEDICARE

## 2024-03-22 ENCOUNTER — ANCILLARY PROCEDURE (OUTPATIENT)
Age: 74
End: 2024-03-22
Payer: MEDICARE

## 2024-03-22 ENCOUNTER — HOSPITAL ENCOUNTER (OUTPATIENT)
Facility: HOSPITAL | Age: 74
End: 2024-03-22
Attending: INTERNAL MEDICINE
Payer: MEDICARE

## 2024-03-22 ENCOUNTER — TELEPHONE (OUTPATIENT)
Age: 74
End: 2024-03-22

## 2024-03-22 VITALS — WEIGHT: 215 LBS | HEIGHT: 62 IN | BODY MASS INDEX: 39.56 KG/M2

## 2024-03-22 DIAGNOSIS — R06.02 SHORTNESS OF BREATH: ICD-10-CM

## 2024-03-22 DIAGNOSIS — J96.01 ACUTE HYPOXIC RESPIRATORY FAILURE (HCC): ICD-10-CM

## 2024-03-22 DIAGNOSIS — D47.2 MONOCLONAL PARAPROTEINEMIA: ICD-10-CM

## 2024-03-22 PROCEDURE — 93306 TTE W/DOPPLER COMPLETE: CPT | Performed by: INTERNAL MEDICINE

## 2024-03-22 PROCEDURE — 77075 RADEX OSSEOUS SURVEY COMPL: CPT

## 2024-03-22 RX ORDER — TIZANIDINE 2 MG/1
2 TABLET ORAL EVERY 8 HOURS PRN
Qty: 30 TABLET | Refills: 0 | Status: SHIPPED | OUTPATIENT
Start: 2024-03-22

## 2024-03-22 RX ORDER — INSULIN GLARGINE 300 U/ML
30 INJECTION, SOLUTION SUBCUTANEOUS DAILY
Qty: 9 ML | Refills: 3 | Status: SHIPPED | OUTPATIENT
Start: 2024-03-22

## 2024-03-22 NOTE — TELEPHONE ENCOUNTER
Requested Prescriptions     Pending Prescriptions Disp Refills    insulin glargine, 1 unit dial, (TOUJEO SOLOSTAR) 300 UNIT/ML concentrated injection pen 9 mL 3     Sig: Inject 30 Units into the skin daily

## 2024-03-22 NOTE — TELEPHONE ENCOUNTER
----- Message from Michele Gómez sent at 3/22/2024  2:14 PM EDT -----  Subject: Message to Provider    QUESTIONS  Information for Provider? pt. Elvia Clifford would like a muscle   relaxer sent to the pharmacy due to back spasms, please follow up w/pt. to   further assist  ---------------------------------------------------------------------------  --------------  CALL BACK INFO  [phone]; OK to leave message on voicemail,OK to respond with electronic   message via [employer] portal (only for patients who have registered [employer]   account)  ---------------------------------------------------------------------------  --------------  SCRIPT ANSWERS  Relationship to Patient? Self

## 2024-03-22 NOTE — TELEPHONE ENCOUNTER
Pt states she is having back spasms and would like a muscle relaxer sent to pharm..    Please send to [facility][phone] [place]      Please call pt to let her know what you can do OR you can My Chart pt

## 2024-03-24 LAB
ECHO AO ASC DIAM: 3.6 CM
ECHO AO ASCENDING AORTA INDEX: 1.83 CM/M2
ECHO AO ROOT DIAM: 3 CM
ECHO AO ROOT INDEX: 1.52 CM/M2
ECHO AV AREA PEAK VELOCITY: 1.6 CM2
ECHO AV AREA VTI: 1.9 CM2
ECHO AV AREA/BSA PEAK VELOCITY: 0.8 CM2/M2
ECHO AV AREA/BSA VTI: 1 CM2/M2
ECHO AV MEAN GRADIENT: 9 MMHG
ECHO AV MEAN VELOCITY: 1.4 M/S
ECHO AV PEAK GRADIENT: 15 MMHG
ECHO AV PEAK VELOCITY: 1.9 M/S
ECHO AV VELOCITY RATIO: 0.58
ECHO AV VTI: 41.8 CM
ECHO BSA: 2.07 M2
ECHO EST RA PRESSURE: 8 MMHG
ECHO LA DIAMETER INDEX: 2.59 CM/M2
ECHO LA DIAMETER: 5.1 CM
ECHO LA TO AORTIC ROOT RATIO: 1.7
ECHO LV FRACTIONAL SHORTENING: 30 % (ref 28–44)
ECHO LV INTERNAL DIMENSION DIASTOLE INDEX: 2.34 CM/M2
ECHO LV INTERNAL DIMENSION DIASTOLIC: 4.6 CM (ref 3.9–5.3)
ECHO LV INTERNAL DIMENSION SYSTOLIC INDEX: 1.62 CM/M2
ECHO LV INTERNAL DIMENSION SYSTOLIC: 3.2 CM
ECHO LV IVSD: 1.4 CM (ref 0.6–0.9)
ECHO LV MASS 2D: 284.8 G (ref 67–162)
ECHO LV MASS INDEX 2D: 144.6 G/M2 (ref 43–95)
ECHO LV POSTERIOR WALL DIASTOLIC: 1.6 CM (ref 0.6–0.9)
ECHO LV RELATIVE WALL THICKNESS RATIO: 0.7
ECHO LVOT AREA: 2.8 CM2
ECHO LVOT AV VTI INDEX: 0.65
ECHO LVOT DIAM: 1.9 CM
ECHO LVOT MEAN GRADIENT: 3 MMHG
ECHO LVOT PEAK GRADIENT: 4 MMHG
ECHO LVOT PEAK VELOCITY: 1.1 M/S
ECHO LVOT STROKE VOLUME INDEX: 39 ML/M2
ECHO LVOT SV: 76.8 ML
ECHO LVOT VTI: 27.1 CM
ECHO MV A VELOCITY: 1.25 M/S
ECHO MV AREA PHT: 1.5 CM2
ECHO MV AREA VTI: 1.2 CM2
ECHO MV E DECELERATION TIME (DT): 466.4 MS
ECHO MV E VELOCITY: 1.48 M/S
ECHO MV E/A RATIO: 1.18
ECHO MV LVOT VTI INDEX: 2.27
ECHO MV MAX VELOCITY: 1.6 M/S
ECHO MV MEAN GRADIENT: 4 MMHG
ECHO MV MEAN VELOCITY: 0.9 M/S
ECHO MV PEAK GRADIENT: 10 MMHG
ECHO MV PRESSURE HALF TIME (PHT): 146.3 MS
ECHO MV VTI: 61.5 CM
ECHO PV MAX VELOCITY: 1.2 M/S
ECHO PV PEAK GRADIENT: 6 MMHG
ECHO RIGHT VENTRICULAR SYSTOLIC PRESSURE (RVSP): 46 MMHG
ECHO TV REGURGITANT MAX VELOCITY: 3.08 M/S
ECHO TV REGURGITANT PEAK GRADIENT: 38 MMHG

## 2024-03-24 PROCEDURE — 93306 TTE W/DOPPLER COMPLETE: CPT | Performed by: INTERNAL MEDICINE

## 2024-03-25 NOTE — RESULT ENCOUNTER NOTE
Echo shows normal pumping function of heart mildly dilated upper chamber nothing to worry.  Mild narrowing of mitral valve nothing to worry follow-up with Dr Veliz

## 2024-03-27 ENCOUNTER — HOME CARE VISIT (OUTPATIENT)
Facility: HOME HEALTH | Age: 74
End: 2024-03-27

## 2024-03-27 VITALS
SYSTOLIC BLOOD PRESSURE: 124 MMHG | TEMPERATURE: 97.5 F | HEART RATE: 62 BPM | RESPIRATION RATE: 16 BRPM | OXYGEN SATURATION: 98 % | DIASTOLIC BLOOD PRESSURE: 54 MMHG

## 2024-03-27 PROCEDURE — G0151 HHCP-SERV OF PT,EA 15 MIN: HCPCS

## 2024-03-27 PROCEDURE — 0221000100 HH NO PAY CLAIM PROCEDURE

## 2024-03-27 ASSESSMENT — ENCOUNTER SYMPTOMS
CONSTIPATION: 1
DYSPNEA ACTIVITY LEVEL: AFTER AMBULATING 10 - 20 FT

## 2024-03-27 NOTE — HOME HEALTH
Reason for referral, PMH SUMMARY of clinical condition: Ms. Clifford is a 74 yo female who is referred for HH PT at her request due to \"feeling weaker\". She was seen by this agency for home PT, OT, SN and completed 7 HH PT visits from Oct-Nov of 2023. Since then she has not exercised and cannot find her prior HEP. It is her desire to return to water aerobics at the local [facility] but she uses a portable concentrator in the community. PT explained the issue is not the tubing but the pool may not allow her to have the battery operated concentrator on the pool deck and she should check into that. PMHX includes HF, DM, morbid obesity, acute resp fail with hypoxia, CVA in 2004, PE, osteopenia, asthma, BRENT, CKD3, anemia. She presents today at a supervision/setup level for mobility and uses a rollator at all times and a stair lift to access bed/bath. She was instructed to never ride on rollator seat due to fall risk but reports that she does even though she's not supposed to. Skilled PT is recommended 1w1 2w3 to re-establish a HEP with emphasis on aerobic activity including home walking program and use of UE restorator bike so she may transition to exercise at the local [facility] even if that just means walking on their track. PT emphasized the point that exercise is to be performed multiple times a day, most days if not all days of the week, not just during PT sessions.   Functional Mobility:  Bed Mobility (rolling/scooting): Independent  Transfers (supine to chair and return to supine): Independent.  Patient uses device: yes  Gait:  Ambulates with supervision using a four-wheeled walker  Safety concerns with mobility include: O2 tubing  DME: walker, shower chair, rollator and a single point cane      Subjective (statement from pt/cg that is relative to why you are there): patient reports not exericsing since last PT episode    Caregiver: friend.  Caregiver assists with: anything needed Caregiver unable to

## 2024-04-01 ENCOUNTER — HOME CARE VISIT (OUTPATIENT)
Facility: HOME HEALTH | Age: 74
End: 2024-04-01
Payer: MEDICARE

## 2024-04-05 ENCOUNTER — HOME CARE VISIT (OUTPATIENT)
Facility: HOME HEALTH | Age: 74
End: 2024-04-05
Payer: MEDICARE

## 2024-04-05 VITALS
TEMPERATURE: 98 F | HEART RATE: 68 BPM | DIASTOLIC BLOOD PRESSURE: 65 MMHG | OXYGEN SATURATION: 98 % | RESPIRATION RATE: 15 BRPM | SYSTOLIC BLOOD PRESSURE: 125 MMHG

## 2024-04-05 PROCEDURE — G0151 HHCP-SERV OF PT,EA 15 MIN: HCPCS

## 2024-04-05 NOTE — HOME HEALTH
Subjective: I am feeling good and can walk.   I Falls since last visit (if yes include. bsrifallreport): None reported by the patient/caregiver.  Caregiver involvement: Yes- patients partner will be available as needed, was present today.  Home health supplies by type and quantity ordered/delivered this visit include none today.  Does the patient have any new or changed medications? No   If yes, were medications reconciled? no.  Was the certifying physician notified of changes in medications? NA.  Clinical assessment (what this visit means for the patient overall and need for ongoing skilled care): PT interventions including BLE resisted exercises, Gait training and safety education-Patient was needed constant cues while doing the resisted exercises including pacing the reps/what to do next. Patient will continue to need more PT to improve her functions so that the patient can transfer with less possible assistance from the caregiver.  Progress or lack of progress toward specific goals: Patient demonstrated no progress today and need more sessions at this time to meet the goals.  Inter disciplinary communication: none.  Discharge planning: DC when met all the goals/DC when home PT is no longer appropriate/ DC when patient is no longer home bound.   Specific plan for next visit: BLE resisted exercises, gait training and transfer training    - so that the patient can ambulate/ transfer with less possible assistance from the caregiver.

## 2024-04-08 ENCOUNTER — HOME CARE VISIT (OUTPATIENT)
Facility: HOME HEALTH | Age: 74
End: 2024-04-08
Payer: MEDICARE

## 2024-04-08 VITALS
HEART RATE: 60 BPM | RESPIRATION RATE: 15 BRPM | SYSTOLIC BLOOD PRESSURE: 122 MMHG | OXYGEN SATURATION: 99 % | TEMPERATURE: 97.2 F | DIASTOLIC BLOOD PRESSURE: 70 MMHG

## 2024-04-08 PROCEDURE — G0151 HHCP-SERV OF PT,EA 15 MIN: HCPCS

## 2024-04-09 NOTE — HOME HEALTH
Subjective: I am feeling good and can walk.   I Falls since last visit (if yes include. bsrifallreport): None reported by the patient/caregiver.  Caregiver involvement: Yes- patients partner will be available as needed, was present today.  Home health supplies by type and quantity ordered/delivered this visit include none today.  Does the patient have any new or changed medications? No   If yes, were medications reconciled? no.  Was the certifying physician notified of changes in medications? NA.  Clinical assessment (what this visit means for the patient overall and need for ongoing skilled care): PT interventions continued with  BLE resisted exercises, Gait training and safety education-Patient was needed constant cues while doing the BLE resisted exercises like previous visit including pacing the reps/what to do next. Patient will continue to need more PT to improve her functions so that the patient can transfer with less possible assistance from the caregiver.  Progress or lack of progress toward specific goals: Patient demonstrated good progress today by ambulating longer distance, but was needed CGAx1 for safety  and need more sessions at this time to meet the goals.  Inter disciplinary communication: none.  Discharge planning: DC when met all the goals/DC when home PT is no longer appropriate/ DC when patient is no longer home bound.   Specific plan for next visit: BLE resisted exercises, gait training and transfer training    - so that the patient can ambulate/ transfer with less possible assistance from the caregiver.

## 2024-04-10 ENCOUNTER — HOME CARE VISIT (OUTPATIENT)
Facility: HOME HEALTH | Age: 74
End: 2024-04-10
Payer: MEDICARE

## 2024-04-10 VITALS
SYSTOLIC BLOOD PRESSURE: 120 MMHG | DIASTOLIC BLOOD PRESSURE: 67 MMHG | RESPIRATION RATE: 15 BRPM | HEART RATE: 67 BPM | OXYGEN SATURATION: 99 % | TEMPERATURE: 97.3 F

## 2024-04-10 PROCEDURE — G0151 HHCP-SERV OF PT,EA 15 MIN: HCPCS

## 2024-04-10 NOTE — HOME HEALTH
Subjective: I can do some standing exercises   I Falls since last visit (if yes include. bsrifallreport): None reported by the patient/caregiver.  Caregiver involvement: Yes- patients partner will be available as needed, was present today.  Home health supplies by type and quantity ordered/delivered this visit include none today.  Does the patient have any new or changed medications? No   If yes, were medications reconciled? no.  Was the certifying physician notified of changes in medications? NA.  Clinical assessment (what this visit means for the patient overall and need for ongoing skilled care): PT interventions including balance challenging exercises in standing, Gait training and safety education-Patient was needed constant cues while doing the balance challenging exercises including for properly shifting the weight with CGAx1 for safety. Patient will continue to need more PT to improve her functions so that the patient can transfer with less possible assistance from the caregiver.  Progress or lack of progress toward specific goals: Patient demonstrated good progress today by ambulating longer distance, but was needed CGAx1 for safety  like previous visit  and need more sessions at this time to meet the goals.  Inter disciplinary communication: none.  Discharge planning: DC when met all the goals/DC when home PT is no longer appropriate/ DC when patient is no longer home bound.   Specific plan for next visit: Continue with BLE resisted exercises (not done today per patient request), gait training and transfer training    - so that the patient can ambulate/ transfer with less possible assistance from the caregiver.

## 2024-04-12 ENCOUNTER — CARE COORDINATION (OUTPATIENT)
Dept: CASE MANAGEMENT | Age: 74
End: 2024-04-12

## 2024-04-12 NOTE — CARE COORDINATION
Remote Alert Monitoring Note  Rpm alert to be reviewed by the provider   red alert   Weight 3.5 lbs in 1 day    Additional needs to be addressed by N/A: No                    Date/Time:  2024 10:14 AM  Patient Current Location: Home: [address] [facility] [place] [id number]  LPN contacted patient by telephone. Verified patients name and  as identifiers.  Background: COPD, HTN, DM  Refer to 911 immediately if:  Patient unresponsive or unable to provide history  Change in cognition or sudden confusion  Patient unable to respond in complete sentences  Intense chest pain/tightness  Any concern for any clinical emergency  Red Alert: Provider response time of 1 hr required for any red alert requiring intervention  Yellow Alert: Provider response time of 3hr required for any escalated yellow alert    Weight Scale Triage  Was your weight obtained upon rising/waking today? yes   Was your weight obtained after voiding and/or use of the bathroom today? yes   Did you weigh yourself in the same amount of clothing today, compared to how you typically do? yes   Was the scale bumped or moved prior to today's weight? no   Is your scale on a flat/hard surface? yes   Did you obtain your weight with shoes on? no   If yes, is this something you normally do during your daily weights? no   Were you standing up straight on the scale today? yes   Were you leaning on anything while obtaining your weight today? no      Clinical Interventions: Reviewed and followed up on alerts and treatments-Spoke to patient she denies chest pain, SOB, dizziness and has no unusual swelling she states the scale has been acting crazy, this am her first weight was 213 but it did not register on tablet when she stepped back on it was 216. She knows this is wrong and is unsure if she was touching the wall, lpn changed weight in HRS and she will continue to watch weight over the weekend    Plan/Follow Up: Will continue to review, monitor and address

## 2024-04-15 ENCOUNTER — HOME CARE VISIT (OUTPATIENT)
Facility: HOME HEALTH | Age: 74
End: 2024-04-15
Payer: MEDICARE

## 2024-04-15 VITALS
RESPIRATION RATE: 15 BRPM | TEMPERATURE: 97 F | HEART RATE: 67 BPM | DIASTOLIC BLOOD PRESSURE: 70 MMHG | OXYGEN SATURATION: 100 % | SYSTOLIC BLOOD PRESSURE: 120 MMHG

## 2024-04-15 PROCEDURE — G0151 HHCP-SERV OF PT,EA 15 MIN: HCPCS

## 2024-04-15 NOTE — HOME HEALTH
Subjective: I want to do the steps next time   I Falls since last visit (if yes include. bsrifallreport): None reported by the patient/caregiver.  Caregiver involvement: Yes- patients partner will be available as needed, was present today.  Home health supplies by type and quantity ordered/delivered this visit include none today.  Does the patient have any new or changed medications? No   If yes, were medications reconciled? no.  Was the certifying physician notified of changes in medications? NA.  Clinical assessment (what this visit means for the patient overall and need for ongoing skilled care): PT interventions continued with  balance challenging exercises in standing, Gait training and safety education-Patient was needed constant cues while doing the balance challenging exercises like previous visit  including for properly shifting the weight with CGAx1 for safety. Patient will continue to need more PT to improve her functions so that the patient can transfer with less possible assistance from the caregiver.  Progress or lack of progress toward specific goals: Patient demonstrated no progress today compared to previous visits   and need more sessions at this time to meet the goals.  Inter disciplinary communication: none.  Discharge planning: DC when met all the goals/DC when home PT is no longer appropriate/ DC when patient is no longer home bound.   Specific plan for next visit: Gait training and transfer training    - so that the patient can ambulate/ transfer with less possible assistance from the caregiver.

## 2024-04-17 ENCOUNTER — HOME CARE VISIT (OUTPATIENT)
Dept: HOME HEALTH SERVICES | Facility: HOME HEALTH | Age: 74
End: 2024-04-17
Payer: MEDICARE

## 2024-04-17 ENCOUNTER — HOME CARE VISIT (OUTPATIENT)
Facility: HOME HEALTH | Age: 74
End: 2024-04-17
Payer: MEDICARE

## 2024-04-17 VITALS
DIASTOLIC BLOOD PRESSURE: 77 MMHG | SYSTOLIC BLOOD PRESSURE: 110 MMHG | HEART RATE: 66 BPM | TEMPERATURE: 98 F | RESPIRATION RATE: 15 BRPM | OXYGEN SATURATION: 99 %

## 2024-04-17 PROCEDURE — G0151 HHCP-SERV OF PT,EA 15 MIN: HCPCS

## 2024-04-17 RX ORDER — INSULIN LISPRO 100 [IU]/ML
20 INJECTION, SOLUTION INTRAVENOUS; SUBCUTANEOUS
Qty: 60 ML | Refills: 3 | Status: SHIPPED | OUTPATIENT
Start: 2024-04-17

## 2024-04-17 NOTE — HOME HEALTH
Subjective: I dont want to do the steps today.   I Falls since last visit (if yes include. bsrifallreport): None reported by the patient/caregiver.  Caregiver involvement: Yes- patients partner will be available as needed, was present today.  Home health supplies by type and quantity ordered/delivered this visit include none today.  Does the patient have any new or changed medications? No   If yes, were medications reconciled? no.  Was the certifying physician notified of changes in medications? NA.  Clinical assessment (what this visit means for the patient overall and need for ongoing skilled care): PT interventions  balance challenging exercises in standing, high risk med education including anticoagulant, hypoglycemic  and safety education-Patient was able to repeat back all the high risk med education with 100% accuracy. Patient will continue to need more PT to improve her functions so that the patient can transfer with less possible assistance from the caregiver.  Progress or lack of progress toward specific goals: Patient demonstrated no progress today compared to previous visits   and need more sessions at this time to meet the goals.  Inter disciplinary communication: none.  Discharge planning: DC when met all the goals/DC when home PT is no longer appropriate/ DC when patient is no longer home bound.   Specific plan for next visit: High risk med edcuation, Gait training and transfer training    - so that the patient can ambulate/ transfer with less possible assistance from the caregiver.

## 2024-04-24 ENCOUNTER — HOME CARE VISIT (OUTPATIENT)
Facility: HOME HEALTH | Age: 74
End: 2024-04-24
Payer: MEDICARE

## 2024-04-24 VITALS
SYSTOLIC BLOOD PRESSURE: 122 MMHG | OXYGEN SATURATION: 99 % | TEMPERATURE: 98 F | DIASTOLIC BLOOD PRESSURE: 80 MMHG | RESPIRATION RATE: 15 BRPM | HEART RATE: 66 BPM

## 2024-04-24 PROCEDURE — G0151 HHCP-SERV OF PT,EA 15 MIN: HCPCS

## 2024-04-24 NOTE — HOME HEALTH
Subjective: I am in no pain today   I Falls since last visit (if yes include. bsrifallreport): None reported by the patient/caregiver.  Caregiver involvement: Yes- patients partner will be available as needed, was present today.  Home health supplies by type and quantity ordered/delivered this visit include none today.  Does the patient have any new or changed medications? No   If yes, were medications reconciled? no.  Was the certifying physician notified of changes in medications? NA.  Clinical assessment (what this visit means for the patient overall and need for ongoing skilled care): PT interventions  including balance challenging exercises in standing, Oxygen safety education, HEP education  and safety education-Patient was able to repeat back all the oxygen safety education with 100% accuracy. Patient will continue to need more PT to improve her functions so that the patient can transfer with less possible assistance from the caregiver.  Progress or lack of progress toward specific goals: Patient demonstrated good progress today by following all the instructions after the initial edcuation, but was needed SBAx1 for safety and need more sessions at this time to meet the goals.  Inter disciplinary communication: none.  Discharge planning: DC when met all the goals/DC when home PT is no longer appropriate/ DC when patient is no longer home bound.   Specific plan for next visit: Gait training and transfer training    - so that the patient can ambulate/ transfer with less possible assistance from the caregiver.

## 2024-04-26 ENCOUNTER — OFFICE VISIT (OUTPATIENT)
Age: 74
End: 2024-04-26
Payer: MEDICARE

## 2024-04-26 ENCOUNTER — HOME CARE VISIT (OUTPATIENT)
Facility: HOME HEALTH | Age: 74
End: 2024-04-26
Payer: MEDICARE

## 2024-04-26 VITALS
WEIGHT: 210.9 LBS | SYSTOLIC BLOOD PRESSURE: 181 MMHG | DIASTOLIC BLOOD PRESSURE: 74 MMHG | HEIGHT: 62 IN | BODY MASS INDEX: 38.81 KG/M2 | HEART RATE: 75 BPM

## 2024-04-26 VITALS
SYSTOLIC BLOOD PRESSURE: 118 MMHG | HEART RATE: 77 BPM | RESPIRATION RATE: 16 BRPM | TEMPERATURE: 97.2 F | OXYGEN SATURATION: 99 % | DIASTOLIC BLOOD PRESSURE: 67 MMHG

## 2024-04-26 DIAGNOSIS — E11.8 TYPE 2 DIABETES MELLITUS WITH UNSPECIFIED COMPLICATIONS (HCC): Primary | ICD-10-CM

## 2024-04-26 LAB — HBA1C MFR BLD: 8 %

## 2024-04-26 PROCEDURE — 1090F PRES/ABSN URINE INCON ASSESS: CPT | Performed by: INTERNAL MEDICINE

## 2024-04-26 PROCEDURE — G8417 CALC BMI ABV UP PARAM F/U: HCPCS | Performed by: INTERNAL MEDICINE

## 2024-04-26 PROCEDURE — 3078F DIAST BP <80 MM HG: CPT | Performed by: INTERNAL MEDICINE

## 2024-04-26 PROCEDURE — 99214 OFFICE O/P EST MOD 30 MIN: CPT | Performed by: INTERNAL MEDICINE

## 2024-04-26 PROCEDURE — 1036F TOBACCO NON-USER: CPT | Performed by: INTERNAL MEDICINE

## 2024-04-26 PROCEDURE — 1123F ACP DISCUSS/DSCN MKR DOCD: CPT | Performed by: INTERNAL MEDICINE

## 2024-04-26 PROCEDURE — 83036 HEMOGLOBIN GLYCOSYLATED A1C: CPT | Performed by: INTERNAL MEDICINE

## 2024-04-26 PROCEDURE — 3017F COLORECTAL CA SCREEN DOC REV: CPT | Performed by: INTERNAL MEDICINE

## 2024-04-26 PROCEDURE — G8399 PT W/DXA RESULTS DOCUMENT: HCPCS | Performed by: INTERNAL MEDICINE

## 2024-04-26 PROCEDURE — G0151 HHCP-SERV OF PT,EA 15 MIN: HCPCS

## 2024-04-26 PROCEDURE — 2022F DILAT RTA XM EVC RTNOPTHY: CPT | Performed by: INTERNAL MEDICINE

## 2024-04-26 PROCEDURE — 3077F SYST BP >= 140 MM HG: CPT | Performed by: INTERNAL MEDICINE

## 2024-04-26 PROCEDURE — G8428 CUR MEDS NOT DOCUMENT: HCPCS | Performed by: INTERNAL MEDICINE

## 2024-04-26 PROCEDURE — 3046F HEMOGLOBIN A1C LEVEL >9.0%: CPT | Performed by: INTERNAL MEDICINE

## 2024-04-26 NOTE — PROGRESS NOTES
Ms. Clifford returns for follow-up of her type 2 diabetes mellitus x 14 years with poor blood sugar control.    Her A1c was 9.7% in September. Her A1c was  8.9%  in March 2021 which is the lowest we have ever had.,   Her A1c in October was 9.3%. When I saw her last her A1c was 7.5%.  Her A1c today is 8.0%.         Recent laboratory tests also remarkable for creatinine of 1.30 with a GFR of 44.  LFTs normal.  She was also found to have CO2 of >45.     She was hospitalized December 2023 for shortness of breath (hypoxia and hypercapnia) and was started on BiPAP which she continues to use.    Since I saw her last, she received some iron infusions and the iron infusions apparently contain glucocorticoids which raised her blood sugar.  This was in March.  For the last 4 weeks she has received no steroids and her blood sugars on her freestyle freedom look much better.       Current medications  Toujeo insulin 30 units in AM    Humalog insulin 15-20 units before each meal  Jardiance 10 mg (just started)   Freestyle Freedom 2     She presents today looking much more comfortable than when I saw her in the hospital in December.  She is breathing more comfortably on chronic O2 therapy.  We downloaded her freestyle freedom.  Her time in range is 75% and her average blood sugar is 136    Examination  Blood pressure 181/74  Pulse 75  Weight 95.7 kg  Weight BMI 38.6  HEENT unremarkable  Lungs clear  Heart reveals a regular rate and rhythm  Abdomen benign although obese  Extremities unremarkable    Impression  1.  Type 2 diabetes mellitus with improving glucose control, exacerbated by steroid taper in March  2.  Obstructive sleep apnea on BiPAP  3.  Chronic kidney disease stage IIIb currently on Jardiance 10 mg    Plan:  1.  I did not change the regimen.  It appears clear to me that the elevations in A1c related to steroids that she received in March.  2.  I will see her back in follow-up    Please note that this dictation was completed

## 2024-04-27 NOTE — HOME HEALTH
Subjective: I am going out to see my doctor this afternoon.   I Falls since last visit (if yes include. bsrifallreport): None reported by the patient/caregiver.  Caregiver involvement: Yes- patients partner will be available as needed, was  not present today.  Home health supplies by type and quantity ordered/delivered this visit include none today.  Does the patient have any new or changed medications? No   If yes, were medications reconciled? no.  Was the certifying physician notified of changes in medications? NA.  Clinical assessment (what this visit means for the patient overall and need for ongoing skilled care): PT interventions  including balance challenging exercises in standing, gait training, 30 day reassessment  and safety education-Patient was able to follow the proper gait pattern after the initial edcuation while ambulating. Patient will continue to need more PT to improve her functions so that the patient can transfer with less possible assistance from the caregiver.  Progress or lack of progress toward specific goals: Patient demonstrated good progress today by ambulating longer distance while ambulating , but was needed SBAx1 for safety and need more sessions at this time to meet the goals.  Inter disciplinary communication: none.  Discharge planning: DC when met all the goals/DC when home PT is no longer appropriate/ DC when patient is no longer home bound.   Specific plan for next visit: Continue with Gait training and balance challenging exercises in stadning   - so that the patient can ambulate/ transfer with less possible assistance from the caregiver.

## 2024-04-29 ENCOUNTER — HOME CARE VISIT (OUTPATIENT)
Facility: HOME HEALTH | Age: 74
End: 2024-04-29
Payer: MEDICARE

## 2024-04-29 VITALS
HEART RATE: 67 BPM | TEMPERATURE: 97 F | OXYGEN SATURATION: 100 % | SYSTOLIC BLOOD PRESSURE: 132 MMHG | RESPIRATION RATE: 15 BRPM | DIASTOLIC BLOOD PRESSURE: 81 MMHG

## 2024-04-29 PROCEDURE — G0151 HHCP-SERV OF PT,EA 15 MIN: HCPCS

## 2024-04-29 NOTE — HOME HEALTH
Subjective: I can walk longer today   I Falls since last visit (if yes include. bsrifallreport): None reported by the patient/caregiver.  Caregiver involvement: Yes- patients partner will be available as needed, was present today.  Home health supplies by type and quantity ordered/delivered this visit include none today.  Does the patient have any new or changed medications? No   If yes, were medications reconciled? no.  Was the certifying physician notified of changes in medications? NA.  Clinical assessment (what this visit means for the patient overall and need for ongoing skilled care): PT interventions  including balance challenging exercises in standing, gait training and safety education-Patient was able to follow the proper gait pattern while ambulating after the initial education. Patient will continue to need more PT to improve her functions so that the patient can transfer with less possible assistance from the caregiver.  Progress or lack of progress toward specific goals: Patient demonstrated good progress today by ambulating longer but was needed SBAx1 for safety and need more sessions at this time to meet the goals.  Inter disciplinary communication: none.  Discharge planning: DC when met all the goals/DC when home PT is no longer appropriate/ DC when patient is no longer home bound.   Specific plan for next visit: Continue with Gait training and balance challenging exercises in stadning   - so that the patient can ambulate/ transfer with less possible assistance from the caregiver.

## 2024-04-30 ENCOUNTER — CARE COORDINATION (OUTPATIENT)
Dept: CARE COORDINATION | Age: 74
End: 2024-04-30

## 2024-04-30 ENCOUNTER — OFFICE VISIT (OUTPATIENT)
Age: 74
End: 2024-04-30
Payer: MEDICARE

## 2024-04-30 VITALS
OXYGEN SATURATION: 98 % | BODY MASS INDEX: 39.2 KG/M2 | WEIGHT: 213 LBS | HEIGHT: 62 IN | SYSTOLIC BLOOD PRESSURE: 130 MMHG | HEART RATE: 69 BPM | DIASTOLIC BLOOD PRESSURE: 62 MMHG | RESPIRATION RATE: 18 BRPM

## 2024-04-30 DIAGNOSIS — I50.32 CHRONIC HEART FAILURE WITH PRESERVED EJECTION FRACTION (HFPEF) (HCC): Primary | ICD-10-CM

## 2024-04-30 DIAGNOSIS — G47.33 OBSTRUCTIVE SLEEP APNEA (ADULT) (PEDIATRIC): ICD-10-CM

## 2024-04-30 DIAGNOSIS — I49.3 PVC'S (PREMATURE VENTRICULAR CONTRACTIONS): ICD-10-CM

## 2024-04-30 DIAGNOSIS — I10 ESSENTIAL (PRIMARY) HYPERTENSION: ICD-10-CM

## 2024-04-30 DIAGNOSIS — J96.01 ACUTE HYPOXIC RESPIRATORY FAILURE (HCC): ICD-10-CM

## 2024-04-30 DIAGNOSIS — R06.02 SHORTNESS OF BREATH: ICD-10-CM

## 2024-04-30 DIAGNOSIS — J45.909 MODERATE ASTHMA, UNSPECIFIED WHETHER COMPLICATED, UNSPECIFIED WHETHER PERSISTENT: ICD-10-CM

## 2024-04-30 DIAGNOSIS — I50.32 CHRONIC HEART FAILURE WITH PRESERVED EJECTION FRACTION (HFPEF) (HCC): ICD-10-CM

## 2024-04-30 DIAGNOSIS — E78.2 MIXED HYPERLIPIDEMIA: ICD-10-CM

## 2024-04-30 DIAGNOSIS — I25.10 ATHEROSCLEROSIS OF NATIVE CORONARY ARTERY OF NATIVE HEART WITHOUT ANGINA PECTORIS: ICD-10-CM

## 2024-04-30 DIAGNOSIS — I25.10 CORONARY ARTERY DISEASE INVOLVING NATIVE CORONARY ARTERY OF NATIVE HEART WITHOUT ANGINA PECTORIS: ICD-10-CM

## 2024-04-30 LAB
ANION GAP SERPL CALC-SCNC: 3 MMOL/L (ref 5–15)
BUN SERPL-MCNC: 19 MG/DL (ref 6–20)
BUN/CREAT SERPL: 12 (ref 12–20)
CALCIUM SERPL-MCNC: 10.3 MG/DL (ref 8.5–10.1)
CHLORIDE SERPL-SCNC: 101 MMOL/L (ref 97–108)
CO2 SERPL-SCNC: 40 MMOL/L (ref 21–32)
CREAT SERPL-MCNC: 1.58 MG/DL (ref 0.55–1.02)
GLUCOSE SERPL-MCNC: 270 MG/DL (ref 65–100)
MAGNESIUM SERPL-MCNC: 2.4 MG/DL (ref 1.6–2.4)
NT PRO BNP: 357 PG/ML
POTASSIUM SERPL-SCNC: 3.8 MMOL/L (ref 3.5–5.1)
SODIUM SERPL-SCNC: 144 MMOL/L (ref 136–145)

## 2024-04-30 PROCEDURE — 93010 ELECTROCARDIOGRAM REPORT: CPT | Performed by: INTERNAL MEDICINE

## 2024-04-30 PROCEDURE — 3078F DIAST BP <80 MM HG: CPT | Performed by: INTERNAL MEDICINE

## 2024-04-30 PROCEDURE — 99214 OFFICE O/P EST MOD 30 MIN: CPT | Performed by: INTERNAL MEDICINE

## 2024-04-30 PROCEDURE — 1036F TOBACCO NON-USER: CPT | Performed by: INTERNAL MEDICINE

## 2024-04-30 PROCEDURE — 93005 ELECTROCARDIOGRAM TRACING: CPT | Performed by: INTERNAL MEDICINE

## 2024-04-30 PROCEDURE — G8417 CALC BMI ABV UP PARAM F/U: HCPCS | Performed by: INTERNAL MEDICINE

## 2024-04-30 PROCEDURE — 3017F COLORECTAL CA SCREEN DOC REV: CPT | Performed by: INTERNAL MEDICINE

## 2024-04-30 PROCEDURE — G8399 PT W/DXA RESULTS DOCUMENT: HCPCS | Performed by: INTERNAL MEDICINE

## 2024-04-30 PROCEDURE — 3075F SYST BP GE 130 - 139MM HG: CPT | Performed by: INTERNAL MEDICINE

## 2024-04-30 PROCEDURE — G8427 DOCREV CUR MEDS BY ELIG CLIN: HCPCS | Performed by: INTERNAL MEDICINE

## 2024-04-30 PROCEDURE — 1090F PRES/ABSN URINE INCON ASSESS: CPT | Performed by: INTERNAL MEDICINE

## 2024-04-30 PROCEDURE — 1123F ACP DISCUSS/DSCN MKR DOCD: CPT | Performed by: INTERNAL MEDICINE

## 2024-04-30 ASSESSMENT — PATIENT HEALTH QUESTIONNAIRE - PHQ9
SUM OF ALL RESPONSES TO PHQ9 QUESTIONS 1 & 2: 0
2. FEELING DOWN, DEPRESSED OR HOPELESS: NOT AT ALL
SUM OF ALL RESPONSES TO PHQ QUESTIONS 1-9: 0
1. LITTLE INTEREST OR PLEASURE IN DOING THINGS: NOT AT ALL
SUM OF ALL RESPONSES TO PHQ QUESTIONS 1-9: 0

## 2024-04-30 NOTE — CARE COORDINATION
Remote Patient Monitoring Note  Date/Time:  4/30/2024 9:39 AM  Pt is enrolled in Remote Patient Monitoring r/t COPD, HTN, DM.  She has a scheduled office visit today at 10:40 am.  RPM metrics added for review.  LPN reviewed patients reported daily Remote Patient Monitoring metrics. All reported metrics are within alert parameters.     Plan/Follow Up: Will continue to review, monitor and address alerts with follow up based on severity of symptoms and risk factors.

## 2024-04-30 NOTE — PROGRESS NOTES
Brain Natriuretic Peptide      4. Essential (primary) hypertension  EKG 12 Lead    Basic Metabolic Panel    Magnesium    Brain Natriuretic Peptide      5. Atherosclerosis of native coronary artery of native heart without angina pectoris  EKG 12 Lead    Basic Metabolic Panel    Magnesium    Brain Natriuretic Peptide      6. Mixed hyperlipidemia  EKG 12 Lead    Basic Metabolic Panel    Magnesium    Brain Natriuretic Peptide      7. PVC's (premature ventricular contractions)  EKG 12 Lead    Basic Metabolic Panel    Magnesium    Brain Natriuretic Peptide      8. Coronary artery disease involving native coronary artery of native heart without angina pectoris  Basic Metabolic Panel    Magnesium    Brain Natriuretic Peptide      9. Obstructive sleep apnea (adult) (pediatric)  Basic Metabolic Panel    Magnesium    Brain Natriuretic Peptide      10. Moderate asthma, unspecified whether complicated, unspecified whether persistent  Basic Metabolic Panel    Magnesium    Brain Natriuretic Peptide          Orders Placed This Encounter   Procedures    Basic Metabolic Panel     Standing Status:   Future     Standing Expiration Date:   4/30/2025    Magnesium     Standing Status:   Future     Standing Expiration Date:   4/30/2025    Brain Natriuretic Peptide     Standing Status:   Future     Standing Expiration Date:   4/30/2025    EKG 12 Lead     Order Specific Question:   Reason for Exam?     Answer:   Other     Comments:   routine        Plan:     Coronary artery disease involving native coronary artery of native heart without angina pectoris   Hx of non-obstructive CAD per cath in 2012   Lexiscan stress test done 1/2020 without evidence of ischemia   Continue medical management and risk factor modification---ASA, BB, statin    HFpEF/mild mitral stenosis due to moderate mitral annular calcification:  -Seen at [facility] in the ER December 4, 2023 with pulmonary edema  -Echo March 2024 LVEF 60%, mild mitral stenosis  -Started on Lasix 20

## 2024-05-01 ENCOUNTER — HOME CARE VISIT (OUTPATIENT)
Facility: HOME HEALTH | Age: 74
End: 2024-05-01
Payer: MEDICARE

## 2024-05-01 ENCOUNTER — PATIENT MESSAGE (OUTPATIENT)
Age: 74
End: 2024-05-01

## 2024-05-01 ENCOUNTER — TELEPHONE (OUTPATIENT)
Age: 74
End: 2024-05-01

## 2024-05-01 VITALS
HEART RATE: 65 BPM | RESPIRATION RATE: 15 BRPM | SYSTOLIC BLOOD PRESSURE: 128 MMHG | TEMPERATURE: 97 F | OXYGEN SATURATION: 100 % | DIASTOLIC BLOOD PRESSURE: 77 MMHG

## 2024-05-01 PROCEDURE — G0151 HHCP-SERV OF PT,EA 15 MIN: HCPCS

## 2024-05-01 NOTE — TELEPHONE ENCOUNTER
Please review blood work results from yesterday.   Patient sent message asking \"What ddose NT Pro Bnp value 357 High means\"      My chart Message sent with results and recommendations on 05-. Patient read message.

## 2024-05-02 ENCOUNTER — TELEPHONE (OUTPATIENT)
Age: 74
End: 2024-05-02

## 2024-05-02 NOTE — HOME HEALTH
Subjective: I can walk longer today   I Falls since last visit (if yes include. bsrifallreport): None reported by the patient/caregiver.  Caregiver involvement: Yes- patients partner will be available as needed, was present today.  Home health supplies by type and quantity ordered/delivered this visit include none today.  Does the patient have any new or changed medications? No   If yes, were medications reconciled? no.  Was the certifying physician notified of changes in medications? NA.  Clinical assessment (what this visit means for the patient overall and need for ongoing skilled care): PT interventions  including HEP reeducation,  gait training and safety education-Patient was able to follow the proper gait pattern while ambulating after the initial education and was not needed any assistance for safety. Patient will need 2 more visits to meet all the goals-  so that the patient can transfer with less possible assistance from the caregiver.  Progress or lack of progress toward specific goals: Patient demonstrated good progress today by ambulating longer but with no assistance needed for safety and need 2  more sessions at this time to meet the goals.  Inter disciplinary communication: none.  Discharge planning: DC when met all the goals/DC when home PT is no longer appropriate/ DC when patient is no longer home bound.   Specific plan for next visit: Continue with Gait training and balance challenging exercises in stadning   - so that the patient can ambulate/ transfer with less possible assistance from the caregiver.

## 2024-05-02 NOTE — TELEPHONE ENCOUNTER
----- Message from Cornell Veliz MD sent at 5/1/2024  9:09 PM EDT -----  Labs are okay, with slight elevation in kidney function test, but I think that is necessary to keep fluid off her body, with the blood test called proBNP much improved, showing that fluid control seems to be much better.  Continue same.  Follow-up as scheduled.  My chart Message sent with results and recommendations.    Detail Level: Detailed Detail Level: Generalized Patient Specific Counseling (Will Not Stick From Patient To Patient): Fhx in sister

## 2024-05-02 NOTE — RESULT ENCOUNTER NOTE
Labs are okay, with slight elevation in kidney function test, but I think that is necessary to keep fluid off her body, with the blood test called proBNP much improved, showing that fluid control seems to be much better.  Continue same.  Follow-up as scheduled.    Future Appointments  5/7/2024   TBFranki Thompson, PT        [facility]  5/10/2024  TBD        Franki Daugherty, PT        [facility]  6/14/2024  2:20 PM    Cornell Veliz MD    [facility]                BS AMB  7/15/2024  11:30 AM   Robby Antoine MD           [facility]                BS AMB  8/30/2024  10:30 AM   Pepito Ambriz MD            [facility] [address]         BS AMB  10/30/2024 1:00 PM    Winifred Szymanski* JES              BS AMB  1/30/2025  9:30 AM    Grace Duque, VIOLETTA - * [facility]                 BS AMB  5/5/2025   1:40 PM    Cornell Veliz MD CAVREY              BS AMB

## 2024-05-06 ENCOUNTER — HOME CARE VISIT (OUTPATIENT)
Facility: HOME HEALTH | Age: 74
End: 2024-05-06
Payer: MEDICARE

## 2024-05-06 VITALS
TEMPERATURE: 97.9 F | SYSTOLIC BLOOD PRESSURE: 120 MMHG | DIASTOLIC BLOOD PRESSURE: 76 MMHG | OXYGEN SATURATION: 99 % | RESPIRATION RATE: 15 BRPM | HEART RATE: 71 BPM

## 2024-05-06 PROCEDURE — G0151 HHCP-SERV OF PT,EA 15 MIN: HCPCS

## 2024-05-06 NOTE — HOME HEALTH
Subjective: I can walk longer today without getting tiered   I Falls since last visit (if yes include. bsrifallreport): None reported by the patient/caregiver.  Caregiver involvement: Yes- patients partner will be available as needed, was present today.  Home health supplies by type and quantity ordered/delivered this visit include none today.  Does the patient have any new or changed medications? No   If yes, were medications reconciled? no.  Was the certifying physician notified of changes in medications? NA.  Clinical assessment (what this visit means for the patient overall and need for ongoing skilled care): PT interventions  including Blanace challenging exercises in standing,  gait training and safety education-Patient was able to follow the proper gait pattern while doing the standing exercises after the initial education and was not needed any assistance for safety. Patient will need one more visit to meet all the goals-  so that the patient can transfer/ambulate without  assistance from the caregiver.  Progress or lack of progress toward specific goals: Patient demonstrated good progress today by ambulating longer but with no assistance needed for safety and need one  more sessions at this time to meet the goals.  Inter disciplinary communication: none.  Discharge planning: DC when met all the goals/DC when home PT is no longer appropriate/ DC when patient is no longer home bound.   Specific plan for next visit: DC instructions, HEP reeducation,Gait training    - so that the patient can ambulate/ transfer with less possible assistance from the caregiver.

## 2024-05-07 ENCOUNTER — CARE COORDINATION (OUTPATIENT)
Dept: CARE COORDINATION | Age: 74
End: 2024-05-07

## 2024-05-07 NOTE — CARE COORDINATION
Remote Alert Monitoring Note  Date/Time:  2024 10:03 AM  Patient Current Location: [place]  Verified patients name and  as identifiers.  Rpm alert to be reviewed by the provider   red alert  pulse ox reading (91%)  Additional needs to be addressed by provider: No     Background: Pt enrolled in RPM r/t COPD, HTN, DM.  Refer to 911 immediately if:  Patient unresponsive or unable to provide history  Change in cognition or sudden confusion  Patient unable to respond in complete sentences  Intense chest pain/tightness  Any concern for any clinical emergency  Red Alert: Provider response time of 1 hr required for any red alert requiring intervention  Yellow Alert: Provider response time of 3hr required for any escalated yellow alert  Patient Chief Complaint:  O2 Triage  Are you having any Chest Pain? no   Are you having any Shortness of Breath? no   Swelling in your hands or feet? no   Are you having any other health concerns or issues? no   Clinical Interventions: Reviewed and followed up on alerts and treatments-Spoke with pt who is in no apparent distress.  Denies any SOB/dyspnea at this time and wearing home O2 at 3L.  States she had taken oxygen off to go to bathroom earlier and forgot to put back on before checking oxygen level.  Pt has used scheduled Advair inhaler this morning and has not required use of rescue inhaler.  She is agreeable to recheck oxygen level at this time with updated reading of 97%.  Oxygen level now within RPM parameters.  Pt education provided that if oxygen level below 92% and pt in no distress, ensure to place oxygen, reposition oximeter and recheck metric.  Pt verbalizes understanding.     Plan/Follow Up: Will continue to review, monitor and address alerts with follow up based on severity of symptoms and risk factors.  **For any new or worsening symptoms, please contact your Provider or report to the nearest Emergency Room.**

## 2024-05-08 ENCOUNTER — HOME CARE VISIT (OUTPATIENT)
Facility: HOME HEALTH | Age: 74
End: 2024-05-08
Payer: MEDICARE

## 2024-05-08 VITALS
DIASTOLIC BLOOD PRESSURE: 80 MMHG | RESPIRATION RATE: 15 BRPM | OXYGEN SATURATION: 98 % | HEART RATE: 67 BPM | TEMPERATURE: 97 F | SYSTOLIC BLOOD PRESSURE: 122 MMHG

## 2024-05-08 PROCEDURE — G0151 HHCP-SERV OF PT,EA 15 MIN: HCPCS

## 2024-05-08 ASSESSMENT — ENCOUNTER SYMPTOMS: CONSTIPATION: 1

## 2024-05-09 ENCOUNTER — CARE COORDINATION (OUTPATIENT)
Dept: CARE COORDINATION | Age: 74
End: 2024-05-09

## 2024-05-09 NOTE — HOME HEALTH
Subjective: I am doing great   I Falls since last visit (if yes include. bsrifallreport): None reported by the patient/caregiver.  Caregiver involvement: Yes- patients partner will be available as needed, was present today.  Home health supplies by type and quantity ordered/delivered this visit include none today.  Does the patient have any new or changed medications? No   If yes, were medications reconciled? no.  Was the certifying physician notified of changes in medications? NA.  Clinical assessment (what this visit means for the patient overall and need for ongoing skilled care): PT interventions  including HEP reeducation, dc instructions,  gait training and safety education-Patient was able to repeat back all the HEP with 100% accuracy.  Progress or lack of progress toward specific goals: met all the goals.  Inter disciplinary communication: none.  Discharge planning: DC when met all the goals/DC when home PT is no longer appropriate/ DC when patient is no longer home bound/dc as of today   Specific plan for next visit: na

## 2024-05-09 NOTE — CARE COORDINATION
Remote Alert Monitoring Note  Date/Time:  2024 9:49 AM  Patient Current Location: [place]  Verified patients name and  as identifiers.  Rpm alert to be reviewed by the provider   red alert  pulse ox reading (86%)  Additional needs to be addressed by provider: No     Background: Pt enrolled in RPM r/t COPD, HTN, DM.  Refer to 911 immediately if:  Patient unresponsive or unable to provide history  Change in cognition or sudden confusion  Patient unable to respond in complete sentences  Intense chest pain/tightness  Any concern for any clinical emergency  Red Alert: Provider response time of 1 hr required for any red alert requiring intervention  Yellow Alert: Provider response time of 3hr required for any escalated yellow alert  Patient Chief Complaint:  O2 Triage  Are you having any Chest Pain? no   Are you having any Shortness of Breath? no   Swelling in your hands or feet? no   Are you having any other health concerns or issues? no   Clinical Interventions: Reviewed and followed up on alerts and treatments-Pt speaking in full sentences, no apparent distress.  Wearing home O2 at 3L and denies any SOB/dyspnea at this time.  Pt has used scheduled Advair inhaler and not required use of prn Albuterol inhaler or nebulizers yet today.  States she took her oxygen off to go to the bathroom and forgot to put it back on prior to checking oxygen level.  She is agreeable to recheck oxygen level at this time with updated reading of 97%.  Oxygen level now within RPM parameters.  Pt education provided that if oxygen level below 92% and pt in no distress, she should ensure to place oxygen on and reposition oximeter to recheck metric.  Pt verbalizes understanding.    Plan/Follow Up: Will continue to review, monitor and address alerts with follow up based on severity of symptoms and risk factors.  **For any new or worsening symptoms, please contact your Provider or report to the nearest Emergency Room.**

## 2024-05-13 ENCOUNTER — TELEPHONE (OUTPATIENT)
Age: 74
End: 2024-05-13

## 2024-05-13 RX ORDER — CARVEDILOL 12.5 MG/1
12.5 TABLET ORAL 2 TIMES DAILY
Qty: 60 TABLET | Refills: 3 | Status: SHIPPED | OUTPATIENT
Start: 2024-05-13

## 2024-05-13 RX ORDER — MONTELUKAST SODIUM 10 MG/1
10 TABLET ORAL NIGHTLY
Qty: 90 TABLET | Refills: 3 | Status: SHIPPED | OUTPATIENT
Start: 2024-05-13

## 2024-05-13 NOTE — TELEPHONE ENCOUNTER
carvedilol (COREG) 12.5 MG tablet    Pt is waiting on mail order and needs emergency supply to go to local pharm.    Send to [facility] [phone] [place]

## 2024-05-13 NOTE — TELEPHONE ENCOUNTER
PCP: Robby Antoine MD    Last appt: 3/14/2024   Future Appointments   Date Time Provider Department Center   6/14/2024  2:20 PM Cornell Veliz MD [facility]   7/15/2024 11:30 AM Robby Antoine MD [facility] BS [facility]   8/30/2024 10:30 AM Pepito Ambriz MD RDE CAR[facility] BS [facility]   10/30/2024  1:00 PM Winifred Szymanski APRN - BERNADINE ANDRE [facility]   1/30/2025  9:30 AM [facility]   5/5/2025  1:40 PM Cornell Veliz MD CAV[facility]       Requested Prescriptions     Pending Prescriptions Disp Refills    montelukast (SINGULAIR) 10 MG tablet 90 tablet 0     Sig: Take 1 tablet by mouth nightly

## 2024-05-28 ENCOUNTER — TELEPHONE (OUTPATIENT)
Age: 74
End: 2024-05-28

## 2024-05-28 ENCOUNTER — TRANSCRIBE ORDERS (OUTPATIENT)
Facility: HOSPITAL | Age: 74
End: 2024-05-28

## 2024-05-28 DIAGNOSIS — Z12.31 VISIT FOR SCREENING MAMMOGRAM: Primary | ICD-10-CM

## 2024-05-28 DIAGNOSIS — E11.9 COMPREHENSIVE DIABETIC FOOT EXAMINATION, TYPE 2 DM, ENCOUNTER FOR (HCC): Primary | ICD-10-CM

## 2024-05-28 NOTE — TELEPHONE ENCOUNTER
Pt needs a referral as to who to see for podiatry.    Please call with your recommendation pt ask.

## 2024-06-14 ENCOUNTER — HOSPITAL ENCOUNTER (OUTPATIENT)
Facility: HOSPITAL | Age: 74
End: 2024-06-14
Attending: INTERNAL MEDICINE
Payer: MEDICARE

## 2024-06-14 VITALS — HEIGHT: 62 IN | BODY MASS INDEX: 39.01 KG/M2 | WEIGHT: 212 LBS

## 2024-06-14 DIAGNOSIS — Z12.31 VISIT FOR SCREENING MAMMOGRAM: ICD-10-CM

## 2024-06-14 PROCEDURE — 77063 BREAST TOMOSYNTHESIS BI: CPT

## 2024-06-19 LAB — CREATININE, EXTERNAL: 1.33

## 2024-06-27 ENCOUNTER — CARE COORDINATION (OUTPATIENT)
Dept: CASE MANAGEMENT | Age: 74
End: 2024-06-27

## 2024-06-27 ENCOUNTER — TELEPHONE (OUTPATIENT)
Age: 74
End: 2024-06-27

## 2024-06-27 NOTE — TELEPHONE ENCOUNTER
What dose NT Pro Bnp value 357 High means      Message from Cornell Veliz MD sent at 5/1/2024  9:09 PM EDT -----  Labs are okay, with slight elevation in kidney function test, but I think that is necessary to keep fluid off her body, with the blood test called pro BNP much improved, showing that fluid control seems to be much better.  Continue same.  Follow-up as scheduled.   Spoke with patient , verified patient with two identifiers, regarding results and recommendations. Patient voiced understanding.

## 2024-06-27 NOTE — CARE COORDINATION
-Remote Alert Monitoring Note      Date/Time:  2024 10:01 AM  Patient Current Location: Home: [address] [facility] [address]  Verified patients name and  as identifiers.    Rpm alert to be reviewed by the provider   red alert  pulse ox heart rate (49)  Vitals Recheck pulse ox heart rate (56)  Additional needs to be addressed by provider: No                   LPN contacted patient by telephone regarding red alert received   Background: ENROLLED IN RPM FOR COPD HTN AND DM  Refer to 911 immediately if:  Patient unresponsive or unable to provide history  Change in cognition or sudden confusion  Patient unable to respond in complete sentences  Intense chest pain/tightness  Any concern for any clinical emergency  Red Alert: Provider response time of 1 hr required for any red alert requiring intervention  Yellow Alert: Provider response time of 3hr required for any escalated yellow alert  Patient Chief Complaint:  HR Triage  Are you having any Chest Pain? no   Are you having any Shortness of Breath? no   Are you having any dizziness? no   Are you feeling more fatigued or tired than normal? no   Are you having any other health concerns or issues? no     Clinical Interventions: Reviewed and followed up on alerts and treatments-Spoke to Elvia regarding RPM red alert for low pulse ox/HR. Denies chest pain , dyspnea, dizziness or fatigue. Requested pt recheck her pulse ox HR . New reading is 56. All metrics WNL. Pt requests a call from [facility] regarding BP metrics needing entered manually.   Call to [facility] spoke to Ludwig.   Plan/Follow Up: Will continue to review, monitor and address alerts with follow up based on severity of symptoms and risk factors.  **For any new or worsening symptoms, please contact your Provider or report to the nearest Emergency Room.**

## 2024-06-28 DIAGNOSIS — E06.3 HYPOTHYROIDISM DUE TO HASHIMOTO'S THYROIDITIS: ICD-10-CM

## 2024-06-28 DIAGNOSIS — E03.8 HYPOTHYROIDISM DUE TO HASHIMOTO'S THYROIDITIS: ICD-10-CM

## 2024-06-28 RX ORDER — LEVOTHYROXINE SODIUM 137 UG/1
TABLET ORAL
Qty: 90 TABLET | Refills: 3 | Status: SHIPPED | OUTPATIENT
Start: 2024-06-28

## 2024-07-08 ENCOUNTER — CARE COORDINATION (OUTPATIENT)
Dept: CARE COORDINATION | Age: 74
End: 2024-07-08

## 2024-07-08 NOTE — CARE COORDINATION
Date/Time:  7/8/2024 10:14 AM  LPN attempted to reach patient by telephone regarding red alert in remote patient monitoring program. Left HIPPA compliant message requesting a return call. Will attempt to reach patient again.

## 2024-07-08 NOTE — CARE COORDINATION
Remote Alert Monitoring Note  Date/Time:  2024 10:46 AM  Patient Current Location: [place]  Verified patients name and  as identifiers.  Rpm alert to be reviewed by the provider   red alert  pulse ox reading (89%)  Vitals Recheck pulse ox reading (99%)  Additional needs to be addressed by provider: No     LPN contacted patient by telephone regarding red alert received   Background: Pt enrolled in RPM r/t COPD, HTN, DM.  Refer to 911 immediately if:  Patient unresponsive or unable to provide history  Change in cognition or sudden confusion  Patient unable to respond in complete sentences  Intense chest pain/tightness  Any concern for any clinical emergency  Red Alert: Provider response time of 1 hr required for any red alert requiring intervention  Yellow Alert: Provider response time of 3hr required for any escalated yellow alert  Patient Chief Complaint:  O2 Triage  Are you having any Chest Pain? no   Are you having any Shortness of Breath? no   Swelling in your hands or feet? no   Are you having any other health concerns or issues? no   Clinical Interventions: Reviewed and followed up on alerts and treatments-Spoke with pt who is in no apparent distress and denies any SOB/dyspnea at this time.  Pt wearing home O2 at 3 L and has not used Advair inhaler as of this time.  States oxygen tubing was loose earlier when she checked metric.  She is agreeable to recheck oxygen level at this  time with updated reading of 99%.  Oxygen level now within RPM parameters.  Pt education provided that if oxygen level below 92% and pt in no distress, she should reposition oximeter and recheck metric.  Pt verbalizes understanding.     Plan/Follow Up: Will continue to review, monitor and address alerts with follow up based on severity of symptoms and risk factors.  **For any new or worsening symptoms, please contact your Provider or report to the nearest Emergency Room.**

## 2024-07-11 ENCOUNTER — CARE COORDINATION (OUTPATIENT)
Dept: CARE COORDINATION | Age: 74
End: 2024-07-11

## 2024-07-11 NOTE — CARE COORDINATION
Remote Alert Monitoring Note  Date/Time:  2024 9:42 AM  Patient Current Location: [place]  Verified patients name and  as identifiers.  Rpm alert to be reviewed by the provider   red alert  blood pressure heart rate (49)  Vitals Recheck blood pressure heart rate (52)  Additional needs to be addressed by provider: No     LPN contacted patient by telephone regarding red alert received   Background: Pt enrolled in RPM r/t COPD, HTN, DM.  Refer to 911 immediately if:  Patient unresponsive or unable to provide history  Change in cognition or sudden confusion  Patient unable to respond in complete sentences  Intense chest pain/tightness  Any concern for any clinical emergency  Red Alert: Provider response time of 1 hr required for any red alert requiring intervention  Yellow Alert: Provider response time of 3hr required for any escalated yellow alert  Patient Chief Complaint:  HR Triage  Are you having any Chest Pain? no   Are you having any Shortness of Breath? no   Are you having any dizziness? no   Are you feeling more fatigued or tired than normal? no   Are you having any other health concerns or issues? no   Clinical Interventions: Reviewed and followed up on alerts and treatments-Pt has history of PVC's.  Spoke with pt who is in no apparent distress.  Denies any chest discomfort, palpitations, SOB, dizziness.  Pt has taken all morning meds and agreeable to recheck BP heart rate at this time with updated reading of 52.  Heart rate now within RPM parameters.  Pt education provided that if heart rate below 50 and she is in no distress, she should reposition equipment and recheck metric.  Pt verbalizes understanding.    Plan/Follow Up: Will continue to review, monitor and address alerts with follow up based on severity of symptoms and risk factors.  **For any new or worsening symptoms, please contact your Provider or report to the nearest Emergency Room.**

## 2024-07-13 NOTE — PROGRESS NOTES
HISTORY OF PRESENT ILLNESS   Elvia Clifford   is a 73 y.o.  female.  FUDM- 2 HTN hld asthma hx CVA-DM-2 osteopenia , hx PE morbid obesity, BRENT on bipap obesity hypoventilation ckd 3 Dr ZAYDA Barboza depression  chronic anemia due to ckd and medicare wellness- here with her male friend  Endo-Dr Ambriz last A1c 8.0  PULM Dr Gómez  Nephro Dr Barboza on jardiance now since last month  Hem---IV iron  CARD Dr Veliz  Hospitalized last year for diastolic chf and asthma    HH was seeing pt for weakness and resp faiure on 02 3lpm-completed PT and HH  Uses walker at home  Saw Dr Gómez--testing being done to try to wean the 02-has fu in August    Has chronic left leg edema -better in [place] after laying in b3ed    Last OV   Nephro Dr ZAYDA Gómez -appt soon  Referred to card Dr DOROTHY Barboza for outpt fu diastolic chf/flash pulm edema and elevated troponin  On lasix 40 mg qd and jardiance s-pt did not start jardiance yet-mail order  Outpt echo ordered  Still some leg edema -wears support hose  Asthma on 02 3lpm continuous and advair and nebs=fu      Her right leg wound has healed per wound care MD note     Last A1c 7.5   12/2/23  Sees Dr Ambriz next month fu DM02 in insulin and jardiance     RPM data reviewed today  most recent weight 216 lbs stable, bp 107/58 glucose 90's 190 but most around 100-130     Saw Hematologist recently for PAVEL and scheduled for 2 iron infusions per pt     Feels weak and wants to get HH PT-uses walker at home  Patient Active Problem List    Diagnosis Date Noted    Diastolic congestive heart failure, unspecified HF chronicity ([facility]) 01/31/2024    Steroid-induced hyperglycemia 12/04/2023    Acute respiratory failure with hypoxia ([facility]) 12/03/2023    Bilateral leg edema 11/28/2023    Acute hypoxic respiratory failure ([facility]) 10/05/2023    Type 2 diabetes mellitus with chronic kidney disease ([facility]) 12/15/2022    CKD (chronic kidney disease) stage 3, GFR 30-59 ml/min ([facility]) 03/10/2022    Major depressive

## 2024-07-15 ENCOUNTER — OFFICE VISIT (OUTPATIENT)
Age: 74
End: 2024-07-15
Payer: MEDICARE

## 2024-07-15 ENCOUNTER — CARE COORDINATION (OUTPATIENT)
Dept: CARE COORDINATION | Age: 74
End: 2024-07-15

## 2024-07-15 VITALS
BODY MASS INDEX: 39.38 KG/M2 | SYSTOLIC BLOOD PRESSURE: 128 MMHG | HEART RATE: 66 BPM | OXYGEN SATURATION: 98 % | WEIGHT: 214 LBS | HEIGHT: 62 IN | TEMPERATURE: 97 F | DIASTOLIC BLOOD PRESSURE: 60 MMHG | RESPIRATION RATE: 14 BRPM

## 2024-07-15 DIAGNOSIS — I50.30 DIASTOLIC CONGESTIVE HEART FAILURE, UNSPECIFIED HF CHRONICITY (HCC): ICD-10-CM

## 2024-07-15 DIAGNOSIS — Z79.4 TYPE 2 DIABETES MELLITUS WITH STAGE 3 CHRONIC KIDNEY DISEASE, WITH LONG-TERM CURRENT USE OF INSULIN, UNSPECIFIED WHETHER STAGE 3A OR 3B CKD (HCC): Primary | ICD-10-CM

## 2024-07-15 DIAGNOSIS — I10 HYPERTENSION, UNSPECIFIED TYPE: ICD-10-CM

## 2024-07-15 DIAGNOSIS — M54.30 SCIATICA, UNSPECIFIED LATERALITY: ICD-10-CM

## 2024-07-15 DIAGNOSIS — E78.00 PURE HYPERCHOLESTEROLEMIA: ICD-10-CM

## 2024-07-15 DIAGNOSIS — N18.30 TYPE 2 DIABETES MELLITUS WITH STAGE 3 CHRONIC KIDNEY DISEASE, WITH LONG-TERM CURRENT USE OF INSULIN, UNSPECIFIED WHETHER STAGE 3A OR 3B CKD (HCC): Primary | ICD-10-CM

## 2024-07-15 DIAGNOSIS — D64.9 CHRONIC ANEMIA: ICD-10-CM

## 2024-07-15 DIAGNOSIS — Z00.00 MEDICARE ANNUAL WELLNESS VISIT, SUBSEQUENT: ICD-10-CM

## 2024-07-15 DIAGNOSIS — E11.22 TYPE 2 DIABETES MELLITUS WITH STAGE 3 CHRONIC KIDNEY DISEASE, WITH LONG-TERM CURRENT USE OF INSULIN, UNSPECIFIED WHETHER STAGE 3A OR 3B CKD (HCC): Primary | ICD-10-CM

## 2024-07-15 DIAGNOSIS — J45.909 UNCOMPLICATED ASTHMA, UNSPECIFIED ASTHMA SEVERITY, UNSPECIFIED WHETHER PERSISTENT: ICD-10-CM

## 2024-07-15 PROCEDURE — 99214 OFFICE O/P EST MOD 30 MIN: CPT | Performed by: INTERNAL MEDICINE

## 2024-07-15 PROCEDURE — G8399 PT W/DXA RESULTS DOCUMENT: HCPCS | Performed by: INTERNAL MEDICINE

## 2024-07-15 PROCEDURE — 3078F DIAST BP <80 MM HG: CPT | Performed by: INTERNAL MEDICINE

## 2024-07-15 PROCEDURE — 3046F HEMOGLOBIN A1C LEVEL >9.0%: CPT | Performed by: INTERNAL MEDICINE

## 2024-07-15 PROCEDURE — 3074F SYST BP LT 130 MM HG: CPT | Performed by: INTERNAL MEDICINE

## 2024-07-15 PROCEDURE — G8427 DOCREV CUR MEDS BY ELIG CLIN: HCPCS | Performed by: INTERNAL MEDICINE

## 2024-07-15 PROCEDURE — 3017F COLORECTAL CA SCREEN DOC REV: CPT | Performed by: INTERNAL MEDICINE

## 2024-07-15 PROCEDURE — G0439 PPPS, SUBSEQ VISIT: HCPCS | Performed by: INTERNAL MEDICINE

## 2024-07-15 PROCEDURE — 1090F PRES/ABSN URINE INCON ASSESS: CPT | Performed by: INTERNAL MEDICINE

## 2024-07-15 PROCEDURE — 1036F TOBACCO NON-USER: CPT | Performed by: INTERNAL MEDICINE

## 2024-07-15 PROCEDURE — G8417 CALC BMI ABV UP PARAM F/U: HCPCS | Performed by: INTERNAL MEDICINE

## 2024-07-15 PROCEDURE — 1123F ACP DISCUSS/DSCN MKR DOCD: CPT | Performed by: INTERNAL MEDICINE

## 2024-07-15 PROCEDURE — 2022F DILAT RTA XM EVC RTNOPTHY: CPT | Performed by: INTERNAL MEDICINE

## 2024-07-15 RX ORDER — ROSUVASTATIN CALCIUM 20 MG/1
TABLET, COATED ORAL
Qty: 90 TABLET | Refills: 3 | Status: SHIPPED | OUTPATIENT
Start: 2024-07-15

## 2024-07-15 RX ORDER — PEN NEEDLE, DIABETIC 31 GX5/16"
NEEDLE, DISPOSABLE MISCELLANEOUS
Qty: 100 EACH | Refills: 0 | Status: SHIPPED | OUTPATIENT
Start: 2024-07-15

## 2024-07-15 RX ORDER — GABAPENTIN 100 MG/1
100 CAPSULE ORAL
Qty: 30 CAPSULE | Refills: 5 | Status: SHIPPED | OUTPATIENT
Start: 2024-07-15 | End: 2025-01-11

## 2024-07-15 ASSESSMENT — PATIENT HEALTH QUESTIONNAIRE - PHQ9
SUM OF ALL RESPONSES TO PHQ QUESTIONS 1-9: 0
5. POOR APPETITE OR OVEREATING: NOT AT ALL
SUM OF ALL RESPONSES TO PHQ QUESTIONS 1-9: 0
9. THOUGHTS THAT YOU WOULD BE BETTER OFF DEAD, OR OF HURTING YOURSELF: NOT AT ALL
2. FEELING DOWN, DEPRESSED OR HOPELESS: NOT AT ALL
SUM OF ALL RESPONSES TO PHQ QUESTIONS 1-9: 0
SUM OF ALL RESPONSES TO PHQ QUESTIONS 1-9: 0
3. TROUBLE FALLING OR STAYING ASLEEP: NOT AT ALL
1. LITTLE INTEREST OR PLEASURE IN DOING THINGS: NOT AT ALL
4. FEELING TIRED OR HAVING LITTLE ENERGY: NOT AT ALL
SUM OF ALL RESPONSES TO PHQ9 QUESTIONS 1 & 2: 0
6. FEELING BAD ABOUT YOURSELF - OR THAT YOU ARE A FAILURE OR HAVE LET YOURSELF OR YOUR FAMILY DOWN: NOT AT ALL
7. TROUBLE CONCENTRATING ON THINGS, SUCH AS READING THE NEWSPAPER OR WATCHING TELEVISION: NOT AT ALL
10. IF YOU CHECKED OFF ANY PROBLEMS, HOW DIFFICULT HAVE THESE PROBLEMS MADE IT FOR YOU TO DO YOUR WORK, TAKE CARE OF THINGS AT HOME, OR GET ALONG WITH OTHER PEOPLE: NOT DIFFICULT AT ALL
8. MOVING OR SPEAKING SO SLOWLY THAT OTHER PEOPLE COULD HAVE NOTICED. OR THE OPPOSITE, BEING SO FIGETY OR RESTLESS THAT YOU HAVE BEEN MOVING AROUND A LOT MORE THAN USUAL: NOT AT ALL

## 2024-07-15 NOTE — CARE COORDINATION
07/15/24 9:52 AM Patient is currently enrolled in Remote Patient Monitoring for COPD, Diabetes, and HTN.  Pt has a scheduled office visit today at 11:30 am.  RPM metrics added for review.

## 2024-07-15 NOTE — PROGRESS NOTES
Chief Complaint   Patient presents with    Medicare AWV       \"Have you been to the ER, urgent care clinic since your last visit?  Hospitalized since your last visit?\"    NO    “Have you seen or consulted any other health care providers outside of [facility] since your last visit?”    NO            Click Here for Release of Records Request

## 2024-07-16 LAB
ANION GAP SERPL CALC-SCNC: 3 MMOL/L (ref 5–15)
BUN SERPL-MCNC: 21 MG/DL (ref 6–20)
BUN/CREAT SERPL: 15 (ref 12–20)
CALCIUM SERPL-MCNC: 9.7 MG/DL (ref 8.5–10.1)
CHLORIDE SERPL-SCNC: 102 MMOL/L (ref 97–108)
CHOLEST SERPL-MCNC: 170 MG/DL
CO2 SERPL-SCNC: 40 MMOL/L (ref 21–32)
CREAT SERPL-MCNC: 1.36 MG/DL (ref 0.55–1.02)
GLUCOSE SERPL-MCNC: 264 MG/DL (ref 65–100)
HDLC SERPL-MCNC: 76 MG/DL
HDLC SERPL: 2.2 (ref 0–5)
LDLC SERPL CALC-MCNC: 75.6 MG/DL (ref 0–100)
POTASSIUM SERPL-SCNC: 3.2 MMOL/L (ref 3.5–5.1)
SODIUM SERPL-SCNC: 145 MMOL/L (ref 136–145)
TRIGL SERPL-MCNC: 92 MG/DL
VLDLC SERPL CALC-MCNC: 18.4 MG/DL

## 2024-07-16 RX ORDER — POTASSIUM CHLORIDE 20 MEQ/1
20 TABLET, EXTENDED RELEASE ORAL 2 TIMES DAILY
Qty: 180 TABLET | Refills: 3 | Status: SHIPPED | OUTPATIENT
Start: 2024-07-16

## 2024-07-25 ENCOUNTER — OFFICE VISIT (OUTPATIENT)
Age: 74
End: 2024-07-25

## 2024-07-25 VITALS
TEMPERATURE: 97.8 F | SYSTOLIC BLOOD PRESSURE: 182 MMHG | OXYGEN SATURATION: 100 % | HEART RATE: 75 BPM | DIASTOLIC BLOOD PRESSURE: 80 MMHG

## 2024-07-25 DIAGNOSIS — S93.402A SPRAIN OF LEFT ANKLE, UNSPECIFIED LIGAMENT, INITIAL ENCOUNTER: Primary | ICD-10-CM

## 2024-07-25 DIAGNOSIS — S99.912A INJURY OF LEFT ANKLE, INITIAL ENCOUNTER: ICD-10-CM

## 2024-07-25 RX ORDER — FLUTICASONE PROPIONATE AND SALMETEROL 250; 50 UG/1; UG/1
1 POWDER RESPIRATORY (INHALATION) 2 TIMES DAILY
COMMUNITY
Start: 2024-06-30

## 2024-07-25 NOTE — PROGRESS NOTES
Elvia Clifford (:  1950) is a 73 y.o. female,Established patient, here for evaluation of the following chief complaint(s):  Ankle Pain (Left ankle pain x2 days. Twisted ankle in her sandal. )      Assessment & Plan :  Visit Diagnoses and Associated Orders       Sprain of left ankle, unspecified ligament, initial encounter    -  Primary    BANDAGE ACE 4\" [GXH416 Custom]           Injury of left ankle, initial encounter        XR ANKLE LEFT (MIN 3 VIEWS) [05052 CPT(R)]   - Future Order         ORDERS WITHOUT AN ASSOCIATED DIAGNOSIS    fluticasone-salmeterol (ADVAIR) 250-50 MCG/ACT AEPB diskus inhaler [801423]            X-ray shows soft tissue swelling without evidence of fracture or dislocation  Will diagnose as acute left ankle sprain  RICE: Rest, ice, compression and elevation  Ice for 15 to 20 minutes at a time several times per day  Tylenol over-the-counter as needed for pain  Ace wrap applied  Please follow-up with orthopedics if symptoms persist beyond 1 to 2 weeks       Subjective :    Ankle Pain          73 y.o. female presents with symptoms of left ankle pain for the past 2 days.  She states she was walking in some sandals and rolled her ankle in an inversion style injury.  She is now having pain and tenderness to the medial aspect of the ankle.  She does report some chronic numbness/tingling to the foot related to neuropathy.  She denies any new numbness or tingling after this injury.  She is brought to exam room in wheelchair today and has expressed difficulty with ambulation.         Vitals:    24 1509 24 1616   BP: (!) 182/79 (!) 182/80   Site: Left Upper Arm Left Upper Arm   Position: Sitting Sitting   Cuff Size: Medium Adult Medium Adult   Pulse: 75    Temp: 97.8 °F (36.6 °C)    SpO2: 100%    Weight: Comment: deferred, unable to stand        Xray Result (most recent):  XR ANKLE LEFT (MIN 3 VIEWS) 2024    Narrative  Exam  Description: Three views of the ankle

## 2024-07-25 NOTE — PATIENT INSTRUCTIONS
X-ray shows soft tissue swelling without evidence of fracture or dislocation  Will diagnose as acute left ankle sprain  RICE: Rest, ice, compression and elevation  Ice for 15 to 20 minutes at a time several times per day  Tylenol over-the-counter as needed for pain  Ace wrap applied  Please follow-up with orthopedics if symptoms persist beyond 1 to 2 weeks

## 2024-08-01 ENCOUNTER — CARE COORDINATION (OUTPATIENT)
Dept: CARE COORDINATION | Age: 74
End: 2024-08-01

## 2024-08-01 NOTE — CARE COORDINATION
Remote Alert Monitoring Note  Date/Time:  2024 10:45 AM  Patient Current Location: [place]  Verified patients name and  as identifiers.  Rpm alert to be reviewed by the provider   red alert  glucose reading (69)  Vitals Recheck glucose reading (158)  Additional needs to be addressed by provider: No     LPN contacted patient by telephone regarding red alert received   Background: Pt enrolled in RPM r/t COPD, HTN, DM.  Refer to 911 immediately if:  Patient unresponsive or unable to provide history  Change in cognition or sudden confusion  Patient unable to respond in complete sentences  Intense chest pain/tightness  Any concern for any clinical emergency  Red Alert: Provider response time of 1 hr required for any red alert requiring intervention  Yellow Alert: Provider response time of 3hr required for any escalated yellow alert  Patient Chief Complaint:  Hypoglycemia:none  Clinical Interventions: Reviewed and followed up on alerts and treatments-Spoke with pt who is in no apparent distress and offers no complaints at this time.  States she did not eat much yesterday and glucose low this morning.  Per patient, she is taking Jardiance 10 mg daily with lunch and Toujeo Solostar 30 units in the morning.  She did take her insulin this morning and had breakfast of colvin, eggs and a biscuit.  She just replaced CGM sensor and unable to retest for another 15 minutes.  Pt will update glucose reading as soon as sensor is ready.  Rechecked glucose at 12:08 pm with updated reading of 158.  Glucose now within RPM parameters.      Plan/Follow Up: Will continue to review, monitor and address alerts with follow up based on severity of symptoms and risk factors.  **For any new or worsening symptoms or you are concerned in anyway, please contact your Provider or report to the nearest Emergency Room.**

## 2024-08-12 ENCOUNTER — CARE COORDINATION (OUTPATIENT)
Dept: CARE COORDINATION | Age: 74
End: 2024-08-12

## 2024-08-12 NOTE — CARE COORDINATION
Remote Alert Monitoring Note  Date/Time:  2024 9:52 AM  Patient Current Location: [place]  Verified patients name and  as identifiers.  Rpm alert to be reviewed by the provider   red alert  blood pressure reading (181/72)  Vitals Recheck blood pressure reading (164/61)  Additional needs to be addressed by provider: No     LPN contacted patient by telephone regarding red alert received   Background: Pt enrolled in RPM r/t HTN, COPD, DM.  Refer to 911 immediately if:  Patient unresponsive or unable to provide history  Change in cognition or sudden confusion  Patient unable to respond in complete sentences  Intense chest pain/tightness  Any concern for any clinical emergency  Red Alert: Provider response time of 1 hr required for any red alert requiring intervention  Yellow Alert: Provider response time of 3hr required for any escalated yellow alert  Patient Chief Complaint:  BP Triage  Are you having any Chest Pain? no   Are you having any Shortness of Breath? no   Do you have a headache or have any vision changes? no   Are you having any numbness or tingling? no   Are you having any other health concerns or issues? no   Clinical Interventions: Reviewed and followed up on alerts and treatments-Spoke with pt who is in no apparent distress and offers no complaints at this time.  Pt wearing home O2 at 3L and denies any SOB/dyspnea at this time.  Reviewed HTN medications and pt taking Carvedilol 12.5 mg BID with 2 tabs in am and 1 tab in pm, Lasix 20 mg takes 2 tabs daily and Losartan 100 mg daily.  Pt states she took all morning doses at around 8:30 am.  She is agreeable to recheck BP at this time with updated reading of 164/71.  BP now within RPM parameters.  Pt education provided to take HTN medications approximately 1 1/2-2 hours prior to checking BP.  Pt verbalizes understanding.    Plan/Follow Up: Will continue to review, monitor and address alerts with follow up based on severity of symptoms and risk

## 2024-08-30 ENCOUNTER — CARE COORDINATION (OUTPATIENT)
Dept: CARE COORDINATION | Age: 74
End: 2024-08-30

## 2024-08-30 ENCOUNTER — OFFICE VISIT (OUTPATIENT)
Age: 74
End: 2024-08-30
Payer: MEDICARE

## 2024-08-30 VITALS
BODY MASS INDEX: 40.3 KG/M2 | HEART RATE: 60 BPM | OXYGEN SATURATION: 100 % | HEIGHT: 62 IN | RESPIRATION RATE: 18 BRPM | SYSTOLIC BLOOD PRESSURE: 163 MMHG | WEIGHT: 219 LBS | DIASTOLIC BLOOD PRESSURE: 59 MMHG

## 2024-08-30 DIAGNOSIS — E11.8 TYPE 2 DIABETES MELLITUS WITH UNSPECIFIED COMPLICATIONS (HCC): Primary | ICD-10-CM

## 2024-08-30 DIAGNOSIS — R35.0 URINE FREQUENCY: Primary | ICD-10-CM

## 2024-08-30 LAB — HBA1C MFR BLD: 8.6 %

## 2024-08-30 PROCEDURE — 3078F DIAST BP <80 MM HG: CPT | Performed by: INTERNAL MEDICINE

## 2024-08-30 PROCEDURE — 1123F ACP DISCUSS/DSCN MKR DOCD: CPT | Performed by: INTERNAL MEDICINE

## 2024-08-30 PROCEDURE — G8417 CALC BMI ABV UP PARAM F/U: HCPCS | Performed by: INTERNAL MEDICINE

## 2024-08-30 PROCEDURE — 1036F TOBACCO NON-USER: CPT | Performed by: INTERNAL MEDICINE

## 2024-08-30 PROCEDURE — 3017F COLORECTAL CA SCREEN DOC REV: CPT | Performed by: INTERNAL MEDICINE

## 2024-08-30 PROCEDURE — 3077F SYST BP >= 140 MM HG: CPT | Performed by: INTERNAL MEDICINE

## 2024-08-30 PROCEDURE — 99214 OFFICE O/P EST MOD 30 MIN: CPT | Performed by: INTERNAL MEDICINE

## 2024-08-30 PROCEDURE — 3046F HEMOGLOBIN A1C LEVEL >9.0%: CPT | Performed by: INTERNAL MEDICINE

## 2024-08-30 PROCEDURE — 2022F DILAT RTA XM EVC RTNOPTHY: CPT | Performed by: INTERNAL MEDICINE

## 2024-08-30 PROCEDURE — 1090F PRES/ABSN URINE INCON ASSESS: CPT | Performed by: INTERNAL MEDICINE

## 2024-08-30 PROCEDURE — G8427 DOCREV CUR MEDS BY ELIG CLIN: HCPCS | Performed by: INTERNAL MEDICINE

## 2024-08-30 PROCEDURE — 83036 HEMOGLOBIN GLYCOSYLATED A1C: CPT | Performed by: INTERNAL MEDICINE

## 2024-08-30 PROCEDURE — G8399 PT W/DXA RESULTS DOCUMENT: HCPCS | Performed by: INTERNAL MEDICINE

## 2024-08-30 RX ORDER — DIPHENHYDRAMINE HYDROCHLORIDE 25 MG/1
1 CAPSULE, LIQUID FILLED ORAL DAILY
Qty: 1 KIT | Refills: 0 | Status: SHIPPED | OUTPATIENT
Start: 2024-08-30

## 2024-08-30 RX ORDER — GLUCOSAMINE HCL/CHONDROITIN SU 500-400 MG
CAPSULE ORAL
Qty: 100 STRIP | Refills: 3 | Status: SHIPPED | OUTPATIENT
Start: 2024-08-30

## 2024-08-30 NOTE — CARE COORDINATION
Remote Alert Monitoring Note  Date/Time:  8/30/2024 10:50 AM  Patient Current Location: [place]  Rpm alert to be reviewed by the provider   red alert  weight (increase of 3 lbs x 1 day, 5 lbs x 7 days)  Additional needs to be addressed by provider: No     Background: Pt enrolled in RPM r/t COPD, HTN, DM.  Clinical Interventions: Reviewed and followed up on alerts and treatments-Per chart review, inaccurate weight of 9.2 lbs recorded on 08/29/24.  In accurate weight removed from RPM metrics and weight alert resolved.  All entered metrics today are within RPM parameters.  No outreach needed at this time.    Plan/Follow Up: Will continue to review, monitor and address alerts with follow up based on severity of symptoms and risk factors.                  none

## 2024-08-30 NOTE — PROGRESS NOTES
Identified pt with two pt identifiers(name and ). Reviewed record in preparation for visit and have obtained necessary documentation. All patient medications has been reviewed.  Chief Complaint   Patient presents with    Follow-up    Diabetes           Wt Readings from Last 3 Encounters:   24 99.3 kg (219 lb)   07/15/24 97.1 kg (214 lb)   24 96.2 kg (212 lb)     Temp Readings from Last 3 Encounters:   24 97.8 °F (36.6 °C)   07/15/24 97 °F (36.1 °C) (Temporal)   24 97 °F (36.1 °C) (Temporal)     BP Readings from Last 3 Encounters:   24 (!) 163/59   24 (!) 182/80   07/15/24 128/60     Pulse Readings from Last 3 Encounters:   24 60   24 75   07/15/24 66       \"Have you been to the ER, urgent care clinic since your last visit?  Hospitalized since your last visit?\"    Yes     “Have you seen or consulted any other health care providers outside of [facility] since your last visit?”    Yes Ophthalmology 2024    Click Here for Release of Records Request

## 2024-08-30 NOTE — PROGRESS NOTES
Ms. Clifford returns for follow-up of her type 2 diabetes mellitus x 14 years with poor blood sugar control.    Her A1c was 9.7% in September. Her A1c was  8.9%  in March 2021 which is the lowest we have ever had.,   Her A1c in October was 9.3%. When I saw her last her A1c was 8.0%.  Her A1c today is 8.6%.         Recent laboratory tests also remarkable for creatinine of 1.30 with a GFR of 44.  LFTs normal.  She was also found to have CO2 of >45.     She was hospitalized December 2023 for shortness of breath (hypoxia and hypercapnia) and was started on BiPAP which she continues to use.     Since I saw her last, she received some iron infusions and the iron infusions apparently contain glucocorticoids which raised her blood sugar.  This was in March.  She had been doing better.  However because she been doing better, she apparently has loosened up on her diet.  She is eating more French fries and drinking a lot more sweet tea and so she is not terribly surprised that her A1c is increased.    Current medications  Toujeo insulin 30 units in AM    Humalog insulin 20-30 units before each meal  Jardiance 10 mg (just started)   Freestyle Freedom 2    Examination  Blood pressure 163/59  Pulse 60  Weight 99 kg  BMI 40.0  HEENT unremarkable  Lungs clear  Heart reveals a regular rate and rhythm  Abdomen benign  Extremities unremarkable  Diabetic foot exam:   Left Foot:   Visual Exam: normal   Pulse DP: 2+ (normal)   Filament test: reduced sensation   Vibratory Sensation: diminished  Right Foot:   Visual Exam: normal   Pulse DP: 2+ (normal)   Filament test: reduced sensation   Vibratory Sensation: diminished     Impression  1.  Type 2 diabetes mellitus with worsening glucose control related to a significant increase in carbohydrates and particularly French fries and sweet tea  2.  Obesity  3.  Heart failure with preserved ejection fraction    Plan:  1.  I asked her to at least the sweet tea and asked her to decrease the amount  of potatoes in her diet  2.  She does admit that she has been asking her daughter to bring her best food from fast food places and then she eats it and then is sorry that her blood sugars so I  3.  I will see her back in follow-up.  4.  I did send a fingerstick glucose meter and strips as backup for her freestyle les    Please note that this dictation was completed with [employer], the computer voice recognition software.  Quite often unanticipated grammatical, syntax, homophones, and other interpretive errors are inadvertently transcribed by the computer software.  Please disregard these errors.  Please excuse any errors that have escaped final proofreading.

## 2024-09-03 ENCOUNTER — CARE COORDINATION (OUTPATIENT)
Dept: CASE MANAGEMENT | Age: 74
End: 2024-09-03

## 2024-09-03 NOTE — CARE COORDINATION
-Remote Alert Monitoring Note      Date/Time:  9/3/2024 11:06 AM  Patient Current Location: Home: [address] [facility] [address]  Verified patients name and  as identifiers.    Rpm alert to be reviewed by the provider   red alert  blood pressure heart rate (49)  Vitals Recheck blood pressure heart rate (68)  Additional needs to be addressed by provider: No                   LPN contacted patient by telephone regarding red alert received   Background:   Refer to 911 immediately if:  Patient unresponsive or unable to provide history  Change in cognition or sudden confusion  Patient unable to respond in complete sentences  Intense chest pain/tightness  Any concern for any clinical emergency  Red Alert: Provider response time of 1 hr required for any red alert requiring intervention  Yellow Alert: Provider response time of 3hr required for any escalated yellow alert  Patient Chief Complaint:  HR Triage  Are you having any Chest Pain? no   Are you having any Shortness of Breath? no   Are you having any dizziness? no   Are you feeling more fatigued or tired than normal? no   Are you having any other health concerns or issues? no     Clinical Interventions: Reviewed and followed up on alerts and treatments-Spoke to Elvia regarding RPM red alert for low HR. Pt denies chest pain, dyspnea, dizziness or fatigue.   Requested pt recheck her BP metrics, new reading is 155/69  HR 68, all metrics WNL   Plan/Follow Up: Will continue to review, monitor and address alerts with follow up based on severity of symptoms and risk factors.  **For any new or worsening symptoms or you are concerned in anyway, please contact your Provider or report to the nearest Emergency Room.**

## 2024-09-04 ENCOUNTER — CARE COORDINATION (OUTPATIENT)
Dept: CASE MANAGEMENT | Age: 74
End: 2024-09-04

## 2024-09-04 NOTE — CARE COORDINATION
-Remote Alert Monitoring Note      Date/Time:  2024 11:43 AM  Patient Current Location: Home: [address] [facility] [address]  Verified patients name and  as identifiers.    Rpm alert to be reviewed by the provider   red alert  weight (increase of 4 pounds in 1 day)  Vitals Recheck n/a  Additional needs to be addressed by provider: No                   LPN contacted patient by telephone regarding red alert received   Background: enrolled in RPM for COPD HTN AND DM  Refer to 911 immediately if:  Patient unresponsive or unable to provide history  Change in cognition or sudden confusion  Patient unable to respond in complete sentences  Intense chest pain/tightness  Any concern for any clinical emergency  Red Alert: Provider response time of 1 hr required for any red alert requiring intervention  Yellow Alert: Provider response time of 3hr required for any escalated yellow alert  Patient Chief Complaint:  Weight Scale Triage  Was your weight obtained upon rising/waking today? YES   Was your weight obtained after voiding and/or use of the bathroom today? yes   Did you weigh yourself in the same amount of clothing today, compared to how you typically do? yes   Was the scale bumped or moved prior to today's weight? Yes  MOVED   Is your scale on a flat/hard surface? yes   Did you obtain your weight with shoes on? no   If yes, is this something you normally do during your daily weights? yes   Were you standing up straight on the scale today? yes   Were you leaning on anything while obtaining your weight today? Yes WINDOW SILL     Clinical Interventions: Reviewed and followed up on alerts and treatments-SPOKE TO Elvia regarding weight increase of 4 pounds in 1 day.   Review of chart. DX of HF.  Denies chest pain dyspnea, or increased swelling in feet or abdomen. Pt reports her left foot is \"always swollen\". Pt weight today 217# yesterday 213#  day before (24) 217#, pt reports her scale was moved

## 2024-09-06 ENCOUNTER — OFFICE VISIT (OUTPATIENT)
Age: 74
End: 2024-09-06

## 2024-09-06 VITALS
OXYGEN SATURATION: 97 % | TEMPERATURE: 98.2 F | BODY MASS INDEX: 39.51 KG/M2 | HEART RATE: 51 BPM | WEIGHT: 216 LBS | DIASTOLIC BLOOD PRESSURE: 67 MMHG | RESPIRATION RATE: 18 BRPM | SYSTOLIC BLOOD PRESSURE: 162 MMHG

## 2024-09-06 DIAGNOSIS — M25.531 RIGHT WRIST PAIN: ICD-10-CM

## 2024-09-06 DIAGNOSIS — I10 PRIMARY HYPERTENSION: ICD-10-CM

## 2024-09-06 DIAGNOSIS — G56.01 CARPAL TUNNEL SYNDROME OF RIGHT WRIST: Primary | ICD-10-CM

## 2024-09-06 ASSESSMENT — ENCOUNTER SYMPTOMS
RESPIRATORY NEGATIVE: 1
ALLERGIC/IMMUNOLOGIC NEGATIVE: 1
GASTROINTESTINAL NEGATIVE: 1
EYES NEGATIVE: 1

## 2024-09-06 NOTE — PATIENT INSTRUCTIONS
Thank you for visiting [facility] today.    -Ibuprofen/Tylenol  -Wear wrist brace as indicated    Follow up with orthopedics in 5-7 days.       Elevated Blood Pressure: Care Instructions  Your Care Instructions    Blood pressure is a measure of how hard the blood pushes against the walls of your arteries. It's normal for blood pressure to go up and down throughout the day. But if it stays up over time, you have high blood pressure.  Two numbers tell you your blood pressure. The first number is the systolic pressure. It shows how hard the blood pushes when your heart is pumping. The second number is the diastolic pressure. It shows how hard the blood pushes between heartbeats, when your heart is relaxed and filling with blood. An ideal blood pressure in adults is less than 120/80 (say \"120 over 80\"). High blood pressure is 140/90 or higher. You have high blood pressure if your top number is 140 or higher or your bottom number is 90 or higher, or both.  The main test for high blood pressure is simple, fast, and painless. To diagnose high blood pressure, your doctor will test your blood pressure at different times. After testing your blood pressure, your doctor may ask you to test it again when you are home.  If you are diagnosed with high blood pressure, you can work with your doctor to make a long-term plan to manage it.  Follow-up care is a key part of your treatment and safety. Be sure to make and go to all appointments, and call your doctor if you are having problems. It's also a good idea to know your test results and keep a list of the medicines you take.  How can you care for yourself at home?  Do not smoke. Smoking increases your risk for heart attack and stroke. If you need help quitting, talk to your doctor about stop-smoking programs and medicines. These can increase your chances of quitting for good.  Stay at a healthy weight.  Try to limit how much sodium you eat to less than 2,300 milligrams

## 2024-09-06 NOTE — PROGRESS NOTES
on 8/30/2024)      aspirin 81 MG EC tablet Take 1 tablet by mouth daily 1 tab      carvedilol (COREG) 12.5 MG tablet Take 1 tablet by mouth 2 times daily (with meals) 2 tab in am, 1 tab in pm       No current facility-administered medications for this visit.        Past Medical History:   Diagnosis Date    Asthma     CAD (coronary artery disease)     \"mild\" per Dr Veliz note    CKD (chronic kidney disease) stage 3, GFR 30-59 ml/min ([facility]) 03/10/2022    Diabetes ([facility])     Dr Honeycutt     Hx of blood clots     Hyperlipidemia     Hypertension     Long term current use of anticoagulant therapy     Nausea & vomiting     Pulmonary embolism ([facility])     Screening for colon cancer 02/16/2005    Dr Ocasio-normal-repeat in 10 years    Stroke ([facility]) 2004    Rt side weaker than left, uses a cane: no longer followed by neuro    Thromboembolus ([facility]) 1976    Rt leg moved to lung     Thyroid disease     Unspecified sleep apnea     uses CPAP        Past Surgical History:   Procedure Laterality Date    CARDIAC CATHETERIZATION  6/6/2012         CARDIAC CATHETERIZATION      COLONOSCOPY N/A 10/24/2022    COLONOSCOPY performed by Clay Ocasio MD at [facility] ENDOSCOPY    COLONOSCOPY,AMAYA MARIO,SNARE  10/24/2022    HEENT      tonsillectomy    HEMORRHOID SURGERY      HYSTERECTOMY (CERVIX STATUS UNKNOWN)      ORTHOPEDIC SURGERY  8/2011    left shoulder    [place] UNLISTED PROCEDURE CARDIAC SURGERY      heart surgery        Social History:   Social Connections: Feeling Socially Integrated (5/8/2024)    OASIS : Social Isolation     Frequency of experiencing loneliness or isolation: Never   Recent Concern: Social Connections - Feeling Somewhat Isolated (3/27/2024)    OASIS : Social Isolation     Frequency of experiencing loneliness or isolation: Sometimes        Patient Care Team:  Robby Antoine MD as PCP - General  Robby Antoine MD as PCP - Empaneled Provider  Cornell Veliz MD as Physician  Elliott Alejandre MD as Surgeon  Gisela Carr MD

## 2024-09-09 ENCOUNTER — CARE COORDINATION (OUTPATIENT)
Dept: CARE COORDINATION | Age: 74
End: 2024-09-09

## 2024-09-16 ENCOUNTER — OFFICE VISIT (OUTPATIENT)
Age: 74
End: 2024-09-16
Payer: MEDICARE

## 2024-09-16 VITALS
DIASTOLIC BLOOD PRESSURE: 75 MMHG | SYSTOLIC BLOOD PRESSURE: 144 MMHG | TEMPERATURE: 97.3 F | OXYGEN SATURATION: 94 % | BODY MASS INDEX: 39.4 KG/M2 | RESPIRATION RATE: 16 BRPM | HEIGHT: 62 IN | HEART RATE: 71 BPM

## 2024-09-16 DIAGNOSIS — R60.0 LOWER EXTREMITY EDEMA: Primary | ICD-10-CM

## 2024-09-16 PROCEDURE — 3077F SYST BP >= 140 MM HG: CPT | Performed by: INTERNAL MEDICINE

## 2024-09-16 PROCEDURE — G8427 DOCREV CUR MEDS BY ELIG CLIN: HCPCS | Performed by: INTERNAL MEDICINE

## 2024-09-16 PROCEDURE — 3017F COLORECTAL CA SCREEN DOC REV: CPT | Performed by: INTERNAL MEDICINE

## 2024-09-16 PROCEDURE — G8399 PT W/DXA RESULTS DOCUMENT: HCPCS | Performed by: INTERNAL MEDICINE

## 2024-09-16 PROCEDURE — 1090F PRES/ABSN URINE INCON ASSESS: CPT | Performed by: INTERNAL MEDICINE

## 2024-09-16 PROCEDURE — 3078F DIAST BP <80 MM HG: CPT | Performed by: INTERNAL MEDICINE

## 2024-09-16 PROCEDURE — 1036F TOBACCO NON-USER: CPT | Performed by: INTERNAL MEDICINE

## 2024-09-16 PROCEDURE — 99213 OFFICE O/P EST LOW 20 MIN: CPT | Performed by: INTERNAL MEDICINE

## 2024-09-16 PROCEDURE — G8417 CALC BMI ABV UP PARAM F/U: HCPCS | Performed by: INTERNAL MEDICINE

## 2024-09-16 PROCEDURE — 1123F ACP DISCUSS/DSCN MKR DOCD: CPT | Performed by: INTERNAL MEDICINE

## 2024-09-16 RX ORDER — FUROSEMIDE 20 MG
40 TABLET ORAL DAILY
Qty: 180 TABLET | Refills: 3 | Status: ON HOLD | OUTPATIENT
Start: 2024-09-16

## 2024-09-16 RX ORDER — METOLAZONE 2.5 MG/1
2.5 TABLET ORAL WEEKLY
Qty: 30 TABLET | Refills: 3 | Status: ON HOLD | OUTPATIENT
Start: 2024-09-16

## 2024-09-20 ENCOUNTER — APPOINTMENT (OUTPATIENT)
Facility: HOSPITAL | Age: 74
DRG: 291 | End: 2024-09-20
Payer: MEDICARE

## 2024-09-20 ENCOUNTER — HOSPITAL ENCOUNTER (INPATIENT)
Facility: HOSPITAL | Age: 74
LOS: 5 days | Discharge: NURSING, REHAB OR LONG-TERM CARE | DRG: 291 | End: 2024-09-25
Attending: EMERGENCY MEDICINE | Admitting: HOSPITALIST
Payer: MEDICARE

## 2024-09-20 DIAGNOSIS — M79.89 LEG SWELLING: ICD-10-CM

## 2024-09-20 DIAGNOSIS — I21.4 NSTEMI (NON-ST ELEVATED MYOCARDIAL INFARCTION) (HCC): ICD-10-CM

## 2024-09-20 DIAGNOSIS — E87.6 HYPOKALEMIA: ICD-10-CM

## 2024-09-20 DIAGNOSIS — I50.9 CONGESTIVE HEART FAILURE, UNSPECIFIED HF CHRONICITY, UNSPECIFIED HEART FAILURE TYPE (HCC): ICD-10-CM

## 2024-09-20 DIAGNOSIS — I50.33 ACUTE ON CHRONIC DIASTOLIC HEART FAILURE (HCC): Primary | ICD-10-CM

## 2024-09-20 LAB
ALBUMIN SERPL-MCNC: 2.6 G/DL (ref 3.5–5)
ALBUMIN/GLOB SERPL: 0.7 (ref 1.1–2.2)
ALP SERPL-CCNC: 125 U/L (ref 45–117)
ALT SERPL-CCNC: 11 U/L (ref 12–78)
ANION GAP SERPL CALC-SCNC: 3 MMOL/L (ref 2–12)
AST SERPL-CCNC: 7 U/L (ref 15–37)
BASOPHILS # BLD: 0.1 K/UL (ref 0–0.1)
BASOPHILS NFR BLD: 1 % (ref 0–1)
BILIRUB SERPL-MCNC: 0.6 MG/DL (ref 0.2–1)
BUN SERPL-MCNC: 20 MG/DL (ref 6–20)
BUN/CREAT SERPL: 13 (ref 12–20)
CALCIUM SERPL-MCNC: 9.8 MG/DL (ref 8.5–10.1)
CHLORIDE SERPL-SCNC: 99 MMOL/L (ref 97–108)
CO2 SERPL-SCNC: 42 MMOL/L (ref 21–32)
CREAT SERPL-MCNC: 1.51 MG/DL (ref 0.55–1.02)
DIFFERENTIAL METHOD BLD: ABNORMAL
EOSINOPHIL # BLD: 0.1 K/UL (ref 0–0.4)
EOSINOPHIL NFR BLD: 2 % (ref 0–7)
ERYTHROCYTE [DISTWIDTH] IN BLOOD BY AUTOMATED COUNT: 15.1 % (ref 11.5–14.5)
GLOBULIN SER CALC-MCNC: 3.8 G/DL (ref 2–4)
GLUCOSE BLD STRIP.AUTO-MCNC: 137 MG/DL (ref 65–117)
GLUCOSE BLD STRIP.AUTO-MCNC: 241 MG/DL (ref 65–117)
GLUCOSE SERPL-MCNC: 167 MG/DL (ref 65–100)
HCT VFR BLD AUTO: 33 % (ref 35–47)
HGB BLD-MCNC: 9.3 G/DL (ref 11.5–16)
IMM GRANULOCYTES # BLD AUTO: 0 K/UL (ref 0–0.04)
IMM GRANULOCYTES NFR BLD AUTO: 0 % (ref 0–0.5)
LYMPHOCYTES # BLD: 1.2 K/UL (ref 0.8–3.5)
LYMPHOCYTES NFR BLD: 19 % (ref 12–49)
MAGNESIUM SERPL-MCNC: 2.2 MG/DL (ref 1.6–2.4)
MCH RBC QN AUTO: 22.7 PG (ref 26–34)
MCHC RBC AUTO-ENTMCNC: 28.2 G/DL (ref 30–36.5)
MCV RBC AUTO: 80.7 FL (ref 80–99)
MONOCYTES # BLD: 0.5 K/UL (ref 0–1)
MONOCYTES NFR BLD: 8 % (ref 5–13)
NEUTS SEG # BLD: 4.5 K/UL (ref 1.8–8)
NEUTS SEG NFR BLD: 70 % (ref 32–75)
NRBC # BLD: 0 K/UL (ref 0–0.01)
NRBC BLD-RTO: 0 PER 100 WBC
NT PRO BNP: 618 PG/ML
PLATELET # BLD AUTO: 220 K/UL (ref 150–400)
PMV BLD AUTO: 10.2 FL (ref 8.9–12.9)
POTASSIUM SERPL-SCNC: 2.8 MMOL/L (ref 3.5–5.1)
PROT SERPL-MCNC: 6.4 G/DL (ref 6.4–8.2)
RBC # BLD AUTO: 4.09 M/UL (ref 3.8–5.2)
RBC MORPH BLD: ABNORMAL
SERVICE CMNT-IMP: ABNORMAL
SERVICE CMNT-IMP: ABNORMAL
SODIUM SERPL-SCNC: 144 MMOL/L (ref 136–145)
TROPONIN I SERPL HS-MCNC: 319 NG/L (ref 0–51)
TROPONIN I SERPL HS-MCNC: 329 NG/L (ref 0–51)
WBC # BLD AUTO: 6.4 K/UL (ref 3.6–11)

## 2024-09-20 PROCEDURE — 2580000003 HC RX 258: Performed by: HOSPITALIST

## 2024-09-20 PROCEDURE — 6370000000 HC RX 637 (ALT 250 FOR IP): Performed by: EMERGENCY MEDICINE

## 2024-09-20 PROCEDURE — 6360000002 HC RX W HCPCS: Performed by: EMERGENCY MEDICINE

## 2024-09-20 PROCEDURE — 84484 ASSAY OF TROPONIN QUANT: CPT

## 2024-09-20 PROCEDURE — 2700000000 HC OXYGEN THERAPY PER DAY

## 2024-09-20 PROCEDURE — 96366 THER/PROPH/DIAG IV INF ADDON: CPT

## 2024-09-20 PROCEDURE — 83735 ASSAY OF MAGNESIUM: CPT

## 2024-09-20 PROCEDURE — 83880 ASSAY OF NATRIURETIC PEPTIDE: CPT

## 2024-09-20 PROCEDURE — 94640 AIRWAY INHALATION TREATMENT: CPT

## 2024-09-20 PROCEDURE — 6360000002 HC RX W HCPCS: Performed by: HOSPITALIST

## 2024-09-20 PROCEDURE — 80053 COMPREHEN METABOLIC PANEL: CPT

## 2024-09-20 PROCEDURE — 96375 TX/PRO/DX INJ NEW DRUG ADDON: CPT

## 2024-09-20 PROCEDURE — 1100000003 HC PRIVATE W/ TELEMETRY

## 2024-09-20 PROCEDURE — 6370000000 HC RX 637 (ALT 250 FOR IP): Performed by: HOSPITALIST

## 2024-09-20 PROCEDURE — 93005 ELECTROCARDIOGRAM TRACING: CPT | Performed by: EMERGENCY MEDICINE

## 2024-09-20 PROCEDURE — 36415 COLL VENOUS BLD VENIPUNCTURE: CPT

## 2024-09-20 PROCEDURE — 71045 X-RAY EXAM CHEST 1 VIEW: CPT

## 2024-09-20 PROCEDURE — 99285 EMERGENCY DEPT VISIT HI MDM: CPT

## 2024-09-20 PROCEDURE — 96365 THER/PROPH/DIAG IV INF INIT: CPT

## 2024-09-20 PROCEDURE — 85025 COMPLETE CBC W/AUTO DIFF WBC: CPT

## 2024-09-20 PROCEDURE — 82962 GLUCOSE BLOOD TEST: CPT

## 2024-09-20 RX ORDER — POLYETHYLENE GLYCOL 3350 17 G/17G
17 POWDER, FOR SOLUTION ORAL DAILY PRN
Status: DISCONTINUED | OUTPATIENT
Start: 2024-09-20 | End: 2024-09-25 | Stop reason: HOSPADM

## 2024-09-20 RX ORDER — ENOXAPARIN SODIUM 100 MG/ML
1 INJECTION SUBCUTANEOUS ONCE
Status: DISCONTINUED | OUTPATIENT
Start: 2024-09-20 | End: 2024-09-20

## 2024-09-20 RX ORDER — ACETAMINOPHEN 650 MG/1
650 SUPPOSITORY RECTAL EVERY 6 HOURS PRN
Status: DISCONTINUED | OUTPATIENT
Start: 2024-09-20 | End: 2024-09-25 | Stop reason: HOSPADM

## 2024-09-20 RX ORDER — INSULIN GLARGINE 100 [IU]/ML
30 INJECTION, SOLUTION SUBCUTANEOUS DAILY
Status: DISCONTINUED | OUTPATIENT
Start: 2024-09-20 | End: 2024-09-25 | Stop reason: HOSPADM

## 2024-09-20 RX ORDER — HYDRALAZINE HYDROCHLORIDE 20 MG/ML
10 INJECTION INTRAMUSCULAR; INTRAVENOUS EVERY 6 HOURS PRN
Status: DISCONTINUED | OUTPATIENT
Start: 2024-09-20 | End: 2024-09-25 | Stop reason: HOSPADM

## 2024-09-20 RX ORDER — PANTOPRAZOLE SODIUM 40 MG/1
40 TABLET, DELAYED RELEASE ORAL DAILY
Status: DISCONTINUED | OUTPATIENT
Start: 2024-09-20 | End: 2024-09-25 | Stop reason: HOSPADM

## 2024-09-20 RX ORDER — ACETAMINOPHEN 325 MG/1
650 TABLET ORAL EVERY 6 HOURS PRN
Status: DISCONTINUED | OUTPATIENT
Start: 2024-09-20 | End: 2024-09-25 | Stop reason: HOSPADM

## 2024-09-20 RX ORDER — POTASSIUM CHLORIDE 1500 MG/1
20 TABLET, EXTENDED RELEASE ORAL 2 TIMES DAILY
Status: DISCONTINUED | OUTPATIENT
Start: 2024-09-20 | End: 2024-09-20

## 2024-09-20 RX ORDER — CARVEDILOL 12.5 MG/1
12.5 TABLET ORAL 2 TIMES DAILY WITH MEALS
Status: DISCONTINUED | OUTPATIENT
Start: 2024-09-20 | End: 2024-09-25 | Stop reason: HOSPADM

## 2024-09-20 RX ORDER — PANTOPRAZOLE SODIUM 40 MG/1
40 TABLET, DELAYED RELEASE ORAL DAILY
COMMUNITY

## 2024-09-20 RX ORDER — ASPIRIN 81 MG/1
324 TABLET, CHEWABLE ORAL DAILY
Status: DISCONTINUED | OUTPATIENT
Start: 2024-09-20 | End: 2024-09-20

## 2024-09-20 RX ORDER — ASPIRIN 81 MG/1
81 TABLET ORAL DAILY
Status: DISCONTINUED | OUTPATIENT
Start: 2024-09-20 | End: 2024-09-25 | Stop reason: HOSPADM

## 2024-09-20 RX ORDER — LOSARTAN POTASSIUM 100 MG/1
100 TABLET ORAL DAILY
Status: DISCONTINUED | OUTPATIENT
Start: 2024-09-20 | End: 2024-09-25 | Stop reason: HOSPADM

## 2024-09-20 RX ORDER — BUMETANIDE 0.25 MG/ML
1 INJECTION INTRAMUSCULAR; INTRAVENOUS DAILY
Status: DISCONTINUED | OUTPATIENT
Start: 2024-09-21 | End: 2024-09-24

## 2024-09-20 RX ORDER — POTASSIUM CHLORIDE 1500 MG/1
20 TABLET, EXTENDED RELEASE ORAL 2 TIMES DAILY
Status: DISCONTINUED | OUTPATIENT
Start: 2024-09-21 | End: 2024-09-25 | Stop reason: HOSPADM

## 2024-09-20 RX ORDER — FUROSEMIDE 10 MG/ML
60 INJECTION INTRAMUSCULAR; INTRAVENOUS ONCE
Status: DISCONTINUED | OUTPATIENT
Start: 2024-09-20 | End: 2024-09-20

## 2024-09-20 RX ORDER — INSULIN LISPRO 100 [IU]/ML
0-8 INJECTION, SOLUTION INTRAVENOUS; SUBCUTANEOUS
Status: DISCONTINUED | OUTPATIENT
Start: 2024-09-20 | End: 2024-09-25 | Stop reason: HOSPADM

## 2024-09-20 RX ORDER — BUMETANIDE 0.25 MG/ML
1 INJECTION INTRAMUSCULAR; INTRAVENOUS ONCE
Status: COMPLETED | OUTPATIENT
Start: 2024-09-20 | End: 2024-09-20

## 2024-09-20 RX ORDER — INSULIN LISPRO 100 [IU]/ML
0-4 INJECTION, SOLUTION INTRAVENOUS; SUBCUTANEOUS NIGHTLY
Status: DISCONTINUED | OUTPATIENT
Start: 2024-09-20 | End: 2024-09-25 | Stop reason: HOSPADM

## 2024-09-20 RX ORDER — ROSUVASTATIN CALCIUM 20 MG/1
20 TABLET, COATED ORAL NIGHTLY
Status: DISCONTINUED | OUTPATIENT
Start: 2024-09-20 | End: 2024-09-25 | Stop reason: HOSPADM

## 2024-09-20 RX ORDER — POTASSIUM CHLORIDE 7.45 MG/ML
10 INJECTION INTRAVENOUS ONCE
Status: COMPLETED | OUTPATIENT
Start: 2024-09-20 | End: 2024-09-20

## 2024-09-20 RX ORDER — INSULIN LISPRO 100 [IU]/ML
7 INJECTION, SOLUTION INTRAVENOUS; SUBCUTANEOUS
Status: DISCONTINUED | OUTPATIENT
Start: 2024-09-20 | End: 2024-09-20

## 2024-09-20 RX ORDER — MONTELUKAST SODIUM 10 MG/1
10 TABLET ORAL NIGHTLY
Status: DISCONTINUED | OUTPATIENT
Start: 2024-09-20 | End: 2024-09-25 | Stop reason: HOSPADM

## 2024-09-20 RX ORDER — ENOXAPARIN SODIUM 100 MG/ML
40 INJECTION SUBCUTANEOUS DAILY
Status: DISCONTINUED | OUTPATIENT
Start: 2024-09-21 | End: 2024-09-22

## 2024-09-20 RX ORDER — ENOXAPARIN SODIUM 100 MG/ML
40 INJECTION SUBCUTANEOUS DAILY
Status: DISCONTINUED | OUTPATIENT
Start: 2024-09-20 | End: 2024-09-20

## 2024-09-20 RX ORDER — SODIUM CHLORIDE 9 MG/ML
INJECTION, SOLUTION INTRAVENOUS PRN
Status: DISCONTINUED | OUTPATIENT
Start: 2024-09-20 | End: 2024-09-25 | Stop reason: HOSPADM

## 2024-09-20 RX ORDER — FLUTICASONE PROPIONATE 50 MCG
1 SPRAY, SUSPENSION (ML) NASAL DAILY PRN
COMMUNITY

## 2024-09-20 RX ORDER — CALCITRIOL 0.25 UG/1
0.5 CAPSULE, LIQUID FILLED ORAL DAILY
Status: DISCONTINUED | OUTPATIENT
Start: 2024-09-21 | End: 2024-09-25 | Stop reason: HOSPADM

## 2024-09-20 RX ORDER — INSULIN LISPRO 100 [IU]/ML
7 INJECTION, SOLUTION INTRAVENOUS; SUBCUTANEOUS
Status: DISCONTINUED | OUTPATIENT
Start: 2024-09-20 | End: 2024-09-25 | Stop reason: HOSPADM

## 2024-09-20 RX ORDER — ONDANSETRON 2 MG/ML
4 INJECTION INTRAMUSCULAR; INTRAVENOUS EVERY 6 HOURS PRN
Status: DISCONTINUED | OUTPATIENT
Start: 2024-09-20 | End: 2024-09-25 | Stop reason: HOSPADM

## 2024-09-20 RX ORDER — SODIUM CHLORIDE 0.9 % (FLUSH) 0.9 %
5-40 SYRINGE (ML) INJECTION PRN
Status: DISCONTINUED | OUTPATIENT
Start: 2024-09-20 | End: 2024-09-25 | Stop reason: HOSPADM

## 2024-09-20 RX ORDER — ONDANSETRON 4 MG/1
4 TABLET, ORALLY DISINTEGRATING ORAL EVERY 8 HOURS PRN
Status: DISCONTINUED | OUTPATIENT
Start: 2024-09-20 | End: 2024-09-25 | Stop reason: HOSPADM

## 2024-09-20 RX ORDER — SODIUM CHLORIDE 0.9 % (FLUSH) 0.9 %
5-40 SYRINGE (ML) INJECTION EVERY 12 HOURS SCHEDULED
Status: DISCONTINUED | OUTPATIENT
Start: 2024-09-20 | End: 2024-09-25 | Stop reason: HOSPADM

## 2024-09-20 RX ORDER — GABAPENTIN 100 MG/1
100 CAPSULE ORAL
Status: DISCONTINUED | OUTPATIENT
Start: 2024-09-20 | End: 2024-09-25 | Stop reason: HOSPADM

## 2024-09-20 RX ADMIN — ROSUVASTATIN CALCIUM 20 MG: 20 TABLET, FILM COATED ORAL at 20:41

## 2024-09-20 RX ADMIN — ARFORMOTEROL TARTRATE: 15 SOLUTION RESPIRATORY (INHALATION) at 22:14

## 2024-09-20 RX ADMIN — CARVEDILOL 12.5 MG: 12.5 TABLET, FILM COATED ORAL at 18:46

## 2024-09-20 RX ADMIN — MONTELUKAST 10 MG: 10 TABLET, FILM COATED ORAL at 20:41

## 2024-09-20 RX ADMIN — SODIUM CHLORIDE, PRESERVATIVE FREE 10 ML: 5 INJECTION INTRAVENOUS at 20:42

## 2024-09-20 RX ADMIN — POTASSIUM BICARBONATE 40 MEQ: 782 TABLET, EFFERVESCENT ORAL at 13:44

## 2024-09-20 RX ADMIN — BUMETANIDE 1 MG: 0.25 INJECTION INTRAMUSCULAR; INTRAVENOUS at 13:53

## 2024-09-20 RX ADMIN — POTASSIUM CHLORIDE 10 MEQ: 7.46 INJECTION, SOLUTION INTRAVENOUS at 13:57

## 2024-09-20 RX ADMIN — GABAPENTIN 100 MG: 100 CAPSULE ORAL at 20:41

## 2024-09-20 RX ADMIN — INSULIN LISPRO 7 UNITS: 100 INJECTION, SOLUTION INTRAVENOUS; SUBCUTANEOUS at 18:46

## 2024-09-20 ASSESSMENT — PAIN DESCRIPTION - LOCATION: LOCATION: LEG

## 2024-09-20 ASSESSMENT — PAIN SCALES - GENERAL
PAINLEVEL_OUTOF10: 0
PAINLEVEL_OUTOF10: 9

## 2024-09-20 ASSESSMENT — PAIN - FUNCTIONAL ASSESSMENT: PAIN_FUNCTIONAL_ASSESSMENT: 0-10

## 2024-09-20 ASSESSMENT — PAIN DESCRIPTION - ORIENTATION: ORIENTATION: RIGHT;LEFT

## 2024-09-21 LAB
ANION GAP SERPL CALC-SCNC: 4 MMOL/L (ref 2–12)
BUN SERPL-MCNC: 19 MG/DL (ref 6–20)
BUN/CREAT SERPL: 14 (ref 12–20)
CALCIUM SERPL-MCNC: 9.9 MG/DL (ref 8.5–10.1)
CHLORIDE SERPL-SCNC: 97 MMOL/L (ref 97–108)
CO2 SERPL-SCNC: 39 MMOL/L (ref 21–32)
CREAT SERPL-MCNC: 1.36 MG/DL (ref 0.55–1.02)
EKG ATRIAL RATE: 63 BPM
EKG DIAGNOSIS: NORMAL
EKG P AXIS: 47 DEGREES
EKG P-R INTERVAL: 200 MS
EKG Q-T INTERVAL: 434 MS
EKG QRS DURATION: 90 MS
EKG QTC CALCULATION (BAZETT): 444 MS
EKG R AXIS: 13 DEGREES
EKG T AXIS: -6 DEGREES
EKG VENTRICULAR RATE: 63 BPM
GLUCOSE BLD STRIP.AUTO-MCNC: 132 MG/DL (ref 65–117)
GLUCOSE BLD STRIP.AUTO-MCNC: 153 MG/DL (ref 65–117)
GLUCOSE BLD STRIP.AUTO-MCNC: 190 MG/DL (ref 65–117)
GLUCOSE BLD STRIP.AUTO-MCNC: 190 MG/DL (ref 65–117)
GLUCOSE SERPL-MCNC: 135 MG/DL (ref 65–100)
MAGNESIUM SERPL-MCNC: 2.3 MG/DL (ref 1.6–2.4)
POTASSIUM SERPL-SCNC: 3.1 MMOL/L (ref 3.5–5.1)
SERVICE CMNT-IMP: ABNORMAL
SODIUM SERPL-SCNC: 140 MMOL/L (ref 136–145)

## 2024-09-21 PROCEDURE — 1100000003 HC PRIVATE W/ TELEMETRY

## 2024-09-21 PROCEDURE — 2580000003 HC RX 258: Performed by: HOSPITALIST

## 2024-09-21 PROCEDURE — 6360000002 HC RX W HCPCS: Performed by: HOSPITALIST

## 2024-09-21 PROCEDURE — 82962 GLUCOSE BLOOD TEST: CPT

## 2024-09-21 PROCEDURE — 6370000000 HC RX 637 (ALT 250 FOR IP): Performed by: INTERNAL MEDICINE

## 2024-09-21 PROCEDURE — 2700000000 HC OXYGEN THERAPY PER DAY

## 2024-09-21 PROCEDURE — 94761 N-INVAS EAR/PLS OXIMETRY MLT: CPT

## 2024-09-21 PROCEDURE — 94640 AIRWAY INHALATION TREATMENT: CPT

## 2024-09-21 PROCEDURE — 97165 OT EVAL LOW COMPLEX 30 MIN: CPT | Performed by: OCCUPATIONAL THERAPIST

## 2024-09-21 PROCEDURE — 5A09457 ASSISTANCE WITH RESPIRATORY VENTILATION, 24-96 CONSECUTIVE HOURS, CONTINUOUS POSITIVE AIRWAY PRESSURE: ICD-10-PCS | Performed by: HOSPITALIST

## 2024-09-21 PROCEDURE — 36415 COLL VENOUS BLD VENIPUNCTURE: CPT

## 2024-09-21 PROCEDURE — 83735 ASSAY OF MAGNESIUM: CPT

## 2024-09-21 PROCEDURE — 97530 THERAPEUTIC ACTIVITIES: CPT

## 2024-09-21 PROCEDURE — 97161 PT EVAL LOW COMPLEX 20 MIN: CPT

## 2024-09-21 PROCEDURE — 97530 THERAPEUTIC ACTIVITIES: CPT | Performed by: OCCUPATIONAL THERAPIST

## 2024-09-21 PROCEDURE — 80048 BASIC METABOLIC PNL TOTAL CA: CPT

## 2024-09-21 PROCEDURE — 6370000000 HC RX 637 (ALT 250 FOR IP): Performed by: HOSPITALIST

## 2024-09-21 RX ORDER — POTASSIUM CHLORIDE 1500 MG/1
40 TABLET, EXTENDED RELEASE ORAL ONCE
Status: COMPLETED | OUTPATIENT
Start: 2024-09-21 | End: 2024-09-21

## 2024-09-21 RX ADMIN — LEVOTHYROXINE SODIUM 137 MCG: 0.11 TABLET ORAL at 06:27

## 2024-09-21 RX ADMIN — ROSUVASTATIN CALCIUM 20 MG: 20 TABLET, FILM COATED ORAL at 21:16

## 2024-09-21 RX ADMIN — ARFORMOTEROL TARTRATE: 15 SOLUTION RESPIRATORY (INHALATION) at 07:37

## 2024-09-21 RX ADMIN — BUMETANIDE 1 MG: 0.25 INJECTION INTRAMUSCULAR; INTRAVENOUS at 08:31

## 2024-09-21 RX ADMIN — ASPIRIN 81 MG: 81 TABLET, COATED ORAL at 08:30

## 2024-09-21 RX ADMIN — LOSARTAN POTASSIUM 100 MG: 100 TABLET, FILM COATED ORAL at 08:30

## 2024-09-21 RX ADMIN — POTASSIUM CHLORIDE 20 MEQ: 1500 TABLET, EXTENDED RELEASE ORAL at 21:16

## 2024-09-21 RX ADMIN — SODIUM CHLORIDE, PRESERVATIVE FREE 10 ML: 5 INJECTION INTRAVENOUS at 08:32

## 2024-09-21 RX ADMIN — POLYETHYLENE GLYCOL 3350 17 G: 17 POWDER, FOR SOLUTION ORAL at 10:31

## 2024-09-21 RX ADMIN — INSULIN LISPRO 7 UNITS: 100 INJECTION, SOLUTION INTRAVENOUS; SUBCUTANEOUS at 12:52

## 2024-09-21 RX ADMIN — ARFORMOTEROL TARTRATE: 15 SOLUTION RESPIRATORY (INHALATION) at 21:25

## 2024-09-21 RX ADMIN — INSULIN GLARGINE 30 UNITS: 100 INJECTION, SOLUTION SUBCUTANEOUS at 08:31

## 2024-09-21 RX ADMIN — INSULIN LISPRO 7 UNITS: 100 INJECTION, SOLUTION INTRAVENOUS; SUBCUTANEOUS at 17:29

## 2024-09-21 RX ADMIN — POTASSIUM CHLORIDE 20 MEQ: 1500 TABLET, EXTENDED RELEASE ORAL at 08:30

## 2024-09-21 RX ADMIN — CARVEDILOL 12.5 MG: 12.5 TABLET, FILM COATED ORAL at 08:30

## 2024-09-21 RX ADMIN — SODIUM CHLORIDE, PRESERVATIVE FREE 10 ML: 5 INJECTION INTRAVENOUS at 21:20

## 2024-09-21 RX ADMIN — ENOXAPARIN SODIUM 40 MG: 100 INJECTION SUBCUTANEOUS at 08:32

## 2024-09-21 RX ADMIN — MONTELUKAST 10 MG: 10 TABLET, FILM COATED ORAL at 21:16

## 2024-09-21 RX ADMIN — PANTOPRAZOLE SODIUM 40 MG: 40 TABLET, DELAYED RELEASE ORAL at 08:30

## 2024-09-21 RX ADMIN — POTASSIUM CHLORIDE 40 MEQ: 1500 TABLET, EXTENDED RELEASE ORAL at 08:47

## 2024-09-21 RX ADMIN — GABAPENTIN 100 MG: 100 CAPSULE ORAL at 21:16

## 2024-09-21 RX ADMIN — INSULIN LISPRO 7 UNITS: 100 INJECTION, SOLUTION INTRAVENOUS; SUBCUTANEOUS at 08:44

## 2024-09-21 RX ADMIN — CALCITRIOL CAPSULES 0.25 MCG 0.5 MCG: 0.25 CAPSULE ORAL at 08:30

## 2024-09-21 ASSESSMENT — PAIN SCALES - GENERAL: PAINLEVEL_OUTOF10: 2

## 2024-09-22 LAB
ANION GAP SERPL CALC-SCNC: 6 MMOL/L (ref 2–12)
BUN SERPL-MCNC: 20 MG/DL (ref 6–20)
BUN/CREAT SERPL: 13 (ref 12–20)
CALCIUM SERPL-MCNC: 10 MG/DL (ref 8.5–10.1)
CHLORIDE SERPL-SCNC: 99 MMOL/L (ref 97–108)
CO2 SERPL-SCNC: 37 MMOL/L (ref 21–32)
CREAT SERPL-MCNC: 1.49 MG/DL (ref 0.55–1.02)
ERYTHROCYTE [DISTWIDTH] IN BLOOD BY AUTOMATED COUNT: 15.2 % (ref 11.5–14.5)
GLUCOSE BLD STRIP.AUTO-MCNC: 118 MG/DL (ref 65–117)
GLUCOSE BLD STRIP.AUTO-MCNC: 170 MG/DL (ref 65–117)
GLUCOSE BLD STRIP.AUTO-MCNC: 217 MG/DL (ref 65–117)
GLUCOSE BLD STRIP.AUTO-MCNC: 262 MG/DL (ref 65–117)
GLUCOSE SERPL-MCNC: 199 MG/DL (ref 65–100)
HCT VFR BLD AUTO: 31 % (ref 35–47)
HGB BLD-MCNC: 8.7 G/DL (ref 11.5–16)
MAGNESIUM SERPL-MCNC: 2.4 MG/DL (ref 1.6–2.4)
MCH RBC QN AUTO: 22.4 PG (ref 26–34)
MCHC RBC AUTO-ENTMCNC: 28.1 G/DL (ref 30–36.5)
MCV RBC AUTO: 79.7 FL (ref 80–99)
NRBC # BLD: 0 K/UL (ref 0–0.01)
NRBC BLD-RTO: 0 PER 100 WBC
PHOSPHATE SERPL-MCNC: 3.1 MG/DL (ref 2.6–4.7)
PLATELET # BLD AUTO: 213 K/UL (ref 150–400)
PMV BLD AUTO: 10 FL (ref 8.9–12.9)
POTASSIUM SERPL-SCNC: 3.4 MMOL/L (ref 3.5–5.1)
RBC # BLD AUTO: 3.89 M/UL (ref 3.8–5.2)
SERVICE CMNT-IMP: ABNORMAL
SODIUM SERPL-SCNC: 142 MMOL/L (ref 136–145)
WBC # BLD AUTO: 6 K/UL (ref 3.6–11)

## 2024-09-22 PROCEDURE — 6370000000 HC RX 637 (ALT 250 FOR IP): Performed by: INTERNAL MEDICINE

## 2024-09-22 PROCEDURE — 36415 COLL VENOUS BLD VENIPUNCTURE: CPT

## 2024-09-22 PROCEDURE — 94640 AIRWAY INHALATION TREATMENT: CPT

## 2024-09-22 PROCEDURE — 6370000000 HC RX 637 (ALT 250 FOR IP): Performed by: HOSPITALIST

## 2024-09-22 PROCEDURE — 2580000003 HC RX 258: Performed by: HOSPITALIST

## 2024-09-22 PROCEDURE — 2700000000 HC OXYGEN THERAPY PER DAY

## 2024-09-22 PROCEDURE — 83735 ASSAY OF MAGNESIUM: CPT

## 2024-09-22 PROCEDURE — 6360000002 HC RX W HCPCS: Performed by: HOSPITALIST

## 2024-09-22 PROCEDURE — 1100000003 HC PRIVATE W/ TELEMETRY

## 2024-09-22 PROCEDURE — 94761 N-INVAS EAR/PLS OXIMETRY MLT: CPT

## 2024-09-22 PROCEDURE — 84100 ASSAY OF PHOSPHORUS: CPT

## 2024-09-22 PROCEDURE — 82962 GLUCOSE BLOOD TEST: CPT

## 2024-09-22 PROCEDURE — 85027 COMPLETE CBC AUTOMATED: CPT

## 2024-09-22 PROCEDURE — 80048 BASIC METABOLIC PNL TOTAL CA: CPT

## 2024-09-22 RX ORDER — POTASSIUM CHLORIDE 1500 MG/1
40 TABLET, EXTENDED RELEASE ORAL ONCE
Status: COMPLETED | OUTPATIENT
Start: 2024-09-22 | End: 2024-09-22

## 2024-09-22 RX ORDER — ENOXAPARIN SODIUM 100 MG/ML
30 INJECTION SUBCUTANEOUS 2 TIMES DAILY
Status: DISCONTINUED | OUTPATIENT
Start: 2024-09-23 | End: 2024-09-25 | Stop reason: HOSPADM

## 2024-09-22 RX ADMIN — INSULIN GLARGINE 30 UNITS: 100 INJECTION, SOLUTION SUBCUTANEOUS at 10:02

## 2024-09-22 RX ADMIN — LEVOTHYROXINE SODIUM 137 MCG: 0.11 TABLET ORAL at 06:45

## 2024-09-22 RX ADMIN — CARVEDILOL 12.5 MG: 12.5 TABLET, FILM COATED ORAL at 17:54

## 2024-09-22 RX ADMIN — CALCITRIOL CAPSULES 0.25 MCG 0.5 MCG: 0.25 CAPSULE ORAL at 09:59

## 2024-09-22 RX ADMIN — ARFORMOTEROL TARTRATE: 15 SOLUTION RESPIRATORY (INHALATION) at 07:38

## 2024-09-22 RX ADMIN — INSULIN LISPRO 2 UNITS: 100 INJECTION, SOLUTION INTRAVENOUS; SUBCUTANEOUS at 10:03

## 2024-09-22 RX ADMIN — BUMETANIDE 1 MG: 0.25 INJECTION INTRAMUSCULAR; INTRAVENOUS at 10:01

## 2024-09-22 RX ADMIN — PANTOPRAZOLE SODIUM 40 MG: 40 TABLET, DELAYED RELEASE ORAL at 10:00

## 2024-09-22 RX ADMIN — ASPIRIN 81 MG: 81 TABLET, COATED ORAL at 09:59

## 2024-09-22 RX ADMIN — POTASSIUM CHLORIDE 20 MEQ: 1500 TABLET, EXTENDED RELEASE ORAL at 13:12

## 2024-09-22 RX ADMIN — CARVEDILOL 12.5 MG: 12.5 TABLET, FILM COATED ORAL at 10:13

## 2024-09-22 RX ADMIN — LOSARTAN POTASSIUM 100 MG: 100 TABLET, FILM COATED ORAL at 10:14

## 2024-09-22 RX ADMIN — ENOXAPARIN SODIUM 40 MG: 100 INJECTION SUBCUTANEOUS at 10:00

## 2024-09-22 RX ADMIN — SODIUM CHLORIDE, PRESERVATIVE FREE 10 ML: 5 INJECTION INTRAVENOUS at 10:00

## 2024-09-22 RX ADMIN — INSULIN LISPRO 4 UNITS: 100 INJECTION, SOLUTION INTRAVENOUS; SUBCUTANEOUS at 13:12

## 2024-09-22 RX ADMIN — INSULIN LISPRO 7 UNITS: 100 INJECTION, SOLUTION INTRAVENOUS; SUBCUTANEOUS at 17:54

## 2024-09-22 RX ADMIN — ARFORMOTEROL TARTRATE: 15 SOLUTION RESPIRATORY (INHALATION) at 20:14

## 2024-09-22 RX ADMIN — INSULIN LISPRO 7 UNITS: 100 INJECTION, SOLUTION INTRAVENOUS; SUBCUTANEOUS at 13:12

## 2024-09-22 RX ADMIN — INSULIN LISPRO 7 UNITS: 100 INJECTION, SOLUTION INTRAVENOUS; SUBCUTANEOUS at 10:03

## 2024-09-22 RX ADMIN — POTASSIUM CHLORIDE 40 MEQ: 1500 TABLET, EXTENDED RELEASE ORAL at 10:12

## 2024-09-22 ASSESSMENT — PAIN SCALES - GENERAL
PAINLEVEL_OUTOF10: 0
PAINLEVEL_OUTOF10: 0

## 2024-09-23 ENCOUNTER — APPOINTMENT (OUTPATIENT)
Facility: HOSPITAL | Age: 74
DRG: 291 | End: 2024-09-23
Attending: INTERNAL MEDICINE
Payer: MEDICARE

## 2024-09-23 LAB
ANION GAP SERPL CALC-SCNC: 4 MMOL/L (ref 2–12)
BUN SERPL-MCNC: 21 MG/DL (ref 6–20)
BUN/CREAT SERPL: 15 (ref 12–20)
CALCIUM SERPL-MCNC: 10 MG/DL (ref 8.5–10.1)
CHLORIDE SERPL-SCNC: 101 MMOL/L (ref 97–108)
CO2 SERPL-SCNC: 38 MMOL/L (ref 21–32)
CREAT SERPL-MCNC: 1.43 MG/DL (ref 0.55–1.02)
ERYTHROCYTE [DISTWIDTH] IN BLOOD BY AUTOMATED COUNT: 15.1 % (ref 11.5–14.5)
GLUCOSE BLD STRIP.AUTO-MCNC: 129 MG/DL (ref 65–117)
GLUCOSE BLD STRIP.AUTO-MCNC: 185 MG/DL (ref 65–117)
GLUCOSE BLD STRIP.AUTO-MCNC: 222 MG/DL (ref 65–117)
GLUCOSE BLD STRIP.AUTO-MCNC: 248 MG/DL (ref 65–117)
GLUCOSE SERPL-MCNC: 121 MG/DL (ref 65–100)
HCT VFR BLD AUTO: 32.3 % (ref 35–47)
HGB BLD-MCNC: 9 G/DL (ref 11.5–16)
MAGNESIUM SERPL-MCNC: 2.4 MG/DL (ref 1.6–2.4)
MCH RBC QN AUTO: 22.3 PG (ref 26–34)
MCHC RBC AUTO-ENTMCNC: 27.9 G/DL (ref 30–36.5)
MCV RBC AUTO: 80.1 FL (ref 80–99)
NRBC # BLD: 0 K/UL (ref 0–0.01)
NRBC BLD-RTO: 0 PER 100 WBC
PHOSPHATE SERPL-MCNC: 2.9 MG/DL (ref 2.6–4.7)
PLATELET # BLD AUTO: 208 K/UL (ref 150–400)
PMV BLD AUTO: 9.9 FL (ref 8.9–12.9)
POTASSIUM SERPL-SCNC: 3.9 MMOL/L (ref 3.5–5.1)
RBC # BLD AUTO: 4.03 M/UL (ref 3.8–5.2)
SERVICE CMNT-IMP: ABNORMAL
SODIUM SERPL-SCNC: 143 MMOL/L (ref 136–145)
WBC # BLD AUTO: 5.5 K/UL (ref 3.6–11)

## 2024-09-23 PROCEDURE — 6360000002 HC RX W HCPCS: Performed by: HOSPITALIST

## 2024-09-23 PROCEDURE — 94761 N-INVAS EAR/PLS OXIMETRY MLT: CPT

## 2024-09-23 PROCEDURE — 97530 THERAPEUTIC ACTIVITIES: CPT | Performed by: PHYSICAL THERAPIST

## 2024-09-23 PROCEDURE — 97116 GAIT TRAINING THERAPY: CPT | Performed by: PHYSICAL THERAPIST

## 2024-09-23 PROCEDURE — 6360000002 HC RX W HCPCS: Performed by: INTERNAL MEDICINE

## 2024-09-23 PROCEDURE — 2580000003 HC RX 258: Performed by: HOSPITALIST

## 2024-09-23 PROCEDURE — 83735 ASSAY OF MAGNESIUM: CPT

## 2024-09-23 PROCEDURE — 36415 COLL VENOUS BLD VENIPUNCTURE: CPT

## 2024-09-23 PROCEDURE — 82962 GLUCOSE BLOOD TEST: CPT

## 2024-09-23 PROCEDURE — 6370000000 HC RX 637 (ALT 250 FOR IP): Performed by: HOSPITALIST

## 2024-09-23 PROCEDURE — 80048 BASIC METABOLIC PNL TOTAL CA: CPT

## 2024-09-23 PROCEDURE — 2700000000 HC OXYGEN THERAPY PER DAY

## 2024-09-23 PROCEDURE — 85027 COMPLETE CBC AUTOMATED: CPT

## 2024-09-23 PROCEDURE — 1100000003 HC PRIVATE W/ TELEMETRY

## 2024-09-23 PROCEDURE — 84100 ASSAY OF PHOSPHORUS: CPT

## 2024-09-23 PROCEDURE — 94640 AIRWAY INHALATION TREATMENT: CPT

## 2024-09-23 RX ADMIN — POTASSIUM CHLORIDE 20 MEQ: 1500 TABLET, EXTENDED RELEASE ORAL at 00:09

## 2024-09-23 RX ADMIN — ASPIRIN 81 MG: 81 TABLET, COATED ORAL at 10:08

## 2024-09-23 RX ADMIN — ARFORMOTEROL TARTRATE: 15 SOLUTION RESPIRATORY (INHALATION) at 20:06

## 2024-09-23 RX ADMIN — INSULIN LISPRO 7 UNITS: 100 INJECTION, SOLUTION INTRAVENOUS; SUBCUTANEOUS at 10:10

## 2024-09-23 RX ADMIN — ROSUVASTATIN CALCIUM 20 MG: 20 TABLET, FILM COATED ORAL at 20:59

## 2024-09-23 RX ADMIN — ARFORMOTEROL TARTRATE: 15 SOLUTION RESPIRATORY (INHALATION) at 08:39

## 2024-09-23 RX ADMIN — ENOXAPARIN SODIUM 30 MG: 100 INJECTION SUBCUTANEOUS at 21:00

## 2024-09-23 RX ADMIN — PANTOPRAZOLE SODIUM 40 MG: 40 TABLET, DELAYED RELEASE ORAL at 10:08

## 2024-09-23 RX ADMIN — POTASSIUM CHLORIDE 20 MEQ: 1500 TABLET, EXTENDED RELEASE ORAL at 20:59

## 2024-09-23 RX ADMIN — GABAPENTIN 100 MG: 100 CAPSULE ORAL at 00:09

## 2024-09-23 RX ADMIN — LEVOTHYROXINE SODIUM 137 MCG: 0.11 TABLET ORAL at 06:21

## 2024-09-23 RX ADMIN — CARVEDILOL 12.5 MG: 12.5 TABLET, FILM COATED ORAL at 10:08

## 2024-09-23 RX ADMIN — SODIUM CHLORIDE, PRESERVATIVE FREE 10 ML: 5 INJECTION INTRAVENOUS at 21:00

## 2024-09-23 RX ADMIN — MONTELUKAST 10 MG: 10 TABLET, FILM COATED ORAL at 21:00

## 2024-09-23 RX ADMIN — INSULIN LISPRO 7 UNITS: 100 INJECTION, SOLUTION INTRAVENOUS; SUBCUTANEOUS at 17:57

## 2024-09-23 RX ADMIN — GABAPENTIN 100 MG: 100 CAPSULE ORAL at 20:59

## 2024-09-23 RX ADMIN — BUMETANIDE 1 MG: 0.25 INJECTION INTRAMUSCULAR; INTRAVENOUS at 10:08

## 2024-09-23 RX ADMIN — LOSARTAN POTASSIUM 100 MG: 100 TABLET, FILM COATED ORAL at 10:08

## 2024-09-23 RX ADMIN — INSULIN LISPRO 2 UNITS: 100 INJECTION, SOLUTION INTRAVENOUS; SUBCUTANEOUS at 17:58

## 2024-09-23 RX ADMIN — ENOXAPARIN SODIUM 30 MG: 100 INJECTION SUBCUTANEOUS at 10:08

## 2024-09-23 RX ADMIN — MONTELUKAST 10 MG: 10 TABLET, FILM COATED ORAL at 00:09

## 2024-09-23 RX ADMIN — SODIUM CHLORIDE, PRESERVATIVE FREE 10 ML: 5 INJECTION INTRAVENOUS at 10:10

## 2024-09-23 RX ADMIN — POTASSIUM CHLORIDE 20 MEQ: 1500 TABLET, EXTENDED RELEASE ORAL at 10:08

## 2024-09-23 RX ADMIN — INSULIN GLARGINE 30 UNITS: 100 INJECTION, SOLUTION SUBCUTANEOUS at 10:10

## 2024-09-23 RX ADMIN — ROSUVASTATIN CALCIUM 20 MG: 20 TABLET, FILM COATED ORAL at 00:10

## 2024-09-23 RX ADMIN — CARVEDILOL 12.5 MG: 12.5 TABLET, FILM COATED ORAL at 17:57

## 2024-09-23 RX ADMIN — INSULIN LISPRO 2 UNITS: 100 INJECTION, SOLUTION INTRAVENOUS; SUBCUTANEOUS at 13:36

## 2024-09-23 RX ADMIN — CALCITRIOL CAPSULES 0.25 MCG 0.5 MCG: 0.25 CAPSULE ORAL at 10:08

## 2024-09-23 RX ADMIN — INSULIN LISPRO 7 UNITS: 100 INJECTION, SOLUTION INTRAVENOUS; SUBCUTANEOUS at 13:36

## 2024-09-23 RX ADMIN — SODIUM CHLORIDE, PRESERVATIVE FREE 10 ML: 5 INJECTION INTRAVENOUS at 00:10

## 2024-09-23 ASSESSMENT — PAIN SCALES - GENERAL
PAINLEVEL_OUTOF10: 0

## 2024-09-24 ENCOUNTER — APPOINTMENT (OUTPATIENT)
Facility: HOSPITAL | Age: 74
DRG: 291 | End: 2024-09-24
Attending: INTERNAL MEDICINE
Payer: MEDICARE

## 2024-09-24 LAB
ANION GAP SERPL CALC-SCNC: 2 MMOL/L (ref 2–12)
BUN SERPL-MCNC: 22 MG/DL (ref 6–20)
BUN/CREAT SERPL: 15 (ref 12–20)
CALCIUM SERPL-MCNC: 10.1 MG/DL (ref 8.5–10.1)
CHLORIDE SERPL-SCNC: 103 MMOL/L (ref 97–108)
CO2 SERPL-SCNC: 38 MMOL/L (ref 21–32)
CREAT SERPL-MCNC: 1.5 MG/DL (ref 0.55–1.02)
ECHO AV MEAN GRADIENT: 9 MMHG
ECHO AV MEAN VELOCITY: 1.4 M/S
ECHO AV PEAK GRADIENT: 15 MMHG
ECHO AV PEAK VELOCITY: 2 M/S
ECHO AV VTI: 37.1 CM
ECHO BSA: 2.07 M2
ECHO LA DIAMETER INDEX: 1.93 CM/M2
ECHO LA DIAMETER: 3.8 CM
ECHO LA VOL A-L A2C: 70 ML (ref 22–52)
ECHO LA VOL A-L A4C: 74 ML (ref 22–52)
ECHO LA VOL MOD A2C: 66 ML (ref 22–52)
ECHO LA VOL MOD A4C: 71 ML (ref 22–52)
ECHO LA VOLUME AREA LENGTH: 79 ML
ECHO LA VOLUME INDEX A-L A2C: 36 ML/M2 (ref 16–34)
ECHO LA VOLUME INDEX A-L A4C: 38 ML/M2 (ref 16–34)
ECHO LA VOLUME INDEX AREA LENGTH: 40 ML/M2 (ref 16–34)
ECHO LA VOLUME INDEX MOD A2C: 34 ML/M2 (ref 16–34)
ECHO LA VOLUME INDEX MOD A4C: 36 ML/M2 (ref 16–34)
ECHO LV EDV A4C: 72 ML
ECHO LV EDV INDEX A4C: 37 ML/M2
ECHO LV EF PHYSICIAN: 80 %
ECHO LV EJECTION FRACTION A4C: 72 %
ECHO LV ESV A4C: 20 ML
ECHO LV ESV INDEX A4C: 10 ML/M2
ECHO LV FRACTIONAL SHORTENING: 34 % (ref 28–44)
ECHO LV INTERNAL DIMENSION DIASTOLE INDEX: 1.93 CM/M2
ECHO LV INTERNAL DIMENSION DIASTOLIC: 3.8 CM (ref 3.9–5.3)
ECHO LV INTERNAL DIMENSION SYSTOLIC INDEX: 1.27 CM/M2
ECHO LV INTERNAL DIMENSION SYSTOLIC: 2.5 CM
ECHO LV IVSD: 1.3 CM (ref 0.6–0.9)
ECHO LV MASS 2D: 183.4 G (ref 67–162)
ECHO LV MASS INDEX 2D: 93.1 G/M2 (ref 43–95)
ECHO LV POSTERIOR WALL DIASTOLIC: 1.4 CM (ref 0.6–0.9)
ECHO LV RELATIVE WALL THICKNESS RATIO: 0.74
ECHO LVOT AREA: 3.1 CM2
ECHO LVOT DIAM: 2 CM
ECHO MV MAX VELOCITY: 1.4 M/S
ECHO MV MEAN GRADIENT: 4 MMHG
ECHO MV MEAN VELOCITY: 0.9 M/S
ECHO MV PEAK GRADIENT: 7 MMHG
ECHO MV VTI: 53.2 CM
ERYTHROCYTE [DISTWIDTH] IN BLOOD BY AUTOMATED COUNT: 15.2 % (ref 11.5–14.5)
GLUCOSE BLD STRIP.AUTO-MCNC: 110 MG/DL (ref 65–117)
GLUCOSE BLD STRIP.AUTO-MCNC: 205 MG/DL (ref 65–117)
GLUCOSE BLD STRIP.AUTO-MCNC: 211 MG/DL (ref 65–117)
GLUCOSE BLD STRIP.AUTO-MCNC: 249 MG/DL (ref 65–117)
GLUCOSE SERPL-MCNC: 122 MG/DL (ref 65–100)
HCT VFR BLD AUTO: 34.6 % (ref 35–47)
HGB BLD-MCNC: 9.6 G/DL (ref 11.5–16)
MAGNESIUM SERPL-MCNC: 2.7 MG/DL (ref 1.6–2.4)
MCH RBC QN AUTO: 22.2 PG (ref 26–34)
MCHC RBC AUTO-ENTMCNC: 27.7 G/DL (ref 30–36.5)
MCV RBC AUTO: 80.1 FL (ref 80–99)
NRBC # BLD: 0 K/UL (ref 0–0.01)
NRBC BLD-RTO: 0 PER 100 WBC
PHOSPHATE SERPL-MCNC: 2.8 MG/DL (ref 2.6–4.7)
PLATELET # BLD AUTO: 207 K/UL (ref 150–400)
PMV BLD AUTO: 10.2 FL (ref 8.9–12.9)
POTASSIUM SERPL-SCNC: 3.9 MMOL/L (ref 3.5–5.1)
RBC # BLD AUTO: 4.32 M/UL (ref 3.8–5.2)
SERVICE CMNT-IMP: ABNORMAL
SERVICE CMNT-IMP: NORMAL
SODIUM SERPL-SCNC: 143 MMOL/L (ref 136–145)
WBC # BLD AUTO: 5.6 K/UL (ref 3.6–11)

## 2024-09-24 PROCEDURE — 85027 COMPLETE CBC AUTOMATED: CPT

## 2024-09-24 PROCEDURE — 94761 N-INVAS EAR/PLS OXIMETRY MLT: CPT

## 2024-09-24 PROCEDURE — 6360000002 HC RX W HCPCS: Performed by: INTERNAL MEDICINE

## 2024-09-24 PROCEDURE — 6360000002 HC RX W HCPCS: Performed by: HOSPITALIST

## 2024-09-24 PROCEDURE — C8924 2D TTE W OR W/O FOL W/CON,FU: HCPCS

## 2024-09-24 PROCEDURE — 2580000003 HC RX 258: Performed by: HOSPITALIST

## 2024-09-24 PROCEDURE — 6360000004 HC RX CONTRAST MEDICATION: Performed by: INTERNAL MEDICINE

## 2024-09-24 PROCEDURE — 83735 ASSAY OF MAGNESIUM: CPT

## 2024-09-24 PROCEDURE — 97535 SELF CARE MNGMENT TRAINING: CPT

## 2024-09-24 PROCEDURE — 97530 THERAPEUTIC ACTIVITIES: CPT | Performed by: PHYSICAL THERAPIST

## 2024-09-24 PROCEDURE — 80048 BASIC METABOLIC PNL TOTAL CA: CPT

## 2024-09-24 PROCEDURE — 84100 ASSAY OF PHOSPHORUS: CPT

## 2024-09-24 PROCEDURE — 36415 COLL VENOUS BLD VENIPUNCTURE: CPT

## 2024-09-24 PROCEDURE — 97116 GAIT TRAINING THERAPY: CPT | Performed by: PHYSICAL THERAPIST

## 2024-09-24 PROCEDURE — 6370000000 HC RX 637 (ALT 250 FOR IP): Performed by: STUDENT IN AN ORGANIZED HEALTH CARE EDUCATION/TRAINING PROGRAM

## 2024-09-24 PROCEDURE — 82962 GLUCOSE BLOOD TEST: CPT

## 2024-09-24 PROCEDURE — 1100000003 HC PRIVATE W/ TELEMETRY

## 2024-09-24 PROCEDURE — 6370000000 HC RX 637 (ALT 250 FOR IP): Performed by: HOSPITALIST

## 2024-09-24 PROCEDURE — 2700000000 HC OXYGEN THERAPY PER DAY

## 2024-09-24 PROCEDURE — 94640 AIRWAY INHALATION TREATMENT: CPT

## 2024-09-24 RX ORDER — BUMETANIDE 1 MG/1
2 TABLET ORAL 2 TIMES DAILY
Status: DISCONTINUED | OUTPATIENT
Start: 2024-09-24 | End: 2024-09-25 | Stop reason: HOSPADM

## 2024-09-24 RX ORDER — BUMETANIDE 1 MG/1
2 TABLET ORAL 2 TIMES DAILY
Status: DISCONTINUED | OUTPATIENT
Start: 2024-09-24 | End: 2024-09-24

## 2024-09-24 RX ADMIN — POTASSIUM CHLORIDE 20 MEQ: 1500 TABLET, EXTENDED RELEASE ORAL at 09:21

## 2024-09-24 RX ADMIN — ASPIRIN 81 MG: 81 TABLET, COATED ORAL at 09:21

## 2024-09-24 RX ADMIN — SODIUM CHLORIDE, PRESERVATIVE FREE 10 ML: 5 INJECTION INTRAVENOUS at 09:22

## 2024-09-24 RX ADMIN — GABAPENTIN 100 MG: 100 CAPSULE ORAL at 20:04

## 2024-09-24 RX ADMIN — INSULIN LISPRO 7 UNITS: 100 INJECTION, SOLUTION INTRAVENOUS; SUBCUTANEOUS at 17:58

## 2024-09-24 RX ADMIN — POTASSIUM CHLORIDE 20 MEQ: 1500 TABLET, EXTENDED RELEASE ORAL at 20:04

## 2024-09-24 RX ADMIN — ARFORMOTEROL TARTRATE: 15 SOLUTION RESPIRATORY (INHALATION) at 07:45

## 2024-09-24 RX ADMIN — BUMETANIDE 1 MG: 0.25 INJECTION INTRAMUSCULAR; INTRAVENOUS at 09:24

## 2024-09-24 RX ADMIN — PANTOPRAZOLE SODIUM 40 MG: 40 TABLET, DELAYED RELEASE ORAL at 09:21

## 2024-09-24 RX ADMIN — INSULIN LISPRO 7 UNITS: 100 INJECTION, SOLUTION INTRAVENOUS; SUBCUTANEOUS at 12:04

## 2024-09-24 RX ADMIN — BUMETANIDE 2 MG: 1 TABLET ORAL at 17:58

## 2024-09-24 RX ADMIN — INSULIN LISPRO 1 UNITS: 100 INJECTION, SOLUTION INTRAVENOUS; SUBCUTANEOUS at 12:05

## 2024-09-24 RX ADMIN — MONTELUKAST 10 MG: 10 TABLET, FILM COATED ORAL at 20:04

## 2024-09-24 RX ADMIN — CARVEDILOL 12.5 MG: 12.5 TABLET, FILM COATED ORAL at 17:58

## 2024-09-24 RX ADMIN — SODIUM CHLORIDE, PRESERVATIVE FREE 10 ML: 5 INJECTION INTRAVENOUS at 20:04

## 2024-09-24 RX ADMIN — INSULIN GLARGINE 30 UNITS: 100 INJECTION, SOLUTION SUBCUTANEOUS at 09:19

## 2024-09-24 RX ADMIN — PERFLUTREN 1.5 ML: 6.52 INJECTION, SUSPENSION INTRAVENOUS at 13:53

## 2024-09-24 RX ADMIN — ENOXAPARIN SODIUM 30 MG: 100 INJECTION SUBCUTANEOUS at 20:03

## 2024-09-24 RX ADMIN — ROSUVASTATIN CALCIUM 20 MG: 20 TABLET, FILM COATED ORAL at 20:04

## 2024-09-24 RX ADMIN — CALCITRIOL CAPSULES 0.25 MCG 0.5 MCG: 0.25 CAPSULE ORAL at 09:21

## 2024-09-24 RX ADMIN — ARFORMOTEROL TARTRATE: 15 SOLUTION RESPIRATORY (INHALATION) at 20:17

## 2024-09-24 RX ADMIN — LOSARTAN POTASSIUM 100 MG: 100 TABLET, FILM COATED ORAL at 09:21

## 2024-09-24 RX ADMIN — LEVOTHYROXINE SODIUM 137 MCG: 0.11 TABLET ORAL at 06:02

## 2024-09-24 RX ADMIN — ENOXAPARIN SODIUM 30 MG: 100 INJECTION SUBCUTANEOUS at 09:22

## 2024-09-24 RX ADMIN — CARVEDILOL 12.5 MG: 12.5 TABLET, FILM COATED ORAL at 09:21

## 2024-09-24 RX ADMIN — INSULIN LISPRO 2 UNITS: 100 INJECTION, SOLUTION INTRAVENOUS; SUBCUTANEOUS at 17:59

## 2024-09-24 ASSESSMENT — PAIN DESCRIPTION - LOCATION: LOCATION: SACRUM

## 2024-09-24 ASSESSMENT — PAIN SCALES - GENERAL
PAINLEVEL_OUTOF10: 4
PAINLEVEL_OUTOF10: 0

## 2024-09-24 ASSESSMENT — PAIN DESCRIPTION - DESCRIPTORS: DESCRIPTORS: DISCOMFORT

## 2024-09-24 ASSESSMENT — PAIN DESCRIPTION - FREQUENCY: FREQUENCY: INTERMITTENT

## 2024-09-25 VITALS
RESPIRATION RATE: 18 BRPM | TEMPERATURE: 98.2 F | HEART RATE: 57 BPM | SYSTOLIC BLOOD PRESSURE: 164 MMHG | OXYGEN SATURATION: 100 % | HEIGHT: 62 IN | WEIGHT: 215.83 LBS | DIASTOLIC BLOOD PRESSURE: 63 MMHG | BODY MASS INDEX: 39.72 KG/M2

## 2024-09-25 LAB
ANION GAP SERPL CALC-SCNC: 2 MMOL/L (ref 2–12)
BUN SERPL-MCNC: 22 MG/DL (ref 6–20)
BUN/CREAT SERPL: 16 (ref 12–20)
CALCIUM SERPL-MCNC: 9.8 MG/DL (ref 8.5–10.1)
CHLORIDE SERPL-SCNC: 102 MMOL/L (ref 97–108)
CO2 SERPL-SCNC: 38 MMOL/L (ref 21–32)
CREAT SERPL-MCNC: 1.4 MG/DL (ref 0.55–1.02)
ERYTHROCYTE [DISTWIDTH] IN BLOOD BY AUTOMATED COUNT: 15.5 % (ref 11.5–14.5)
GLUCOSE BLD STRIP.AUTO-MCNC: 132 MG/DL (ref 65–117)
GLUCOSE BLD STRIP.AUTO-MCNC: 179 MG/DL (ref 65–117)
GLUCOSE SERPL-MCNC: 133 MG/DL (ref 65–100)
HCT VFR BLD AUTO: 30.5 % (ref 35–47)
HGB BLD-MCNC: 8.7 G/DL (ref 11.5–16)
MAGNESIUM SERPL-MCNC: 2.6 MG/DL (ref 1.6–2.4)
MCH RBC QN AUTO: 22.6 PG (ref 26–34)
MCHC RBC AUTO-ENTMCNC: 28.5 G/DL (ref 30–36.5)
MCV RBC AUTO: 79.2 FL (ref 80–99)
NRBC # BLD: 0 K/UL (ref 0–0.01)
NRBC BLD-RTO: 0 PER 100 WBC
PHOSPHATE SERPL-MCNC: 3 MG/DL (ref 2.6–4.7)
PLATELET # BLD AUTO: 211 K/UL (ref 150–400)
PMV BLD AUTO: 10.3 FL (ref 8.9–12.9)
POTASSIUM SERPL-SCNC: 3.4 MMOL/L (ref 3.5–5.1)
RBC # BLD AUTO: 3.85 M/UL (ref 3.8–5.2)
SERVICE CMNT-IMP: ABNORMAL
SERVICE CMNT-IMP: ABNORMAL
SODIUM SERPL-SCNC: 142 MMOL/L (ref 136–145)
WBC # BLD AUTO: 5.8 K/UL (ref 3.6–11)

## 2024-09-25 PROCEDURE — 94761 N-INVAS EAR/PLS OXIMETRY MLT: CPT

## 2024-09-25 PROCEDURE — 6370000000 HC RX 637 (ALT 250 FOR IP): Performed by: STUDENT IN AN ORGANIZED HEALTH CARE EDUCATION/TRAINING PROGRAM

## 2024-09-25 PROCEDURE — 2700000000 HC OXYGEN THERAPY PER DAY

## 2024-09-25 PROCEDURE — 6360000002 HC RX W HCPCS: Performed by: HOSPITALIST

## 2024-09-25 PROCEDURE — 82962 GLUCOSE BLOOD TEST: CPT

## 2024-09-25 PROCEDURE — 2580000003 HC RX 258: Performed by: HOSPITALIST

## 2024-09-25 PROCEDURE — 94640 AIRWAY INHALATION TREATMENT: CPT

## 2024-09-25 PROCEDURE — 36415 COLL VENOUS BLD VENIPUNCTURE: CPT

## 2024-09-25 PROCEDURE — 6370000000 HC RX 637 (ALT 250 FOR IP): Performed by: HOSPITALIST

## 2024-09-25 PROCEDURE — 80048 BASIC METABOLIC PNL TOTAL CA: CPT

## 2024-09-25 PROCEDURE — 84100 ASSAY OF PHOSPHORUS: CPT

## 2024-09-25 PROCEDURE — 85027 COMPLETE CBC AUTOMATED: CPT

## 2024-09-25 PROCEDURE — 6360000002 HC RX W HCPCS: Performed by: INTERNAL MEDICINE

## 2024-09-25 PROCEDURE — 83735 ASSAY OF MAGNESIUM: CPT

## 2024-09-25 RX ORDER — POTASSIUM CHLORIDE 750 MG/1
10 TABLET, EXTENDED RELEASE ORAL ONCE
Status: COMPLETED | OUTPATIENT
Start: 2024-09-25 | End: 2024-09-25

## 2024-09-25 RX ORDER — BUMETANIDE 2 MG/1
2 TABLET ORAL DAILY
Qty: 30 TABLET | Refills: 0 | Status: SHIPPED | OUTPATIENT
Start: 2024-09-25

## 2024-09-25 RX ADMIN — ARFORMOTEROL TARTRATE: 15 SOLUTION RESPIRATORY (INHALATION) at 08:38

## 2024-09-25 RX ADMIN — LEVOTHYROXINE SODIUM 137 MCG: 0.11 TABLET ORAL at 05:46

## 2024-09-25 RX ADMIN — CARVEDILOL 12.5 MG: 12.5 TABLET, FILM COATED ORAL at 08:30

## 2024-09-25 RX ADMIN — PANTOPRAZOLE SODIUM 40 MG: 40 TABLET, DELAYED RELEASE ORAL at 08:30

## 2024-09-25 RX ADMIN — INSULIN GLARGINE 30 UNITS: 100 INJECTION, SOLUTION SUBCUTANEOUS at 08:33

## 2024-09-25 RX ADMIN — CALCITRIOL CAPSULES 0.25 MCG 0.5 MCG: 0.25 CAPSULE ORAL at 12:39

## 2024-09-25 RX ADMIN — LOSARTAN POTASSIUM 100 MG: 100 TABLET, FILM COATED ORAL at 08:30

## 2024-09-25 RX ADMIN — INSULIN LISPRO 7 UNITS: 100 INJECTION, SOLUTION INTRAVENOUS; SUBCUTANEOUS at 08:33

## 2024-09-25 RX ADMIN — BUMETANIDE 2 MG: 1 TABLET ORAL at 08:29

## 2024-09-25 RX ADMIN — INSULIN LISPRO 7 UNITS: 100 INJECTION, SOLUTION INTRAVENOUS; SUBCUTANEOUS at 12:39

## 2024-09-25 RX ADMIN — POTASSIUM CHLORIDE 10 MEQ: 750 TABLET, FILM COATED, EXTENDED RELEASE ORAL at 08:30

## 2024-09-25 RX ADMIN — ASPIRIN 81 MG: 81 TABLET, COATED ORAL at 08:30

## 2024-09-25 RX ADMIN — ENOXAPARIN SODIUM 30 MG: 100 INJECTION SUBCUTANEOUS at 08:33

## 2024-09-25 RX ADMIN — SODIUM CHLORIDE, PRESERVATIVE FREE 10 ML: 5 INJECTION INTRAVENOUS at 08:37

## 2024-09-25 RX ADMIN — POTASSIUM CHLORIDE 20 MEQ: 1500 TABLET, EXTENDED RELEASE ORAL at 08:30

## 2024-09-25 ASSESSMENT — PAIN SCALES - GENERAL
PAINLEVEL_OUTOF10: 0
PAINLEVEL_OUTOF10: 0

## 2024-09-26 ENCOUNTER — TELEPHONE (OUTPATIENT)
Age: 74
End: 2024-09-26

## 2024-09-26 NOTE — TELEPHONE ENCOUNTER
[place] states pt is still admitted.  Pt will need a HFU the week of October 14 th.    Please call [place] to schedule the HFU.

## 2024-10-08 ENCOUNTER — TELEPHONE (OUTPATIENT)
Age: 74
End: 2024-10-08

## 2024-10-09 ENCOUNTER — CARE COORDINATION (OUTPATIENT)
Dept: CARE COORDINATION | Age: 74
End: 2024-10-09

## 2024-10-09 NOTE — CARE COORDINATION
Remote Patient Monitoring Note  Date/Time:  10/9/2024 1:24 PM  Patient Current Location: [place]  LPN contacted patient by telephone regarding yellow alert received for no RPM metrics entered x > 2 days. Verified patients name and  as identifiers.  Background: Pt enrolled in RPM r/t COPD, HTN, DM.  Clinical Interventions: Reviewed and followed up on alerts and treatments-Pt returned home from SNF stay and reached out to [facility] support that she is ready to resume monitoring.  RPM tablet unpaused.  Spoke with pt to update and she is agreeable to enter RPM metrics at this time.  All entered metrics are noted to be within RPM parameters.  Pt will resume weight monitoring tomorrow morning.     Plan/Follow Up: Will continue to review, monitor and address alerts with follow up based on severity of symptoms and risk factors.

## 2024-10-11 ENCOUNTER — CARE COORDINATION (OUTPATIENT)
Dept: CARE COORDINATION | Age: 74
End: 2024-10-11

## 2024-10-11 ENCOUNTER — TELEPHONE (OUTPATIENT)
Age: 74
End: 2024-10-11

## 2024-10-11 NOTE — TELEPHONE ENCOUNTER
Franki states pt's b/p is running low.    74/46 and then after drinking water it improved 81/51    Got a call from tele monitor    No dizzy, no sob and all other vitals normal.

## 2024-10-11 NOTE — CARE COORDINATION
-Remote Alert Monitoring Note  Date/Time:  10/11/2024 9:47 AM  Patient Current Location: [place]  Verified patients name and  as identifiers.  Rpm alert to be reviewed by the provider   red alert  blood pressure reading (78/48)  Vitals Recheck blood pressure reading (74/46)  Additional needs to be addressed by provider:  Pt is enrolled in Remote Patient Monitoring r/t COPD, HTN, DM.   She recently returned home from SNF after hospitalization for CHF exacerbation.  Spoke with pt who is in no apparent distress and offers no complaints at this time.  Pt wearing home O2 at 2 L.  Denies  any dizziness, weakness, vision change.  States she feels fine.  Reviewed HTN medications and pt taking Bumex 2 mg daily, Carvedilol 12.5 mg take 2 tabs in am and 1 tab in pm and Losartan 100 mg daily.  She has an order for Metolazone 2.5 mg take one time weekly, pt verbalizes that she is not taking medication.  States she took all morning doses today around 8:30 am.  PT is present at this time and agreeable to assist pt to recheck BP at this time with updated reading of 74/46.  They also did a manual BP with reading of 74/47.  Pt instructed to drink a glass of water and will recheck BP again in approximately 1 hour.  RPM metrics added for review.  Please advise.       LPN contacted patient by telephone regarding red alert received   Background: Pt enrolled in RPM r/t HTN, COPD, DM.  Refer to 911 immediately if:  Patient unresponsive or unable to provide history  Change in cognition or sudden confusion  Patient unable to respond in complete sentences  Intense chest pain/tightness  Any concern for any clinical emergency  Red Alert: Provider response time of 1 hr required for any red alert requiring intervention  Yellow Alert: Provider response time of 3hr required for any escalated yellow alert  Patient Chief Complaint:  Blood Pressure BP Triage  Are you having any Chest Pain? no   Are you having any Shortness of Breath? no   Do you

## 2024-10-11 NOTE — CARE COORDINATION
10/11/24 11:58 AM Patient is currently enrolled in Remote Patient Monitoring for COPD, Diabetes, and HTN.  Pt rechecked BP at 11:39 am with updated reading of 95/49.  BP now within RPM parameters.  Plan/Follow Up: Will continue to review, monitor and address alerts with follow up based on severity of symptoms and risk factors.

## 2024-10-11 NOTE — CARE COORDINATION
10/11/24 11:00 AM Patient is currently enrolled in Remote Patient Monitoring for COPD, Diabetes, and HTN. Message received from PCP Dr. Antoine, \"Tel pt to hold next dose of croeg and losartan . Recheck BP in AM. Go to Er if dizzy or feeling very weak\"  Outreach to pt to update and she verbalizes understanding.  Pt removed medications from pill box during call.  She continues to verbalize asymptomatic and rechecked BP during call with updated reading of 89/62.  She again verbalizes understanding of when to seek emergent medical attention.  Plan/Follow Up: Will continue to review, monitor and address alerts with follow up based on severity of symptoms and risk factors.

## 2024-10-14 NOTE — TELEPHONE ENCOUNTER
Attempted to reach [facility]. Left message on vm to return call    Spoke with patient. BP yesterday 130/66. Pt feeling ok. No symptoms reported. No concerns.     LAURA

## 2024-10-17 ENCOUNTER — OFFICE VISIT (OUTPATIENT)
Age: 74
End: 2024-10-17

## 2024-10-17 VITALS
TEMPERATURE: 97.2 F | RESPIRATION RATE: 18 BRPM | OXYGEN SATURATION: 99 % | BODY MASS INDEX: 37.87 KG/M2 | SYSTOLIC BLOOD PRESSURE: 130 MMHG | HEART RATE: 58 BPM | DIASTOLIC BLOOD PRESSURE: 60 MMHG | WEIGHT: 205.8 LBS | HEIGHT: 62 IN

## 2024-10-17 DIAGNOSIS — N18.30 TYPE 2 DIABETES MELLITUS WITH STAGE 3 CHRONIC KIDNEY DISEASE, WITH LONG-TERM CURRENT USE OF INSULIN, UNSPECIFIED WHETHER STAGE 3A OR 3B CKD (HCC): ICD-10-CM

## 2024-10-17 DIAGNOSIS — I10 HYPERTENSION, UNSPECIFIED TYPE: ICD-10-CM

## 2024-10-17 DIAGNOSIS — Z79.4 TYPE 2 DIABETES MELLITUS WITH STAGE 3 CHRONIC KIDNEY DISEASE, WITH LONG-TERM CURRENT USE OF INSULIN, UNSPECIFIED WHETHER STAGE 3A OR 3B CKD (HCC): ICD-10-CM

## 2024-10-17 DIAGNOSIS — Z23 ENCOUNTER FOR IMMUNIZATION: Primary | ICD-10-CM

## 2024-10-17 DIAGNOSIS — I50.32 CHRONIC DIASTOLIC CONGESTIVE HEART FAILURE (HCC): ICD-10-CM

## 2024-10-17 DIAGNOSIS — J96.11 CHRONIC RESPIRATORY FAILURE WITH HYPOXIA: ICD-10-CM

## 2024-10-17 DIAGNOSIS — J45.909 UNCOMPLICATED ASTHMA, UNSPECIFIED ASTHMA SEVERITY, UNSPECIFIED WHETHER PERSISTENT: ICD-10-CM

## 2024-10-17 DIAGNOSIS — E11.22 TYPE 2 DIABETES MELLITUS WITH STAGE 3 CHRONIC KIDNEY DISEASE, WITH LONG-TERM CURRENT USE OF INSULIN, UNSPECIFIED WHETHER STAGE 3A OR 3B CKD (HCC): ICD-10-CM

## 2024-10-17 NOTE — PROGRESS NOTES
HISTORY OF PRESENT ILLNESS   Elvia Clifford   is a 73 y.o.  female.  Hx DM- 2 HTN hld asthma hx CVA-DM-2 osteopenia , hx PE morbid obesity, BRENT on bipap obesity hypoventilation ckd 3 Dr ZAYDA Barboza depression  chronic anemia due to ckd ---here with her male friend  Endo-Dr Ambriz last A1c 8.0  PULM Dr Gómez  Nephro Dr Barboza -has appt next month  Hem---IV iron  CARD Dr Veliz--appt 10/30 fu    Here for [facility] admitted 9/20-9/25 for acute diastolic CHF exacerbation-diuresed with iv bumex then oral bumex 2mg qd on d/c  Echo-ef 75-80  Leg edema improved  Did not have pulm edema  Very weak-d/c to SNF  On home 02 3lpm-continued    Bun/cr 22/1.40 on d/c-at baseline renal function    On RPM via HH since return to home   Had low bp recently and losartan and coreg were held on 10/11/24-asymptomatic  Repeat bp 10/13  130/66    Has been off bumex for about 10d since return home--restarte bumex yesterday. Had difficulty getting the rx at the pharmacy per pt  Weight 202lbs when returned  home -weight up about 3 lb to 205 lbs today  Breathing ok   On 02 2lpm continuous      Fsbs around 112-120 in AM  Daily weight and vitals monitoring and glucose via HH  Denies SOB ad legs not swollen per pt  Walks in her house with wheeled walker    Date of Admission: 9/20/2024  Date of Discharge: 9/25/2024  Attending Physician: Tiny Hammonds MD     Discharge Diagnosis:      Acute CHF exacerbation  Diastolic CHF  Elevated troponin   HTN    Sx: Worsening lower extremity edema resulting in inability to ambulate/ RHODES/  Admit to telemetry  Diuretics: Continue with IV Bumex  Monitor with daily weight, BMP  Repeat troponin   BP high side, use hydralazine prn  Cont home Coreg, Cozaar  Primary cardiology consult  9/21: Echocardiogram from 3/24/2024 shows EF of 65%, mildly increased LV wall thickness, mild dilated left atrium, mild MS, mild pulmonary hypertension.  The patient has wrinkling of bilateral lower extremities.  Troponin was initially 329

## 2024-10-17 NOTE — PROGRESS NOTES
\"Have you been to the ER, urgent care clinic since your last visit?  Hospitalized since your last visit?\"    [facility]  ER discharged 9/25 acute on chronic diastolic HF    “Have you seen or consulted any other health care providers outside our system since your last visit?”    NO

## 2024-10-22 ENCOUNTER — CARE COORDINATION (OUTPATIENT)
Dept: CARE COORDINATION | Age: 74
End: 2024-10-22

## 2024-10-22 NOTE — CARE COORDINATION
offers no complaints at this time.  Pt wearing home O2 at 2 L and denies any SOB/dyspnea.  She does verbalize that her hands are cold.  Pt instruction to rub hands briskly prior to placing oximeter and she is agreeable to recheck metric at this time.  Updated oxygen level noted to be 98%.  Oxygen level now within RPM parameters.    Plan/Follow Up: Will continue to review, monitor and address alerts with follow up based on severity of symptoms and risk factors.  **For any new or worsening symptoms or you are concerned in anyway, please contact your Provider or report to the nearest Emergency Room.**

## 2024-10-28 ENCOUNTER — CARE COORDINATION (OUTPATIENT)
Dept: CASE MANAGEMENT | Age: 74
End: 2024-10-28

## 2024-10-28 NOTE — CARE COORDINATION
-Remote Alert Monitoring Note      Date/Time:  10/28/2024 10:25 AM  Patient Current Location: Home: [address] [place] [facility] [address]  Verified patients name and  as identifiers.    Rpm alert to be reviewed by the provider   red alert  weight (INCREASE OF 5 POUNDS IN 7 DAYS)  Vitals Recheck N/A  Additional needs to be addressed by provider: No                   LPN contacted patient by telephone regarding red alert received   Background: ENROLLED IN RPM FOR COPD HTN AND DM  Refer to 911 immediately if:  Patient unresponsive or unable to provide history  Change in cognition or sudden confusion  Patient unable to respond in complete sentences  Intense chest pain/tightness  Any concern for any clinical emergency  Red Alert: Provider response time of 1 hr required for any red alert requiring intervention  Yellow Alert: Provider response time of 3hr required for any escalated yellow alert  Patient Chief Complaint:  Weight Weight Scale Triage  Was your weight obtained upon rising/waking today? yes   Was your weight obtained after voiding and/or use of the bathroom today? yes   Did you weigh yourself in the same amount of clothing today, compared to how you typically do? yes   Was the scale bumped or moved prior to today's weight? no   Is your scale on a flat/hard surface? yes   Did you obtain your weight with shoes on? no   If yes, is this something you normally do during your daily weights? yes   Were you standing up straight on the scale today? yes   Were you leaning on anything while obtaining your weight today? no     Clinical Interventions: Reviewed and followed up on alerts and treatments-spoke to Elvia blood [facility] red alert for weight increase. Pt hx of HF. Denies chest pain or dyspnea.   No abnormal swelling in legs, feet, hands or abdomen. Pt is taking her medications including Bumex daily as directed. Discussed low sodium diet. Pt states she ate potato chips yesterday.  She also states she has

## 2024-10-29 ENCOUNTER — CARE COORDINATION (OUTPATIENT)
Dept: CARE COORDINATION | Age: 74
End: 2024-10-29

## 2024-10-29 NOTE — CARE COORDINATION
Date/Time:  10/29/2024 9:59 AM  LPN attempted to reach patient by telephone regarding red alert in remote patient monitoring program. Left HIPPA compliant message requesting a return call. Will attempt to reach patient again.                 
escalation needed at this time.   Plan/Follow Up: Will continue to review, monitor and address alerts with follow up based on severity of symptoms and risk factors.  **For any new or worsening symptoms or you are concerned in anyway, please contact your Provider or report to the nearest Emergency Room.**

## 2024-10-30 ENCOUNTER — CARE COORDINATION (OUTPATIENT)
Dept: CARE COORDINATION | Age: 74
End: 2024-10-30

## 2024-10-30 ENCOUNTER — OFFICE VISIT (OUTPATIENT)
Age: 74
End: 2024-10-30
Payer: MEDICARE

## 2024-10-30 VITALS
SYSTOLIC BLOOD PRESSURE: 168 MMHG | WEIGHT: 207.2 LBS | OXYGEN SATURATION: 98 % | BODY MASS INDEX: 38.13 KG/M2 | DIASTOLIC BLOOD PRESSURE: 58 MMHG | HEIGHT: 62 IN | HEART RATE: 58 BPM

## 2024-10-30 DIAGNOSIS — I34.2 NONRHEUMATIC MITRAL VALVE STENOSIS: ICD-10-CM

## 2024-10-30 DIAGNOSIS — E78.2 MIXED HYPERLIPIDEMIA: ICD-10-CM

## 2024-10-30 DIAGNOSIS — I25.10 ATHEROSCLEROSIS OF NATIVE CORONARY ARTERY OF NATIVE HEART WITHOUT ANGINA PECTORIS: ICD-10-CM

## 2024-10-30 DIAGNOSIS — I10 ESSENTIAL (PRIMARY) HYPERTENSION: ICD-10-CM

## 2024-10-30 DIAGNOSIS — R06.02 SHORTNESS OF BREATH: ICD-10-CM

## 2024-10-30 DIAGNOSIS — I50.32 CHRONIC HEART FAILURE WITH PRESERVED EJECTION FRACTION (HCC): ICD-10-CM

## 2024-10-30 DIAGNOSIS — I50.32 CHRONIC HEART FAILURE WITH PRESERVED EJECTION FRACTION (HCC): Primary | ICD-10-CM

## 2024-10-30 DIAGNOSIS — Z09 HOSPITAL DISCHARGE FOLLOW-UP: ICD-10-CM

## 2024-10-30 PROCEDURE — 99214 OFFICE O/P EST MOD 30 MIN: CPT | Performed by: NURSE PRACTITIONER

## 2024-10-30 RX ORDER — POTASSIUM CHLORIDE 1500 MG/1
20 TABLET, EXTENDED RELEASE ORAL DAILY
Qty: 30 TABLET | Refills: 1 | Status: SHIPPED | OUTPATIENT
Start: 2024-10-30

## 2024-10-30 RX ORDER — SPIRONOLACTONE 25 MG/1
25 TABLET ORAL DAILY
Qty: 30 TABLET | Refills: 1 | Status: SHIPPED | OUTPATIENT
Start: 2024-10-30

## 2024-10-30 ASSESSMENT — PATIENT HEALTH QUESTIONNAIRE - PHQ9
SUM OF ALL RESPONSES TO PHQ QUESTIONS 1-9: 0
SUM OF ALL RESPONSES TO PHQ QUESTIONS 1-9: 0
2. FEELING DOWN, DEPRESSED OR HOPELESS: NOT AT ALL
SUM OF ALL RESPONSES TO PHQ9 QUESTIONS 1 & 2: 0
SUM OF ALL RESPONSES TO PHQ QUESTIONS 1-9: 0
1. LITTLE INTEREST OR PLEASURE IN DOING THINGS: NOT AT ALL
SUM OF ALL RESPONSES TO PHQ QUESTIONS 1-9: 0

## 2024-10-30 NOTE — PROGRESS NOTES
[address] [place] [address] [phone]       Subjective:        Elvia Clifford is a 73 y.o. female is here for follow up regarding diastolic heart failure admission in 9/24.  She was discharged to home on 9/25/24.    She has remote monitoring at home which shows SBP runs between 110-130mmHg  Her weight has increased by 5 lbs in the last 4 days and she has been compliant with medications  She thinks she is gaining fluid in her abdomen, used to take metolazone almost daily and had recent hypokalemia  She has SOB and is on continuous oxygen  No chest pain or palpitations  No dizziness  Walks with rollator and has mild LE edema   is here with her today    Past Medical History:   Diagnosis Date    Asthma     CAD (coronary artery disease)     \"mild\" per Dr Veliz note    CKD (chronic kidney disease) stage 3, GFR 30-59 ml/min ([facility]) 03/10/2022    Diabetes ([facility])     Dr Honeycutt     Hx of blood clots     Hyperlipidemia     Hypertension     Long term current use of anticoagulant therapy     Nausea & vomiting     Pulmonary embolism ([facility])     Screening for colon cancer 02/16/2005    Dr Ocasio-normal-repeat in 10 years    Stroke ([facility]) 2004    Rt side weaker than left, uses a cane: no longer followed by neuro    Thromboembolus ([facility]) 1976    Rt leg moved to lung     Thyroid disease     Unspecified sleep apnea     uses CPAP        Past Surgical History:   Procedure Laterality Date    CARDIAC CATHETERIZATION  6/6/2012         CARDIAC CATHETERIZATION      COLONOSCOPY N/A 10/24/2022    COLONOSCOPY performed by Clay Ocasio MD at [facility]COPY    COLONOSCOPY,AMAYA MARIO,SNARE  10/24/2022    HEENT      tonsillectomy    HEMORRHOID SURGERY      HYSTERECTOMY (CERVIX STATUS UNKNOWN)      ORTHOPEDIC SURGERY  8/2011    left shoulder    MT UNLISTED PROCEDURE CARDIAC SURGERY      heart surgery     Allergies   Allergen Reactions    Latex Itching    Codeine Itching and Other (See Comments)     hallucinate

## 2024-10-30 NOTE — CARE COORDINATION
11:41 AM10/30/2024  Patient is currently enrolled in RPM RPM Care Plans: COPD, Diabetes, and HTN.  Will route to RPM  Miriam Fine NP per RPM protocol for review of alert escalation.

## 2024-10-30 NOTE — PROGRESS NOTES
1. Have you been to the ER, urgent care clinic since your last visit?  Hospitalized since your last visit?[facility] on 09/20/24    2. Have you seen or consulted any other health care providers outside of the [facility] since your last visit?  Include any pap smears or colon screening. No

## 2024-10-30 NOTE — PATIENT INSTRUCTIONS
Please start spironolactone 25mg daily   Please reduce potassium to 20meq ONCE daily   Please have labs drawn to check potassium and kidney function when you see Dr. Barboza on 11/10/24  I'd like to try to use spironolactone for your diastolic heart failure and hopefully get you off the potassium pills so between Dr. Barboza and myself we will be monitoring your labs closely    Keep monitoring blood pressure and weights     Follow a low sodium diet

## 2024-10-30 NOTE — CARE COORDINATION
-Remote Alert Monitoring Note  Date/Time:  10/30/2024 10:29 AM  Patient Current Location: [place]  Verified patients name and  as identifiers.  Rpm alert to be reviewed by the provider   red alert  weight (increase of 5 lbs x 7 days)  Additional needs to be addressed by provider:  Pt is enrolled in Remote Patient Monitoring r/t COPD, HTN, DM.  She has a scheduled office visit today at 1:00 pm.  Last office weight on 10/17/24 noted to be 205 lbs and weight today is 206.4 lbs.  Weight alert reviewed with pt on 10/29/24.  Weight today is up an additional 0.5 lbs.  Spoke with pt who is in no apparent distress and continues to deny any new/increasing edema. Pt taking Bumex 2 mg daily and Metolazone 2.5 mg weekly on  with last dose 10/29/24. Verbalizes compliance with low sodium diet and states she had baked wings and beans for dinner.  RPM metrics added for review.       LPN contacted patient by telephone regarding red alert received   Background: Pt enrolled in RPM r/t HTN, COPD, DM.   Refer to 911 immediately if:  Patient unresponsive or unable to provide history  Change in cognition or sudden confusion  Patient unable to respond in complete sentences  Intense chest pain/tightness  Any concern for any clinical emergency  Red Alert: Provider response time of 1 hr required for any red alert requiring intervention  Yellow Alert: Provider response time of 3hr required for any escalated yellow alert  Patient Chief Complaint:  Weight Weight Scale Triage  Was your weight obtained upon rising/waking today? yes   Was your weight obtained after voiding and/or use of the bathroom today? yes   Did you weigh yourself in the same amount of clothing today, compared to how you typically do? yes   Was the scale bumped or moved prior to today's weight? no   Is your scale on a flat/hard surface? yes   Did you obtain your weight with shoes on? no   If yes, is this something you normally do during your daily weights? NA   Were

## 2024-10-31 ENCOUNTER — CARE COORDINATION (OUTPATIENT)
Dept: CARE COORDINATION | Age: 74
End: 2024-10-31

## 2024-10-31 NOTE — CARE COORDINATION
Remote Alert Monitoring Note  Date/Time:  10/31/2024 11:04 AM  Patient Current Location: [place]  Verified patients name and  as identifiers.  Rpm alert to be reviewed by the provider   red alert  weight (increase of 5 lbs x 7 days)  Additional needs to be addressed by provider: No     LPN contacted patient by telephone regarding red alert received   Background: Pt enrolled in RPM r/t HTN, COPD, DM.  Refer to 911 immediately if:  Patient unresponsive or unable to provide history  Change in cognition or sudden confusion  Patient unable to respond in complete sentences  Intense chest pain/tightness  Any concern for any clinical emergency  Red Alert: Provider response time of 1 hr required for any red alert requiring intervention  Yellow Alert: Provider response time of 3hr required for any escalated yellow alert  Clinical Interventions: Reviewed and followed up on alerts and treatments-Weight increase reviewed with pt on 10/30/24 and routed to provider for review.  Pt had scheduled office visit with cardiology yesterday with weight noted at 207 lbs.  Noted new order for Spironolactone 25 mg daily.  Weight entered today is 207.5 lbs. Spoke with pt who is in no apparent distress, wearing O2 at 2L and denies any SOB/dyspnea, new or increasing edema.  Pt taking Bumex 2 mg daily and Metolazone 2.5 mg weekly on .  She just picked up new medications, potassium and Spironolactone.  She took Bumex 2 mg earlier this morning and will take spironolactone now.  Pt education to ensure to follow low sodium diet and take diuretics as ordered.  Pt verbalizes understanding. No escalation needed at this time.     Plan/Follow Up: Will continue to review, monitor and address alerts with follow up based on severity of symptoms and risk factors.  **For any new or worsening symptoms or you are concerned in anyway, please contact your Provider or report to the nearest Emergency Room.**

## 2024-11-01 ENCOUNTER — CARE COORDINATION (OUTPATIENT)
Dept: CARE COORDINATION | Age: 74
End: 2024-11-01

## 2024-11-01 NOTE — CARE COORDINATION
Remote Alert Monitoring Note  Date/Time:  2024 10:19 AM  Patient Current Location: [place]  Verified patients name and  as identifiers.  Rpm alert to be reviewed by the provider   red alert  weight (increase of 5 lbs x 7 days)  Additional needs to be addressed by provider: No     LPN contacted patient by telephone regarding red alert received   Background: Pt enrolled in RPM r/t HTN, COPD, DM.   Refer to 911 immediately if:  Patient unresponsive or unable to provide history  Change in cognition or sudden confusion  Patient unable to respond in complete sentences  Intense chest pain/tightness  Any concern for any clinical emergency  Red Alert: Provider response time of 1 hr required for any red alert requiring intervention  Yellow Alert: Provider response time of 3hr required for any escalated yellow alert  Clinical Interventions: Reviewed and followed up on alerts and treatments-Weight increase reviewed with pt on 10/30/24 and routed to provider for review. Pt had scheduled office visit with cardiology on 10/30/24 with weight noted at 207 lbs. Noted new order for Spironolactone 25 mg daily.  Weight today noted to be 207 lbs.  Spoke with pt who is in no apparent distress, wearing O2 at 2L and denies any SOB/dyspnea, new or increasing edema. Pt taking Bumex 2 mg daily, Spironolactone 25 mg daily and Metolazone 2.5 mg weekly on . Pt education to ensure to follow low sodium diet and take diuretics as ordered. Pt verbalizes understanding. No escalation needed at this time.     Plan/Follow Up: Will continue to review, monitor and address alerts with follow up based on severity of symptoms and risk factors.  **For any new or worsening symptoms or you are concerned in anyway, please contact your Provider or report to the nearest Emergency Room.**

## 2024-11-04 ENCOUNTER — TELEPHONE (OUTPATIENT)
Age: 74
End: 2024-11-04

## 2024-11-04 NOTE — TELEPHONE ENCOUNTER
Spoke with patient. Advised. Verbalized understanding.     Per Dr. Antoine  Advise to call card MD for the weight gain and take the bumex bid for now

## 2024-11-04 NOTE — TELEPHONE ENCOUNTER
Pt states her left foot is swollen again. Pt would like to know what to do.      She was told if this happened again to call the office.     Pt would like a call back

## 2024-11-04 NOTE — TELEPHONE ENCOUNTER
Spoke with patient.    Pt c/o left leg swelling and foot. Patient gained 5 lbs since Friday.  Patient states she does not know what medications she should be taking. Pt could not confirm if taking bumex or spironolactone.    No sob. No pain. Leg/foot not hot to touch or red. States leg feels tight and heavy    Please advise.

## 2024-11-04 NOTE — TELEPHONE ENCOUNTER
Attempted to reach patient. Left message on vm to return call    Per Dr. Antoine  Advise to call card MD for the weight gain and take the bumex bid for now

## 2024-11-05 ENCOUNTER — TELEPHONE (OUTPATIENT)
Age: 74
End: 2024-11-05

## 2024-11-05 NOTE — TELEPHONE ENCOUNTER
Attempted to reach Fiorella/ [facility]. Left message on vm to return call

## 2024-11-05 NOTE — TELEPHONE ENCOUNTER
Fiorella, nurse  with [facility]    Phone [phone]    States:  Reports since 10/30 weight gain from 206 to 210 lbs  Denies increased shortness of breath   Face is puffy  Has some swelling in left leg  Blood pressure today 160/73   States asymptomatic        Appointments:  Last seen at [facility]:   10/17/2024   Future appointment [facility]:  1/20/2025         .            Caller confirms readback of documented phone/fax number(s) as correct.

## 2024-11-06 NOTE — TELEPHONE ENCOUNTER
Spoke with Fiorella//[facility]. Advised per PCP recommendations. Verbalized understanding.     Per Dr. Antoine  Advise to call card MD for the weight gain and take the bumex bid for now

## 2024-11-22 ENCOUNTER — CARE COORDINATION (OUTPATIENT)
Facility: CLINIC | Age: 74
End: 2024-11-22

## 2024-11-22 DIAGNOSIS — E11.22 TYPE 2 DIABETES MELLITUS WITH CHRONIC KIDNEY DISEASE, WITH LONG-TERM CURRENT USE OF INSULIN, UNSPECIFIED CKD STAGE (HCC): ICD-10-CM

## 2024-11-22 DIAGNOSIS — I10 PRIMARY HYPERTENSION: Primary | ICD-10-CM

## 2024-11-22 DIAGNOSIS — I50.33 ACUTE ON CHRONIC DIASTOLIC HEART FAILURE WITH PRESERVED EJECTION FRACTION (HCC): ICD-10-CM

## 2024-11-22 DIAGNOSIS — Z79.4 TYPE 2 DIABETES MELLITUS WITH CHRONIC KIDNEY DISEASE, WITH LONG-TERM CURRENT USE OF INSULIN, UNSPECIFIED CKD STAGE (HCC): ICD-10-CM

## 2024-11-22 NOTE — CARE COORDINATION
Kit Return No Answer Patient Elvia Clifford  11/22/24     Care Coordination  placed call to patient to arrange RPM kit  through [employer]. Left HIPAA Compliant Message     provided return and how to pack equipment in original packing via the patients voicemail if available and provided call back number should patient have questions.    Patient made aware [employer] will  equipment in 2-4 days.

## 2024-11-22 NOTE — PROGRESS NOTES
Remote Patient Order Discontinued    Received request from Maria Ines Kelsey   to discontinue order for remote patient monitoring of CHF, Diabetes, and HTN and order completed.

## 2024-11-29 ENCOUNTER — OFFICE VISIT (OUTPATIENT)
Age: 74
End: 2024-11-29
Payer: MEDICARE

## 2024-11-29 ENCOUNTER — TELEPHONE (OUTPATIENT)
Age: 74
End: 2024-11-29

## 2024-11-29 VITALS
OXYGEN SATURATION: 98 % | BODY MASS INDEX: 35.85 KG/M2 | SYSTOLIC BLOOD PRESSURE: 148 MMHG | DIASTOLIC BLOOD PRESSURE: 72 MMHG | WEIGHT: 196 LBS | HEART RATE: 80 BPM

## 2024-11-29 DIAGNOSIS — N18.31 STAGE 3A CHRONIC KIDNEY DISEASE (HCC): ICD-10-CM

## 2024-11-29 DIAGNOSIS — I50.32 CHRONIC DIASTOLIC CONGESTIVE HEART FAILURE (HCC): ICD-10-CM

## 2024-11-29 DIAGNOSIS — I25.10 CORONARY ARTERY DISEASE INVOLVING NATIVE CORONARY ARTERY OF NATIVE HEART WITHOUT ANGINA PECTORIS: ICD-10-CM

## 2024-11-29 DIAGNOSIS — I10 PRIMARY HYPERTENSION: Primary | ICD-10-CM

## 2024-11-29 PROCEDURE — 1159F MED LIST DOCD IN RCRD: CPT | Performed by: NURSE PRACTITIONER

## 2024-11-29 PROCEDURE — 3078F DIAST BP <80 MM HG: CPT | Performed by: NURSE PRACTITIONER

## 2024-11-29 PROCEDURE — 99214 OFFICE O/P EST MOD 30 MIN: CPT | Performed by: NURSE PRACTITIONER

## 2024-11-29 PROCEDURE — 1090F PRES/ABSN URINE INCON ASSESS: CPT | Performed by: NURSE PRACTITIONER

## 2024-11-29 PROCEDURE — 1126F AMNT PAIN NOTED NONE PRSNT: CPT | Performed by: NURSE PRACTITIONER

## 2024-11-29 PROCEDURE — G8417 CALC BMI ABV UP PARAM F/U: HCPCS | Performed by: NURSE PRACTITIONER

## 2024-11-29 PROCEDURE — 1123F ACP DISCUSS/DSCN MKR DOCD: CPT | Performed by: NURSE PRACTITIONER

## 2024-11-29 PROCEDURE — 3077F SYST BP >= 140 MM HG: CPT | Performed by: NURSE PRACTITIONER

## 2024-11-29 PROCEDURE — 1160F RVW MEDS BY RX/DR IN RCRD: CPT | Performed by: NURSE PRACTITIONER

## 2024-11-29 PROCEDURE — G8427 DOCREV CUR MEDS BY ELIG CLIN: HCPCS | Performed by: NURSE PRACTITIONER

## 2024-11-29 PROCEDURE — 3017F COLORECTAL CA SCREEN DOC REV: CPT | Performed by: NURSE PRACTITIONER

## 2024-11-29 PROCEDURE — G8482 FLU IMMUNIZE ORDER/ADMIN: HCPCS | Performed by: NURSE PRACTITIONER

## 2024-11-29 PROCEDURE — G8399 PT W/DXA RESULTS DOCUMENT: HCPCS | Performed by: NURSE PRACTITIONER

## 2024-11-29 PROCEDURE — 1036F TOBACCO NON-USER: CPT | Performed by: NURSE PRACTITIONER

## 2024-11-29 RX ORDER — FUROSEMIDE 20 MG/1
2 TABLET ORAL DAILY
COMMUNITY

## 2024-11-29 RX ORDER — LOSARTAN POTASSIUM 100 MG/1
100 TABLET ORAL DAILY
Qty: 90 TABLET | Refills: 3 | Status: SHIPPED | OUTPATIENT
Start: 2024-11-29

## 2024-11-29 ASSESSMENT — PATIENT HEALTH QUESTIONNAIRE - PHQ9
1. LITTLE INTEREST OR PLEASURE IN DOING THINGS: NOT AT ALL
SUM OF ALL RESPONSES TO PHQ QUESTIONS 1-9: 2
SUM OF ALL RESPONSES TO PHQ QUESTIONS 1-9: 0
SUM OF ALL RESPONSES TO PHQ QUESTIONS 1-9: 0
2. FEELING DOWN, DEPRESSED OR HOPELESS: NOT AT ALL
SUM OF ALL RESPONSES TO PHQ9 QUESTIONS 1 & 2: 2
1. LITTLE INTEREST OR PLEASURE IN DOING THINGS: SEVERAL DAYS
SUM OF ALL RESPONSES TO PHQ9 QUESTIONS 1 & 2: 0
SUM OF ALL RESPONSES TO PHQ QUESTIONS 1-9: 2
2. FEELING DOWN, DEPRESSED OR HOPELESS: SEVERAL DAYS
SUM OF ALL RESPONSES TO PHQ QUESTIONS 1-9: 0
SUM OF ALL RESPONSES TO PHQ QUESTIONS 1-9: 0

## 2024-11-29 NOTE — PATIENT INSTRUCTIONS
-please compare provided medication list to current medications to be sure that you are taking everything as ordered  -losartan was sent to your pharmacy, be sure that you restart this.  -take BP everyday at least 1 hour after medications and keep a record  -please bring BP record and all medications to next follow-up appointment  -follow-up with Dr. Antoine about anxiety, stress  -follow-up in 1 month to recheck BP

## 2024-12-02 NOTE — TELEPHONE ENCOUNTER
This nurse called Nephrology specialist at [phone], office of dr. STEVE Barboza to request last blood work (BMP or CMP).  Labs requested.

## 2024-12-05 ENCOUNTER — TELEPHONE (OUTPATIENT)
Age: 74
End: 2024-12-05

## 2024-12-05 RX ORDER — CEPHALEXIN 500 MG/1
500 CAPSULE ORAL 2 TIMES DAILY
Qty: 14 CAPSULE | Refills: 0 | Status: SHIPPED | OUTPATIENT
Start: 2024-12-05 | End: 2024-12-12

## 2024-12-05 NOTE — TELEPHONE ENCOUNTER
Spoke with patient.   Pt c/o boil under right arm. States boil is size of point of index finger. White. Looks like it is about to pop. Painful.  Boil has been in area for a week.  Pt tried heating sensation and \"boil ease\" for two days. Kept a cloth under arm.     Please advise.

## 2024-12-05 NOTE — TELEPHONE ENCOUNTER
Spoke with patient. Advised per PCP. Pt verbalized understanding.     Per Dr. Antoine  Tell pt I sent in keflex for 1 week. If not improving then she should see Gen Sx for I and D at [facility]

## 2024-12-05 NOTE — TELEPHONE ENCOUNTER
Pt has a boil under arm and is requesting an antibiotic to be called in for this.     Send to [facility] [place] #[phone]    Please call pt to let her know what doctor will do.  Thanks.

## 2024-12-24 ENCOUNTER — OFFICE VISIT (OUTPATIENT)
Age: 74
End: 2024-12-24
Payer: MEDICARE

## 2024-12-24 VITALS
HEIGHT: 62 IN | BODY MASS INDEX: 38.46 KG/M2 | RESPIRATION RATE: 16 BRPM | HEART RATE: 67 BPM | WEIGHT: 209 LBS | SYSTOLIC BLOOD PRESSURE: 120 MMHG | DIASTOLIC BLOOD PRESSURE: 70 MMHG | OXYGEN SATURATION: 95 %

## 2024-12-24 DIAGNOSIS — N18.31 STAGE 3A CHRONIC KIDNEY DISEASE (HCC): ICD-10-CM

## 2024-12-24 DIAGNOSIS — I10 ESSENTIAL (PRIMARY) HYPERTENSION: ICD-10-CM

## 2024-12-24 DIAGNOSIS — I25.10 CORONARY ARTERY DISEASE INVOLVING NATIVE CORONARY ARTERY OF NATIVE HEART WITHOUT ANGINA PECTORIS: Primary | ICD-10-CM

## 2024-12-24 DIAGNOSIS — I50.32 CHRONIC HEART FAILURE WITH PRESERVED EJECTION FRACTION (HCC): ICD-10-CM

## 2024-12-24 PROCEDURE — 99214 OFFICE O/P EST MOD 30 MIN: CPT | Performed by: NURSE PRACTITIONER

## 2024-12-24 PROCEDURE — 1123F ACP DISCUSS/DSCN MKR DOCD: CPT | Performed by: NURSE PRACTITIONER

## 2024-12-24 PROCEDURE — 3074F SYST BP LT 130 MM HG: CPT | Performed by: NURSE PRACTITIONER

## 2024-12-24 PROCEDURE — 3078F DIAST BP <80 MM HG: CPT | Performed by: NURSE PRACTITIONER

## 2024-12-24 PROCEDURE — 3017F COLORECTAL CA SCREEN DOC REV: CPT | Performed by: NURSE PRACTITIONER

## 2024-12-24 PROCEDURE — 1036F TOBACCO NON-USER: CPT | Performed by: NURSE PRACTITIONER

## 2024-12-24 PROCEDURE — 1126F AMNT PAIN NOTED NONE PRSNT: CPT | Performed by: NURSE PRACTITIONER

## 2024-12-24 PROCEDURE — 1159F MED LIST DOCD IN RCRD: CPT | Performed by: NURSE PRACTITIONER

## 2024-12-24 PROCEDURE — G8482 FLU IMMUNIZE ORDER/ADMIN: HCPCS | Performed by: NURSE PRACTITIONER

## 2024-12-24 PROCEDURE — 1160F RVW MEDS BY RX/DR IN RCRD: CPT | Performed by: NURSE PRACTITIONER

## 2024-12-24 PROCEDURE — G8399 PT W/DXA RESULTS DOCUMENT: HCPCS | Performed by: NURSE PRACTITIONER

## 2024-12-24 PROCEDURE — 1090F PRES/ABSN URINE INCON ASSESS: CPT | Performed by: NURSE PRACTITIONER

## 2024-12-24 PROCEDURE — G8417 CALC BMI ABV UP PARAM F/U: HCPCS | Performed by: NURSE PRACTITIONER

## 2024-12-24 PROCEDURE — G8427 DOCREV CUR MEDS BY ELIG CLIN: HCPCS | Performed by: NURSE PRACTITIONER

## 2024-12-24 RX ORDER — TIZANIDINE 2 MG/1
2 TABLET ORAL EVERY 8 HOURS PRN
COMMUNITY

## 2024-12-24 RX ORDER — PANTOPRAZOLE SODIUM 40 MG/1
40 TABLET, DELAYED RELEASE ORAL AS NEEDED
COMMUNITY

## 2024-12-24 RX ORDER — DOCUSATE SODIUM 100 MG/1
100 CAPSULE, LIQUID FILLED ORAL 2 TIMES DAILY
COMMUNITY

## 2024-12-24 NOTE — PROGRESS NOTES
consistent with mild pulmonary hypertension. The estimated RVSP is 46 mmHg.    Signed by: Cornell Veliz MD on 3/24/2024 12:10 PM      STRESS:  01/24/20 01/27/2020  1:45 PM, 01/27/2020 12:00 AM (Final)    Narrative  This is a summary report. The complete report is available in the patient's medical record. If you cannot access the medical record, please contact the sending organization for a detailed fax or copy.    · Baseline ECG: Sinus bradycardia.  · Gated SPECT: Left ventricular function post-stress was normal. Calculated ejection fraction is 71%. There is no evidence of transient ischemic dilation (TID).  · Myocardial perfusion imaging defect 1: There is a defect that is moderate to large in size with a mild reduction in uptake present in the inferior and apex location(s) that is non-reversible. There is normal wall motion in the defect area. The defect appears to be an artifact caused by subdiaphragmatic activity.  · Negative myocardial perfusion imaging. Myocardial perfusion imaging supports a low risk stress test.    Signed by: Shannan Jain MD on 1/27/2020  1:45 PM, Signed by: Unknown Provider Result on 1/27/2020 12:00 AM    CARDIAC CATH  No results found for this or any previous visit.        Assessment:         Diagnosis Orders   1. Coronary artery disease involving native coronary artery of native heart without angina pectoris        2. Chronic heart failure with preserved ejection fraction (HCC)        3. Essential (primary) hypertension        4. Stage 3a chronic kidney disease (HCC)            No orders of the defined types were placed in this encounter.       Plan:     Coronary artery disease involving native coronary artery of native heart without angina pectoris  -Hx of non-obstructive CAD per cath in 2012  -Lexiscan stress test done 1/2020 without evidence of ischemia  -Continue medical management and risk factor modification---ASA, BB, statin  -She will follow up with

## 2025-01-03 ENCOUNTER — OFFICE VISIT (OUTPATIENT)
Age: 75
End: 2025-01-03
Payer: MEDICARE

## 2025-01-03 VITALS
WEIGHT: 205.8 LBS | HEIGHT: 62 IN | HEART RATE: 60 BPM | SYSTOLIC BLOOD PRESSURE: 133 MMHG | DIASTOLIC BLOOD PRESSURE: 52 MMHG | BODY MASS INDEX: 37.87 KG/M2

## 2025-01-03 DIAGNOSIS — E11.8 TYPE 2 DIABETES MELLITUS WITH UNSPECIFIED COMPLICATIONS (HCC): Primary | ICD-10-CM

## 2025-01-03 LAB — HBA1C MFR BLD: 8.5 %

## 2025-01-03 PROCEDURE — 99214 OFFICE O/P EST MOD 30 MIN: CPT | Performed by: INTERNAL MEDICINE

## 2025-01-03 PROCEDURE — 83036 HEMOGLOBIN GLYCOSYLATED A1C: CPT | Performed by: INTERNAL MEDICINE

## 2025-01-03 NOTE — PROGRESS NOTES
Ms. Clifford returns for follow-up of her type 2 diabetes mellitus x 14 years with poor blood sugar control.    Her A1c was 9.7% in September. Her A1c was  8.9%  in March 2021 which is the lowest we have ever had.,   Her A1c in October was 9.3%. When I saw her last her A1c was 8.6%.  Her A1c today is 8.5%.         She was hospitalized December 2023 for shortness of breath (hypoxia and hypercapnia) and was started on BiPAP which she continues to use.She was then readmitted September 2024 with volume overload.    PMH   HFpEF  CKD 3a       Current Diabetes Medications  Toujeo insulin 30 units in AM    Humalog insulin 20-30 units before each meal  Jardiance 10 mg (just started)  Freestyle Freedom 2    She presents today having forgotten her freestyle freedom reader so I am unable to review her blood sugars.  She says they generally run from a low of 100 to a high of 250.  She has cereal for breakfast.  A sandwich for lunch.  Last night she had gumbo without the rice.  She has  eliminated the sweet tea and French fries.  When I saw her last that was a major issue.  She says that her shortness of breath is much improved.    Examination  Blood pressure 133/52  Pulse 60  Weight 93 kg  BMI 37.6  HEENT unremarkable  Lungs clear  Heart reveals a regular rate and rhythm  Abdomen obese  Extremities unremarkable there is no lower extremity edema today  Diabetic foot exam:   Left Foot:   Visual Exam: normal   Pulse DP: 2+ (normal)   Filament test: reduced sensation   Vibratory Sensation: diminished  Right Foot:   Visual Exam: normal   Pulse DP: 2+ (normal)   Filament test: reduced sensation   Vibratory Sensation: diminished     Impression  1.  Type 2 diabetes mellitus with an A1c of 8.5% on combination therapy  2.  Obesity  3.  Heart failure with preserved ejection fraction    Plan:  1.  I have asked her to watch the carbs even more  2.  Have also asked her to bring me the freestyle freedom so I can download the data and see where the

## 2025-01-09 ENCOUNTER — PATIENT MESSAGE (OUTPATIENT)
Age: 75
End: 2025-01-09

## 2025-01-10 RX ORDER — CALCITRIOL 0.5 UG/1
0.5 CAPSULE, LIQUID FILLED ORAL DAILY
Qty: 30 CAPSULE | Refills: 5 | Status: SHIPPED | OUTPATIENT
Start: 2025-01-10

## 2025-01-10 NOTE — TELEPHONE ENCOUNTER
PCP: Robby Antoine MD    Last appt:   10/17/2024    Future Appointments   Date Time Provider Department Center   1/20/2025 10:30 AM Robby Antoine MD [facility]   2/27/2025 11:20 AM Gisela Carr MD [facility] AM[facility]   3/4/2025  2:00 PM [facility]   5/7/2025 11:50 AM Pepito Ambriz MD RDE [facility] BS AMB       Requested Prescriptions     Pending Prescriptions Disp Refills    calcitRIOL (ROCALTROL) 0.5 MCG capsule 30 capsule      Sig: Take 1 capsule by mouth daily

## 2025-01-15 PROBLEM — D63.1 EPO-RESISTANT ANEMIA: Status: ACTIVE | Noted: 2025-01-15

## 2025-01-15 PROBLEM — D50.9 IRON DEFICIENCY ANEMIA, UNSPECIFIED: Status: ACTIVE | Noted: 2025-01-15

## 2025-01-16 PROBLEM — D50.0 IRON DEFICIENCY ANEMIA SECONDARY TO BLOOD LOSS (CHRONIC): Status: ACTIVE | Noted: 2025-01-16

## 2025-01-18 NOTE — PROGRESS NOTES
HISTORY OF PRESENT ILLNESS   Elvia Clifford   is a 74 y.o.  female.  Hx DM- 2 HTN hld asthma hx CVA-Diastolic CHF osteopenia , hx PE morbid obesity, BRENT on bipap obesity hypoventilation ckd 3- Dr ZAYDA Barboza depression  chronic anemia due to ckd ---here with her male friend      Endo-Dr Ambriz last A1c 8.5 last month-advised carb reduction -on toujeo 30 u qd and lispro 20 u tid.. fsbs around 120 range now. Some low readings at night. Has CGM  PULM Dr Gómez--weaned off 02 in November for CHF but uses 02 hs with CPAP  Nephro Dr Barboza -had labs 11/2024 and fu next month--cr 1.1-1.4  Hem---IV iron tomorrow per Dr Barboza at infusion center /monthly x 3  CARD Dr Veliz--fu last month-stable on lasix 40 mg qd and weekly metolazone-weight 209 lbs in office last month    Not c/o sob or swelling  Usees rollator '  Lives alone , independent  Last OV     Here for LOUIE admitted 9/20-9/25 for acute diastolic CHF exacerbation-diuresed with iv bumex then oral bumex 2mg qd on d/c  Echo-ef 75-80  Leg edema improved  Did not have pulm edema  Very weak-d/c to SNF  On home 02 3lpm-continued     Bun/cr 22/1.40 on d/c-at baseline renal function     On RPM via HH since return to home   Had low bp recently and losartan and coreg were held on 10/11/24-asymptomatic  Repeat bp 10/13  130/66     Has been off bumex for about 10d since return home--restarte bumex yesterday. Had difficulty getting the rx at the pharmacy per pt  Weight 202lbs when returned  home -weight up about 3 lb to 205 lbs today  Breathing ok   On 02 2lpm continuous        Fsbs around 112-120 in AM  Daily weight and vitals monitoring and glucose via HH  Denies SOB ad legs not swollen per pt  Walks in her house with wheeled walker     Date of Admission: 9/20/2024  Patient Active Problem List    Diagnosis Date Noted    Iron deficiency anemia secondary to blood loss (chronic) 01/16/2025    EPO-resistant anemia 01/15/2025    Iron deficiency anemia, unspecified 01/15/2025

## 2025-01-20 ENCOUNTER — OFFICE VISIT (OUTPATIENT)
Age: 75
End: 2025-01-20
Payer: MEDICARE

## 2025-01-20 VITALS
RESPIRATION RATE: 18 BRPM | HEIGHT: 62 IN | HEART RATE: 57 BPM | WEIGHT: 208.6 LBS | OXYGEN SATURATION: 97 % | SYSTOLIC BLOOD PRESSURE: 122 MMHG | DIASTOLIC BLOOD PRESSURE: 56 MMHG | TEMPERATURE: 97.2 F | BODY MASS INDEX: 38.39 KG/M2

## 2025-01-20 DIAGNOSIS — J96.11 CHRONIC RESPIRATORY FAILURE WITH HYPOXIA: ICD-10-CM

## 2025-01-20 DIAGNOSIS — F32.1 MAJOR DEPRESSIVE DISORDER, SINGLE EPISODE, MODERATE (HCC): ICD-10-CM

## 2025-01-20 DIAGNOSIS — Z79.4 TYPE 2 DIABETES MELLITUS WITH STAGE 3 CHRONIC KIDNEY DISEASE, WITH LONG-TERM CURRENT USE OF INSULIN, UNSPECIFIED WHETHER STAGE 3A OR 3B CKD (HCC): ICD-10-CM

## 2025-01-20 DIAGNOSIS — Z86.73 HISTORY OF CEREBROVASCULAR ACCIDENT: ICD-10-CM

## 2025-01-20 DIAGNOSIS — E66.9 OBESITY (BMI 30-39.9): ICD-10-CM

## 2025-01-20 DIAGNOSIS — N18.30 TYPE 2 DIABETES MELLITUS WITH STAGE 3 CHRONIC KIDNEY DISEASE, WITH LONG-TERM CURRENT USE OF INSULIN, UNSPECIFIED WHETHER STAGE 3A OR 3B CKD (HCC): ICD-10-CM

## 2025-01-20 DIAGNOSIS — I50.32 CHRONIC DIASTOLIC CONGESTIVE HEART FAILURE (HCC): Primary | ICD-10-CM

## 2025-01-20 DIAGNOSIS — E11.22 TYPE 2 DIABETES MELLITUS WITH STAGE 3 CHRONIC KIDNEY DISEASE, WITH LONG-TERM CURRENT USE OF INSULIN, UNSPECIFIED WHETHER STAGE 3A OR 3B CKD (HCC): ICD-10-CM

## 2025-01-20 DIAGNOSIS — E06.3 HYPOTHYROIDISM DUE TO HASHIMOTO'S THYROIDITIS: ICD-10-CM

## 2025-01-20 DIAGNOSIS — N18.31 STAGE 3A CHRONIC KIDNEY DISEASE (HCC): ICD-10-CM

## 2025-01-20 PROBLEM — I50.33 ACUTE ON CHRONIC DIASTOLIC HEART FAILURE WITH PRESERVED EJECTION FRACTION (HCC): Status: RESOLVED | Noted: 2024-09-20 | Resolved: 2025-01-20

## 2025-01-20 PROCEDURE — 1090F PRES/ABSN URINE INCON ASSESS: CPT | Performed by: INTERNAL MEDICINE

## 2025-01-20 PROCEDURE — G8427 DOCREV CUR MEDS BY ELIG CLIN: HCPCS | Performed by: INTERNAL MEDICINE

## 2025-01-20 PROCEDURE — 3078F DIAST BP <80 MM HG: CPT | Performed by: INTERNAL MEDICINE

## 2025-01-20 PROCEDURE — G8399 PT W/DXA RESULTS DOCUMENT: HCPCS | Performed by: INTERNAL MEDICINE

## 2025-01-20 PROCEDURE — 3074F SYST BP LT 130 MM HG: CPT | Performed by: INTERNAL MEDICINE

## 2025-01-20 PROCEDURE — G8417 CALC BMI ABV UP PARAM F/U: HCPCS | Performed by: INTERNAL MEDICINE

## 2025-01-20 PROCEDURE — 99214 OFFICE O/P EST MOD 30 MIN: CPT | Performed by: INTERNAL MEDICINE

## 2025-01-20 PROCEDURE — 1159F MED LIST DOCD IN RCRD: CPT | Performed by: INTERNAL MEDICINE

## 2025-01-20 PROCEDURE — 1036F TOBACCO NON-USER: CPT | Performed by: INTERNAL MEDICINE

## 2025-01-20 PROCEDURE — 3017F COLORECTAL CA SCREEN DOC REV: CPT | Performed by: INTERNAL MEDICINE

## 2025-01-20 PROCEDURE — 2022F DILAT RTA XM EVC RTNOPTHY: CPT | Performed by: INTERNAL MEDICINE

## 2025-01-20 PROCEDURE — 3046F HEMOGLOBIN A1C LEVEL >9.0%: CPT | Performed by: INTERNAL MEDICINE

## 2025-01-20 PROCEDURE — 1123F ACP DISCUSS/DSCN MKR DOCD: CPT | Performed by: INTERNAL MEDICINE

## 2025-01-20 RX ORDER — LEVOTHYROXINE SODIUM 137 UG/1
137 TABLET ORAL DAILY
Qty: 90 TABLET | Refills: 3 | Status: SHIPPED | OUTPATIENT
Start: 2025-01-20

## 2025-01-20 ASSESSMENT — PATIENT HEALTH QUESTIONNAIRE - PHQ9
5. POOR APPETITE OR OVEREATING: NOT AT ALL
SUM OF ALL RESPONSES TO PHQ QUESTIONS 1-9: 3
10. IF YOU CHECKED OFF ANY PROBLEMS, HOW DIFFICULT HAVE THESE PROBLEMS MADE IT FOR YOU TO DO YOUR WORK, TAKE CARE OF THINGS AT HOME, OR GET ALONG WITH OTHER PEOPLE: NOT DIFFICULT AT ALL
SUM OF ALL RESPONSES TO PHQ QUESTIONS 1-9: 3
6. FEELING BAD ABOUT YOURSELF - OR THAT YOU ARE A FAILURE OR HAVE LET YOURSELF OR YOUR FAMILY DOWN: NEARLY EVERY DAY
SUM OF ALL RESPONSES TO PHQ QUESTIONS 1-9: 3
1. LITTLE INTEREST OR PLEASURE IN DOING THINGS: NOT AT ALL
4. FEELING TIRED OR HAVING LITTLE ENERGY: NOT AT ALL
SUM OF ALL RESPONSES TO PHQ QUESTIONS 1-9: 3
SUM OF ALL RESPONSES TO PHQ9 QUESTIONS 1 & 2: 0
9. THOUGHTS THAT YOU WOULD BE BETTER OFF DEAD, OR OF HURTING YOURSELF: NOT AT ALL
8. MOVING OR SPEAKING SO SLOWLY THAT OTHER PEOPLE COULD HAVE NOTICED. OR THE OPPOSITE, BEING SO FIGETY OR RESTLESS THAT YOU HAVE BEEN MOVING AROUND A LOT MORE THAN USUAL: NOT AT ALL
2. FEELING DOWN, DEPRESSED OR HOPELESS: NOT AT ALL
7. TROUBLE CONCENTRATING ON THINGS, SUCH AS READING THE NEWSPAPER OR WATCHING TELEVISION: NOT AT ALL
3. TROUBLE FALLING OR STAYING ASLEEP: NOT AT ALL

## 2025-01-20 NOTE — PROGRESS NOTES
\"Have you been to the ER, urgent care clinic since your last visit?  Hospitalized since your last visit?\"    NO    “Have you seen or consulted any other health care providers outside our system since your last visit?”    Ortho

## 2025-01-21 ENCOUNTER — HOSPITAL ENCOUNTER (OUTPATIENT)
Facility: HOSPITAL | Age: 75
Setting detail: INFUSION SERIES
Discharge: HOME | End: 2025-01-21
Payer: MEDICARE

## 2025-01-21 VITALS
HEART RATE: 56 BPM | RESPIRATION RATE: 16 BRPM | OXYGEN SATURATION: 99 % | SYSTOLIC BLOOD PRESSURE: 119 MMHG | DIASTOLIC BLOOD PRESSURE: 53 MMHG | TEMPERATURE: 97.2 F

## 2025-01-21 DIAGNOSIS — D50.9 IRON DEFICIENCY ANEMIA, UNSPECIFIED IRON DEFICIENCY ANEMIA TYPE: ICD-10-CM

## 2025-01-21 DIAGNOSIS — D50.0 IRON DEFICIENCY ANEMIA SECONDARY TO BLOOD LOSS (CHRONIC): ICD-10-CM

## 2025-01-21 DIAGNOSIS — D63.1 EPO-RESISTANT ANEMIA: Primary | ICD-10-CM

## 2025-01-21 PROCEDURE — 96365 THER/PROPH/DIAG IV INF INIT: CPT

## 2025-01-21 PROCEDURE — 6360000002 HC RX W HCPCS: Performed by: INTERNAL MEDICINE

## 2025-01-21 PROCEDURE — 2580000003 HC RX 258: Performed by: INTERNAL MEDICINE

## 2025-01-21 RX ORDER — SODIUM CHLORIDE 9 MG/ML
5-250 INJECTION, SOLUTION INTRAVENOUS PRN
Status: DISCONTINUED | OUTPATIENT
Start: 2025-01-21 | End: 2025-01-22 | Stop reason: HOSPADM

## 2025-01-21 RX ORDER — SODIUM CHLORIDE 9 MG/ML
5-250 INJECTION, SOLUTION INTRAVENOUS PRN
OUTPATIENT
Start: 2025-02-04

## 2025-01-21 RX ADMIN — SODIUM CHLORIDE 300 MG: 9 INJECTION, SOLUTION INTRAVENOUS at 09:40

## 2025-01-21 NOTE — PROGRESS NOTES
Outpatient Infusion Center Progress Note    Pt arrived in stable condition for Venofer    Assessment completed.     24G PIV established in left A/C with positive blood return noted.     Patient Vitals for the past 12 hrs:   Temp Pulse Resp BP SpO2   01/21/25 1130 -- 56 16 (!) 119/53 99 %   01/21/25 0845 97.2 °F (36.2 °C) 65 16 (!) 99/56 98 %      The following medications administered:  Medications Administered         iron sucrose (VENOFER) 300 mg in sodium chloride 0.9 % 250 mL IVPB Admin Date  01/21/2025 Action  New Bag Dose  300 mg Rate  193.3 mL/hr Route  IntraVENous Documented By  Mariana Stark, RN          Pt monitored x 30 mins post infusion. Pt tolerated treatment well. IV flushed per policy and removed, 2x2 and coban placed.     Pt provided with education on possible side effects of medication along with discharge instructions. Pt verbalized understanding.      Pt discharged in no acute distress. Next appointment:    Future Appointments   Date Time Provider Department Center   2/18/2025 10:00 AM [facility] INFUSION NURSE 2 [facility]   2/27/2025 11:20 AM Gisela Carr MD [facility] BS AMB   3/4/2025  2:00 PM Cornell Veliz MD [facility] BS [facility]   3/18/2025 10:00 AM [facility] INFUSION NURSE 1 [facility]   5/7/2025 11:50 AM Pepito Ambriz MD RDE [facility] BS [facility]   7/25/2025 11:00 AM Robby Antoine MD [facility] DEP

## 2025-02-09 DIAGNOSIS — M54.30 SCIATICA, UNSPECIFIED LATERALITY: ICD-10-CM

## 2025-02-10 RX ORDER — GABAPENTIN 100 MG/1
100 CAPSULE ORAL NIGHTLY
Qty: 30 CAPSULE | Refills: 5 | Status: SHIPPED | OUTPATIENT
Start: 2025-02-10 | End: 2025-08-08

## 2025-02-18 ENCOUNTER — HOSPITAL ENCOUNTER (OUTPATIENT)
Facility: HOSPITAL | Age: 75
Setting detail: INFUSION SERIES
Discharge: HOME | End: 2025-02-18
Payer: MEDICARE

## 2025-02-18 VITALS
TEMPERATURE: 97.5 F | HEART RATE: 55 BPM | DIASTOLIC BLOOD PRESSURE: 47 MMHG | SYSTOLIC BLOOD PRESSURE: 120 MMHG | OXYGEN SATURATION: 100 % | RESPIRATION RATE: 16 BRPM

## 2025-02-18 DIAGNOSIS — D63.1 EPO-RESISTANT ANEMIA: ICD-10-CM

## 2025-02-18 DIAGNOSIS — D50.0 IRON DEFICIENCY ANEMIA SECONDARY TO BLOOD LOSS (CHRONIC): ICD-10-CM

## 2025-02-18 DIAGNOSIS — D50.9 IRON DEFICIENCY ANEMIA, UNSPECIFIED IRON DEFICIENCY ANEMIA TYPE: Primary | ICD-10-CM

## 2025-02-18 PROCEDURE — 6360000002 HC RX W HCPCS: Performed by: INTERNAL MEDICINE

## 2025-02-18 PROCEDURE — 96366 THER/PROPH/DIAG IV INF ADDON: CPT

## 2025-02-18 PROCEDURE — 96365 THER/PROPH/DIAG IV INF INIT: CPT

## 2025-02-18 PROCEDURE — 2580000003 HC RX 258: Performed by: INTERNAL MEDICINE

## 2025-02-18 RX ORDER — SODIUM CHLORIDE 9 MG/ML
5-250 INJECTION, SOLUTION INTRAVENOUS PRN
OUTPATIENT
Start: 2025-03-04

## 2025-02-18 RX ORDER — SODIUM CHLORIDE 9 MG/ML
5-250 INJECTION, SOLUTION INTRAVENOUS PRN
Status: DISCONTINUED | OUTPATIENT
Start: 2025-02-18 | End: 2025-02-19 | Stop reason: HOSPADM

## 2025-02-18 RX ADMIN — SODIUM CHLORIDE 50 ML/HR: 9 INJECTION, SOLUTION INTRAVENOUS at 10:10

## 2025-02-18 RX ADMIN — SODIUM CHLORIDE 300 MG: 9 INJECTION, SOLUTION INTRAVENOUS at 10:36

## 2025-02-18 NOTE — PROGRESS NOTES
[facility] Short Note                       Date: 2025    Name: Elvia Clifford    MRN: [id number]         : 1950    Treatment: Venofer Day 14    [facility] COVID-19 SCREENING      The patient was asked the following questions and answered as documented below:    Do you have any symptoms of COVID-19?  SOB, coughing, fever, or generally not feeling well? NO  Have you been exposed to COVID-19 recently? NO  Have you had any recent contact with family/friend that has a pending COVID test? NO      Follow Up: Proceed with treatment    Ms. Clifford was assessed and education was provided. No changes to report. VSS. IV placed to the RFA w/o difficulty.     Lines:   Peripheral IV 25 Proximal;Right;Anterior Forearm (Active)   Site Assessment Clean, dry & intact 25 1009   Line Status Brisk blood return 25 1009   Phlebitis Assessment No symptoms 25 1009   Infiltration Assessment 0 25 1009   Dressing Status New dressing applied 25 1009   Dressing Type Transparent 25 1009   Dressing Intervention New 25 1009        Ms. Meléndezs vitals were reviewed prior to and after treatment.   Patient Vitals for the past 12 hrs:   Temp Pulse Resp BP SpO2   25 1230 -- 55 -- (!) 120/47 --   25 1215 -- 54 -- (!) 127/47 --   25 0957 97.5 °F (36.4 °C) 55 16 (!) 126/53 100 %           Medications given:  Medications Administered         0.9 % sodium chloride infusion Admin Date  2025 Action  New Bag Dose  50 mL/hr Rate  50 mL/hr Route  IntraVENous Documented By  Juanjose Dozier RN        iron sucrose (VENOFER) 300 mg in sodium chloride 0.9 % 250 mL IVPB Admin Date  2025 Action  New Bag Dose  300 mg Rate  193.3 mL/hr Route  IntraVENous Documented By  Juanjose Dozier, RN            Ms. Clifford tolerated the treatment without complaints. Observation complete. VSS. IV flushed and removed.     [facility] condition at 1240.      Patient provided with AVS , which includes future appointment and written educational material.     Future Appointments   Date Time Provider Department Center   2/27/2025 11:20 AM Gisela Carr MD SDC BS [facility]   3/4/2025  9:00 AM [facility] INFUSION NURSE 2 [facility]   3/4/2025  2:00 PM [facility]   5/7/2025 11:50 AM Pepito Ambriz MD RDE [facility] PBB BS [facility]   7/25/2025 11:00 AM Robby Antoine MD [facility] DEP       MELCHOR VAUGHAN RN  February 18, 2025  11:07 AM

## 2025-02-18 NOTE — DISCHARGE INSTRUCTIONS
iron sucrose (injection)  Pronunciation:  EYE urn TOBIN espinoza  Brand:  Venofer  What is the most important information I should know about iron sucrose?  Follow all directions on your medicine label and package. Tell each of your healthcare providers about all your medical conditions, allergies, and all medicines you use.  What is iron sucrose?  Iron sucrose is used to treat iron deficiency anemia in people with kidney disease.  Iron sucrose is for use in adults and children at least 2 years old.  Iron sucrose is not for treating other forms of anemia not caused by iron deficiency.   Iron sucrose may also be used for purposes not listed in this medication guide.  What should I discuss with my healthcare provider before I receive iron sucrose?  You should not be treated with this medicine if you have ever had an allergic reaction to an iron injection.  Tell your doctor if you have ever had:  hemochromatosis or iron overload (the buildup of excess iron).  Tell your doctor if you are pregnant or plan to become pregnant. Iron sucrose can harm an unborn baby if you have a severe reaction to this medicine during your second or third trimester. However, not treating iron deficiency anemia during pregnancy may cause complications such as premature birth or low birth weight. The benefit of treating your condition during pregnancy may outweigh any risks.  If you are breastfeeding, tell your doctor if you notice diarrhea or constipation in the nursing baby.  How is iron sucrose given?  Iron sucrose is given as an infusion into a vein. A healthcare provider will give you this injection.  This medicine is sometimes given slowly, and the infusion can take up to 2.5 hours to complete.  Tell your caregivers if you feel any burning, pain, or swelling around the IV needle when iron sucrose is injected.  You will be watched closely for at least 30 minutes to make sure you do not have an allergic reaction.  You will need frequent

## 2025-02-18 NOTE — PROGRESS NOTES
Spiritual Care Partner Volunteer visited patient at [facility] in [facility] on 2/18/2025   Documented by:  [facility] [employer]  To contact the  call: [phone] ([phone]

## 2025-02-26 VITALS — BODY MASS INDEX: 37.17 KG/M2 | HEIGHT: 62 IN | WEIGHT: 202 LBS

## 2025-02-26 ASSESSMENT — SLEEP AND FATIGUE QUESTIONNAIRES
HOW LIKELY ARE YOU TO NOD OFF OR FALL ASLEEP WHILE SITTING AND READING: SLIGHT CHANCE OF DOZING
DO YOU HAVE DIFFICULTY WATCHING A MOVIE OR VIDEO BECAUSE YOU BECOME SLEEPY OR TIRED: NO
HOW LIKELY ARE YOU TO NOD OFF OR FALL ASLEEP WHILE LYING DOWN TO REST IN THE AFTERNOON WHEN CIRCUMSTANCES PERMIT: SLIGHT CHANCE OF DOZING
HOW LIKELY ARE YOU TO NOD OFF OR FALL ASLEEP WHILE SITTING AND TALKING TO SOMEONE: WOULD NEVER DOZE
DO YOU HAVE DIFFICULTY OPERATING A MOTOR VEHICLE FOR SHORT DISTANCES (LESS THAN 100 MILES) BECAUSE YOU BECOME SLEEPY: NO
HAS YOUR MOOD BEEN AFFECTED BECAUSE YOU ARE SLEEPY OR TIRED: NO
HOW LIKELY ARE YOU TO NOD OFF OR FALL ASLEEP WHEN YOU ARE A PASSENGER IN A CAR FOR AN HOUR WITHOUT A BREAK: WOULD NEVER DOZE
FOSQ SCORE: 19.5
HOW LIKELY ARE YOU TO NOD OFF OR FALL ASLEEP WHILE SITTING AND READING: SLIGHT CHANCE OF DOZING
ESS TOTAL SCORE: 3
HOW LIKELY ARE YOU TO NOD OFF OR FALL ASLEEP WHILE SITTING INACTIVE IN A PUBLIC PLACE: WOULD NEVER DOZE
HOW LIKELY ARE YOU TO NOD OFF OR FALL ASLEEP WHILE WATCHING TV: SLIGHT CHANCE OF DOZING
HOW LIKELY ARE YOU TO NOD OFF OR FALL ASLEEP IN A CAR, WHILE STOPPED FOR A FEW MINUTES IN TRAFFIC: WOULD NEVER DOZE
HOW LIKELY ARE YOU TO NOD OFF OR FALL ASLEEP WHILE SITTING AND TALKING TO SOMEONE: WOULD NEVER DOZE
DO YOU GENERALLY HAVE DIFFICULTY REMEMBERING THINGS BECAUSE YOU ARE SLEEPY OR TIRED: NO
DO YOU HAVE DIFFICULTY CONCENTRATING ON THE THINGS YOU DO BECAUSE YOU ARE SLEEPY OR TIRED: NO
HOW LIKELY ARE YOU TO NOD OFF OR FALL ASLEEP WHEN YOU ARE A PASSENGER IN A CAR FOR AN HOUR WITHOUT A BREAK: WOULD NEVER DOZE
DO YOU HAVE DIFFICULTY VISITING YOUR FAMILY OR FRIENDS IN THEIR HOME BECAUSE YOU BECOME SLEEPY OR TIRED: NO
DO YOU HAVE DIFFICULTY OPERATING A MOTOR VEHICLE FOR LONG DISTANCES (GREATER THAN 100 MILES) BECAUSE YOU BECOME SLEEPY: NO
DO YOU HAVE DIFFICULTY BEING AS ACTIVE AS YOU WANT TO BE IN THE EVENING BECAUSE YOU ARE SLEEPY OR TIRED: YES, LITTLE
DO YOU HAVE DIFFICULTY BEING AS ACTIVE AS YOU WANT TO BE IN THE MORNING BECAUSE YOU ARE SLEEPY OR TIRED: NO
HOW LIKELY ARE YOU TO NOD OFF OR FALL ASLEEP WHILE SITTING QUIETLY AFTER LUNCH WITHOUT ALCOHOL: WOULD NEVER DOZE
HOW LIKELY ARE YOU TO NOD OFF OR FALL ASLEEP WHILE LYING DOWN TO REST IN THE AFTERNOON WHEN CIRCUMSTANCES PERMIT: SLIGHT CHANCE OF DOZING
HOW LIKELY ARE YOU TO NOD OFF OR FALL ASLEEP WHEN YOU ARE A PASSENGER IN A CAR FOR AN HOUR WITHOUT A BREAK: WOULD NEVER DOZE
HOW LIKELY ARE YOU TO NOD OFF OR FALL ASLEEP WHILE SITTING QUIETLY AFTER LUNCH WITHOUT ALCOHOL: WOULD NEVER DOZE
HAS YOUR RELATIONSHIP WITH FAMILY, FRIENDS OR WORK COLLEAGUES BEEN AFFECTED BECAUSE YOU ARE SLEEPY OR TIRED: NO
HOW LIKELY ARE YOU TO NOD OFF OR FALL ASLEEP WHILE SITTING INACTIVE IN A PUBLIC PLACE: WOULD NEVER DOZE
HOW LIKELY ARE YOU TO NOD OFF OR FALL ASLEEP IN A CAR, WHILE STOPPED FOR A FEW MINUTES IN TRAFFIC: WOULD NEVER DOZE
HOW LIKELY ARE YOU TO NOD OFF OR FALL ASLEEP WHILE WATCHING TV: SLIGHT CHANCE OF DOZING
HOW LIKELY ARE YOU TO NOD OFF OR FALL ASLEEP WHILE WATCHING TV: SLIGHT CHANCE OF DOZING
ESS TOTAL SCORE: 3
HOW LIKELY ARE YOU TO NOD OFF OR FALL ASLEEP WHILE LYING DOWN TO REST IN THE AFTERNOON WHEN CIRCUMSTANCES PERMIT: SLIGHT CHANCE OF DOZING
HOW LIKELY ARE YOU TO NOD OFF OR FALL ASLEEP WHILE SITTING INACTIVE IN A PUBLIC PLACE: WOULD NEVER DOZE
HOW LIKELY ARE YOU TO NOD OFF OR FALL ASLEEP IN A CAR, WHILE STOPPED FOR A FEW MINUTES IN TRAFFIC: WOULD NEVER DOZE
HOW LIKELY ARE YOU TO NOD OFF OR FALL ASLEEP WHILE SITTING QUIETLY AFTER LUNCH WITHOUT ALCOHOL: WOULD NEVER DOZE
HOW LIKELY ARE YOU TO NOD OFF OR FALL ASLEEP WHILE SITTING AND TALKING TO SOMEONE: WOULD NEVER DOZE
HOW LIKELY ARE YOU TO NOD OFF OR FALL ASLEEP WHILE SITTING AND READING: SLIGHT CHANCE OF DOZING

## 2025-02-27 ENCOUNTER — TELEMEDICINE (OUTPATIENT)
Age: 75
End: 2025-02-27
Payer: MEDICARE

## 2025-02-27 DIAGNOSIS — G47.33 OBSTRUCTIVE SLEEP APNEA (ADULT) (PEDIATRIC): Primary | ICD-10-CM

## 2025-02-27 DIAGNOSIS — I10 PRIMARY HYPERTENSION: ICD-10-CM

## 2025-02-27 DIAGNOSIS — E66.812 OBESITY, CLASS II, BMI 35-39.9: ICD-10-CM

## 2025-02-27 PROCEDURE — 3017F COLORECTAL CA SCREEN DOC REV: CPT | Performed by: INTERNAL MEDICINE

## 2025-02-27 PROCEDURE — 1159F MED LIST DOCD IN RCRD: CPT | Performed by: INTERNAL MEDICINE

## 2025-02-27 PROCEDURE — 1160F RVW MEDS BY RX/DR IN RCRD: CPT | Performed by: INTERNAL MEDICINE

## 2025-02-27 PROCEDURE — G8427 DOCREV CUR MEDS BY ELIG CLIN: HCPCS | Performed by: INTERNAL MEDICINE

## 2025-02-27 PROCEDURE — 1123F ACP DISCUSS/DSCN MKR DOCD: CPT | Performed by: INTERNAL MEDICINE

## 2025-02-27 PROCEDURE — 1090F PRES/ABSN URINE INCON ASSESS: CPT | Performed by: INTERNAL MEDICINE

## 2025-02-27 PROCEDURE — G8399 PT W/DXA RESULTS DOCUMENT: HCPCS | Performed by: INTERNAL MEDICINE

## 2025-02-27 PROCEDURE — 99214 OFFICE O/P EST MOD 30 MIN: CPT | Performed by: INTERNAL MEDICINE

## 2025-02-27 NOTE — PROGRESS NOTES
Response:  Positive    P>    she is compliant with PAP therapy and PAP continues to benefit patient and remains necessary for control of her sleep apnea.   BIPAP titration, will optimize settings and see if she still requires supplemental oxygen. Will start on room air and add oxygen only as required once airflow optimized  I have ordered replacement supplies        * She was asked to contact our office for any problems regarding PAP therapy.    * Counseling was provided regarding the importance of regular PAP use and on proper sleep hygiene and safe driving.    * Re-enforced proper and regular cleaning for the device.  she was advised to follow 's recommendations regarding cleaning of PAP device and PAP supplies.    2. Hypertension - controlled on current regimen. she will continue her current regimen. she will continue to monitor at home and with her PMD for further adjustments as needed.    I have reviewed the relationship between hypertension as it relates to sleep-disordered breathing.      3. Obesity - weight has been relatively stable. I have discussed the relationship of weight to obstructive sleep apnea. I have advised her to continue efforts at a weight loss plan.   she understands that weight loss can reduce severity of sleep apnea and snoring.     All of her questions were addressed.        Services were provided through a video synchronous discussion virtually to substitute for in-person clinic visit.    Gisela Carr MD    Electronically signed by    Gisela Carr MD  Diplomate in Sleep Medicine  [facility]

## 2025-02-27 NOTE — PATIENT INSTRUCTIONS
[address]  [place] [address]  Tel.  [phone]  Fax. [phone] [address]  [place] [address]  Tel.  [phone]  Fax. [phone] [address] [place] [address]  Tel.  [phone]  Fax. [phone]     PROPER SLEEP HYGIENE    What to avoid  Do not have drinks with caffeine, such as coffee or black tea, for 8 hours before bed.  Do not smoke or use other types of tobacco near bedtime. Nicotine is a stimulant and can keep you awake.  Avoid drinking alcohol late in the evening, because it can cause you to wake in the middle of the night.  Do not eat a big meal close to bedtime. If you are hungry, eat a light snack.  Do not drink a lot of water close to bedtime, because the need to urinate may wake you up during the night.  Do not read or watch TV in bed. Use the bed only for sleeping and sexual activity.  What to try  Go to bed at the same time every night, and wake up at the same time every morning. Do not take naps during the day.  Keep your bedroom quiet, dark, and cool.  Get regular exercise, but not within 3 to 4 hours of your bedtime..  Sleep on a comfortable pillow and mattress.  If watching the clock makes you anxious, turn it facing away from you so you cannot see the time.  If you worry when you lie down, start a worry book. Well before bedtime, write down your worries, and then set the book and your concerns aside.  Try meditation or other relaxation techniques before you go to bed.  If you cannot fall asleep, get up and go to another room until you feel sleepy. Do something relaxing. Repeat your bedtime routine before you go to bed again.  Make your house quiet and calm about an hour before bedtime. Turn down the lights, turn off the TV, log off the computer, and turn down the volume on music. This can help you relax after a busy day.    Drowsy Driving  The [place] National Highway Traffic Safety Administration cites drowsiness as a

## 2025-03-04 ENCOUNTER — OFFICE VISIT (OUTPATIENT)
Age: 75
End: 2025-03-04
Payer: MEDICARE

## 2025-03-04 ENCOUNTER — HOSPITAL ENCOUNTER (OUTPATIENT)
Facility: HOSPITAL | Age: 75
Setting detail: INFUSION SERIES
Discharge: HOME | End: 2025-03-04
Payer: MEDICARE

## 2025-03-04 VITALS
HEART RATE: 57 BPM | SYSTOLIC BLOOD PRESSURE: 125 MMHG | OXYGEN SATURATION: 100 % | TEMPERATURE: 97.3 F | RESPIRATION RATE: 18 BRPM | DIASTOLIC BLOOD PRESSURE: 84 MMHG

## 2025-03-04 VITALS
BODY MASS INDEX: 38.46 KG/M2 | HEIGHT: 62 IN | SYSTOLIC BLOOD PRESSURE: 126 MMHG | HEART RATE: 66 BPM | OXYGEN SATURATION: 95 % | WEIGHT: 209 LBS | DIASTOLIC BLOOD PRESSURE: 60 MMHG

## 2025-03-04 DIAGNOSIS — D63.1 EPO-RESISTANT ANEMIA: ICD-10-CM

## 2025-03-04 DIAGNOSIS — I49.3 PVC'S (PREMATURE VENTRICULAR CONTRACTIONS): ICD-10-CM

## 2025-03-04 DIAGNOSIS — D50.0 IRON DEFICIENCY ANEMIA SECONDARY TO BLOOD LOSS (CHRONIC): ICD-10-CM

## 2025-03-04 DIAGNOSIS — E11.69 TYPE 2 DIABETES MELLITUS WITH OTHER SPECIFIED COMPLICATION, UNSPECIFIED WHETHER LONG TERM INSULIN USE (HCC): ICD-10-CM

## 2025-03-04 DIAGNOSIS — N18.31 STAGE 3A CHRONIC KIDNEY DISEASE (HCC): ICD-10-CM

## 2025-03-04 DIAGNOSIS — E78.2 MIXED HYPERLIPIDEMIA: ICD-10-CM

## 2025-03-04 DIAGNOSIS — D50.9 IRON DEFICIENCY ANEMIA, UNSPECIFIED IRON DEFICIENCY ANEMIA TYPE: Primary | ICD-10-CM

## 2025-03-04 DIAGNOSIS — I50.32 CHRONIC HEART FAILURE WITH PRESERVED EJECTION FRACTION (HCC): ICD-10-CM

## 2025-03-04 DIAGNOSIS — R06.02 SHORTNESS OF BREATH: ICD-10-CM

## 2025-03-04 DIAGNOSIS — I10 ESSENTIAL (PRIMARY) HYPERTENSION: ICD-10-CM

## 2025-03-04 DIAGNOSIS — I25.10 CORONARY ARTERY DISEASE INVOLVING NATIVE CORONARY ARTERY OF NATIVE HEART WITHOUT ANGINA PECTORIS: Primary | ICD-10-CM

## 2025-03-04 DIAGNOSIS — I34.2 NONRHEUMATIC MITRAL VALVE STENOSIS: ICD-10-CM

## 2025-03-04 PROCEDURE — 96365 THER/PROPH/DIAG IV INF INIT: CPT

## 2025-03-04 PROCEDURE — 1160F RVW MEDS BY RX/DR IN RCRD: CPT | Performed by: INTERNAL MEDICINE

## 2025-03-04 PROCEDURE — 1036F TOBACCO NON-USER: CPT | Performed by: INTERNAL MEDICINE

## 2025-03-04 PROCEDURE — G8417 CALC BMI ABV UP PARAM F/U: HCPCS | Performed by: INTERNAL MEDICINE

## 2025-03-04 PROCEDURE — 1090F PRES/ABSN URINE INCON ASSESS: CPT | Performed by: INTERNAL MEDICINE

## 2025-03-04 PROCEDURE — G8399 PT W/DXA RESULTS DOCUMENT: HCPCS | Performed by: INTERNAL MEDICINE

## 2025-03-04 PROCEDURE — 96366 THER/PROPH/DIAG IV INF ADDON: CPT

## 2025-03-04 PROCEDURE — 3078F DIAST BP <80 MM HG: CPT | Performed by: INTERNAL MEDICINE

## 2025-03-04 PROCEDURE — 3046F HEMOGLOBIN A1C LEVEL >9.0%: CPT | Performed by: INTERNAL MEDICINE

## 2025-03-04 PROCEDURE — 93010 ELECTROCARDIOGRAM REPORT: CPT | Performed by: INTERNAL MEDICINE

## 2025-03-04 PROCEDURE — 1123F ACP DISCUSS/DSCN MKR DOCD: CPT | Performed by: INTERNAL MEDICINE

## 2025-03-04 PROCEDURE — 1159F MED LIST DOCD IN RCRD: CPT | Performed by: INTERNAL MEDICINE

## 2025-03-04 PROCEDURE — 1126F AMNT PAIN NOTED NONE PRSNT: CPT | Performed by: INTERNAL MEDICINE

## 2025-03-04 PROCEDURE — 99214 OFFICE O/P EST MOD 30 MIN: CPT | Performed by: INTERNAL MEDICINE

## 2025-03-04 PROCEDURE — G2211 COMPLEX E/M VISIT ADD ON: HCPCS | Performed by: INTERNAL MEDICINE

## 2025-03-04 PROCEDURE — 2022F DILAT RTA XM EVC RTNOPTHY: CPT | Performed by: INTERNAL MEDICINE

## 2025-03-04 PROCEDURE — 3052F HG A1C>EQUAL 8.0%<EQUAL 9.0%: CPT | Performed by: INTERNAL MEDICINE

## 2025-03-04 PROCEDURE — 2580000003 HC RX 258: Performed by: INTERNAL MEDICINE

## 2025-03-04 PROCEDURE — 93005 ELECTROCARDIOGRAM TRACING: CPT | Performed by: INTERNAL MEDICINE

## 2025-03-04 PROCEDURE — 3074F SYST BP LT 130 MM HG: CPT | Performed by: INTERNAL MEDICINE

## 2025-03-04 PROCEDURE — 6360000002 HC RX W HCPCS: Performed by: INTERNAL MEDICINE

## 2025-03-04 PROCEDURE — G8427 DOCREV CUR MEDS BY ELIG CLIN: HCPCS | Performed by: INTERNAL MEDICINE

## 2025-03-04 PROCEDURE — 3017F COLORECTAL CA SCREEN DOC REV: CPT | Performed by: INTERNAL MEDICINE

## 2025-03-04 RX ORDER — SODIUM CHLORIDE 9 MG/ML
5-250 INJECTION, SOLUTION INTRAVENOUS PRN
OUTPATIENT
Start: 2025-03-04

## 2025-03-04 RX ORDER — SODIUM CHLORIDE 9 MG/ML
5-250 INJECTION, SOLUTION INTRAVENOUS PRN
Status: DISCONTINUED | OUTPATIENT
Start: 2025-03-04 | End: 2025-03-05 | Stop reason: HOSPADM

## 2025-03-04 RX ADMIN — SODIUM CHLORIDE 25 ML/HR: 9 INJECTION, SOLUTION INTRAVENOUS at 09:36

## 2025-03-04 RX ADMIN — SODIUM CHLORIDE 400 MG: 9 INJECTION, SOLUTION INTRAVENOUS at 09:37

## 2025-03-04 ASSESSMENT — PATIENT HEALTH QUESTIONNAIRE - PHQ9
2. FEELING DOWN, DEPRESSED OR HOPELESS: NOT AT ALL
SUM OF ALL RESPONSES TO PHQ QUESTIONS 1-9: 0
SUM OF ALL RESPONSES TO PHQ QUESTIONS 1-9: 0
1. LITTLE INTEREST OR PLEASURE IN DOING THINGS: NOT AT ALL
SUM OF ALL RESPONSES TO PHQ QUESTIONS 1-9: 0
SUM OF ALL RESPONSES TO PHQ QUESTIONS 1-9: 0

## 2025-03-04 NOTE — PROGRESS NOTES
[address] [place] [address] [phone]    [address] [place] [address]     Subjective:        Elvia Clifford is a 74 y.o. female is here for routine f/u.  The patient denies chest pain/ shortness of breath, orthopnea, PND, LE edema, palpitations, syncope, presyncope or fatigue.    Patient Active Problem List    Diagnosis Date Noted    Iron deficiency anemia secondary to blood loss (chronic) 01/16/2025    EPO-resistant anemia 01/15/2025    Iron deficiency anemia, unspecified 01/15/2025    Diastolic congestive heart failure, unspecified HF chronicity (HCC) 01/31/2024    Steroid-induced hyperglycemia 12/04/2023    Acute respiratory failure with hypoxia ([facility]) 12/03/2023    Bilateral leg edema 11/28/2023    Acute hypoxic respiratory failure (HCC) 10/05/2023    Type 2 diabetes mellitus with chronic kidney disease ([facility]) 12/15/2022    CKD (chronic kidney disease) stage 3, GFR 30-59 ml/min ([facility]) 03/10/2022    Major depressive disorder, recurrent, moderate (HCC) 12/10/2021    Major depressive disorder, recurrent, unspecified 12/10/2021    Major depressive disorder, recurrent, mild 12/10/2021    Type 2 diabetes mellitus with diabetic neuropathy ([facility]) 04/29/2019    Type 2 diabetes with nephropathy ([facility]) 12/14/2018    History of CVA (cerebrovascular accident) 07/13/2018    Obesity, morbid 01/25/2018    Warthin's tumor 07/01/2016    HTN (hypertension) 09/13/2013    Axillary hidradenitis suppurativa 08/07/2013    Esophageal reflux 01/15/2013    Renal failure, acute 01/13/2013    Asthma 12/14/2012    Coronary artery disease 06/06/2012    Shortness of breath 06/06/2012    Myocardial ischemia 06/06/2012    PVC's (premature ventricular contractions) 06/01/2012    DM type 2 (diabetes mellitus, type 2) ([facility]) 04/23/2012    Diverticulosis 10/20/2009    Osteopenia 10/20/2009    DJD (degenerative joint disease) of knee 10/20/2009    CTS (carpal tunnel syndrome) 10/20/2009      Robby Antoine

## 2025-03-04 NOTE — PROGRESS NOTES
[facility] Short Note                       Date: 2025    Name: Elvia Clifford    MRN: [id number]         : 1950    Treatment: Venofer Day 28    [facility] COVID-19 SCREENING      The patient was asked the following questions and answered as documented below:    Do you have any symptoms of COVID-19?  SOB, coughing, fever, or generally not feeling well? NO  Have you been exposed to COVID-19 recently? NO  Have you had any recent contact with family/friend that has a pending COVID test? NO      Follow Up: Proceed with treatment    Ms. Clifford was assessed and education was provided. NO changes to report. VSS. IV placed to the LAC.     Lines:   Peripheral IV 25 Left;Proximal;Anterior Forearm (Active)   Site Assessment Clean, dry & intact 25   Line Status Brisk blood return 25   Phlebitis Assessment No symptoms 25   Infiltration Assessment 0 25   Dressing Status New dressing applied 25   Dressing Type Transparent 25   Dressing Intervention New 25        Ms. Meléndezs vitals were reviewed prior to and after treatment.   Patient Vitals for the past 12 hrs:   Temp Pulse Resp BP SpO2   25 1236 -- 57 -- 125/84 --   25 1023 -- 54 -- (!) 111/46 --   25 0858 97.3 °F (36.3 °C) 60 18 (!) 117/42 100 %       Medications given:  Medications Administered         0.9 % sodium chloride infusion Admin Date  2025 Action  New Bag Dose  25 mL/hr Rate  25 mL/hr Route  IntraVENous Documented By  Juanjose Dozier RN        iron sucrose (VENOFER) 400 mg in sodium chloride 0.9 % 250 mL IVPB Admin Date  2025 Action  New Bag Dose  400 mg Rate  118 mL/hr Route  IntraVENous Documented By  Juanjose Dozier, RN            Ms. Clifford tolerated the treatment without complaints. Observation complete. VSS. IV flushed and removed.     [facility] in [facility] condition at 1250.         Future Appointments   Date Time Provider Department Center   3/4/2025  2:00 PM Cornell Veliz MD CAVREY BS [facility]   4/11/2025  8:30 PM [facility]ED[facility] 1 [facility]   5/7/2025 11:50 AM Pepito Ambriz MD RDE [facility] PBB BS [facility]   7/25/2025 11:00 AM [facility]       MELCHOR VAUGHAN RN  March 4, 2025  9:33 AM

## 2025-03-12 RX ORDER — INSULIN GLARGINE 300 U/ML
30 INJECTION, SOLUTION SUBCUTANEOUS DAILY
Qty: 9 ML | Refills: 3 | Status: SHIPPED | OUTPATIENT
Start: 2025-03-12

## 2025-03-22 NOTE — TELEPHONE ENCOUNTER
----- Message from Jason Gomez sent at 7/3/2019 12:39 PM EDT -----  Regarding: Dr. Lelia Harrell / Telephone   Pt requesting call back to discuss meds. Pt also wants to be seen immediately for a check up.  Pt best contact is [phone]    Copy/paste [facility]
Called, spoke to pt. Two identifiers confirmed. Notified pt RX was changed through [facility] d/t insurance coverage. Pt verbalized understanding of information discussed w/ no further questions at this time.
No

## 2025-03-31 DIAGNOSIS — E06.3 HYPOTHYROIDISM DUE TO HASHIMOTO'S THYROIDITIS: ICD-10-CM

## 2025-03-31 RX ORDER — LEVOTHYROXINE SODIUM 137 UG/1
137 TABLET ORAL DAILY
Qty: 90 TABLET | Refills: 3 | Status: SHIPPED | OUTPATIENT
Start: 2025-03-31

## 2025-03-31 NOTE — TELEPHONE ENCOUNTER
Spoke to the pt and she stated that she moved and accidentally misplaced her thyroid medication. Pt stated she would need a refill sent to [facility] local pharmacy ( [facility]    Requested Prescriptions     Pending Prescriptions Disp Refills    levothyroxine (SYNTHROID) 137 MCG tablet 90 tablet 3     Sig: Take 1 tablet by mouth Daily

## 2025-04-11 ENCOUNTER — HOSPITAL ENCOUNTER (OUTPATIENT)
Facility: HOSPITAL | Age: 75
Discharge: HOME | End: 2025-04-14
Payer: MEDICARE

## 2025-04-11 DIAGNOSIS — G47.33 OBSTRUCTIVE SLEEP APNEA (ADULT) (PEDIATRIC): ICD-10-CM

## 2025-04-11 PROCEDURE — 95811 POLYSOM 6/>YRS CPAP 4/> PARM: CPT | Performed by: INTERNAL MEDICINE

## 2025-04-18 ENCOUNTER — CLINICAL DOCUMENTATION (OUTPATIENT)
Age: 75
End: 2025-04-18

## 2025-04-18 DIAGNOSIS — G47.33 OBSTRUCTIVE SLEEP APNEA (ADULT) (PEDIATRIC): Primary | ICD-10-CM

## 2025-04-18 NOTE — PROGRESS NOTES
Results of sleep study in [facility]  Lead tech to convey results to patient    PAP titration was performed to optimize airflow settings to control her apnea/respiratory events. It was found that a setting of 16/9 cmH20 adequately controlled her respiratory events. Oxygen saturation was maintained at or above 90% at this setting. Supplemental oxygen not required during the night.        Tech to change settings on her BIPAP to EPAP min 8 cm, PS 6 cmH20, IPAP max 16 cmH20  Follow up for PAP follow up within 3 months.   Replacement supplies ordered

## 2025-04-25 ENCOUNTER — TELEPHONE (OUTPATIENT)
Age: 75
End: 2025-04-25

## 2025-04-25 NOTE — TELEPHONE ENCOUNTER
Reviewed sleep study results with patient. She expressed understanding and is willing to proceed with the remote pressure change of the following:  EPAP min 8 cm  IPAP max 16 cmH20   PS 6 cmH20

## 2025-05-07 ENCOUNTER — OFFICE VISIT (OUTPATIENT)
Age: 75
End: 2025-05-07
Payer: MEDICARE

## 2025-05-07 VITALS
BODY MASS INDEX: 38.46 KG/M2 | HEART RATE: 57 BPM | HEIGHT: 62 IN | SYSTOLIC BLOOD PRESSURE: 141 MMHG | DIASTOLIC BLOOD PRESSURE: 43 MMHG | WEIGHT: 209 LBS

## 2025-05-07 DIAGNOSIS — E11.8 TYPE 2 DIABETES MELLITUS WITH UNSPECIFIED COMPLICATIONS (HCC): Primary | ICD-10-CM

## 2025-05-07 DIAGNOSIS — E66.01 MORBID (SEVERE) OBESITY DUE TO EXCESS CALORIES (HCC): ICD-10-CM

## 2025-05-07 LAB — HBA1C MFR BLD: 7.7 %

## 2025-05-07 PROCEDURE — 1090F PRES/ABSN URINE INCON ASSESS: CPT | Performed by: INTERNAL MEDICINE

## 2025-05-07 PROCEDURE — 3078F DIAST BP <80 MM HG: CPT | Performed by: INTERNAL MEDICINE

## 2025-05-07 PROCEDURE — 2022F DILAT RTA XM EVC RTNOPTHY: CPT | Performed by: INTERNAL MEDICINE

## 2025-05-07 PROCEDURE — 3046F HEMOGLOBIN A1C LEVEL >9.0%: CPT | Performed by: INTERNAL MEDICINE

## 2025-05-07 PROCEDURE — G8428 CUR MEDS NOT DOCUMENT: HCPCS | Performed by: INTERNAL MEDICINE

## 2025-05-07 PROCEDURE — 1036F TOBACCO NON-USER: CPT | Performed by: INTERNAL MEDICINE

## 2025-05-07 PROCEDURE — 83036 HEMOGLOBIN GLYCOSYLATED A1C: CPT | Performed by: INTERNAL MEDICINE

## 2025-05-07 PROCEDURE — G8417 CALC BMI ABV UP PARAM F/U: HCPCS | Performed by: INTERNAL MEDICINE

## 2025-05-07 PROCEDURE — 1125F AMNT PAIN NOTED PAIN PRSNT: CPT | Performed by: INTERNAL MEDICINE

## 2025-05-07 PROCEDURE — 3051F HG A1C>EQUAL 7.0%<8.0%: CPT | Performed by: INTERNAL MEDICINE

## 2025-05-07 PROCEDURE — 1123F ACP DISCUSS/DSCN MKR DOCD: CPT | Performed by: INTERNAL MEDICINE

## 2025-05-07 PROCEDURE — 99214 OFFICE O/P EST MOD 30 MIN: CPT | Performed by: INTERNAL MEDICINE

## 2025-05-07 PROCEDURE — 3017F COLORECTAL CA SCREEN DOC REV: CPT | Performed by: INTERNAL MEDICINE

## 2025-05-07 PROCEDURE — PBSHW AMB POC HEMOGLOBIN A1C: Performed by: INTERNAL MEDICINE

## 2025-05-07 PROCEDURE — 3077F SYST BP >= 140 MM HG: CPT | Performed by: INTERNAL MEDICINE

## 2025-05-07 PROCEDURE — G8399 PT W/DXA RESULTS DOCUMENT: HCPCS | Performed by: INTERNAL MEDICINE

## 2025-05-07 RX ORDER — KETOROLAC TROMETHAMINE 30 MG/ML
1 INJECTION, SOLUTION INTRAMUSCULAR; INTRAVENOUS DAILY
Qty: 1 EACH | Refills: 0 | Status: SHIPPED | OUTPATIENT
Start: 2025-05-07

## 2025-05-07 RX ORDER — HYDROCHLOROTHIAZIDE 12.5 MG/1
1 CAPSULE ORAL
Qty: 6 EACH | Refills: 4 | Status: SHIPPED | OUTPATIENT
Start: 2025-05-07 | End: 2025-05-08 | Stop reason: SDUPTHER

## 2025-05-07 NOTE — PROGRESS NOTES
Ms. Clifford returns for follow-up of her type 2 diabetes mellitus x 14 years with poor blood sugar control.    Her A1c was 9.7% in September. Her A1c was  8.9%  in March 2021 which is the lowest we have ever had.,   Her A1c in October was 9.3%. When I saw her last her A1c was 8.6%.  Her A1c today is 7.7%.         She was hospitalized December 2023 for shortness of breath (hypoxia and hypercapnia) and was started on BiPAP which she continues to use.She was then readmitted September 2024 with volume overload.     PMH   HFpEF  CKD 4: Creat 2.14, GFR 24 (April 2024)    Current Diabetes Medications  Toujeo insulin 30 units in AM    Humalog insulin 20-30 units before each meal  Jardiance 10 mg (just started)  Freestyle Freedom 2     Moved into a 55+ facility and feels much better.  She is getting more physical activity she is eating less carbs and she in general she can get around a lot better.  She is very pleased that her A1c is improved.    Examination  Blood pressure 141/43  Pulse 57  Weight 95 kg  BMI 38.2  HEENT unremarkable  Lungs clear  Heart reveals a regular rate and rhythm  Abdomen benign  Extremities unremarkable      Impression  1.  Type 2 diabetes mellitus with improved A1c  2.  Chronic kidney disease stage IV  3.  Heart failure with preserved ejection fraction  4.  Obesity    Plan:  1.  We have continue the above regimen  2.  She is scheduled to see her nephrologist next week  3.  I sent a prescription for the freestyle freedom plus reader and sensors  4.  I will see her in follow-up    Please note that this dictation was completed with [employer], the [email or web address] voice recognition software.  Quite often unanticipated grammatical, syntax, homophones, and other interpretive errors are inadvertently transcribed by the computer software.  Please disregard these errors.  Please excuse any errors that have escaped final proofreading.

## 2025-05-08 DIAGNOSIS — E11.8 TYPE 2 DIABETES MELLITUS WITH UNSPECIFIED COMPLICATIONS (HCC): ICD-10-CM

## 2025-05-08 NOTE — TELEPHONE ENCOUNTER
Received a fax from [employer] stating for us to resend the Rx for the Freestyle Freedom 3 Plus sensor with the directions to change every 15 days instead of 14 days. Rx has been updated and is ready to be signed.     Requested Prescriptions     Pending Prescriptions Disp Refills    Continuous Glucose Sensor (FREESTYLE FREEDOM 3 PLUS SENSOR) MISC 6 each 4     Sig: Change sensor every 15 days.

## 2025-05-09 RX ORDER — HYDROCHLOROTHIAZIDE 12.5 MG/1
CAPSULE ORAL
Qty: 6 EACH | Refills: 4 | Status: SHIPPED | OUTPATIENT
Start: 2025-05-09

## 2025-05-09 RX ORDER — INSULIN LISPRO 100 [IU]/ML
20 INJECTION, SOLUTION INTRAVENOUS; SUBCUTANEOUS
Qty: 60 ML | Refills: 3 | Status: SHIPPED | OUTPATIENT
Start: 2025-05-09

## 2025-05-09 NOTE — TELEPHONE ENCOUNTER
Requested Prescriptions     Pending Prescriptions Disp Refills    insulin lispro, 1 Unit Dial, (HUMALOG KWIKPEN) 100 UNIT/ML SOPN 60 mL 3     Sig: Inject 20 Units into the skin 3 times daily (before meals)

## 2025-05-19 ENCOUNTER — TELEPHONE (OUTPATIENT)
Age: 75
End: 2025-05-19

## 2025-05-19 ENCOUNTER — CLINICAL DOCUMENTATION (OUTPATIENT)
Age: 75
End: 2025-05-19

## 2025-05-19 DIAGNOSIS — M54.30 SCIATICA, UNSPECIFIED LATERALITY: ICD-10-CM

## 2025-05-19 DIAGNOSIS — E06.3 HYPOTHYROIDISM DUE TO HASHIMOTO'S THYROIDITIS: ICD-10-CM

## 2025-05-19 RX ORDER — LEVOTHYROXINE SODIUM 137 UG/1
137 TABLET ORAL DAILY
Qty: 30 TABLET | Refills: 0 | OUTPATIENT
Start: 2025-05-19

## 2025-05-19 RX ORDER — MONTELUKAST SODIUM 10 MG/1
10 TABLET ORAL NIGHTLY
Qty: 90 TABLET | Refills: 3 | Status: SHIPPED | OUTPATIENT
Start: 2025-05-19

## 2025-05-19 NOTE — TELEPHONE ENCOUNTER
Patient called [employer], to say she longer need her PAP machine. [employer] would like to have a discontinued letter for this action. Please advised

## 2025-05-19 NOTE — PROGRESS NOTES
PATIENT CALLED [employer] TO HAVE PAP DISCONTINUED, NO LONGER NEEDED  [employer] CALLED TO HAVE PATIENT DISCONTINUED PAP LETTER.

## 2025-05-20 ENCOUNTER — TRANSCRIBE ORDERS (OUTPATIENT)
Facility: HOSPITAL | Age: 75
End: 2025-05-20

## 2025-05-20 DIAGNOSIS — Z12.31 VISIT FOR SCREENING MAMMOGRAM: Primary | ICD-10-CM

## 2025-05-22 DIAGNOSIS — E06.3 HYPOTHYROIDISM DUE TO HASHIMOTO'S THYROIDITIS: ICD-10-CM

## 2025-05-22 RX ORDER — LEVOTHYROXINE SODIUM 137 UG/1
137 TABLET ORAL DAILY
Qty: 90 TABLET | Refills: 3 | Status: SHIPPED | OUTPATIENT
Start: 2025-05-22

## 2025-05-22 NOTE — TELEPHONE ENCOUNTER
Requested Prescriptions     Pending Prescriptions Disp Refills    levothyroxine (SYNTHROID) 137 MCG tablet 90 tablet 3     Sig: Take 1 tablet by mouth Daily

## 2025-05-23 NOTE — TELEPHONE ENCOUNTER
Phoned patient to schedule a follow up to discuss PAP discontinuance.  She stated that she wanted to return her O2 concentrator, not her PAP machine.  Patient was advised to reach out to her PCP.  As Dr. Carr did not write the order for continuous oxygen.

## 2025-05-30 RX ORDER — GABAPENTIN 100 MG/1
100 CAPSULE ORAL
Qty: 90 CAPSULE | Refills: 1 | Status: SHIPPED | OUTPATIENT
Start: 2025-05-30 | End: 2025-11-26

## 2025-06-05 NOTE — PROGRESS NOTES
1. \"Have you been to the ER, urgent care clinic since your last visit? Hospitalized since your last visit? \" No    2. \"Have you seen or consulted any other health care providers outside of the [address] [place] [address] since your last visit? \" No     3. For patients aged 43-73: Has the patient had a colonoscopy / FIT/ Cologuard?  2022 anthony
HISTORY OF PRESENT ILLNESS   Elvia Felipe   is a 81 y.o.  female.   hx HTN hld asthma hx CVA-DM-2 osteopenia , hx PE morbid obesity ckd 3 depression     3 week fu asthma exacerbation, low BP ( amlodipine on hold) and large right leg blister unroofed in ER--xeroform guaze per [facility] and lasix started  Has only mild pain  Overall edema in right leg has improved  Has appt with wound clinic in 2 weeks      Saw PULM for asthma -on advair, using alb nebs prn but overall breathing better and not much wheezes    Has been referred to Hematology Dr Makenzie Briones for mild thrombocytopenia and anemia with slight drop in Hb to 9.8. had recent colonoscopty Has ACD due to CKD 3-Dr ZAYDA [place] decreased hearing in ears -left worse than right  Last OV  Here for LOUIE--admitted for asthma exacerbation and hypoxia  Cxr clear  Treated with nebs and steroids-duo neb on d/c, not on steroid now  Had advair and albuterol inhalers  PULM added o2 to cpap at night for obesity hypoventilation=had telemed fu Dr Ellyn Ross yesterday-will get bipap and 02 as needed     Some nocturnal confusion on neontin tid -lowered to qhs with improvement    toprol and dyazide were stopped due to low BP     No wheezing even PTA per pt  Still weak but feeling better  [facility] Ptworking with pt and nursing  Developed increased leg edema since return to home and large right LE bullae--has been off dyazide  Patient Active Problem List    Diagnosis Date Noted    Acute hypoxic respiratory failure ([address]) 10/05/2023    Type 2 diabetes mellitus with chronic kidney disease ([address]) 12/15/2022    CKD (chronic kidney disease) stage 3, GFR 30-59 ml/min ([address]) 03/10/2022    Major depressive disorder, recurrent, moderate ([address]) 12/10/2021    Major depressive disorder, recurrent, unspecified ([address]) 12/10/2021    Major depressive disorder, recurrent, mild ([address]) 12/10/2021    Type 2 diabetes mellitus with diabetic neuropathy ([address]) 04/29/2019    Type 2 diabetes with nephropathy ([address]) 12/14/2018
Nursing home

## 2025-07-08 ENCOUNTER — TELEPHONE (OUTPATIENT)
Age: 75
End: 2025-07-08

## 2025-07-08 DIAGNOSIS — E78.00 PURE HYPERCHOLESTEROLEMIA: ICD-10-CM

## 2025-07-08 RX ORDER — ROSUVASTATIN CALCIUM 20 MG/1
TABLET, COATED ORAL
Qty: 90 TABLET | Refills: 3 | Status: SHIPPED | OUTPATIENT
Start: 2025-07-08

## 2025-07-08 NOTE — TELEPHONE ENCOUNTER
Spoke with Shannan with [employer] [facility]    They needed patients last creatinine and weight     Information provided    They will need an updated RX    PCP: Robby Antoine MD    Last appt: 1/20/2025  Future Appointments   Date Time Provider Department Center   7/15/2025  1:20 PM [facility]   7/25/2025 11:00 AM Robby Antoine MD [facility]   9/17/2025 11:50 AM Pepito Ambriz MD [facility] BS [facility]   3/10/2026  2:00 PM Cornell Veliz MD C[facility]       Requested Prescriptions     Pending Prescriptions Disp Refills    rosuvastatin (CRESTOR) 20 MG tablet 90 tablet 3     Sig: TAKE 1 TABLET oral DAILY

## 2025-07-08 NOTE — TELEPHONE ENCOUNTER
Nathaly states would like a call from clinical staff in regards to rosuvastatin (CRESTOR) 20 MG tablet , states needs clarification on instructions.     Please call to discuss     Ref: [id number]

## 2025-07-15 ENCOUNTER — HOSPITAL ENCOUNTER (OUTPATIENT)
Facility: HOSPITAL | Age: 75
Discharge: HOME | End: 2025-07-18
Attending: INTERNAL MEDICINE
Payer: MEDICARE

## 2025-07-15 DIAGNOSIS — Z12.31 VISIT FOR SCREENING MAMMOGRAM: ICD-10-CM

## 2025-07-15 PROCEDURE — 77063 BREAST TOMOSYNTHESIS BI: CPT

## 2025-07-23 NOTE — PROGRESS NOTES
HISTORY OF PRESENT ILLNESS   Elvia Clifford   is a 74 y.o.  female.  Hx DM- 2 HTN hld asthma hx CVA-Diastolic CHF osteopenia , hx PE morbid obesity, BRENT on bipap obesity hypoventilation ckd 3- Dr ZAYDA Barboza depression  chronic anemia due to ckd and AWV-----here with her male friend   Endo Dr Ambriz--recent A1c 7.7 in May-improved on toujeo 30 u, humalog 20-30 units ac and jardiance 10mg  Has [id number] this AM  CARD-Dr Veliz  Nephro Dr ZAYDA Barboza-last cr 2.1  Pulm Dr Gómez-off 02, no recent asthma flare  Sleep Dr Carr on bipap at night-no lnet requiring 02  Watns to start water therapy    Home BP wnl    Due for colonoscopy 10/2025 -- 3polyps in [id number] Amarjit  Last  OV     Endo-Dr Ambriz last A1c 8.5 last month-advised carb reduction -on toujeo 30 u qd and lispro 20 u tid.. fsbs around 120 range now. Some low readings at night. Has CGM  PULM Dr Gómez--weaned off 02 in November for CHF but uses 02 hs with CPAP  Nephro Dr Barboza -had labs 11/2024 and fu next month--cr 1.1-1.4  Hem---IV iron tomorrow per Dr Barboza at infusion center /monthly x 3  CARD Dr Veliz--fu last month-stable on lasix 40 mg qd and weekly metolazone-weight 209 lbs in office last month     Not c/o sob or swelling  Usees rollator '  Lives alone , independent  Patient Active Problem List    Diagnosis Date Noted    Body mass index [BMI] 38.0-38.9, adult (Z68.38) 05/07/2025    Iron deficiency anemia secondary to blood loss (chronic) 01/16/2025    EPO-resistant anemia 01/15/2025    Iron deficiency anemia, unspecified 01/15/2025    Diastolic congestive heart failure, unspecified HF chronicity (HCC) 01/31/2024    Steroid-induced hyperglycemia 12/04/2023    Acute respiratory failure with hypoxia (HCC) 12/03/2023    Bilateral leg edema 11/28/2023    Acute hypoxic respiratory failure (HCC) 10/05/2023    Type 2 diabetes mellitus with chronic kidney disease (HCC) 12/15/2022    CKD (chronic kidney disease) stage 3, GFR 30-59 ml/min ([facility]) 03/10/2022    Major

## 2025-07-24 SDOH — HEALTH STABILITY: PHYSICAL HEALTH: ON AVERAGE, HOW MANY MINUTES DO YOU ENGAGE IN EXERCISE AT THIS LEVEL?: 10 MIN

## 2025-07-24 SDOH — HEALTH STABILITY: PHYSICAL HEALTH: ON AVERAGE, HOW MANY DAYS PER WEEK DO YOU ENGAGE IN MODERATE TO STRENUOUS EXERCISE (LIKE A BRISK WALK)?: 2 DAYS

## 2025-07-24 ASSESSMENT — PATIENT HEALTH QUESTIONNAIRE - PHQ9
1. LITTLE INTEREST OR PLEASURE IN DOING THINGS: NOT AT ALL
SUM OF ALL RESPONSES TO PHQ QUESTIONS 1-9: 0
2. FEELING DOWN, DEPRESSED OR HOPELESS: NOT AT ALL
SUM OF ALL RESPONSES TO PHQ QUESTIONS 1-9: 0

## 2025-07-24 ASSESSMENT — LIFESTYLE VARIABLES
HOW OFTEN DO YOU HAVE SIX OR MORE DRINKS ON ONE OCCASION: 1
HOW MANY STANDARD DRINKS CONTAINING ALCOHOL DO YOU HAVE ON A TYPICAL DAY: PATIENT DOES NOT DRINK
HOW OFTEN DO YOU HAVE A DRINK CONTAINING ALCOHOL: 1
HOW MANY STANDARD DRINKS CONTAINING ALCOHOL DO YOU HAVE ON A TYPICAL DAY: 0
HOW OFTEN DO YOU HAVE A DRINK CONTAINING ALCOHOL: NEVER

## 2025-07-25 ENCOUNTER — OFFICE VISIT (OUTPATIENT)
Age: 75
End: 2025-07-25
Payer: MEDICARE

## 2025-07-25 VITALS
DIASTOLIC BLOOD PRESSURE: 60 MMHG | HEART RATE: 59 BPM | SYSTOLIC BLOOD PRESSURE: 112 MMHG | BODY MASS INDEX: 38.24 KG/M2 | RESPIRATION RATE: 20 BRPM | OXYGEN SATURATION: 96 % | HEIGHT: 62 IN | WEIGHT: 207.8 LBS | TEMPERATURE: 97.7 F

## 2025-07-25 DIAGNOSIS — J45.909 UNCOMPLICATED ASTHMA, UNSPECIFIED ASTHMA SEVERITY, UNSPECIFIED WHETHER PERSISTENT: ICD-10-CM

## 2025-07-25 DIAGNOSIS — I10 HYPERTENSION, UNSPECIFIED TYPE: ICD-10-CM

## 2025-07-25 DIAGNOSIS — N18.4 CKD (CHRONIC KIDNEY DISEASE) STAGE 4, GFR 15-29 ML/MIN (HCC): ICD-10-CM

## 2025-07-25 DIAGNOSIS — Z00.00 MEDICARE ANNUAL WELLNESS VISIT, SUBSEQUENT: ICD-10-CM

## 2025-07-25 DIAGNOSIS — Z79.4 TYPE 2 DIABETES MELLITUS WITH OTHER SPECIFIED COMPLICATION, WITH LONG-TERM CURRENT USE OF INSULIN (HCC): Primary | ICD-10-CM

## 2025-07-25 DIAGNOSIS — E11.69 TYPE 2 DIABETES MELLITUS WITH OTHER SPECIFIED COMPLICATION, WITH LONG-TERM CURRENT USE OF INSULIN (HCC): Primary | ICD-10-CM

## 2025-07-25 DIAGNOSIS — E78.5 HYPERLIPIDEMIA, UNSPECIFIED HYPERLIPIDEMIA TYPE: ICD-10-CM

## 2025-07-25 DIAGNOSIS — J96.11 CHRONIC RESPIRATORY FAILURE WITH HYPOXIA (HCC): ICD-10-CM

## 2025-07-25 DIAGNOSIS — I50.30 DIASTOLIC CONGESTIVE HEART FAILURE, UNSPECIFIED HF CHRONICITY (HCC): ICD-10-CM

## 2025-07-25 PROCEDURE — 1123F ACP DISCUSS/DSCN MKR DOCD: CPT | Performed by: INTERNAL MEDICINE

## 2025-07-25 PROCEDURE — 2022F DILAT RTA XM EVC RTNOPTHY: CPT | Performed by: INTERNAL MEDICINE

## 2025-07-25 PROCEDURE — G8427 DOCREV CUR MEDS BY ELIG CLIN: HCPCS | Performed by: INTERNAL MEDICINE

## 2025-07-25 PROCEDURE — 1036F TOBACCO NON-USER: CPT | Performed by: INTERNAL MEDICINE

## 2025-07-25 PROCEDURE — G8417 CALC BMI ABV UP PARAM F/U: HCPCS | Performed by: INTERNAL MEDICINE

## 2025-07-25 PROCEDURE — 1090F PRES/ABSN URINE INCON ASSESS: CPT | Performed by: INTERNAL MEDICINE

## 2025-07-25 PROCEDURE — G0439 PPPS, SUBSEQ VISIT: HCPCS | Performed by: INTERNAL MEDICINE

## 2025-07-25 PROCEDURE — G8399 PT W/DXA RESULTS DOCUMENT: HCPCS | Performed by: INTERNAL MEDICINE

## 2025-07-25 PROCEDURE — 3051F HG A1C>EQUAL 7.0%<8.0%: CPT | Performed by: INTERNAL MEDICINE

## 2025-07-25 PROCEDURE — 99214 OFFICE O/P EST MOD 30 MIN: CPT | Performed by: INTERNAL MEDICINE

## 2025-07-25 PROCEDURE — 1159F MED LIST DOCD IN RCRD: CPT | Performed by: INTERNAL MEDICINE

## 2025-07-25 PROCEDURE — 3078F DIAST BP <80 MM HG: CPT | Performed by: INTERNAL MEDICINE

## 2025-07-25 PROCEDURE — 3017F COLORECTAL CA SCREEN DOC REV: CPT | Performed by: INTERNAL MEDICINE

## 2025-07-25 PROCEDURE — 3046F HEMOGLOBIN A1C LEVEL >9.0%: CPT | Performed by: INTERNAL MEDICINE

## 2025-07-25 PROCEDURE — 3074F SYST BP LT 130 MM HG: CPT | Performed by: INTERNAL MEDICINE

## 2025-07-25 RX ORDER — BUMETANIDE 2 MG/1
2 TABLET ORAL DAILY
COMMUNITY
Start: 2025-06-07

## 2025-07-25 RX ORDER — POTASSIUM CHLORIDE 1500 MG/1
TABLET, EXTENDED RELEASE ORAL
COMMUNITY
Start: 2025-05-21

## 2025-07-25 SDOH — ECONOMIC STABILITY: FOOD INSECURITY: WITHIN THE PAST 12 MONTHS, YOU WORRIED THAT YOUR FOOD WOULD RUN OUT BEFORE YOU GOT MONEY TO BUY MORE.: NEVER TRUE

## 2025-07-25 SDOH — ECONOMIC STABILITY: FOOD INSECURITY: WITHIN THE PAST 12 MONTHS, THE FOOD YOU BOUGHT JUST DIDN'T LAST AND YOU DIDN'T HAVE MONEY TO GET MORE.: NEVER TRUE

## 2025-07-25 ASSESSMENT — PATIENT HEALTH QUESTIONNAIRE - PHQ9
SUM OF ALL RESPONSES TO PHQ QUESTIONS 1-9: 0
1. LITTLE INTEREST OR PLEASURE IN DOING THINGS: NOT AT ALL
10. IF YOU CHECKED OFF ANY PROBLEMS, HOW DIFFICULT HAVE THESE PROBLEMS MADE IT FOR YOU TO DO YOUR WORK, TAKE CARE OF THINGS AT HOME, OR GET ALONG WITH OTHER PEOPLE: NOT DIFFICULT AT ALL
3. TROUBLE FALLING OR STAYING ASLEEP: NOT AT ALL
2. FEELING DOWN, DEPRESSED OR HOPELESS: NOT AT ALL
7. TROUBLE CONCENTRATING ON THINGS, SUCH AS READING THE NEWSPAPER OR WATCHING TELEVISION: NOT AT ALL
SUM OF ALL RESPONSES TO PHQ QUESTIONS 1-9: 0
SUM OF ALL RESPONSES TO PHQ QUESTIONS 1-9: 0
8. MOVING OR SPEAKING SO SLOWLY THAT OTHER PEOPLE COULD HAVE NOTICED. OR THE OPPOSITE, BEING SO FIGETY OR RESTLESS THAT YOU HAVE BEEN MOVING AROUND A LOT MORE THAN USUAL: NOT AT ALL
9. THOUGHTS THAT YOU WOULD BE BETTER OFF DEAD, OR OF HURTING YOURSELF: NOT AT ALL
SUM OF ALL RESPONSES TO PHQ QUESTIONS 1-9: 0
5. POOR APPETITE OR OVEREATING: NOT AT ALL
6. FEELING BAD ABOUT YOURSELF - OR THAT YOU ARE A FAILURE OR HAVE LET YOURSELF OR YOUR FAMILY DOWN: NOT AT ALL
4. FEELING TIRED OR HAVING LITTLE ENERGY: NOT AT ALL

## 2025-07-25 NOTE — PATIENT INSTRUCTIONS
on any new medications the person is taking.  Encourage a balanced diet that includes whole grains, vegetables, and fruits, and that is low in saturated fat and sodium.  Encourage the person you're caring for not to use tobacco products. They can affect dental and general health.  Many older adults have a fixed income and feel that they can't afford dental care. But most towns and cities have programs in which dentists help older adults by lowering fees. Contact your area's public health offices or  for information about dental care in your area.  Using a toothbrush  Older adults with arthritis sometimes have trouble brushing their teeth because they can't easily hold the toothbrush. Their hands and fingers may be stiff, painful, or weak. If this is the case, you can:  Offer an electric toothbrush.  Enlarge the handle of a non-electric toothbrush by wrapping a sponge, an elastic bandage, or adhesive tape around it.  Push the toothbrush handle through a ball made of rubber or soft foam.  Make the handle longer and thicker by taping Popsicle sticks or tongue depressors to it.  You may also be able to buy special toothbrushes, toothpaste dispensers, and floss holders.  Your doctor may recommend a soft-bristle toothbrush if the person you care for bleeds easily. Bleeding can happen because of a health problem or from certain medicines.  A toothpaste for sensitive teeth may help if the person you care for has sensitive teeth.  How do you brush and floss someone's teeth?  If the person you are caring for has a hard time cleaning their teeth on their own, you may need to brush and floss their teeth for them. It may be easiest to have the person sit and face away from you, and to sit or stand behind them. That way you can steady their head against your arm as you reach around to floss and brush their teeth. Choose a place that has good lighting and is comfortable for both of you.  Before you begin, gather

## 2025-07-25 NOTE — PROGRESS NOTES
Have you been to the ER, urgent care clinic since your last visit?  Hospitalized since your last visit?   NO    Have you seen or consulted any other health care providers outside our system since your last visit?   NO      “Have you had a diabetic eye exam?”    Yes, [facility], not sure which location// 7/2025    Date of last diabetic eye exam: 5/9/2024

## 2025-07-26 LAB
CHOLEST SERPL-MCNC: 170 MG/DL
HDLC SERPL-MCNC: 67 MG/DL
HDLC SERPL: 2.5 (ref 0–5)
LDLC SERPL CALC-MCNC: 67.8 MG/DL (ref 0–100)
TRIGL SERPL-MCNC: 176 MG/DL
TSH SERPL DL<=0.05 MIU/L-ACNC: 0.32 UIU/ML (ref 0.36–3.74)
VLDLC SERPL CALC-MCNC: 35.2 MG/DL

## 2025-07-27 RX ORDER — LEVOTHYROXINE SODIUM 125 UG/1
125 TABLET ORAL DAILY
Qty: 90 TABLET | Refills: 3 | Status: SHIPPED | OUTPATIENT
Start: 2025-07-27

## (undated) DEVICE — NON-REM POLYHESIVE PATIENT RETURN ELECTRODE: Brand: VALLEYLAB

## (undated) DEVICE — BASIN EMSIS 16OZ GRAPHITE PLAS KID SHP MOLD GRAD FOR ORAL

## (undated) DEVICE — SYR 10ML LUER LOK 1/5ML GRAD --

## (undated) DEVICE — ELECTRODE,RADIOTRANSLUCENT,FOAM,5PK: Brand: MEDLINE

## (undated) DEVICE — CATH IV AUTOGRD BC PNK 20GA 25 -- INSYTE

## (undated) DEVICE — SNARE ENDOSCP M L240CM W27MM SHTH DIA2.4MM CHN 2.8MM OVL

## (undated) DEVICE — SET ADMIN 16ML TBNG L100IN 2 Y INJ SITE IV PIGGY BK DISP (ORDER IN MULIPLES OF 48)

## (undated) DEVICE — CONTAINER SPEC 20 ML LID NEUT BUFF FORMALIN 10 % POLYPR STS

## (undated) DEVICE — SYR 3ML LL TIP 1/10ML GRAD --

## (undated) DEVICE — HYPODERMIC SAFETY NEEDLE: Brand: MAGELLAN

## (undated) DEVICE — STRAINER URIN CALC RNL MSH -- CONVERT TO ITEM 357634

## (undated) DEVICE — Device

## (undated) DEVICE — Z DISCONTINUED PER MEDLINE LINE GAS SAMPLING O2/CO2 LNG AD 13 FT NSL W/ TBNG FILTERLINE

## (undated) DEVICE — NEONATAL-ADULT SPO2 SENSOR: Brand: NELLCOR

## (undated) DEVICE — 1200 GUARD II KIT W/5MM TUBE W/O VAC TUBE: Brand: GUARDIAN

## (undated) DEVICE — SOLIDIFIER FLD 2OZ 1500CC N DISINF IN BTL DISP SAFESORB

## (undated) DEVICE — TOWEL 4 PLY TISS 19X30 SUE WHT

## (undated) DEVICE — TRAP,MUCUS SPECIMEN, 80CC: Brand: MEDLINE